# Patient Record
Sex: MALE | Race: OTHER | HISPANIC OR LATINO | URBAN - METROPOLITAN AREA
[De-identification: names, ages, dates, MRNs, and addresses within clinical notes are randomized per-mention and may not be internally consistent; named-entity substitution may affect disease eponyms.]

---

## 2017-07-11 ENCOUNTER — EMERGENCY (EMERGENCY)
Facility: HOSPITAL | Age: 37
LOS: 1 days | Discharge: HOME | End: 2017-07-11
Attending: EMERGENCY MEDICINE | Admitting: EMERGENCY MEDICINE
Payer: MEDICAID

## 2017-07-11 VITALS
HEART RATE: 84 BPM | DIASTOLIC BLOOD PRESSURE: 90 MMHG | SYSTOLIC BLOOD PRESSURE: 138 MMHG | TEMPERATURE: 98 F | OXYGEN SATURATION: 96 % | RESPIRATION RATE: 18 BRPM

## 2017-07-11 LAB
ALBUMIN SERPL ELPH-MCNC: 4.2 G/DL — SIGNIFICANT CHANGE UP (ref 3.3–5)
ALP SERPL-CCNC: 65 U/L — SIGNIFICANT CHANGE UP (ref 40–120)
ALT FLD-CCNC: 52 U/L RC — HIGH (ref 10–45)
ANION GAP SERPL CALC-SCNC: 11 MMOL/L — SIGNIFICANT CHANGE UP (ref 5–17)
APPEARANCE UR: CLEAR — SIGNIFICANT CHANGE UP
AST SERPL-CCNC: 28 U/L — SIGNIFICANT CHANGE UP (ref 10–40)
BACTERIA # UR AUTO: ABNORMAL /HPF
BASOPHILS # BLD AUTO: 0 K/UL — SIGNIFICANT CHANGE UP (ref 0–0.2)
BASOPHILS NFR BLD AUTO: 0.2 % — SIGNIFICANT CHANGE UP (ref 0–2)
BILIRUB SERPL-MCNC: 0.3 MG/DL — SIGNIFICANT CHANGE UP (ref 0.2–1.2)
BILIRUB UR-MCNC: NEGATIVE — SIGNIFICANT CHANGE UP
BUN SERPL-MCNC: 16 MG/DL — SIGNIFICANT CHANGE UP (ref 7–23)
CALCIUM SERPL-MCNC: 9.2 MG/DL — SIGNIFICANT CHANGE UP (ref 8.4–10.5)
CHLORIDE SERPL-SCNC: 104 MMOL/L — SIGNIFICANT CHANGE UP (ref 96–108)
CO2 SERPL-SCNC: 27 MMOL/L — SIGNIFICANT CHANGE UP (ref 22–31)
COLOR SPEC: YELLOW — SIGNIFICANT CHANGE UP
CREAT SERPL-MCNC: 0.89 MG/DL — SIGNIFICANT CHANGE UP (ref 0.5–1.3)
DIFF PNL FLD: NEGATIVE — SIGNIFICANT CHANGE UP
EOSINOPHIL # BLD AUTO: 0.1 K/UL — SIGNIFICANT CHANGE UP (ref 0–0.5)
EOSINOPHIL NFR BLD AUTO: 0.5 % — SIGNIFICANT CHANGE UP (ref 0–6)
EPI CELLS # UR: SIGNIFICANT CHANGE UP /HPF
GLUCOSE SERPL-MCNC: 97 MG/DL — SIGNIFICANT CHANGE UP (ref 70–99)
GLUCOSE UR QL: NEGATIVE — SIGNIFICANT CHANGE UP
HCT VFR BLD CALC: 46.6 % — SIGNIFICANT CHANGE UP (ref 39–50)
HGB BLD-MCNC: 16 G/DL — SIGNIFICANT CHANGE UP (ref 13–17)
KETONES UR-MCNC: NEGATIVE — SIGNIFICANT CHANGE UP
LEUKOCYTE ESTERASE UR-ACNC: ABNORMAL
LYMPHOCYTES # BLD AUTO: 21.9 % — SIGNIFICANT CHANGE UP (ref 13–44)
LYMPHOCYTES # BLD AUTO: 3.1 K/UL — SIGNIFICANT CHANGE UP (ref 1–3.3)
MCHC RBC-ENTMCNC: 33.3 PG — SIGNIFICANT CHANGE UP (ref 27–34)
MCHC RBC-ENTMCNC: 34.4 GM/DL — SIGNIFICANT CHANGE UP (ref 32–36)
MCV RBC AUTO: 96.9 FL — SIGNIFICANT CHANGE UP (ref 80–100)
MONOCYTES # BLD AUTO: 1.2 K/UL — HIGH (ref 0–0.9)
MONOCYTES NFR BLD AUTO: 8.1 % — SIGNIFICANT CHANGE UP (ref 2–14)
NEUTROPHILS # BLD AUTO: 9.8 K/UL — HIGH (ref 1.8–7.4)
NEUTROPHILS NFR BLD AUTO: 69.2 % — SIGNIFICANT CHANGE UP (ref 43–77)
NITRITE UR-MCNC: NEGATIVE — SIGNIFICANT CHANGE UP
PH UR: 6 — SIGNIFICANT CHANGE UP (ref 5–8)
PLATELET # BLD AUTO: 220 K/UL — SIGNIFICANT CHANGE UP (ref 150–400)
POTASSIUM SERPL-MCNC: 4.2 MMOL/L — SIGNIFICANT CHANGE UP (ref 3.5–5.3)
POTASSIUM SERPL-SCNC: 4.2 MMOL/L — SIGNIFICANT CHANGE UP (ref 3.5–5.3)
PROT SERPL-MCNC: 7.5 G/DL — SIGNIFICANT CHANGE UP (ref 6–8.3)
PROT UR-MCNC: 30 MG/DL
RBC # BLD: 4.81 M/UL — SIGNIFICANT CHANGE UP (ref 4.2–5.8)
RBC # FLD: 11.4 % — SIGNIFICANT CHANGE UP (ref 10.3–14.5)
RBC CASTS # UR COMP ASSIST: SIGNIFICANT CHANGE UP /HPF (ref 0–2)
SODIUM SERPL-SCNC: 142 MMOL/L — SIGNIFICANT CHANGE UP (ref 135–145)
SP GR SPEC: >1.03 — HIGH (ref 1.01–1.02)
UROBILINOGEN FLD QL: NEGATIVE — SIGNIFICANT CHANGE UP
WBC # BLD: 14.2 K/UL — HIGH (ref 3.8–10.5)
WBC # FLD AUTO: 14.2 K/UL — HIGH (ref 3.8–10.5)
WBC UR QL: ABNORMAL /HPF (ref 0–5)

## 2017-07-11 PROCEDURE — 76870 US EXAM SCROTUM: CPT | Mod: 26

## 2017-07-11 PROCEDURE — 99285 EMERGENCY DEPT VISIT HI MDM: CPT

## 2017-07-11 PROCEDURE — 93975 VASCULAR STUDY: CPT | Mod: 26

## 2017-07-11 RX ORDER — CEFTRIAXONE 500 MG/1
1 INJECTION, POWDER, FOR SOLUTION INTRAMUSCULAR; INTRAVENOUS EVERY 24 HOURS
Qty: 0 | Refills: 0 | Status: DISCONTINUED | OUTPATIENT
Start: 2017-07-11 | End: 2017-07-15

## 2017-07-11 RX ADMIN — CEFTRIAXONE 100 GRAM(S): 500 INJECTION, POWDER, FOR SOLUTION INTRAMUSCULAR; INTRAVENOUS at 23:57

## 2017-07-11 NOTE — ED PROVIDER NOTE - CARE PLAN
Principal Discharge DX:	Malignant neoplasm of descended left testis  Instructions for follow-up, activity and diet:	- Please follow up with urology.

## 2017-07-11 NOTE — ED PROVIDER NOTE - OBJECTIVE STATEMENT
36 y old male with a history of chronic back pain for I h after mild accident ,follow up by pain control doctor ,has been on oxycodon ,muscle relaxant ,came in complains of  pain and swelling in his l testicle on and off for several years ,for the last month pain increase and fees  what testicle is double size .Not sexually active ,denies penis discharge urinary frequency and urgency ,no fever or chills

## 2017-07-11 NOTE — ED ADULT NURSE NOTE - OBJECTIVE STATEMENT
36yr male presented to ED c/o L testicular pain.  Pt has no significant medical hx.  pt reports L testicular pain on and off for the past 1 week with the pain increasing the past 2 days, Pt reports the testicle is swollen as well, pt reports additional back pain with pain radiating to the L testicle.  Pt presents a&ox4, no n/v/d, no sob, no chest pain, no dizziness, no blood in urine or stool, no burning with urination, no problems with bowel movement, abdomin soft non distended, non tender to the touch, skin warm dry & intact.

## 2017-07-11 NOTE — ED PROVIDER NOTE - MEDICAL DECISION MAKING DETAILS
Pt is a 36 year old M with no PMH that presents with testicular pain and swelling. He is not sexually active. US of the testicle was ordered and results were consistent with testicular cancer. Urology was consulted and they suggested a LDH, AFP, and HCG. They also suggested a CXR. Pt is ready for d/c with outpt follow up. Pt is a 36 year old M with no PMH that presents with testicular pain and swelling for several y which is worst recently . He is not sexually active. ro hydrocelle ro malignancy  will obtain  US of the testicle   also blood test and ua   cons and on reevaluation: KRYSTAL

## 2017-07-11 NOTE — ED PROVIDER NOTE - GENITOURINARY, MLM
enlarged left testicle. enlarged left testicle.very hard and tender no inguinals lymph node enlargement

## 2017-07-11 NOTE — ED ADULT NURSE NOTE - CHPI ED SYMPTOMS NEG
no hematuria/no nausea/no burning urination/no chills/no fever/no dysuria/no diarrhea/no abdominal distension/no vomiting/no blood in stool

## 2017-07-11 NOTE — ED PROVIDER NOTE - PROGRESS NOTE DETAILS
ATTENDING MD Honorio: ultrasound results c/w malignancy, resident is consulting urology Urology c/s appreciated. Pt to follow up as outpt with Dr. Restrepo. Labs and CXR performed.

## 2017-07-12 VITALS
OXYGEN SATURATION: 98 % | DIASTOLIC BLOOD PRESSURE: 63 MMHG | SYSTOLIC BLOOD PRESSURE: 106 MMHG | HEART RATE: 73 BPM | RESPIRATION RATE: 17 BRPM

## 2017-07-12 PROBLEM — Z00.00 ENCOUNTER FOR PREVENTIVE HEALTH EXAMINATION: Noted: 2017-07-12

## 2017-07-12 LAB
AFP-TM SERPL-MCNC: 25.5 NG/ML — HIGH
HCG SERPL-ACNC: <2 MIU/ML — SIGNIFICANT CHANGE UP
LDH SERPL L TO P-CCNC: 146 U/L — SIGNIFICANT CHANGE UP (ref 50–242)

## 2017-07-12 PROCEDURE — 71010: CPT | Mod: 26

## 2017-07-12 PROCEDURE — 83615 LACTATE (LD) (LDH) ENZYME: CPT

## 2017-07-12 PROCEDURE — 71045 X-RAY EXAM CHEST 1 VIEW: CPT

## 2017-07-12 PROCEDURE — 85027 COMPLETE CBC AUTOMATED: CPT

## 2017-07-12 PROCEDURE — 96374 THER/PROPH/DIAG INJ IV PUSH: CPT

## 2017-07-12 PROCEDURE — 87086 URINE CULTURE/COLONY COUNT: CPT

## 2017-07-12 PROCEDURE — 76870 US EXAM SCROTUM: CPT

## 2017-07-12 PROCEDURE — 81001 URINALYSIS AUTO W/SCOPE: CPT

## 2017-07-12 PROCEDURE — 93975 VASCULAR STUDY: CPT

## 2017-07-12 PROCEDURE — 84702 CHORIONIC GONADOTROPIN TEST: CPT

## 2017-07-12 PROCEDURE — 99284 EMERGENCY DEPT VISIT MOD MDM: CPT | Mod: 25

## 2017-07-12 PROCEDURE — 82105 ALPHA-FETOPROTEIN SERUM: CPT

## 2017-07-12 PROCEDURE — 80053 COMPREHEN METABOLIC PANEL: CPT

## 2017-07-12 NOTE — CONSULT NOTE ADULT - ASSESSMENT
36 y.o. M with testicular mass   - tumor markers: LDH, AFP, Bhcg  - CXR  - f/u outpatient with urologist Dr. Phong Restrepo at the [facility] [phone]  - d/w Jono Arana, urology resident

## 2017-07-12 NOTE — CONSULT NOTE ADULT - SUBJECTIVE AND OBJECTIVE BOX
Urology PA Consult Note    HPI:  Patient is a 36y Male with no significant PMHx presents today with 3 week history of L testicular pain and swelling. Pt states L testicular pain radiating to lower back and swelling has been on and off for the past 3 weeks, but has constant for the past week. States in the past few years has had pain and swelling on and off which resolved. Pt reports he is able to void without any issue, denies urinary urgency or frequency. 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 Pt denies family history of testicular Ca. Denies penile discharge, denies current sexual activity, dysuria, hematuria, fevers/chills, n/v, chest pain or SOB.      PAST MEDICAL & SURGICAL HISTORY:  No pertinent past medical history  No significant past surgical history    MEDICATIONS  (STANDING):  cefTRIAXone   IVPB 1 Gram(s) IV Intermittent every 24 hours    MEDICATIONS  (PRN):    FAMILY HISTORY:    Allergies    No Known Allergies    Intolerances      REVIEW OF SYSTEMS: Pertinent positives and negatives as stated in HPI, otherwise negative    Vital signs  T(C): 36.9 (07-11-17 @ 18:34), Max: 36.9 (07-11-17 @ 18:34)  HR: 74 (07-12-17 @ 00:20)  BP: 110/67 (07-12-17 @ 00:20)  SpO2: 96% (07-12-17 @ 00:20)  Wt(kg): --    Output  I&O's Summary    UOP    Physical Exam  Gen: NAD, A&O x 3  Pulm: Nonlabored breathing  CV: RRR  Abd: Soft, NT/ND  : Uncircumcised, no lesions.  No discharge or blood at urethral meatus.  Testes descended bilaterally.  L testicular swelling with hard, tender, nonmobile mass palpated.     Back: no cva tenderness b/l  Ext: No edema present b/l    LABS:      07-11 @ 20:05    WBC 14.2  / Hct 46.6  / SCr 0.89     07-11    142  |  104  |  16  ----------------------------<  97  4.2   |  27  |  0.89    Ca    9.2      11 Jul 2017 20:05    TPro  7.5  /  Alb  4.2  /  TBili  0.3  /  DBili  x   /  AST  28  /  ALT  52<H>  /  AlkPhos  65  07-11      Urinalysis Basic - ( 11 Jul 2017 20:47 )    Color: Yellow / Appearance: Clear / SG: >1.030 / pH: x  Gluc: x / Ketone: Negative  / Bili: Negative / Urobili: Negative   Blood: x / Protein: 30 mg/dL / Nitrite: Negative   Leuk Esterase: Moderate / RBC: 0-2 /HPF / WBC 5-10 /HPF   Sq Epi: x / Non Sq Epi: x / Bacteria: Few /HPF        Cultures: pending    RADIOLOGY:    Testicular U/S FINDINGS:      RIGHT:  Right testis: 4.6 x 2.6 x 3.3 cm. Normal echogenicity and echotexture   with no masses or areas of architectural distortion. Normal blood flow   pattern.  Right epididymis: Within normal limits.  Right hydrocele: Small hydrocele.  Right varicocele: None.      LEFT:   Left testis: 5.7 x 4.3 x 4.3 cm. Within the left testicle there is a   heterogeneous mass measuring 3.8 x 3.8 x 3.7 cm with mixed cystic and   solid areas. The mass demonstrates internal vascular flow.  Left epididymis: Within normal limits.  Left hydrocele: Moderate hydrocele.  Left varicocele: None.    IMPRESSION:   Mixed cystic and solid left testicular mass compatible with malignancy.   Follow-up CT may be obtained to evaluate for adenopathy.  Moderate right and small left hydroceles. Urology PA Consult Note    HPI:  Patient is a 36y Male with no significant PMHx presents today with 3 week history of L testicular pain and swelling. Pt states L testicular pain radiating to lower back and swelling has been on and off for the past 3 weeks, but has remained constant for the past week. States in the past few years has had pain and swelling on and off which resolved. Pt reports he is able to void without any issue, denies urinary urgency or frequency. 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 Pt denies family history of testicular Ca. Denies penile discharge, denies current sexual activity, dysuria, hematuria, fevers/chills, n/v, chest pain or SOB.      PAST MEDICAL & SURGICAL HISTORY:  No pertinent past medical history  No significant past surgical history    MEDICATIONS  (STANDING):  cefTRIAXone   IVPB 1 Gram(s) IV Intermittent every 24 hours    MEDICATIONS  (PRN):    FAMILY HISTORY:    Allergies    No Known Allergies    Intolerances      REVIEW OF SYSTEMS: Pertinent positives and negatives as stated in HPI, otherwise negative    Vital signs  T(C): 36.9 (07-11-17 @ 18:34), Max: 36.9 (07-11-17 @ 18:34)  HR: 74 (07-12-17 @ 00:20)  BP: 110/67 (07-12-17 @ 00:20)  SpO2: 96% (07-12-17 @ 00:20)  Wt(kg): --    Output  I&O's Summary    UOP    Physical Exam  Gen: NAD, A&O x 3  Pulm: Nonlabored breathing  CV: RRR  Abd: Soft, NT/ND  : Uncircumcised, no lesions.  No discharge or blood at urethral meatus.  Testes descended bilaterally.  L testicular swelling with hard, tender, nonmobile mass palpated.     Back: no cva tenderness b/l  Ext: No edema present b/l    LABS:      07-11 @ 20:05    WBC 14.2  / Hct 46.6  / SCr 0.89     07-11    142  |  104  |  16  ----------------------------<  97  4.2   |  27  |  0.89    Ca    9.2      11 Jul 2017 20:05    TPro  7.5  /  Alb  4.2  /  TBili  0.3  /  DBili  x   /  AST  28  /  ALT  52<H>  /  AlkPhos  65  07-11      Urinalysis Basic - ( 11 Jul 2017 20:47 )    Color: Yellow / Appearance: Clear / SG: >1.030 / pH: x  Gluc: x / Ketone: Negative  / Bili: Negative / Urobili: Negative   Blood: x / Protein: 30 mg/dL / Nitrite: Negative   Leuk Esterase: Moderate / RBC: 0-2 /HPF / WBC 5-10 /HPF   Sq Epi: x / Non Sq Epi: x / Bacteria: Few /HPF        Cultures: pending    RADIOLOGY:    Testicular U/S FINDINGS:      RIGHT:  Right testis: 4.6 x 2.6 x 3.3 cm. Normal echogenicity and echotexture   with no masses or areas of architectural distortion. Normal blood flow   pattern.  Right epididymis: Within normal limits.  Right hydrocele: Small hydrocele.  Right varicocele: None.      LEFT:   Left testis: 5.7 x 4.3 x 4.3 cm. Within the left testicle there is a   heterogeneous mass measuring 3.8 x 3.8 x 3.7 cm with mixed cystic and   solid areas. The mass demonstrates internal vascular flow.  Left epididymis: Within normal limits.  Left hydrocele: Moderate hydrocele.  Left varicocele: None.    IMPRESSION:   Mixed cystic and solid left testicular mass compatible with malignancy.   Follow-up CT may be obtained to evaluate for adenopathy.  Moderate right and small left hydroceles.

## 2017-07-12 NOTE — ED ADULT NURSE REASSESSMENT NOTE - NS ED NURSE REASSESS COMMENT FT1
Pt resting in room with family at bedside, no complaints at this time, VSS, IV site clear fluids running, a&ox4, waiting for plan of care.

## 2017-07-12 NOTE — ED ADULT NURSE REASSESSMENT NOTE - NS ED NURSE REASSESS COMMENT FT1
Pt discharged received written & verbal instructions, Pt verbalized understanding, IV d/c site clear no redness or swelling, VSS, a&ox4.

## 2017-07-13 ENCOUNTER — APPOINTMENT (OUTPATIENT)
Dept: UROLOGY | Facility: CLINIC | Age: 37
End: 2017-07-13

## 2017-07-13 DIAGNOSIS — Z84.1 FAMILY HISTORY OF DISORDERS OF KIDNEY AND URETER: ICD-10-CM

## 2017-07-13 LAB
CULTURE RESULTS: SIGNIFICANT CHANGE UP
SPECIMEN SOURCE: SIGNIFICANT CHANGE UP

## 2017-07-15 ENCOUNTER — OUTPATIENT (OUTPATIENT)
Dept: OUTPATIENT SERVICES | Facility: HOSPITAL | Age: 37
LOS: 1 days | End: 2017-07-15
Payer: MEDICAID

## 2017-07-15 ENCOUNTER — APPOINTMENT (OUTPATIENT)
Dept: CT IMAGING | Facility: CLINIC | Age: 37
End: 2017-07-15

## 2017-07-15 DIAGNOSIS — Z80.42 FAMILY HISTORY OF MALIGNANT NEOPLASM OF PROSTATE: ICD-10-CM

## 2017-07-15 PROCEDURE — 74177 CT ABD & PELVIS W/CONTRAST: CPT

## 2017-07-17 ENCOUNTER — OUTPATIENT (OUTPATIENT)
Dept: OUTPATIENT SERVICES | Facility: HOSPITAL | Age: 37
LOS: 1 days | End: 2017-07-17

## 2017-07-17 VITALS
HEIGHT: 72 IN | DIASTOLIC BLOOD PRESSURE: 74 MMHG | SYSTOLIC BLOOD PRESSURE: 102 MMHG | HEART RATE: 80 BPM | TEMPERATURE: 97 F | WEIGHT: 223.11 LBS | RESPIRATION RATE: 16 BRPM

## 2017-07-17 DIAGNOSIS — N50.9 DISORDER OF MALE GENITAL ORGANS, UNSPECIFIED: ICD-10-CM

## 2017-07-17 LAB
ALBUMIN SERPL ELPH-MCNC: 4.3 G/DL — SIGNIFICANT CHANGE UP (ref 3.3–5)
ALP SERPL-CCNC: 62 U/L — SIGNIFICANT CHANGE UP (ref 40–120)
ALT FLD-CCNC: 55 U/L — HIGH (ref 4–41)
APPEARANCE UR: CLEAR — SIGNIFICANT CHANGE UP
AST SERPL-CCNC: 31 U/L — SIGNIFICANT CHANGE UP (ref 4–40)
BACTERIA # UR AUTO: SIGNIFICANT CHANGE UP
BILIRUB SERPL-MCNC: 0.3 MG/DL — SIGNIFICANT CHANGE UP (ref 0.2–1.2)
BILIRUB UR-MCNC: NEGATIVE — SIGNIFICANT CHANGE UP
BLOOD UR QL VISUAL: NEGATIVE — SIGNIFICANT CHANGE UP
BUN SERPL-MCNC: 16 MG/DL — SIGNIFICANT CHANGE UP (ref 7–23)
CALCIUM SERPL-MCNC: 9.2 MG/DL — SIGNIFICANT CHANGE UP (ref 8.4–10.5)
CHLORIDE SERPL-SCNC: 101 MMOL/L — SIGNIFICANT CHANGE UP (ref 98–107)
CO2 SERPL-SCNC: 29 MMOL/L — SIGNIFICANT CHANGE UP (ref 22–31)
COLOR SPEC: YELLOW — SIGNIFICANT CHANGE UP
CREAT SERPL-MCNC: 0.84 MG/DL — SIGNIFICANT CHANGE UP (ref 0.5–1.3)
GLUCOSE SERPL-MCNC: 97 MG/DL — SIGNIFICANT CHANGE UP (ref 70–99)
GLUCOSE UR-MCNC: NEGATIVE — SIGNIFICANT CHANGE UP
HCT VFR BLD CALC: 46.5 % — SIGNIFICANT CHANGE UP (ref 39–50)
HGB BLD-MCNC: 15.6 G/DL — SIGNIFICANT CHANGE UP (ref 13–17)
KETONES UR-MCNC: NEGATIVE — SIGNIFICANT CHANGE UP
LEUKOCYTE ESTERASE UR-ACNC: NEGATIVE — SIGNIFICANT CHANGE UP
MCHC RBC-ENTMCNC: 31.3 PG — SIGNIFICANT CHANGE UP (ref 27–34)
MCHC RBC-ENTMCNC: 33.5 % — SIGNIFICANT CHANGE UP (ref 32–36)
MCV RBC AUTO: 93.2 FL — SIGNIFICANT CHANGE UP (ref 80–100)
MUCOUS THREADS # UR AUTO: SIGNIFICANT CHANGE UP
NITRITE UR-MCNC: NEGATIVE — SIGNIFICANT CHANGE UP
NON-SQ EPI CELLS # UR AUTO: <1 — SIGNIFICANT CHANGE UP
NRBC # FLD: 0 — SIGNIFICANT CHANGE UP
PH UR: 6 — SIGNIFICANT CHANGE UP (ref 4.6–8)
PLATELET # BLD AUTO: 227 K/UL — SIGNIFICANT CHANGE UP (ref 150–400)
PMV BLD: 10.1 FL — SIGNIFICANT CHANGE UP (ref 7–13)
POTASSIUM SERPL-MCNC: 4.5 MMOL/L — SIGNIFICANT CHANGE UP (ref 3.5–5.3)
POTASSIUM SERPL-SCNC: 4.5 MMOL/L — SIGNIFICANT CHANGE UP (ref 3.5–5.3)
PROT SERPL-MCNC: 7.7 G/DL — SIGNIFICANT CHANGE UP (ref 6–8.3)
PROT UR-MCNC: 20 — SIGNIFICANT CHANGE UP
RBC # BLD: 4.99 M/UL — SIGNIFICANT CHANGE UP (ref 4.2–5.8)
RBC # FLD: 12.3 % — SIGNIFICANT CHANGE UP (ref 10.3–14.5)
RBC CASTS # UR COMP ASSIST: SIGNIFICANT CHANGE UP (ref 0–?)
SODIUM SERPL-SCNC: 141 MMOL/L — SIGNIFICANT CHANGE UP (ref 135–145)
SP GR SPEC: 1.03 — SIGNIFICANT CHANGE UP (ref 1–1.03)
SQUAMOUS # UR AUTO: SIGNIFICANT CHANGE UP
UROBILINOGEN FLD QL: NORMAL E.U. — SIGNIFICANT CHANGE UP (ref 0.1–0.2)
WBC # BLD: 7.36 K/UL — SIGNIFICANT CHANGE UP (ref 3.8–10.5)
WBC # FLD AUTO: 7.36 K/UL — SIGNIFICANT CHANGE UP (ref 3.8–10.5)
WBC UR QL: SIGNIFICANT CHANGE UP (ref 0–?)

## 2017-07-17 NOTE — H&P PST ADULT - NEGATIVE NEUROLOGICAL SYMPTOMS
no headache/no generalized seizures/no difficulty walking/no weakness/no focal seizures/no transient paralysis/no paresthesias/no loss of sensation

## 2017-07-17 NOTE — H&P PST ADULT - NSANTHOSAYNRD_GEN_A_CORE
No. MALOU screening performed.  STOP BANG Legend: 0-2 = LOW Risk; 3-4 = INTERMEDIATE Risk; 5-8 = HIGH Risk

## 2017-07-17 NOTE — H&P PST ADULT - MUSCULOSKELETAL
details… detailed exam no calf tenderness/normal strength/no joint swelling/no joint erythema/ROM intact/no joint warmth

## 2017-07-17 NOTE — H&P PST ADULT - HISTORY OF PRESENT ILLNESS
35 yo male with PMH herniated lumbar disc presents to pre surgical testing.  Pt reports he woke up and felt extreme left testicular pain 7/11/17, pt went to "nearby ED", tumor marker was elevated. Pt reports sonogram and CT done, left testicular mass seen, suspicion for testicular cancer.  Pt is scheduled for left radical orchiectomy on 7/20/17. 35 yo male with PMH herniated lumbar disc presents to pre surgical testing.  Pt reports he woke up and felt extreme left testicular pain 7/11/17, pt went to ED, tumor marker was elevated. Pt reports sonogram and CT done, left testicular mass seen, suspicion for testicular cancer.  Pt is scheduled for left radical orchiectomy on 7/20/17.

## 2017-07-17 NOTE — H&P PST ADULT - RS GEN PE MLT RESP DETAILS PC
clear to auscultation bilaterally/no rhonchi/no wheezes/no chest wall tenderness/no subcutaneous emphysema/good air movement/breath sounds equal/no intercostal retractions/respirations non-labored/no rales

## 2017-07-17 NOTE — H&P PST ADULT - GENITOURINARY COMMENTS
was told he had UTI 7/11/17, was given IV antibiotics, did not take oral antibiotics post discharge was told he had UTI 7/11/17, was given IV antibiotics, did not take oral antibiotics post discharge from ED

## 2017-07-17 NOTE — H&P PST ADULT - ATTENDING COMMENTS
Discussed risk and complications with patient and parents.  Patient aware of need for additional treatment after radical orchiectomy  Patient does not want a prosthesis.  We discussed cryopreservation of sperm which he will embark on immediately after surgery

## 2017-07-17 NOTE — H&P PST ADULT - PROBLEM SELECTOR PLAN 1
Pt is scheduled for left radical orchiectomy on 7/20/17.  Pre op instructions given and pt able to verbalize understanding.

## 2017-07-17 NOTE — H&P PST ADULT - NEGATIVE MUSCULOSKELETAL SYMPTOMS
no leg pain L/no myalgia/no arthralgia/no arm pain L/no arm pain R/no joint swelling/no muscle weakness/no stiffness/no leg pain R/no muscle cramps/no neck pain

## 2017-07-17 NOTE — H&P PST ADULT - FAMILY HISTORY
Aunt  Still living? No  Family history of diabetes mellitus, Age at diagnosis: Age Unknown     Grandparent  Still living? No  Family history of ischemic heart disease, Age at diagnosis: Age Unknown  Family history of cancer, Age at diagnosis: Age Unknown

## 2017-07-17 NOTE — H&P PST ADULT - NEGATIVE CARDIOVASCULAR SYMPTOMS
no palpitations/no chest pain/no peripheral edema/no dyspnea on exertion/no orthopnea/no claudication/no paroxysmal nocturnal dyspnea

## 2017-07-17 NOTE — H&P PST ADULT - LYMPHATIC
supraclavicular L/posterior cervical L/anterior cervical R/supraclavicular R/anterior cervical L/posterior cervical R

## 2017-07-18 ENCOUNTER — MOBILE ON CALL (OUTPATIENT)
Age: 37
End: 2017-07-18

## 2017-07-19 LAB
BACTERIA UR CULT: SIGNIFICANT CHANGE UP
SPECIMEN SOURCE: SIGNIFICANT CHANGE UP

## 2017-07-20 ENCOUNTER — TRANSCRIPTION ENCOUNTER (OUTPATIENT)
Age: 37
End: 2017-07-20

## 2017-07-20 ENCOUNTER — OUTPATIENT (OUTPATIENT)
Dept: OUTPATIENT SERVICES | Facility: HOSPITAL | Age: 37
LOS: 1 days | Discharge: HOME | End: 2017-07-20
Payer: MEDICAID

## 2017-07-20 ENCOUNTER — MOBILE ON CALL (OUTPATIENT)
Age: 37
End: 2017-07-20

## 2017-07-20 ENCOUNTER — RESULT REVIEW (OUTPATIENT)
Age: 37
End: 2017-07-20

## 2017-07-20 ENCOUNTER — APPOINTMENT (OUTPATIENT)
Dept: UROLOGY | Facility: AMBULATORY SURGERY CENTER | Age: 37
End: 2017-07-20

## 2017-07-20 VITALS
SYSTOLIC BLOOD PRESSURE: 109 MMHG | TEMPERATURE: 98 F | WEIGHT: 223.11 LBS | HEART RATE: 72 BPM | DIASTOLIC BLOOD PRESSURE: 70 MMHG | HEIGHT: 72 IN | OXYGEN SATURATION: 98 % | RESPIRATION RATE: 16 BRPM

## 2017-07-20 VITALS
RESPIRATION RATE: 13 BRPM | HEART RATE: 83 BPM | OXYGEN SATURATION: 98 % | SYSTOLIC BLOOD PRESSURE: 107 MMHG | TEMPERATURE: 98 F | DIASTOLIC BLOOD PRESSURE: 69 MMHG

## 2017-07-20 DIAGNOSIS — N50.9 DISORDER OF MALE GENITAL ORGANS, UNSPECIFIED: ICD-10-CM

## 2017-07-20 PROCEDURE — 54530 REMOVAL OF TESTIS: CPT | Mod: LT

## 2017-07-20 PROCEDURE — 88309 TISSUE EXAM BY PATHOLOGIST: CPT | Mod: 26

## 2017-07-20 PROCEDURE — 88342 IMHCHEM/IMCYTCHM 1ST ANTB: CPT | Mod: 26

## 2017-07-20 PROCEDURE — 88341 IMHCHEM/IMCYTCHM EA ADD ANTB: CPT | Mod: 26

## 2017-07-20 NOTE — ASU DISCHARGE PLAN (ADULT/PEDIATRIC). - SPECIAL INSTRUCTIONS
ice if needed for 20 minutes at a time   increase fiber in diet to help with possible constipation from narcotics that are home

## 2017-07-20 NOTE — ASU DISCHARGE PLAN (ADULT/PEDIATRIC). - NOTIFY
Fever greater than 101/Inability to Tolerate Liquids or Foods/Bleeding that does not stop/Pain not relieved by Medications/Unable to Urinate/Persistent Nausea and Vomiting Swelling that continues/Inability to Tolerate Liquids or Foods/Bleeding that does not stop/Persistent Nausea and Vomiting/Pain not relieved by Medications/Fever greater than 101/Unable to Urinate

## 2017-07-27 ENCOUNTER — APPOINTMENT (OUTPATIENT)
Dept: HUMAN REPRODUCTION | Facility: CLINIC | Age: 37
End: 2017-07-27

## 2017-08-02 ENCOUNTER — APPOINTMENT (OUTPATIENT)
Dept: UROLOGY | Facility: CLINIC | Age: 37
End: 2017-08-02
Payer: MEDICAID

## 2017-08-02 LAB
BASOPHILS # BLD AUTO: 0.02 K/UL
BASOPHILS NFR BLD AUTO: 0.2 %
EOSINOPHIL # BLD AUTO: 0.14 K/UL
EOSINOPHIL NFR BLD AUTO: 1.6 %
HCT VFR BLD CALC: 48.8 %
HGB BLD-MCNC: 15.7 G/DL
IMM GRANULOCYTES NFR BLD AUTO: 0.8 %
LYMPHOCYTES # BLD AUTO: 2.72 K/UL
LYMPHOCYTES NFR BLD AUTO: 31.7 %
MAN DIFF?: NORMAL
MCHC RBC-ENTMCNC: 31.1 PG
MCHC RBC-ENTMCNC: 32.2 GM/DL
MCV RBC AUTO: 96.6 FL
MONOCYTES # BLD AUTO: 0.82 K/UL
MONOCYTES NFR BLD AUTO: 9.6 %
NEUTROPHILS # BLD AUTO: 4.81 K/UL
NEUTROPHILS NFR BLD AUTO: 56.1 %
PLATELET # BLD AUTO: 264 K/UL
RBC # BLD: 5.05 M/UL
RBC # FLD: 13 %
WBC # FLD AUTO: 8.58 K/UL

## 2017-08-02 PROCEDURE — 99213 OFFICE O/P EST LOW 20 MIN: CPT

## 2017-08-07 ENCOUNTER — OUTPATIENT (OUTPATIENT)
Dept: OUTPATIENT SERVICES | Facility: HOSPITAL | Age: 37
LOS: 1 days | Discharge: HOME | End: 2017-08-07

## 2017-08-07 DIAGNOSIS — C62.90 MALIGNANT NEOPLASM OF UNSPECIFIED TESTIS, UNSPECIFIED WHETHER DESCENDED OR UNDESCENDED: ICD-10-CM

## 2017-08-09 ENCOUNTER — APPOINTMENT (OUTPATIENT)
Dept: HEMATOLOGY ONCOLOGY | Facility: CLINIC | Age: 37
End: 2017-08-09
Payer: MEDICAID

## 2017-08-09 ENCOUNTER — RESULT REVIEW (OUTPATIENT)
Age: 37
End: 2017-08-09

## 2017-08-09 VITALS
OXYGEN SATURATION: 95 % | HEIGHT: 73.23 IN | WEIGHT: 228.17 LBS | SYSTOLIC BLOOD PRESSURE: 113 MMHG | DIASTOLIC BLOOD PRESSURE: 78 MMHG | HEART RATE: 76 BPM | BODY MASS INDEX: 29.92 KG/M2 | RESPIRATION RATE: 16 BRPM | TEMPERATURE: 98.6 F

## 2017-08-09 DIAGNOSIS — Z87.898 PERSONAL HISTORY OF OTHER SPECIFIED CONDITIONS: ICD-10-CM

## 2017-08-09 DIAGNOSIS — F17.200 NICOTINE DEPENDENCE, UNSPECIFIED, UNCOMPLICATED: ICD-10-CM

## 2017-08-09 DIAGNOSIS — Z80.42 FAMILY HISTORY OF MALIGNANT NEOPLASM OF PROSTATE: ICD-10-CM

## 2017-08-09 DIAGNOSIS — Z80.49 FAMILY HISTORY OF MALIGNANT NEOPLASM OF OTHER GENITAL ORGANS: ICD-10-CM

## 2017-08-09 DIAGNOSIS — Z87.39 PERSONAL HISTORY OF OTHER DISEASES OF THE MUSCULOSKELETAL SYSTEM AND CONNECTIVE TISSUE: ICD-10-CM

## 2017-08-09 LAB
HCT VFR BLD CALC: 47.3 % — SIGNIFICANT CHANGE UP (ref 39–50)
HGB BLD-MCNC: 16.5 G/DL — SIGNIFICANT CHANGE UP (ref 13–17)
MCHC RBC-ENTMCNC: 33 PG — SIGNIFICANT CHANGE UP (ref 27–34)
MCHC RBC-ENTMCNC: 35 G/DL — SIGNIFICANT CHANGE UP (ref 32–36)
MCV RBC AUTO: 94.5 FL — SIGNIFICANT CHANGE UP (ref 80–100)
PLATELET # BLD AUTO: 219 K/UL — SIGNIFICANT CHANGE UP (ref 150–400)
RBC # BLD: 5.01 M/UL — SIGNIFICANT CHANGE UP (ref 4.2–5.8)
RBC # FLD: 11.6 % — SIGNIFICANT CHANGE UP (ref 10.3–14.5)
WBC # BLD: 8.2 K/UL — SIGNIFICANT CHANGE UP (ref 3.8–10.5)
WBC # FLD AUTO: 8.2 K/UL — SIGNIFICANT CHANGE UP (ref 3.8–10.5)

## 2017-08-09 PROCEDURE — 99205 OFFICE O/P NEW HI 60 MIN: CPT

## 2017-08-09 RX ORDER — TIZANIDINE 2 MG/1
2 TABLET ORAL
Qty: 30 | Refills: 0 | Status: DISCONTINUED | COMMUNITY
Start: 2017-03-30 | End: 2017-08-09

## 2017-08-10 DIAGNOSIS — C62.92 MALIGNANT NEOPLASM OF LEFT TESTIS, UNSPECIFIED WHETHER DESCENDED OR UNDESCENDED: ICD-10-CM

## 2017-08-11 LAB
AFP-TM SERPL-MCNC: 20.7 NG/ML
AFPL3 RESULTS RECEIVED: NORMAL
ALBUMIN SERPL ELPH-MCNC: 4.4 G/DL
ALP BLD-CCNC: 60 U/L
ALPHA-1-FETOPROTEIN-L3: NORMAL %
ALPHA-1-FETOPROTEIN: 25.6 NG/ML
ALT SERPL-CCNC: 60 U/L
ANION GAP SERPL CALC-SCNC: 13 MMOL/L
AST SERPL-CCNC: 35 U/L
BILIRUB SERPL-MCNC: 0.4 MG/DL
BUN SERPL-MCNC: 14 MG/DL
CALCIUM SERPL-MCNC: 9.5 MG/DL
CHLORIDE SERPL-SCNC: 103 MMOL/L
CO2 SERPL-SCNC: 27 MMOL/L
CREAT SERPL-MCNC: 0.89 MG/DL
GLUCOSE SERPL-MCNC: 86 MG/DL
HBV CORE IGG+IGM SER QL: NONREACTIVE
HBV SURFACE AG SER QL: NONREACTIVE
HCG SERPL-MCNC: <1 MIU/ML
HCG SERPL-MCNC: <1 MIU/ML
HCV AB SER QL: NONREACTIVE
HCV S/CO RATIO: 0.16 S/CO
LDH SERPL-CCNC: 164 U/L
LDH SERPL-CCNC: 192 U/L
MAGNESIUM SERPL-MCNC: 2.1 MG/DL
POTASSIUM SERPL-SCNC: 4.4 MMOL/L
PROT SERPL-MCNC: 7.7 G/DL
SODIUM SERPL-SCNC: 143 MMOL/L

## 2017-08-14 ENCOUNTER — RESULT REVIEW (OUTPATIENT)
Age: 37
End: 2017-08-14

## 2017-08-14 ENCOUNTER — OTHER (OUTPATIENT)
Age: 37
End: 2017-08-14

## 2017-08-14 ENCOUNTER — APPOINTMENT (OUTPATIENT)
Dept: INFUSION THERAPY | Facility: HOSPITAL | Age: 37
End: 2017-08-14

## 2017-08-14 LAB
HCT VFR BLD CALC: 44 % — SIGNIFICANT CHANGE UP (ref 39–50)
HGB BLD-MCNC: 15.6 G/DL — SIGNIFICANT CHANGE UP (ref 13–17)
MCHC RBC-ENTMCNC: 33.5 PG — SIGNIFICANT CHANGE UP (ref 27–34)
MCHC RBC-ENTMCNC: 35.6 G/DL — SIGNIFICANT CHANGE UP (ref 32–36)
MCV RBC AUTO: 94.3 FL — SIGNIFICANT CHANGE UP (ref 80–100)
PLATELET # BLD AUTO: 201 K/UL — SIGNIFICANT CHANGE UP (ref 150–400)
RBC # BLD: 4.66 M/UL — SIGNIFICANT CHANGE UP (ref 4.2–5.8)
RBC # FLD: 11.7 % — SIGNIFICANT CHANGE UP (ref 10.3–14.5)
WBC # BLD: 8.3 K/UL — SIGNIFICANT CHANGE UP (ref 3.8–10.5)
WBC # FLD AUTO: 8.3 K/UL — SIGNIFICANT CHANGE UP (ref 3.8–10.5)

## 2017-08-15 ENCOUNTER — APPOINTMENT (OUTPATIENT)
Dept: INFUSION THERAPY | Facility: HOSPITAL | Age: 37
End: 2017-08-15

## 2017-08-15 ENCOUNTER — APPOINTMENT (OUTPATIENT)
Dept: UROLOGY | Facility: CLINIC | Age: 37
End: 2017-08-15

## 2017-08-15 DIAGNOSIS — Z51.11 ENCOUNTER FOR ANTINEOPLASTIC CHEMOTHERAPY: ICD-10-CM

## 2017-08-15 DIAGNOSIS — E86.0 DEHYDRATION: ICD-10-CM

## 2017-08-15 DIAGNOSIS — R11.2 NAUSEA WITH VOMITING, UNSPECIFIED: ICD-10-CM

## 2017-08-16 ENCOUNTER — APPOINTMENT (OUTPATIENT)
Dept: INFUSION THERAPY | Facility: HOSPITAL | Age: 37
End: 2017-08-16

## 2017-08-17 ENCOUNTER — APPOINTMENT (OUTPATIENT)
Dept: INFUSION THERAPY | Facility: HOSPITAL | Age: 37
End: 2017-08-17

## 2017-08-17 ENCOUNTER — RESULT REVIEW (OUTPATIENT)
Age: 37
End: 2017-08-17

## 2017-08-17 LAB
HCT VFR BLD CALC: 39.2 % — SIGNIFICANT CHANGE UP (ref 39–50)
HGB BLD-MCNC: 13.1 G/DL — SIGNIFICANT CHANGE UP (ref 13–17)
MCHC RBC-ENTMCNC: 31.8 PG — SIGNIFICANT CHANGE UP (ref 27–34)
MCHC RBC-ENTMCNC: 33.3 G/DL — SIGNIFICANT CHANGE UP (ref 32–36)
MCV RBC AUTO: 95.5 FL — SIGNIFICANT CHANGE UP (ref 80–100)
PLATELET # BLD AUTO: 193 K/UL — SIGNIFICANT CHANGE UP (ref 150–400)
RBC # BLD: 4.11 M/UL — LOW (ref 4.2–5.8)
RBC # FLD: 13.2 % — SIGNIFICANT CHANGE UP (ref 10.3–14.5)
WBC # BLD: 7.2 K/UL — SIGNIFICANT CHANGE UP (ref 3.8–10.5)
WBC # FLD AUTO: 7.2 K/UL — SIGNIFICANT CHANGE UP (ref 3.8–10.5)

## 2017-08-18 ENCOUNTER — APPOINTMENT (OUTPATIENT)
Dept: INFUSION THERAPY | Facility: HOSPITAL | Age: 37
End: 2017-08-18

## 2017-08-25 ENCOUNTER — INPATIENT (INPATIENT)
Facility: HOSPITAL | Age: 37
LOS: 3 days | Discharge: HOME | DRG: 603 | End: 2017-08-29
Attending: INTERNAL MEDICINE | Admitting: INTERNAL MEDICINE
Payer: MEDICAID

## 2017-08-25 VITALS
TEMPERATURE: 99 F | OXYGEN SATURATION: 98 % | DIASTOLIC BLOOD PRESSURE: 69 MMHG | SYSTOLIC BLOOD PRESSURE: 121 MMHG | RESPIRATION RATE: 18 BRPM | HEART RATE: 95 BPM

## 2017-08-25 DIAGNOSIS — Z90.79 ACQUIRED ABSENCE OF OTHER GENITAL ORGAN(S): Chronic | ICD-10-CM

## 2017-08-25 LAB
ALBUMIN SERPL ELPH-MCNC: 4.1 G/DL — SIGNIFICANT CHANGE UP (ref 3.3–5)
ALP SERPL-CCNC: 46 U/L — SIGNIFICANT CHANGE UP (ref 40–120)
ALT FLD-CCNC: 74 U/L RC — HIGH (ref 10–45)
ANION GAP SERPL CALC-SCNC: 12 MMOL/L — SIGNIFICANT CHANGE UP (ref 5–17)
AST SERPL-CCNC: 30 U/L — SIGNIFICANT CHANGE UP (ref 10–40)
BILIRUB SERPL-MCNC: 0.2 MG/DL — SIGNIFICANT CHANGE UP (ref 0.2–1.2)
BUN SERPL-MCNC: 18 MG/DL — SIGNIFICANT CHANGE UP (ref 7–23)
CALCIUM SERPL-MCNC: 9.3 MG/DL — SIGNIFICANT CHANGE UP (ref 8.4–10.5)
CHLORIDE SERPL-SCNC: 99 MMOL/L — SIGNIFICANT CHANGE UP (ref 96–108)
CO2 SERPL-SCNC: 30 MMOL/L — SIGNIFICANT CHANGE UP (ref 22–31)
CREAT SERPL-MCNC: 1.01 MG/DL — SIGNIFICANT CHANGE UP (ref 0.5–1.3)
GLUCOSE SERPL-MCNC: 104 MG/DL — HIGH (ref 70–99)
HCT VFR BLD CALC: 41.1 % — SIGNIFICANT CHANGE UP (ref 39–50)
HGB BLD-MCNC: 14.3 G/DL — SIGNIFICANT CHANGE UP (ref 13–17)
MCHC RBC-ENTMCNC: 33.6 PG — SIGNIFICANT CHANGE UP (ref 27–34)
MCHC RBC-ENTMCNC: 34.8 GM/DL — SIGNIFICANT CHANGE UP (ref 32–36)
MCV RBC AUTO: 96.3 FL — SIGNIFICANT CHANGE UP (ref 80–100)
PLATELET # BLD AUTO: 127 K/UL — LOW (ref 150–400)
POTASSIUM SERPL-MCNC: 4.4 MMOL/L — SIGNIFICANT CHANGE UP (ref 3.5–5.3)
POTASSIUM SERPL-SCNC: 4.4 MMOL/L — SIGNIFICANT CHANGE UP (ref 3.5–5.3)
PROT SERPL-MCNC: 7.3 G/DL — SIGNIFICANT CHANGE UP (ref 6–8.3)
RBC # BLD: 4.26 M/UL — SIGNIFICANT CHANGE UP (ref 4.2–5.8)
RBC # FLD: 11.2 % — SIGNIFICANT CHANGE UP (ref 10.3–14.5)
SODIUM SERPL-SCNC: 141 MMOL/L — SIGNIFICANT CHANGE UP (ref 135–145)
WBC # BLD: 2.8 K/UL — LOW (ref 3.8–10.5)
WBC # FLD AUTO: 2.8 K/UL — LOW (ref 3.8–10.5)

## 2017-08-25 PROCEDURE — 99285 EMERGENCY DEPT VISIT HI MDM: CPT

## 2017-08-25 RX ORDER — VANCOMYCIN HCL 1 G
1000 VIAL (EA) INTRAVENOUS ONCE
Qty: 0 | Refills: 0 | Status: COMPLETED | OUTPATIENT
Start: 2017-08-25 | End: 2017-08-26

## 2017-08-25 RX ORDER — PIPERACILLIN AND TAZOBACTAM 4; .5 G/20ML; G/20ML
3.38 INJECTION, POWDER, LYOPHILIZED, FOR SOLUTION INTRAVENOUS ONCE
Qty: 0 | Refills: 0 | Status: COMPLETED | OUTPATIENT
Start: 2017-08-25 | End: 2017-08-26

## 2017-08-25 NOTE — ED PROVIDER NOTE - OBJECTIVE STATEMENT
37 male history of testicular CA s/p surgical removal (early aug) and chemo x5 days here for right arm swelling. Onset  of swelling mid week, located at site of IV accses for chemo,   Onciologist: Ronnie Zambrano  Primary: Andres Matthews

## 2017-08-25 NOTE — ED PROVIDER NOTE - PROGRESS NOTE DETAILS
Ravin GARCIA: patient reassessed and reports not worsening of the pain. Patient awaiting for US results. - MD Maureen,PhD - Resident: Oncology was consulted at time of consultation note. I emphasized to oncologist to request hospitalist to call me regarding the patient and let me know that he received sign out from oncologist. Was not called. I repaged hospitalist at this time and was told patient was already assessed by hospitalist team. Will admit at this time.

## 2017-08-25 NOTE — ED PROVIDER NOTE - ATTENDING CONTRIBUTION TO CARE
I performed a history and physical exam of the patient and discussed their management with the resident. I reviewed the resident's note and agree with the documented findings and plan of care. My medical decision making and observations are found above.  Abd soft. rt arm warm tender and swollen over vein.

## 2017-08-25 NOTE — ED ADULT NURSE NOTE - OBJECTIVE STATEMENT
36 y/o male hx testicular cancer & kidney tumor came in c/o swelling & pain 7/10 on pain scale to right lower arm where he received chemo infusion last friday. As per pt numbness to the arm at times, cap refill <3sec, pulses present. Denies any chest pain or SOB, no nausea or vomiting, no diarrhea or constipation. Safety maintained & continue monitor.

## 2017-08-25 NOTE — ED PROVIDER NOTE - MEDICAL DECISION MAKING DETAILS
Danielle: Patient on chemo with redness and arm swelling after chemo infusion in that arm.  Looks like catheter infection. labs and US

## 2017-08-25 NOTE — ED PROVIDER NOTE - NS ED ROS FT
GENERAL: No fever or chills, EYES: no change in vision, HEENT: no trouble swallowing or speaking, CARDIAC: no chest pain, PULMONARY: no cough or SOB, GI: no abdominal pain, no nausea or no vomiting, no diarrhea or constipation, : No changes in urination, SKIN: no rashes, NEURO: no headache,  MSK: No joint pain otherwise as HPI or negative. ~Marvin Reddy M.D., Ph.D. -Resident

## 2017-08-26 DIAGNOSIS — D70.9 NEUTROPENIA, UNSPECIFIED: ICD-10-CM

## 2017-08-26 DIAGNOSIS — C62.90 MALIGNANT NEOPLASM OF UNSPECIFIED TESTIS, UNSPECIFIED WHETHER DESCENDED OR UNDESCENDED: ICD-10-CM

## 2017-08-26 DIAGNOSIS — I80.9 PHLEBITIS AND THROMBOPHLEBITIS OF UNSPECIFIED SITE: ICD-10-CM

## 2017-08-26 DIAGNOSIS — L03.113 CELLULITIS OF RIGHT UPPER LIMB: ICD-10-CM

## 2017-08-26 DIAGNOSIS — I80.8 PHLEBITIS AND THROMBOPHLEBITIS OF OTHER SITES: ICD-10-CM

## 2017-08-26 LAB
ANION GAP SERPL CALC-SCNC: 14 MMOL/L — SIGNIFICANT CHANGE UP (ref 5–17)
APPEARANCE UR: CLEAR — SIGNIFICANT CHANGE UP
APTT BLD: 32.2 SEC — SIGNIFICANT CHANGE UP (ref 27.5–37.4)
BASOPHILS # BLD AUTO: 0 K/UL — SIGNIFICANT CHANGE UP (ref 0–0.2)
BASOPHILS # BLD AUTO: 0 K/UL — SIGNIFICANT CHANGE UP (ref 0–0.2)
BASOPHILS NFR BLD AUTO: 0 % — SIGNIFICANT CHANGE UP (ref 0–2)
BASOPHILS NFR BLD AUTO: 0 % — SIGNIFICANT CHANGE UP (ref 0–2)
BILIRUB UR-MCNC: NEGATIVE — SIGNIFICANT CHANGE UP
BLASTS # FLD: 1 % — HIGH (ref 0–0)
BUN SERPL-MCNC: 13 MG/DL — SIGNIFICANT CHANGE UP (ref 7–23)
CALCIUM SERPL-MCNC: 8.4 MG/DL — SIGNIFICANT CHANGE UP (ref 8.4–10.5)
CHLORIDE SERPL-SCNC: 100 MMOL/L — SIGNIFICANT CHANGE UP (ref 96–108)
CO2 SERPL-SCNC: 24 MMOL/L — SIGNIFICANT CHANGE UP (ref 22–31)
COLOR SPEC: YELLOW — SIGNIFICANT CHANGE UP
CREAT SERPL-MCNC: 0.73 MG/DL — SIGNIFICANT CHANGE UP (ref 0.5–1.3)
DIFF PNL FLD: NEGATIVE — SIGNIFICANT CHANGE UP
EOSINOPHIL # BLD AUTO: 0.02 K/UL — SIGNIFICANT CHANGE UP (ref 0–0.5)
EOSINOPHIL # BLD AUTO: SIGNIFICANT CHANGE UP (ref 0–0.5)
EOSINOPHIL NFR BLD AUTO: 1.1 % — SIGNIFICANT CHANGE UP (ref 0–6)
EOSINOPHIL NFR BLD AUTO: 2 % — SIGNIFICANT CHANGE UP (ref 0–6)
GLUCOSE SERPL-MCNC: 108 MG/DL — HIGH (ref 70–99)
GLUCOSE UR QL: NEGATIVE — SIGNIFICANT CHANGE UP
HCT VFR BLD CALC: 36.7 % — LOW (ref 39–50)
HGB BLD-MCNC: 12.2 G/DL — LOW (ref 13–17)
IMM GRANULOCYTES NFR BLD AUTO: 0 % — SIGNIFICANT CHANGE UP (ref 0–1.5)
INR BLD: 0.95 RATIO — SIGNIFICANT CHANGE UP (ref 0.88–1.16)
KETONES UR-MCNC: NEGATIVE — SIGNIFICANT CHANGE UP
LEUKOCYTE ESTERASE UR-ACNC: NEGATIVE — SIGNIFICANT CHANGE UP
LYMPHOCYTES # BLD AUTO: 1.55 K/UL — SIGNIFICANT CHANGE UP (ref 1–3.3)
LYMPHOCYTES # BLD AUTO: 2.3 K/UL — SIGNIFICANT CHANGE UP (ref 1–3.3)
LYMPHOCYTES # BLD AUTO: 82.4 % — HIGH (ref 13–44)
LYMPHOCYTES # BLD AUTO: 87 % — HIGH (ref 13–44)
MCHC RBC-ENTMCNC: 31 PG — SIGNIFICANT CHANGE UP (ref 27–34)
MCHC RBC-ENTMCNC: 33.2 GM/DL — SIGNIFICANT CHANGE UP (ref 32–36)
MCV RBC AUTO: 93.4 FL — SIGNIFICANT CHANGE UP (ref 80–100)
MONOCYTES # BLD AUTO: 0.16 K/UL — SIGNIFICANT CHANGE UP (ref 0–0.9)
MONOCYTES # BLD AUTO: 0.2 K/UL — SIGNIFICANT CHANGE UP (ref 0–0.9)
MONOCYTES NFR BLD AUTO: 5 % — SIGNIFICANT CHANGE UP (ref 2–14)
MONOCYTES NFR BLD AUTO: 8.5 % — SIGNIFICANT CHANGE UP (ref 2–14)
NEUTROPHILS # BLD AUTO: 0.15 K/UL — LOW (ref 1.8–7.4)
NEUTROPHILS # BLD AUTO: SIGNIFICANT CHANGE UP (ref 1.8–7.4)
NEUTROPHILS NFR BLD AUTO: 4 % — LOW (ref 43–77)
NEUTROPHILS NFR BLD AUTO: 8 % — LOW (ref 43–77)
NEUTS BAND # BLD: 1 % — SIGNIFICANT CHANGE UP (ref 0–8)
NITRITE UR-MCNC: NEGATIVE — SIGNIFICANT CHANGE UP
PH UR: 6 — SIGNIFICANT CHANGE UP (ref 5–8)
PLAT MORPH BLD: NORMAL — SIGNIFICANT CHANGE UP
PLATELET # BLD AUTO: 105 K/UL — LOW (ref 150–400)
POTASSIUM SERPL-MCNC: 4.4 MMOL/L — SIGNIFICANT CHANGE UP (ref 3.5–5.3)
POTASSIUM SERPL-SCNC: 4.4 MMOL/L — SIGNIFICANT CHANGE UP (ref 3.5–5.3)
PROT UR-MCNC: SIGNIFICANT CHANGE UP
PROTHROM AB SERPL-ACNC: 10.3 SEC — SIGNIFICANT CHANGE UP (ref 9.8–12.7)
RBC # BLD: 3.93 M/UL — LOW (ref 4.2–5.8)
RBC # FLD: 12.3 % — SIGNIFICANT CHANGE UP (ref 10.3–14.5)
RBC BLD AUTO: NORMAL — SIGNIFICANT CHANGE UP
SODIUM SERPL-SCNC: 138 MMOL/L — SIGNIFICANT CHANGE UP (ref 135–145)
SP GR SPEC: 1.02 — SIGNIFICANT CHANGE UP (ref 1.01–1.02)
UROBILINOGEN FLD QL: NEGATIVE — SIGNIFICANT CHANGE UP
WBC # BLD: 1.88 K/UL — LOW (ref 3.8–10.5)
WBC # FLD AUTO: 1.88 K/UL — LOW (ref 3.8–10.5)

## 2017-08-26 PROCEDURE — 93971 EXTREMITY STUDY: CPT | Mod: 26

## 2017-08-26 PROCEDURE — 99222 1ST HOSP IP/OBS MODERATE 55: CPT | Mod: GC

## 2017-08-26 PROCEDURE — 71010: CPT | Mod: 26

## 2017-08-26 PROCEDURE — 12345: CPT | Mod: NC

## 2017-08-26 PROCEDURE — 99223 1ST HOSP IP/OBS HIGH 75: CPT

## 2017-08-26 RX ORDER — PIPERACILLIN AND TAZOBACTAM 4; .5 G/20ML; G/20ML
3.38 INJECTION, POWDER, LYOPHILIZED, FOR SOLUTION INTRAVENOUS EVERY 8 HOURS
Qty: 0 | Refills: 0 | Status: DISCONTINUED | OUTPATIENT
Start: 2017-08-26 | End: 2017-08-26

## 2017-08-26 RX ORDER — ACETAMINOPHEN 500 MG
650 TABLET ORAL EVERY 6 HOURS
Qty: 0 | Refills: 0 | Status: DISCONTINUED | OUTPATIENT
Start: 2017-08-26 | End: 2017-08-29

## 2017-08-26 RX ORDER — ENOXAPARIN SODIUM 100 MG/ML
40 INJECTION SUBCUTANEOUS EVERY 24 HOURS
Qty: 0 | Refills: 0 | Status: DISCONTINUED | OUTPATIENT
Start: 2017-08-26 | End: 2017-08-29

## 2017-08-26 RX ORDER — OXYCODONE HYDROCHLORIDE 5 MG/1
15 TABLET ORAL EVERY 6 HOURS
Qty: 0 | Refills: 0 | Status: DISCONTINUED | OUTPATIENT
Start: 2017-08-26 | End: 2017-08-29

## 2017-08-26 RX ORDER — VANCOMYCIN HCL 1 G
1000 VIAL (EA) INTRAVENOUS EVERY 12 HOURS
Qty: 0 | Refills: 0 | Status: DISCONTINUED | OUTPATIENT
Start: 2017-08-26 | End: 2017-08-29

## 2017-08-26 RX ADMIN — Medication 250 MILLIGRAM(S): at 13:09

## 2017-08-26 RX ADMIN — OXYCODONE HYDROCHLORIDE 15 MILLIGRAM(S): 5 TABLET ORAL at 05:28

## 2017-08-26 RX ADMIN — OXYCODONE HYDROCHLORIDE 15 MILLIGRAM(S): 5 TABLET ORAL at 05:43

## 2017-08-26 RX ADMIN — PIPERACILLIN AND TAZOBACTAM 25 GRAM(S): 4; .5 INJECTION, POWDER, LYOPHILIZED, FOR SOLUTION INTRAVENOUS at 07:01

## 2017-08-26 RX ADMIN — PIPERACILLIN AND TAZOBACTAM 200 GRAM(S): 4; .5 INJECTION, POWDER, LYOPHILIZED, FOR SOLUTION INTRAVENOUS at 00:14

## 2017-08-26 RX ADMIN — Medication 250 MILLIGRAM(S): at 01:29

## 2017-08-26 RX ADMIN — PIPERACILLIN AND TAZOBACTAM 25 GRAM(S): 4; .5 INJECTION, POWDER, LYOPHILIZED, FOR SOLUTION INTRAVENOUS at 14:16

## 2017-08-26 NOTE — CONSULT NOTE ADULT - PROBLEM SELECTOR RECOMMENDATION 9
Continue IV antibiotics until blood cultures proved negative.  Then, hope to send home on oral antibiotics if does not develop neutropenia.  Apply heat to thrombosis area.

## 2017-08-26 NOTE — H&P ADULT - PROBLEM SELECTOR PLAN 1
See above.  IV antibiotics as above.  Blood and urine cultures sent.  Oncology notified by the ER of patient's admission.

## 2017-08-26 NOTE — H&P ADULT - ATTENDING COMMENTS
NIGHT HOSPITALIST:  Patient/ parents in attendance aware of course and agree with plan/care as above.  Long term prognosis is guarded.  Emotional support provided to patient.  Care reviewed with covering NP.  Care assumed by the DAY HOSPITALIST.

## 2017-08-26 NOTE — CONSULT NOTE ADULT - ASSESSMENT
Testicular cancer s/p C#1 of etoposide and cisplatin. Has superficial vein thrombosis in right arm. Feels much improved with antibiotics.

## 2017-08-26 NOTE — H&P ADULT - ASSESSMENT
NIGHT HOSPITALIST:  Febrile nando counts in the setting of metastatic nonseminoma testicular tumor with suspected RIGHT forearm cellulitis>>will have to continue with anti-Pseudomonal and MRSA coverage.  [place] [employer]  in chart.  Will continue with Oxycodone IR 15 mg PO for adequate pain relief.  Reverse isolation.  Long term prognosis is guarded.  Emotional support provided to patient.

## 2017-08-26 NOTE — H&P ADULT - HISTORY OF PRESENT ILLNESS
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  No rigors.  No chest pain/pressure.  No dyspnea.  No HA, no focal weakness.  No abdominal pain, no red blood per rectum or melena.  No back pain, no tearing back pain.  No hematuria, no dysuria.  No rash.  Patient with anorexia and unspecified weight loss.  Remaining review of systems not contributory.

## 2017-08-26 NOTE — CHART NOTE - NSCHARTNOTEFT_GEN_A_CORE
Pt seen and examined.    Pt with testicular cancer on chemotherapy aw RUE cellulitis. Continue iv vancomycin. Monitor CBC. Likely D/c on Monday on po abx

## 2017-08-26 NOTE — ED ADULT NURSE REASSESSMENT NOTE - NS ED NURSE REASSESS COMMENT FT1
Pt sitting on assigned stretcher shows no signs of any distress, no pain noted and vs wnl. Awaiting US result at this time..Continue monitor.

## 2017-08-26 NOTE — H&P ADULT - NSHPPHYSICALEXAM_GEN_ALL_CORE
Physical exam with a young, chronically ill appearing M.  BP  Tm 99.4F.  /77  HR  81.  RR 14.  97% on RA.  HEENT, PERRL, EOMI, oropharynx clear.  neck supple, chest clear, cor s1 s2, abdomen soft, normal bowel sounds, nontender, LEFT high inguinal surgical scar c/d/i.  Ext with RIGHT lateral ventral forearm with mild edema and warmth.  No crepitus.  Neurologic exam AxOx3. speech fluent.  Cognition intact.

## 2017-08-26 NOTE — H&P ADULT - NSHPLABSRESULTS_GEN_ALL_CORE
WBC 2.8.  4% N with 1 % bands with 140 ANC.  87% L.  INR 0.9.  K+ 4.4  Random glucose of 104.  Cr 1.0.  Alb 4.1.  U/A neg.  Chest radiograph reviewed with no infiltrate or effusion.  Doppler with no DVT with superficial thrombophlebitis.

## 2017-08-26 NOTE — CONSULT NOTE ADULT - SUBJECTIVE AND OBJECTIVE BOX
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 He is S/P chemotherapy (cisplatin and etoposide 8/14 - 8/18). This weak with patient noted right forearm swelling, subjective fever and pain for the 3 days from a former IV chemotherapy site.  No rigors.  No chest pain/pressure.  No dyspnea.  No HA, no focal weakness.  No abdominal pain, no red blood per rectum or melena.  No back pain, no tearing back pain.  No hematuria, no dysuria.  No rash.      PAST MEDICAL & SURGICAL HISTORY:  Testicular cancer  Lumbar herniated disc  Testicular mass: left  History of orchiectomy      Allergies    sulfa drugs (Unknown)        MEDICATIONS  (STANDING):  vancomycin  IVPB 1000 milliGRAM(s) IV Intermittent every 12 hours  enoxaparin Injectable 40 milliGRAM(s) SubCutaneous every 24 hours    MEDICATIONS  (PRN):  oxyCODONE    IR 15 milliGRAM(s) Oral every 6 hours PRN Moderate Pain (4 - 6)  acetaminophen   Tablet. 650 milliGRAM(s) Oral every 6 hours PRN Mild Pain (1 - 3)      FAMILY HISTORY:  Family history of cancer (Grandparent)  Family history of ischemic heart disease (Grandparent)  Family history of diabetes mellitus (Aunt)      SOCIAL HISTORY: No EtOH, no tobacco    REVIEW OF SYSTEMS:    CONSTITUTIONAL: mild chills.  EYES/ENT: No visual changes;  No vertigo or throat pain   NECK: No pain or stiffness  RESPIRATORY: No cough, wheezing, hemoptysis; No shortness of breath  CARDIOVASCULAR: No chest pain or palpitations  GASTROINTESTINAL: No abdominal or epigastric pain. No nausea, vomiting, or hematemesis; No diarrhea or constipation. No melena or hematochezia.  GENITOURINARY: No dysuria, frequency or hematuria  NEUROLOGICAL: No numbness or weakness  SKIN: No itching, burning, rashes, or lesions       Height (cm): 187.96 (08-26 @ 04:48)  Weight (kg): 106.9 (08-26 @ 04:48)  BMI (kg/m2): 30.3 (08-26 @ 04:48)  BSA (m2): 2.33 (08-26 @ 04:48)    T(F): 98.9 (08-26-17 @ 14:01), Max: 99.4 (08-26-17 @ 04:01)  HR: 85 (08-26-17 @ 14:01)  BP: 113/76 (08-26-17 @ 14:01)  RR: 18 (08-26-17 @ 14:01)  SpO2: 97% (08-26-17 @ 14:01)  Wt(kg): --    GENERAL: NAD, well-developed  HEAD:  Atraumatic, Normocephalic  EYES: EOMI, PERRLA, conjunctiva and sclera clear  NECK: Supple, No JVD  CHEST/LUNG: Clear to auscultation bilaterally; No wheeze  HEART: Regular rate and rhythm; No murmurs, rubs, or gallops  ABDOMEN: Soft, Nontender, Nondistended; Bowel sounds present  EXTREMITIES:  Distal right forearm tender and minimal redness; small palpable vein cord.  NEUROLOGY: non-focal  SKIN: No rashes or lesions                          14.3   2.8   )-----------( 127      ( 25 Aug 2017 23:21 )             41.1       < from: [facility] Duplex Upper Ext Vein Scan, Right (08.26.17 @ 01:04) >  EXAM:  DUPLEX EXT VEINS UPPER RT                            PROCEDURE DATE:  08/26/2017      INTERPRETATION:  CLINICAL INFORMATION: Right upper extremity redness and   swelling following intravenous chemotherapy infusion.    COMPARISON: None available.    TECHNIQUE: Duplex sonography of the RIGHT UPPER extremity with color and   spectral Doppler, with and without compression.      FINDINGS:    The right internal jugular, axillary, brachial, basilic veins are patent   and compressible where applicable. The subclavian and innominate veins   demonstrates spontaneous and phasic flow. The cephalic vein is patent and   compressible above the elbow. However, below the elbow, the vessel   contains echogenic noncompressible debris within the lumen and does not   demonstrate flow.    Doppler examination shows normal spontaneous and phasic flow.    IMPRESSION:     No evidence of right upper extremity deep venous thrombosis.    Superficial thrombophlebitis involving the cephalic vein below the elbow.        141  |  99  |  18  ----------------------------<  104<H>  4.4   |  30  |  1.01    Ca    9.3      25 Aug 2017 23:21    TPro  7.3  /  Alb  4.1  /  TBili  0.2  /  DBili  x   /  AST  30  /  ALT  74<H>  /  AlkPhos  46  08-25    PT/INR - ( 26 Aug 2017 01:25 )   PT: 10.3 sec;   INR: 0.95 ratio       PTT - ( 26 Aug 2017 01:25 )  PTT:32.2 sec

## 2017-08-27 DIAGNOSIS — I80.9 PHLEBITIS AND THROMBOPHLEBITIS OF UNSPECIFIED SITE: ICD-10-CM

## 2017-08-27 LAB
ANION GAP SERPL CALC-SCNC: 15 MMOL/L — SIGNIFICANT CHANGE UP (ref 5–17)
BASOPHILS # BLD AUTO: 0 K/UL — SIGNIFICANT CHANGE UP (ref 0–0.2)
BASOPHILS NFR BLD AUTO: 0 % — SIGNIFICANT CHANGE UP (ref 0–2)
BUN SERPL-MCNC: 12 MG/DL — SIGNIFICANT CHANGE UP (ref 7–23)
CALCIUM SERPL-MCNC: 9.1 MG/DL — SIGNIFICANT CHANGE UP (ref 8.4–10.5)
CHLORIDE SERPL-SCNC: 100 MMOL/L — SIGNIFICANT CHANGE UP (ref 96–108)
CO2 SERPL-SCNC: 24 MMOL/L — SIGNIFICANT CHANGE UP (ref 22–31)
CREAT SERPL-MCNC: 0.84 MG/DL — SIGNIFICANT CHANGE UP (ref 0.5–1.3)
CULTURE RESULTS: NO GROWTH — SIGNIFICANT CHANGE UP
EOSINOPHIL # BLD AUTO: 0.02 K/UL — SIGNIFICANT CHANGE UP (ref 0–0.5)
EOSINOPHIL NFR BLD AUTO: 0.8 % — SIGNIFICANT CHANGE UP (ref 0–6)
GLUCOSE SERPL-MCNC: 102 MG/DL — HIGH (ref 70–99)
HCT VFR BLD CALC: 39.3 % — SIGNIFICANT CHANGE UP (ref 39–50)
HGB BLD-MCNC: 13 G/DL — SIGNIFICANT CHANGE UP (ref 13–17)
IMM GRANULOCYTES NFR BLD AUTO: 0 % — SIGNIFICANT CHANGE UP (ref 0–1.5)
LYMPHOCYTES # BLD AUTO: 2.16 K/UL — SIGNIFICANT CHANGE UP (ref 1–3.3)
LYMPHOCYTES # BLD AUTO: 84.7 % — HIGH (ref 13–44)
MCHC RBC-ENTMCNC: 31.1 PG — SIGNIFICANT CHANGE UP (ref 27–34)
MCHC RBC-ENTMCNC: 33.1 GM/DL — SIGNIFICANT CHANGE UP (ref 32–36)
MCV RBC AUTO: 94 FL — SIGNIFICANT CHANGE UP (ref 80–100)
MONOCYTES # BLD AUTO: 0.24 K/UL — SIGNIFICANT CHANGE UP (ref 0–0.9)
MONOCYTES NFR BLD AUTO: 9.4 % — SIGNIFICANT CHANGE UP (ref 2–14)
NEUTROPHILS # BLD AUTO: 0.13 K/UL — LOW (ref 1.8–7.4)
NEUTROPHILS NFR BLD AUTO: 5.1 % — LOW (ref 43–77)
PLATELET # BLD AUTO: 103 K/UL — LOW (ref 150–400)
POTASSIUM SERPL-MCNC: 4.3 MMOL/L — SIGNIFICANT CHANGE UP (ref 3.5–5.3)
POTASSIUM SERPL-SCNC: 4.3 MMOL/L — SIGNIFICANT CHANGE UP (ref 3.5–5.3)
RBC # BLD: 4.18 M/UL — LOW (ref 4.2–5.8)
RBC # FLD: 12.4 % — SIGNIFICANT CHANGE UP (ref 10.3–14.5)
SODIUM SERPL-SCNC: 139 MMOL/L — SIGNIFICANT CHANGE UP (ref 135–145)
SPECIMEN SOURCE: SIGNIFICANT CHANGE UP
VANCOMYCIN TROUGH SERPL-MCNC: 14 UG/ML — SIGNIFICANT CHANGE UP (ref 10–20)
WBC # BLD: 2.55 K/UL — LOW (ref 3.8–10.5)
WBC # FLD AUTO: 2.55 K/UL — LOW (ref 3.8–10.5)

## 2017-08-27 PROCEDURE — 99233 SBSQ HOSP IP/OBS HIGH 50: CPT

## 2017-08-27 PROCEDURE — 99232 SBSQ HOSP IP/OBS MODERATE 35: CPT | Mod: GC

## 2017-08-27 RX ORDER — FILGRASTIM 480MCG/1.6
480 VIAL (ML) INJECTION DAILY
Qty: 0 | Refills: 0 | Status: COMPLETED | OUTPATIENT
Start: 2017-08-27 | End: 2017-08-29

## 2017-08-27 RX ADMIN — ENOXAPARIN SODIUM 40 MILLIGRAM(S): 100 INJECTION SUBCUTANEOUS at 00:30

## 2017-08-27 RX ADMIN — Medication 250 MILLIGRAM(S): at 00:28

## 2017-08-27 RX ADMIN — OXYCODONE HYDROCHLORIDE 15 MILLIGRAM(S): 5 TABLET ORAL at 01:30

## 2017-08-27 RX ADMIN — Medication 480 MICROGRAM(S): at 16:38

## 2017-08-27 RX ADMIN — OXYCODONE HYDROCHLORIDE 15 MILLIGRAM(S): 5 TABLET ORAL at 00:34

## 2017-08-27 RX ADMIN — Medication 250 MILLIGRAM(S): at 12:00

## 2017-08-27 NOTE — PROGRESS NOTE ADULT - SUBJECTIVE AND OBJECTIVE BOX
KAYLEY GUERRIER  MRN-[id number]    Chief Complain:Patient is a 37y old  Male who presents with a chief complaint of cellulitis rt arm (26 Aug 2017 05:00)      SUBJECTIVE / OVERNIGHT EVENTS:    Review of Systems:  14 point ROS negative in detail except for the above: ***  Unable to evaluate ROS due to: cognitive deficits / altered mental status    MEDICATIONS  (STANDING):  vancomycin  IVPB 1000 milliGRAM(s) IV Intermittent every 12 hours  enoxaparin Injectable 40 milliGRAM(s) SubCutaneous every 24 hours    MEDICATIONS  (PRN):  oxyCODONE    IR 15 milliGRAM(s) Oral every 6 hours PRN Moderate Pain (4 - 6)  acetaminophen   Tablet. 650 milliGRAM(s) Oral every 6 hours PRN Mild Pain (1 - 3)      T(C): 36.8 (17 @ 07:50), Max: 37.3 (17 @ 18:05)  HR: 78 (17 @ 07:50) (78 - 92)  BP: 111/70 (17 @ 07:50) (103/68 - 113/76)  RR: 18 (17 @ 07:50) (16 - 18)  SpO2: 96% (17 @ 07:50) (96% - 97%)    CAPILLARY BLOOD GLUCOSE          I&O's Summary    26 Aug 2017 07:01  -  27 Aug 2017 07:00  --------------------------------------------------------  IN: 1190 mL / OUT: 0 mL / NET: 1190 mL        Allergies    sulfa drugs (Unknown)    Intolerances        PHYSICAL EXAM:  GENERAL: Not in distress, well-developed  HEAD:  Atraumatic, Normocephalic  EYES: EOMI, PERRLA, conjunctiva and sclera clear  NECK: Supple, No JVD  CHEST/LUNG: Clear to auscultation bilaterally; No wheeze  HEART: Regular rate and rhythm; No murmurs, rubs, or edema  ABDOMEN: Soft, Nontender, Nondistended; Bowel sounds present  EXTREMITIES: No joint effusions, good muscle tone and bulk  PSYCH: Normal mood and affect, alert and oriented  NEUROLOGY: Grossly intact   SKIN: No rashes or lesions    LABS:                        13.0   2.55  )-----------( 103      ( 27 Aug 2017 08:58 )             39.3         139  |  100  |  12  ----------------------------<  102<H>  4.3   |  24  |  0.84    Ca    9.1      27 Aug 2017 09:05    TPro  7.3  /  Alb  4.1  /  TBili  0.2  /  DBili  x   /  AST  30  /  ALT  74<H>  /  AlkPhos  46      LIVER FUNCTIONS - ( 25 Aug 2017 23:21 )  Alb: 4.1 g/dL / Pro: 7.3 g/dL / ALK PHOS: 46 U/L / ALT: 74 U/L RC / AST: 30 U/L / GGT: x           PT/INR - ( 26 Aug 2017 01:25 )   PT: 10.3 sec;   INR: 0.95 ratio         PTT - ( 26 Aug 2017 01:25 )  PTT:32.2 sec      Urinalysis Basic - ( 26 Aug 2017 01:55 )    Color: Yellow / Appearance: Clear / S.019 / pH: x  Gluc: x / Ketone: Negative  / Bili: Negative / Urobili: Negative   Blood: x / Protein: Trace / Nitrite: Negative   Leuk Esterase: Negative / RBC: x / WBC x   Sq Epi: x / Non Sq Epi: x / Bacteria: x      Blood Cultures        RADIOLOGY & ADDITIONAL TESTS:    Imaging:    EKG/Telemetry:    Consultant(s) Notes Reviewed:      Care Discussed with Consultants/Other Providers:    The above recommendations were discussed with the patient/family. The patient/family had all questions answered and expressed understanding of the plan. 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 Afebrile for 24 hours.    SUBJECTIVE / OVERNIGHT EVENTS: no reported overnight events    Review of Systems:  ROS negative in detail except for the above:     MEDICATIONS  (STANDING):  vancomycin  IVPB 1000 milliGRAM(s) IV Intermittent every 12 hours  enoxaparin Injectable 40 milliGRAM(s) SubCutaneous every 24 hours    MEDICATIONS  (PRN):  oxyCODONE    IR 15 milliGRAM(s) Oral every 6 hours PRN Moderate Pain (4 - 6)  acetaminophen   Tablet. 650 milliGRAM(s) Oral every 6 hours PRN Mild Pain (1 - 3)      T(C): 36.8 (17 @ 07:50), Max: 37.3 (17 @ 18:05)  HR: 78 (17 @ 07:50) (78 - 92)  BP: 111/70 (17 @ 07:50) (103/68 - 113/76)  RR: 18 (17 @ 07:50) (16 - 18)  SpO2: 96% (17 @ 07:50) (96% - 97%)    I&O's Summary    26 Aug 2017 07:01  -  27 Aug 2017 07:00  --------------------------------------------------------  IN: 1190 mL / OUT: 0 mL / NET: 1190 mL        Allergies    sulfa drugs (Unknown)    Intolerances        PHYSICAL EXAM:  GENERAL: NAD  EYES: conjunctiva and sclera clear  CHEST/LUNG: CTAB  HEART: S1S2 RRR  ABDOMEN: +BS NTND  EXTREMITIES: no edema  PSYCH: Normal mood and affect, alert and oriented  NEUROLOGY: Grossly intact   SKIN: warm, dry    LABS:                        13.0   2.55  )-----------( 103      ( 27 Aug 2017 08:58 )             39.3         139  |  100  |  12  ----------------------------<  102<H>  4.3   |  24  |  0.84    Ca    9.1      27 Aug 2017 09:05    TPro  7.3  /  Alb  4.1  /  TBili  0.2  /  DBili  x   /  AST  30  /  ALT  74<H>  /  AlkPhos  46  08-25    LIVER FUNCTIONS - ( 25 Aug 2017 23:21 )  Alb: 4.1 g/dL / Pro: 7.3 g/dL / ALK PHOS: 46 U/L / ALT: 74 U/L RC / AST: 30 U/L / GGT: x           PT/INR - ( 26 Aug 2017 01:25 )   PT: 10.3 sec;   INR: 0.95 ratio         PTT - ( 26 Aug 2017 01:25 )  PTT:32.2 sec      Urinalysis Basic - ( 26 Aug 2017 01:55 )    Color: Yellow / Appearance: Clear / S.019 / pH: x  Gluc: x / Ketone: Negative  / Bili: Negative / Urobili: Negative   Blood: x / Protein: Trace / Nitrite: Negative   Leuk Esterase: Negative / RBC: x / WBC x   Sq Epi: x / Non Sq Epi: x / Bacteria: x      Blood Cultures        RADIOLOGY & ADDITIONAL TESTS: no new imaging for review    Consultant(s) Notes Reviewed: hematology/oncology    The above recommendations were discussed with the patient/family. The patient/family had all questions answered and expressed understanding of the plan.

## 2017-08-27 NOTE — PROGRESS NOTE ADULT - SUBJECTIVE AND OBJECTIVE BOX
INTERVAL HPI/OVERNIGHT EVENTS:  Patient S&E at bedside. No o/n events, patient resting comfortably. Patient denies fever, chills, dizziness, weakness, CP, palpitations, SOB, cough, N/V/D/C, dysuria, changes in bowel movements, LE edema. Still has pain in the arm that si a bit worse overnight.    VITAL SIGNS:  T(F): 98.3 (08-27-17 @ 07:50)  HR: 78 (08-27-17 @ 07:50)  BP: 111/70 (08-27-17 @ 07:50)  RR: 18 (08-27-17 @ 07:50)  SpO2: 96% (08-27-17 @ 07:50)  Wt(kg): --    PHYSICAL EXAM:    Constitutional: NAD  Eyes: EOMI, sclera non-icteric  Neck: supple, no masses, no JVD  Respiratory: CTA b/l, good air entry b/l  Cardiovascular: RRR, no M/R/G  Gastrointestinal: soft, NTND, no masses palpable, + BS, no hepatosplenomegaly  Extremities: tenderness volar, right forearm, no erythema.  Neurological: AAOx3      MEDICATIONS  (STANDING):  vancomycin  IVPB 1000 milliGRAM(s) IV Intermittent every 12 hours  enoxaparin Injectable 40 milliGRAM(s) SubCutaneous every 24 hours  filgrastim-sndz Injectable 480 MICROGram(s) SubCutaneous daily  levoFLOXacin  Tablet 500 milliGRAM(s) Oral every 24 hours    MEDICATIONS  (PRN):  oxyCODONE    IR 15 milliGRAM(s) Oral every 6 hours PRN Moderate Pain (4 - 6)  acetaminophen   Tablet. 650 milliGRAM(s) Oral every 6 hours PRN Mild Pain (1 - 3)      Allergies    sulfa drugs (Unknown)      LABS:                        13.0   2.55  )-----------( 103      ( 27 Aug 2017 08:58 )             39.3     08-27    139  |  100  |  12  ----------------------------<  102<H>  4.3   |  24  |  0.84    Ca    9.1      27 Aug 2017 09:05    TPro  7.3  /  Alb  4.1  /  TBili  0.2  /  DBili  x   /  AST  30  /  ALT  74<H>  /  AlkPhos  46  08-25

## 2017-08-27 NOTE — PROGRESS NOTE ADULT - PROBLEM SELECTOR PLAN 2
c/w vancomycin, monitor VS, WBC with diff. Blood and urine culture NGTD. If afebrile and cultures remain negative, possible d/c with PO abx in 1-2 days. c/w vancomycin, monitor VS, WBC with diff. Blood and urine culture NGTD. If afebrile and cultures remain negative, possible d/c with PO abx in 1-2 days. Start levaqin in setting of neutropenia; d/w Dr. Sheehan, plan for neupogen today.

## 2017-08-28 DIAGNOSIS — A41.9 SEPSIS, UNSPECIFIED ORGANISM: ICD-10-CM

## 2017-08-28 DIAGNOSIS — L03.113 CELLULITIS OF RIGHT UPPER LIMB: ICD-10-CM

## 2017-08-28 LAB
ANION GAP SERPL CALC-SCNC: 14 MMOL/L — SIGNIFICANT CHANGE UP (ref 5–17)
BUN SERPL-MCNC: 12 MG/DL — SIGNIFICANT CHANGE UP (ref 7–23)
CALCIUM SERPL-MCNC: 8.8 MG/DL — SIGNIFICANT CHANGE UP (ref 8.4–10.5)
CHLORIDE SERPL-SCNC: 100 MMOL/L — SIGNIFICANT CHANGE UP (ref 96–108)
CO2 SERPL-SCNC: 25 MMOL/L — SIGNIFICANT CHANGE UP (ref 22–31)
CREAT SERPL-MCNC: 0.9 MG/DL — SIGNIFICANT CHANGE UP (ref 0.5–1.3)
GLUCOSE SERPL-MCNC: 95 MG/DL — SIGNIFICANT CHANGE UP (ref 70–99)
HCT VFR BLD CALC: 38.8 % — LOW (ref 39–50)
HGB BLD-MCNC: 13.3 G/DL — SIGNIFICANT CHANGE UP (ref 13–17)
MANUAL DIF COMMENT BLD-IMP: SIGNIFICANT CHANGE UP
MCHC RBC-ENTMCNC: 31.7 PG — SIGNIFICANT CHANGE UP (ref 27–34)
MCHC RBC-ENTMCNC: 34.3 GM/DL — SIGNIFICANT CHANGE UP (ref 32–36)
MCV RBC AUTO: 92.4 FL — SIGNIFICANT CHANGE UP (ref 80–100)
PLATELET # BLD AUTO: 105 K/UL — LOW (ref 150–400)
POTASSIUM SERPL-MCNC: 4.6 MMOL/L — SIGNIFICANT CHANGE UP (ref 3.5–5.3)
POTASSIUM SERPL-SCNC: 4.6 MMOL/L — SIGNIFICANT CHANGE UP (ref 3.5–5.3)
RBC # BLD: 4.2 M/UL — SIGNIFICANT CHANGE UP (ref 4.2–5.8)
RBC # FLD: 12.6 % — SIGNIFICANT CHANGE UP (ref 10.3–14.5)
SODIUM SERPL-SCNC: 139 MMOL/L — SIGNIFICANT CHANGE UP (ref 135–145)
WBC # BLD: 2.73 K/UL — LOW (ref 3.8–10.5)
WBC # FLD AUTO: 2.73 K/UL — LOW (ref 3.8–10.5)

## 2017-08-28 PROCEDURE — 99233 SBSQ HOSP IP/OBS HIGH 50: CPT

## 2017-08-28 RX ADMIN — Medication 480 MICROGRAM(S): at 12:22

## 2017-08-28 RX ADMIN — OXYCODONE HYDROCHLORIDE 15 MILLIGRAM(S): 5 TABLET ORAL at 19:30

## 2017-08-28 RX ADMIN — Medication 250 MILLIGRAM(S): at 12:22

## 2017-08-28 RX ADMIN — Medication 250 MILLIGRAM(S): at 00:13

## 2017-08-28 RX ADMIN — ENOXAPARIN SODIUM 40 MILLIGRAM(S): 100 INJECTION SUBCUTANEOUS at 00:13

## 2017-08-28 RX ADMIN — OXYCODONE HYDROCHLORIDE 15 MILLIGRAM(S): 5 TABLET ORAL at 20:30

## 2017-08-28 NOTE — PROGRESS NOTE ADULT - SUBJECTIVE AND OBJECTIVE BOX
Patient is a 37y old  Male who presents with a chief complaint of cellulitis rt arm (26 Aug 2017 05:00)    HPI: R arm erythema improving. Pt feels well    Vital Signs Last 24 Hrs  T(C): 37.1 (28 Aug 2017 07:59), Max: 37.2 (28 Aug 2017 00:24)  T(F): 98.8 (28 Aug 2017 07:59), Max: 98.9 (28 Aug 2017 00:24)  HR: 92 (28 Aug 2017 07:59) (77 - 92)  BP: 113/75 (28 Aug 2017 07:59) (94/60 - 115/75)  BP(mean): --  RR: 16 (28 Aug 2017 07:59) (16 - 18)  SpO2: 95% (28 Aug 2017 07:59) (95% - 99%)                          13.3   2.73  )-----------( 105      ( 28 Aug 2017 08:50 )             38.8     08-28    139  |  100  |  12  ----------------------------<  95  4.6   |  25  |  0.90    Ca    8.8      28 Aug 2017 08:56      MEDICATIONS  (STANDING):  vancomycin  IVPB 1000 milliGRAM(s) IV Intermittent every 12 hours  enoxaparin Injectable 40 milliGRAM(s) SubCutaneous every 24 hours  filgrastim-sndz Injectable 480 MICROGram(s) SubCutaneous daily  levoFLOXacin  Tablet 500 milliGRAM(s) Oral every 24 hours    MEDICATIONS  (PRN):  oxyCODONE    IR 15 milliGRAM(s) Oral every 6 hours PRN Moderate Pain (4 - 6)  acetaminophen   Tablet. 650 milliGRAM(s) Oral every 6 hours PRN Mild Pain (1 - 3)

## 2017-08-29 ENCOUNTER — TRANSCRIPTION ENCOUNTER (OUTPATIENT)
Age: 37
End: 2017-08-29

## 2017-08-29 VITALS
TEMPERATURE: 99 F | OXYGEN SATURATION: 98 % | RESPIRATION RATE: 18 BRPM | HEART RATE: 91 BPM | DIASTOLIC BLOOD PRESSURE: 66 MMHG | SYSTOLIC BLOOD PRESSURE: 100 MMHG

## 2017-08-29 LAB
BASOPHILS # BLD AUTO: 0 K/UL — SIGNIFICANT CHANGE UP (ref 0–0.2)
BASOPHILS NFR BLD AUTO: 0 % — SIGNIFICANT CHANGE UP (ref 0–2)
DOHLE BOD BLD QL SMEAR: PRESENT — SIGNIFICANT CHANGE UP
EOSINOPHIL # BLD AUTO: 0.1 K/UL — SIGNIFICANT CHANGE UP (ref 0–0.5)
EOSINOPHIL NFR BLD AUTO: 0 % — SIGNIFICANT CHANGE UP (ref 0–6)
HCT VFR BLD CALC: 41.1 % — SIGNIFICANT CHANGE UP (ref 39–50)
HGB BLD-MCNC: 13.9 G/DL — SIGNIFICANT CHANGE UP (ref 13–17)
HYPOSEGMENTATION: PRESENT — SIGNIFICANT CHANGE UP
LYMPHOCYTES # BLD AUTO: 2.6 K/UL — SIGNIFICANT CHANGE UP (ref 1–3.3)
LYMPHOCYTES # BLD AUTO: 56 % — HIGH (ref 13–44)
MCHC RBC-ENTMCNC: 32.8 PG — SIGNIFICANT CHANGE UP (ref 27–34)
MCHC RBC-ENTMCNC: 33.9 GM/DL — SIGNIFICANT CHANGE UP (ref 32–36)
MCV RBC AUTO: 96.5 FL — SIGNIFICANT CHANGE UP (ref 80–100)
METAMYELOCYTES # FLD: 2 % — HIGH (ref 0–0)
MONOCYTES # BLD AUTO: 0.8 K/UL — SIGNIFICANT CHANGE UP (ref 0–0.9)
MONOCYTES NFR BLD AUTO: 9 % — SIGNIFICANT CHANGE UP (ref 2–14)
MYELOCYTES NFR BLD: 5 % — HIGH (ref 0–0)
NEUTROPHILS # BLD AUTO: 1.3 K/UL — LOW (ref 1.8–7.4)
NEUTROPHILS NFR BLD AUTO: 11 % — LOW (ref 43–77)
NEUTS BAND # BLD: 10 % — HIGH (ref 0–8)
NRBC # BLD: 2 /100 — HIGH (ref 0–0)
PLAT MORPH BLD: NORMAL — SIGNIFICANT CHANGE UP
PLATELET # BLD AUTO: 120 K/UL — LOW (ref 150–400)
RBC # BLD: 4.26 M/UL — SIGNIFICANT CHANGE UP (ref 4.2–5.8)
RBC # FLD: 11.9 % — SIGNIFICANT CHANGE UP (ref 10.3–14.5)
RBC BLD AUTO: NORMAL — SIGNIFICANT CHANGE UP
TOXIC GRANULES BLD QL SMEAR: PRESENT — SIGNIFICANT CHANGE UP
VARIANT LYMPHS # BLD: 7 % — HIGH (ref 0–6)
WBC # BLD: 4.9 K/UL — SIGNIFICANT CHANGE UP (ref 3.8–10.5)
WBC # FLD AUTO: 4.9 K/UL — SIGNIFICANT CHANGE UP (ref 3.8–10.5)

## 2017-08-29 PROCEDURE — 85730 THROMBOPLASTIN TIME PARTIAL: CPT

## 2017-08-29 PROCEDURE — 80053 COMPREHEN METABOLIC PANEL: CPT

## 2017-08-29 PROCEDURE — 96374 THER/PROPH/DIAG INJ IV PUSH: CPT

## 2017-08-29 PROCEDURE — 87086 URINE CULTURE/COLONY COUNT: CPT

## 2017-08-29 PROCEDURE — 80048 BASIC METABOLIC PNL TOTAL CA: CPT

## 2017-08-29 PROCEDURE — 85610 PROTHROMBIN TIME: CPT

## 2017-08-29 PROCEDURE — 87040 BLOOD CULTURE FOR BACTERIA: CPT

## 2017-08-29 PROCEDURE — 85027 COMPLETE CBC AUTOMATED: CPT

## 2017-08-29 PROCEDURE — 99239 HOSP IP/OBS DSCHRG MGMT >30: CPT

## 2017-08-29 PROCEDURE — 99285 EMERGENCY DEPT VISIT HI MDM: CPT | Mod: 25

## 2017-08-29 PROCEDURE — 93971 EXTREMITY STUDY: CPT

## 2017-08-29 PROCEDURE — 80202 ASSAY OF VANCOMYCIN: CPT

## 2017-08-29 PROCEDURE — 71045 X-RAY EXAM CHEST 1 VIEW: CPT

## 2017-08-29 PROCEDURE — 93005 ELECTROCARDIOGRAM TRACING: CPT

## 2017-08-29 PROCEDURE — 81003 URINALYSIS AUTO W/O SCOPE: CPT

## 2017-08-29 PROCEDURE — 96375 TX/PRO/DX INJ NEW DRUG ADDON: CPT

## 2017-08-29 RX ADMIN — Medication 480 MICROGRAM(S): at 11:02

## 2017-08-29 RX ADMIN — Medication 650 MILLIGRAM(S): at 05:41

## 2017-08-29 RX ADMIN — Medication 650 MILLIGRAM(S): at 06:41

## 2017-08-29 RX ADMIN — ENOXAPARIN SODIUM 40 MILLIGRAM(S): 100 INJECTION SUBCUTANEOUS at 00:02

## 2017-08-29 RX ADMIN — Medication 250 MILLIGRAM(S): at 00:01

## 2017-08-29 NOTE — PROGRESS NOTE ADULT - PROBLEM SELECTOR PROBLEM 3
Malignant neoplasm of testis, unspecified laterality, unspecified whether descended or undescended

## 2017-08-29 NOTE — DISCHARGE NOTE ADULT - PLAN OF CARE
Resolution with superficial thrombophlebitis. Improved. Continue antibiotic as prescribed. Resolved. Received 3 doses of Neupogen. F/U with your oncologist. F/U with your oncologist tomorrow 8/30/17 at 10:40 am at [facility].

## 2017-08-29 NOTE — PROGRESS NOTE ADULT - MUSCULOSKELETAL
No joint pain, swelling or deformity; no limitation of movement
No joint pain, swelling or deformity; no limitation of movement

## 2017-08-29 NOTE — PROGRESS NOTE ADULT - PROBLEM SELECTOR PROBLEM 2
Neutropenic sepsis
Neutropenic fever
Superficial thrombophlebitis, involving unspecified site
Neutropenic sepsis

## 2017-08-29 NOTE — PROGRESS NOTE ADULT - SUBJECTIVE AND OBJECTIVE BOX
Patient is a 37y old  Male who presents with a chief complaint of cellulitis rt arm (26 Aug 2017 05:00)    HPI: Pt feels well. Remains afebrile    Vital Signs Last 24 Hrs  T(C): 37.4 (29 Aug 2017 04:52), Max: 37.4 (29 Aug 2017 04:52)  T(F): 99.3 (29 Aug 2017 04:52), Max: 99.3 (29 Aug 2017 04:52)  HR: 91 (29 Aug 2017 04:52) (91 - 112)  BP: 100/66 (29 Aug 2017 04:52) (99/69 - 109/70)  BP(mean): --  RR: 18 (29 Aug 2017 04:52) (18 - 18)  SpO2: 98% (29 Aug 2017 04:52) (94% - 98%)                          13.9   4.9   )-----------( 120      ( 29 Aug 2017 07:18 )             41.1     08-28    139  |  100  |  12  ----------------------------<  95  4.6   |  25  |  0.90    Ca    8.8      28 Aug 2017 08:56      MEDICATIONS  (STANDING):  vancomycin  IVPB 1000 milliGRAM(s) IV Intermittent every 12 hours  enoxaparin Injectable 40 milliGRAM(s) SubCutaneous every 24 hours  filgrastim-sndz Injectable 480 MICROGram(s) SubCutaneous daily  levoFLOXacin  Tablet 500 milliGRAM(s) Oral every 24 hours    MEDICATIONS  (PRN):  oxyCODONE    IR 15 milliGRAM(s) Oral every 6 hours PRN Moderate Pain (4 - 6)  acetaminophen   Tablet. 650 milliGRAM(s) Oral every 6 hours PRN Mild Pain (1 - 3)

## 2017-08-29 NOTE — DISCHARGE NOTE ADULT - CARE PROVIDER_API CALL
Neo Chirinos), Hematology; Internal Medicine; Medical Oncology  [address] [place] [address]  Phone: [phone]  Fax: [phone]

## 2017-08-29 NOTE — PROGRESS NOTE ADULT - PROBLEM SELECTOR PLAN 1
Improving on iv vancomycin. Transition to po abx today
Continue with supportive care, heat packs as tolerated. Improving.
Patient still neutropenic after his chemotherapy.  Given infection, will add GCSF.  Also, will add oral levaquin as prophylaxis against Gram negative organisms.
Improving on iv vancomycin

## 2017-08-29 NOTE — PROGRESS NOTE ADULT - ASSESSMENT
39M with PMH of testicular cancer s/p first cycle of etoposide and cisplatin, with neutropenic fever and superficial vein thrombosis in right arm. Feels much improved with antibiotics.
39M with testicular cancer s/p first cycle of etoposide and cisplatin aw RUE cellulitis and thrombophlebitis. Neutropenic- improving on filgrastim
Testicular cancer s/p C#1 of etoposide and cisplatin. Has superficial vein thrombosis in right arm. Feels somewhat improved with antibiotics.
39M with testicular cancer s/p first cycle of etoposide and cisplatin aw RUE cellulitis and thrombophlebitis. Neutropenic

## 2017-08-29 NOTE — DISCHARGE NOTE ADULT - PATIENT PORTAL LINK FT
“You can access the [employer] Patient Portal, offered by [facility], by registering with the following website: [email or web address]”

## 2017-08-29 NOTE — DISCHARGE NOTE ADULT - CARE PLAN
Principal Discharge DX:	Cellulitis of right upper extremity  Goal:	Resolution  Instructions for follow-up, activity and diet:	with superficial thrombophlebitis. Improved. Continue antibiotic as prescribed.  Secondary Diagnosis:	Neutropenia  Instructions for follow-up, activity and diet:	Resolved. Received 3 doses of Neupogen. F/U with your oncologist.  Secondary Diagnosis:	Testicular cancer  Instructions for follow-up, activity and diet:	F/U with your oncologist tomorrow 8/30/17 at 10:40 am at [facility]. Principal Discharge DX:	Cellulitis of right upper extremity  Goal:	Resolution  Instructions for follow-up, activity and diet:	with superficial thrombophlebitis. Improved. Continue antibiotic as prescribed.  Secondary Diagnosis:	Neutropenia  Instructions for follow-up, activity and diet:	Resolved. Received 3 doses of Neupogen. F/U with your oncologist.  Secondary Diagnosis:	Testicular cancer  Instructions for follow-up, activity and diet:	F/U with your oncologist tomorrow 8/30/17 at 10:40 am at [facility]. Principal Discharge DX:	Cellulitis of right upper extremity  Goal:	Resolution  Instructions for follow-up, activity and diet:	with superficial thrombophlebitis. Improved. Continue antibiotic as prescribed.  Secondary Diagnosis:	Neutropenia  Instructions for follow-up, activity and diet:	Resolved. Received 3 doses of Neupogen. F/U with your oncologist.  Secondary Diagnosis:	Testicular cancer  Instructions for follow-up, activity and diet:	F/U with your oncologist tomorrow 8/30/17 at 10:40 am at [facility].

## 2017-08-29 NOTE — PROGRESS NOTE ADULT - PROBLEM SELECTOR PROBLEM 1
Cellulitis of right upper extremity
Malignant neoplasm of testis, unspecified laterality, unspecified whether descended or undescended
Superficial thrombophlebitis of right upper extremity
Cellulitis of right upper extremity

## 2017-08-29 NOTE — DISCHARGE NOTE ADULT - HOSPITAL COURSE
By attending 38 yo with metastatic testicular cancer s/p recent chemotherapy aw RUE swelling, erythema and fever. Sepsis present on admission.     Started on iv abx for RUE cellulitis and phlebitis. ( IV vanco given for presumed MRSA infection). Neupogen started for neutropenia.     Erythema and swelling improved on treatment. ANC improved with neupogen.    D/ethan on 5 more days of po abx. F/u with oncology.    Diagnoses: Neutropenic sepsis; R arm cellulitis and acute phlebitis; Testicular cancer; Lymph node metastases; Chronic pain

## 2017-08-29 NOTE — DISCHARGE NOTE ADULT - MEDICATION SUMMARY - MEDICATIONS TO TAKE
I will START or STAY ON the medications listed below when I get home from the hospital:    oxyCODONE 15 mg oral tablet  -- 1 tab(s) by mouth every 6 hours, As Needed  -- Indication: For pain    levoFLOXacin 500 mg oral tablet  -- 1 tab(s) by mouth every 24 hours  -- Indication: For Cellulitis of right upper extremity

## 2017-08-30 ENCOUNTER — APPOINTMENT (OUTPATIENT)
Dept: HEMATOLOGY ONCOLOGY | Facility: CLINIC | Age: 37
End: 2017-08-30
Payer: MEDICAID

## 2017-08-30 ENCOUNTER — RESULT REVIEW (OUTPATIENT)
Age: 37
End: 2017-08-30

## 2017-08-30 VITALS
BODY MASS INDEX: 30.78 KG/M2 | OXYGEN SATURATION: 95 % | TEMPERATURE: 99.1 F | SYSTOLIC BLOOD PRESSURE: 111 MMHG | DIASTOLIC BLOOD PRESSURE: 74 MMHG | HEART RATE: 102 BPM | RESPIRATION RATE: 17 BRPM | WEIGHT: 234.79 LBS

## 2017-08-30 LAB
HCT VFR BLD CALC: 40.6 % — SIGNIFICANT CHANGE UP (ref 39–50)
HGB BLD-MCNC: 13.7 G/DL — SIGNIFICANT CHANGE UP (ref 13–17)
MCHC RBC-ENTMCNC: 31.8 PG — SIGNIFICANT CHANGE UP (ref 27–34)
MCHC RBC-ENTMCNC: 33.7 G/DL — SIGNIFICANT CHANGE UP (ref 32–36)
MCV RBC AUTO: 94.2 FL — SIGNIFICANT CHANGE UP (ref 80–100)
PLATELET # BLD AUTO: 141 K/UL — LOW (ref 150–400)
RBC # BLD: 4.3 M/UL — SIGNIFICANT CHANGE UP (ref 4.2–5.8)
RBC # FLD: 13.6 % — SIGNIFICANT CHANGE UP (ref 10.3–14.5)
WBC # BLD: 15.3 K/UL — HIGH (ref 3.8–10.5)
WBC # FLD AUTO: 15.3 K/UL — HIGH (ref 3.8–10.5)

## 2017-08-30 PROCEDURE — 99215 OFFICE O/P EST HI 40 MIN: CPT

## 2017-08-31 ENCOUNTER — OTHER (OUTPATIENT)
Age: 37
End: 2017-08-31

## 2017-08-31 LAB
AFP-TM SERPL-MCNC: 3.8 NG/ML
ALBUMIN SERPL ELPH-MCNC: 4.1 G/DL
ALP BLD-CCNC: 65 U/L
ALT SERPL-CCNC: 44 U/L
ANION GAP SERPL CALC-SCNC: 13 MMOL/L
AST SERPL-CCNC: 34 U/L
BILIRUB SERPL-MCNC: 0.2 MG/DL
BUN SERPL-MCNC: 15 MG/DL
CALCIUM SERPL-MCNC: 9.9 MG/DL
CHLORIDE SERPL-SCNC: 103 MMOL/L
CO2 SERPL-SCNC: 28 MMOL/L
CREAT SERPL-MCNC: 0.97 MG/DL
CULTURE RESULTS: SIGNIFICANT CHANGE UP
CULTURE RESULTS: SIGNIFICANT CHANGE UP
GLUCOSE SERPL-MCNC: 129 MG/DL
HCG SERPL-MCNC: 7 MIU/ML
LDH SERPL-CCNC: 711 U/L
MAGNESIUM SERPL-MCNC: 1.9 MG/DL
POTASSIUM SERPL-SCNC: 4.7 MMOL/L
PROT SERPL-MCNC: 6.5 G/DL
SODIUM SERPL-SCNC: 144 MMOL/L
SPECIMEN SOURCE: SIGNIFICANT CHANGE UP
SPECIMEN SOURCE: SIGNIFICANT CHANGE UP

## 2017-09-05 ENCOUNTER — APPOINTMENT (OUTPATIENT)
Dept: INFUSION THERAPY | Facility: HOSPITAL | Age: 37
End: 2017-09-05

## 2017-09-05 ENCOUNTER — LABORATORY RESULT (OUTPATIENT)
Age: 37
End: 2017-09-05

## 2017-09-05 ENCOUNTER — OUTPATIENT (OUTPATIENT)
Dept: OUTPATIENT SERVICES | Facility: HOSPITAL | Age: 37
LOS: 1 days | Discharge: HOME | End: 2017-09-05

## 2017-09-05 ENCOUNTER — RESULT REVIEW (OUTPATIENT)
Age: 37
End: 2017-09-05

## 2017-09-05 ENCOUNTER — OUTPATIENT (OUTPATIENT)
Dept: OUTPATIENT SERVICES | Facility: HOSPITAL | Age: 37
LOS: 1 days | End: 2017-09-05
Payer: MEDICAID

## 2017-09-05 DIAGNOSIS — C62.92 MALIGNANT NEOPLASM OF LEFT TESTIS, UNSPECIFIED WHETHER DESCENDED OR UNDESCENDED: ICD-10-CM

## 2017-09-05 DIAGNOSIS — Z90.79 ACQUIRED ABSENCE OF OTHER GENITAL ORGAN(S): Chronic | ICD-10-CM

## 2017-09-05 LAB
HCT VFR BLD CALC: 38.3 % — LOW (ref 39–50)
HGB BLD-MCNC: 13.6 G/DL — SIGNIFICANT CHANGE UP (ref 13–17)
MCHC RBC-ENTMCNC: 32.8 PG — SIGNIFICANT CHANGE UP (ref 27–34)
MCHC RBC-ENTMCNC: 35.6 G/DL — SIGNIFICANT CHANGE UP (ref 32–36)
MCV RBC AUTO: 92.3 FL — SIGNIFICANT CHANGE UP (ref 80–100)
PLATELET # BLD AUTO: 194 K/UL — SIGNIFICANT CHANGE UP (ref 150–400)
RBC # BLD: 4.15 M/UL — LOW (ref 4.2–5.8)
RBC # FLD: 11.9 % — SIGNIFICANT CHANGE UP (ref 10.3–14.5)
WBC # BLD: 10.3 K/UL — SIGNIFICANT CHANGE UP (ref 3.8–10.5)
WBC # FLD AUTO: 10.3 K/UL — SIGNIFICANT CHANGE UP (ref 3.8–10.5)

## 2017-09-05 PROCEDURE — 77001 FLUOROGUIDE FOR VEIN DEVICE: CPT | Mod: 26

## 2017-09-05 PROCEDURE — 36561 INSERT TUNNELED CV CATH: CPT

## 2017-09-05 PROCEDURE — 76937 US GUIDE VASCULAR ACCESS: CPT | Mod: 26

## 2017-09-05 PROCEDURE — C1894: CPT

## 2017-09-05 PROCEDURE — 77001 FLUOROGUIDE FOR VEIN DEVICE: CPT

## 2017-09-05 PROCEDURE — C1769: CPT

## 2017-09-05 PROCEDURE — C1788: CPT

## 2017-09-05 PROCEDURE — 76937 US GUIDE VASCULAR ACCESS: CPT

## 2017-09-06 ENCOUNTER — APPOINTMENT (OUTPATIENT)
Dept: INFUSION THERAPY | Facility: HOSPITAL | Age: 37
End: 2017-09-06

## 2017-09-06 DIAGNOSIS — R11.2 NAUSEA WITH VOMITING, UNSPECIFIED: ICD-10-CM

## 2017-09-06 DIAGNOSIS — E86.0 DEHYDRATION: ICD-10-CM

## 2017-09-06 DIAGNOSIS — Z51.11 ENCOUNTER FOR ANTINEOPLASTIC CHEMOTHERAPY: ICD-10-CM

## 2017-09-07 ENCOUNTER — APPOINTMENT (OUTPATIENT)
Dept: INFUSION THERAPY | Facility: HOSPITAL | Age: 37
End: 2017-09-07

## 2017-09-07 DIAGNOSIS — C62.90 MALIGNANT NEOPLASM OF UNSPECIFIED TESTIS, UNSPECIFIED WHETHER DESCENDED OR UNDESCENDED: ICD-10-CM

## 2017-09-07 DIAGNOSIS — Z45.2 ENCOUNTER FOR ADJUSTMENT AND MANAGEMENT OF VASCULAR ACCESS DEVICE: ICD-10-CM

## 2017-09-08 ENCOUNTER — APPOINTMENT (OUTPATIENT)
Dept: INFUSION THERAPY | Facility: HOSPITAL | Age: 37
End: 2017-09-08

## 2017-09-09 ENCOUNTER — RESULT REVIEW (OUTPATIENT)
Age: 37
End: 2017-09-09

## 2017-09-09 ENCOUNTER — APPOINTMENT (OUTPATIENT)
Dept: INFUSION THERAPY | Facility: HOSPITAL | Age: 37
End: 2017-09-09

## 2017-09-09 LAB
BASOPHILS # BLD AUTO: 0 K/UL — SIGNIFICANT CHANGE UP (ref 0–0.2)
EOSINOPHIL # BLD AUTO: 0 K/UL — SIGNIFICANT CHANGE UP (ref 0–0.5)
HCT VFR BLD CALC: 36.3 % — LOW (ref 39–50)
HGB BLD-MCNC: 13 G/DL — SIGNIFICANT CHANGE UP (ref 13–17)
LYMPHOCYTES # BLD AUTO: 2.4 K/UL — SIGNIFICANT CHANGE UP (ref 1–3.3)
LYMPHOCYTES # BLD AUTO: 48 % — HIGH (ref 13–44)
MCHC RBC-ENTMCNC: 33.5 PG — SIGNIFICANT CHANGE UP (ref 27–34)
MCHC RBC-ENTMCNC: 35.8 G/DL — SIGNIFICANT CHANGE UP (ref 32–36)
MCV RBC AUTO: 93.6 FL — SIGNIFICANT CHANGE UP (ref 80–100)
MONOCYTES # BLD AUTO: 0.2 K/UL — SIGNIFICANT CHANGE UP (ref 0–0.9)
MONOCYTES NFR BLD AUTO: 1 % — LOW (ref 2–14)
NEUTROPHILS # BLD AUTO: 2.3 K/UL — SIGNIFICANT CHANGE UP (ref 1.8–7.4)
NEUTROPHILS NFR BLD AUTO: 51 % — SIGNIFICANT CHANGE UP (ref 43–77)
PLAT MORPH BLD: NORMAL — SIGNIFICANT CHANGE UP
PLATELET # BLD AUTO: 220 K/UL — SIGNIFICANT CHANGE UP (ref 150–400)
RBC # BLD: 3.87 M/UL — LOW (ref 4.2–5.8)
RBC # FLD: 12 % — SIGNIFICANT CHANGE UP (ref 10.3–14.5)
RBC BLD AUTO: SIGNIFICANT CHANGE UP
WBC # BLD: 5 K/UL — SIGNIFICANT CHANGE UP (ref 3.8–10.5)
WBC # FLD AUTO: 5 K/UL — SIGNIFICANT CHANGE UP (ref 3.8–10.5)

## 2017-09-12 DIAGNOSIS — Z51.89 ENCOUNTER FOR OTHER SPECIFIED AFTERCARE: ICD-10-CM

## 2017-09-13 ENCOUNTER — TRANSCRIPTION ENCOUNTER (OUTPATIENT)
Age: 37
End: 2017-09-13

## 2017-09-19 ENCOUNTER — RESULT REVIEW (OUTPATIENT)
Age: 37
End: 2017-09-19

## 2017-09-19 ENCOUNTER — APPOINTMENT (OUTPATIENT)
Dept: HEMATOLOGY ONCOLOGY | Facility: CLINIC | Age: 37
End: 2017-09-19
Payer: MEDICAID

## 2017-09-19 VITALS
TEMPERATURE: 98.4 F | WEIGHT: 242.49 LBS | OXYGEN SATURATION: 100 % | BODY MASS INDEX: 31.8 KG/M2 | DIASTOLIC BLOOD PRESSURE: 78 MMHG | RESPIRATION RATE: 16 BRPM | SYSTOLIC BLOOD PRESSURE: 110 MMHG | HEART RATE: 84 BPM

## 2017-09-19 LAB
BASOPHILS # BLD AUTO: 0 K/UL — SIGNIFICANT CHANGE UP (ref 0–0.2)
BASOPHILS NFR BLD AUTO: 1 % — SIGNIFICANT CHANGE UP (ref 0–2)
EOSINOPHIL # BLD AUTO: 0.1 K/UL — SIGNIFICANT CHANGE UP (ref 0–0.5)
EOSINOPHIL NFR BLD AUTO: 1 % — SIGNIFICANT CHANGE UP (ref 0–6)
HCT VFR BLD CALC: 35.3 % — LOW (ref 39–50)
HGB BLD-MCNC: 12.1 G/DL — LOW (ref 13–17)
LYMPHOCYTES # BLD AUTO: 2 K/UL — SIGNIFICANT CHANGE UP (ref 1–3.3)
LYMPHOCYTES # BLD AUTO: 56 % — HIGH (ref 13–44)
MCHC RBC-ENTMCNC: 31.9 PG — SIGNIFICANT CHANGE UP (ref 27–34)
MCHC RBC-ENTMCNC: 34.3 G/DL — SIGNIFICANT CHANGE UP (ref 32–36)
MCV RBC AUTO: 92.9 FL — SIGNIFICANT CHANGE UP (ref 80–100)
MONOCYTES # BLD AUTO: 0.5 K/UL — SIGNIFICANT CHANGE UP (ref 0–0.9)
MONOCYTES NFR BLD AUTO: 15 % — HIGH (ref 2–14)
MYELOCYTES NFR BLD: 2 % — HIGH (ref 0–0)
NEUTROPHILS # BLD AUTO: 0.9 K/UL — LOW (ref 1.8–7.4)
NEUTROPHILS NFR BLD AUTO: 25 % — LOW (ref 43–77)
NRBC # BLD: 1 /100 — HIGH (ref 0–0)
PLAT MORPH BLD: NORMAL — SIGNIFICANT CHANGE UP
PLATELET # BLD AUTO: 72 K/UL — LOW (ref 150–400)
RBC # BLD: 3.8 M/UL — LOW (ref 4.2–5.8)
RBC # FLD: 12.6 % — SIGNIFICANT CHANGE UP (ref 10.3–14.5)
RBC BLD AUTO: SIGNIFICANT CHANGE UP
WBC # BLD: 3.4 K/UL — LOW (ref 3.8–10.5)
WBC # FLD AUTO: 3.4 K/UL — LOW (ref 3.8–10.5)

## 2017-09-19 PROCEDURE — 99214 OFFICE O/P EST MOD 30 MIN: CPT

## 2017-09-19 RX ORDER — IBUPROFEN 800 MG/1
TABLET, FILM COATED ORAL
Refills: 0 | Status: DISCONTINUED | COMMUNITY
End: 2017-09-19

## 2017-09-19 RX ORDER — FAMOTIDINE 20 MG/1
20 TABLET, FILM COATED ORAL
Qty: 60 | Refills: 5 | Status: DISCONTINUED | COMMUNITY
Start: 2017-08-15 | End: 2017-09-19

## 2017-09-19 RX ORDER — SUCRALFATE 1 G/10ML
1 SUSPENSION ORAL 4 TIMES DAILY
Qty: 1200 | Refills: 5 | Status: DISCONTINUED | COMMUNITY
Start: 2017-08-18 | End: 2017-09-19

## 2017-09-19 RX ORDER — GABAPENTIN 100 MG
100 TABLET ORAL
Refills: 0 | Status: DISCONTINUED | COMMUNITY
End: 2017-09-19

## 2017-09-20 LAB — HCG SERPL-MCNC: <1 MIU/ML

## 2017-09-25 ENCOUNTER — LABORATORY RESULT (OUTPATIENT)
Age: 37
End: 2017-09-25

## 2017-09-25 ENCOUNTER — APPOINTMENT (OUTPATIENT)
Dept: INFUSION THERAPY | Facility: HOSPITAL | Age: 37
End: 2017-09-25

## 2017-09-25 ENCOUNTER — RESULT REVIEW (OUTPATIENT)
Age: 37
End: 2017-09-25

## 2017-09-25 LAB
AFP-TM SERPL-MCNC: 3.3 NG/ML
ALBUMIN SERPL ELPH-MCNC: 3.9 G/DL
ALP BLD-CCNC: 60 U/L
ALT SERPL-CCNC: 38 U/L
ANION GAP SERPL CALC-SCNC: 10 MMOL/L
AST SERPL-CCNC: 16 U/L
BILIRUB SERPL-MCNC: 0.2 MG/DL
BUN SERPL-MCNC: 19 MG/DL
CALCIUM SERPL-MCNC: 9.1 MG/DL
CHLORIDE SERPL-SCNC: 105 MMOL/L
CO2 SERPL-SCNC: 28 MMOL/L
CREAT SERPL-MCNC: 0.95 MG/DL
GLUCOSE SERPL-MCNC: 109 MG/DL
HCT VFR BLD CALC: 36.5 % — LOW (ref 39–50)
HGB BLD-MCNC: 12.9 G/DL — LOW (ref 13–17)
LDH SERPL-CCNC: 212 U/L
MCHC RBC-ENTMCNC: 32.9 PG — SIGNIFICANT CHANGE UP (ref 27–34)
MCHC RBC-ENTMCNC: 35.5 G/DL — SIGNIFICANT CHANGE UP (ref 32–36)
MCV RBC AUTO: 92.6 FL — SIGNIFICANT CHANGE UP (ref 80–100)
PLATELET # BLD AUTO: 221 K/UL — SIGNIFICANT CHANGE UP (ref 150–400)
POTASSIUM SERPL-SCNC: 4.5 MMOL/L
PROT SERPL-MCNC: 6.4 G/DL
RBC # BLD: 3.94 M/UL — LOW (ref 4.2–5.8)
RBC # FLD: 14.3 % — SIGNIFICANT CHANGE UP (ref 10.3–14.5)
SODIUM SERPL-SCNC: 143 MMOL/L
WBC # BLD: 13.7 K/UL — HIGH (ref 3.8–10.5)
WBC # FLD AUTO: 13.7 K/UL — HIGH (ref 3.8–10.5)

## 2017-09-26 ENCOUNTER — APPOINTMENT (OUTPATIENT)
Dept: INFUSION THERAPY | Facility: HOSPITAL | Age: 37
End: 2017-09-26

## 2017-09-27 ENCOUNTER — APPOINTMENT (OUTPATIENT)
Dept: INFUSION THERAPY | Facility: HOSPITAL | Age: 37
End: 2017-09-27

## 2017-09-28 ENCOUNTER — APPOINTMENT (OUTPATIENT)
Dept: INFUSION THERAPY | Facility: HOSPITAL | Age: 37
End: 2017-09-28

## 2017-09-28 ENCOUNTER — RESULT REVIEW (OUTPATIENT)
Age: 37
End: 2017-09-28

## 2017-09-28 LAB
HCT VFR BLD CALC: 37.6 % — LOW (ref 39–50)
HGB BLD-MCNC: 12.8 G/DL — LOW (ref 13–17)
MCHC RBC-ENTMCNC: 31.9 PG — SIGNIFICANT CHANGE UP (ref 27–34)
MCHC RBC-ENTMCNC: 33.9 G/DL — SIGNIFICANT CHANGE UP (ref 32–36)
MCV RBC AUTO: 94.1 FL — SIGNIFICANT CHANGE UP (ref 80–100)
PLATELET # BLD AUTO: 311 K/UL — SIGNIFICANT CHANGE UP (ref 150–400)
RBC # BLD: 4 M/UL — LOW (ref 4.2–5.8)
RBC # FLD: 13 % — SIGNIFICANT CHANGE UP (ref 10.3–14.5)
WBC # BLD: 7.1 K/UL — SIGNIFICANT CHANGE UP (ref 3.8–10.5)
WBC # FLD AUTO: 7.1 K/UL — SIGNIFICANT CHANGE UP (ref 3.8–10.5)

## 2017-09-29 ENCOUNTER — APPOINTMENT (OUTPATIENT)
Dept: INFUSION THERAPY | Facility: HOSPITAL | Age: 37
End: 2017-09-29

## 2017-10-04 ENCOUNTER — OUTPATIENT (OUTPATIENT)
Dept: OUTPATIENT SERVICES | Facility: HOSPITAL | Age: 37
LOS: 1 days | Discharge: HOME | End: 2017-10-04
Payer: MEDICAID

## 2017-10-04 DIAGNOSIS — Z90.79 ACQUIRED ABSENCE OF OTHER GENITAL ORGAN(S): Chronic | ICD-10-CM

## 2017-10-04 DIAGNOSIS — C62.92 MALIGNANT NEOPLASM OF LEFT TESTIS, UNSPECIFIED WHETHER DESCENDED OR UNDESCENDED: ICD-10-CM

## 2017-10-09 ENCOUNTER — APPOINTMENT (OUTPATIENT)
Dept: HEMATOLOGY ONCOLOGY | Facility: CLINIC | Age: 37
End: 2017-10-09
Payer: MEDICAID

## 2017-10-09 ENCOUNTER — RESULT REVIEW (OUTPATIENT)
Age: 37
End: 2017-10-09

## 2017-10-09 VITALS
TEMPERATURE: 98.4 F | HEART RATE: 82 BPM | DIASTOLIC BLOOD PRESSURE: 60 MMHG | WEIGHT: 244.49 LBS | SYSTOLIC BLOOD PRESSURE: 110 MMHG | RESPIRATION RATE: 16 BRPM | OXYGEN SATURATION: 98 % | BODY MASS INDEX: 32.06 KG/M2

## 2017-10-09 LAB
HCT VFR BLD CALC: 32.4 % — LOW (ref 39–50)
HGB BLD-MCNC: 11.3 G/DL — LOW (ref 13–17)
MCHC RBC-ENTMCNC: 32.9 PG — SIGNIFICANT CHANGE UP (ref 27–34)
MCHC RBC-ENTMCNC: 34.9 G/DL — SIGNIFICANT CHANGE UP (ref 32–36)
MCV RBC AUTO: 94.1 FL — SIGNIFICANT CHANGE UP (ref 80–100)
PLATELET # BLD AUTO: 52 K/UL — LOW (ref 150–400)
RBC # BLD: 3.44 M/UL — LOW (ref 4.2–5.8)
RBC # FLD: 15.7 % — HIGH (ref 10.3–14.5)
WBC # BLD: 8.6 K/UL — SIGNIFICANT CHANGE UP (ref 3.8–10.5)
WBC # FLD AUTO: 8.6 K/UL — SIGNIFICANT CHANGE UP (ref 3.8–10.5)

## 2017-10-09 PROCEDURE — 99214 OFFICE O/P EST MOD 30 MIN: CPT

## 2017-10-10 LAB
AFP-TM SERPL-MCNC: 2.5 NG/ML
ALBUMIN SERPL ELPH-MCNC: 3.9 G/DL
ALP BLD-CCNC: 85 U/L
ALT SERPL-CCNC: 45 U/L
ANION GAP SERPL CALC-SCNC: 16 MMOL/L
AST SERPL-CCNC: 23 U/L
BILIRUB SERPL-MCNC: 0.2 MG/DL
BUN SERPL-MCNC: 18 MG/DL
CALCIUM SERPL-MCNC: 9 MG/DL
CHLORIDE SERPL-SCNC: 103 MMOL/L
CO2 SERPL-SCNC: 24 MMOL/L
CREAT SERPL-MCNC: 1.16 MG/DL
GLUCOSE SERPL-MCNC: 135 MG/DL
HCG SERPL-MCNC: <1 MIU/ML
LDH SERPL-CCNC: 261 U/L
MAGNESIUM SERPL-MCNC: 1.8 MG/DL
POTASSIUM SERPL-SCNC: 4.2 MMOL/L
PROT SERPL-MCNC: 6.3 G/DL
SODIUM SERPL-SCNC: 143 MMOL/L

## 2017-10-11 LAB — BACTERIA UR CULT: NORMAL

## 2017-10-13 ENCOUNTER — MOBILE ON CALL (OUTPATIENT)
Age: 37
End: 2017-10-13

## 2017-10-16 ENCOUNTER — LABORATORY RESULT (OUTPATIENT)
Age: 37
End: 2017-10-16

## 2017-10-16 ENCOUNTER — RESULT REVIEW (OUTPATIENT)
Age: 37
End: 2017-10-16

## 2017-10-16 ENCOUNTER — APPOINTMENT (OUTPATIENT)
Dept: INFUSION THERAPY | Facility: HOSPITAL | Age: 37
End: 2017-10-16

## 2017-10-16 ENCOUNTER — INPATIENT (INPATIENT)
Facility: HOSPITAL | Age: 37
LOS: 4 days | Discharge: HOME | DRG: 309 | End: 2017-10-21
Attending: INTERNAL MEDICINE | Admitting: HOSPITALIST
Payer: MEDICAID

## 2017-10-16 VITALS — HEART RATE: 150 BPM | RESPIRATION RATE: 18 BRPM | DIASTOLIC BLOOD PRESSURE: 86 MMHG | SYSTOLIC BLOOD PRESSURE: 114 MMHG

## 2017-10-16 DIAGNOSIS — C62.90 MALIGNANT NEOPLASM OF UNSPECIFIED TESTIS, UNSPECIFIED WHETHER DESCENDED OR UNDESCENDED: ICD-10-CM

## 2017-10-16 DIAGNOSIS — I48.92 UNSPECIFIED ATRIAL FLUTTER: ICD-10-CM

## 2017-10-16 DIAGNOSIS — Z29.9 ENCOUNTER FOR PROPHYLACTIC MEASURES, UNSPECIFIED: ICD-10-CM

## 2017-10-16 DIAGNOSIS — Z95.828 PRESENCE OF OTHER VASCULAR IMPLANTS AND GRAFTS: Chronic | ICD-10-CM

## 2017-10-16 DIAGNOSIS — Z90.79 ACQUIRED ABSENCE OF OTHER GENITAL ORGAN(S): Chronic | ICD-10-CM

## 2017-10-16 DIAGNOSIS — C62.92 MALIGNANT NEOPLASM OF LEFT TESTIS, UNSPECIFIED WHETHER DESCENDED OR UNDESCENDED: ICD-10-CM

## 2017-10-16 DIAGNOSIS — Z90.79 ACQUIRED ABSENCE OF OTHER GENITAL ORGAN(S): ICD-10-CM

## 2017-10-16 LAB
ALBUMIN SERPL ELPH-MCNC: 4 G/DL — SIGNIFICANT CHANGE UP (ref 3.3–5)
ALP SERPL-CCNC: 74 U/L — SIGNIFICANT CHANGE UP (ref 40–120)
ALT FLD-CCNC: 48 U/L RC — HIGH (ref 10–45)
ANION GAP SERPL CALC-SCNC: 11 MMOL/L — SIGNIFICANT CHANGE UP (ref 5–17)
APTT BLD: 29.8 SEC — SIGNIFICANT CHANGE UP (ref 27.5–37.4)
AST SERPL-CCNC: 30 U/L — SIGNIFICANT CHANGE UP (ref 10–40)
BASE EXCESS BLDV CALC-SCNC: 2.2 MMOL/L — HIGH (ref -2–2)
BASOPHILS # BLD AUTO: 0 K/UL — SIGNIFICANT CHANGE UP (ref 0–0.2)
BASOPHILS NFR BLD AUTO: 0.1 % — SIGNIFICANT CHANGE UP (ref 0–2)
BILIRUB SERPL-MCNC: 0.2 MG/DL — SIGNIFICANT CHANGE UP (ref 0.2–1.2)
BUN SERPL-MCNC: 19 MG/DL — SIGNIFICANT CHANGE UP (ref 7–23)
CA-I SERPL-SCNC: 1.22 MMOL/L — SIGNIFICANT CHANGE UP (ref 1.12–1.3)
CALCIUM SERPL-MCNC: 9.3 MG/DL — SIGNIFICANT CHANGE UP (ref 8.4–10.5)
CHLORIDE BLDV-SCNC: 107 MMOL/L — SIGNIFICANT CHANGE UP (ref 96–108)
CHLORIDE SERPL-SCNC: 104 MMOL/L — SIGNIFICANT CHANGE UP (ref 96–108)
CK SERPL-CCNC: 43 U/L — SIGNIFICANT CHANGE UP (ref 30–200)
CO2 BLDV-SCNC: 29 MMOL/L — SIGNIFICANT CHANGE UP (ref 22–30)
CO2 SERPL-SCNC: 25 MMOL/L — SIGNIFICANT CHANGE UP (ref 22–31)
CREAT SERPL-MCNC: 0.94 MG/DL — SIGNIFICANT CHANGE UP (ref 0.5–1.3)
EOSINOPHIL # BLD AUTO: 0.1 K/UL — SIGNIFICANT CHANGE UP (ref 0–0.5)
EOSINOPHIL NFR BLD AUTO: 0.5 % — SIGNIFICANT CHANGE UP (ref 0–6)
GAS PNL BLDV: 139 MMOL/L — SIGNIFICANT CHANGE UP (ref 136–145)
GAS PNL BLDV: SIGNIFICANT CHANGE UP
GAS PNL BLDV: SIGNIFICANT CHANGE UP
GLUCOSE BLDV-MCNC: 125 MG/DL — HIGH (ref 70–99)
GLUCOSE SERPL-MCNC: 133 MG/DL — HIGH (ref 70–99)
HCO3 BLDV-SCNC: 28 MMOL/L — SIGNIFICANT CHANGE UP (ref 21–29)
HCT VFR BLD CALC: 33 % — LOW (ref 39–50)
HCT VFR BLD CALC: 33.5 % — LOW (ref 39–50)
HCT VFR BLDA CALC: 42 % — SIGNIFICANT CHANGE UP (ref 39–50)
HGB BLD CALC-MCNC: 13.6 G/DL — SIGNIFICANT CHANGE UP (ref 13–17)
HGB BLD-MCNC: 11.7 G/DL — LOW (ref 13–17)
HGB BLD-MCNC: 11.9 G/DL — LOW (ref 13–17)
INR BLD: 1.05 RATIO — SIGNIFICANT CHANGE UP (ref 0.88–1.16)
LACTATE BLDV-MCNC: 1.7 MMOL/L — SIGNIFICANT CHANGE UP (ref 0.7–2)
LYMPHOCYTES # BLD AUTO: 2.8 K/UL — SIGNIFICANT CHANGE UP (ref 1–3.3)
LYMPHOCYTES # BLD AUTO: 24.6 % — SIGNIFICANT CHANGE UP (ref 13–44)
MAGNESIUM SERPL-MCNC: 2.1 MG/DL — SIGNIFICANT CHANGE UP (ref 1.6–2.6)
MCHC RBC-ENTMCNC: 33.7 PG — SIGNIFICANT CHANGE UP (ref 27–34)
MCHC RBC-ENTMCNC: 34.2 PG — HIGH (ref 27–34)
MCHC RBC-ENTMCNC: 35 GM/DL — SIGNIFICANT CHANGE UP (ref 32–36)
MCHC RBC-ENTMCNC: 35.9 G/DL — SIGNIFICANT CHANGE UP (ref 32–36)
MCV RBC AUTO: 93.7 FL — SIGNIFICANT CHANGE UP (ref 80–100)
MCV RBC AUTO: 97.9 FL — SIGNIFICANT CHANGE UP (ref 80–100)
MONOCYTES # BLD AUTO: 1.2 K/UL — HIGH (ref 0–0.9)
MONOCYTES NFR BLD AUTO: 10.3 % — SIGNIFICANT CHANGE UP (ref 2–14)
NEUTROPHILS # BLD AUTO: 7.3 K/UL — SIGNIFICANT CHANGE UP (ref 1.8–7.4)
NEUTROPHILS NFR BLD AUTO: 64.6 % — SIGNIFICANT CHANGE UP (ref 43–77)
NT-PROBNP SERPL-SCNC: 863 PG/ML — HIGH (ref 0–300)
OTHER CELLS CSF MANUAL: 11 ML/DL — LOW (ref 18–22)
PCO2 BLDV: 48 MMHG — SIGNIFICANT CHANGE UP (ref 35–50)
PH BLDV: 7.38 — SIGNIFICANT CHANGE UP (ref 7.35–7.45)
PHOSPHATE SERPL-MCNC: 3.3 MG/DL — SIGNIFICANT CHANGE UP (ref 2.5–4.5)
PLATELET # BLD AUTO: 205 K/UL — SIGNIFICANT CHANGE UP (ref 150–400)
PLATELET # BLD AUTO: 227 K/UL — SIGNIFICANT CHANGE UP (ref 150–400)
PO2 BLDV: 34 MMHG — SIGNIFICANT CHANGE UP (ref 25–45)
POTASSIUM BLDV-SCNC: 4.1 MMOL/L — SIGNIFICANT CHANGE UP (ref 3.5–5)
POTASSIUM SERPL-MCNC: 4.3 MMOL/L — SIGNIFICANT CHANGE UP (ref 3.5–5.3)
POTASSIUM SERPL-SCNC: 4.3 MMOL/L — SIGNIFICANT CHANGE UP (ref 3.5–5.3)
PROT SERPL-MCNC: 7.2 G/DL — SIGNIFICANT CHANGE UP (ref 6–8.3)
PROTHROM AB SERPL-ACNC: 11.5 SEC — SIGNIFICANT CHANGE UP (ref 9.8–12.7)
RBC # BLD: 3.42 M/UL — LOW (ref 4.2–5.8)
RBC # BLD: 3.52 M/UL — LOW (ref 4.2–5.8)
RBC # FLD: 14.8 % — HIGH (ref 10.3–14.5)
RBC # FLD: 15 % — HIGH (ref 10.3–14.5)
SAO2 % BLDV: 60 % — LOW (ref 67–88)
SODIUM SERPL-SCNC: 140 MMOL/L — SIGNIFICANT CHANGE UP (ref 135–145)
TROPONIN T SERPL-MCNC: <0.01 NG/ML — SIGNIFICANT CHANGE UP (ref 0–0.06)
TROPONIN T SERPL-MCNC: <0.01 NG/ML — SIGNIFICANT CHANGE UP (ref 0–0.06)
WBC # BLD: 10.4 K/UL — SIGNIFICANT CHANGE UP (ref 3.8–10.5)
WBC # BLD: 11.3 K/UL — HIGH (ref 3.8–10.5)
WBC # FLD AUTO: 10.4 K/UL — SIGNIFICANT CHANGE UP (ref 3.8–10.5)
WBC # FLD AUTO: 11.3 K/UL — HIGH (ref 3.8–10.5)

## 2017-10-16 PROCEDURE — 93010 ELECTROCARDIOGRAM REPORT: CPT

## 2017-10-16 PROCEDURE — 99222 1ST HOSP IP/OBS MODERATE 55: CPT

## 2017-10-16 PROCEDURE — 93308 TTE F-UP OR LMTD: CPT | Mod: 26

## 2017-10-16 PROCEDURE — 99223 1ST HOSP IP/OBS HIGH 75: CPT | Mod: GC

## 2017-10-16 PROCEDURE — 71020: CPT | Mod: 26

## 2017-10-16 PROCEDURE — 99285 EMERGENCY DEPT VISIT HI MDM: CPT

## 2017-10-16 RX ORDER — SODIUM CHLORIDE 9 MG/ML
1000 INJECTION INTRAMUSCULAR; INTRAVENOUS; SUBCUTANEOUS ONCE
Qty: 0 | Refills: 0 | Status: COMPLETED | OUTPATIENT
Start: 2017-10-16 | End: 2017-10-16

## 2017-10-16 RX ORDER — ENOXAPARIN SODIUM 100 MG/ML
40 INJECTION SUBCUTANEOUS DAILY
Qty: 0 | Refills: 0 | Status: DISCONTINUED | OUTPATIENT
Start: 2017-10-16 | End: 2017-10-17

## 2017-10-16 RX ORDER — METOCLOPRAMIDE HCL 10 MG
10 TABLET ORAL
Qty: 0 | Refills: 0 | Status: DISCONTINUED | OUTPATIENT
Start: 2017-10-16 | End: 2017-10-16

## 2017-10-16 RX ORDER — OXYCODONE HYDROCHLORIDE 5 MG/1
15 TABLET ORAL EVERY 6 HOURS
Qty: 0 | Refills: 0 | Status: DISCONTINUED | OUTPATIENT
Start: 2017-10-16 | End: 2017-10-16

## 2017-10-16 RX ORDER — METOPROLOL TARTRATE 50 MG
25 TABLET ORAL
Qty: 0 | Refills: 0 | Status: DISCONTINUED | OUTPATIENT
Start: 2017-10-16 | End: 2017-10-17

## 2017-10-16 RX ORDER — METOPROLOL TARTRATE 50 MG
5 TABLET ORAL ONCE
Qty: 0 | Refills: 0 | Status: DISCONTINUED | OUTPATIENT
Start: 2017-10-16 | End: 2017-10-16

## 2017-10-16 RX ORDER — ONDANSETRON 8 MG/1
4 TABLET, FILM COATED ORAL ONCE
Qty: 0 | Refills: 0 | Status: COMPLETED | OUTPATIENT
Start: 2017-10-16 | End: 2017-10-16

## 2017-10-16 RX ORDER — ENOXAPARIN SODIUM 100 MG/ML
40 INJECTION SUBCUTANEOUS ONCE
Qty: 0 | Refills: 0 | Status: COMPLETED | OUTPATIENT
Start: 2017-10-16 | End: 2017-10-16

## 2017-10-16 RX ORDER — FAMOTIDINE 10 MG/ML
40 INJECTION INTRAVENOUS
Qty: 0 | Refills: 0 | Status: DISCONTINUED | OUTPATIENT
Start: 2017-10-16 | End: 2017-10-21

## 2017-10-16 RX ORDER — SODIUM CHLORIDE 9 MG/ML
3 INJECTION INTRAMUSCULAR; INTRAVENOUS; SUBCUTANEOUS ONCE
Qty: 0 | Refills: 0 | Status: COMPLETED | OUTPATIENT
Start: 2017-10-16 | End: 2017-10-16

## 2017-10-16 RX ORDER — ENOXAPARIN SODIUM 100 MG/ML
30 INJECTION SUBCUTANEOUS EVERY 24 HOURS
Qty: 0 | Refills: 0 | Status: DISCONTINUED | OUTPATIENT
Start: 2017-10-16 | End: 2017-10-16

## 2017-10-16 RX ORDER — SUCRALFATE 1 G
1 TABLET ORAL
Qty: 0 | Refills: 0 | Status: DISCONTINUED | OUTPATIENT
Start: 2017-10-16 | End: 2017-10-16

## 2017-10-16 RX ADMIN — Medication 25 MILLIGRAM(S): at 22:19

## 2017-10-16 RX ADMIN — SODIUM CHLORIDE 3 MILLILITER(S): 9 INJECTION INTRAMUSCULAR; INTRAVENOUS; SUBCUTANEOUS at 12:12

## 2017-10-16 RX ADMIN — ENOXAPARIN SODIUM 40 MILLIGRAM(S): 100 INJECTION SUBCUTANEOUS at 22:19

## 2017-10-16 RX ADMIN — SODIUM CHLORIDE 2000 MILLILITER(S): 9 INJECTION INTRAMUSCULAR; INTRAVENOUS; SUBCUTANEOUS at 11:30

## 2017-10-16 RX ADMIN — FAMOTIDINE 40 MILLIGRAM(S): 10 INJECTION INTRAVENOUS at 22:19

## 2017-10-16 RX ADMIN — ONDANSETRON 4 MILLIGRAM(S): 8 TABLET, FILM COATED ORAL at 14:40

## 2017-10-16 RX ADMIN — SODIUM CHLORIDE 4000 MILLILITER(S): 9 INJECTION INTRAMUSCULAR; INTRAVENOUS; SUBCUTANEOUS at 12:35

## 2017-10-16 NOTE — H&P ADULT - NSHPREVIEWOFSYSTEMS_GEN_ALL_CORE
Review of Systems:  Constitutional: [ ] Fever [ ] Chills [ ] Fatigue [ ] Weight change   HEENT: [ ] Blurred vision [ ] Eye Pain [ ] Headache [ ] Runny nose [ ] Sore Throat   Respiratory: [ ] Cough [ ] Wheezing [ ] Shortness of breath  Cardiovascular: [ ] Chest Pain [ ] Palpitations [ ] ABARCA [ ] PND [ ] Orthopnea  Gastrointestinal: [X ] Abdominal Pain [ ] Diarrhea [ ] Constipation [ ] Hemorrhoids [X ] Nausea [ X] Vomiting  Genitourinary: [ ] Nocturia [ ] Dysuria [ ] Incontinence  Extremities: [ ] Swelling [ ] Joint Pain  Neurologic: [ ] Focal deficit [ ] Paresthesias [ ] Syncope  Skin: [ ] Rash [ ] Ecchymoses [ ] Wounds [ ] Lesions  Psychiatry: [ ] Depression [ ] Suicidal/Homicidal Ideation [ ] Anxiety [ ] Sleep Disturbances  [ ] 10 point review of systems is otherwise negative except as mentioned above            [ ]Unable to obtain Review of Systems:  Constitutional: [ ] Fever [ ] Chills [ ] Fatigue [ ] Weight change   HEENT: [ ] Blurred vision [ ] Eye Pain [ ] Headache [ ] Runny nose [ ] Sore Throat   Respiratory: [ ] Cough [ ] Wheezing [ ] Shortness of breath  Cardiovascular: [ ] Chest Pain [ ] Palpitations [ ] ABARCA [ ] PND [ ] Orthopnea  Gastrointestinal: [X ] Abdominal Pain [ ] Diarrhea [ ] Constipation [ ] Hemorrhoids [X ] Nausea [ X] Vomiting  Genitourinary: [ ] Nocturia [ ] Dysuria [ ] Incontinence  Extremities: [ ] Swelling [ ] Joint Pain  Neurologic: [ ] Focal deficit [ ] Paresthesias [ ] Syncope  Skin: [ ] Rash [ ] Ecchymoses [ ] Wounds [ ] Lesions  Psychiatry: [ ] Depression [ ] Suicidal/Homicidal Ideation [ ] Anxiety [ ] Sleep Disturbances  [ X] 10 point review of systems is otherwise negative except as mentioned above            [ ]Unable to obtain

## 2017-10-16 NOTE — ED PROVIDER NOTE - ATTENDING CONTRIBUTION TO CARE
I performed a history and physical exam of the patient and discussed their management with the resident. I reviewed the resident's note and agree with the documented findings and plan of care. My medical decison making and observations are found above.  lungs clear, mouth moist.

## 2017-10-16 NOTE — H&P ADULT - NSHPLABSRESULTS_GEN_ALL_CORE
11.7   11.3  )-----------( 227      ( 16 Oct 2017 11:50 )             33.5   10-16    140  |  104  |  19  ----------------------------<  133<H>  4.3   |  25  |  0.94    Ca    9.3      16 Oct 2017 11:42  Phos  3.3     10-16  Mg     2.1     10-16    TPro  7.2  /  Alb  4.0  /  TBili  0.2  /  DBili  x   /  AST  30  /  ALT  48<H>  /  AlkPhos  74  10-16    PT/INR - ( 16 Oct 2017 11:42 )   PT: 11.5 sec;   INR: 1.05 ratio         PTT - ( 16 Oct 2017 11:42 )  PTT:29.8 sec    pH, Venous: 7.38 (10.16.17 @ 11:42)  Serum Pro-Brain Natriuretic Peptide (10.16.17 @ 11:42)    Serum Pro-Brain Natriuretic Peptide: 863: NT-proBNP Interpretive comments:        < from: US TTE 2D F/U, Limited w/o Contrast (ED) (10.16.17 @ 13:46) >    IMPRESSION:   Trace pericardial effusion without evidence of tamponade.   The heart was dilated and hypodynamic with an estimated ejection fraction   of 10-15%.    < end of copied text >    < from: Xray Chest 2 Views PA/Lat (10.16.17 @ 11:49) >    FINDINGS:  The cardiac silhouette is normal in size. There is a   right-sided Mediport with its tip overlying the superior vena cava. There   are no focal consolidations or pleural effusions. The hilar and   mediastinal structures appear unremarkable. The osseous structures are   intact.    IMPRESSION: Clear lungs.    < end of copied text >

## 2017-10-16 NOTE — ED ADULT NURSE NOTE - OBJECTIVE STATEMENT
Pt YAN from [facility] for "tachycardia."  Pt was due to receive his 4th of 4 chemo treatments today, receiving etoposide and cisplatin Q2 weeks for testicular cancer, last treatment 2 weeks ago, when he was found to have a heart rate as high as 150 via pulse ox monitoring.  Pt reports that he has had nausea/vomiting and decreased PO intake throughout his treatment, not any worse over the past few days.  He endorses feeling weak, but this has also been normal for him.  Pt conversive without difficulty, no distress, able to stand and get himself into stretcher without need for assistance, skin wdi, abd soft/nd/nt, denies cp, palpitations, lightheadedness/dizziness, sob, abd pain, current n/v, diarrhea, urinary symptoms, sick contacts.  R chest wall port accessed at [facility] and pt arrives with IVF infusing, 200mL given by EMS. [facility]."  Pt was due to receive his 4th of 4 chemo treatments today, receiving etoposide and cisplatin Q2 weeks for testicular cancer, last treatment 2 weeks ago, when he was found to have a heart rate as high as 150 via pulse ox monitoring.  Pt reports that he has had nausea/vomiting and decreased PO intake throughout his treatment, not any worse over the past few days.  He endorses feeling weak, but this has also been normal for him.  Pt conversive without difficulty, no distress, able to stand and get himself into stretcher without need for assistance, skin wdi, abd soft/nd/nt, denies cp, palpitations, lightheadedness/dizziness, sob, abd pain, current n/v, diarrhea, urinary symptoms, sick contacts.  R chest wall port accessed at [facility] and pt arrives with IVF infusing, 200mL given by EMS.  Denies any PMH aside from cancer.  Diagnosed July 2017 here, had L radical orchiectomy, no radiation. Pt YAN from [facility] for "tachycardia."  Pt was due to receive his 4th of 4 chemo treatments today, receiving etoposide and cisplatin Q2 weeks for testicular cancer, began Aug 14, last treatment 2 weeks ago, when he was found to have a heart rate as high as 150 via pulse ox monitoring.  Pt reports that he has had nausea/vomiting and decreased PO intake throughout his treatment, not any worse over the past few days.  He endorses feeling weak, but this has also been normal for him.  Pt conversive without difficulty, no distress, able to stand and get himself into stretcher without need for assistance, skin wdi, abd soft/nd/nt, denies cp, palpitations, lightheadedness/dizziness, sob, abd pain, current n/v, diarrhea, urinary symptoms, sick contacts.  R chest wall port accessed at [facility] and pt arrives with IVF infusing, 200mL given by EMS.  Denies any PMH aside from cancer.  Diagnosed July 2017 here, had L radical orchiectomy, no radiation.

## 2017-10-16 NOTE — ED PROVIDER NOTE - MEDICAL DECISION MAKING DETAILS
Danielle: pt with high heart rate, ?flutter. Patient on chemo for testicular cancer. Some vomiting and diarrhea but not clinically dehydrated. No chest pain or sob.

## 2017-10-16 NOTE — CONSULT NOTE ADULT - PROBLEM SELECTOR RECOMMENDATION 9
We would consult IR: Flutter may be related to the portocath that may be irritating the R atrium. We would also consult and follow up cardiology rec

## 2017-10-16 NOTE — H&P ADULT - PROBLEM SELECTOR PLAN 1
-Responded to diltiazem 10mg x2 in ED, continues to be tachycardic to 120s with irregularly irreg rhythm will need to control rhythm before continuation of chemotherapy  -TTE to check EF with better controlled rhythm  -continue patient on telemetry  -will follow up with cardiology recommendations -Responded to diltiazem 10mg x2 in ED, continues to be tachycardic to 120s with irregularly irreg rhythm will need to control rhythm before continuation of chemotherapy  -Started on 30mg diltiazem PO q8h after 10mg IV diltiazem  -TTE to check EF with better controlled rhythm  -continue patient on telemetry  -will follow up with cardiology recommendations -Responded to diltiazem 10mg x2 in ED, continues to be tachycardic to 120s with irregularly irreg rhythm will need to control rhythm before continuation of chemotherapy  -Started on 30mg diltiazem PO q8h after 10mg IV diltiazem  -TTE to check EF with better controlled rhythm  -continue patient on telemetry  -TSH, T4  -troponin q6  -will follow up with cardiology recommendations

## 2017-10-16 NOTE — ED PROVIDER NOTE - PHYSICAL EXAMINATION
Abraham: A & O x 3, NAD, HEENT with no hair on head, otherwise WNL and no facial asymmetry; lungs CTAB, heart with irreg tachycaric rhythm; abdomen soft NTND; extremities with trace edema; skin with no rashes, neuro exam non focal with no motor or sensory deficits

## 2017-10-16 NOTE — H&P ADULT - NSHPPHYSICALEXAM_GEN_ALL_CORE
PHYSICAL EXAM:  GENERAL: In no acute distress. NAD, well-developed  HEAD:  Atraumatic, Normocephalic  EYES: EOMI, PERRLA, conjunctiva and sclera clear  NECK: Supple, No JVD  CHEST/LUNG: Clear to auscultation bilaterally; No wheeze  HEART: Regular rate and rhythm; No murmurs, rubs, or gallops  ABDOMEN: Soft, Nontender, Nondistended; Bowel sounds present  EXTREMITIES:  2+ Peripheral Pulses, No clubbing, cyanosis, or edema  PSYCH: AAOx3  NEUROLOGY: non-focal  SKIN: No rashes or lesions, portacath well healed PHYSICAL EXAM:  GENERAL: In no acute distress. NAD, well-developed  HEAD:  Bald, Atraumatic, Normocephalic  EYES: EOMI, PERRLA, conjunctiva and sclera clear  NECK: Supple, No JVD  CHEST/LUNG: Port-a-cath placed with no erythema/edema surrounding. No TTP. Clear to auscultation bilaterally; No wheeze  HEART: Irregularly irregular at about 120bpm, palpable heave; No murmurs, rubs, or gallops  ABDOMEN: Soft, Nontender, Nondistended; Bowel sounds present  EXTREMITIES:  2+ Peripheral Pulses, No clubbing, cyanosis, or edema, 5/5 strength BL U/L extremities  PSYCH: AAOx3  NEUROLOGY: non-focal CN II-XII grossly intact  SKIN: Dry skin on forearms, No rashes or lesions

## 2017-10-16 NOTE — ED PROVIDER NOTE - OBJECTIVE STATEMENT
37 year old, on chemo for left testicular CA since july 11th, s/p orchectomy, last chemo 2 weeks ago, daily nausea/vomit. Presenting from treatment center today after he was found to be tachycardic to 150s. He did not receive the treatment today. patient denies severe change in his PO intake. not in pain, no shortness of breath, no dyspnea on exertion, no chest pain.    PMD: none  Onc: Laith ( Neo Chirinos) 37 year old, on chemo for left testicular CA since july 11th, s/p orchectomy, last chemo 2 weeks ago, daily nausea/vomit. Presenting from treatment center today after he was found to be tachycardic to 150s. He did not receive the treatment today. patient denies severe change in his PO intake. not in pain, no shortness of breath, no dyspnea on exertion, no chest pain.    Patient has received 3 cycles of etoposide and cisplatin.     PMD: none  Onc: Laith ( Neo Chirinos)

## 2017-10-16 NOTE — CONSULT NOTE ADULT - ASSESSMENT
36 y/o M with pmhx of testicular cancer presenting after being seen in chemo infusion center and told his heart rate was very fast and found to be tachycardic    #Tachycardia - Long RP suggestive of atrial arrythmia 36 y/o M with pmhx of testicular cancer presenting after being seen in chemo infusion center and told his heart rate was very fast and found to be tachycardic    #Tachycardia - likely afib/flutter/ w/ some organized atrial activity. Pt [id number] is 0. Unclear etiology at this point  - check TSH, ECHO  - started PO Metoprolol 25mg BID for rate control  - admit to Tele  - appreciate Heme/Onc reccs. Reach out to IR to look closely as chemoport position. This arrythmia occurs about 4 weeks post placement.  - may need cardioversion if doesn't spontaneously convert and has 36 y/o M with pmhx of testicular cancer presenting after being seen in chemo infusion center and told his heart rate was very fast and found to be tachycardic    #Tachycardia - Atypical Atria flutter. Pt [id number] is 0. Unclear etiology at this point,  but it is possible that ht e catheter trip is causing mechanical irritation  - check TSH, ECHO  - started PO Metoprolol 25mg BID for rate control  - admit to Tele  Consult IR re: catheter position  - appreciate Heme/Onc recs. Reach out to IR to look closely as chemoport position. This arrythmia occurs about 4 weeks post placement.  - may need cardioversion if doesn't spontaneously convert and has

## 2017-10-16 NOTE — ED PROVIDER NOTE - NS ED ROS FT
CONST: no fevers, no chills  EYES: no pain  ENT: no sore throat   CV: no chest pain  RESP: no shortness of breath  ABD: currently no nausea, no abdominal pain   : no dysuria  MSK: no back pain  NEURO: no headache or additional neurologic complaints  HEME: no easy bleeding  SKIN:  no rash

## 2017-10-16 NOTE — CONSULT NOTE ADULT - SUBJECTIVE AND OBJECTIVE BOX
Chief Complaint: "They told me to come in for elevated heart rate"    HPI: 36 y/o M with pmhx of testicular cancer presenting after being seen in chemo infusion center and told his heart rate was very fast. Patient was last seen by his oncologist, Dr. Chirinos, last Monday at which time everything was normal. He endorses getting a usual check up a week before infusions. He denies any palpitations, chest pain or dyspnea and was surprised when alerted to this finding. Patient is currently receiving a regimen of etoposide and cisplatin.     In the ED patient received metoprolol and cardizem.     PMH:   Testicular cancer  Lumbar herniated disc  Testicular mass    PSH:   History of orchiectomy    Medications:   metoclopramide 10 milliGRAM(s) Oral Before meals and at bedtime PRN  oxyCODONE    IR 15 milliGRAM(s) Oral every 6 hours PRN    Allergies:  sulfa drugs (Unknown)    FAMILY HISTORY:  Family history of cancer (Grandparent)  Family history of ischemic heart disease (Grandparent)  Family history of diabetes mellitus (Aunt)    Social History:  Smoking: No  Alcohol: No  Drugs: No    Review of Systems:  REVIEW OF SYSTEMS:    CONSTITUTIONAL: No weakness, fevers or chills  EYES/ENT: No visual changes;  No dysphagia  NECK: No pain or stiffness  RESPIRATORY: No cough, wheezing, hemoptysis; No shortness of breath  CARDIOVASCULAR: No chest pain or palpitations; No lower extremity edema  GASTROINTESTINAL: No abdominal or epigastric pain. No nausea, vomiting, or hematemesis; No diarrhea or constipation. No melena or hematochezia.  BACK: No back pain  GENITOURINARY: No dysuria, frequency or hematuria  NEUROLOGICAL: No numbness or weakness  SKIN: No itching, burning, rashes, or lesions   All other review of systems is negative unless indicated above.  [x ] 10 point review of systems is otherwise negative except as mentioned above            [ ]Unable to obtain    Physical Exam:  T(C): 37 (10-16-17 @ 15:44), Max: 37 (10-16-17 @ 15:44)  HR: 124 (10-16-17 @ 15:44) (114 - 150)  BP: 107/86 (10-16-17 @ 15:44) (107/86 - 126/95)  RR: 16 (10-16-17 @ 15:44) (16 - 20)  SpO2: 100% (10-16-17 @ 15:44) (98% - 100%)  Wt(kg): --  GENERAL: No acute distress, well-developed  HEAD:  Atraumatic, Normocephalic  ENT: EOMI, PERRLA, conjunctiva and sclera clear, Neck supple, No JVD, moist mucosa  CHEST/LUNG: Clear to auscultation bilaterally; No wheeze, equal breath sounds bilaterally   BACK: No spinal tenderness  HEART: irregularly irregular, tachycardic; No murmurs, rubs, or gallops  ABDOMEN: Soft, Nontender, Nondistended; Bowel sounds present  EXTREMITIES:  No clubbing, cyanosis, or edema  PSYCH: Nl behavior, nl affect  NEUROLOGY: AAOx3, non-focal, cranial nerves intact  SKIN: Normal color, No rashes or lesions      Daily     Daily     Cardiovascular Diagnostic Testing:  ECG: Afib w/ RVR at 134    Echo:    Stress Testing:    Cath:    Interpretation of Telemetry:    Imaging:    Labs:                        11.7   11.3  )-----------( 227      ( 16 Oct 2017 11:50 )             33.5     10-16    140  |  104  |  19  ----------------------------<  133<H>  4.3   |  25  |  0.94    Ca    9.3      16 Oct 2017 11:42  Phos  3.3     10-16  Mg     2.1     10-16    TPro  7.2  /  Alb  4.0  /  TBili  0.2  /  DBili  x   /  AST  30  /  ALT  48<H>  /  AlkPhos  74  10-16    PT/INR - ( 16 Oct 2017 11:42 )   PT: 11.5 sec;   INR: 1.05 ratio         PTT - ( 16 Oct 2017 11:42 )  PTT:29.8 sec  CARDIAC MARKERS ( 16 Oct 2017 11:42 )  x     / <0.01 ng/mL / 43 U/L / x     / x          Serum Pro-Brain Natriuretic Peptide: 863 pg/mL (10-16 @ 11:42) Chief Complaint: "They told me to come in for elevated heart rate"    HPI: 38 y/o M with pmhx of testicular cancer presenting after being seen in chemo infusion center and told his heart rate was very fast. Patient was last seen by his oncologist, Dr. Chirinos, last Monday at which time everything was normal. He endorses getting a usual check up a week before infusions. He denies any palpitations, chest pain or dyspnea and was surprised when alerted to this finding. Patient is currently receiving a regimen of etoposide and cisplatin.     In the ED patient received metoprolol and cardizem.     PMH:   Testicular cancer  Lumbar herniated disc  Testicular mass    PSH:   History of orchiectomy    Medications:   metoclopramide 10 milliGRAM(s) Oral Before meals and at bedtime PRN  oxyCODONE    IR 15 milliGRAM(s) Oral every 6 hours PRN    Allergies:  sulfa drugs (Unknown)    FAMILY HISTORY:  Family history of cancer (Grandparent)  Family history of ischemic heart disease (Grandparent)  Family history of diabetes mellitus (Aunt)    Social History:  Smoking: No  Alcohol: No  Drugs: No    Review of Systems:  REVIEW OF SYSTEMS:    CONSTITUTIONAL: No weakness, fevers or chills  EYES/ENT: No visual changes;  No dysphagia  NECK: No pain or stiffness  RESPIRATORY: No cough, wheezing, hemoptysis; No shortness of breath  CARDIOVASCULAR: No chest pain or palpitations; No lower extremity edema  GASTROINTESTINAL: No abdominal or epigastric pain. No nausea, vomiting, or hematemesis; No diarrhea or constipation. No melena or hematochezia.  BACK: No back pain  GENITOURINARY: No dysuria, frequency or hematuria  NEUROLOGICAL: No numbness or weakness  SKIN: No itching, burning, rashes, or lesions   All other review of systems is negative unless indicated above.  [x ] 10 point review of systems is otherwise negative except as mentioned above            [ ]Unable to obtain    Physical Exam:  T(C): 37 (10-16-17 @ 15:44), Max: 37 (10-16-17 @ 15:44)  HR: 124 (10-16-17 @ 15:44) (114 - 150)  BP: 107/86 (10-16-17 @ 15:44) (107/86 - 126/95)  RR: 16 (10-16-17 @ 15:44) (16 - 20)  SpO2: 100% (10-16-17 @ 15:44) (98% - 100%)  Wt(kg): --  GENERAL: No acute distress, well-developed  HEAD:  Atraumatic, Normocephalic  ENT: EOMI, PERRLA, conjunctiva and sclera clear, Neck supple, No JVD, moist mucosa  CHEST/LUNG: Clear to auscultation bilaterally; No wheeze, equal breath sounds bilaterally   BACK: No spinal tenderness  HEART: irregularly irregular, tachycardic; No murmurs, rubs, or gallops  ABDOMEN: Soft, Nontender, Nondistended; Bowel sounds present  EXTREMITIES:  No clubbing, cyanosis, or edema  PSYCH: Nl behavior, nl affect  NEUROLOGY: AAOx3, non-focal, cranial nerves intact  SKIN: Normal color, No rashes or les    Cardiovascular Diagnostic Testing:  ECG: Atypical AFL w/ 2:1 => variable block    Echo:    Stress Testing:    Cath:    Interpretation of Telemetry:    Imaging:    Labs:                        11.7   11.3  )-----------( 227      ( 16 Oct 2017 11:50 )             33.5     10-16    140  |  104  |  19  ----------------------------<  133<H>  4.3   |  25  |  0.94    Ca    9.3      16 Oct 2017 11:42  Phos  3.3     10-16  Mg     2.1     10-16    TPro  7.2  /  Alb  4.0  /  TBili  0.2  /  DBili  x   /  AST  30  /  ALT  48<H>  /  AlkPhos  74  10-16    PT/INR - ( 16 Oct 2017 11:42 )   PT: 11.5 sec;   INR: 1.05 ratio         PTT - ( 16 Oct 2017 11:42 )  PTT:29.8 sec  CARDIAC MARKERS ( 16 Oct 2017 11:42 )  x     / <0.01 ng/mL / 43 U/L / x     / x          Serum Pro-Brain Natriuretic Peptide: 863 pg/mL (10-16 @ 11:42)

## 2017-10-16 NOTE — CONSULT NOTE ADULT - SUBJECTIVE AND OBJECTIVE BOX
INTERVAL HPI/OVERNIGHT EVENTS:  Patient S&E at bedside. No o/n events,     VITAL SIGNS:  T(F): 98.6 (10-16-17 @ 15:44)  HR: 124 (10-16-17 @ 15:44)  BP: 107/86 (10-16-17 @ 15:44)  RR: 16 (10-16-17 @ 15:44)  SpO2: 100% (10-16-17 @ 15:44)  Wt(kg): --    PHYSICAL EXAM:    Constitutional: NAD  Eyes: EOMI, sclera non-icteric  Chest Portacath R side chest  Neck: supple, no masses, no JVD  Respiratory: CTA b/l, good air entry b/l  Cardiovascular: rapid heart beats, no M/R/G  Gastrointestinal: soft, NTND, no masses palpable, + BS, no hepatosplenomegaly  Extremities: no c/c/e  Neurological: AAOx3      MEDICATIONS  (STANDING):    MEDICATIONS  (PRN):  metoclopramide 10 milliGRAM(s) Oral Before meals and at bedtime PRN Nausea  oxyCODONE    IR 15 milliGRAM(s) Oral every 6 hours PRN Moderate Pain (4 - 6)      Allergies    sulfa drugs (Unknown)    Intolerances        LABS:                        11.7   11.3  )-----------( 227      ( 16 Oct 2017 11:50 )             33.5     10-16    140  |  104  |  19  ----------------------------<  133<H>  4.3   |  25  |  0.94    Ca    9.3      16 Oct 2017 11:42  Phos  3.3     10-16  Mg     2.1     10-16    TPro  7.2  /  Alb  4.0  /  TBili  0.2  /  DBili  x   /  AST  30  /  ALT  48<H>  /  AlkPhos  74  10-16    PT/INR - ( 16 Oct 2017 11:42 )   PT: 11.5 sec;   INR: 1.05 ratio         PTT - ( 16 Oct 2017 11:42 )  PTT:29.8 sec      RADIOLOGY & ADDITIONAL TESTS:  Studies reviewed.

## 2017-10-16 NOTE — H&P ADULT - HISTORY OF PRESENT ILLNESS
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 He only mentions that he fell into the door after a bout of emesis two weeks ago.   Currently he states that he feels "much better" than his numbers look.  In the ED he was found to have tachycardia found to be a.flutter after receiving 10mg IV diltiazem x2, a CXR 36 yo M with PMHx of testicular cancer s/p left orchiectomy on chemotherapy who present to the ED before receiving chemo for tachycardia.  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 He only mentions that he fell into the door after a bout of emesis two weeks ago.   Currently he states that he feels "much better" than his numbers look.  In the ED he was found to have tachycardia found to be a.flutter after receiving 10mg IV diltiazem x2, a CXR 38 yo M with PMHx of testicular cancer s/p left orchiectomy on chemotherapy who present to the ED before receiving chemo for tachycardia.  The patient states that he has been feeling well for the past 2 weeks since his 3rd dose of chemotherapy. He denies CP, ABARCA, peripheral edema, cough, palpitations. He does states that he had one near syncopal event during a bout of emesis 2 weeks ago which caused him to fall into a door. Has not had any other syncopal or presyncopal episodes since then.   The patient was diagnosed with testicular cancer on 7/11/2017 when lower back pain extended to scrotum prompting an ED visit to [facility]. He subsuquently underwent left radical orchiectomy on 7/20 found to be a mixed germ cell tumor 95% teratoma (T1 N2M0) and started on chemotherapy on 8/14/2017 with etoposide and cisplatin currently s/p 3 cycles (4th and final scheduled for today 10/16). His complications from the medication include periperal neuropathy the day of infusion, hair loss, dysgeusia, GERD, and fatigue. He follows with Dr. Redman for Heme/Onc.     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  In the ED he was found to have tachycardia found to be a.flutter after receiving 10mg IV diltiazem x2, a CXR 38 yo M with PMHx of testicular cancer s/p left orchiectomy on chemotherapy who present to the ED before receiving chemo for tachycardia.  The patient states that he has been feeling well for the past 2 weeks since his 3rd dose of chemotherapy. He denies CP, ABARCA, peripheral edema, cough, palpitations. He does states that he had one near syncopal event during a bout of emesis 2 weeks ago which caused him to fall into a door. Has not had any other syncopal or presyncopal episodes since then.   The patient was diagnosed with testicular cancer on 7/11/2017 when lower back pain extended to scrotum prompting an ED visit to [facility]. He subsuquently underwent left radical orchiectomy on 7/20 found to be a mixed germ cell tumor 95% teratoma (T1 N2M0) and started on chemotherapy on 8/14/2017 with etoposide and cisplatin currently s/p 3 cycles (4th and final scheduled for today 10/16). His complications from the medication include periperal neuropathy the day of infusion, hair loss, dysgeusia, GERD, and fatigue. He follows with Dr. Redman for Heme/Onc.   He also denies any fevers, chills, rash, erythema or edema or portacath.   Currently he states that he feels "much better" than his numbers look.    In the ED he was found to have tachycardia found to be a.flutter after receiving 10mg IV diltiazem x2, a CXR with well placed portacath and no infiltrations. On bedside US found to have EF of 10-15% while tachycardic.

## 2017-10-16 NOTE — CONSULT NOTE ADULT - ASSESSMENT
38 y/o man with hx of T1 N2 L testicular cancer mixed germ cell tumor 95% teratoma diagnosed in  July 11, 2017. He underwent a left radical orchiectomy on July 20, 2017.  Has had sperm cryopreservation. [id number] patient received 3 cycles of Cis Etoposide. Today seen in chemo room for his cycle #4 found to be tachycardic. Patient denies any SOB with exertion, describes episodes of nausea associated with chemotherapy. He has a port in place and he states sometimes at night the port has been bothering him.  [id number]

## 2017-10-16 NOTE — CONSULT NOTE ADULT - ATTENDING COMMENTS
Patient interviewed, examined and chart reviewed.  Case discussed with fellow.  Agree w/ Assessment and Plan as outlined.    Ramos Waggoner MD [facility]  P: [phone]  Spectra:  [phone]  Office: [phone]

## 2017-10-16 NOTE — H&P ADULT - ASSESSMENT
38 yo M with PMHx of testicular cancer s/p left orchiectomy on chemotherapy who present to the ED before receiving chemo for tachycardia. Patient found to be in atrial flutter with some response to IV diltiazem and bedside US with EF of 10-15%. However clinically the patient does not appear to have such severe HF with no JVD, minimal edema, no hepatosplenomegaly, mildly elevated proBNP with no symptoms of ABARCA, orthopnea or PND. However, etiology of acute onset of tachycardia is unclear, possible due to chemotherapy induced arrythmia, unlikely due to portacath placement as it is not in RA.

## 2017-10-17 ENCOUNTER — APPOINTMENT (OUTPATIENT)
Dept: INFUSION THERAPY | Facility: HOSPITAL | Age: 37
End: 2017-10-17

## 2017-10-17 LAB
ANION GAP SERPL CALC-SCNC: 12 MMOL/L — SIGNIFICANT CHANGE UP (ref 5–17)
APTT BLD: 98.8 SEC — HIGH (ref 27.5–37.4)
BUN SERPL-MCNC: 13 MG/DL — SIGNIFICANT CHANGE UP (ref 7–23)
CALCIUM SERPL-MCNC: 8.9 MG/DL — SIGNIFICANT CHANGE UP (ref 8.4–10.5)
CHLORIDE SERPL-SCNC: 103 MMOL/L — SIGNIFICANT CHANGE UP (ref 96–108)
CO2 SERPL-SCNC: 25 MMOL/L — SIGNIFICANT CHANGE UP (ref 22–31)
CREAT SERPL-MCNC: 0.98 MG/DL — SIGNIFICANT CHANGE UP (ref 0.5–1.3)
GLUCOSE SERPL-MCNC: 98 MG/DL — SIGNIFICANT CHANGE UP (ref 70–99)
HCT VFR BLD CALC: 30.6 % — LOW (ref 39–50)
HCT VFR BLD CALC: 31.7 % — LOW (ref 39–50)
HGB BLD-MCNC: 10.6 G/DL — LOW (ref 13–17)
HGB BLD-MCNC: 10.8 G/DL — LOW (ref 13–17)
MCHC RBC-ENTMCNC: 32.2 PG — SIGNIFICANT CHANGE UP (ref 27–34)
MCHC RBC-ENTMCNC: 33.7 PG — SIGNIFICANT CHANGE UP (ref 27–34)
MCHC RBC-ENTMCNC: 34.1 GM/DL — SIGNIFICANT CHANGE UP (ref 32–36)
MCHC RBC-ENTMCNC: 34.5 GM/DL — SIGNIFICANT CHANGE UP (ref 32–36)
MCV RBC AUTO: 94.6 FL — SIGNIFICANT CHANGE UP (ref 80–100)
MCV RBC AUTO: 97.9 FL — SIGNIFICANT CHANGE UP (ref 80–100)
PLATELET # BLD AUTO: 224 K/UL — SIGNIFICANT CHANGE UP (ref 150–400)
PLATELET # BLD AUTO: 228 K/UL — SIGNIFICANT CHANGE UP (ref 150–400)
POTASSIUM SERPL-MCNC: 4.6 MMOL/L — SIGNIFICANT CHANGE UP (ref 3.5–5.3)
POTASSIUM SERPL-SCNC: 4.6 MMOL/L — SIGNIFICANT CHANGE UP (ref 3.5–5.3)
RBC # BLD: 3.13 M/UL — LOW (ref 4.2–5.8)
RBC # BLD: 3.35 M/UL — LOW (ref 4.2–5.8)
RBC # FLD: 14.9 % — HIGH (ref 10.3–14.5)
RBC # FLD: 16.3 % — HIGH (ref 10.3–14.5)
SODIUM SERPL-SCNC: 140 MMOL/L — SIGNIFICANT CHANGE UP (ref 135–145)
T4 AB SER-ACNC: 8 UG/DL — SIGNIFICANT CHANGE UP (ref 4.6–12)
TROPONIN T SERPL-MCNC: <0.01 NG/ML — SIGNIFICANT CHANGE UP (ref 0–0.06)
TSH SERPL-MCNC: 1.01 UIU/ML — SIGNIFICANT CHANGE UP (ref 0.27–4.2)
WBC # BLD: 10.3 K/UL — SIGNIFICANT CHANGE UP (ref 3.8–10.5)
WBC # BLD: 9.46 K/UL — SIGNIFICANT CHANGE UP (ref 3.8–10.5)
WBC # FLD AUTO: 10.3 K/UL — SIGNIFICANT CHANGE UP (ref 3.8–10.5)
WBC # FLD AUTO: 9.46 K/UL — SIGNIFICANT CHANGE UP (ref 3.8–10.5)

## 2017-10-17 PROCEDURE — 99233 SBSQ HOSP IP/OBS HIGH 50: CPT | Mod: GC

## 2017-10-17 RX ORDER — HEPARIN SODIUM 5000 [USP'U]/ML
9000 INJECTION INTRAVENOUS; SUBCUTANEOUS ONCE
Qty: 0 | Refills: 0 | Status: COMPLETED | OUTPATIENT
Start: 2017-10-17 | End: 2017-10-17

## 2017-10-17 RX ORDER — METOPROLOL TARTRATE 50 MG
75 TABLET ORAL
Qty: 0 | Refills: 0 | Status: DISCONTINUED | OUTPATIENT
Start: 2017-10-17 | End: 2017-10-17

## 2017-10-17 RX ORDER — HEPARIN SODIUM 5000 [USP'U]/ML
INJECTION INTRAVENOUS; SUBCUTANEOUS
Qty: 25000 | Refills: 0 | Status: DISCONTINUED | OUTPATIENT
Start: 2017-10-17 | End: 2017-10-18

## 2017-10-17 RX ORDER — METOPROLOL TARTRATE 50 MG
50 TABLET ORAL
Qty: 0 | Refills: 0 | Status: DISCONTINUED | OUTPATIENT
Start: 2017-10-17 | End: 2017-10-17

## 2017-10-17 RX ORDER — DILTIAZEM HCL 120 MG
10 CAPSULE, EXT RELEASE 24 HR ORAL
Qty: 125 | Refills: 0 | Status: DISCONTINUED | OUTPATIENT
Start: 2017-10-17 | End: 2017-10-17

## 2017-10-17 RX ORDER — METOPROLOL TARTRATE 50 MG
25 TABLET ORAL ONCE
Qty: 0 | Refills: 0 | Status: COMPLETED | OUTPATIENT
Start: 2017-10-17 | End: 2017-10-17

## 2017-10-17 RX ORDER — HEPARIN SODIUM 5000 [USP'U]/ML
9000 INJECTION INTRAVENOUS; SUBCUTANEOUS EVERY 6 HOURS
Qty: 0 | Refills: 0 | Status: DISCONTINUED | OUTPATIENT
Start: 2017-10-17 | End: 2017-10-18

## 2017-10-17 RX ORDER — METOPROLOL TARTRATE 50 MG
75 TABLET ORAL
Qty: 0 | Refills: 0 | Status: DISCONTINUED | OUTPATIENT
Start: 2017-10-17 | End: 2017-10-18

## 2017-10-17 RX ORDER — HEPARIN SODIUM 5000 [USP'U]/ML
4000 INJECTION INTRAVENOUS; SUBCUTANEOUS EVERY 6 HOURS
Qty: 0 | Refills: 0 | Status: DISCONTINUED | OUTPATIENT
Start: 2017-10-17 | End: 2017-10-18

## 2017-10-17 RX ADMIN — HEPARIN SODIUM 9000 UNIT(S): 5000 INJECTION INTRAVENOUS; SUBCUTANEOUS at 15:51

## 2017-10-17 RX ADMIN — Medication 75 MILLIGRAM(S): at 17:50

## 2017-10-17 RX ADMIN — FAMOTIDINE 40 MILLIGRAM(S): 10 INJECTION INTRAVENOUS at 17:50

## 2017-10-17 RX ADMIN — HEPARIN SODIUM 1900 UNIT(S)/HR: 5000 INJECTION INTRAVENOUS; SUBCUTANEOUS at 23:08

## 2017-10-17 RX ADMIN — HEPARIN SODIUM 1900 UNIT(S)/HR: 5000 INJECTION INTRAVENOUS; SUBCUTANEOUS at 15:50

## 2017-10-17 RX ADMIN — Medication 25 MILLIGRAM(S): at 10:36

## 2017-10-17 RX ADMIN — FAMOTIDINE 40 MILLIGRAM(S): 10 INJECTION INTRAVENOUS at 06:36

## 2017-10-17 RX ADMIN — Medication 25 MILLIGRAM(S): at 06:36

## 2017-10-17 RX ADMIN — ENOXAPARIN SODIUM 40 MILLIGRAM(S): 100 INJECTION SUBCUTANEOUS at 11:59

## 2017-10-17 NOTE — PROGRESS NOTE ADULT - PROBLEM SELECTOR PLAN 1
-Responded to diltiazem 10mg x2 in ED, continues to be tachycardic to 120s with irregularly irreg rhythm will need to control rhythm before continuation of chemotherapy  -Started on 30mg diltiazem PO q8h after 10mg IV diltiazem  -TTE to check EF with better controlled rhythm  -continue patient on telemetry  -TSH, T4  -troponinx3 negative  -will follow up with cardiology recommendations  -will consult IR about Port a cath placement possibly causing symptom. -Responded to diltiazem 10mg x2 in ED, continues to be tachycardic to 120s with irregularly irreg rhythm will need to control rhythm before continuation of chemotherapy  -Started on 30mg diltiazem PO q8h after 10mg IV diltiazem  -TTE to check EF with better controlled rhythm  -continue patient on telemetry  -TSH, T4  -troponinx3 negative  -will follow up with cardiology recommendations  -will consult IR about metaport placement possibly causing symptom. -Responded to diltiazem 10mg x2 in ED, continues to be tachycardic to 120s with atrial flutter will need to control rhythm before continuation of chemotherapy  -will start on diltiazem drip.  -TTE to check EF with better controlled rhythm  -continue patient on telemetry  -TSH, T4 negative  -troponinx3 negative  -will follow up with cardiology recommendations  -will consult IR about metaport placement possibly causing symptom. Patient interested in removal of metaport. -Responded to diltiazem 10mg x2 in ED, continues to be tachycardic to 120s with atrial flutter will need to control rhythm before continuation of chemotherapy  -will start on diltiazem drip.  -TTE to check EF with better controlled rhythm  -continue patient on telemetry  -TSH, T4 negative  -troponinx3 negative  -will follow up with cardiology recommendations/ Possible cardioversion tomorrow if patient does not spontaneously convert to sinus / so far patient is still in A flutter.  Patient was started on Heparin drip  -will consult IR about metaport placement possibly causing symptom. Patient interested in removal of metaport. -Responded to diltiazem 10mg x2 in ED, continues to be tachycardic to 120s with atrial flutter will need to control rhythm before continuation of chemotherapy  -increased dose of metoprolol  -TTE to check EF with better controlled rhythm  -continue patient on telemetry  -TSH, T4 negative  -troponinx3 negative  -will follow up with cardiology recommendations/ Possible cardioversion tomorrow if patient does not spontaneously convert to sinus / so far patient is still in A flutter.  Patient was started on Heparin drip  -will consult IR about metiport placement possibly causing symptom. Patient interested in removal of metiport.

## 2017-10-17 NOTE — PROGRESS NOTE ADULT - ASSESSMENT
36 yo M with PMHx of testicular cancer s/p left orchiectomy on chemotherapy who present to the ED before receiving chemo for tachycardia. Patient found to be in atrial flutter with some response to IV diltiazem and bedside US with EF of 10-15%. However clinically the patient does not appear to have such severe HF with no JVD, minimal edema, no hepatosplenomegaly, mildly elevated proBNP with no symptoms of ABARCA, orthopnea or PND. However, etiology of acute onset of tachycardia is unclear, possible due to chemotherapy induced arrythmia, unlikely due to portacath placement as it is not in RA. 36 yo M with PMHx of testicular cancer s/p left orchiectomy on chemotherapy who present to the ED before receiving chemo for tachycardia. Patient found to be in atrial flutter with some response to IV diltiazem and bedside US with EF of 10-15%. However clinically the patient does not appear to have such severe HF with no JVD, minimal edema, no hepatosplenomegaly, mildly elevated proBNP with no symptoms of ABARCA, orthopnea or PND. However, etiology of acute onset of tachycardia is unclear, possible due to chemotherapy induced arrythmia, unlikely due to metaport placement as it is not in RA.

## 2017-10-17 NOTE — PROGRESS NOTE ADULT - SUBJECTIVE AND OBJECTIVE BOX
Patient is a 37y old  Male who presents with a chief complaint of cellulitis rt arm (16 Oct 2017 21:25)      SUBJECTIVE / OVERNIGHT EVENTS:  Patient continues to have episodes of tachycardia.    MEDICATIONS  (STANDING):  enoxaparin Injectable 40 milliGRAM(s) SubCutaneous daily  famotidine    Tablet 40 milliGRAM(s) Oral two times a day  metoprolol 25 milliGRAM(s) Oral two times a day    MEDICATIONS  (PRN):      Vital Signs Last 24 Hrs  T(C): 36.9 (17 Oct 2017 04:00), Max: 37.6 (16 Oct 2017 19:51)  T(F): 98.4 (17 Oct 2017 04:00), Max: 99.7 (16 Oct 2017 19:51)  HR: 130 (17 Oct 2017 06:22) (102 - 150)  BP: 106/74 (17 Oct 2017 06:22) (104/72 - 126/95)  BP(mean): --  RR: 18 (17 Oct 2017 04:00) (16 - 20)  SpO2: 95% (17 Oct 2017 04:00) (95% - 100%)  CAPILLARY BLOOD GLUCOSE        I&O's Summary    16 Oct 2017 07:01  -  17 Oct 2017 06:48  --------------------------------------------------------  IN: 50 mL / OUT: 0 mL / NET: 50 mL        PHYSICAL EXAM:  GENERAL: NAD, well-developed  HEAD:  Atraumatic, Normocephalic  EYES: EOMI, PERRLA, conjunctiva and sclera clear  NECK: Supple, No JVD  CHEST/LUNG: Clear to auscultation bilaterally; No wheeze  HEART: Regular rate and rhythm; No murmurs, rubs, or gallops  ABDOMEN: Soft, Nontender, Nondistended; Bowel sounds present  EXTREMITIES:  2+ Peripheral Pulses, No clubbing, cyanosis, or edema  PSYCH: AAOx3  NEUROLOGY: non-focal  SKIN: No rashes or lesions    LABS:                        11.7   11.3  )-----------( 227      ( 16 Oct 2017 11:50 )             33.5     10-16    140  |  104  |  19  ----------------------------<  133<H>  4.3   |  25  |  0.94    Ca    9.3      16 Oct 2017 11:42  Phos  3.3     10-16  Mg     2.1     10-16    TPro  7.2  /  Alb  4.0  /  TBili  0.2  /  DBili  x   /  AST  30  /  ALT  48<H>  /  AlkPhos  74  10-16    PT/INR - ( 16 Oct 2017 11:42 )   PT: 11.5 sec;   INR: 1.05 ratio         PTT - ( 16 Oct 2017 11:42 )  PTT:29.8 sec  CARDIAC MARKERS ( 17 Oct 2017 02:12 )  x     / <0.01 ng/mL / x     / x     / x      CARDIAC MARKERS ( 16 Oct 2017 19:51 )  x     / <0.01 ng/mL / x     / x     / x      CARDIAC MARKERS ( 16 Oct 2017 11:42 )  x     / <0.01 ng/mL / 43 U/L / x     / x              RADIOLOGY & ADDITIONAL TESTS:    Imaging Personally Reviewed:    Consultant(s) Notes Reviewed:      Care Discussed with Consultants/Other Providers: Patient is a 37y old  Male who presents with a chief complaint of cellulitis rt arm (16 Oct 2017 21:25)      SUBJECTIVE / OVERNIGHT EVENTS:    MEDICATIONS  (STANDING):  famotidine    Tablet 40 milliGRAM(s) Oral two times a day  heparin  Infusion.  Unit(s)/Hr (19 mL/Hr) IV Continuous <Continuous>  metoprolol 75 milliGRAM(s) Oral two times a day    MEDICATIONS  (PRN):  heparin  Injectable 9000 Unit(s) IV Push every 6 hours PRN For aPTT less than 40  heparin  Injectable 4000 Unit(s) IV Push every 6 hours PRN For aPTT between 40 - 57      Vital Signs Last 24 Hrs  T(C): 36.6 (17 Oct 2017 10:34), Max: 37.6 (16 Oct 2017 19:51)  T(F): 97.8 (17 Oct 2017 10:34), Max: 99.7 (16 Oct 2017 19:51)  HR: 130 (17 Oct 2017 13:55) (85 - 135)  BP: 106/78 (17 Oct 2017 13:55) (103/73 - 122/71)  BP(mean): --  RR: 18 (17 Oct 2017 10:34) (17 - 18)  SpO2: 96% (17 Oct 2017 10:34) (95% - 98%)  CAPILLARY BLOOD GLUCOSE        I&O's Summary    16 Oct 2017 07:01  -  17 Oct 2017 07:00  --------------------------------------------------------  IN: 50 mL / OUT: 0 mL / NET: 50 mL    17 Oct 2017 07:01  -  17 Oct 2017 15:55  --------------------------------------------------------  IN: 360 mL / OUT: 0 mL / NET: 360 mL        GENERAL: In no acute distress. NAD, well-developed  HEAD:  Bald, Atraumatic, Normocephalic  EYES: EOMI, PERRLA, conjunctiva and sclera clear  NECK: Supple, No JVD  CHEST/LUNG: metaport placed with no erythema/edema surrounding. No TTP. Clear to auscultation bilaterally; No wheeze  HEART: Irregularly irregular at about 100; No murmurs, rubs, or gallops  ABDOMEN: Soft, Nontender, Nondistended; Bowel sounds present  EXTREMITIES:  2+ Peripheral Pulses, No clubbing, cyanosis, or edema, 5/5 strength BL U/L extremities  PSYCH: AAOx3  NEUROLOGY: non-focal CN II-XII grossly intact  SKIN: Dry skin on forearms, No rashes or lesions    LABS:                        10.8   9.46  )-----------( 228      ( 17 Oct 2017 09:09 )             31.7     10-17    140  |  103  |  13  ----------------------------<  98  4.6   |  25  |  0.98    Ca    8.9      17 Oct 2017 09:09  Phos  3.3     10-16  Mg     2.1     10-16    TPro  7.2  /  Alb  4.0  /  TBili  0.2  /  DBili  x   /  AST  30  /  ALT  48<H>  /  AlkPhos  74  10-16    PT/INR - ( 16 Oct 2017 11:42 )   PT: 11.5 sec;   INR: 1.05 ratio         PTT - ( 16 Oct 2017 11:42 )  PTT:29.8 sec  CARDIAC MARKERS ( 17 Oct 2017 02:12 )  x     / <0.01 ng/mL / x     / x     / x      CARDIAC MARKERS ( 16 Oct 2017 19:51 )  x     / <0.01 ng/mL / x     / x     / x      CARDIAC MARKERS ( 16 Oct 2017 11:42 )  x     / <0.01 ng/mL / 43 U/L / x     / x              RADIOLOGY & ADDITIONAL TESTS:    Imaging Personally Reviewed:    Consultant(s) Notes Reviewed:  Cardiology and Heme/Onc    Care Discussed with Consultants/Other Providers: Patient is a 37y old  Male who presents with a chief complaint of tachycardia / Atrial flutter with rapid ventricular response (16 Oct 2017 21:25)      SUBJECTIVE / OVERNIGHT EVENTS:   NO chest pain or SOB or dizziness or palpitation.        MEDICATIONS  (STANDING):  famotidine    Tablet 40 milliGRAM(s) Oral two times a day  heparin  Infusion.  Unit(s)/Hr (19 mL/Hr) IV Continuous <Continuous>  metoprolol 75 milliGRAM(s) Oral two times a day    MEDICATIONS  (PRN):  heparin  Injectable 9000 Unit(s) IV Push every 6 hours PRN For aPTT less than 40  heparin  Injectable 4000 Unit(s) IV Push every 6 hours PRN For aPTT between 40 - 57      Vital Signs Last 24 Hrs  T(C): 36.6 (17 Oct 2017 10:34), Max: 37.6 (16 Oct 2017 19:51)  T(F): 97.8 (17 Oct 2017 10:34), Max: 99.7 (16 Oct 2017 19:51)  HR: 130 (17 Oct 2017 13:55) (85 - 135)  BP: 106/78 (17 Oct 2017 13:55) (103/73 - 122/71)  BP(mean): --  RR: 18 (17 Oct 2017 10:34) (17 - 18)  SpO2: 96% (17 Oct 2017 10:34) (95% - 98%)  CAPILLARY BLOOD GLUCOSE        I&O's Summary    16 Oct 2017 07:01  -  17 Oct 2017 07:00  --------------------------------------------------------  IN: 50 mL / OUT: 0 mL / NET: 50 mL    17 Oct 2017 07:01  -  17 Oct 2017 15:55  --------------------------------------------------------  IN: 360 mL / OUT: 0 mL / NET: 360 mL        GENERAL: In no acute distress. NAD, well-developed  HEAD:  Bald, Atraumatic, Normocephalic  EYES: EOMI, PERRLA, conjunctiva and sclera clear  NECK: Supple, No JVD  CHEST/LUNG: metaport placed with no erythema/edema surrounding. No TTP. Clear to auscultation bilaterally; No wheeze  HEART: Irregularly irregular at about 100; No murmurs, rubs, or gallops  ABDOMEN: Soft, Nontender, Nondistended; Bowel sounds present  EXTREMITIES:  2+ Peripheral Pulses, No clubbing, cyanosis, or edema, 5/5 strength BL U/L extremities  PSYCH: AAOx3  NEUROLOGY: non-focal CN II-XII grossly intact  SKIN: Dry skin on forearms, No rashes or lesions    LABS:                        10.8   9.46  )-----------( 228      ( 17 Oct 2017 09:09 )             31.7     10-17    140  |  103  |  13  ----------------------------<  98  4.6   |  25  |  0.98    Ca    8.9      17 Oct 2017 09:09  Phos  3.3     10-16  Mg     2.1     10-16    TPro  7.2  /  Alb  4.0  /  TBili  0.2  /  DBili  x   /  AST  30  /  ALT  48<H>  /  AlkPhos  74  10-16    PT/INR - ( 16 Oct 2017 11:42 )   PT: 11.5 sec;   INR: 1.05 ratio         PTT - ( 16 Oct 2017 11:42 )  PTT:29.8 sec  CARDIAC MARKERS ( 17 Oct 2017 02:12 )  x     / <0.01 ng/mL / x     / x     / x      CARDIAC MARKERS ( 16 Oct 2017 19:51 )  x     / <0.01 ng/mL / x     / x     / x      CARDIAC MARKERS ( 16 Oct 2017 11:42 )  x     / <0.01 ng/mL / 43 U/L / x     / x              RADIOLOGY & ADDITIONAL TESTS:    Imaging Personally Reviewed:    Consultant(s) Notes Reviewed:  Cardiology and Heme/Onc    Care Discussed with Consultants/Other Providers:

## 2017-10-17 NOTE — PROGRESS NOTE ADULT - SUBJECTIVE AND OBJECTIVE BOX
Date of Admission: 10/16/17     24H hour events: No acute events overnight. Tele AF .     MEDICATIONS:  enoxaparin Injectable 40 milliGRAM(s) SubCutaneous daily  metoprolol 50 milliGRAM(s) Oral two times a day      famotidine    Tablet 40 milliGRAM(s) Oral two times a day      REVIEW OF SYSTEMS:    CONSTITUTIONAL: No weakness, fevers or chills  EYES/ENT: No visual changes;  No dysphagia  NECK: No pain or stiffness  RESPIRATORY: No cough, wheezing, hemoptysis; No shortness of breath  CARDIOVASCULAR: No chest pain or palpitations; No lower extremity edema  GASTROINTESTINAL: No abdominal or epigastric pain. No nausea, vomiting, or hematemesis; No diarrhea or constipation. No melena or hematochezia.  BACK: No back pain  GENITOURINARY: No dysuria, frequency or hematuria  NEUROLOGICAL: No numbness or weakness  SKIN: No itching, burning, rashes, or lesions   All other review of systems is negative unless indicated above.  PHYSICAL EXAM:  T(C): 36.6 (10-17-17 @ 10:34), Max: 37.6 (10-16-17 @ 19:51)  HR: 85 (10-17-17 @ 10:34) (85 - 143)  BP: 103/73 (10-17-17 @ 10:34) (103/73 - 122/71)  RR: 18 (10-17-17 @ 10:34) (16 - 18)  SpO2: 96% (10-17-17 @ 10:34) (95% - 100%)  Wt(kg): --  I&O's Summary    16 Oct 2017 07:01  -  17 Oct 2017 07:00  --------------------------------------------------------  IN: 50 mL / OUT: 0 mL / NET: 50 mL    17 Oct 2017 07:01  -  17 Oct 2017 13:06  --------------------------------------------------------  IN: 120 mL / OUT: 0 mL / NET: 120 mL        Appearance: Normal	  HEENT:   Normal oral mucosa, PERRL, EOMI	  Lymphatic: No lymphadenopathy  Cardiovascular: Normal S1 S2, No JVD, No murmurs, No edema  Respiratory: Lungs clear to auscultation	  Psychiatry: A & O x 3, Mood & affect appropriate  Gastrointestinal:  Soft, Non-tender, + BS	  Skin: No rashes, No ecchymoses, No cyanosis	  Neurologic: Non-focal  Extremities: Normal range of motion, No clubbing, cyanosis or edema  Vascular: Peripheral pulses palpable 2+ bilaterally        LABS:	 	    CBC Full  -  ( 17 Oct 2017 09:09 )  WBC Count : 9.46 K/uL  Hemoglobin : 10.8 g/dL  Hematocrit : 31.7 %  Platelet Count - Automated : 228 K/uL  Mean Cell Volume : 94.6 fl  Mean Cell Hemoglobin : 32.2 pg  Mean Cell Hemoglobin Concentration : 34.1 gm/dL    10-17    140  |  103  |  13  ----------------------------<  98  4.6   |  25  |  0.98  10-16    140  |  104  |  19  ----------------------------<  133<H>  4.3   |  25  |  0.94    Ca    8.9      17 Oct 2017 09:09  Ca    9.3      16 Oct 2017 11:42  Phos  3.3     10-16  Mg     2.1     10-16    TPro  7.2  /  Alb  4.0  /  TBili  0.2  /  DBili  x   /  AST  30  /  ALT  48<H>  /  AlkPhos  74  10-16      proBNP: Serum Pro-Brain Natriuretic Peptide: 863 pg/mL (10-16 @ 11:42)    TELEMETRY: 	  Aflutter     ASSESSMENT/PLAN: 	    38 y/o M with pmhx of testicular cancer referred from the chemo infusion center with tachycardia found to be in new onset Atrial flutter;     #Atypical Atrial flutter CHADSVASC 0--concern that there may be mechanical irritation by the recently chemoport  - Follow up TTE; TSH wnl   - c/w Metoprolol 50mg BID for rate control; tele rates AF ; transiently 140 however overall controlled  - Telemetry   - Recommend discussing with IR re: catheter position  - appreciate Heme/Onc recs  - If persistent Aflutter may need cardioversion if doesn't spontaneously convert.     will continue to follow Date of Admission: 10/16/17     24H hour events: No acute events overnight. Tele AF .     MEDICATIONS:  enoxaparin Injectable 40 milliGRAM(s) SubCutaneous daily  metoprolol 50 milliGRAM(s) Oral two times a day      famotidine    Tablet 40 milliGRAM(s) Oral two times a day      REVIEW OF SYSTEMS:    CONSTITUTIONAL: No weakness, fevers or chills  EYES/ENT: No visual changes;  No dysphagia  NECK: No pain or stiffness  RESPIRATORY: No cough, wheezing, hemoptysis; No shortness of breath  CARDIOVASCULAR: No chest pain or palpitations; No lower extremity edema  GASTROINTESTINAL: No abdominal or epigastric pain. No nausea, vomiting, or hematemesis; No diarrhea or constipation. No melena or hematochezia.  BACK: No back pain  GENITOURINARY: No dysuria, frequency or hematuria  NEUROLOGICAL: No numbness or weakness  SKIN: No itching, burning, rashes, or lesions   All other review of systems is negative unless indicated above.  PHYSICAL EXAM:  T(C): 36.6 (10-17-17 @ 10:34), Max: 37.6 (10-16-17 @ 19:51)  HR: 85 (10-17-17 @ 10:34) (85 - 143)  BP: 103/73 (10-17-17 @ 10:34) (103/73 - 122/71)  RR: 18 (10-17-17 @ 10:34) (16 - 18)  SpO2: 96% (10-17-17 @ 10:34) (95% - 100%)  Wt(kg): --  I&O's Summary    16 Oct 2017 07:01  -  17 Oct 2017 07:00  --------------------------------------------------------  IN: 50 mL / OUT: 0 mL / NET: 50 mL    17 Oct 2017 07:01  -  17 Oct 2017 13:06  --------------------------------------------------------  IN: 120 mL / OUT: 0 mL / NET: 120 mL        Appearance: Normal	  HEENT:   Normal oral mucosa, PERRL, EOMI	  Lymphatic: No lymphadenopathy  Cardiovascular: Normal S1 S2, No JVD, No murmurs, No edema  Respiratory: Lungs clear to auscultation	  Psychiatry: A & O x 3, Mood & affect appropriate  Gastrointestinal:  Soft, Non-tender, + BS	  Skin: No rashes, No ecchymoses, No cyanosis	  Neurologic: Non-focal  Extremities: Normal range of motion, No clubbing, cyanosis or edema  Vascular: Peripheral pulses palpable 2+ bilaterally        LABS:	 	    CBC Full  -  ( 17 Oct 2017 09:09 )  WBC Count : 9.46 K/uL  Hemoglobin : 10.8 g/dL  Hematocrit : 31.7 %  Platelet Count - Automated : 228 K/uL  Mean Cell Volume : 94.6 fl  Mean Cell Hemoglobin : 32.2 pg  Mean Cell Hemoglobin Concentration : 34.1 gm/dL    10-17    140  |  103  |  13  ----------------------------<  98  4.6   |  25  |  0.98  10-16    140  |  104  |  19  ----------------------------<  133<H>  4.3   |  25  |  0.94    Ca    8.9      17 Oct 2017 09:09  Ca    9.3      16 Oct 2017 11:42  Phos  3.3     10-16  Mg     2.1     10-16    TPro  7.2  /  Alb  4.0  /  TBili  0.2  /  DBili  x   /  AST  30  /  ALT  48<H>  /  AlkPhos  74  10-16      proBNP: Serum Pro-Brain Natriuretic Peptide: 863 pg/mL (10-16 @ 11:42)    TELEMETRY: 	  Aflutter , transiently to 140    ASSESSMENT/PLAN: 	    38 y/o M with pmhx of testicular cancer referred from the chemo infusion center with tachycardia found to be in new onset Atrial flutter.     #Atypical Atrial flutter CHADSVASC 0--concern that there may be mechanical irritation by the recently chemoport  - Heparin AC   - Follow up TTE; TSH wnl   - Discontinue BB and start dilt gtt.  - Telemetry   - Recommend discussing with IR re: catheter position  - appreciate Heme/Onc recs  - If persistent Aflutter may need cardioversion if doesn't spontaneously convert.     will continue to follow Date of Admission: 10/16/17     24H hour events: No acute events overnight. Tele AF .     MEDICATIONS:  enoxaparin Injectable 40 milliGRAM(s) SubCutaneous daily  metoprolol 50 milliGRAM(s) Oral two times a day      famotidine    Tablet 40 milliGRAM(s) Oral two times a day      REVIEW OF SYSTEMS:    CONSTITUTIONAL: No weakness, fevers or chills  EYES/ENT: No visual changes;  No dysphagia  NECK: No pain or stiffness  RESPIRATORY: No cough, wheezing, hemoptysis; No shortness of breath  CARDIOVASCULAR: No chest pain or palpitations; No lower extremity edema  GASTROINTESTINAL: No abdominal or epigastric pain. No nausea, vomiting, or hematemesis; No diarrhea or constipation. No melena or hematochezia.  BACK: No back pain  GENITOURINARY: No dysuria, frequency or hematuria  NEUROLOGICAL: No numbness or weakness  SKIN: No itching, burning, rashes, or lesions   All other review of systems is negative unless indicated above.  PHYSICAL EXAM:  T(C): 36.6 (10-17-17 @ 10:34), Max: 37.6 (10-16-17 @ 19:51)  HR: 85 (10-17-17 @ 10:34) (85 - 143)  BP: 103/73 (10-17-17 @ 10:34) (103/73 - 122/71)  RR: 18 (10-17-17 @ 10:34) (16 - 18)  SpO2: 96% (10-17-17 @ 10:34) (95% - 100%)  Wt(kg): --  I&O's Summary    16 Oct 2017 07:01  -  17 Oct 2017 07:00  --------------------------------------------------------  IN: 50 mL / OUT: 0 mL / NET: 50 mL    17 Oct 2017 07:01  -  17 Oct 2017 13:06  --------------------------------------------------------  IN: 120 mL / OUT: 0 mL / NET: 120 mL        Appearance: Normal	  HEENT:   Normal oral mucosa, PERRL, EOMI	  Lymphatic: No lymphadenopathy  Cardiovascular: Normal S1 S2, No JVD, No murmurs, No edema  Respiratory: Lungs clear to auscultation	  Psychiatry: A & O x 3, Mood & affect appropriate  Gastrointestinal:  Soft, Non-tender, + BS	  Skin: No rashes, No ecchymoses, No cyanosis	  Neurologic: Non-focal  Extremities: Normal range of motion, No clubbing, cyanosis or edema  Vascular: Peripheral pulses palpable 2+ bilaterally        LABS:	 	    CBC Full  -  ( 17 Oct 2017 09:09 )  WBC Count : 9.46 K/uL  Hemoglobin : 10.8 g/dL  Hematocrit : 31.7 %  Platelet Count - Automated : 228 K/uL  Mean Cell Volume : 94.6 fl  Mean Cell Hemoglobin : 32.2 pg  Mean Cell Hemoglobin Concentration : 34.1 gm/dL    10-17    140  |  103  |  13  ----------------------------<  98  4.6   |  25  |  0.98  10-16    140  |  104  |  19  ----------------------------<  133<H>  4.3   |  25  |  0.94    Ca    8.9      17 Oct 2017 09:09  Ca    9.3      16 Oct 2017 11:42  Phos  3.3     10-16  Mg     2.1     10-16    TPro  7.2  /  Alb  4.0  /  TBili  0.2  /  DBili  x   /  AST  30  /  ALT  48<H>  /  AlkPhos  74  10-16      proBNP: Serum Pro-Brain Natriuretic Peptide: 863 pg/mL (10-16 @ 11:42)    TELEMETRY: 	  Aflutter , transiently to 140    ASSESSMENT/PLAN: 	    36 y/o M with pmhx of testicular cancer referred from the chemo infusion center with tachycardia found to be in new onset Atrial flutter.     #Atypical Atrial flutter CHADSVASC 0--concern that there may be mechanical irritation by the recently chemoport  - Heparin AC   - Follow up TTE- ; TSH wnl   - Discontinue BB and start dilt gtt.  - Telemetry f LV dysfxn is confirmed. likely myopathy of tachcardia vs stress CM  - Recommend discussing with IR re: catheter position  - appreciate Heme/Onc recs  - If persistent Aflutter may need cardioversion(TYLOR guided) if doesn't spontaneously convert.   -Heparinize(unless port is about to be d/c)  -  review possibility of removing pot as he has only 1 more round of ChemoRx planned  will continue to follow

## 2017-10-18 ENCOUNTER — APPOINTMENT (OUTPATIENT)
Dept: INFUSION THERAPY | Facility: HOSPITAL | Age: 37
End: 2017-10-18

## 2017-10-18 LAB
ALBUMIN SERPL ELPH-MCNC: 3.3 G/DL — SIGNIFICANT CHANGE UP (ref 3.3–5)
ALP SERPL-CCNC: 62 U/L — SIGNIFICANT CHANGE UP (ref 40–120)
ALT FLD-CCNC: 44 U/L — SIGNIFICANT CHANGE UP (ref 10–45)
ANION GAP SERPL CALC-SCNC: 11 MMOL/L — SIGNIFICANT CHANGE UP (ref 5–17)
APTT BLD: 146.3 SEC — CRITICAL HIGH (ref 27.5–37.4)
APTT BLD: 150 SEC — CRITICAL HIGH (ref 27.5–37.4)
APTT BLD: 54.1 SEC — HIGH (ref 27.5–37.4)
AST SERPL-CCNC: 37 U/L — SIGNIFICANT CHANGE UP (ref 10–40)
BILIRUB SERPL-MCNC: 0.2 MG/DL — SIGNIFICANT CHANGE UP (ref 0.2–1.2)
BUN SERPL-MCNC: 16 MG/DL — SIGNIFICANT CHANGE UP (ref 7–23)
CALCIUM SERPL-MCNC: 8.7 MG/DL — SIGNIFICANT CHANGE UP (ref 8.4–10.5)
CHLORIDE SERPL-SCNC: 103 MMOL/L — SIGNIFICANT CHANGE UP (ref 96–108)
CO2 SERPL-SCNC: 26 MMOL/L — SIGNIFICANT CHANGE UP (ref 22–31)
CREAT SERPL-MCNC: 1.15 MG/DL — SIGNIFICANT CHANGE UP (ref 0.5–1.3)
GLUCOSE SERPL-MCNC: 105 MG/DL — HIGH (ref 70–99)
HCT VFR BLD CALC: 30.9 % — LOW (ref 39–50)
HCT VFR BLD CALC: 31.4 % — LOW (ref 39–50)
HGB BLD-MCNC: 10.6 G/DL — LOW (ref 13–17)
HGB BLD-MCNC: 11.1 G/DL — LOW (ref 13–17)
MAGNESIUM SERPL-MCNC: 2 MG/DL — SIGNIFICANT CHANGE UP (ref 1.6–2.6)
MCHC RBC-ENTMCNC: 31.9 PG — SIGNIFICANT CHANGE UP (ref 27–34)
MCHC RBC-ENTMCNC: 33.8 GM/DL — SIGNIFICANT CHANGE UP (ref 32–36)
MCHC RBC-ENTMCNC: 35.1 PG — HIGH (ref 27–34)
MCHC RBC-ENTMCNC: 36 GM/DL — SIGNIFICANT CHANGE UP (ref 32–36)
MCV RBC AUTO: 94.6 FL — SIGNIFICANT CHANGE UP (ref 80–100)
MCV RBC AUTO: 97.5 FL — SIGNIFICANT CHANGE UP (ref 80–100)
PHOSPHATE SERPL-MCNC: 4.5 MG/DL — SIGNIFICANT CHANGE UP (ref 2.5–4.5)
PLATELET # BLD AUTO: 240 K/UL — SIGNIFICANT CHANGE UP (ref 150–400)
PLATELET # BLD AUTO: 257 K/UL — SIGNIFICANT CHANGE UP (ref 150–400)
POTASSIUM SERPL-MCNC: 4.3 MMOL/L — SIGNIFICANT CHANGE UP (ref 3.5–5.3)
POTASSIUM SERPL-SCNC: 4.3 MMOL/L — SIGNIFICANT CHANGE UP (ref 3.5–5.3)
PROT SERPL-MCNC: 6.5 G/DL — SIGNIFICANT CHANGE UP (ref 6–8.3)
RBC # BLD: 3.17 M/UL — LOW (ref 4.2–5.8)
RBC # BLD: 3.32 M/UL — LOW (ref 4.2–5.8)
RBC # FLD: 15 % — HIGH (ref 10.3–14.5)
RBC # FLD: 16.1 % — HIGH (ref 10.3–14.5)
SODIUM SERPL-SCNC: 140 MMOL/L — SIGNIFICANT CHANGE UP (ref 135–145)
WBC # BLD: 9.1 K/UL — SIGNIFICANT CHANGE UP (ref 3.8–10.5)
WBC # BLD: 9.51 K/UL — SIGNIFICANT CHANGE UP (ref 3.8–10.5)
WBC # FLD AUTO: 9.1 K/UL — SIGNIFICANT CHANGE UP (ref 3.8–10.5)
WBC # FLD AUTO: 9.51 K/UL — SIGNIFICANT CHANGE UP (ref 3.8–10.5)

## 2017-10-18 PROCEDURE — 93306 TTE W/DOPPLER COMPLETE: CPT | Mod: 26

## 2017-10-18 PROCEDURE — 99233 SBSQ HOSP IP/OBS HIGH 50: CPT | Mod: GC

## 2017-10-18 RX ORDER — HEPARIN SODIUM 5000 [USP'U]/ML
9000 INJECTION INTRAVENOUS; SUBCUTANEOUS EVERY 6 HOURS
Qty: 0 | Refills: 0 | Status: DISCONTINUED | OUTPATIENT
Start: 2017-10-18 | End: 2017-10-18

## 2017-10-18 RX ORDER — HEPARIN SODIUM 5000 [USP'U]/ML
9000 INJECTION INTRAVENOUS; SUBCUTANEOUS ONCE
Qty: 0 | Refills: 0 | Status: DISCONTINUED | OUTPATIENT
Start: 2017-10-18 | End: 2017-10-18

## 2017-10-18 RX ORDER — HEPARIN SODIUM 5000 [USP'U]/ML
4000 INJECTION INTRAVENOUS; SUBCUTANEOUS EVERY 6 HOURS
Qty: 0 | Refills: 0 | Status: DISCONTINUED | OUTPATIENT
Start: 2017-10-18 | End: 2017-10-19

## 2017-10-18 RX ORDER — DILTIAZEM HCL 120 MG
10 CAPSULE, EXT RELEASE 24 HR ORAL
Qty: 125 | Refills: 0 | Status: DISCONTINUED | OUTPATIENT
Start: 2017-10-18 | End: 2017-10-18

## 2017-10-18 RX ORDER — HEPARIN SODIUM 5000 [USP'U]/ML
1600 INJECTION INTRAVENOUS; SUBCUTANEOUS
Qty: 25000 | Refills: 0 | Status: DISCONTINUED | OUTPATIENT
Start: 2017-10-18 | End: 2017-10-19

## 2017-10-18 RX ORDER — HEPARIN SODIUM 5000 [USP'U]/ML
4000 INJECTION INTRAVENOUS; SUBCUTANEOUS EVERY 6 HOURS
Qty: 0 | Refills: 0 | Status: DISCONTINUED | OUTPATIENT
Start: 2017-10-18 | End: 2017-10-18

## 2017-10-18 RX ORDER — HEPARIN SODIUM 5000 [USP'U]/ML
9000 INJECTION INTRAVENOUS; SUBCUTANEOUS EVERY 6 HOURS
Qty: 0 | Refills: 0 | Status: DISCONTINUED | OUTPATIENT
Start: 2017-10-18 | End: 2017-10-19

## 2017-10-18 RX ORDER — DILTIAZEM HCL 120 MG
10 CAPSULE, EXT RELEASE 24 HR ORAL
Qty: 125 | Refills: 0 | Status: DISCONTINUED | OUTPATIENT
Start: 2017-10-18 | End: 2017-10-19

## 2017-10-18 RX ORDER — HEPARIN SODIUM 5000 [USP'U]/ML
INJECTION INTRAVENOUS; SUBCUTANEOUS
Qty: 25000 | Refills: 0 | Status: DISCONTINUED | OUTPATIENT
Start: 2017-10-18 | End: 2017-10-18

## 2017-10-18 RX ADMIN — HEPARIN SODIUM 1800 UNIT(S)/HR: 5000 INJECTION INTRAVENOUS; SUBCUTANEOUS at 17:08

## 2017-10-18 RX ADMIN — FAMOTIDINE 40 MILLIGRAM(S): 10 INJECTION INTRAVENOUS at 17:06

## 2017-10-18 RX ADMIN — Medication 10 MG/HR: at 14:28

## 2017-10-18 RX ADMIN — HEPARIN SODIUM 4000 UNIT(S): 5000 INJECTION INTRAVENOUS; SUBCUTANEOUS at 17:09

## 2017-10-18 RX ADMIN — Medication 75 MILLIGRAM(S): at 05:54

## 2017-10-18 RX ADMIN — FAMOTIDINE 40 MILLIGRAM(S): 10 INJECTION INTRAVENOUS at 05:54

## 2017-10-18 RX ADMIN — HEPARIN SODIUM 0 UNIT(S)/HR: 5000 INJECTION INTRAVENOUS; SUBCUTANEOUS at 06:57

## 2017-10-18 RX ADMIN — HEPARIN SODIUM 1600 UNIT(S)/HR: 5000 INJECTION INTRAVENOUS; SUBCUTANEOUS at 10:12

## 2017-10-18 RX ADMIN — HEPARIN SODIUM 0 UNIT(S)/HR: 5000 INJECTION INTRAVENOUS; SUBCUTANEOUS at 23:50

## 2017-10-18 RX ADMIN — Medication 10 MG/HR: at 20:11

## 2017-10-18 NOTE — PROGRESS NOTE ADULT - SUBJECTIVE AND OBJECTIVE BOX
Patient is a 37y old  Male who presents with a chief complaint of cellulitis rt arm (16 Oct 2017 21:25)      SUBJECTIVE / OVERNIGHT EVENTS:    MEDICATIONS  (STANDING):  famotidine    Tablet 40 milliGRAM(s) Oral two times a day  heparin  Infusion.  Unit(s)/Hr (19 mL/Hr) IV Continuous <Continuous>  metoprolol 75 milliGRAM(s) Oral two times a day    MEDICATIONS  (PRN):  heparin  Injectable 9000 Unit(s) IV Push every 6 hours PRN For aPTT less than 40  heparin  Injectable 4000 Unit(s) IV Push every 6 hours PRN For aPTT between 40 - 57      Vital Signs Last 24 Hrs  T(C): 37 (18 Oct 2017 04:27), Max: 37 (18 Oct 2017 04:27)  T(F): 98.6 (18 Oct 2017 04:27), Max: 98.6 (18 Oct 2017 04:27)  HR: 104 (18 Oct 2017 04:27) (85 - 130)  BP: 105/70 (18 Oct 2017 04:27) (103/73 - 112/76)  BP(mean): --  RR: 18 (18 Oct 2017 04:27) (18 - 18)  SpO2: 98% (18 Oct 2017 04:27) (96% - 98%)  CAPILLARY BLOOD GLUCOSE        I&O's Summary    17 Oct 2017 07:01  -  18 Oct 2017 07:00  --------------------------------------------------------  IN: 1078 mL / OUT: 0 mL / NET: 1078 mL        PHYSICAL EXAM:  GENERAL: NAD, well-developed  HEAD:  Atraumatic, Normocephalic  EYES: EOMI, PERRLA, conjunctiva and sclera clear  NECK: Supple, No JVD  CHEST/LUNG: Clear to auscultation bilaterally; No wheeze  HEART: Regular rate and rhythm; No murmurs, rubs, or gallops  ABDOMEN: Soft, Nontender, Nondistended; Bowel sounds present  EXTREMITIES:  2+ Peripheral Pulses, No clubbing, cyanosis, or edema  PSYCH: AAOx3  NEUROLOGY: non-focal  SKIN: No rashes or lesions    LABS:                        10.6   10.3  )-----------( 224      ( 17 Oct 2017 22:27 )             30.6     10-17    140  |  103  |  13  ----------------------------<  98  4.6   |  25  |  0.98    Ca    8.9      17 Oct 2017 09:09  Phos  3.3     10-16  Mg     2.1     10-16    TPro  7.2  /  Alb  4.0  /  TBili  0.2  /  DBili  x   /  AST  30  /  ALT  48<H>  /  AlkPhos  74  10-16    PT/INR - ( 16 Oct 2017 11:42 )   PT: 11.5 sec;   INR: 1.05 ratio         PTT - ( 18 Oct 2017 06:05 )  PTT:146.3 sec  CARDIAC MARKERS ( 17 Oct 2017 02:12 )  x     / <0.01 ng/mL / x     / x     / x      CARDIAC MARKERS ( 16 Oct 2017 19:51 )  x     / <0.01 ng/mL / x     / x     / x      CARDIAC MARKERS ( 16 Oct 2017 11:42 )  x     / <0.01 ng/mL / 43 U/L / x     / x              RADIOLOGY & ADDITIONAL TESTS:    Imaging Personally Reviewed:    Consultant(s) Notes Reviewed:      Care Discussed with Consultants/Other Providers: Patient is a 37y old  Male who presents with a chief complaint of cellulitis rt arm (16 Oct 2017 21:25)      SUBJECTIVE / OVERNIGHT EVENTS:  Continues to have elevated rates despite diltiazem drip ranging from . He continues to deny any symptoms stating that he feels well with no headache, fever, chill, chest pain, palpitations, SOB, abdominal pain dysuria, edema.    MEDICATIONS  (STANDING):  famotidine    Tablet 40 milliGRAM(s) Oral two times a day  heparin  Infusion.  Unit(s)/Hr (19 mL/Hr) IV Continuous <Continuous>  metoprolol 75 milliGRAM(s) Oral two times a day    MEDICATIONS  (PRN):  heparin  Injectable 9000 Unit(s) IV Push every 6 hours PRN For aPTT less than 40  heparin  Injectable 4000 Unit(s) IV Push every 6 hours PRN For aPTT between 40 - 57      Vital Signs Last 24 Hrs  T(C): 37 (18 Oct 2017 04:27), Max: 37 (18 Oct 2017 04:27)  T(F): 98.6 (18 Oct 2017 04:27), Max: 98.6 (18 Oct 2017 04:27)  HR: 104 (18 Oct 2017 04:27) (85 - 130)  BP: 105/70 (18 Oct 2017 04:27) (103/73 - 112/76)  BP(mean): --  RR: 18 (18 Oct 2017 04:27) (18 - 18)  SpO2: 98% (18 Oct 2017 04:27) (96% - 98%)  CAPILLARY BLOOD GLUCOSE        I&O's Summary    17 Oct 2017 07:01  -  18 Oct 2017 07:00  --------------------------------------------------------  IN: 1078 mL / OUT: 0 mL / NET: 1078 mL      GENERAL: In no acute distress. NAD, well-developed  HEAD:  [place], Atraumatic, Normocephalic  EYES: EOMI, PERRLA, conjunctiva and sclera clear,   NECK: Supple, No JVD, no cervical lymphadenopathy  CHEST/LUNG: metaport placed with no erythema/edema surrounding. No TTP. Clear to auscultation bilaterally; No wheeze  HEART: Irregularly irregular at about 100; No murmurs, rubs, or gallops  ABDOMEN: Soft, Nontender, Nondistended; Bowel sounds present  EXTREMITIES:  2+ Peripheral Pulses, No clubbing, cyanosis, or edema  PSYCH: AAOx3  NEUROLOGY: non-focal CN II-XII grossly intact  SKIN: Dry skin on forearms, No rashes or lesions    LABS:                        10.6   10.3  )-----------( 224      ( 17 Oct 2017 22:27 )             30.6     10-17    140  |  103  |  13  ----------------------------<  98  4.6   |  25  |  0.98    Ca    8.9      17 Oct 2017 09:09  Phos  3.3     10-16  Mg     2.1     10-16    TPro  7.2  /  Alb  4.0  /  TBili  0.2  /  DBili  x   /  AST  30  /  ALT  48<H>  /  AlkPhos  74  10-16    PT/INR - ( 16 Oct 2017 11:42 )   PT: 11.5 sec;   INR: 1.05 ratio         PTT - ( 18 Oct 2017 06:05 )  PTT:146.3 sec  CARDIAC MARKERS ( 17 Oct 2017 02:12 )  x     / <0.01 ng/mL / x     / x     / x      CARDIAC MARKERS ( 16 Oct 2017 19:51 )  x     / <0.01 ng/mL / x     / x     / x      CARDIAC MARKERS ( 16 Oct 2017 11:42 )  x     / <0.01 ng/mL / 43 U/L / x     / x              RADIOLOGY & ADDITIONAL TESTS:    Imaging Personally Reviewed:    Consultant(s) Notes Reviewed:      Care Discussed with Consultants/Other Providers:

## 2017-10-18 NOTE — PROGRESS NOTE ADULT - PROBLEM SELECTOR PLAN 1
-Responded to diltiazem 10mg x2 in ED, continues to be tachycardic to 120s with atrial flutter will need to control rhythm before continuation of chemotherapy  -will start on diltiazem drip.  -TTE to check EF with better controlled rhythm  -continue patient on telemetry  -TSH, T4 negative  -troponinx3 negative  -will follow up with cardiology recommendations/ Possible cardioversion tomorrow if patient does not spontaneously convert to sinus / so far patient is still in A flutter.  Patient was started on Heparin drip  -will consult IR about metaport placement possibly causing symptom. Patient interested in removal of metaport. -Responded to diltiazem 10mg x2 in ED, continues to be tachycardic to 120s with atrial flutter will need to control rhythm before continuation of chemotherapy  -will start on diltiazem drip.  -TTE to check EF with better controlled rhythm  -continue patient on telemetry  -TSH, T4 negative  -troponinx3 negative  -will follow up with cardiology recommendations will remove metaport prior to cardioversion. Holding heparin for now.  -will consult IR about metaport placement possibly causing symptom. Patient interested in removal of metaport. -Responded to diltiazem 10mg x2 in ED, continues to be tachycardic to 120s with atrial flutter will need to control rhythm before continuation of chemotherapy  -increased metoprolol to 75mg BID PO  -TTE to check EF with better controlled rhythm  -continue patient on telemetry  -will follow up with cardiology recommendations will remove metaport prior to cardioversion. Will hold heparin overnight.  -will consult IR about metiport placement possibly causing symptom. Patient interested in removal of metiport.  -NPO at midnight

## 2017-10-18 NOTE — PROGRESS NOTE ADULT - ASSESSMENT
38 yo M with PMHx of testicular cancer s/p left orchiectomy on chemotherapy who present to the ED before receiving chemo for tachycardia. Patient found to be in atrial flutter with some response to IV diltiazem and bedside US with EF of 10-15%. However clinically the patient does not appear to have such severe HF with no JVD, minimal edema, no hepatosplenomegaly, mildly elevated proBNP with no symptoms of ABARCA, orthopnea or PND. However, etiology of acute onset of tachycardia is unclear, possible due to chemotherapy induced arrythmia, unlikely due to metaport placement as it is not in RA.

## 2017-10-18 NOTE — PROGRESS NOTE ADULT - SUBJECTIVE AND OBJECTIVE BOX
Interval History: No acute issues overnight. Denies any chest pain, palpitations, dyspnea overnight.     Review Of Systems:  Constitutional: [ ] Fever [ ] Chills [ ] Fatigue [ ] Weight change   HEENT: [ ] Blurred vision [ ] Eye Pain [ ] Headache [ ] Runny nose [ ] Sore Throat   Respiratory: [ ] Cough [ ] Wheezing [ ] Shortness of breath  Cardiovascular: [ ] Chest Pain [ ] Palpitations [ ] ABARCA [ ] PND [ ] Orthopnea  Gastrointestinal: [ ] Abdominal Pain [ ] Diarrhea [ ] Constipation [ ] Hemorrhoids [ ] Nausea [ ] Vomiting  Genitourinary: [ ] Nocturia [ ] Dysuria [ ] Incontinence  Extremities: [ ] Swelling [ ] Joint Pain  Neurologic: [ ] Focal deficit [ ] Paresthesias [ ] Syncope  Lymphatic: [ ] Swelling [ ] Lymphadenopathy   Skin: [ ] Rash [ ] Ecchymoses [ ] Wounds [ ] Lesions  Psychiatry: [ ] Depression [ ] Suicidal/Homicidal Ideation [ ] Anxiety [ ] Sleep Disturbances  [x] 10 point review of systems is otherwise negative except as mentioned above            [ ]Unable to obtain    Medications:  famotidine    Tablet 40 milliGRAM(s) Oral two times a day  heparin  Infusion.  Unit(s)/Hr IV Continuous <Continuous>  heparin  Injectable 9000 Unit(s) IV Push every 6 hours PRN  heparin  Injectable 4000 Unit(s) IV Push every 6 hours PRN  metoprolol 75 milliGRAM(s) Oral two times a day      Vitals:  T(C): 37 (10-18-17 @ 04:27), Max: 37 (10-18-17 @ 04:27)  HR: 104 (10-18-17 @ 04:27) (85 - 130)  BP: 105/70 (10-18-17 @ 04:27) (103/73 - 112/76)  BP(mean): --  RR: 18 (10-18-17 @ 04:27) (18 - 18)  SpO2: 98% (10-18-17 @ 04:27) (96% - 98%)  Wt(kg): --  Daily     Daily Weight in k.2 (17 Oct 2017 07:27)  I&O's Summary    16 Oct 2017 07:01  -  17 Oct 2017 07:00  --------------------------------------------------------  IN: 50 mL / OUT: 0 mL / NET: 50 mL    17 Oct 2017 07:01  -  18 Oct 2017 06:30  --------------------------------------------------------  IN: 600 mL / OUT: 0 mL / NET: 600 mL        Physical Exam:  Appearance:  Normal, NAD  Eyes: PERRL, EOMI  HENT: Normal oral [place] NC/AT  Cardiovascular: S1, S2, irregular, No m/r/g appreciated, Trace edema, no elevation in JVP  Respiratory: Clear to auscultation bilaterally  Gastrointestinal: Soft, Non-tender, Non-distended, BS+  Musculoskeletal:  No clubbing, No joint deformity   Neurologic: Non-focal  Lymphatic: No lymphadenopathy  Psychiatry: AAOx3, Mood & affect appropriate  Skin: No rashes, No ecchymoses, No cyanosis; chemo port CDI    Labs:                        10.6   10.3  )-----------( 224      ( 17 Oct 2017 22:27 )             30.6     10-17    140  |  103  |  13  ----------------------------<  98  4.6   |  25  |  0.98    Ca    8.9      17 Oct 2017 09:09  Phos  3.3     10-16  Mg     2.1     10-16    TPro  7.2  /  Alb  4.0  /  TBili  0.2  /  DBili  x   /  AST  30  /  ALT  48<H>  /  AlkPhos  74  10-16    PT/INR - ( 16 Oct 2017 11:42 )   PT: 11.5 sec;   INR: 1.05 ratio         PTT - ( 17 Oct 2017 22:27 )  PTT:98.8 sec  CARDIAC MARKERS ( 17 Oct 2017 02:12 )  x     / <0.01 ng/mL / x     / x     / x      CARDIAC MARKERS ( 16 Oct 2017 19:51 )  x     / <0.01 ng/mL / x     / x     / x      CARDIAC MARKERS ( 16 Oct 2017 11:42 )  x     / <0.01 ng/mL / 43 U/L / x     / x          Serum Pro-Brain Natriuretic Peptide: 863 pg/mL (10-16 @ 11:42)        Echo: Official Pending        Interpretation of Telemetry: Aflutter ~100 bpm

## 2017-10-18 NOTE — PROGRESS NOTE ADULT - ASSESSMENT
38 y/o M with pmhx of testicular cancer referred from the chemo infusion center with tachycardia found to be in new onset Atrial flutter.     #Atypical Atrial flutter CHADSVASC 0--concern that there may be mechanical irritation by the recently chemoport  - Heparin AC for potential cardioversion once discussion with IR regarding port placement is had   - Follow up TTE (findings on POCUS in ER - likely myopathy of tachcardia vs stress CM)  - c/w metoprolol   - appreciate Heme/Onc recs

## 2017-10-19 ENCOUNTER — APPOINTMENT (OUTPATIENT)
Dept: INFUSION THERAPY | Facility: HOSPITAL | Age: 37
End: 2017-10-19

## 2017-10-19 DIAGNOSIS — I50.20 UNSPECIFIED SYSTOLIC (CONGESTIVE) HEART FAILURE: ICD-10-CM

## 2017-10-19 LAB
APTT BLD: 38.6 SEC — HIGH (ref 27.5–37.4)
APTT BLD: 59.8 SEC — HIGH (ref 27.5–37.4)
CHOLEST SERPL-MCNC: 117 MG/DL — SIGNIFICANT CHANGE UP (ref 10–199)
HCT VFR BLD CALC: 30.8 % — LOW (ref 39–50)
HDLC SERPL-MCNC: 32 MG/DL — LOW (ref 40–125)
HGB BLD-MCNC: 10.5 G/DL — LOW (ref 13–17)
LIPID PNL WITH DIRECT LDL SERPL: 65 MG/DL — SIGNIFICANT CHANGE UP
MCHC RBC-ENTMCNC: 32.3 PG — SIGNIFICANT CHANGE UP (ref 27–34)
MCHC RBC-ENTMCNC: 34.1 GM/DL — SIGNIFICANT CHANGE UP (ref 32–36)
MCV RBC AUTO: 94.8 FL — SIGNIFICANT CHANGE UP (ref 80–100)
PLATELET # BLD AUTO: 243 K/UL — SIGNIFICANT CHANGE UP (ref 150–400)
RBC # BLD: 3.25 M/UL — LOW (ref 4.2–5.8)
RBC # FLD: 16.6 % — HIGH (ref 10.3–14.5)
TOTAL CHOLESTEROL/HDL RATIO MEASUREMENT: 3.7 RATIO — SIGNIFICANT CHANGE UP (ref 3.4–9.6)
TRIGL SERPL-MCNC: 101 MG/DL — SIGNIFICANT CHANGE UP (ref 10–149)
WBC # BLD: 8.2 K/UL — SIGNIFICANT CHANGE UP (ref 3.8–10.5)
WBC # FLD AUTO: 8.2 K/UL — SIGNIFICANT CHANGE UP (ref 3.8–10.5)

## 2017-10-19 PROCEDURE — 99222 1ST HOSP IP/OBS MODERATE 55: CPT

## 2017-10-19 PROCEDURE — 36590 REMOVAL TUNNELED CV CATH: CPT

## 2017-10-19 PROCEDURE — 99233 SBSQ HOSP IP/OBS HIGH 50: CPT | Mod: GC

## 2017-10-19 RX ORDER — METOPROLOL TARTRATE 50 MG
100 TABLET ORAL
Qty: 0 | Refills: 0 | Status: DISCONTINUED | OUTPATIENT
Start: 2017-10-19 | End: 2017-10-20

## 2017-10-19 RX ORDER — HEPARIN SODIUM 5000 [USP'U]/ML
9000 INJECTION INTRAVENOUS; SUBCUTANEOUS EVERY 6 HOURS
Qty: 0 | Refills: 0 | Status: DISCONTINUED | OUTPATIENT
Start: 2017-10-19 | End: 2017-10-21

## 2017-10-19 RX ORDER — HEPARIN SODIUM 5000 [USP'U]/ML
4000 INJECTION INTRAVENOUS; SUBCUTANEOUS EVERY 6 HOURS
Qty: 0 | Refills: 0 | Status: DISCONTINUED | OUTPATIENT
Start: 2017-10-19 | End: 2017-10-21

## 2017-10-19 RX ORDER — HEPARIN SODIUM 5000 [USP'U]/ML
1500 INJECTION INTRAVENOUS; SUBCUTANEOUS
Qty: 25000 | Refills: 0 | Status: DISCONTINUED | OUTPATIENT
Start: 2017-10-19 | End: 2017-10-21

## 2017-10-19 RX ADMIN — Medication 100 MILLIGRAM(S): at 18:08

## 2017-10-19 RX ADMIN — FAMOTIDINE 40 MILLIGRAM(S): 10 INJECTION INTRAVENOUS at 17:25

## 2017-10-19 RX ADMIN — HEPARIN SODIUM 1500 UNIT(S)/HR: 5000 INJECTION INTRAVENOUS; SUBCUTANEOUS at 00:55

## 2017-10-19 RX ADMIN — HEPARIN SODIUM 1500 UNIT(S)/HR: 5000 INJECTION INTRAVENOUS; SUBCUTANEOUS at 17:25

## 2017-10-19 RX ADMIN — FAMOTIDINE 40 MILLIGRAM(S): 10 INJECTION INTRAVENOUS at 05:46

## 2017-10-19 NOTE — CONSULT NOTE ADULT - SUBJECTIVE AND OBJECTIVE BOX
HISTORY OF PRESENT ILLNESS:   38 y/o M with pmhx of testicular cancer referred from the chemo infusion center with tachycardia found to be in new onset Atrial flutter.   Admitted 10/16, has remained in AFL.     EP consulted for TYLOR/DCCV.   At bedside, patient sitting in chair. Has no complaints. Denies chest pain, dyspnea, dizziness, syncope, presyncope, orthopnea, edema, PND.     Allergies    sulfa drugs (Unknown)    Intolerances    	    MEDICATIONS:  diltiazem Infusion 10 mG/Hr IV Continuous <Continuous>  heparin  Infusion. 1500 Unit(s)/Hr IV Continuous <Continuous>  heparin  Injectable 9000 Unit(s) IV Push every 6 hours PRN  heparin  Injectable 4000 Unit(s) IV Push every 6 hours PRN  metoprolol 100 milliGRAM(s) Oral two times a day          famotidine    Tablet 40 milliGRAM(s) Oral two times a day          PAST MEDICAL & SURGICAL HISTORY:  Testicular cancer  Lumbar herniated disc  Testicular mass: left  Port-a-cath in place  History of orchiectomy      FAMILY HISTORY:  Family history of cancer (Grandparent)  Family history of ischemic heart disease (Grandparent)  Family history of diabetes mellitus (Aunt)      SOCIAL HISTORY:    [ ] Non-smoker  [ ] Smoker  [ ] Alcohol      REVIEW OF SYSTEMS:  General: no fatigue/malaise, weight loss/gain.  Skin: no rashes.  Ophthalmologic: no blurred vision, no loss of vision. 	  ENT: no sore throat, rhinorrhea, sinus congestion.  Respiratory: no SOB, cough or wheeze.  Gastrointestinal:  no N/V/D, no melena/hematemesis/hematochezia.  Genitourinary: no dysuria/hesitancy or hematuria.  Musculoskeletal: no myalgias or arthralgias.  Neurological: no changes in vision or hearing, no lightheadedness/dizziness, no syncope/near syncope	  Psychiatric: no unusual stress/anxiety.   Hematology/Lymphatics: no unusual bleeding, bruising and no lymphadenopathy.  Endocrine: no unusual thirst.   All others negative except as stated above and in HPI.    PHYSICAL EXAM:  T(C): 36.5 (10-19-17 @ 14:44), Max: 37 (10-18-17 @ 17:39)  HR: 82 (10-19-17 @ 15:30) (74 - 86)  BP: 122/81 (10-19-17 @ 15:30) (99/66 - 123/81)  RR: 18 (10-19-17 @ 14:44) (18 - 18)  SpO2: 98% (10-19-17 @ 14:44) (95% - 98%)  Wt(kg): --  I&O's Summary    18 Oct 2017 07:01  -  19 Oct 2017 07:00  --------------------------------------------------------  IN: 928 mL / OUT: 0 mL / NET: 928 mL        Appearance: Normal	  HEENT:   Normal oral mucosa, PERRL, EOMI	  Lymphatic: No lymphadenopathy  Cardiovascular: Normal S1 S2, No JVD, No murmurs, No edema  Respiratory: Lungs clear to auscultation	  Psychiatry: A & O x 3, Mood & affect appropriate  Gastrointestinal:  Soft, Non-tender, + BS	  Skin: No rashes, No ecchymoses, No cyanosis	  Neurologic: Non-focal  Extremities: Normal range of motion, No clubbing, cyanosis or edema  Vascular: Peripheral pulses palpable 2+ bilaterally        LABS:	 	    CBC Full  -  ( 19 Oct 2017 07:25 )  WBC Count : 8.20 K/uL  Hemoglobin : 10.5 g/dL  Hematocrit : 30.8 %  Platelet Count - Automated : 243 K/uL  Mean Cell Volume : 94.8 fl  Mean Cell Hemoglobin : 32.3 pg  Mean Cell Hemoglobin Concentration : 34.1 gm/dL  Auto Neutrophil # : x  Auto Lymphocyte # : x  Auto Monocyte # : x  Auto Eosinophil # : x  Auto Basophil # : x  Auto Neutrophil % : x  Auto Lymphocyte % : x  Auto Monocyte % : x  Auto Eosinophil % : x  Auto Basophil % : x    10-18    140  |  103  |  16  ----------------------------<  105<H>  4.3   |  26  |  1.15    Ca    8.7      18 Oct 2017 07:30  Phos  4.5     10-18  Mg     2.0     10-18    TPro  6.5  /  Alb  3.3  /  TBili  0.2  /  DBili  x   /  AST  37  /  ALT  44  /  AlkPhos  62  10-18      proBNP:   Lipid Profile:   HgA1c:   TSH:       CARDIAC MARKERS:            TELEMETRY: 	    ECG:  	  RADIOLOGY:  OTHER: 	    PREVIOUS DIAGNOSTIC TESTING:    [ ] Echocardiogram: < from: Transthoracic Echocardiogram (10.18.17 @ 18:10) >    Patient name: KAYLEY GUERRIER  YOB: 1980   Age: 37 (M)   MR#: [id number]  Study Date: 10/18/2017  Location: [address] [place], [place] [id number]: Manuela Traore RDCS  Study quality: Technically fair  Referring Physician: Fili sandoval MD  Blood Pressure: 150/80 mmHg  Height: 188 cm  Weight: 106 kg  BSA: 2.3 m2  Heart Rate: 140 mmHg  ------------------------------------------------------------------------  PROCEDURE: Transthoracic echocardiogram with 2-D, M-Mode  and complete spectral and color flow Doppler.  INDICATION: Unspecified atrial fibrillation (I48.91)  ------------------------------------------------------------------------  Dimensions:    Normal Values:  LA:     4.4    2.0 - 4.0 cm  Ao:     3.3    2.0 - 3.8 cm  SEPTUM: 0.9 0.6 - 1.2 cm  PWT:    1.0    0.6 - 1.1 cm  LVIDd:  6.0    3.0 - 5.6 cm  LVIDs:  4.8    1.8 - 4.0 cm  Derived variables:  LVMI: 100 g/m2  RWT: 0.33  Fractional short: 20 %  EF (Visual Estimate): 35-40 %  EF (Teicholtz): 40 %  ------------------------------------------------------------------------  Observations:  Mitral Valve: Normal mitral valve. Mild mitral  regurgitation.  Aortic Valve/Aorta: Normal trileaflet aortic valve.  Aortic Root: 3.3 cm.  Left Atrium: Mildly dilated left atrium.  LA volume index =  39 cc/m2.  Left Ventricle: Moderate to severe global left ventricular  systolic dysfunction. Increased relative wall thickness  with normal left ventricular mass index, consistent with  concentric left ventricular remodeling.  Right Heart: Normal right atrium. Normal right ventricular  size and function. Normal tricuspid valve. Mild tricuspid  regurgitation. Normal pulmonic valve. Mild pulmonic  regurgitation.  Pericardium/Pleura: Normal pericardium with no pericardial  effusion.  Hemodynamic: Estimated right atrial pressure is 8 mm Hg.  Estimated right ventricular systolic pressure equals 33 mm  Hg, assuming right atrial pressure equals 8 mm Hg,  consistent with normal pulmonary pressures.  ------------------------------------------------------------------------  Conclusions:  1. Mildly dilated left atrium.  LA volume index = 39 cc/m2.  2. Increased relative wall thickness with normal left  ventricular mass index, consistent with concentric left  ventricular remodeling.  3. Moderate to severe global left ventricular systolic  dysfunction.  4. Strain average -10.3% which is abnormally below normal  values of at least >-17% consistent with LV dysfunction.  ------------------------------------------------------------------------  Confirmed on  10/18/2017 - 20:34:32 by Zoey Greer M.D.  ------------------------------------------------------------------------    < end of copied text >    [ ]  Catheterization:  [ ] Stress Test:

## 2017-10-19 NOTE — PROGRESS NOTE ADULT - PROBLEM SELECTOR PLAN 1
-Responded to diltiazem 10mg x2 in ED, continues to be tachycardic to 120s with atrial flutter will need to control rhythm before continuation of chemotherapy  -on diltiazem drip with better control  -TTE to check EF with better controlled rhythm  -continue patient on telemetry  -will follow up with cardiology recommendations will remove metaport prior to cardioversion. Will hold heparin overnight.  -will consult IR about metiport placement possibly causing symptom. Patient interested in removal of metiport.  -NPO at midnight -on diltiazem drip with better control and will switch to oral metoprolol 100mg BID  -TTE to check EF with better controlled rhythm  -continue patient on telemetry  -now s/p metiport removal  -NPO at midnight for TYLOR and cardioversion in AM

## 2017-10-19 NOTE — PROGRESS NOTE ADULT - PROBLEM SELECTOR PLAN 3
-currently on heparin for DVT ppx -Seen by heme/onc, will not receive chemo until after heart rate controlled.

## 2017-10-19 NOTE — PROGRESS NOTE ADULT - SUBJECTIVE AND OBJECTIVE BOX
Interval History: Denies any chest pain, palpitations, dyspnea overnight. Currently NPO for port removal. Pt transitioned back to dilt gtt yesterday afternoon.    Review Of Systems:  Constitutional: [ ] Fever [ ] Chills [ ] Fatigue [ ] Weight change   HEENT: [ ] Blurred vision [ ] Eye Pain [ ] Headache [ ] Runny nose [ ] Sore Throat   Respiratory: [ ] Cough [ ] Wheezing [ ] Shortness of breath  Cardiovascular: [ ] Chest Pain [ ] Palpitations [ ] ABARCA [ ] PND [ ] Orthopnea  Gastrointestinal: [ ] Abdominal Pain [ ] Diarrhea [ ] Constipation [ ] Hemorrhoids [ ] Nausea [ ] Vomiting  Genitourinary: [ ] Nocturia [ ] Dysuria [ ] Incontinence  Extremities: [ ] Swelling [ ] Joint Pain  Neurologic: [ ] Focal deficit [ ] Paresthesias [ ] Syncope  Lymphatic: [ ] Swelling [ ] Lymphadenopathy   Skin: [ ] Rash [ ] Ecchymoses [ ] Wounds [ ] Lesions  Psychiatry: [ ] Depression [ ] Suicidal/Homicidal Ideation [ ] Anxiety [ ] Sleep Disturbances  [x ] 10 point review of systems is otherwise negative except as mentioned above            [ ]Unable to obtain    Medications:  diltiazem Infusion 10 mG/Hr IV Continuous <Continuous>  famotidine    Tablet 40 milliGRAM(s) Oral two times a day      Vitals:  T(C): 37 (10-19-17 @ 04:22), Max: 37 (10-18-17 @ 17:39)  HR: 80 (10-19-17 @ 04:22) (78 - 108)  BP: 103/68 (10-19-17 @ 04:22) (99/66 - 121/87)  BP(mean): --  RR: 18 (10-19-17 @ 04:22) (18 - 20)  SpO2: 95% (10-19-17 @ 04:22) (95% - 98%)  Wt(kg): --  Daily     Daily Weight in k.4 (18 Oct 2017 08:01)  I&O's Summary    17 Oct 2017 07:01  -  18 Oct 2017 07:00  --------------------------------------------------------  IN: 1078 mL / OUT: 0 mL / NET: 1078 mL    18 Oct 2017 07:01  -  19 Oct 2017 06:53  --------------------------------------------------------  IN: 928 mL / OUT: 0 mL / NET: 928 mL        Physical Exam:  Appearance:  Normal, NAD  Eyes: PERRL, EOMI  HENT: Normal oral [place] NC/AT  Cardiovascular: S1, S2, irreg, No m/r/g appreciated,trace edema   Respiratory: Clear to auscultation bilaterally  Gastrointestinal: Soft, Non-tender, Non-distended, BS+  Musculoskeletal:  No clubbing, No joint deformity   Neurologic: Non-focal  Lymphatic: No lymphadenopathy  Psychiatry: AAOx3, Mood & affect appropriate  Skin: No rashes, No ecchymoses, No cyanosis    Labs:                        11.1   9.1   )-----------( 257      ( 18 Oct 2017 16:44 )             30.9     10-18    140  |  103  |  16  ----------------------------<  105<H>  4.3   |  26  |  1.15    Ca    8.7      18 Oct 2017 07:30  Phos  4.5     10-  Mg     2.0     10-18    TPro  6.5  /  Alb  3.3  /  TBili  0.2  /  DBili  x   /  AST  37  /  ALT  44  /  AlkPhos  62  10-    PTT - ( 18 Oct 2017 23:16 )  PTT:150.0 sec      Serum Pro-Brain Natriuretic Peptide: 863 pg/mL (10-16 @ 11:42)          ECG:    Echo:  Confirmed on  10/18/2017 - 20:34:32 by Zoey Greer M.D.  Dimensions:    Normal Values:  LA:     4.4    2.0 - 4.0 cm  Ao:     3.3    2.0 - 3.8 cm  SEPTUM: 0.9    0.6 - 1.2 cm  PWT:    1.0    0.6 - 1.1 cm  LVIDd:  6.0    3.0 - 5.6 cm  LVIDs:  4.8    1.8 - 4.0 cm  Derived variables:  LVMI: 100 g/m2  RWT: 0.33  Fractional short: 20 %  EF (Visual Estimate): 35-40 %  EF (Teicholtz): 40 %  ------------------------------------------------------------------------  Observations:  Mitral Valve: Normal mitral valve. Mild mitral  regurgitation.  Aortic Valve/Aorta: Normal trileaflet aortic valve.  Aortic Root: 3.3 cm.  Left Atrium: Mildly dilated left atrium.  LA volume index =  39 cc/m2.  Left Ventricle: Moderate to severe global left ventricular  systolic dysfunction. Increased relative wall thickness  with normal left ventricular mass index, consistent with  concentric left ventricular remodeling.  Right Heart: Normal right atrium. Normal right ventricular  size and function. Normal tricuspid valve. Mild tricuspid  regurgitation. Normal pulmonic valve. Mild pulmonic  regurgitation.  Pericardium/Pleura: Normal pericardium with no pericardial  effusion.  Hemodynamic: Estimated right atrial pressure is 8 mm Hg.  Estimated right ventricular systolic pressure equals 33 mm  Hg, assuming right atrial pressure equals 8 mm Hg,  consistent with normal pulmonary pressures.  ------------------------------------------------------------------------  Conclusions:  1. Mildly dilated left atrium.  LA volume index = 39 cc/m2.  2. Increased relative wall thickness with normal left  ventricular mass index, consistent with concentric left  ventricular remodeling.  3. Moderate to severe global left ventricular systolic  dysfunction.  4. Strain average -10.3% which is abnormally below normal  values of at least >-17% consistent with LV dysfunction.  ------------------------------------------------------------------------      Stress Testing:     Cath:    Imaging:    Interpretation of Telemetry: flutter 60-70s

## 2017-10-19 NOTE — PROGRESS NOTE ADULT - SUBJECTIVE AND OBJECTIVE BOX
Patient is a 37y old  Male who presents with a chief complaint of cellulitis rt arm (16 Oct 2017 21:25)      SUBJECTIVE / OVERNIGHT EVENTS:    MEDICATIONS  (STANDING):  diltiazem Infusion 10 mG/Hr (10 mL/Hr) IV Continuous <Continuous>  famotidine    Tablet 40 milliGRAM(s) Oral two times a day    MEDICATIONS  (PRN):      Vital Signs Last 24 Hrs  T(C): 37 (19 Oct 2017 04:22), Max: 37 (18 Oct 2017 17:39)  T(F): 98.6 (19 Oct 2017 04:22), Max: 98.6 (18 Oct 2017 17:39)  HR: 80 (19 Oct 2017 04:22) (78 - 108)  BP: 103/68 (19 Oct 2017 04:22) (99/66 - 121/87)  BP(mean): --  RR: 18 (19 Oct 2017 04:22) (18 - 20)  SpO2: 95% (19 Oct 2017 04:22) (95% - 98%)  CAPILLARY BLOOD GLUCOSE        I&O's Summary    18 Oct 2017 07:01  -  19 Oct 2017 07:00  --------------------------------------------------------  IN: 928 mL / OUT: 0 mL / NET: 928 mL        PHYSICAL EXAM:  GENERAL: NAD, well-developed  HEAD:  Atraumatic, Normocephalic  EYES: EOMI, PERRLA, conjunctiva and sclera clear  NECK: Supple, No JVD  CHEST/LUNG: Clear to auscultation bilaterally; No wheeze  HEART: Regular rate and rhythm; No murmurs, rubs, or gallops  ABDOMEN: Soft, Nontender, Nondistended; Bowel sounds present  EXTREMITIES:  2+ Peripheral Pulses, No clubbing, cyanosis, or edema  PSYCH: AAOx3  NEUROLOGY: non-focal  SKIN: No rashes or lesions    LABS:                        11.1   9.1   )-----------( 257      ( 18 Oct 2017 16:44 )             30.9     10-18    140  |  103  |  16  ----------------------------<  105<H>  4.3   |  26  |  1.15    Ca    8.7      18 Oct 2017 07:30  Phos  4.5     10-18  Mg     2.0     10-18    TPro  6.5  /  Alb  3.3  /  TBili  0.2  /  DBili  x   /  AST  37  /  ALT  44  /  AlkPhos  62  10-18    PTT - ( 18 Oct 2017 23:16 )  PTT:150.0 sec          RADIOLOGY & ADDITIONAL TESTS:    Imaging Personally Reviewed:    < from: Transthoracic Echocardiogram (10.18.17 @ 18:10) >  Conclusions:  1. Mildly dilated left atrium.  LA volume index = 39 cc/m2.  2. Increased relative wall thickness with normal left  ventricular mass index, consistent with concentric left  ventricular remodeling.  3. Moderate to severe global left ventricular systolic  dysfunction.  4. Strain average -10.3% which is abnormally below normal  values of at least >-17% consistent with LV dysfunction.    < end of copied text >      Consultant(s) Notes Reviewed:      Care Discussed with Consultants/Other Providers: Patient is a 37y old  Male who presents with a chief complaint of cellulitis rt arm (16 Oct 2017 21:25)      SUBJECTIVE / OVERNIGHT EVENTS:  Patient is doing well just anxious on the next steps in care. He has been better rate controlled on the diltiazem drip. He went for TTE yesterday. NPO overnight and heparin stopped overnight for port removal.  He denies any chest pain, SOB, ABARCA, palpitations, abdominal pain, dysuria, peripheral edema.    MEDICATIONS  (STANDING):  diltiazem Infusion 10 mG/Hr (10 mL/Hr) IV Continuous <Continuous>  famotidine    Tablet 40 milliGRAM(s) Oral two times a day    MEDICATIONS  (PRN):      Vital Signs Last 24 Hrs  T(C): 37 (19 Oct 2017 04:22), Max: 37 (18 Oct 2017 17:39)  T(F): 98.6 (19 Oct 2017 04:22), Max: 98.6 (18 Oct 2017 17:39)  HR: 80 (19 Oct 2017 04:22) (78 - 108)  BP: 103/68 (19 Oct 2017 04:22) (99/66 - 121/87)  BP(mean): --  RR: 18 (19 Oct 2017 04:22) (18 - 20)  SpO2: 95% (19 Oct 2017 04:22) (95% - 98%)  CAPILLARY BLOOD GLUCOSE        I&O's Summary    18 Oct 2017 07:01  -  19 Oct 2017 07:00  --------------------------------------------------------  IN: 928 mL / OUT: 0 mL / NET: 928 mL        GENERAL: In no acute distress. NAD, well-developed  HEAD:  Balding, Atraumatic, Normocephalic  EYES: EOMI, PERRLA, conjunctiva and sclera clear,   NECK: Supple, No JVD, no cervical lymphadenopathy  CHEST/LUNG: metiport placed with no erythema/edema surrounding. No TTP. Clear to auscultation bilaterally; No wheeze  HEART: Irregularly irregular at about 80; No murmurs, rubs, or gallops  ABDOMEN: Soft, Nontender, Nondistended; Bowel sounds present  EXTREMITIES:  2+ Peripheral Pulses, No clubbing, cyanosis, or edema  PSYCH: AAOx3  NEUROLOGY: non-focal CN II-XII grossly intact  SKIN: Dry skin on forearms, No rashes or lesions    LABS:                        11.1   9.1   )-----------( 257      ( 18 Oct 2017 16:44 )             30.9     10-18    140  |  103  |  16  ----------------------------<  105<H>  4.3   |  26  |  1.15    Ca    8.7      18 Oct 2017 07:30  Phos  4.5     10-18  Mg     2.0     10-18    TPro  6.5  /  Alb  3.3  /  TBili  0.2  /  DBili  x   /  AST  37  /  ALT  44  /  AlkPhos  62  10-18    PTT - ( 18 Oct 2017 23:16 )  PTT:150.0 sec          RADIOLOGY & ADDITIONAL TESTS:    Imaging Personally Reviewed:    < from: Transthoracic Echocardiogram (10.18.17 @ 18:10) >  Conclusions:  1. Mildly dilated left atrium.  LA volume index = 39 cc/m2.  2. Increased relative wall thickness with normal left  ventricular mass index, consistent with concentric left  ventricular remodeling.  3. Moderate to severe global left ventricular systolic  dysfunction.  4. Strain average -10.3% which is abnormally below normal  values of at least >-17% consistent with LV dysfunction.    < end of copied text >      Consultant(s) Notes Reviewed:      Care Discussed with Consultants/Other Providers:

## 2017-10-19 NOTE — PROGRESS NOTE ADULT - SUBJECTIVE AND OBJECTIVE BOX
38 yo M with PMHx of testicular cancer s/p left orchiectomy on chemotherapy s/p chest port placement on 9/5/17 with aflutter presented to IR for port removal . NPO past midnight.      Allergies: sulfa drugs (Unknown)      PAST MEDICAL & SURGICAL HISTORY:  Testicular cancer  Lumbar herniated disc  Testicular mass: left  Port-a-cath in place  History of orchiectomy        Pertinent labs:                      10.5   8.20  )-----------( 243      ( 19 Oct 2017 07:25 )             30.8   10-18    140  |  103  |  16  ----------------------------<  105<H>  4.3   |  26  |  1.15    Ca    8.7      18 Oct 2017 07:30  Phos  4.5     10-18  Mg     2.0     10-18    TPro  6.5  /  Alb  3.3  /  TBili  0.2  /  DBili  x   /  AST  37  /  ALT  44  /  AlkPhos  62  10-18  PTT - ( 19 Oct 2017 06:59 )  PTT:38.6 sec    Consent: Procedure/risks/ Benefits explained. Informed consent obtained. Pt verbalizes understanding. 38 yo M with PMHx of testicular cancer s/p left orchiectomy on chemotherapy s/p chest port placement on 9/5/17 with aflutter presented to IR for port removal . Cardiology note reviewed- concern for port causing irritation and atypical atrial flutter. NPO past midnight.      Allergies: sulfa drugs (Unknown)      PAST MEDICAL & SURGICAL HISTORY:  Testicular cancer  Lumbar herniated disc  Testicular mass: left  Port-a-cath in place  History of orchiectomy        Pertinent labs:                      10.5   8.20  )-----------( 243      ( 19 Oct 2017 07:25 )             30.8   10-18    140  |  103  |  16  ----------------------------<  105<H>  4.3   |  26  |  1.15    Ca    8.7      18 Oct 2017 07:30  Phos  4.5     10-18  Mg     2.0     10-18    TPro  6.5  /  Alb  3.3  /  TBili  0.2  /  DBili  x   /  AST  37  /  ALT  44  /  AlkPhos  62  10-18  PTT - ( 19 Oct 2017 06:59 )  PTT:38.6 sec    Consent: Procedure/risks/ Benefits explained. Informed consent obtained. Pt verbalizes understanding.

## 2017-10-19 NOTE — PROGRESS NOTE ADULT - ASSESSMENT
36 y/o M with pmhx of testicular cancer referred from the chemo infusion center with tachycardia found to be in new onset Atrial flutter.     #Atypical Atrial flutter CHADSVASC 0--concern that there may be mechanical irritation by the recently chemoport, however, patient does have an mildly dilated LA which may of been contributory. Will need to re-address the etiology of this arrythmia in setting of echo findings but okay to proceed with port removal (patient very adamant about this)  - okay to hold heparin for IR procedure today  - can leave on dilt gtt till after the procedure as it is controlling his rates well. After procedure would start Metoprolol 100 succinate w/ an hour overlap with diltiazem gtt which can then be turned off. Beta-blocker with more of an indication phani in the setting of his cardiomyopathy    #Cardiomyopathy - unclear etiology. Possibly related to arrythmia and doubtful there is an ischemic component. Chemo agents are not known to be overtly cardiotoxic  - proceed with above plan for now  - if related to rate there should be reversibility  - will continue to follow 38 y/o M with pmhx of testicular cancer referred from the chemo infusion center with tachycardia found to be in new onset Atrial flutter.     #Atrial flutter- CHADSVASC 0--concern that there may be mechanical irritation by the recently chemoport, however, patient does have an mildly dilated LA which may of been contributory. Will need to re-address the etiology of this arrythmia in setting of echo findings but okay to proceed with port removal (patient very adamant about this)  - okay to hold heparin for IR procedure today  - can leave on dilt gtt till after the procedure as it is controlling his rates well. After procedure would start Metoprolol 100 succinate w/ an hour overlap with diltiazem gtt which can then be turned off. Beta-blocker with more of an indication phani in the setting of his cardiomyopathy    #Cardiomyopathy - unclear etiology. Possibly related to arrythmia and doubtful there is an ischemic component. Chemo agents are not known to be overtly cardiotoxic  - proceed with above plan for now  - if related to rate there should be reversibility  - will continue to follow

## 2017-10-19 NOTE — PROGRESS NOTE ADULT - PROBLEM SELECTOR PLAN 2
-Seen by heme/onc, will not receive chemo until after heart rate controlled. -will add metoprolol succinate 100mg BID

## 2017-10-19 NOTE — PROGRESS NOTE ADULT - ASSESSMENT
36 yo M with PMHx of testicular cancer s/p left orchiectomy on chemotherapy who present to the ED before receiving chemo for tachycardia. Patient found to be in atrial flutter with some response to IV diltiazem and bedside US with EF of 10-15%. However clinically the patient does not appear to have such severe HF with no JVD, minimal edema, no hepatosplenomegaly, mildly elevated proBNP with no symptoms of ABARCA, orthopnea or PND. However, etiology of acute onset of tachycardia is unclear, possible due to chemotherapy induced arrythmia, unlikely due to metaport placement as it is not in RA. 36 yo M with PMHx of testicular cancer s/p left orchiectomy on chemotherapy who present to the ED before receiving chemo for tachycardia. Patient found to be in atrial flutter with some response to IV diltiazem and bedside US with EF of 10-15% on TTE showed concentric left ventricular remodeling with LVEF of 35-40% possibly due to paroxysmal aflutter, unlikely due to chemotherapy. Currently better rate controlled on diltiazem drip.

## 2017-10-20 ENCOUNTER — APPOINTMENT (OUTPATIENT)
Dept: INFUSION THERAPY | Facility: HOSPITAL | Age: 37
End: 2017-10-20

## 2017-10-20 LAB
ALBUMIN SERPL ELPH-MCNC: 3.6 G/DL — SIGNIFICANT CHANGE UP (ref 3.3–5)
ALP SERPL-CCNC: 64 U/L — SIGNIFICANT CHANGE UP (ref 40–120)
ALT FLD-CCNC: 61 U/L — HIGH (ref 10–45)
ANION GAP SERPL CALC-SCNC: 15 MMOL/L — SIGNIFICANT CHANGE UP (ref 5–17)
APTT BLD: 72.2 SEC — HIGH (ref 27.5–37.4)
AST SERPL-CCNC: 39 U/L — SIGNIFICANT CHANGE UP (ref 10–40)
BILIRUB SERPL-MCNC: 0.2 MG/DL — SIGNIFICANT CHANGE UP (ref 0.2–1.2)
BUN SERPL-MCNC: 15 MG/DL — SIGNIFICANT CHANGE UP (ref 7–23)
CALCIUM SERPL-MCNC: 9.7 MG/DL — SIGNIFICANT CHANGE UP (ref 8.4–10.5)
CHLORIDE SERPL-SCNC: 104 MMOL/L — SIGNIFICANT CHANGE UP (ref 96–108)
CO2 SERPL-SCNC: 21 MMOL/L — LOW (ref 22–31)
CREAT SERPL-MCNC: 1.01 MG/DL — SIGNIFICANT CHANGE UP (ref 0.5–1.3)
GLUCOSE SERPL-MCNC: 107 MG/DL — HIGH (ref 70–99)
HCT VFR BLD CALC: 33.3 % — LOW (ref 39–50)
HCT VFR BLD CALC: 34.2 % — LOW (ref 39–50)
HGB BLD-MCNC: 11.3 G/DL — LOW (ref 13–17)
HGB BLD-MCNC: 11.9 G/DL — LOW (ref 13–17)
MAGNESIUM SERPL-MCNC: 2 MG/DL — SIGNIFICANT CHANGE UP (ref 1.6–2.6)
MCHC RBC-ENTMCNC: 31.9 PG — SIGNIFICANT CHANGE UP (ref 27–34)
MCHC RBC-ENTMCNC: 33.9 GM/DL — SIGNIFICANT CHANGE UP (ref 32–36)
MCHC RBC-ENTMCNC: 34 PG — SIGNIFICANT CHANGE UP (ref 27–34)
MCHC RBC-ENTMCNC: 34.9 GM/DL — SIGNIFICANT CHANGE UP (ref 32–36)
MCV RBC AUTO: 94.1 FL — SIGNIFICANT CHANGE UP (ref 80–100)
MCV RBC AUTO: 97.7 FL — SIGNIFICANT CHANGE UP (ref 80–100)
PHOSPHATE SERPL-MCNC: 4.5 MG/DL — SIGNIFICANT CHANGE UP (ref 2.5–4.5)
PLATELET # BLD AUTO: 271 K/UL — SIGNIFICANT CHANGE UP (ref 150–400)
PLATELET # BLD AUTO: 275 K/UL — SIGNIFICANT CHANGE UP (ref 150–400)
POTASSIUM SERPL-MCNC: 4.7 MMOL/L — SIGNIFICANT CHANGE UP (ref 3.5–5.3)
POTASSIUM SERPL-SCNC: 4.7 MMOL/L — SIGNIFICANT CHANGE UP (ref 3.5–5.3)
PROT SERPL-MCNC: 6.7 G/DL — SIGNIFICANT CHANGE UP (ref 6–8.3)
RBC # BLD: 3.5 M/UL — LOW (ref 4.2–5.8)
RBC # BLD: 3.54 M/UL — LOW (ref 4.2–5.8)
RBC # FLD: 14.9 % — HIGH (ref 10.3–14.5)
RBC # FLD: 16.3 % — HIGH (ref 10.3–14.5)
SODIUM SERPL-SCNC: 140 MMOL/L — SIGNIFICANT CHANGE UP (ref 135–145)
WBC # BLD: 8.77 K/UL — SIGNIFICANT CHANGE UP (ref 3.8–10.5)
WBC # BLD: 9.4 K/UL — SIGNIFICANT CHANGE UP (ref 3.8–10.5)
WBC # FLD AUTO: 8.77 K/UL — SIGNIFICANT CHANGE UP (ref 3.8–10.5)
WBC # FLD AUTO: 9.4 K/UL — SIGNIFICANT CHANGE UP (ref 3.8–10.5)

## 2017-10-20 PROCEDURE — 99233 SBSQ HOSP IP/OBS HIGH 50: CPT

## 2017-10-20 PROCEDURE — 93325 DOPPLER ECHO COLOR FLOW MAPG: CPT | Mod: 26

## 2017-10-20 PROCEDURE — 93320 DOPPLER ECHO COMPLETE: CPT | Mod: 26

## 2017-10-20 PROCEDURE — 93010 ELECTROCARDIOGRAM REPORT: CPT

## 2017-10-20 PROCEDURE — 93312 ECHO TRANSESOPHAGEAL: CPT | Mod: 26

## 2017-10-20 PROCEDURE — 99232 SBSQ HOSP IP/OBS MODERATE 35: CPT | Mod: GC

## 2017-10-20 RX ORDER — METOPROLOL TARTRATE 50 MG
200 TABLET ORAL DAILY
Qty: 0 | Refills: 0 | Status: DISCONTINUED | OUTPATIENT
Start: 2017-10-20 | End: 2017-10-21

## 2017-10-20 RX ORDER — RIVAROXABAN 15 MG-20MG
15 KIT ORAL
Qty: 0 | Refills: 0 | Status: DISCONTINUED | OUTPATIENT
Start: 2017-10-21 | End: 2017-10-21

## 2017-10-20 RX ADMIN — Medication 100 MILLIGRAM(S): at 06:07

## 2017-10-20 RX ADMIN — FAMOTIDINE 40 MILLIGRAM(S): 10 INJECTION INTRAVENOUS at 06:07

## 2017-10-20 RX ADMIN — HEPARIN SODIUM 1500 UNIT(S)/HR: 5000 INJECTION INTRAVENOUS; SUBCUTANEOUS at 09:06

## 2017-10-20 RX ADMIN — Medication 100 MILLIGRAM(S): at 16:50

## 2017-10-20 RX ADMIN — FAMOTIDINE 40 MILLIGRAM(S): 10 INJECTION INTRAVENOUS at 16:50

## 2017-10-20 RX ADMIN — HEPARIN SODIUM 1500 UNIT(S)/HR: 5000 INJECTION INTRAVENOUS; SUBCUTANEOUS at 00:30

## 2017-10-20 NOTE — PROGRESS NOTE ADULT - SUBJECTIVE AND OBJECTIVE BOX
Interval History: Denies any chest pain, palpitations, dyspnea overnight.     Review Of Systems:  Constitutional: [ ] Fever [ ] Chills [ ] Fatigue [ ] Weight change   HEENT: [ ] Blurred vision [ ] Eye Pain [ ] Headache [ ] Runny nose [ ] Sore Throat   Respiratory: [ ] Cough [ ] Wheezing [ ] Shortness of breath  Cardiovascular: [ ] Chest Pain [ ] Palpitations [ ] ABARCA [ ] PND [ ] Orthopnea  Gastrointestinal: [ ] Abdominal Pain [ ] Diarrhea [ ] Constipation [ ] Hemorrhoids [ ] Nausea [ ] Vomiting  Genitourinary: [ ] Nocturia [ ] Dysuria [ ] Incontinence  Extremities: [ ] Swelling [ ] Joint Pain  Neurologic: [ ] Focal deficit [ ] Paresthesias [ ] Syncope  Lymphatic: [ ] Swelling [ ] Lymphadenopathy   Skin: [ ] Rash [ ] Ecchymoses [ ] Wounds [ ] Lesions  Psychiatry: [ ] Depression [ ] Suicidal/Homicidal Ideation [ ] Anxiety [ ] Sleep Disturbances  [x ] 10 point review of systems is otherwise negative except as mentioned above            [ ]Unable to obtain    Medications:  famotidine    Tablet 40 milliGRAM(s) Oral two times a day  heparin  Infusion. 1500 Unit(s)/Hr IV Continuous <Continuous>  heparin  Injectable 9000 Unit(s) IV Push every 6 hours PRN  heparin  Injectable 4000 Unit(s) IV Push every 6 hours PRN  metoprolol 100 milliGRAM(s) Oral two times a day      Vitals:  T(C): 36.8 (10-20-17 @ 04:25), Max: 36.8 (10-20-17 @ 04:25)  HR: 105 (10-20-17 @ 06:04) (69 - 105)  BP: 103/72 (10-20-17 @ 06:04) (97/68 - 123/81)  BP(mean): --  RR: 18 (10-20-17 @ 04:25) (18 - 18)  SpO2: 97% (10-20-17 @ 04:25) (96% - 98%)  Wt(kg): --  Daily     Daily   I&O's Summary    19 Oct 2017 07:01  -  20 Oct 2017 07:00  --------------------------------------------------------  IN: 240 mL / OUT: 2 mL / NET: 238 mL        Physical Exam:  Appearance:  Normal, NAD  Eyes: PERRL, EOMI  HENT: Normal oral [place] NC/AT  Cardiovascular: S1, S2, irreg (stable on exam), No m/r/g appreciated,trace edema   Respiratory: Clear to auscultation bilaterally  Gastrointestinal: Soft, Non-tender, Non-distended, BS+  Musculoskeletal:  No clubbing, No joint deformity   Neurologic: Non-focal  Lymphatic: No lymphadenopathy  Psychiatry: AAOx3, Mood & affect appropriate  Skin: No rashes, No ecchymoses, No cyanosis    Labs:                        11.9   9.4   )-----------( 275      ( 19 Oct 2017 23:37 )             34.2           PTT - ( 20 Oct 2017 08:24 )  PTT:72.2 sec      Serum Pro-Brain Natriuretic Peptide: 863 pg/mL (10-16 @ 11:42)      Interpretation of Telemetry: flutter 70-80s      Echo:  Confirmed on  10/18/2017 - 20:34:32 by Zoey Greer M.D.  Dimensions:    Normal Values:  LA:     4.4    2.0 - 4.0 cm  Ao:     3.3    2.0 - 3.8 cm  SEPTUM: 0.9    0.6 - 1.2 cm  PWT:    1.0    0.6 - 1.1 cm  LVIDd:  6.0    3.0 - 5.6 cm  LVIDs:  4.8    1.8 - 4.0 cm  Derived variables:  LVMI: 100 g/m2  RWT: 0.33  Fractional short: 20 %  EF (Visual Estimate): 35-40 %  EF (Teicholtz): 40 %  ------------------------------------------------------------------------  Observations:  Mitral Valve: Normal mitral valve. Mild mitral  regurgitation.  Aortic Valve/Aorta: Normal trileaflet aortic valve.  Aortic Root: 3.3 cm.  Left Atrium: Mildly dilated left atrium.  LA volume index =  39 cc/m2.  Left Ventricle: Moderate to severe global left ventricular  systolic dysfunction. Increased relative wall thickness  with normal left ventricular mass index, consistent with  concentric left ventricular remodeling.  Right Heart: Normal right atrium. Normal right ventricular  size and function. Normal tricuspid valve. Mild tricuspid  regurgitation. Normal pulmonic valve. Mild pulmonic  regurgitation.  Pericardium/Pleura: Normal pericardium with no pericardial  effusion.  Hemodynamic: Estimated right atrial pressure is 8 mm Hg.  Estimated right ventricular systolic pressure equals 33 mm  Hg, assuming right atrial pressure equals 8 mm Hg,  consistent with normal pulmonary pressures.  ------------------------------------------------------------------------  Conclusions:  1. Mildly dilated left atrium.  LA volume index = 39 cc/m2.  2. Increased relative wall thickness with normal left  ventricular mass index, consistent with concentric left  ventricular remodeling.  3. Moderate to severe global left ventricular systolic  dysfunction.  4. Strain average -10.3% which is abnormally below normal  values of at least >-17% consistent with LV dysfunction.  ------------------------------------------------------------------------

## 2017-10-20 NOTE — PROGRESS NOTE ADULT - SUBJECTIVE AND OBJECTIVE BOX
Interventional Radiology Brief Note    Procedure: Port removal (10/19/2017)    Operators: Nikki    Anesthesia (type): sedation administered by anesth Attg. 1% lidocaine local    Contrast: none    EBL: none    Findings/Follow up Plan of Care: Intact port removal    Specimens Removed: port sent to microbiology    Implants: none    Complications: none    Condition/Disposition: stable    Please call Interventional Radiology [phone] with any questions, concerns, or issues.

## 2017-10-20 NOTE — PROGRESS NOTE ADULT - ASSESSMENT
38 y/o M with pmhx of testicular cancer referred from the chemo infusion center with tachycardia found to be in new onset Atrial flutter.     #Atrial flutter- CHADSVASC, s/p port removal and remain in flutter. pt NPO for TYLOR cardioversion this morning.  - c/w metoprolol  - f/u after cardioversion and place back on tele to continue monitor for stability of rhythm    #Cardiomyopathy - unclear etiology. Possibly related to arrythmia and doubtful there is an ischemic component. Chemo agents are not known to be overtly cardiotoxic  - proceed with above plan for now  - if related to rate there should be reversibility  - will continue to follow   38 y/o M with pmhx of testicular cancer referred from the chemo infusion center with tachycardia found to be in new onset Atrial flutter.     #Atypical Atrial flutter CHADSVASC 0--concern that there may be mechanical irritation by the recently chemoport, however, patient does have an mildly dilated LA which may of been contributory. Will need to re-address the etiology of this arrythmia in setting of echo findings but okay to proceed with port removal (patient very adamant about this)  - okay to hold heparin for IR procedure today  - can leave on dilt gtt till after the procedure as it is controlling his rates well. After procedure would start Metoprolol 100 succinate w/ an hour overlap with diltiazem gtt which can then be turned off. Beta-blocker with more of an indication phani in the setting of his cardiomyopathy    #Cardiomyopathy - unclear etiology. Possibly related to arrythmia and doubtful there is an ischemic component. Chemo agents are not known to be overtly cardiotoxic  - proceed with above plan for now  - if related to rate there should be reversibility  - will continue to follow   - c/w beta blocker for now  - will likely request introduction of additional meds after intervention to optimize

## 2017-10-20 NOTE — PROGRESS NOTE ADULT - SUBJECTIVE AND OBJECTIVE BOX
Patient is a 37y old  Male who presents with a chief complaint of cellulitis rt arm (16 Oct 2017 21:25)      SUBJECTIVE / OVERNIGHT EVENTS:  Scheduled for TYLOR today. NPO overnight.    MEDICATIONS  (STANDING):  famotidine    Tablet 40 milliGRAM(s) Oral two times a day  heparin  Infusion. 1500 Unit(s)/Hr (15 mL/Hr) IV Continuous <Continuous>  metoprolol 100 milliGRAM(s) Oral two times a day    MEDICATIONS  (PRN):  heparin  Injectable 9000 Unit(s) IV Push every 6 hours PRN For aPTT less than 40  heparin  Injectable 4000 Unit(s) IV Push every 6 hours PRN For aPTT between 40 - 57      Vital Signs Last 24 Hrs  T(C): 36.8 (20 Oct 2017 04:25), Max: 36.8 (20 Oct 2017 04:25)  T(F): 98.3 (20 Oct 2017 04:25), Max: 98.3 (20 Oct 2017 04:25)  HR: 105 (20 Oct 2017 06:04) (69 - 105)  BP: 103/72 (20 Oct 2017 06:04) (97/68 - 123/81)  BP(mean): --  RR: 18 (20 Oct 2017 04:25) (18 - 18)  SpO2: 97% (20 Oct 2017 04:25) (96% - 98%)  CAPILLARY BLOOD GLUCOSE        I&O's Summary    19 Oct 2017 07:01  -  20 Oct 2017 07:00  --------------------------------------------------------  IN: 240 mL / OUT: 2 mL / NET: 238 mL        PHYSICAL EXAM:  GENERAL: NAD, well-developed  HEAD:  Atraumatic, Normocephalic  EYES: EOMI, PERRLA, conjunctiva and sclera clear  NECK: Supple, No JVD  CHEST/LUNG: Clear to auscultation bilaterally; No wheeze  HEART: Regular rate and rhythm; No murmurs, rubs, or gallops  ABDOMEN: Soft, Nontender, Nondistended; Bowel sounds present  EXTREMITIES:  2+ Peripheral Pulses, No clubbing, cyanosis, or edema  PSYCH: AAOx3  NEUROLOGY: non-focal  SKIN: No rashes or lesions    LABS:                        11.9   9.4   )-----------( 275      ( 19 Oct 2017 23:37 )             34.2     10-18    140  |  103  |  16  ----------------------------<  105<H>  4.3   |  26  |  1.15    Ca    8.7      18 Oct 2017 07:30  Phos  4.5     10-18  Mg     2.0     10-18    TPro  6.5  /  Alb  3.3  /  TBili  0.2  /  DBili  x   /  AST  37  /  ALT  44  /  AlkPhos  62  10-18    PTT - ( 19 Oct 2017 23:37 )  PTT:59.8 sec          RADIOLOGY & ADDITIONAL TESTS:    Imaging Personally Reviewed:    Consultant(s) Notes Reviewed:      Care Discussed with Consultants/Other Providers: Patient is a 37y old  Male who presents with a chief complaint of cellulitis rt arm (16 Oct 2017 21:25)      SUBJECTIVE / OVERNIGHT EVENTS:  Patient with no complaints overnight, just stating he is anxious and wonders if there is anything to help with the anxiety. Scheduled for TYLOR today. NPO overnight. He denies fever, chills, headache, chest pain, palpitations, SOB, abdominal pain, dysuria.    MEDICATIONS  (STANDING):  famotidine    Tablet 40 milliGRAM(s) Oral two times a day  heparin  Infusion. 1500 Unit(s)/Hr (15 mL/Hr) IV Continuous <Continuous>  metoprolol 100 milliGRAM(s) Oral two times a day    MEDICATIONS  (PRN):  heparin  Injectable 9000 Unit(s) IV Push every 6 hours PRN For aPTT less than 40  heparin  Injectable 4000 Unit(s) IV Push every 6 hours PRN For aPTT between 40 - 57      Vital Signs Last 24 Hrs  T(C): 36.8 (20 Oct 2017 04:25), Max: 36.8 (20 Oct 2017 04:25)  T(F): 98.3 (20 Oct 2017 04:25), Max: 98.3 (20 Oct 2017 04:25)  HR: 105 (20 Oct 2017 06:04) (69 - 105)  BP: 103/72 (20 Oct 2017 06:04) (97/68 - 123/81)  BP(mean): --  RR: 18 (20 Oct 2017 04:25) (18 - 18)  SpO2: 97% (20 Oct 2017 04:25) (96% - 98%)  CAPILLARY BLOOD GLUCOSE        I&O's Summary    19 Oct 2017 07:01  -  20 Oct 2017 07:00  --------------------------------------------------------  IN: 240 mL / OUT: 2 mL / NET: 238 mL        GENERAL: In no acute distress. NAD, well-developed  HEAD:  Balding, Atraumatic, Normocephalic  EYES: EOMI, PERRLA, conjunctiva and sclera clear,   NECK: Supple, No JVD, no cervical lymphadenopathy  CHEST/LUNG: Metiport removed with small amount of serosanginous drainage at the bandaged site. Clear to auscultation bilaterally; No wheeze  HEART: Irregularly irregular at about 80; No murmurs, rubs, or gallops  ABDOMEN: Soft, Nontender, Nondistended; Bowel sounds present  EXTREMITIES:  2+ Peripheral Pulses, No clubbing, cyanosis, or edema  PSYCH: AAOx3  NEUROLOGY: non-focal CN II-XII grossly intact  SKIN: Dry skin on forearms, No rashes or lesions    LABS:                        11.9   9.4   )-----------( 275      ( 19 Oct 2017 23:37 )             34.2     10-18    140  |  103  |  16  ----------------------------<  105<H>  4.3   |  26  |  1.15    Ca    8.7      18 Oct 2017 07:30  Phos  4.5     10-18  Mg     2.0     10-18    TPro  6.5  /  Alb  3.3  /  TBili  0.2  /  DBili  x   /  AST  37  /  ALT  44  /  AlkPhos  62  10-18    PTT - ( 19 Oct 2017 23:37 )  PTT:59.8 sec          RADIOLOGY & ADDITIONAL TESTS:    Imaging Personally Reviewed:    Consultant(s) Notes Reviewed:      Care Discussed with Consultants/Other Providers:

## 2017-10-20 NOTE — PROGRESS NOTE ADULT - PROBLEM SELECTOR PLAN 1
-Switched to oral metoprolol 100mg BID 10/19  -TTE showing left ventricular remodeling.  -continue patient on telemetry  -now s/p metiport removal  -going to DC cardioversion with TYLOR today.

## 2017-10-20 NOTE — CHART NOTE - NSCHARTNOTEFT_GEN_A_CORE
With new findings on TYLOR the cardioversion was aborted. Patient likely has RA thrombus.    Discussed case with EP colleagues and attending. Please start patient on Xarelto - heparin gtt can be stopped after.    Continue with rate control.    Pt should return to EP clinic in 4 week on AC where a repeat TYLOR will be done will subsequent cardioversion if thrombus resolved.    Cleve Lion MD  [id number]

## 2017-10-21 ENCOUNTER — TRANSCRIPTION ENCOUNTER (OUTPATIENT)
Age: 37
End: 2017-10-21

## 2017-10-21 VITALS
HEART RATE: 93 BPM | OXYGEN SATURATION: 98 % | SYSTOLIC BLOOD PRESSURE: 105 MMHG | TEMPERATURE: 98 F | DIASTOLIC BLOOD PRESSURE: 77 MMHG | RESPIRATION RATE: 19 BRPM

## 2017-10-21 DIAGNOSIS — I51.3 INTRACARDIAC THROMBOSIS, NOT ELSEWHERE CLASSIFIED: ICD-10-CM

## 2017-10-21 LAB — APTT BLD: 38.8 SEC — HIGH (ref 27.5–37.4)

## 2017-10-21 PROCEDURE — 71046 X-RAY EXAM CHEST 2 VIEWS: CPT

## 2017-10-21 PROCEDURE — 80061 LIPID PANEL: CPT

## 2017-10-21 PROCEDURE — 83735 ASSAY OF MAGNESIUM: CPT

## 2017-10-21 PROCEDURE — 80048 BASIC METABOLIC PNL TOTAL CA: CPT

## 2017-10-21 PROCEDURE — 99232 SBSQ HOSP IP/OBS MODERATE 35: CPT

## 2017-10-21 PROCEDURE — 99239 HOSP IP/OBS DSCHRG MGMT >30: CPT

## 2017-10-21 PROCEDURE — 84295 ASSAY OF SERUM SODIUM: CPT

## 2017-10-21 PROCEDURE — 83880 ASSAY OF NATRIURETIC PEPTIDE: CPT

## 2017-10-21 PROCEDURE — 93325 DOPPLER ECHO COLOR FLOW MAPG: CPT

## 2017-10-21 PROCEDURE — 93320 DOPPLER ECHO COMPLETE: CPT

## 2017-10-21 PROCEDURE — 84484 ASSAY OF TROPONIN QUANT: CPT

## 2017-10-21 PROCEDURE — 85730 THROMBOPLASTIN TIME PARTIAL: CPT

## 2017-10-21 PROCEDURE — 82550 ASSAY OF CK (CPK): CPT

## 2017-10-21 PROCEDURE — 93308 TTE F-UP OR LMTD: CPT

## 2017-10-21 PROCEDURE — 80053 COMPREHEN METABOLIC PANEL: CPT

## 2017-10-21 PROCEDURE — 82435 ASSAY OF BLOOD CHLORIDE: CPT

## 2017-10-21 PROCEDURE — 36590 REMOVAL TUNNELED CV CATH: CPT

## 2017-10-21 PROCEDURE — 82947 ASSAY GLUCOSE BLOOD QUANT: CPT

## 2017-10-21 PROCEDURE — 85610 PROTHROMBIN TIME: CPT

## 2017-10-21 PROCEDURE — 99285 EMERGENCY DEPT VISIT HI MDM: CPT | Mod: 25

## 2017-10-21 PROCEDURE — 85014 HEMATOCRIT: CPT

## 2017-10-21 PROCEDURE — 84100 ASSAY OF PHOSPHORUS: CPT

## 2017-10-21 PROCEDURE — 83605 ASSAY OF LACTIC ACID: CPT

## 2017-10-21 PROCEDURE — 84443 ASSAY THYROID STIM HORMONE: CPT

## 2017-10-21 PROCEDURE — 84132 ASSAY OF SERUM POTASSIUM: CPT

## 2017-10-21 PROCEDURE — 84436 ASSAY OF TOTAL THYROXINE: CPT

## 2017-10-21 PROCEDURE — 96374 THER/PROPH/DIAG INJ IV PUSH: CPT

## 2017-10-21 PROCEDURE — 93005 ELECTROCARDIOGRAM TRACING: CPT

## 2017-10-21 PROCEDURE — 85027 COMPLETE CBC AUTOMATED: CPT

## 2017-10-21 PROCEDURE — 82330 ASSAY OF CALCIUM: CPT

## 2017-10-21 PROCEDURE — 87070 CULTURE OTHR SPECIMN AEROBIC: CPT

## 2017-10-21 PROCEDURE — 93306 TTE W/DOPPLER COMPLETE: CPT

## 2017-10-21 PROCEDURE — 93312 ECHO TRANSESOPHAGEAL: CPT

## 2017-10-21 PROCEDURE — 82803 BLOOD GASES ANY COMBINATION: CPT

## 2017-10-21 RX ORDER — ETOPOSIDE 20 MG/ML
0 VIAL (ML) INTRAVENOUS
Qty: 0 | Refills: 0 | COMMUNITY

## 2017-10-21 RX ORDER — CISPLATIN 1 MG/ML
0 INJECTION, SOLUTION INTRAVENOUS
Qty: 0 | Refills: 0 | COMMUNITY

## 2017-10-21 RX ORDER — FAMOTIDINE 10 MG/ML
1 INJECTION INTRAVENOUS
Qty: 30 | Refills: 0 | OUTPATIENT
Start: 2017-10-21 | End: 2017-11-20

## 2017-10-21 RX ORDER — LISINOPRIL 2.5 MG/1
1 TABLET ORAL
Qty: 30 | Refills: 0 | OUTPATIENT
Start: 2017-10-21 | End: 2017-11-20

## 2017-10-21 RX ORDER — LISINOPRIL 2.5 MG/1
2.5 TABLET ORAL DAILY
Qty: 0 | Refills: 0 | Status: DISCONTINUED | OUTPATIENT
Start: 2017-10-21 | End: 2017-10-21

## 2017-10-21 RX ORDER — RIVAROXABAN 15 MG-20MG
1 KIT ORAL
Qty: 42 | Refills: 0 | OUTPATIENT
Start: 2017-10-21 | End: 2017-11-11

## 2017-10-21 RX ORDER — METOPROLOL TARTRATE 50 MG
1 TABLET ORAL
Qty: 30 | Refills: 0 | OUTPATIENT
Start: 2017-10-21 | End: 2017-11-20

## 2017-10-21 RX ADMIN — RIVAROXABAN 15 MILLIGRAM(S): KIT at 06:05

## 2017-10-21 RX ADMIN — FAMOTIDINE 40 MILLIGRAM(S): 10 INJECTION INTRAVENOUS at 06:05

## 2017-10-21 RX ADMIN — Medication 200 MILLIGRAM(S): at 06:07

## 2017-10-21 RX ADMIN — LISINOPRIL 2.5 MILLIGRAM(S): 2.5 TABLET ORAL at 08:21

## 2017-10-21 NOTE — DISCHARGE NOTE ADULT - HOSPITAL COURSE
36 yo M with PMHx of testicular cancer s/p left orchiectomy on chemotherapy who present to the ED before receiving chemo for tachycardia.  The patient states that he has been feeling well for the past 2 weeks since his 3rd dose of chemotherapy. He denies CP, ABARCA, peripheral edema, cough, palpitations. He does states that he had one near syncopal event during a bout of emesis 2 weeks ago which caused him to fall into a door. Has not had any other syncopal or presyncopal episodes since then.   In the ED he was found to have tachycardia found to be a.flutter after receiving 10mg IV diltiazem x2, a CXR with well placed portacath and no infiltrations. On bedside US found to have EF of 10-15% while tachycardic.  The patient was initially started on metoprolol tartrate with increasing dosages unable to control the arrhythmia he was subsequently switch to a diltiazem drip. The metiport was removed after discussion with his Oncologist and cardiology for possible arrhythmia induction which was removed 10/19. He also received a TTE on 10/19 which showed EF of 35-40% with LV remodeling. Afterwards he was switched to metoprolol succinate 200mg qday.  He was started on heparin for preparation for TYLOR and cardioversion which was performed on 10/20 and found a RA appendage thrombus with PFO and reduced LV function. No cardioversion was performed as a result and he was switched to rivaroxaban on 10/21, lisinopril 2.5mg qday and metoprolol succinate 200mg qday. He will follow with cardiology in 2 weeks and EP in 4 weeks. 36 yo M with PMHx of testicular cancer s/p left orchiectomy on chemotherapy who present to the ED before receiving chemo for tachycardia.  The patient states that he has been feeling well for the past 2 weeks since his 3rd dose of chemotherapy. He denies CP, ABARCA, peripheral edema, cough, palpitations. He does states that he had one near syncopal event during a bout of emesis 2 weeks ago which caused him to fall into a door. Has not had any other syncopal or presyncopal episodes since then.   In the ED he was found to have tachycardia found to be a.flutter after receiving 10mg IV diltiazem x2, a CXR with well placed portacath and no infiltrations. On bedside US found to have EF of 10-15% while tachycardic.    The patient was initially started on metoprolol tartrate with increasing dosages unable to control the arrhythmia he was subsequently switch to a diltiazem drip. The metiport was removed after discussion with his Oncologist and cardiology for possible arrhythmia induction which was removed 10/19. He also received a TTE on 10/19 which showed EF of 35-40% with LV remodeling. Afterwards he was switched to metoprolol succinate 200mg qday.    He was started on heparin for preparation for TYLOR and cardioversion which was performed on 10/20 and found a RA appendage thrombus with PFO and reduced LV function. No cardioversion was performed as a result and he was switched to rivaroxaban on 10/21, lisinopril 2.5mg qday and metoprolol succinate 200mg qday. He will follow with cardiology in 2 weeks and EP in 4 weeks.

## 2017-10-21 NOTE — PROGRESS NOTE ADULT - PROVIDER SPECIALTY LIST ADULT
Cardiology
Internal Medicine
Internal Medicine
Intervent Radiology
Intervent Radiology
Cardiology
Cardiology
Internal Medicine

## 2017-10-21 NOTE — DISCHARGE NOTE ADULT - OTHER SIGNIFICANT FINDINGS
< from: Transesophageal Echocardiogram w/o TTE (10.20.17 @ 12:08) >  Conclusions:  1. Left atrial enlargement.  No left atrial or left atrial  appendage thrombus.  2. Moderate to severe global left ventricular systolic  dysfunction.  3. There is an echodensity (likely thrombus vs mass) in the  right atrial wall that measures (approximately 2.7 cm x 2.0  cm). This is near the distal point of the SVC and may  represent thrombus from prior indwelling catheter.  4. Right ventricular enlargement with decreased right  ventricular systolic function.  5. Color Doppler demonstrates evidence of a patent foramen  ovale.  6. Trivial pericardial effusion.    < end of copied text >      < from: Transthoracic Echocardiogram (10.18.17 @ 18:10) >  Conclusions:  1. Mildly dilated left atrium.  LA volume index = 39 cc/m2.  2. Increased relative wall thickness with normal left  ventricular mass index, consistent with concentric left  ventricular remodeling.  3. Moderate to severe global left ventricular systolic  dysfunction.  4. Strain average -10.3% which is abnormally below normal  values of at least >-17% consistent with LV dysfunction.    < end of copied text >

## 2017-10-21 NOTE — PROGRESS NOTE ADULT - PROBLEM SELECTOR PLAN 1
-TYLOR showed RA thrombus, discuss with cards and patient started on rivaroxaban and will f/u with  EP in 4wks  -Switched to metoprolol succinate 200mg qday  -TTE showing left ventricular remodeling.  -continue patient on telemetry  -now s/p metiport removal -TYLOR showed RA thrombus, discuss with cards and patient started on rivaroxaban and will f/u with  EP in 4wks for cardioversion if thrombus resolves  -Switched to metoprolol succinate 200mg qday  -TTE showing left ventricular remodeling.  -continue patient on telemetry  -now s/p metiport removal

## 2017-10-21 NOTE — DISCHARGE NOTE ADULT - PLAN OF CARE
Management You were found to have an irregular rhythm that was difficult to control. Initially metoprolol tartrate was used but was unable to control the rhythm and rate. The metiport was removed as a possible cause of the atrial flutter. Diltiazem intervenously was able to control the rate in preparation for cardioversion and heparin was used for anticoagulation. During transesophageal echocardiogram a thrombus was found so no cardioversion was performed. Please take metoprolol succinate 200mg every day and follow up with your electrophysiologist in 4 weeks. During initial evaluation with echocardiogram you were found to have reduced function of you heart to pump blood. It is thought that the irregular rhythm could have cause the reduced function. Please follow up with your cardiologist in two week and take lisinopril 2.5mg every day and metoprolol succinate 200mg everyday for protection of the heart. Treatment During evaluation for possible cardioversion a thrombus was found in the right atrium despite being on heparin. No cardioversion was performed and you were started on an oral medication called rivaroxaban 15mg two times a day for 21 days. After 21 days you will take 20mg rivaroxaban every day. Please follow up with your primary care physician, electrophysiologist and cardiology for treatment. You did not receive chemotherapy while in the hospital, however your metiport was removed. Your oncologist was informed about your condition. Please follow up with Dr. Chirinos for further management.

## 2017-10-21 NOTE — DISCHARGE NOTE ADULT - CARE PROVIDER_API CALL
Neo Chirinos), Hematology; Internal Medicine; Medical Oncology  [address] [place] [address]  Phone: [phone]  Fax: [phone]    Austyn Talbot), Cardiac Electrophysiology; Cardiology; Internal Medicine  [address] [place] [address]  Phone: [phone]    Ramos Waggoner), Cardiovascular Disease; Internal Medicine  [address] [place] [address]  Phone: [phone]  Fax: [phone]

## 2017-10-21 NOTE — DISCHARGE NOTE ADULT - CARE PROVIDERS DIRECT ADDRESSES
[email or web address]

## 2017-10-21 NOTE — DISCHARGE NOTE ADULT - PATIENT PORTAL LINK FT
“You can access the Follow[facility] Patient Portal, offered by [facility], by registering with the following website: [email or web address]”

## 2017-10-21 NOTE — PROGRESS NOTE ADULT - PROBLEM SELECTOR PLAN 4
-Seen by heme/onc, will not receive chemo until after heart rate controlled. -Seen by heme/onc, will not receive chemo until after heart rate controlled

## 2017-10-21 NOTE — DISCHARGE NOTE ADULT - CARE PLAN
Principal Discharge DX:	Atrial flutter, unspecified type  Goal:	Management  Instructions for follow-up, activity and diet:	You were found to have an irregular rhythm that was difficult to control. Initially metoprolol tartrate was used but was unable to control the rhythm and rate. The metiport was removed as a possible cause of the atrial flutter. Diltiazem intervenously was able to control the rate in preparation for cardioversion and heparin was used for anticoagulation. During transesophageal echocardiogram a thrombus was found so no cardioversion was performed. Please take metoprolol succinate 200mg every day and follow up with your electrophysiologist in 4 weeks.  Secondary Diagnosis:	Systolic heart failure, unspecified heart failure chronicity  Goal:	Management  Instructions for follow-up, activity and diet:	During initial evaluation with echocardiogram you were found to have reduced function of you heart to pump blood. It is thought that the irregular rhythm could have cause the reduced function. Please follow up with your cardiologist in two week and take lisinopril 2.5mg every day and metoprolol succinate 200mg everyday for protection of the heart.  Secondary Diagnosis:	Thrombus of right atrial appendage without antecedent myocardial infarction  Goal:	Treatment  Instructions for follow-up, activity and diet:	During evaluation for possible cardioversion a thrombus was found in the right atrium despite being on heparin. No cardioversion was performed and you were started on an oral medication called rivaroxaban 15mg two times a day for 21 days. After 21 days you will take 20mg rivaroxaban every day. Please follow up with your primary care physician, electrophysiologist and cardiology for treatment.  Secondary Diagnosis:	Malignant neoplasm of left testis, unspecified whether descended or undescended  Goal:	Treatment  Instructions for follow-up, activity and diet:	You did not receive chemotherapy while in the hospital, however your metiport was removed. Your oncologist was informed about your condition. Please follow up with Dr. Chirinos for further management.

## 2017-10-21 NOTE — DISCHARGE NOTE ADULT - MEDICATION SUMMARY - MEDICATIONS TO STOP TAKING
I will STOP taking the medications listed below when I get home from the hospital:    oxyCODONE 15 mg oral tablet  -- 1 tab(s) by mouth every 6 hours, As Needed    levoFLOXacin 500 mg oral tablet  -- 1 tab(s) by mouth every 24 hours    metoclopramide 10 mg oral tablet  -- 1 tab(s) by mouth 4 times a day (before meals and at bedtime), As Needed    dexamethasone 4 mg oral tablet  -- take 2 tablets for 2 days, then 1 tablet for 1 day after day 5 of treatment.

## 2017-10-21 NOTE — PROGRESS NOTE ADULT - ATTENDING COMMENTS
Patient interviewed, examined and chart reviewed.  Case discussed with fellow.  Agree w/ Assessment and Plan as outlined.    Ramos Waggoner MD [facility]  P: [phone]  Spectra:  [phone]  Office: [phone]
Patient interviewed, examined and chart reviewed.  Case discussed with fellow.  Agree w/ Assessment and Plan as outlined.  would Inc Metoprolol abd change to Succinate 100 mg bid  Proceed w/ port removal, the , pending review of TTE => TYLOR guided DCCV.  Ramos Waggoner MD [facility]  P: [phone]  Spectra:  [phone]  Office: [phone]
Patient interviewed, examined and chart reviewed.  Case discussed with fellow.  Agree w/ Assessment and Plan as outlined.    Ramos Waggoner MD [facility]  P: [phone]  Spectra:  [phone]  Office: [phone]
Patient seen agree with above assessment and plan. No objection to discharge. Continue present medications. Plan for DCCV in 4 weeks if suspected RA thrombus resolved. Eventual PFO closure. Follow up in 1-2 weeks.
Possible d/c of the port at the request of patient to be done by IR , and if patient remains in A Flutter , might undergo TYLOR Cardioversion.  continue Heparin Drip.       April Magaña   Hospitalist   [phone]
April Magaña M.D.  Hospitalist   
April Magaña M.D.   Hospitalist   
Extensive discussion with patient regarding hospitalization and diagnoses and plan of action going forward. Patient in agreement with discharge plan, and will return home today with plan for outpatient follow-up with his oncologist at the [facility], as well as with cardiology and electrophysiology.     Total discharge planning time spent = 40minutes.

## 2017-10-21 NOTE — PROGRESS NOTE ADULT - SUBJECTIVE AND OBJECTIVE BOX
24H hour events:   Pt denies palpatations, chest pain, shortness of breath, orthopnea, PND  MEDICATIONS:  lisinopril 2.5 milliGRAM(s) Oral daily  metoprolol succinate  milliGRAM(s) Oral daily  rivaroxaban 15 milliGRAM(s) Oral two times a day          famotidine    Tablet 40 milliGRAM(s) Oral two times a day          REVIEW OF SYSTEMS:  Complete 10point ROS negative.    PHYSICAL EXAM:  T(C): 36.5 (10-21-17 @ 03:55), Max: 36.8 (10-20-17 @ 21:06)  HR: 95 (10-21-17 @ 06:01) (78 - 116)  BP: 112/71 (10-21-17 @ 06:01) (96/65 - 126/86)  RR: 18 (10-21-17 @ 03:55) (18 - 18)  SpO2: 98% (10-21-17 @ 03:55) (98% - 98%)  Wt(kg): --  I&O's Summary    20 Oct 2017 07:01  -  21 Oct 2017 07:00  --------------------------------------------------------  IN: 367 mL / OUT: 0 mL / NET: 367 mL        Appearance:  Normal, NAD  Eyes: PERRL, EOMI  HENT: Normal oral [place] NC/AT  Cardiovascular: S1, S2, irreg (stable on exam), No m/r/g appreciated,trace edema   Respiratory: Clear to auscultation bilaterally  Gastrointestinal: Soft, Non-tender, Non-distended, BS+  Musculoskeletal:  No clubbing, No joint deformity   Neurologic: Non-focal  Lymphatic: No lymphadenopathy  Psychiatry: AAOx3, Mood & affect appropriate  Skin: No rashes, No ecchymoses, No cyanosis        LABS:	 	    CBC Full  -  ( 20 Oct 2017 10:08 )  WBC Count : 8.77 K/uL  Hemoglobin : 11.3 g/dL  Hematocrit : 33.3 %  Platelet Count - Automated : 271 K/uL  Mean Cell Volume : 94.1 fl  Mean Cell Hemoglobin : 31.9 pg  Mean Cell Hemoglobin Concentration : 33.9 gm/dL  Auto Neutrophil # : x  Auto Lymphocyte # : x  Auto Monocyte # : x  Auto Eosinophil # : x  Auto Basophil # : x  Auto Neutrophil % : x  Auto Lymphocyte % : x  Auto Monocyte % : x  Auto Eosinophil % : x  Auto Basophil % : x    10-20    140  |  104  |  15  ----------------------------<  107<H>  4.7   |  21<L>  |  1.01    Ca    9.7      20 Oct 2017 10:08  Phos  4.5     10-20  Mg     2.0     10-20    TPro  6.7  /  Alb  3.6  /  TBili  0.2  /  DBili  x   /  AST  39  /  ALT  61<H>  /  AlkPhos  64  10-20      proBNP: Serum Pro-Brain Natriuretic Peptide: 863 pg/mL (10-16 @ 11:42)    TELEMETRY: 	  NSR  	    PREVIOUS DIAGNOSTIC TESTING:    [ ] Echocardiogram:  Conclusions:  1. Left atrial enlargement.  No left atrial or left atrial  appendage thrombus.  2. Moderate to severe global left ventricular systolic  dysfunction.  3. There is an echodensity (likely thrombus vs mass) in the  right atrial wall that measures (approximately 2.7 cm x 2.0  cm). This is near the distal point of the SVC and may  represent thrombus from prior indwelling catheter.  4. Right ventricular enlargement with decreased right  ventricular systolic function.  5. Color Doppler demonstrates evidence of a patent foramen  ovale.  6. Trivial pericardial effusion.  *** Compared with echocardiogram of 10/18/2017, findings of  RA echodensity is new. Cardioversion was not performed due  to concern for embolization of thrombus.  [ ]  Catheterization:  [ ] Stress Test:  	  	  ASSESSMENT/PLAN: 	  38 y/o M with pmhx of testicular cancer referred from the chemo infusion center with tachycardia found to be in new onset Atrial flutter.     #Atrial flutter  -s/p cardioversion. Now in NSR. Cont metoprolol, xarelto    #Cardiomyopathy - unclear etiology. Possibly related to arrythmia and doubtful there is an ischemic component. Chemo agents are not known to be overtly cardiotoxic  - proceed with above plan for now  - if related to rate there should be reversibility  - will continue to follow   38 y/o M with pmhx of testicular cancer referred from the chemo infusion center with tachycardia found to be in new onset Atrial flutter.     #Atypical Atrial flutter CHADSVASC 0--concern that there may be mechanical irritation by the recently chemoport, however, patient does have an mildly dilated LA which may of been contributory. Will need to re-address the etiology of this arrythmia in setting of echo findings but okay to proceed with port removal (patient very adamant about this)  - okay to hold heparin for IR procedure today  - can leave on dilt gtt till after the procedure as it is controlling his rates well. After procedure would start Metoprolol 100 succinate w/ an hour overlap with diltiazem gtt which can then be turned off. Beta-blocker with more of an indication phani in the setting of his cardiomyopathy    #Cardiomyopathy - unclear etiology. Possibly related to arrythmia and doubtful there is an ischemic component. Chemo agents are not known to be overtly cardiotoxic  - proceed with above plan for now  - if related to rate there should be reversibility  - will continue to follow   - c/w beta blocker for now  - will likely request introduction of additional meds after intervention to optimize     # PFO  -No history of CVA. No significant shunt visualized. May need outpatient workup for closure given possible RA thrombus    # RA thrombus  -continue yvette Patton [id number] 24H hour events:   Pt denies palpatations, chest pain, shortness of breath, orthopnea, PND  MEDICATIONS:  lisinopril 2.5 milliGRAM(s) Oral daily  metoprolol succinate  milliGRAM(s) Oral daily  rivaroxaban 15 milliGRAM(s) Oral two times a day          famotidine    Tablet 40 milliGRAM(s) Oral two times a day          REVIEW OF SYSTEMS:  Complete 10point ROS negative.    PHYSICAL EXAM:  T(C): 36.5 (10-21-17 @ 03:55), Max: 36.8 (10-20-17 @ 21:06)  HR: 95 (10-21-17 @ 06:01) (78 - 116)  BP: 112/71 (10-21-17 @ 06:01) (96/65 - 126/86)  RR: 18 (10-21-17 @ 03:55) (18 - 18)  SpO2: 98% (10-21-17 @ 03:55) (98% - 98%)  Wt(kg): --  I&O's Summary    20 Oct 2017 07:01  -  21 Oct 2017 07:00  --------------------------------------------------------  IN: 367 mL / OUT: 0 mL / NET: 367 mL        Appearance:  Normal, NAD  Eyes: PERRL, EOMI  HENT: Normal oral [place] NC/AT  Cardiovascular: S1, S2, irreg (stable on exam), No m/r/g appreciated,trace edema   Respiratory: Clear to auscultation bilaterally  Gastrointestinal: Soft, Non-tender, Non-distended, BS+  Musculoskeletal:  No clubbing, No joint deformity   Neurologic: Non-focal  Lymphatic: No lymphadenopathy  Psychiatry: AAOx3, Mood & affect appropriate  Skin: No rashes, No ecchymoses, No cyanosis        LABS:	 	    CBC Full  -  ( 20 Oct 2017 10:08 )  WBC Count : 8.77 K/uL  Hemoglobin : 11.3 g/dL  Hematocrit : 33.3 %  Platelet Count - Automated : 271 K/uL  Mean Cell Volume : 94.1 fl  Mean Cell Hemoglobin : 31.9 pg  Mean Cell Hemoglobin Concentration : 33.9 gm/dL  Auto Neutrophil # : x  Auto Lymphocyte # : x  Auto Monocyte # : x  Auto Eosinophil # : x  Auto Basophil # : x  Auto Neutrophil % : x  Auto Lymphocyte % : x  Auto Monocyte % : x  Auto Eosinophil % : x  Auto Basophil % : x    10-20    140  |  104  |  15  ----------------------------<  107<H>  4.7   |  21<L>  |  1.01    Ca    9.7      20 Oct 2017 10:08  Phos  4.5     10-20  Mg     2.0     10-20    TPro  6.7  /  Alb  3.6  /  TBili  0.2  /  DBili  x   /  AST  39  /  ALT  61<H>  /  AlkPhos  64  10-20      proBNP: Serum Pro-Brain Natriuretic Peptide: 863 pg/mL (10-16 @ 11:42)    TELEMETRY: 	  atrial flutter with rate < 100 (90 now)  	    PREVIOUS DIAGNOSTIC TESTING:    [ ] Echocardiogram:  Conclusions:  1. Left atrial enlargement.  No left atrial or left atrial  appendage thrombus.  2. Moderate to severe global left ventricular systolic  dysfunction.  3. There is an echodensity (likely thrombus vs mass) in the  right atrial wall that measures (approximately 2.7 cm x 2.0  cm). This is near the distal point of the SVC and may  represent thrombus from prior indwelling catheter.  4. Right ventricular enlargement with decreased right  ventricular systolic function.  5. Color Doppler demonstrates evidence of a patent foramen  ovale.  6. Trivial pericardial effusion.  *** Compared with echocardiogram of 10/18/2017, findings of  RA echodensity is new. Cardioversion was not performed due  to concern for embolization of thrombus.  [ ]  Catheterization:  [ ] Stress Test:  	  	  ASSESSMENT/PLAN: 	  36 y/o M with pmhx of testicular cancer referred from the chemo infusion center with tachycardia found to be in new onset Atrial flutter.     #Atrial flutter  - plan for cardioversion in 4 weeks once thrombus resolves    #Cardiomyopathy - unclear etiology. Possibly related to arrythmia and doubtful there is an ischemic component. Chemo agents are not known to be overtly cardiotoxic  - proceed with above plan for now  - if related to rate there should be reversibility  - will continue to follow   36 y/o M with pmhx of testicular cancer referred from the chemo infusion center with tachycardia found to be in new onset Atrial flutter.     #Atypical Atrial flutter CHADSVASC 0--concern that there may be mechanical irritation by the recently chemoport, however, patient does have an mildly dilated LA which may of been contributory. Will need to re-address the etiology of this arrythmia in setting of echo findings but okay to proceed with port removal (patient very adamant about this)  - okay to hold heparin for IR procedure today  - can leave on dilt gtt till after the procedure as it is controlling his rates well. After procedure would start Metoprolol 100 succinate w/ an hour overlap with diltiazem gtt which can then be turned off. Beta-blocker with more of an indication phani in the setting of his cardiomyopathy    #Cardiomyopathy - unclear etiology. Possibly related to arrythmia and doubtful there is an ischemic component. Chemo agents are not known to be overtly cardiotoxic  - proceed with above plan for now  - if related to rate there should be reversibility  - will continue to follow   - c/w beta blocker for now  - will likely request introduction of additional meds after intervention to optimize     # PFO  -No history of CVA. No significant shunt visualized. May need outpatient workup for closure given possible RA thrombus    # RA thrombus  -continue xarelto    No cardiovascular contraindication to discharge today. Please make an appointment with the cardiology team for cardioversion in 4 weeks    Austyn Patton [id number] 24H hour events:   Pt denies palpatations, chest pain, shortness of breath, orthopnea, PND  MEDICATIONS:  lisinopril 2.5 milliGRAM(s) Oral daily  metoprolol succinate  milliGRAM(s) Oral daily  rivaroxaban 15 milliGRAM(s) Oral two times a day  famotidine    Tablet 40 milliGRAM(s) Oral two times a day      REVIEW OF SYSTEMS:  Complete 10point ROS negative.    PHYSICAL EXAM:  T(C): 36.5 (10-21-17 @ 03:55), Max: 36.8 (10-20-17 @ 21:06)  HR: 95 (10-21-17 @ 06:01) (78 - 116)  BP: 112/71 (10-21-17 @ 06:01) (96/65 - 126/86)  RR: 18 (10-21-17 @ 03:55) (18 - 18)  SpO2: 98% (10-21-17 @ 03:55) (98% - 98%)  Wt(kg): --  I&O's Summary    20 Oct 2017 07:01  -  21 Oct 2017 07:00  --------------------------------------------------------  IN: 367 mL / OUT: 0 mL / NET: 367 mL        Appearance:  Normal, NAD  Eyes: PERRL, EOMI  HENT: Normal oral [place] NC/AT  Cardiovascular: S1, S2, irreg (stable on exam), No m/r/g appreciated,trace edema   Respiratory: Clear to auscultation bilaterally  Gastrointestinal: Soft, Non-tender, Non-distended, BS+  Musculoskeletal:  No clubbing, No joint deformity   Neurologic: Non-focal  Lymphatic: No lymphadenopathy  Psychiatry: AAOx3, Mood & affect appropriate  Skin: No rashes, No ecchymoses, No cyanosis        LABS:	 	    CBC Full  -  ( 20 Oct 2017 10:08 )  WBC Count : 8.77 K/uL  Hemoglobin : 11.3 g/dL  Hematocrit : 33.3 %  Platelet Count - Automated : 271 K/uL  Mean Cell Volume : 94.1 fl  Mean Cell Hemoglobin : 31.9 pg  Mean Cell Hemoglobin Concentration : 33.9 gm/dL  Auto Neutrophil # : x  Auto Lymphocyte # : x  Auto Monocyte # : x  Auto Eosinophil # : x  Auto Basophil # : x  Auto Neutrophil % : x  Auto Lymphocyte % : x  Auto Monocyte % : x  Auto Eosinophil % : x  Auto Basophil % : x    10-20    140  |  104  |  15  ----------------------------<  107<H>  4.7   |  21<L>  |  1.01    Ca    9.7      20 Oct 2017 10:08  Phos  4.5     10-20  Mg     2.0     10-20    TPro  6.7  /  Alb  3.6  /  TBili  0.2  /  DBili  x   /  AST  39  /  ALT  61<H>  /  AlkPhos  64  10-20      proBNP: Serum Pro-Brain Natriuretic Peptide: 863 pg/mL (10-16 @ 11:42)    TELEMETRY: 	  atrial flutter with rate < 100 (90 now)  	    PREVIOUS DIAGNOSTIC TESTING:    [ ] Echocardiogram:  Conclusions:  1. Left atrial enlargement.  No left atrial or left atrial  appendage thrombus.  2. Moderate to severe global left ventricular systolic  dysfunction.  3. There is an echodensity (likely thrombus vs mass) in the  right atrial wall that measures (approximately 2.7 cm x 2.0  cm). This is near the distal point of the SVC and may  represent thrombus from prior indwelling catheter.  4. Right ventricular enlargement with decreased right  ventricular systolic function.  5. Color Doppler demonstrates evidence of a patent foramen  ovale.  6. Trivial pericardial effusion.  *** Compared with echocardiogram of 10/18/2017, findings of  RA echodensity is new. Cardioversion was not performed due  to concern for embolization of thrombus.  [ ]  Catheterization:  [ ] Stress Test:  	  	  ASSESSMENT/PLAN: 	  38 y/o M with pmhx of testicular cancer referred from the chemo infusion center with tachycardia found to be in new onset Atrial flutter.     #Atrial flutter  - plan for cardioversion in 4 weeks once thrombus resolves    #Cardiomyopathy - unclear etiology. Possibly related to arrythmia and doubtful there is an ischemic component. Chemo agents are not known to be overtly cardiotoxic  - proceed with above plan for now  - if related to rate there should be reversibility  - will continue to follow   38 y/o M with pmhx of testicular cancer referred from the chemo infusion center with tachycardia found to be in new onset Atrial flutter.     #Atypical Atrial flutter CHADSVASC 0--concern that there may be mechanical irritation by the recently chemoport, however, patient does have an mildly dilated LA which may of been contributory. Will need to re-address the etiology of this arrythmia in setting of echo findings but okay to proceed with port removal (patient very adamant about this)  - okay to hold heparin for IR procedure today  - can leave on dilt gtt till after the procedure as it is controlling his rates well. After procedure would start Metoprolol 100 succinate w/ an hour overlap with diltiazem gtt which can then be turned off. Beta-blocker with more of an indication phani in the setting of his cardiomyopathy    #Cardiomyopathy - unclear etiology. Possibly related to arrythmia and doubtful there is an ischemic component. Chemo agents are not known to be overtly cardiotoxic  - proceed with above plan for now  - if related to rate there should be reversibility  - will continue to follow   - c/w beta blocker for now  - will likely request introduction of additional meds after intervention to optimize     # PFO  -No history of CVA. No significant shunt visualized. May need outpatient workup for closure given possible RA thrombus    # RA thrombus  -continue xarelto    No cardiovascular contraindication to discharge today. Please make an appointment with the cardiology team for cardioversion in 4 weeks    Austyn Patton [id number]

## 2017-10-21 NOTE — PROGRESS NOTE ADULT - ASSESSMENT
36 yo M with PMHx of testicular cancer s/p left orchiectomy on chemotherapy who present to the ED before receiving chemo for tachycardia. Patient found to be in atrial flutter with some response to IV diltiazem and bedside US with EF of 10-15% on TTE showed concentric left ventricular remodeling with LVEF of 35-40% possibly due to paroxysmal aflutter, unlikely due to chemotherapy. Better rate controlled on 100mg metoprolol BID PO. Found to have right atrial thrombus and so did not have cardioversion.

## 2017-10-21 NOTE — PROGRESS NOTE ADULT - PROBLEM SELECTOR PLAN 2
-Found on TYLOR prior to cardioversion. Started on rivaroxaban 15mg PO BID for 21 days then 20mg qday.  -Will f/u EP 4 wks -Found on TYLOR prior to cardioversion. Started on rivaroxaban 15mg PO BID for 21 days then 20mg qday.  -Will f/u EP 4 wks and cardiology 2weeks

## 2017-10-21 NOTE — PROGRESS NOTE ADULT - SUBJECTIVE AND OBJECTIVE BOX
Patient is a 37y old  Male who presents with a chief complaint of cellulitis rt arm (16 Oct 2017 21:25)      SUBJECTIVE / OVERNIGHT EVENTS:    MEDICATIONS  (STANDING):  famotidine    Tablet 40 milliGRAM(s) Oral two times a day  lisinopril 2.5 milliGRAM(s) Oral daily  metoprolol succinate  milliGRAM(s) Oral daily  rivaroxaban 15 milliGRAM(s) Oral two times a day    MEDICATIONS  (PRN):      Vital Signs Last 24 Hrs  T(C): 36.5 (21 Oct 2017 03:55), Max: 36.8 (20 Oct 2017 21:06)  T(F): 97.7 (21 Oct 2017 03:55), Max: 98.3 (20 Oct 2017 21:06)  HR: 95 (21 Oct 2017 06:01) (78 - 116)  BP: 112/71 (21 Oct 2017 06:01) (96/65 - 126/86)  BP(mean): --  RR: 18 (21 Oct 2017 03:55) (18 - 18)  SpO2: 98% (21 Oct 2017 03:55) (98% - 98%)  CAPILLARY BLOOD GLUCOSE        I&O's Summary    20 Oct 2017 07:01  -  21 Oct 2017 07:00  --------------------------------------------------------  IN: 367 mL / OUT: 0 mL / NET: 367 mL        PHYSICAL EXAM:  GENERAL: NAD, well-developed  HEAD:  Atraumatic, Normocephalic  EYES: EOMI, PERRLA, conjunctiva and sclera clear  NECK: Supple, No JVD  CHEST/LUNG: Clear to auscultation bilaterally; No wheeze  HEART: Regular rate and rhythm; No murmurs, rubs, or gallops  ABDOMEN: Soft, Nontender, Nondistended; Bowel sounds present  EXTREMITIES:  2+ Peripheral Pulses, No clubbing, cyanosis, or edema  PSYCH: AAOx3  NEUROLOGY: non-focal  SKIN: No rashes or lesions    LABS:                        11.3   8.77  )-----------( 271      ( 20 Oct 2017 10:08 )             33.3     10-20    140  |  104  |  15  ----------------------------<  107<H>  4.7   |  21<L>  |  1.01    Ca    9.7      20 Oct 2017 10:08  Phos  4.5     10-20  Mg     2.0     10-20    TPro  6.7  /  Alb  3.6  /  TBili  0.2  /  DBili  x   /  AST  39  /  ALT  61<H>  /  AlkPhos  64  10-20    PTT - ( 20 Oct 2017 08:24 )  PTT:72.2 sec          RADIOLOGY & ADDITIONAL TESTS:    Imaging Personally Reviewed:    Consultant(s) Notes Reviewed:      Care Discussed with Consultants/Other Providers: Patient is a 37y old  Male who presents with a chief complaint of cellulitis rt arm (16 Oct 2017 21:25)      SUBJECTIVE / OVERNIGHT EVENTS:  Patient found to have RA appendage thrombus on TYLOR so did not go for cardioversion. Switched to rivaroxaban and metoprolol succinate this morning. Patient with no complaints overnight with rates between 70s and 90 and spikes to 130s in flutter.     MEDICATIONS  (STANDING):  famotidine    Tablet 40 milliGRAM(s) Oral two times a day  lisinopril 2.5 milliGRAM(s) Oral daily  metoprolol succinate  milliGRAM(s) Oral daily  rivaroxaban 15 milliGRAM(s) Oral two times a day    MEDICATIONS  (PRN):      Vital Signs Last 24 Hrs  T(C): 36.5 (21 Oct 2017 03:55), Max: 36.8 (20 Oct 2017 21:06)  T(F): 97.7 (21 Oct 2017 03:55), Max: 98.3 (20 Oct 2017 21:06)  HR: 95 (21 Oct 2017 06:01) (78 - 116)  BP: 112/71 (21 Oct 2017 06:01) (96/65 - 126/86)  BP(mean): --  RR: 18 (21 Oct 2017 03:55) (18 - 18)  SpO2: 98% (21 Oct 2017 03:55) (98% - 98%)  CAPILLARY BLOOD GLUCOSE        I&O's Summary    20 Oct 2017 07:01  -  21 Oct 2017 07:00  --------------------------------------------------------  IN: 367 mL / OUT: 0 mL / NET: 367 mL        GENERAL: In no acute distress. NAD, well-developed  HEAD:  Balding, Atraumatic, Normocephalic  EYES: EOMI, PERRLA, conjunctiva and sclera clear,   NECK: Supple, No JVD, no cervical lymphadenopathy  CHEST/LUNG: Metiport removed with small amount of serosanginous drainage at the bandaged site. Clear to auscultation bilaterally; No wheeze  HEART: Irregularly irregular at about 90; No murmurs, rubs, or gallops  ABDOMEN: Soft, Nontender, Nondistended; Bowel sounds present  EXTREMITIES:  2+ Peripheral Pulses, No clubbing, cyanosis, or edema  PSYCH: AAOx3  NEUROLOGY: non-focal CN II-XII grossly intact  SKIN: No rashes or lesions    LABS:                        11.3   8.77  )-----------( 271      ( 20 Oct 2017 10:08 )             33.3     10-20    140  |  104  |  15  ----------------------------<  107<H>  4.7   |  21<L>  |  1.01    Ca    9.7      20 Oct 2017 10:08  Phos  4.5     10-20  Mg     2.0     10-20    TPro  6.7  /  Alb  3.6  /  TBili  0.2  /  DBili  x   /  AST  39  /  ALT  61<H>  /  AlkPhos  64  10-20    PTT - ( 20 Oct 2017 08:24 )  PTT:72.2 sec          RADIOLOGY & ADDITIONAL TESTS:    Imaging Personally Reviewed:  < from: Transesophageal Echocardiogram w/o TTE (10.20.17 @ 12:08) >  Conclusions:  1. Left atrial enlargement.  No left atrial or left atrial  appendage thrombus.  2. Moderate to severe global left ventricular systolic  dysfunction.  3. There is an echodensity (likely thrombus vs mass) in the  right atrial wall that measures (approximately 2.7 cm x 2.0  cm). This is near the distal point of the SVC and may  represent thrombus from prior indwelling catheter.  4. Right ventricular enlargement with decreased right  ventricular systolic function.  5. Color Doppler demonstrates evidence of a patent foramen  ovale.  6. Trivial pericardial effusion.    < end of copied text >      Consultant(s) Notes Reviewed:  Cardiology, EP    Care Discussed with Consultants/Other Providers: Patient is a 37y old  Male who presents with a chief complaint of cellulitis rt arm (16 Oct 2017 21:25)    SUBJECTIVE / OVERNIGHT EVENTS:  Patient found to have RA appendage thrombus on TYLOR so did not go for cardioversion. Switched to rivaroxaban and metoprolol succinate this morning. Patient with no complaints overnight with rates between 70s and 90 and spikes to 130s in flutter.     MEDICATIONS  (STANDING):  famotidine    Tablet 40 milliGRAM(s) Oral two times a day  lisinopril 2.5 milliGRAM(s) Oral daily  metoprolol succinate  milliGRAM(s) Oral daily  rivaroxaban 15 milliGRAM(s) Oral two times a day    Vital Signs Last 24 Hrs  T(F): 97.7 (21 Oct 2017 03:55), Max: 98.3 (20 Oct 2017 21:06)  HR: 95 (21 Oct 2017 06:01) (78 - 116)  BP: 112/71 (21 Oct 2017 06:01) (96/65 - 126/86)  RR: 18 (21 Oct 2017 03:55) (18 - 18)  SpO2: 98% (21 Oct 2017 03:55) (98% - 98%)    I&O's Summary  20 Oct 2017 07:01  -  21 Oct 2017 07:00  --------------------------------------------------------  IN: 367 mL / OUT: 0 mL / NET: 367 mL    Physical Examination:    GENERAL: In no acute distress. NAD, well-developed  HEAD:  Balding, Atraumatic, Normocephalic  EYES: EOMI, PERRLA, conjunctiva and sclera clear,   NECK: Supple, No JVD, no cervical lymphadenopathy  CHEST/LUNG: Metiport removed with small amount of serosanginous drainage at the bandaged site. Clear to auscultation bilaterally; No wheeze  HEART: Irregularly irregular at about 90; No murmurs, rubs, or gallops  ABDOMEN: Soft, Nontender, Nondistended; Bowel sounds present  EXTREMITIES:  2+ Peripheral Pulses, No clubbing, cyanosis, or edema  PSYCH: AAOx3  NEUROLOGY: non-focal CN II-XII grossly intact  SKIN: No rashes or lesions    LABS:                      11.3   8.77  )-----------( 271      ( 20 Oct 2017 10:08 )             33.3     10-20    140  |  104  |  15  ----------------------------<  107<H>  4.7   |  21<L>  |  1.01    Ca    9.7      20 Oct 2017 10:08  Phos  4.5     10-20  Mg     2.0     10-20    TPro  6.7  /  Alb  3.6  /  TBili  0.2  /  DBili  x   /  AST  39  /  ALT  61<H>  /  AlkPhos  64  10-20    PTT - ( 20 Oct 2017 08:24 )  PTT:72.2 sec    RADIOLOGY & ADDITIONAL TESTS:  Imaging Personally Reviewed:  < from: Transesophageal Echocardiogram w/o TTE (10.20.17 @ 12:08) >  Conclusions:  1. Left atrial enlargement.  No left atrial or left atrial  appendage thrombus.  2. Moderate to severe global left ventricular systolic  dysfunction.  3. There is an echodensity (likely thrombus vs mass) in the  right atrial wall that measures (approximately 2.7 cm x 2.0  cm). This is near the distal point of the SVC and may  represent thrombus from prior indwelling catheter.  4. Right ventricular enlargement with decreased right  ventricular systolic function.  5. Color Doppler demonstrates evidence of a patent foramen  ovale.  6. Trivial pericardial effusion.    < end of copied text >    Consultant(s) Notes Reviewed:  Cardiology, EP

## 2017-10-21 NOTE — DISCHARGE NOTE ADULT - SECONDARY DIAGNOSIS.
Systolic heart failure, unspecified heart failure chronicity Thrombus of right atrial appendage without antecedent myocardial infarction Malignant neoplasm of left testis, unspecified whether descended or undescended

## 2017-10-28 LAB
CULTURE RESULTS: SIGNIFICANT CHANGE UP
SPECIMEN SOURCE: SIGNIFICANT CHANGE UP

## 2017-10-30 ENCOUNTER — APPOINTMENT (OUTPATIENT)
Dept: HEMATOLOGY ONCOLOGY | Facility: CLINIC | Age: 37
End: 2017-10-30
Payer: MEDICAID

## 2017-10-30 ENCOUNTER — RESULT REVIEW (OUTPATIENT)
Age: 37
End: 2017-10-30

## 2017-10-30 VITALS
OXYGEN SATURATION: 96 % | WEIGHT: 241.84 LBS | SYSTOLIC BLOOD PRESSURE: 101 MMHG | HEART RATE: 130 BPM | BODY MASS INDEX: 31.71 KG/M2 | TEMPERATURE: 98.6 F | RESPIRATION RATE: 18 BRPM | DIASTOLIC BLOOD PRESSURE: 71 MMHG

## 2017-10-30 DIAGNOSIS — I51.89 OTHER ILL-DEFINED HEART DISEASES: ICD-10-CM

## 2017-10-30 LAB
HCT VFR BLD CALC: 39.4 % — SIGNIFICANT CHANGE UP (ref 39–50)
HGB BLD-MCNC: 13.7 G/DL — SIGNIFICANT CHANGE UP (ref 13–17)
MCHC RBC-ENTMCNC: 33.8 PG — SIGNIFICANT CHANGE UP (ref 27–34)
MCHC RBC-ENTMCNC: 34.8 G/DL — SIGNIFICANT CHANGE UP (ref 32–36)
MCV RBC AUTO: 97.1 FL — SIGNIFICANT CHANGE UP (ref 80–100)
PLATELET # BLD AUTO: 195 K/UL — SIGNIFICANT CHANGE UP (ref 150–400)
RBC # BLD: 4.06 M/UL — LOW (ref 4.2–5.8)
RBC # FLD: 14.5 % — SIGNIFICANT CHANGE UP (ref 10.3–14.5)
WBC # BLD: 7.9 K/UL — SIGNIFICANT CHANGE UP (ref 3.8–10.5)
WBC # FLD AUTO: 7.9 K/UL — SIGNIFICANT CHANGE UP (ref 3.8–10.5)

## 2017-10-30 PROCEDURE — 99215 OFFICE O/P EST HI 40 MIN: CPT

## 2017-10-30 RX ORDER — DRONABINOL 5 MG/1
5 CAPSULE ORAL EVERY 8 HOURS
Qty: 90 | Refills: 0 | Status: DISCONTINUED | COMMUNITY
Start: 2017-10-09 | End: 2017-10-30

## 2017-10-30 RX ORDER — CIPROFLOXACIN HYDROCHLORIDE 500 MG/1
500 TABLET, FILM COATED ORAL
Qty: 14 | Refills: 0 | Status: DISCONTINUED | COMMUNITY
Start: 2017-07-13

## 2017-10-30 RX ORDER — BACLOFEN 10 MG/1
10 TABLET ORAL
Qty: 120 | Refills: 5 | Status: DISCONTINUED | COMMUNITY
Start: 2017-08-17 | End: 2017-10-30

## 2017-10-30 RX ORDER — GABAPENTIN 100 MG/1
100 CAPSULE ORAL
Qty: 90 | Refills: 0 | Status: DISCONTINUED | COMMUNITY
Start: 2017-07-27

## 2017-10-30 RX ORDER — PROCHLORPERAZINE MALEATE 5 MG/1
5 TABLET ORAL EVERY 6 HOURS
Qty: 30 | Refills: 5 | Status: DISCONTINUED | COMMUNITY
Start: 2017-09-29 | End: 2017-10-30

## 2017-10-30 RX ORDER — LEVOFLOXACIN 500 MG/1
500 TABLET, FILM COATED ORAL
Qty: 5 | Refills: 0 | Status: DISCONTINUED | COMMUNITY
Start: 2017-08-29

## 2017-10-31 ENCOUNTER — APPOINTMENT (OUTPATIENT)
Dept: CT IMAGING | Facility: IMAGING CENTER | Age: 37
End: 2017-10-31
Payer: MEDICAID

## 2017-10-31 ENCOUNTER — OUTPATIENT (OUTPATIENT)
Dept: OUTPATIENT SERVICES | Facility: HOSPITAL | Age: 37
LOS: 1 days | End: 2017-10-31
Payer: MEDICAID

## 2017-10-31 DIAGNOSIS — C62.92 MALIGNANT NEOPLASM OF LEFT TESTIS, UNSPECIFIED WHETHER DESCENDED OR UNDESCENDED: ICD-10-CM

## 2017-10-31 DIAGNOSIS — Z95.828 PRESENCE OF OTHER VASCULAR IMPLANTS AND GRAFTS: Chronic | ICD-10-CM

## 2017-10-31 DIAGNOSIS — Z90.79 ACQUIRED ABSENCE OF OTHER GENITAL ORGAN(S): Chronic | ICD-10-CM

## 2017-10-31 LAB
AFP-TM SERPL-MCNC: 1.9 NG/ML
ALBUMIN SERPL ELPH-MCNC: 4.4 G/DL
ALP BLD-CCNC: 59 U/L
ALT SERPL-CCNC: 49 U/L
ANION GAP SERPL CALC-SCNC: 12 MMOL/L
AST SERPL-CCNC: 23 U/L
BILIRUB SERPL-MCNC: 0.4 MG/DL
BUN SERPL-MCNC: 23 MG/DL
CALCIUM SERPL-MCNC: 9.4 MG/DL
CHLORIDE SERPL-SCNC: 104 MMOL/L
CO2 SERPL-SCNC: 25 MMOL/L
CREAT SERPL-MCNC: 1.15 MG/DL
GLUCOSE SERPL-MCNC: 107 MG/DL
HCG SERPL-MCNC: <1 MIU/ML
LDH SERPL-CCNC: 187 U/L
MAGNESIUM SERPL-MCNC: 1.9 MG/DL
POTASSIUM SERPL-SCNC: 4.8 MMOL/L
PROT SERPL-MCNC: 7.1 G/DL
SODIUM SERPL-SCNC: 141 MMOL/L

## 2017-10-31 PROCEDURE — 71260 CT THORAX DX C+: CPT | Mod: 26

## 2017-10-31 PROCEDURE — 74177 CT ABD & PELVIS W/CONTRAST: CPT | Mod: 26

## 2017-10-31 PROCEDURE — 71260 CT THORAX DX C+: CPT

## 2017-10-31 PROCEDURE — 74177 CT ABD & PELVIS W/CONTRAST: CPT

## 2017-11-03 ENCOUNTER — OUTPATIENT (OUTPATIENT)
Dept: OUTPATIENT SERVICES | Facility: HOSPITAL | Age: 37
LOS: 1 days | Discharge: HOME | End: 2017-11-03

## 2017-11-03 DIAGNOSIS — C62.92 MALIGNANT NEOPLASM OF LEFT TESTIS, UNSPECIFIED WHETHER DESCENDED OR UNDESCENDED: ICD-10-CM

## 2017-11-03 DIAGNOSIS — Z95.828 PRESENCE OF OTHER VASCULAR IMPLANTS AND GRAFTS: Chronic | ICD-10-CM

## 2017-11-03 DIAGNOSIS — Z90.79 ACQUIRED ABSENCE OF OTHER GENITAL ORGAN(S): Chronic | ICD-10-CM

## 2017-11-06 ENCOUNTER — APPOINTMENT (OUTPATIENT)
Dept: CARDIOLOGY | Facility: CLINIC | Age: 37
End: 2017-11-06
Payer: MEDICAID

## 2017-11-06 ENCOUNTER — NON-APPOINTMENT (OUTPATIENT)
Age: 37
End: 2017-11-06

## 2017-11-06 VITALS
WEIGHT: 241 LBS | HEIGHT: 73.23 IN | BODY MASS INDEX: 31.6 KG/M2 | HEART RATE: 69 BPM | OXYGEN SATURATION: 99 % | DIASTOLIC BLOOD PRESSURE: 72 MMHG | SYSTOLIC BLOOD PRESSURE: 101 MMHG

## 2017-11-06 PROCEDURE — 99215 OFFICE O/P EST HI 40 MIN: CPT

## 2017-11-06 PROCEDURE — 93000 ELECTROCARDIOGRAM COMPLETE: CPT

## 2017-11-07 ENCOUNTER — LABORATORY RESULT (OUTPATIENT)
Age: 37
End: 2017-11-07

## 2017-11-07 ENCOUNTER — APPOINTMENT (OUTPATIENT)
Dept: INFUSION THERAPY | Facility: HOSPITAL | Age: 37
End: 2017-11-07

## 2017-11-08 ENCOUNTER — APPOINTMENT (OUTPATIENT)
Dept: INFUSION THERAPY | Facility: HOSPITAL | Age: 37
End: 2017-11-08

## 2017-11-09 ENCOUNTER — APPOINTMENT (OUTPATIENT)
Dept: INFUSION THERAPY | Facility: HOSPITAL | Age: 37
End: 2017-11-09

## 2017-11-10 ENCOUNTER — APPOINTMENT (OUTPATIENT)
Dept: INFUSION THERAPY | Facility: HOSPITAL | Age: 37
End: 2017-11-10

## 2017-11-11 ENCOUNTER — APPOINTMENT (OUTPATIENT)
Dept: INFUSION THERAPY | Facility: HOSPITAL | Age: 37
End: 2017-11-11

## 2017-11-13 ENCOUNTER — APPOINTMENT (OUTPATIENT)
Dept: CARDIOLOGY | Facility: CLINIC | Age: 37
End: 2017-11-13
Payer: MEDICAID

## 2017-11-13 ENCOUNTER — NON-APPOINTMENT (OUTPATIENT)
Age: 37
End: 2017-11-13

## 2017-11-13 VITALS — HEART RATE: 78 BPM | WEIGHT: 242 LBS | OXYGEN SATURATION: 98 % | BODY MASS INDEX: 31.73 KG/M2

## 2017-11-13 VITALS — DIASTOLIC BLOOD PRESSURE: 70 MMHG | SYSTOLIC BLOOD PRESSURE: 100 MMHG

## 2017-11-13 PROCEDURE — 93000 ELECTROCARDIOGRAM COMPLETE: CPT

## 2017-11-13 PROCEDURE — 99215 OFFICE O/P EST HI 40 MIN: CPT

## 2017-11-15 ENCOUNTER — APPOINTMENT (OUTPATIENT)
Dept: ELECTROPHYSIOLOGY | Facility: CLINIC | Age: 37
End: 2017-11-15
Payer: MEDICAID

## 2017-11-15 VITALS — OXYGEN SATURATION: 98 % | HEART RATE: 85 BPM | SYSTOLIC BLOOD PRESSURE: 109 MMHG | DIASTOLIC BLOOD PRESSURE: 77 MMHG

## 2017-11-15 PROCEDURE — 93000 ELECTROCARDIOGRAM COMPLETE: CPT

## 2017-11-15 PROCEDURE — 99214 OFFICE O/P EST MOD 30 MIN: CPT

## 2017-11-15 RX ORDER — PANTOPRAZOLE 40 MG/1
40 TABLET, DELAYED RELEASE ORAL DAILY
Qty: 30 | Refills: 5 | Status: DISCONTINUED | COMMUNITY
Start: 2017-08-17 | End: 2017-11-15

## 2017-12-04 ENCOUNTER — MEDICATION RENEWAL (OUTPATIENT)
Age: 37
End: 2017-12-04

## 2017-12-04 ENCOUNTER — OUTPATIENT (OUTPATIENT)
Dept: OUTPATIENT SERVICES | Facility: HOSPITAL | Age: 37
LOS: 1 days | End: 2017-12-04

## 2017-12-04 ENCOUNTER — APPOINTMENT (OUTPATIENT)
Dept: CV DIAGNOSITCS | Facility: HOSPITAL | Age: 37
End: 2017-12-04

## 2017-12-04 ENCOUNTER — OUTPATIENT (OUTPATIENT)
Dept: OUTPATIENT SERVICES | Facility: HOSPITAL | Age: 37
LOS: 1 days | End: 2017-12-04
Payer: MEDICAID

## 2017-12-04 VITALS
TEMPERATURE: 98 F | WEIGHT: 242.95 LBS | HEIGHT: 74 IN | OXYGEN SATURATION: 97 % | DIASTOLIC BLOOD PRESSURE: 81 MMHG | RESPIRATION RATE: 16 BRPM | SYSTOLIC BLOOD PRESSURE: 119 MMHG | HEART RATE: 83 BPM

## 2017-12-04 DIAGNOSIS — I82.409 ACUTE EMBOLISM AND THROMBOSIS OF UNSPECIFIED DEEP VEINS OF UNSPECIFIED LOWER EXTREMITY: ICD-10-CM

## 2017-12-04 DIAGNOSIS — Z90.79 ACQUIRED ABSENCE OF OTHER GENITAL ORGAN(S): Chronic | ICD-10-CM

## 2017-12-04 DIAGNOSIS — Z95.828 PRESENCE OF OTHER VASCULAR IMPLANTS AND GRAFTS: Chronic | ICD-10-CM

## 2017-12-04 DIAGNOSIS — I49.9 CARDIAC ARRHYTHMIA, UNSPECIFIED: ICD-10-CM

## 2017-12-04 DIAGNOSIS — I48.92 UNSPECIFIED ATRIAL FLUTTER: ICD-10-CM

## 2017-12-04 DIAGNOSIS — Z01.818 ENCOUNTER FOR OTHER PREPROCEDURAL EXAMINATION: ICD-10-CM

## 2017-12-04 LAB
ALBUMIN SERPL ELPH-MCNC: 4.3 G/DL — SIGNIFICANT CHANGE UP (ref 3.3–5)
ALP SERPL-CCNC: 49 U/L — SIGNIFICANT CHANGE UP (ref 40–120)
ALT FLD-CCNC: 45 U/L RC — SIGNIFICANT CHANGE UP (ref 10–45)
ANION GAP SERPL CALC-SCNC: 11 MMOL/L — SIGNIFICANT CHANGE UP (ref 5–17)
APTT BLD: 40.2 SEC — HIGH (ref 27.5–37.4)
AST SERPL-CCNC: 45 U/L — HIGH (ref 10–40)
BILIRUB SERPL-MCNC: 0.7 MG/DL — SIGNIFICANT CHANGE UP (ref 0.2–1.2)
BLD GP AB SCN SERPL QL: NEGATIVE — SIGNIFICANT CHANGE UP
BUN SERPL-MCNC: 17 MG/DL — SIGNIFICANT CHANGE UP (ref 7–23)
CALCIUM SERPL-MCNC: 8.9 MG/DL — SIGNIFICANT CHANGE UP (ref 8.4–10.5)
CHLORIDE SERPL-SCNC: 101 MMOL/L — SIGNIFICANT CHANGE UP (ref 96–108)
CO2 SERPL-SCNC: 26 MMOL/L — SIGNIFICANT CHANGE UP (ref 22–31)
CREAT SERPL-MCNC: 1.06 MG/DL — SIGNIFICANT CHANGE UP (ref 0.5–1.3)
GLUCOSE SERPL-MCNC: 101 MG/DL — HIGH (ref 70–99)
HCT VFR BLD CALC: 43.3 % — SIGNIFICANT CHANGE UP (ref 39–50)
HGB BLD-MCNC: 15 G/DL — SIGNIFICANT CHANGE UP (ref 13–17)
INR BLD: 1.49 RATIO — HIGH (ref 0.88–1.16)
MCHC RBC-ENTMCNC: 34.5 PG — HIGH (ref 27–34)
MCHC RBC-ENTMCNC: 34.7 GM/DL — SIGNIFICANT CHANGE UP (ref 32–36)
MCV RBC AUTO: 99.4 FL — SIGNIFICANT CHANGE UP (ref 80–100)
PLATELET # BLD AUTO: 186 K/UL — SIGNIFICANT CHANGE UP (ref 150–400)
POTASSIUM SERPL-MCNC: 4.5 MMOL/L — SIGNIFICANT CHANGE UP (ref 3.5–5.3)
POTASSIUM SERPL-MCNC: 5.1 MMOL/L — SIGNIFICANT CHANGE UP (ref 3.5–5.3)
POTASSIUM SERPL-SCNC: 4.5 MMOL/L — SIGNIFICANT CHANGE UP (ref 3.5–5.3)
POTASSIUM SERPL-SCNC: 5.1 MMOL/L — SIGNIFICANT CHANGE UP (ref 3.5–5.3)
PROT SERPL-MCNC: 7.4 G/DL — SIGNIFICANT CHANGE UP (ref 6–8.3)
PROTHROM AB SERPL-ACNC: 16.4 SEC — HIGH (ref 9.8–12.7)
RBC # BLD: 4.36 M/UL — SIGNIFICANT CHANGE UP (ref 4.2–5.8)
RBC # FLD: 11.8 % — SIGNIFICANT CHANGE UP (ref 10.3–14.5)
RH IG SCN BLD-IMP: POSITIVE — SIGNIFICANT CHANGE UP
SODIUM SERPL-SCNC: 138 MMOL/L — SIGNIFICANT CHANGE UP (ref 135–145)
WBC # BLD: 8.1 K/UL — SIGNIFICANT CHANGE UP (ref 3.8–10.5)
WBC # FLD AUTO: 8.1 K/UL — SIGNIFICANT CHANGE UP (ref 3.8–10.5)

## 2017-12-04 PROCEDURE — 86850 RBC ANTIBODY SCREEN: CPT

## 2017-12-04 PROCEDURE — 80053 COMPREHEN METABOLIC PANEL: CPT

## 2017-12-04 PROCEDURE — 85610 PROTHROMBIN TIME: CPT

## 2017-12-04 PROCEDURE — 86901 BLOOD TYPING SEROLOGIC RH(D): CPT

## 2017-12-04 PROCEDURE — 86900 BLOOD TYPING SEROLOGIC ABO: CPT

## 2017-12-04 PROCEDURE — 85730 THROMBOPLASTIN TIME PARTIAL: CPT

## 2017-12-04 PROCEDURE — 85027 COMPLETE CBC AUTOMATED: CPT

## 2017-12-04 PROCEDURE — 84132 ASSAY OF SERUM POTASSIUM: CPT

## 2017-12-04 PROCEDURE — 93010 ELECTROCARDIOGRAM REPORT: CPT

## 2017-12-04 PROCEDURE — 93005 ELECTROCARDIOGRAM TRACING: CPT

## 2017-12-04 NOTE — H&P CARDIOLOGY - PMH
Atrial fibrillation    Lumbar herniated disc    Testicular cancer    Testicular mass  left  Thrombus  Right atrial wall ( suspected from last TYLOR) On Xarelto

## 2017-12-04 NOTE — H&P CARDIOLOGY - HISTORY OF PRESENT ILLNESS
38 y/o male with PMHx of A fib, suspected Left atrial thrombus, on Xarelto, Testicular cancer presents today for TYLOR and has scheduled SVT ablation on 12/5. Patient states he was admitted here in October 2017 for management for testcular cancer, developed Atrial flutter and TYLOR was done which showed atrial thrombus. DCCV cancelled secondary to thrombus and started on Xarelto. Patient has SOB, Palpitation, dizziness and weakness. Seen & evaluated by Dr. Arambula and recommends for Ablation  Conclusions:  1. Left atrial enlargement.  No left atrial or left atrial  appendage thrombus.  2. Moderate to severe global left ventricular systolic  dysfunction.  3. There is an echodensity (likely thrombus vs mass) in the  right atrial wall that measures (approximately 2.7 cm x 2.0  cm). This is near the distal point of the SVC and may  represent thrombus from prior indwelling catheter.  4. Right ventricular enlargement with decreased right  ventricular systolic function.  5. Color Doppler demonstrates evidence of a patent foramen  ovale 38 y/o male with PMHx of A fib/ flutter, suspected Left atrial thrombus, on Xarelto, Testicular cancer presents today for TYLOR and has scheduled SVT ablation on 12/5. Patient states he was admitted here in October 2017 for management for testcular cancer, developed Atrial flutter and TYLOR was done which showed atrial thrombus. DCCV cancelled secondary to thrombus and started on Xarelto. Patient has SOB, Palpitation, dizziness and weakness. Seen & evaluated by Dr. Arambula and recommends for Ablation. patient is here today for TYLOR and ablation on 12/5  Conclusions:  1. Left atrial enlargement.  No left atrial or left atrial  appendage thrombus.  2. Moderate to severe global left ventricular systolic  dysfunction.  3. There is an echodensity (likely thrombus vs mass) in the  right atrial wall that measures (approximately 2.7 cm x 2.0  cm). This is near the distal point of the SVC and may  represent thrombus from prior indwelling catheter.  4. Right ventricular enlargement with decreased right  ventricular systolic function.  5. Color Doppler demonstrates evidence of a patent foramen  ovale 36 y/o male with PMHx of A fib/ flutter, suspected Left atrial thrombus, on Xarelto, Testicular cancer presents today for TYLOR and has scheduled SVT ablation on 12/5. Patient states he was admitted here in October 2017 for management for testcular cancer, developed Atrial flutter and TYLOR was done which showed atrial thrombus. DCCV cancelled secondary to thrombus and started on Xarelto. Patient has SOB, Palpitation, dizziness and weakness. Seen & evaluated by Dr. Arambula and recommends for Ablation. Patient is here today for TYLOR and ablation on 12/5  Conclusions:  1. Left atrial enlargement.  No left atrial or left atrial  appendage thrombus.  2. Moderate to severe global left ventricular systolic  dysfunction.  3. There is an echodensity (likely thrombus vs mass) in the  right atrial wall that measures (approximately 2.7 cm x 2.0  cm). This is near the distal point of the SVC and may  represent thrombus from prior indwelling catheter.  4. Right ventricular enlargement with decreased right  ventricular systolic function.  5. Color Doppler demonstrates evidence of a patent foramen  ovale

## 2017-12-05 ENCOUNTER — RESULT REVIEW (OUTPATIENT)
Age: 37
End: 2017-12-05

## 2017-12-05 ENCOUNTER — MEDICATION RENEWAL (OUTPATIENT)
Age: 37
End: 2017-12-05

## 2017-12-05 LAB — PROTHROMBIN TIME COMMENT: NORMAL

## 2017-12-08 ENCOUNTER — APPOINTMENT (OUTPATIENT)
Dept: CARDIOLOGY | Facility: CLINIC | Age: 37
End: 2017-12-08
Payer: MEDICAID

## 2017-12-08 VITALS — DIASTOLIC BLOOD PRESSURE: 68 MMHG | SYSTOLIC BLOOD PRESSURE: 102 MMHG | HEART RATE: 84 BPM | OXYGEN SATURATION: 98 %

## 2017-12-08 LAB — INR PPP: 2.5 RATIO

## 2017-12-08 PROCEDURE — 85610 PROTHROMBIN TIME: CPT | Mod: QW

## 2017-12-08 PROCEDURE — 99211 OFF/OP EST MAY X REQ PHY/QHP: CPT

## 2017-12-08 RX ORDER — RIVAROXABAN 15 MG/1
15 TABLET, FILM COATED ORAL
Qty: 90 | Refills: 2 | Status: DISCONTINUED | COMMUNITY
Start: 2017-10-21 | End: 2017-12-08

## 2017-12-11 ENCOUNTER — APPOINTMENT (OUTPATIENT)
Dept: CARDIOLOGY | Facility: CLINIC | Age: 37
End: 2017-12-11
Payer: MEDICAID

## 2017-12-11 VITALS
DIASTOLIC BLOOD PRESSURE: 70 MMHG | HEART RATE: 55 BPM | RESPIRATION RATE: 18 BRPM | OXYGEN SATURATION: 98 % | SYSTOLIC BLOOD PRESSURE: 96 MMHG

## 2017-12-11 LAB — INR PPP: 1.8 RATIO

## 2017-12-11 PROCEDURE — 85610 PROTHROMBIN TIME: CPT | Mod: QW

## 2017-12-11 PROCEDURE — 99211 OFF/OP EST MAY X REQ PHY/QHP: CPT

## 2017-12-12 ENCOUNTER — APPOINTMENT (OUTPATIENT)
Dept: CARDIOLOGY | Facility: CLINIC | Age: 37
End: 2017-12-12

## 2017-12-18 ENCOUNTER — APPOINTMENT (OUTPATIENT)
Dept: CARDIOLOGY | Facility: CLINIC | Age: 37
End: 2017-12-18
Payer: MEDICAID

## 2017-12-18 VITALS — HEART RATE: 80 BPM | DIASTOLIC BLOOD PRESSURE: 60 MMHG | OXYGEN SATURATION: 99 % | SYSTOLIC BLOOD PRESSURE: 118 MMHG

## 2017-12-18 LAB — INR PPP: 2.5 RATIO

## 2017-12-18 PROCEDURE — 85610 PROTHROMBIN TIME: CPT | Mod: QW

## 2017-12-18 PROCEDURE — 99211 OFF/OP EST MAY X REQ PHY/QHP: CPT

## 2017-12-26 ENCOUNTER — RX RENEWAL (OUTPATIENT)
Age: 37
End: 2017-12-26

## 2017-12-27 ENCOUNTER — APPOINTMENT (OUTPATIENT)
Dept: CARDIOLOGY | Facility: CLINIC | Age: 37
End: 2017-12-27
Payer: MEDICAID

## 2017-12-27 VITALS — OXYGEN SATURATION: 97 % | SYSTOLIC BLOOD PRESSURE: 100 MMHG | DIASTOLIC BLOOD PRESSURE: 60 MMHG | HEART RATE: 60 BPM

## 2017-12-27 LAB — INR PPP: 2 RATIO

## 2017-12-27 PROCEDURE — 99211 OFF/OP EST MAY X REQ PHY/QHP: CPT

## 2017-12-27 PROCEDURE — 85610 PROTHROMBIN TIME: CPT | Mod: QW

## 2018-01-02 ENCOUNTER — NON-APPOINTMENT (OUTPATIENT)
Age: 38
End: 2018-01-02

## 2018-01-02 ENCOUNTER — APPOINTMENT (OUTPATIENT)
Dept: CARDIOLOGY | Facility: CLINIC | Age: 38
End: 2018-01-02
Payer: MEDICAID

## 2018-01-02 VITALS
SYSTOLIC BLOOD PRESSURE: 101 MMHG | HEART RATE: 87 BPM | DIASTOLIC BLOOD PRESSURE: 69 MMHG | WEIGHT: 255 LBS | OXYGEN SATURATION: 95 % | BODY MASS INDEX: 33.43 KG/M2

## 2018-01-02 LAB — INR PPP: 1.7 RATIO

## 2018-01-02 PROCEDURE — 93000 ELECTROCARDIOGRAM COMPLETE: CPT

## 2018-01-02 PROCEDURE — 99215 OFFICE O/P EST HI 40 MIN: CPT

## 2018-01-08 ENCOUNTER — APPOINTMENT (OUTPATIENT)
Dept: CARDIOLOGY | Facility: CLINIC | Age: 38
End: 2018-01-08

## 2018-01-17 ENCOUNTER — OUTPATIENT (OUTPATIENT)
Dept: OUTPATIENT SERVICES | Facility: HOSPITAL | Age: 38
LOS: 1 days | End: 2018-01-17
Payer: COMMERCIAL

## 2018-01-17 ENCOUNTER — APPOINTMENT (OUTPATIENT)
Dept: CV DIAGNOSITCS | Facility: HOSPITAL | Age: 38
End: 2018-01-17

## 2018-01-17 VITALS
DIASTOLIC BLOOD PRESSURE: 71 MMHG | SYSTOLIC BLOOD PRESSURE: 109 MMHG | WEIGHT: 259.93 LBS | HEART RATE: 86 BPM | HEIGHT: 74 IN | TEMPERATURE: 99 F | OXYGEN SATURATION: 96 % | RESPIRATION RATE: 20 BRPM

## 2018-01-17 DIAGNOSIS — I49.9 CARDIAC ARRHYTHMIA, UNSPECIFIED: ICD-10-CM

## 2018-01-17 DIAGNOSIS — Z90.79 ACQUIRED ABSENCE OF OTHER GENITAL ORGAN(S): Chronic | ICD-10-CM

## 2018-01-17 DIAGNOSIS — I48.92 UNSPECIFIED ATRIAL FLUTTER: ICD-10-CM

## 2018-01-17 DIAGNOSIS — Z95.828 PRESENCE OF OTHER VASCULAR IMPLANTS AND GRAFTS: Chronic | ICD-10-CM

## 2018-01-17 LAB
ALBUMIN SERPL ELPH-MCNC: 4 G/DL — SIGNIFICANT CHANGE UP (ref 3.3–5)
ALP SERPL-CCNC: 48 U/L — SIGNIFICANT CHANGE UP (ref 40–120)
ALT FLD-CCNC: 54 U/L RC — HIGH (ref 10–45)
ANION GAP SERPL CALC-SCNC: 15 MMOL/L — SIGNIFICANT CHANGE UP (ref 5–17)
APTT BLD: 40.7 SEC — HIGH (ref 27.5–37.4)
AST SERPL-CCNC: 39 U/L — SIGNIFICANT CHANGE UP (ref 10–40)
BILIRUB SERPL-MCNC: 0.4 MG/DL — SIGNIFICANT CHANGE UP (ref 0.2–1.2)
BUN SERPL-MCNC: 18 MG/DL — SIGNIFICANT CHANGE UP (ref 7–23)
CALCIUM SERPL-MCNC: 8.9 MG/DL — SIGNIFICANT CHANGE UP (ref 8.4–10.5)
CHLORIDE SERPL-SCNC: 101 MMOL/L — SIGNIFICANT CHANGE UP (ref 96–108)
CO2 SERPL-SCNC: 24 MMOL/L — SIGNIFICANT CHANGE UP (ref 22–31)
CREAT SERPL-MCNC: 0.95 MG/DL — SIGNIFICANT CHANGE UP (ref 0.5–1.3)
GLUCOSE SERPL-MCNC: 104 MG/DL — HIGH (ref 70–99)
HCT VFR BLD CALC: 43.7 % — SIGNIFICANT CHANGE UP (ref 39–50)
HGB BLD-MCNC: 16 G/DL — SIGNIFICANT CHANGE UP (ref 13–17)
INR BLD: 1.94 RATIO — HIGH (ref 0.88–1.16)
MCHC RBC-ENTMCNC: 34.4 PG — HIGH (ref 27–34)
MCHC RBC-ENTMCNC: 36.6 GM/DL — HIGH (ref 32–36)
MCV RBC AUTO: 93.9 FL — SIGNIFICANT CHANGE UP (ref 80–100)
PLATELET # BLD AUTO: 202 K/UL — SIGNIFICANT CHANGE UP (ref 150–400)
POTASSIUM SERPL-MCNC: 4.1 MMOL/L — SIGNIFICANT CHANGE UP (ref 3.5–5.3)
POTASSIUM SERPL-SCNC: 4.1 MMOL/L — SIGNIFICANT CHANGE UP (ref 3.5–5.3)
PROT SERPL-MCNC: 7.1 G/DL — SIGNIFICANT CHANGE UP (ref 6–8.3)
PROTHROM AB SERPL-ACNC: 21.4 SEC — HIGH (ref 9.8–12.7)
RBC # BLD: 4.65 M/UL — SIGNIFICANT CHANGE UP (ref 4.2–5.8)
RBC # FLD: 10.9 % — SIGNIFICANT CHANGE UP (ref 10.3–14.5)
SODIUM SERPL-SCNC: 140 MMOL/L — SIGNIFICANT CHANGE UP (ref 135–145)
WBC # BLD: 11.6 K/UL — HIGH (ref 3.8–10.5)
WBC # FLD AUTO: 11.6 K/UL — HIGH (ref 3.8–10.5)

## 2018-01-17 PROCEDURE — 93010 ELECTROCARDIOGRAM REPORT: CPT

## 2018-01-17 PROCEDURE — 93312 ECHO TRANSESOPHAGEAL: CPT

## 2018-01-17 PROCEDURE — 93306 TTE W/DOPPLER COMPLETE: CPT

## 2018-01-17 PROCEDURE — 80053 COMPREHEN METABOLIC PANEL: CPT

## 2018-01-17 PROCEDURE — 93306 TTE W/DOPPLER COMPLETE: CPT | Mod: 26

## 2018-01-17 PROCEDURE — 93005 ELECTROCARDIOGRAM TRACING: CPT

## 2018-01-17 PROCEDURE — 93312 ECHO TRANSESOPHAGEAL: CPT | Mod: 26

## 2018-01-17 PROCEDURE — 93010 ELECTROCARDIOGRAM REPORT: CPT | Mod: 77

## 2018-01-17 PROCEDURE — 92960 CARDIOVERSION ELECTRIC EXT: CPT

## 2018-01-17 PROCEDURE — 85610 PROTHROMBIN TIME: CPT

## 2018-01-17 PROCEDURE — 85730 THROMBOPLASTIN TIME PARTIAL: CPT

## 2018-01-17 PROCEDURE — 85027 COMPLETE CBC AUTOMATED: CPT

## 2018-01-17 RX ORDER — DIGOXIN 125 UG/1
125 TABLET ORAL DAILY
Qty: 90 | Refills: 1 | Status: DISCONTINUED | COMMUNITY
Start: 2017-11-06 | End: 2018-01-17

## 2018-01-17 RX ORDER — SODIUM CHLORIDE 9 MG/ML
3 INJECTION INTRAMUSCULAR; INTRAVENOUS; SUBCUTANEOUS EVERY 8 HOURS
Qty: 0 | Refills: 0 | Status: DISCONTINUED | OUTPATIENT
Start: 2018-01-17 | End: 2018-02-01

## 2018-01-17 NOTE — H&P CARDIOLOGY - PMH
Atrial fibrillation    Lumbar herniated disc    Testicular cancer    Testicular mass  left  Thrombus  Right atrial wall ( suspected from last TYLOR) On AC

## 2018-01-17 NOTE — H&P CARDIOLOGY - HISTORY OF PRESENT ILLNESS
36 y/o male with PMHx of A fib/ flutter, suspected Left atrial thrombus, on AC, Testicular cancer, Patient states he was admitted here in October 2017 for management for testcular cancer, developed Atrial flutter and TYLOR was done which showed atrial thrombus. DCCV cancelled secondary to thrombus and started on warfarin last dose 1/17 AM. Patient has SOB, Palpitation, dizziness and weakness. Seen & evaluated,. Patient is here today for TYLOR/ cardioversion.

## 2018-01-18 ENCOUNTER — APPOINTMENT (OUTPATIENT)
Dept: CARDIOLOGY | Facility: CLINIC | Age: 38
End: 2018-01-18
Payer: MEDICAID

## 2018-01-18 VITALS — DIASTOLIC BLOOD PRESSURE: 68 MMHG | SYSTOLIC BLOOD PRESSURE: 112 MMHG | OXYGEN SATURATION: 95 % | HEART RATE: 73 BPM

## 2018-01-18 LAB
INR PPP: 2.2 RATIO
PROTHROMBIN TIME COMMENT: NORMAL

## 2018-01-18 PROCEDURE — 85610 PROTHROMBIN TIME: CPT | Mod: QW

## 2018-01-18 PROCEDURE — 99211 OFF/OP EST MAY X REQ PHY/QHP: CPT

## 2018-01-22 ENCOUNTER — APPOINTMENT (OUTPATIENT)
Dept: CARDIOLOGY | Facility: CLINIC | Age: 38
End: 2018-01-22
Payer: MEDICAID

## 2018-01-22 ENCOUNTER — NON-APPOINTMENT (OUTPATIENT)
Age: 38
End: 2018-01-22

## 2018-01-22 VITALS — HEART RATE: 67 BPM | OXYGEN SATURATION: 98 % | SYSTOLIC BLOOD PRESSURE: 102 MMHG | DIASTOLIC BLOOD PRESSURE: 60 MMHG

## 2018-01-22 LAB — INR PPP: 1.9 RATIO

## 2018-01-22 PROCEDURE — 93000 ELECTROCARDIOGRAM COMPLETE: CPT

## 2018-01-22 PROCEDURE — 85610 PROTHROMBIN TIME: CPT | Mod: QW

## 2018-01-22 PROCEDURE — 99211 OFF/OP EST MAY X REQ PHY/QHP: CPT | Mod: 25

## 2018-01-29 ENCOUNTER — APPOINTMENT (OUTPATIENT)
Dept: CARDIOLOGY | Facility: CLINIC | Age: 38
End: 2018-01-29
Payer: MEDICAID

## 2018-01-29 VITALS — SYSTOLIC BLOOD PRESSURE: 118 MMHG | DIASTOLIC BLOOD PRESSURE: 72 MMHG | HEART RATE: 80 BPM | OXYGEN SATURATION: 97 %

## 2018-01-29 LAB — INR PPP: 2 RATIO

## 2018-01-29 PROCEDURE — 99211 OFF/OP EST MAY X REQ PHY/QHP: CPT

## 2018-01-29 PROCEDURE — 85610 PROTHROMBIN TIME: CPT | Mod: QW

## 2018-02-05 ENCOUNTER — APPOINTMENT (OUTPATIENT)
Dept: CARDIOLOGY | Facility: CLINIC | Age: 38
End: 2018-02-05
Payer: MEDICAID

## 2018-02-05 ENCOUNTER — NON-APPOINTMENT (OUTPATIENT)
Age: 38
End: 2018-02-05

## 2018-02-05 VITALS
DIASTOLIC BLOOD PRESSURE: 72 MMHG | WEIGHT: 255 LBS | HEART RATE: 72 BPM | SYSTOLIC BLOOD PRESSURE: 99 MMHG | BODY MASS INDEX: 33.43 KG/M2 | OXYGEN SATURATION: 96 %

## 2018-02-05 VITALS — DIASTOLIC BLOOD PRESSURE: 76 MMHG | SYSTOLIC BLOOD PRESSURE: 102 MMHG

## 2018-02-05 LAB — INR PPP: 2 RATIO

## 2018-02-05 PROCEDURE — 99215 OFFICE O/P EST HI 40 MIN: CPT

## 2018-02-05 PROCEDURE — 93000 ELECTROCARDIOGRAM COMPLETE: CPT

## 2018-02-05 PROCEDURE — 85610 PROTHROMBIN TIME: CPT | Mod: QW

## 2018-02-05 RX ORDER — AMIODARONE HYDROCHLORIDE 200 MG/1
200 TABLET ORAL
Qty: 60 | Refills: 5 | Status: DISCONTINUED | COMMUNITY
Start: 2018-01-17 | End: 2018-02-05

## 2018-02-07 ENCOUNTER — APPOINTMENT (OUTPATIENT)
Dept: ELECTROPHYSIOLOGY | Facility: CLINIC | Age: 38
End: 2018-02-07
Payer: MEDICAID

## 2018-02-14 ENCOUNTER — APPOINTMENT (OUTPATIENT)
Dept: ELECTROPHYSIOLOGY | Facility: CLINIC | Age: 38
End: 2018-02-14
Payer: MEDICAID

## 2018-02-14 ENCOUNTER — NON-APPOINTMENT (OUTPATIENT)
Age: 38
End: 2018-02-14

## 2018-02-14 VITALS — OXYGEN SATURATION: 95 % | DIASTOLIC BLOOD PRESSURE: 77 MMHG | SYSTOLIC BLOOD PRESSURE: 112 MMHG | HEART RATE: 82 BPM

## 2018-02-14 PROCEDURE — 93000 ELECTROCARDIOGRAM COMPLETE: CPT

## 2018-02-14 PROCEDURE — 99214 OFFICE O/P EST MOD 30 MIN: CPT

## 2018-02-14 RX ORDER — CISPLATIN 1 MG/ML
INJECTION, SOLUTION INTRAVENOUS
Refills: 0 | Status: DISCONTINUED | COMMUNITY
End: 2018-02-14

## 2018-02-14 RX ORDER — ETOPOSIDE 20 MG/ML
INJECTION INTRAVENOUS
Refills: 0 | Status: DISCONTINUED | COMMUNITY
End: 2018-02-14

## 2018-02-14 RX ORDER — SUCRALFATE 1 G/10ML
1 SUSPENSION ORAL 4 TIMES DAILY
Qty: 1200 | Refills: 5 | Status: DISCONTINUED | COMMUNITY
Start: 2017-10-09 | End: 2018-02-14

## 2018-02-14 RX ORDER — DEXAMETHASONE 4 MG/1
4 TABLET ORAL
Qty: 25 | Refills: 5 | Status: DISCONTINUED | COMMUNITY
Start: 2017-08-09 | End: 2018-02-14

## 2018-02-14 RX ORDER — METOCLOPRAMIDE 5 MG/1
5 TABLET ORAL
Qty: 30 | Refills: 5 | Status: DISCONTINUED | COMMUNITY
Start: 2017-08-09 | End: 2018-02-14

## 2018-02-14 RX ORDER — FAMOTIDINE 40 MG/1
40 TABLET, FILM COATED ORAL
Qty: 30 | Refills: 0 | Status: DISCONTINUED | COMMUNITY
Start: 2017-10-21 | End: 2018-02-14

## 2018-02-14 RX ORDER — LIDOCAINE AND PRILOCAINE 25; 25 MG/G; MG/G
2.5-2.5 CREAM TOPICAL
Qty: 1 | Refills: 0 | Status: DISCONTINUED | COMMUNITY
Start: 2017-09-08 | End: 2018-02-14

## 2018-02-14 RX ORDER — LORAZEPAM 0.5 MG/1
0.5 TABLET ORAL
Qty: 60 | Refills: 0 | Status: DISCONTINUED | COMMUNITY
Start: 2017-09-19 | End: 2018-02-14

## 2018-02-14 RX ORDER — OXYCODONE AND ACETAMINOPHEN TABLETS 10; 300 MG/1; MG/1
TABLET ORAL
Refills: 0 | Status: DISCONTINUED | COMMUNITY
End: 2018-02-14

## 2018-02-20 ENCOUNTER — NON-APPOINTMENT (OUTPATIENT)
Age: 38
End: 2018-02-20

## 2018-02-21 ENCOUNTER — APPOINTMENT (OUTPATIENT)
Dept: CARDIOLOGY | Facility: CLINIC | Age: 38
End: 2018-02-21
Payer: MEDICAID

## 2018-02-21 VITALS — SYSTOLIC BLOOD PRESSURE: 102 MMHG | HEART RATE: 77 BPM | DIASTOLIC BLOOD PRESSURE: 60 MMHG | OXYGEN SATURATION: 97 %

## 2018-02-21 LAB
INR PPP: 1.6 RATIO
PROTHROMBIN TIME COMMENT: NORMAL

## 2018-02-21 PROCEDURE — 99211 OFF/OP EST MAY X REQ PHY/QHP: CPT

## 2018-02-21 PROCEDURE — 85610 PROTHROMBIN TIME: CPT | Mod: QW

## 2018-02-21 RX ORDER — DIGOXIN 125 UG/1
125 TABLET ORAL DAILY
Qty: 30 | Refills: 0 | Status: DISCONTINUED | COMMUNITY
End: 2018-02-21

## 2018-02-22 ENCOUNTER — OUTPATIENT (OUTPATIENT)
Dept: OUTPATIENT SERVICES | Facility: HOSPITAL | Age: 38
LOS: 1 days | End: 2018-02-22
Payer: COMMERCIAL

## 2018-02-22 ENCOUNTER — APPOINTMENT (OUTPATIENT)
Dept: CV DIAGNOSITCS | Facility: HOSPITAL | Age: 38
End: 2018-02-22

## 2018-02-22 DIAGNOSIS — I49.9 CARDIAC ARRHYTHMIA, UNSPECIFIED: ICD-10-CM

## 2018-02-22 DIAGNOSIS — Z90.79 ACQUIRED ABSENCE OF OTHER GENITAL ORGAN(S): Chronic | ICD-10-CM

## 2018-02-22 DIAGNOSIS — Z95.828 PRESENCE OF OTHER VASCULAR IMPLANTS AND GRAFTS: Chronic | ICD-10-CM

## 2018-02-22 PROCEDURE — 86900 BLOOD TYPING SEROLOGIC ABO: CPT

## 2018-02-22 PROCEDURE — 86850 RBC ANTIBODY SCREEN: CPT

## 2018-02-22 PROCEDURE — 86901 BLOOD TYPING SEROLOGIC RH(D): CPT

## 2018-02-22 PROCEDURE — 93005 ELECTROCARDIOGRAM TRACING: CPT

## 2018-02-22 PROCEDURE — 93306 TTE W/DOPPLER COMPLETE: CPT

## 2018-02-22 PROCEDURE — 93010 ELECTROCARDIOGRAM REPORT: CPT

## 2018-02-26 ENCOUNTER — RESULT REVIEW (OUTPATIENT)
Age: 38
End: 2018-02-26

## 2018-02-26 ENCOUNTER — OUTPATIENT (OUTPATIENT)
Dept: OUTPATIENT SERVICES | Facility: HOSPITAL | Age: 38
LOS: 1 days | Discharge: HOME | End: 2018-02-26

## 2018-02-26 ENCOUNTER — APPOINTMENT (OUTPATIENT)
Dept: HEMATOLOGY ONCOLOGY | Facility: CLINIC | Age: 38
End: 2018-02-26
Payer: MEDICAID

## 2018-02-26 VITALS
SYSTOLIC BLOOD PRESSURE: 111 MMHG | BODY MASS INDEX: 33.96 KG/M2 | HEART RATE: 91 BPM | TEMPERATURE: 98.9 F | DIASTOLIC BLOOD PRESSURE: 73 MMHG | OXYGEN SATURATION: 94 % | RESPIRATION RATE: 16 BRPM | WEIGHT: 259 LBS

## 2018-02-26 DIAGNOSIS — Z95.828 PRESENCE OF OTHER VASCULAR IMPLANTS AND GRAFTS: Chronic | ICD-10-CM

## 2018-02-26 DIAGNOSIS — C62.92 MALIGNANT NEOPLASM OF LEFT TESTIS, UNSPECIFIED WHETHER DESCENDED OR UNDESCENDED: ICD-10-CM

## 2018-02-26 DIAGNOSIS — Z90.79 ACQUIRED ABSENCE OF OTHER GENITAL ORGAN(S): Chronic | ICD-10-CM

## 2018-02-26 PROBLEM — I42.9 CARDIOMYOPATHY, UNSPECIFIED: Chronic | Status: ACTIVE | Noted: 2018-02-22

## 2018-02-26 PROBLEM — I48.92 UNSPECIFIED ATRIAL FLUTTER: Chronic | Status: ACTIVE | Noted: 2018-02-22

## 2018-02-26 LAB
HCT VFR BLD CALC: 42.4 % — SIGNIFICANT CHANGE UP (ref 39–50)
HGB BLD-MCNC: 15 G/DL — SIGNIFICANT CHANGE UP (ref 13–17)
MCHC RBC-ENTMCNC: 33 PG — SIGNIFICANT CHANGE UP (ref 27–34)
MCHC RBC-ENTMCNC: 35.4 G/DL — SIGNIFICANT CHANGE UP (ref 32–36)
MCV RBC AUTO: 93.2 FL — SIGNIFICANT CHANGE UP (ref 80–100)
PLATELET # BLD AUTO: 194 K/UL — SIGNIFICANT CHANGE UP (ref 150–400)
RBC # BLD: 4.55 M/UL — SIGNIFICANT CHANGE UP (ref 4.2–5.8)
RBC # FLD: 11.4 % — SIGNIFICANT CHANGE UP (ref 10.3–14.5)
WBC # BLD: 7.6 K/UL — SIGNIFICANT CHANGE UP (ref 3.8–10.5)
WBC # FLD AUTO: 7.6 K/UL — SIGNIFICANT CHANGE UP (ref 3.8–10.5)

## 2018-02-26 PROCEDURE — 99215 OFFICE O/P EST HI 40 MIN: CPT

## 2018-02-27 ENCOUNTER — APPOINTMENT (OUTPATIENT)
Dept: CARDIOLOGY | Facility: CLINIC | Age: 38
End: 2018-02-27

## 2018-02-27 NOTE — BRIEF OPERATIVE NOTE - ELECTIVE PROCEDURE
ULTRASOUND GUIDED NEEDLE CORE BIOPSY RIGHT BREAST WITH CLIP PLACEMENT: 

02/27/18



CLINICAL: Right retroareolar breast mass.



COMPARISON :02/14/18



FINDINGS: The procedure was explained to the patient and informed 

consent was obtained.  



Ultrasound demonstrated the previously described retroareolar mass at 

12 o'clock..



I marked the breast with a felt tip marker and a time out was called. 

The skin was prepped with Betadine and anesthetized with 1% lidocaine. 

Needle core biopsy was performed through a tiny dermatotomy using 

ultrasound guidance, 2% lidocaine with epinephrine for deep anesthesia 

and a 14-gauge Achieve biopsy device.  4 cores were obtained and placed 

in formalin.  A clip was deployed within the mass. The patient 

tolerated the procedure well and there were no apparent complications.  

Hemostasis was achieved with minimal pressure and a sterile dressing 

was applied. A two view mammogram demonstrated concordant placement of 

the clip. She left the department in good condition and was given 

instructions for wound care and followup.



IMPRESSION: Uncomplicated ultrasound guided needle core biopsy with 

clip placement right breast.
Yes

## 2018-02-28 LAB
AFP-TM SERPL-MCNC: 2.1 NG/ML
ALBUMIN SERPL ELPH-MCNC: 4.4 G/DL
ALP BLD-CCNC: 45 U/L
ALT SERPL-CCNC: 48 U/L
ANION GAP SERPL CALC-SCNC: 13 MMOL/L
AST SERPL-CCNC: 39 U/L
BILIRUB SERPL-MCNC: 0.5 MG/DL
BUN SERPL-MCNC: 18 MG/DL
CALCIUM SERPL-MCNC: 9.6 MG/DL
CHLORIDE SERPL-SCNC: 102 MMOL/L
CO2 SERPL-SCNC: 26 MMOL/L
CREAT SERPL-MCNC: 0.92 MG/DL
GLUCOSE SERPL-MCNC: 97 MG/DL
HCG-TM SERPL-MCNC: <1 MIU/ML
LDH SERPL-CCNC: 273 U/L
POTASSIUM SERPL-SCNC: 4.4 MMOL/L
PROT SERPL-MCNC: 7.4 G/DL
SODIUM SERPL-SCNC: 141 MMOL/L

## 2018-03-08 ENCOUNTER — FORM ENCOUNTER (OUTPATIENT)
Age: 38
End: 2018-03-08

## 2018-03-09 ENCOUNTER — APPOINTMENT (OUTPATIENT)
Dept: CT IMAGING | Facility: IMAGING CENTER | Age: 38
End: 2018-03-09
Payer: MEDICAID

## 2018-03-09 ENCOUNTER — OUTPATIENT (OUTPATIENT)
Dept: OUTPATIENT SERVICES | Facility: HOSPITAL | Age: 38
LOS: 1 days | End: 2018-03-09
Payer: COMMERCIAL

## 2018-03-09 DIAGNOSIS — Z95.828 PRESENCE OF OTHER VASCULAR IMPLANTS AND GRAFTS: Chronic | ICD-10-CM

## 2018-03-09 DIAGNOSIS — Z90.79 ACQUIRED ABSENCE OF OTHER GENITAL ORGAN(S): Chronic | ICD-10-CM

## 2018-03-09 DIAGNOSIS — C62.92 MALIGNANT NEOPLASM OF LEFT TESTIS, UNSPECIFIED WHETHER DESCENDED OR UNDESCENDED: ICD-10-CM

## 2018-03-09 DIAGNOSIS — I51.3 INTRACARDIAC THROMBOSIS, NOT ELSEWHERE CLASSIFIED: ICD-10-CM

## 2018-03-09 PROCEDURE — 71260 CT THORAX DX C+: CPT

## 2018-03-09 PROCEDURE — 74177 CT ABD & PELVIS W/CONTRAST: CPT | Mod: 26

## 2018-03-09 PROCEDURE — 74177 CT ABD & PELVIS W/CONTRAST: CPT

## 2018-03-09 PROCEDURE — 71260 CT THORAX DX C+: CPT | Mod: 26

## 2018-03-13 ENCOUNTER — FORM ENCOUNTER (OUTPATIENT)
Age: 38
End: 2018-03-13

## 2018-03-14 ENCOUNTER — OUTPATIENT (OUTPATIENT)
Dept: OUTPATIENT SERVICES | Facility: HOSPITAL | Age: 38
LOS: 1 days | End: 2018-03-14
Payer: COMMERCIAL

## 2018-03-14 ENCOUNTER — APPOINTMENT (OUTPATIENT)
Dept: MRI IMAGING | Facility: CLINIC | Age: 38
End: 2018-03-14

## 2018-03-14 DIAGNOSIS — Z95.828 PRESENCE OF OTHER VASCULAR IMPLANTS AND GRAFTS: Chronic | ICD-10-CM

## 2018-03-14 DIAGNOSIS — Z00.8 ENCOUNTER FOR OTHER GENERAL EXAMINATION: ICD-10-CM

## 2018-03-14 DIAGNOSIS — Z90.79 ACQUIRED ABSENCE OF OTHER GENITAL ORGAN(S): Chronic | ICD-10-CM

## 2018-03-14 PROCEDURE — 75561 CARDIAC MRI FOR MORPH W/DYE: CPT

## 2018-03-14 PROCEDURE — A9585: CPT

## 2018-03-14 PROCEDURE — 75561 CARDIAC MRI FOR MORPH W/DYE: CPT | Mod: 26

## 2018-03-22 ENCOUNTER — APPOINTMENT (OUTPATIENT)
Dept: UROLOGY | Facility: CLINIC | Age: 38
End: 2018-03-22
Payer: MEDICAID

## 2018-03-22 DIAGNOSIS — N50.9 DISORDER OF MALE GENITAL ORGANS, UNSPECIFIED: ICD-10-CM

## 2018-03-22 PROCEDURE — 99215 OFFICE O/P EST HI 40 MIN: CPT

## 2018-04-10 NOTE — H&P CARDIOLOGY - TOBACCO USE
Pt daughter notified and states they need this sent to mail order- [id number] [facility] she will call [facility] to cancel Rx with them Former smoker

## 2018-04-11 ENCOUNTER — APPOINTMENT (OUTPATIENT)
Dept: ELECTROPHYSIOLOGY | Facility: CLINIC | Age: 38
End: 2018-04-11

## 2018-04-23 NOTE — ED PROVIDER NOTE - CONDUCTED A DETAILED DISCUSSION WITH PATIENT AND/OR GUARDIAN REGARDING, MDM
Writer notified DEJAH Patel of patient having 130cc out of drain in PACU and an additional 180cc out on day shift. New orders received for H/H tomorrow AM. Writer will continue to monitor.   lab results/radiology results

## 2018-04-27 ENCOUNTER — OUTPATIENT (OUTPATIENT)
Dept: OUTPATIENT SERVICES | Facility: HOSPITAL | Age: 38
LOS: 1 days | End: 2018-04-27
Payer: MEDICAID

## 2018-04-27 VITALS
DIASTOLIC BLOOD PRESSURE: 80 MMHG | HEIGHT: 73 IN | HEART RATE: 82 BPM | TEMPERATURE: 98 F | WEIGHT: 250 LBS | RESPIRATION RATE: 16 BRPM | SYSTOLIC BLOOD PRESSURE: 110 MMHG

## 2018-04-27 DIAGNOSIS — Z90.79 ACQUIRED ABSENCE OF OTHER GENITAL ORGAN(S): Chronic | ICD-10-CM

## 2018-04-27 DIAGNOSIS — Z98.890 OTHER SPECIFIED POSTPROCEDURAL STATES: Chronic | ICD-10-CM

## 2018-04-27 DIAGNOSIS — I48.92 UNSPECIFIED ATRIAL FLUTTER: ICD-10-CM

## 2018-04-27 DIAGNOSIS — Z95.828 PRESENCE OF OTHER VASCULAR IMPLANTS AND GRAFTS: Chronic | ICD-10-CM

## 2018-04-27 DIAGNOSIS — C79.9 SECONDARY MALIGNANT NEOPLASM OF UNSPECIFIED SITE: ICD-10-CM

## 2018-04-27 DIAGNOSIS — C62.92 MALIGNANT NEOPLASM OF LEFT TESTIS, UNSPECIFIED WHETHER DESCENDED OR UNDESCENDED: ICD-10-CM

## 2018-04-27 LAB
APPEARANCE UR: CLEAR — SIGNIFICANT CHANGE UP
APTT BLD: 41.4 SEC — HIGH (ref 27.5–37.4)
BILIRUB UR-MCNC: NEGATIVE — SIGNIFICANT CHANGE UP
BLD GP AB SCN SERPL QL: NEGATIVE — SIGNIFICANT CHANGE UP
BLOOD UR QL VISUAL: NEGATIVE — SIGNIFICANT CHANGE UP
BUN SERPL-MCNC: 17 MG/DL — SIGNIFICANT CHANGE UP (ref 7–23)
CALCIUM SERPL-MCNC: 9.2 MG/DL — SIGNIFICANT CHANGE UP (ref 8.4–10.5)
CHLORIDE SERPL-SCNC: 102 MMOL/L — SIGNIFICANT CHANGE UP (ref 98–107)
CO2 SERPL-SCNC: 29 MMOL/L — SIGNIFICANT CHANGE UP (ref 22–31)
COLOR SPEC: YELLOW — SIGNIFICANT CHANGE UP
CREAT SERPL-MCNC: 0.9 MG/DL — SIGNIFICANT CHANGE UP (ref 0.5–1.3)
GLUCOSE SERPL-MCNC: 90 MG/DL — SIGNIFICANT CHANGE UP (ref 70–99)
GLUCOSE UR-MCNC: NEGATIVE — SIGNIFICANT CHANGE UP
HCT VFR BLD CALC: 46.1 % — SIGNIFICANT CHANGE UP (ref 39–50)
HGB BLD-MCNC: 14.9 G/DL — SIGNIFICANT CHANGE UP (ref 13–17)
INR BLD: 1.35 — HIGH (ref 0.88–1.17)
KETONES UR-MCNC: NEGATIVE — SIGNIFICANT CHANGE UP
LEUKOCYTE ESTERASE UR-ACNC: NEGATIVE — SIGNIFICANT CHANGE UP
MCHC RBC-ENTMCNC: 31.2 PG — SIGNIFICANT CHANGE UP (ref 27–34)
MCHC RBC-ENTMCNC: 32.3 % — SIGNIFICANT CHANGE UP (ref 32–36)
MCV RBC AUTO: 96.6 FL — SIGNIFICANT CHANGE UP (ref 80–100)
MUCOUS THREADS # UR AUTO: SIGNIFICANT CHANGE UP
NITRITE UR-MCNC: NEGATIVE — SIGNIFICANT CHANGE UP
NRBC # FLD: 0 — SIGNIFICANT CHANGE UP
PH UR: 7 — SIGNIFICANT CHANGE UP (ref 4.6–8)
PLATELET # BLD AUTO: 203 K/UL — SIGNIFICANT CHANGE UP (ref 150–400)
PMV BLD: 10 FL — SIGNIFICANT CHANGE UP (ref 7–13)
POTASSIUM SERPL-MCNC: 4.3 MMOL/L — SIGNIFICANT CHANGE UP (ref 3.5–5.3)
POTASSIUM SERPL-SCNC: 4.3 MMOL/L — SIGNIFICANT CHANGE UP (ref 3.5–5.3)
PROT UR-MCNC: 10 MG/DL — SIGNIFICANT CHANGE UP
PROTHROM AB SERPL-ACNC: 15.1 SEC — HIGH (ref 9.8–13.1)
RBC # BLD: 4.77 M/UL — SIGNIFICANT CHANGE UP (ref 4.2–5.8)
RBC # FLD: 12.4 % — SIGNIFICANT CHANGE UP (ref 10.3–14.5)
RBC CASTS # UR COMP ASSIST: SIGNIFICANT CHANGE UP (ref 0–?)
RH IG SCN BLD-IMP: POSITIVE — SIGNIFICANT CHANGE UP
SODIUM SERPL-SCNC: 141 MMOL/L — SIGNIFICANT CHANGE UP (ref 135–145)
SP GR SPEC: 1.02 — SIGNIFICANT CHANGE UP (ref 1–1.04)
SQUAMOUS # UR AUTO: SIGNIFICANT CHANGE UP
UROBILINOGEN FLD QL: NORMAL MG/DL — SIGNIFICANT CHANGE UP
WBC # BLD: 7.71 K/UL — SIGNIFICANT CHANGE UP (ref 3.8–10.5)
WBC # FLD AUTO: 7.71 K/UL — SIGNIFICANT CHANGE UP (ref 3.8–10.5)
WBC UR QL: SIGNIFICANT CHANGE UP (ref 0–?)

## 2018-04-27 PROCEDURE — 93010 ELECTROCARDIOGRAM REPORT: CPT

## 2018-04-27 RX ORDER — DIGOXIN 250 MCG
1 TABLET ORAL
Qty: 0 | Refills: 0 | COMMUNITY

## 2018-04-27 RX ORDER — SUCRALFATE 1 G
10 TABLET ORAL
Qty: 0 | Refills: 0 | COMMUNITY

## 2018-04-27 RX ORDER — TETRAHYDROZOLINE/POLYETHYL GLY 0.05 %-1 %
1 DROPS OPHTHALMIC (EYE)
Qty: 0 | Refills: 0 | COMMUNITY

## 2018-04-27 NOTE — H&P PST ADULT - REASON FOR ADMISSION
"retroperitoneal dissection, removing all of the tumors that are inside, using nerve sparing technique"

## 2018-04-27 NOTE — H&P PST ADULT - PSH
History of orchiectomy  left-7/2017  Port-a-cath in place  placed 9/2017,, removed-10/18/2017 History of cardioversion  2/2018  History of orchiectomy  left-7/2017  Port-a-cath in place  placed 9/2017,, removed-10/18/2017

## 2018-04-27 NOTE — H&P PST ADULT - PROBLEM SELECTOR PLAN 2
Pt. states he's on Coumadin and has not seen his Cardiologist since cardioversion approximately 2 months ago.  Pt. states no insurance now, currently on Medicaid and insurance will be reinstated 5/1/18.  Consequently, he has not recently seen his Doctors.  Discussed with Dr. Lam.  Pt. instructed to contact his Cardiologist and discuss when Coumadin needs to be stopped prior to surgery.  Notified the Surgeon's Surgical Coordinator, Yary that pt. instructed to discuss and obtain stop date for Coumadin with his Cardiologist.

## 2018-04-27 NOTE — H&P PST ADULT - PMH
Atrial fibrillation  s/p DCCV 1/18  Atrial flutter    Cardiomyopathy    Lumbar herniated disc    Testicular cancer    Testicular mass  left  Thrombus  Right atrial wall ( suspected from last TYLOR) On AC

## 2018-04-27 NOTE — H&P PST ADULT - PROBLEM SELECTOR PLAN 1
Pt. is scheduled for an exploratory laparotomy, resection of retroperitoneal mass, retroperitoneal lymph node dissection, pelvic lymph node dissection 5/4/18.

## 2018-04-27 NOTE — H&P PST ADULT - HISTORY OF PRESENT ILLNESS
Pt. is a 38 yo male that was diagnosed with testicular cancer 7/2017 that metastasized.  Pt. had chemotherapy.

## 2018-04-27 NOTE — H&P PST ADULT - MALLAMPATI CLASS
Class III - visualization of the soft palate and the base of the uvula Class III - visualization of the soft palate and the base of the uvula/on phonation

## 2018-04-28 LAB
BACTERIA UR CULT: SIGNIFICANT CHANGE UP
SPECIMEN SOURCE: SIGNIFICANT CHANGE UP

## 2018-05-03 NOTE — ASU PATIENT PROFILE, ADULT - PSH
History of cardioversion  2/2018  History of orchiectomy  left-7/2017  Port-a-cath in place  placed 9/2017,, removed-10/18/2017

## 2018-05-03 NOTE — ASU PATIENT PROFILE, ADULT - PMH
Atrial fibrillation  s/p DCCV 1/18  Atrial flutter    Atrial mass  right artrial echo density per MRI 3/14/18.  Cardiomyopathy    Lumbar herniated disc    Obesity    Testicular cancer    Testicular mass  left  Thrombus  Right atrial wall ( suspected from last TYLOR) On AC

## 2018-05-04 ENCOUNTER — OTHER (OUTPATIENT)
Age: 38
End: 2018-05-04

## 2018-05-04 ENCOUNTER — APPOINTMENT (OUTPATIENT)
Dept: UROLOGY | Facility: HOSPITAL | Age: 38
End: 2018-05-04

## 2018-05-25 ENCOUNTER — FORM ENCOUNTER (OUTPATIENT)
Age: 38
End: 2018-05-25

## 2018-05-26 ENCOUNTER — APPOINTMENT (OUTPATIENT)
Dept: CT IMAGING | Facility: IMAGING CENTER | Age: 38
End: 2018-05-26
Payer: MEDICAID

## 2018-05-26 ENCOUNTER — OUTPATIENT (OUTPATIENT)
Dept: OUTPATIENT SERVICES | Facility: HOSPITAL | Age: 38
LOS: 1 days | End: 2018-05-26
Payer: MEDICAID

## 2018-05-26 DIAGNOSIS — C77.2 SECONDARY AND UNSPECIFIED MALIGNANT NEOPLASM OF INTRA-ABDOMINAL LYMPH NODES: ICD-10-CM

## 2018-05-26 DIAGNOSIS — Z98.890 OTHER SPECIFIED POSTPROCEDURAL STATES: Chronic | ICD-10-CM

## 2018-05-26 DIAGNOSIS — Z90.79 ACQUIRED ABSENCE OF OTHER GENITAL ORGAN(S): Chronic | ICD-10-CM

## 2018-05-26 DIAGNOSIS — Z95.828 PRESENCE OF OTHER VASCULAR IMPLANTS AND GRAFTS: Chronic | ICD-10-CM

## 2018-05-26 PROCEDURE — 71260 CT THORAX DX C+: CPT

## 2018-05-26 PROCEDURE — 74177 CT ABD & PELVIS W/CONTRAST: CPT | Mod: 26

## 2018-05-26 PROCEDURE — 71260 CT THORAX DX C+: CPT | Mod: 26

## 2018-05-26 PROCEDURE — 74177 CT ABD & PELVIS W/CONTRAST: CPT

## 2018-05-30 ENCOUNTER — OTHER (OUTPATIENT)
Age: 38
End: 2018-05-30

## 2018-06-01 ENCOUNTER — OUTPATIENT (OUTPATIENT)
Dept: OUTPATIENT SERVICES | Facility: HOSPITAL | Age: 38
LOS: 1 days | End: 2018-06-01
Payer: MEDICAID

## 2018-06-01 DIAGNOSIS — Z95.828 PRESENCE OF OTHER VASCULAR IMPLANTS AND GRAFTS: Chronic | ICD-10-CM

## 2018-06-01 DIAGNOSIS — Z90.79 ACQUIRED ABSENCE OF OTHER GENITAL ORGAN(S): Chronic | ICD-10-CM

## 2018-06-01 DIAGNOSIS — Z98.890 OTHER SPECIFIED POSTPROCEDURAL STATES: Chronic | ICD-10-CM

## 2018-06-01 PROCEDURE — G9001: CPT

## 2018-06-04 ENCOUNTER — OUTPATIENT (OUTPATIENT)
Dept: OUTPATIENT SERVICES | Facility: HOSPITAL | Age: 38
LOS: 1 days | End: 2018-06-04
Payer: MEDICAID

## 2018-06-04 VITALS
HEIGHT: 74 IN | SYSTOLIC BLOOD PRESSURE: 120 MMHG | OXYGEN SATURATION: 98 % | RESPIRATION RATE: 16 BRPM | TEMPERATURE: 98 F | DIASTOLIC BLOOD PRESSURE: 80 MMHG | WEIGHT: 253.09 LBS | HEART RATE: 75 BPM

## 2018-06-04 DIAGNOSIS — R19.00 INTRA-ABDOMINAL AND PELVIC SWELLING, MASS AND LUMP, UNSPECIFIED SITE: ICD-10-CM

## 2018-06-04 DIAGNOSIS — C62.92 MALIGNANT NEOPLASM OF LEFT TESTIS, UNSPECIFIED WHETHER DESCENDED OR UNDESCENDED: ICD-10-CM

## 2018-06-04 DIAGNOSIS — Z98.890 OTHER SPECIFIED POSTPROCEDURAL STATES: Chronic | ICD-10-CM

## 2018-06-04 DIAGNOSIS — Z95.828 PRESENCE OF OTHER VASCULAR IMPLANTS AND GRAFTS: Chronic | ICD-10-CM

## 2018-06-04 DIAGNOSIS — Z90.79 ACQUIRED ABSENCE OF OTHER GENITAL ORGAN(S): Chronic | ICD-10-CM

## 2018-06-04 DIAGNOSIS — I51.9 HEART DISEASE, UNSPECIFIED: ICD-10-CM

## 2018-06-04 LAB
ALBUMIN SERPL ELPH-MCNC: 4.5 G/DL — SIGNIFICANT CHANGE UP (ref 3.3–5)
ALP SERPL-CCNC: 66 U/L — SIGNIFICANT CHANGE UP (ref 40–120)
ALT FLD-CCNC: 60 U/L — HIGH (ref 4–41)
APTT BLD: 33.3 SEC — SIGNIFICANT CHANGE UP (ref 27.5–37.4)
AST SERPL-CCNC: 41 U/L — HIGH (ref 4–40)
BILIRUB SERPL-MCNC: 0.4 MG/DL — SIGNIFICANT CHANGE UP (ref 0.2–1.2)
BLD GP AB SCN SERPL QL: NEGATIVE — SIGNIFICANT CHANGE UP
BUN SERPL-MCNC: 15 MG/DL — SIGNIFICANT CHANGE UP (ref 7–23)
CALCIUM SERPL-MCNC: 9.4 MG/DL — SIGNIFICANT CHANGE UP (ref 8.4–10.5)
CHLORIDE SERPL-SCNC: 98 MMOL/L — SIGNIFICANT CHANGE UP (ref 98–107)
CO2 SERPL-SCNC: 27 MMOL/L — SIGNIFICANT CHANGE UP (ref 22–31)
CREAT SERPL-MCNC: 0.9 MG/DL — SIGNIFICANT CHANGE UP (ref 0.5–1.3)
GLUCOSE SERPL-MCNC: 80 MG/DL — SIGNIFICANT CHANGE UP (ref 70–99)
HCT VFR BLD CALC: 45.9 % — SIGNIFICANT CHANGE UP (ref 39–50)
HGB BLD-MCNC: 15.5 G/DL — SIGNIFICANT CHANGE UP (ref 13–17)
INR BLD: 0.99 — SIGNIFICANT CHANGE UP (ref 0.88–1.17)
MCHC RBC-ENTMCNC: 31.9 PG — SIGNIFICANT CHANGE UP (ref 27–34)
MCHC RBC-ENTMCNC: 33.8 % — SIGNIFICANT CHANGE UP (ref 32–36)
MCV RBC AUTO: 94.4 FL — SIGNIFICANT CHANGE UP (ref 80–100)
NRBC # FLD: 0 — SIGNIFICANT CHANGE UP
PLATELET # BLD AUTO: 227 K/UL — SIGNIFICANT CHANGE UP (ref 150–400)
PMV BLD: 10.1 FL — SIGNIFICANT CHANGE UP (ref 7–13)
POTASSIUM SERPL-MCNC: 4 MMOL/L — SIGNIFICANT CHANGE UP (ref 3.5–5.3)
POTASSIUM SERPL-SCNC: 4 MMOL/L — SIGNIFICANT CHANGE UP (ref 3.5–5.3)
PROT SERPL-MCNC: 8.1 G/DL — SIGNIFICANT CHANGE UP (ref 6–8.3)
PROTHROM AB SERPL-ACNC: 11.4 SEC — SIGNIFICANT CHANGE UP (ref 9.8–13.1)
RBC # BLD: 4.86 M/UL — SIGNIFICANT CHANGE UP (ref 4.2–5.8)
RBC # FLD: 12.1 % — SIGNIFICANT CHANGE UP (ref 10.3–14.5)
RH IG SCN BLD-IMP: POSITIVE — SIGNIFICANT CHANGE UP
SODIUM SERPL-SCNC: 139 MMOL/L — SIGNIFICANT CHANGE UP (ref 135–145)
WBC # BLD: 10.88 K/UL — HIGH (ref 3.8–10.5)
WBC # FLD AUTO: 10.88 K/UL — HIGH (ref 3.8–10.5)

## 2018-06-04 PROCEDURE — 93010 ELECTROCARDIOGRAM REPORT: CPT

## 2018-06-04 RX ORDER — IBUPROFEN 200 MG
2 TABLET ORAL
Qty: 0 | Refills: 0 | COMMUNITY

## 2018-06-04 RX ORDER — SODIUM CHLORIDE 9 MG/ML
1000 INJECTION, SOLUTION INTRAVENOUS
Qty: 0 | Refills: 0 | Status: DISCONTINUED | OUTPATIENT
Start: 2018-06-06 | End: 2018-06-06

## 2018-06-04 RX ORDER — WARFARIN SODIUM 2.5 MG/1
1 TABLET ORAL
Qty: 0 | Refills: 0 | COMMUNITY

## 2018-06-04 NOTE — H&P PST ADULT - PMH
Atrial fibrillation  s/p DCCV 1/18  Atrial flutter    Cardiomyopathy    Lumbar herniated disc    Testicular cancer    Testicular mass  left  Thrombus  Right atrial wall ( suspected from last TYLOR) On AC Atrial fibrillation  s/p DCCV 1/18  Atrial flutter    Atrial mass  right artrial echo density per MRI 3/14/18.  Cardiomyopathy    Lumbar herniated disc    Testicular cancer    Testicular mass  left  Thrombus  Right atrial wall ( suspected from last YTLOR) On AC Atrial fibrillation  s/p DCCV 1/18  Atrial flutter    Atrial mass  right artrial echo density per MRI 3/14/18.  Cardiomyopathy    Lumbar herniated disc    Obesity    Testicular cancer    Testicular mass  left  Thrombus  Right atrial wall ( suspected from last TYLOR) On AC

## 2018-06-04 NOTE — H&P PST ADULT - CARDIOVASCULAR COMMENTS
right echodensity per Cardiac  MRI.  Hx of Afib, Aflutter.  pt was on coumadin which he discontinued on his own 2 weeks ago because he "ran out" right echodensity per Cardiac  MRI.  Hx of Afib, Aflutter. S/P Successful cardio conversion per pt.   Thrombus right atrial wall. Pt was on coumadin which he discontinued on his own 2 weeks ago because he "ran out"

## 2018-06-04 NOTE — H&P PST ADULT - PROBLEM SELECTOR PLAN 1
Exploratory Laparotomy, Resection or Retroperitoneal mass, retroperitoneal lymph node dissection, pelvic lymph node  dissection    MALOU precautions, OR booking informed  CMP PT/PTT T&S EKG    Pre-op instructions, given and explained Exploratory Laparotomy, Resection or Retroperitoneal mass, retroperitoneal lymph node dissection, pelvic lymph node  dissection 6/6/18                  CMP PT/PTT T&S EKG    Pre-op instructions, antibacterial soap given and explained

## 2018-06-04 NOTE — H&P PST ADULT - PRO TOBACCO TYPE
1 pack per week on and off for 8 years, last smoked summer 2017/cigarettes , last smoked summer 2017.  Hx 1 PPweek x15 years/cigarettes

## 2018-06-04 NOTE — H&P PST ADULT - OTHER CARE PROVIDERS
Dr. Mccualey, Cardiologist [address] [place] [address]; Dr. Chirinos, Oncologist Dr. Marquez, [phone] Cardiologist [address] [place] [address]; Dr. Chirinos, Oncologist

## 2018-06-04 NOTE — H&P PST ADULT - RS GEN PE MLT RESP DETAILS PC
clear to auscultation bilaterally no rhonchi/no wheezes/breath sounds equal/respirations non-labored/no rales/clear to auscultation bilaterally

## 2018-06-04 NOTE — H&P PST ADULT - HISTORY OF PRESENT ILLNESS
Pt. is a 38 yo male that was diagnosed with testicular cancer 7/2017 that metastasized.  Pt. had chemotherapy. Pt. is a 36 yo male that was diagnosed with testicular cancer 7/2017 that metastasized.  Pt. had chemotherapy which completed 10/2018.  Pt reports retroperitoneal mass discovered on CT scan.  now scheduled for Exploratory Laparotomy, Resection of retroperitoneal mass, retroperitoneal lymph node dissection, pelvic lymph node dissection 6/6/18.

## 2018-06-04 NOTE — H&P PST ADULT - MUSCULOSKELETAL
details… No joint pain, swelling or deformity; no limitation of movement negative detailed exam no calf tenderness/normal strength

## 2018-06-04 NOTE — H&P PST ADULT - PROBLEM SELECTOR PLAN 2
Hx of right atrial thrombus, possible right atrial mass, on coumadin, Pt reports he stopped taking his Coumadin 2 weeks ago on his own because he "ran out".  Pt also reports that he gets CP at times with exertion.  Cardiology clearance requested on chart. Dr Restrepo informed of above Hx of right atrial thrombus, possible right atrial mass, on coumadin, Pt reports he stopped taking his Coumadin 2 weeks ago on his own because he "ran out".  Pt also reports that he gets CP at times with exertion.  Cardiology clearance requested on chart. Dr Restrepo informed of above.

## 2018-06-05 ENCOUNTER — TRANSCRIPTION ENCOUNTER (OUTPATIENT)
Age: 38
End: 2018-06-05

## 2018-06-06 ENCOUNTER — INPATIENT (INPATIENT)
Facility: HOSPITAL | Age: 38
LOS: 4 days | Discharge: HOME | End: 2018-06-11
Attending: UROLOGY | Admitting: UROLOGY
Payer: MEDICAID

## 2018-06-06 ENCOUNTER — RESULT REVIEW (OUTPATIENT)
Age: 38
End: 2018-06-06

## 2018-06-06 ENCOUNTER — APPOINTMENT (OUTPATIENT)
Dept: UROLOGY | Facility: HOSPITAL | Age: 38
End: 2018-06-06

## 2018-06-06 VITALS
TEMPERATURE: 98 F | RESPIRATION RATE: 17 BRPM | HEART RATE: 88 BPM | WEIGHT: 253.09 LBS | SYSTOLIC BLOOD PRESSURE: 126 MMHG | HEIGHT: 74 IN | DIASTOLIC BLOOD PRESSURE: 81 MMHG | OXYGEN SATURATION: 95 %

## 2018-06-06 DIAGNOSIS — C62.92 MALIGNANT NEOPLASM OF LEFT TESTIS, UNSPECIFIED WHETHER DESCENDED OR UNDESCENDED: ICD-10-CM

## 2018-06-06 DIAGNOSIS — Z90.79 ACQUIRED ABSENCE OF OTHER GENITAL ORGAN(S): Chronic | ICD-10-CM

## 2018-06-06 DIAGNOSIS — Z98.890 OTHER SPECIFIED POSTPROCEDURAL STATES: Chronic | ICD-10-CM

## 2018-06-06 DIAGNOSIS — Z95.828 PRESENCE OF OTHER VASCULAR IMPLANTS AND GRAFTS: Chronic | ICD-10-CM

## 2018-06-06 LAB
ALBUMIN SERPL ELPH-MCNC: 4.2 G/DL — SIGNIFICANT CHANGE UP (ref 3.3–5)
ALP SERPL-CCNC: 57 U/L — SIGNIFICANT CHANGE UP (ref 40–120)
ALT FLD-CCNC: 59 U/L — HIGH (ref 4–41)
AST SERPL-CCNC: 55 U/L — HIGH (ref 4–40)
BASE EXCESS BLDA CALC-SCNC: -0.2 MMOL/L — SIGNIFICANT CHANGE UP
BASE EXCESS BLDA CALC-SCNC: -0.6 MMOL/L — SIGNIFICANT CHANGE UP
BASE EXCESS BLDA CALC-SCNC: 0.1 MMOL/L — SIGNIFICANT CHANGE UP
BASE EXCESS BLDA CALC-SCNC: 0.9 MMOL/L — SIGNIFICANT CHANGE UP
BASE EXCESS BLDA CALC-SCNC: 3.3 MMOL/L — SIGNIFICANT CHANGE UP
BILIRUB SERPL-MCNC: 0.5 MG/DL — SIGNIFICANT CHANGE UP (ref 0.2–1.2)
BUN SERPL-MCNC: 18 MG/DL — SIGNIFICANT CHANGE UP (ref 7–23)
CA-I BLDA-SCNC: 1.07 MMOL/L — LOW (ref 1.15–1.29)
CA-I BLDA-SCNC: 1.13 MMOL/L — LOW (ref 1.15–1.29)
CA-I BLDA-SCNC: 1.13 MMOL/L — LOW (ref 1.15–1.29)
CA-I BLDA-SCNC: 1.14 MMOL/L — LOW (ref 1.15–1.29)
CA-I BLDA-SCNC: 1.15 MMOL/L — SIGNIFICANT CHANGE UP (ref 1.15–1.29)
CALCIUM SERPL-MCNC: 9.1 MG/DL — SIGNIFICANT CHANGE UP (ref 8.4–10.5)
CHLORIDE SERPL-SCNC: 101 MMOL/L — SIGNIFICANT CHANGE UP (ref 98–107)
CO2 SERPL-SCNC: 21 MMOL/L — LOW (ref 22–31)
CREAT SERPL-MCNC: 0.82 MG/DL — SIGNIFICANT CHANGE UP (ref 0.5–1.3)
GAS PNL BLDV: 137 MMOL/L — SIGNIFICANT CHANGE UP (ref 136–146)
GLUCOSE BLDA-MCNC: 118 MG/DL — HIGH (ref 70–99)
GLUCOSE BLDA-MCNC: 121 MG/DL — HIGH (ref 70–99)
GLUCOSE BLDA-MCNC: 128 MG/DL — HIGH (ref 70–99)
GLUCOSE BLDA-MCNC: 136 MG/DL — HIGH (ref 70–99)
GLUCOSE BLDA-MCNC: 94 MG/DL — SIGNIFICANT CHANGE UP (ref 70–99)
GLUCOSE BLDV-MCNC: 103 — HIGH (ref 70–99)
GLUCOSE SERPL-MCNC: 99 MG/DL — SIGNIFICANT CHANGE UP (ref 70–99)
HCO3 BLDA-SCNC: 24 MMOL/L — SIGNIFICANT CHANGE UP (ref 22–26)
HCO3 BLDA-SCNC: 25 MMOL/L — SIGNIFICANT CHANGE UP (ref 22–26)
HCO3 BLDA-SCNC: 25 MMOL/L — SIGNIFICANT CHANGE UP (ref 22–26)
HCO3 BLDA-SCNC: 26 MMOL/L — SIGNIFICANT CHANGE UP (ref 22–26)
HCO3 BLDA-SCNC: 27 MMOL/L — HIGH (ref 22–26)
HCT VFR BLDA CALC: 29.5 % — LOW (ref 39–51)
HCT VFR BLDA CALC: 30 % — LOW (ref 39–51)
HCT VFR BLDA CALC: 32.1 % — LOW (ref 39–51)
HCT VFR BLDA CALC: 38.1 % — LOW (ref 39–51)
HCT VFR BLDA CALC: 38.1 % — LOW (ref 39–51)
HCT VFR BLDV CALC: 55.9 % — HIGH (ref 39–51)
HGB BLDA-MCNC: 10.4 G/DL — LOW (ref 13–17)
HGB BLDA-MCNC: 12.4 G/DL — LOW (ref 13–17)
HGB BLDA-MCNC: 12.4 G/DL — LOW (ref 13–17)
HGB BLDA-MCNC: 9.5 G/DL — LOW (ref 13–17)
HGB BLDA-MCNC: 9.7 G/DL — LOW (ref 13–17)
PCO2 BLDA: 32 MMHG — LOW (ref 35–48)
PCO2 BLDA: 34 MMHG — LOW (ref 35–48)
PCO2 BLDA: 39 MMHG — SIGNIFICANT CHANGE UP (ref 35–48)
PCO2 BLDA: 39 MMHG — SIGNIFICANT CHANGE UP (ref 35–48)
PCO2 BLDA: 41 MMHG — SIGNIFICANT CHANGE UP (ref 35–48)
PH BLDA: 7.4 PH — SIGNIFICANT CHANGE UP (ref 7.35–7.45)
PH BLDA: 7.41 PH — SIGNIFICANT CHANGE UP (ref 7.35–7.45)
PH BLDA: 7.44 PH — SIGNIFICANT CHANGE UP (ref 7.35–7.45)
PH BLDA: 7.46 PH — HIGH (ref 7.35–7.45)
PH BLDA: 7.49 PH — HIGH (ref 7.35–7.45)
PO2 BLDA: 228 MMHG — HIGH (ref 83–108)
PO2 BLDA: 229 MMHG — HIGH (ref 83–108)
PO2 BLDA: 238 MMHG — HIGH (ref 83–108)
PO2 BLDA: 258 MMHG — HIGH (ref 83–108)
PO2 BLDA: 279 MMHG — HIGH (ref 83–108)
POTASSIUM BLDA-SCNC: 3.6 MMOL/L — SIGNIFICANT CHANGE UP (ref 3.4–4.5)
POTASSIUM BLDA-SCNC: 3.6 MMOL/L — SIGNIFICANT CHANGE UP (ref 3.4–4.5)
POTASSIUM BLDA-SCNC: 3.8 MMOL/L — SIGNIFICANT CHANGE UP (ref 3.4–4.5)
POTASSIUM BLDA-SCNC: 3.9 MMOL/L — SIGNIFICANT CHANGE UP (ref 3.4–4.5)
POTASSIUM BLDA-SCNC: 4.2 MMOL/L — SIGNIFICANT CHANGE UP (ref 3.4–4.5)
POTASSIUM BLDV-SCNC: 4.4 MMOL/L — SIGNIFICANT CHANGE UP (ref 3.4–4.5)
POTASSIUM SERPL-MCNC: 5.1 MMOL/L — SIGNIFICANT CHANGE UP (ref 3.5–5.3)
POTASSIUM SERPL-SCNC: 5.1 MMOL/L — SIGNIFICANT CHANGE UP (ref 3.5–5.3)
PROT SERPL-MCNC: 8 G/DL — SIGNIFICANT CHANGE UP (ref 6–8.3)
SAO2 % BLDA: 99.6 % — HIGH (ref 95–99)
SAO2 % BLDA: 99.6 % — HIGH (ref 95–99)
SAO2 % BLDA: 99.7 % — HIGH (ref 95–99)
SAO2 % BLDA: 99.8 % — HIGH (ref 95–99)
SAO2 % BLDA: 99.8 % — HIGH (ref 95–99)
SODIUM BLDA-SCNC: 135 MMOL/L — LOW (ref 136–146)
SODIUM BLDA-SCNC: 136 MMOL/L — SIGNIFICANT CHANGE UP (ref 136–146)
SODIUM BLDA-SCNC: 138 MMOL/L — SIGNIFICANT CHANGE UP (ref 136–146)
SODIUM SERPL-SCNC: 137 MMOL/L — SIGNIFICANT CHANGE UP (ref 135–145)

## 2018-06-06 PROCEDURE — 88342 IMHCHEM/IMCYTCHM 1ST ANTB: CPT | Mod: 26

## 2018-06-06 PROCEDURE — 88305 TISSUE EXAM BY PATHOLOGIST: CPT | Mod: 26

## 2018-06-06 PROCEDURE — 93010 ELECTROCARDIOGRAM REPORT: CPT

## 2018-06-06 PROCEDURE — 38780 REMOVE ABDOMEN LYMPH NODES: CPT | Mod: 59

## 2018-06-06 PROCEDURE — 88331 PATH CONSLTJ SURG 1 BLK 1SPC: CPT | Mod: 26

## 2018-06-06 PROCEDURE — 49204: CPT

## 2018-06-06 PROCEDURE — 88341 IMHCHEM/IMCYTCHM EA ADD ANTB: CPT | Mod: 26

## 2018-06-06 PROCEDURE — 88307 TISSUE EXAM BY PATHOLOGIST: CPT | Mod: 26

## 2018-06-06 RX ORDER — SODIUM CHLORIDE 9 MG/ML
1000 INJECTION, SOLUTION INTRAVENOUS
Qty: 0 | Refills: 0 | Status: DISCONTINUED | OUTPATIENT
Start: 2018-06-06 | End: 2018-06-07

## 2018-06-06 RX ORDER — HEPARIN SODIUM 5000 [USP'U]/ML
5000 INJECTION INTRAVENOUS; SUBCUTANEOUS THREE TIMES A DAY
Qty: 0 | Refills: 0 | Status: DISCONTINUED | OUTPATIENT
Start: 2018-06-06 | End: 2018-06-11

## 2018-06-06 RX ORDER — NALOXONE HYDROCHLORIDE 4 MG/.1ML
0.1 SPRAY NASAL
Qty: 0 | Refills: 0 | Status: DISCONTINUED | OUTPATIENT
Start: 2018-06-06 | End: 2018-06-08

## 2018-06-06 RX ORDER — HYDROMORPHONE HYDROCHLORIDE 2 MG/ML
0.5 INJECTION INTRAMUSCULAR; INTRAVENOUS; SUBCUTANEOUS
Qty: 0 | Refills: 0 | Status: DISCONTINUED | OUTPATIENT
Start: 2018-06-06 | End: 2018-06-08

## 2018-06-06 RX ORDER — FENTANYL CITRATE 50 UG/ML
25 INJECTION INTRAVENOUS
Qty: 0 | Refills: 0 | Status: DISCONTINUED | OUTPATIENT
Start: 2018-06-06 | End: 2018-06-07

## 2018-06-06 RX ORDER — OXYCODONE AND ACETAMINOPHEN 5; 325 MG/1; MG/1
1 TABLET ORAL EVERY 4 HOURS
Qty: 0 | Refills: 0 | Status: DISCONTINUED | OUTPATIENT
Start: 2018-06-06 | End: 2018-06-06

## 2018-06-06 RX ORDER — SENNA PLUS 8.6 MG/1
2 TABLET ORAL AT BEDTIME
Qty: 0 | Refills: 0 | Status: DISCONTINUED | OUTPATIENT
Start: 2018-06-06 | End: 2018-06-11

## 2018-06-06 RX ORDER — ONDANSETRON 8 MG/1
4 TABLET, FILM COATED ORAL EVERY 6 HOURS
Qty: 0 | Refills: 0 | Status: DISCONTINUED | OUTPATIENT
Start: 2018-06-06 | End: 2018-06-11

## 2018-06-06 RX ORDER — FENTANYL CITRATE 50 UG/ML
50 INJECTION INTRAVENOUS
Qty: 0 | Refills: 0 | Status: DISCONTINUED | OUTPATIENT
Start: 2018-06-06 | End: 2018-06-07

## 2018-06-06 RX ORDER — ONDANSETRON 8 MG/1
4 TABLET, FILM COATED ORAL EVERY 6 HOURS
Qty: 0 | Refills: 0 | Status: DISCONTINUED | OUTPATIENT
Start: 2018-06-06 | End: 2018-06-07

## 2018-06-06 RX ORDER — HYDROMORPHONE HYDROCHLORIDE 2 MG/ML
30 INJECTION INTRAMUSCULAR; INTRAVENOUS; SUBCUTANEOUS
Qty: 0 | Refills: 0 | Status: DISCONTINUED | OUTPATIENT
Start: 2018-06-06 | End: 2018-06-08

## 2018-06-06 RX ORDER — HYDROMORPHONE HYDROCHLORIDE 2 MG/ML
0.5 INJECTION INTRAMUSCULAR; INTRAVENOUS; SUBCUTANEOUS
Qty: 0 | Refills: 0 | Status: DISCONTINUED | OUTPATIENT
Start: 2018-06-06 | End: 2018-06-07

## 2018-06-06 RX ADMIN — HYDROMORPHONE HYDROCHLORIDE 30 MILLILITER(S): 2 INJECTION INTRAMUSCULAR; INTRAVENOUS; SUBCUTANEOUS at 23:31

## 2018-06-06 RX ADMIN — SODIUM CHLORIDE 30 MILLILITER(S): 9 INJECTION, SOLUTION INTRAVENOUS at 13:43

## 2018-06-06 RX ADMIN — SODIUM CHLORIDE 125 MILLILITER(S): 9 INJECTION, SOLUTION INTRAVENOUS at 22:26

## 2018-06-06 NOTE — BRIEF OPERATIVE NOTE - PROCEDURE
<<-----Click on this checkbox to enter Procedure Open retroperitoneal lymph node dissection  06/06/2018    Active  SGURRAM

## 2018-06-06 NOTE — H&P PST ADULT - LAST ECHOCARDIOGRAM
EXAM DATE:  6/6/2018 2:21 PM EDT

AGE/SEX:        88 years / Female



INDICATIONS:  Shortness of breath.



CLINICAL DATA:  This is the patient's subsequent encounter. Patient reports that signs and symptoms h
ave been present for 1 week and indicates a pain score of 3/10. 

                                                                          

MEDICAL/SURGICAL HISTORY:       Hypertension. Appendectomy.  Pacemaker.  Kyphoplasty.  Bowel surgery.




COMPARISON:      [facility] [place], CHEST PA & LAT, 5/8/2018 and chest x-ray 6/1/2018.  .



FINDINGS:  

A single AP view of the chest demonstrates diffuse interstitial prominence most pronounced within the
 left lung. An intra-alveolar opacity is now seen within the right upper lobe. No discrete effusions.
 Heart is mildly enlarged. Pulmonary vasculature is limited in its evaluation but appears within the 
normal range. Left-sided pacing device. Prior cement augmentation of an upper thoracic vertebral body
.



CONCLUSION: 

Radiographic pattern most consistent with pulmonary edema. Infiltrate within the right apex likely re
lating to focal intra-alveolar edema.



Electronically signed by: Wilmer Hamilton MD  6/6/2018 2:25 PM EDT denies

## 2018-06-07 DIAGNOSIS — R69 ILLNESS, UNSPECIFIED: ICD-10-CM

## 2018-06-07 DIAGNOSIS — C62.90 MALIGNANT NEOPLASM OF UNSPECIFIED TESTIS, UNSPECIFIED WHETHER DESCENDED OR UNDESCENDED: ICD-10-CM

## 2018-06-07 LAB
BLD GP AB SCN SERPL QL: NEGATIVE — SIGNIFICANT CHANGE UP
HCT VFR BLD CALC: 31.4 % — LOW (ref 39–50)
HGB BLD-MCNC: 10.7 G/DL — LOW (ref 13–17)
MCHC RBC-ENTMCNC: 32.2 PG — SIGNIFICANT CHANGE UP (ref 27–34)
MCHC RBC-ENTMCNC: 34.1 % — SIGNIFICANT CHANGE UP (ref 32–36)
MCV RBC AUTO: 94.6 FL — SIGNIFICANT CHANGE UP (ref 80–100)
NRBC # FLD: 0 — SIGNIFICANT CHANGE UP
PLATELET # BLD AUTO: 204 K/UL — SIGNIFICANT CHANGE UP (ref 150–400)
PMV BLD: 9.6 FL — SIGNIFICANT CHANGE UP (ref 7–13)
RBC # BLD: 3.32 M/UL — LOW (ref 4.2–5.8)
RBC # FLD: 12.2 % — SIGNIFICANT CHANGE UP (ref 10.3–14.5)
RH IG SCN BLD-IMP: POSITIVE — SIGNIFICANT CHANGE UP
WBC # BLD: 12.61 K/UL — HIGH (ref 3.8–10.5)
WBC # FLD AUTO: 12.61 K/UL — HIGH (ref 3.8–10.5)

## 2018-06-07 PROCEDURE — 93010 ELECTROCARDIOGRAM REPORT: CPT

## 2018-06-07 PROCEDURE — 99223 1ST HOSP IP/OBS HIGH 75: CPT

## 2018-06-07 RX ORDER — DOCUSATE SODIUM 100 MG
100 CAPSULE ORAL THREE TIMES A DAY
Qty: 0 | Refills: 0 | Status: DISCONTINUED | OUTPATIENT
Start: 2018-06-07 | End: 2018-06-11

## 2018-06-07 RX ORDER — ACETAMINOPHEN 500 MG
1000 TABLET ORAL ONCE
Qty: 0 | Refills: 0 | Status: COMPLETED | OUTPATIENT
Start: 2018-06-07 | End: 2018-06-07

## 2018-06-07 RX ORDER — SODIUM CHLORIDE 9 MG/ML
1000 INJECTION, SOLUTION INTRAVENOUS
Qty: 0 | Refills: 0 | Status: DISCONTINUED | OUTPATIENT
Start: 2018-06-07 | End: 2018-06-11

## 2018-06-07 RX ORDER — GABAPENTIN 400 MG/1
200 CAPSULE ORAL EVERY 8 HOURS
Qty: 0 | Refills: 0 | Status: DISCONTINUED | OUTPATIENT
Start: 2018-06-07 | End: 2018-06-11

## 2018-06-07 RX ORDER — SODIUM CHLORIDE 9 MG/ML
1000 INJECTION INTRAMUSCULAR; INTRAVENOUS; SUBCUTANEOUS ONCE
Qty: 0 | Refills: 0 | Status: COMPLETED | OUTPATIENT
Start: 2018-06-07 | End: 2018-06-07

## 2018-06-07 RX ADMIN — HYDROMORPHONE HYDROCHLORIDE 30 MILLILITER(S): 2 INJECTION INTRAMUSCULAR; INTRAVENOUS; SUBCUTANEOUS at 20:21

## 2018-06-07 RX ADMIN — HYDROMORPHONE HYDROCHLORIDE 30 MILLILITER(S): 2 INJECTION INTRAMUSCULAR; INTRAVENOUS; SUBCUTANEOUS at 02:28

## 2018-06-07 RX ADMIN — Medication 100 MILLIGRAM(S): at 13:23

## 2018-06-07 RX ADMIN — SODIUM CHLORIDE 75 MILLILITER(S): 9 INJECTION, SOLUTION INTRAVENOUS at 07:57

## 2018-06-07 RX ADMIN — HYDROMORPHONE HYDROCHLORIDE 30 MILLILITER(S): 2 INJECTION INTRAMUSCULAR; INTRAVENOUS; SUBCUTANEOUS at 08:27

## 2018-06-07 RX ADMIN — HEPARIN SODIUM 5000 UNIT(S): 5000 INJECTION INTRAVENOUS; SUBCUTANEOUS at 05:28

## 2018-06-07 RX ADMIN — Medication 400 MILLIGRAM(S): at 13:23

## 2018-06-07 RX ADMIN — HEPARIN SODIUM 5000 UNIT(S): 5000 INJECTION INTRAVENOUS; SUBCUTANEOUS at 13:23

## 2018-06-07 RX ADMIN — SODIUM CHLORIDE 125 MILLILITER(S): 9 INJECTION, SOLUTION INTRAVENOUS at 06:58

## 2018-06-07 RX ADMIN — GABAPENTIN 200 MILLIGRAM(S): 400 CAPSULE ORAL at 22:25

## 2018-06-07 RX ADMIN — GABAPENTIN 200 MILLIGRAM(S): 400 CAPSULE ORAL at 16:43

## 2018-06-07 RX ADMIN — Medication 400 MILLIGRAM(S): at 19:48

## 2018-06-07 RX ADMIN — Medication 400 MILLIGRAM(S): at 08:34

## 2018-06-07 RX ADMIN — SODIUM CHLORIDE 1000 MILLILITER(S): 9 INJECTION INTRAMUSCULAR; INTRAVENOUS; SUBCUTANEOUS at 17:15

## 2018-06-07 RX ADMIN — HEPARIN SODIUM 5000 UNIT(S): 5000 INJECTION INTRAVENOUS; SUBCUTANEOUS at 22:25

## 2018-06-07 NOTE — CONSULT NOTE ADULT - SUBJECTIVE AND OBJECTIVE BOX
HPI:  Mr. Calderón is a 36 yo male with hx of testicular Ca diagnosed in 7/2017 s/p chemo last on 10/18, c/b CM with EF or 40-45%, peripheral neuropathies, afib, right atrial mass (unclear if thrombus vs mass) previously on coumadin, fth RP mass subsequently presenting for RP mass resection and LN dissection.  Pt is now s/p Exploratory Laparotomy, Resection of retroperitoneal mass, retroperitoneal lymph node dissection, pelvic lymph node dissection on 6/6/18.  Currently pt's only complaint is abd pain with movement. He notes the pain is moderately well controlled on PCA. Otherwise denies SOB, CP, fatigue, f/c. Notes +flatus, tolerating liquid diet.    Review of symptoms:  Remainder ROS negative    PAST MEDICAL & SURGICAL HISTORY:  Obesity  Atrial mass: right artrial echo density per MRI 3/14/18.  Atrial flutter  Cardiomyopathy  Atrial fibrillation: s/p DCCV 1/18  Thrombus: Right atrial wall ( suspected from last TYLOR) On AC  Testicular cancer  Lumbar herniated disc  Testicular mass: left  History of cardioversion: 2/2018  Port-a-cath in place: placed 9/2017,, removed-10/18/2017  History of orchiectomy: left-7/2017      Allergies    No Known Allergies    Intolerances        Social History:     FAMILY HISTORY:  Family history of cancer (Grandparent)  Family history of ischemic heart disease (Grandparent)  Family history of diabetes mellitus (Aunt)      MEDICATIONS  (STANDING):  acetaminophen  IVPB. 1000 milliGRAM(s) IV Intermittent once  dextrose 5% + sodium chloride 0.45%. 1000 milliLiter(s) (75 mL/Hr) IV Continuous <Continuous>  docusate sodium 100 milliGRAM(s) Oral three times a day  gabapentin 200 milliGRAM(s) Oral every 8 hours  heparin  Injectable 5000 Unit(s) SubCutaneous three times a day  HYDROmorphone PCA (1 mG/mL) 30 milliLiter(s) PCA Continuous PCA Continuous  senna 2 Tablet(s) Oral at bedtime    MEDICATIONS  (PRN):  HYDROmorphone PCA (1 mG/mL) Rescue Clinician Bolus 0.5 milliGRAM(s) IV Push every 15 minutes PRN for Pain Scale GREATER THAN 6  naloxone Injectable 0.1 milliGRAM(s) IV Push every 3 minutes PRN For ANY of the following changes in patient status:  A. RR LESS THAN 10 breaths per minute, B. Oxygen saturation LESS THAN 90%, C. Sedation score of 6  ondansetron Injectable 4 milliGRAM(s) IV Push every 6 hours PRN Nausea and/or Vomiting      CAPILLARY BLOOD GLUCOSE        I&O's Summary    06 Jun 2018 07:01  -  07 Jun 2018 07:00  --------------------------------------------------------  IN: 350 mL / OUT: 1150 mL / NET: -800 mL    07 Jun 2018 07:01  -  07 Jun 2018 14:01  --------------------------------------------------------  IN: 0 mL / OUT: 275 mL / NET: -275 mL        PHYSICAL EXAM:  GEN: NAD, obese  EYES: conjunctiva and sclera clear  HEENT: mmm, no JVD  CHEST/LUNG: CTABL anteriorly, exam limited by shallow breaths  HEART: tachy, regular, no m/r/g appreciated  ABDOMEN: Soft, distended, surgical site c/d/i, diffusely tender  EXTREMITIES:  wwp, no edema  PSYCH: AAOx3  NEUROLOGY: non-focal  SKIN: No rashes or lesions    LABS:    06-06    137  |  101  |  18  ----------------------------<  99  5.1   |  21<L>  |  0.82    Ca    9.1      06 Jun 2018 13:37    TPro  8.0  /  Alb  4.2  /  TBili  0.5  /  DBili  x   /  AST  55<H>  /  ALT  59<H>  /  AlkPhos  57  06-06              RADIOLOGY & ADDITIONAL TESTS:    Imaging Personally Reviewed:    Consultant(s) Notes Reviewed:      Care Discussed with Consultants/Other Providers:

## 2018-06-07 NOTE — PROGRESS NOTE ADULT - SUBJECTIVE AND OBJECTIVE BOX
ANESTHESIA POSTOP CHECK    37y Male POSTOP DAY 1 S/P     Vital Signs Last 24 Hrs  T(C): 36.9 (07 Jun 2018 13:44), Max: 37.3 (07 Jun 2018 05:24)  T(F): 98.4 (07 Jun 2018 13:44), Max: 99.1 (07 Jun 2018 05:24)  HR: 105 (07 Jun 2018 13:44) (85 - 105)  BP: 95/64 (07 Jun 2018 13:44) (92/55 - 118/72)  BP(mean): 77 (06 Jun 2018 23:00) (74 - 81)  RR: 19 (07 Jun 2018 13:44) (10 - 19)  SpO2: 95% (07 Jun 2018 13:44) (95% - 100%)  I&O's Summary    06 Jun 2018 07:01  -  07 Jun 2018 07:00  --------------------------------------------------------  IN: 350 mL / OUT: 1150 mL / NET: -800 mL    07 Jun 2018 07:01  -  07 Jun 2018 13:51  --------------------------------------------------------  IN: 0 mL / OUT: 275 mL / NET: -275 mL        [X ] NO APPARENT ANESTHESIA COMPLICATIONS      Comments:

## 2018-06-07 NOTE — PROGRESS NOTE ADULT - SUBJECTIVE AND OBJECTIVE BOX
Day _2__ of Anesthesia Pain Management Service    Allergies    No Known Allergies    Intolerances        SUBJECTIVE: I'm doing ok    Pain Scale Score	At rest: _1__ 	With Activity: ___ 	[ ] Refer to charted pain scores    THERAPY:    [ ] IV PCA Morphine		[ ] 5 mg/mL	[ ] 1 mg/mL  [X] IV PCA Hydromorphone	[ ] 5 mg/mL	[X] 1 mg/mL  [ ] IV PCA Fentanyl		[ ] 50 micrograms/mL    Demand dose _0.2mg_ lockout _6_ (minutes) Continuous Rate _0_ Total: _3.6mg__  Daily      MEDICATIONS  (STANDING):  acetaminophen  IVPB. 1000 milliGRAM(s) IV Intermittent once  acetaminophen  IVPB. 1000 milliGRAM(s) IV Intermittent once  dextrose 5% + sodium chloride 0.45%. 1000 milliLiter(s) (75 mL/Hr) IV Continuous <Continuous>  docusate sodium 100 milliGRAM(s) Oral three times a day  heparin  Injectable 5000 Unit(s) SubCutaneous three times a day  HYDROmorphone PCA (1 mG/mL) 30 milliLiter(s) PCA Continuous PCA Continuous  senna 2 Tablet(s) Oral at bedtime    MEDICATIONS  (PRN):  HYDROmorphone PCA (1 mG/mL) Rescue Clinician Bolus 0.5 milliGRAM(s) IV Push every 15 minutes PRN for Pain Scale GREATER THAN 6  naloxone Injectable 0.1 milliGRAM(s) IV Push every 3 minutes PRN For ANY of the following changes in patient status:  A. RR LESS THAN 10 breaths per minute, B. Oxygen saturation LESS THAN 90%, C. Sedation score of 6  ondansetron Injectable 4 milliGRAM(s) IV Push every 6 hours PRN Nausea and/or Vomiting      OBJECTIVE: A&Ox3, NAD, supine in bed    Sedation Score:	[X] Alert	[ ] Drowsy	[ ] Arousable	[ ] Asleep	[ ] Unresponsive    Side Effects:	[X] None	[ ] Nausea	[ ] Vomiting	[ ] Pruritus  		  [ ] Weakness		[ ] Numbness	[ ] Other:            06-06    137  |  101  |  18  ----------------------------<  99  5.1   |  21<L>  |  0.82    Ca    9.1      06 Jun 2018 13:37    TPro  8.0  /  Alb  4.2  /  TBili  0.5  /  DBili  x   /  AST  55<H>  /  ALT  59<H>  /  AlkPhos  57  06-06      ASSESSMENT/ PLAN    Therapy to  be:	[X] Continue   [ ] Discontinued   [ ] Change to prn Analgesics    Documentation and Verification of current medications:  [X] Done	[ ] Not done, not eligible  [ ] Not done, reason not given    [ ]  [place]  Reviewed and Copied to Chart    Comments: continue current therapy

## 2018-06-07 NOTE — PROGRESS NOTE ADULT - SUBJECTIVE AND OBJECTIVE BOX
Anesthesia Pain Management Service    SUBJECTIVE: Pt doing well with IV PCA without problems reported but with some pain not fully controlled.  Therapy:	  [ X] IV PCA	   [ ] Epidural           [ ] s/p Spinal Opoid              [ ] Postpartum infusion	  [ ] Patient controlled regional anesthesia (PCRA)    [ ] prn Analgesics    Allergies    No Known Allergies    Intolerances      MEDICATIONS  (STANDING):  acetaminophen  IVPB. 1000 milliGRAM(s) IV Intermittent once  dextrose 5% + sodium chloride 0.45%. 1000 milliLiter(s) (75 mL/Hr) IV Continuous <Continuous>  docusate sodium 100 milliGRAM(s) Oral three times a day  gabapentin 200 milliGRAM(s) Oral every 8 hours  heparin  Injectable 5000 Unit(s) SubCutaneous three times a day  HYDROmorphone PCA (1 mG/mL) 30 milliLiter(s) PCA Continuous PCA Continuous  senna 2 Tablet(s) Oral at bedtime    MEDICATIONS  (PRN):  HYDROmorphone PCA (1 mG/mL) Rescue Clinician Bolus 0.5 milliGRAM(s) IV Push every 15 minutes PRN for Pain Scale GREATER THAN 6  naloxone Injectable 0.1 milliGRAM(s) IV Push every 3 minutes PRN For ANY of the following changes in patient status:  A. RR LESS THAN 10 breaths per minute, B. Oxygen saturation LESS THAN 90%, C. Sedation score of 6  ondansetron Injectable 4 milliGRAM(s) IV Push every 6 hours PRN Nausea and/or Vomiting      OBJECTIVE:   [X] No new signs     [ ] Other:    Side Effects:  [X ] None			[ ] Other:    Assessment of Catheter Site:		[ ] Intact		[ ] Other:    ASSESSMENT/PLAN  [ X] Continue current therapy    [ ] Therapy changed to:    [ ] IV PCA       [ ] Epidural     [ ] prn Analgesics     Comments: Pt agrees to have  Neurontin added as adjuvant analgesic & has taken this in past.    Progress Note written now but Patient was seen earlier.

## 2018-06-07 NOTE — CONSULT NOTE ADULT - ASSESSMENT
38 yo male with hx of testicular Ca diagnosed in 7/2017 s/p chemo last on 10/18, c/b CM with EF or 40-45%, peripheral neuropathies, afib, right atrial mass (unclear if thrombus vs mass) previously on coumadin, fth RP mass subsequently presenting for RP mass resection and LN dissection, s/p Ex lap on 6/6/18.    #Post-op  - management per   - pain control with PCA, IV tylenol  - richardson, monitor UO  - advance diet as tolerated  - OOB with assistance    #Intra-op blood loss  - Pt currently tachy with boderline BPs, asymp  - would obtain CBC to ensure hgb stable  - if dropping consider CTAP for bleed  - holding home lisinopril    #HFrEF  - euvolemic on exam  - monitor closely for overload  - if pt receiving blood will consider lasix bud  - holding lisinopril in setting of borderline BP    #Elevated LFTs  - unclear etiology, elevated intermittently in past  - check CMP  - check hep serologies  - if persistent and hep neg, would consider imaging    #Atrial mass  - unclear if thrombus vs mass  - restart coumadin as soon as deemed safe per primary team    #afib  - s/p cardioversion, currently regular rate on exam  - coumadin held in setting of OR, restart as soon as safe    #DVT ppx  - hsq 38 yo male with hx of testicular Ca diagnosed in 7/2017 s/p chemo last on 10/18, c/b CM with EF or 40-45%, peripheral neuropathies, afib, right atrial mass (unclear if thrombus vs mass) previously on coumadin, fth RP mass subsequently presenting for RP mass resection and LN dissection, s/p Ex lap on 6/6/18.    #Post-op  - management per   - pain control with PCA, IV tylenol  - richardson, monitor UO  - advance diet as tolerated  - OOB with assistance    #Intra-op blood loss  - Pt currently tachy with boderline BPs, asymp  - would obtain CBC to ensure hgb stable  - if dropping consider CTAP for bleed  - holding home lisinopril    #HFrEF  - euvolemic on exam  - given borderline BP and intraop blood loss, ok to c/w IVF for now, monitor closely for overload  - if pt receiving blood will consider lasix bud  - holding lisinopril in setting of borderline BP    #Elevated LFTs  - unclear etiology, elevated intermittently in past  - check CMP  - check hep serologies  - if persistent and hep neg, would consider imaging    #Atrial mass  - unclear if thrombus vs mass  - restart coumadin as soon as deemed safe per primary team    #afib  - s/p cardioversion, currently regular rate on exam  - coumadin held in setting of OR, restart as soon as safe    #DVT ppx  - hsq 36 yo male with hx of testicular Ca diagnosed in 7/2017 s/p chemo last on 10/18, c/b CM with EF or 40-45%, peripheral neuropathies, afib, right atrial mass (unclear if thrombus vs mass) previously on coumadin, fth RP mass subsequently presenting for RP mass resection and LN dissection, s/p Ex lap on 6/6/18.    #Post-op  - management per   - pain control with PCA, IV tylenol  - richardson, monitor UO  - advance diet as tolerated  - OOB with assistance    #Intra-op blood loss  - Pt currently tachy with boderline BPs, asymp  - would obtain CBC to ensure hgb stable  - if dropping consider CTAP for bleed  - holding home lisinopril    #Hyponatremia  - pt does not appear overloaded on exam, etiology SIADH in setting of pain and fevers vs hypovolemic in setting of decreased PO intake and increased insensible losses  - fu UA however urine studies will be confounded by ongoing IVF  - if Na continues to downtrend on IVF would be c/f SIADH  - monitor Na daily    #HFrEF  - euvolemic on exam  - given borderline BP and intraop blood loss, ok to c/w IVF for now, monitor closely for overload  - if pt receiving blood will consider lasix bud  - holding lisinopril in setting of borderline BP    #Elevated LFTs  - unclear etiology, elevated intermittently in past  - check CMP  - check hep serologies  - if persistent and hep neg, would consider imaging    #Atrial mass  - unclear if thrombus vs mass  - restart coumadin as soon as deemed safe per primary team    #afib  - s/p cardioversion, currently regular rate on exam  - coumadin held in setting of OR, restart as soon as safe    #DVT ppx  - hsq

## 2018-06-07 NOTE — PROGRESS NOTE ADULT - PROBLEM SELECTOR PLAN 1
Continue PCA, add IV tylenol, monitor pain  Continue richardson, monitor UO  IVM, continue CLD, monitor GI function  OOB with assistance  DVT prophy, IS

## 2018-06-07 NOTE — PROGRESS NOTE ADULT - SUBJECTIVE AND OBJECTIVE BOX
Overnight events:  None    Subjective:  Pt states pain fairly controlled with PCA, + flatus, tolerating sips of CLD, not OOB yet    Objective:    Vital signs  T(C): , Max: 37.3 (06-07-18 @ 05:24)  HR: 96 (06-07-18 @ 05:24)  BP: 106/55 (06-07-18 @ 05:24)  SpO2: 95% (06-07-18 @ 05:24)  Wt(kg): --    Output   Wong: 1150      Gen: NAD  Abd: staples c/d/i, mildly distended, appropriately tender  : dylan secured    Labs: none today

## 2018-06-08 LAB
ALBUMIN SERPL ELPH-MCNC: 3.3 G/DL — SIGNIFICANT CHANGE UP (ref 3.3–5)
ALBUMIN SERPL ELPH-MCNC: 3.3 G/DL — SIGNIFICANT CHANGE UP (ref 3.3–5)
ALP SERPL-CCNC: 41 U/L — SIGNIFICANT CHANGE UP (ref 40–120)
ALP SERPL-CCNC: 41 U/L — SIGNIFICANT CHANGE UP (ref 40–120)
ALT FLD-CCNC: 32 U/L — SIGNIFICANT CHANGE UP (ref 4–41)
ALT FLD-CCNC: 32 U/L — SIGNIFICANT CHANGE UP (ref 4–41)
APPEARANCE UR: CLEAR — SIGNIFICANT CHANGE UP
AST SERPL-CCNC: 48 U/L — HIGH (ref 4–40)
AST SERPL-CCNC: 48 U/L — HIGH (ref 4–40)
BACTERIA # UR AUTO: SIGNIFICANT CHANGE UP
BILIRUB DIRECT SERPL-MCNC: 0.1 MG/DL — SIGNIFICANT CHANGE UP (ref 0.1–0.2)
BILIRUB SERPL-MCNC: 0.5 MG/DL — SIGNIFICANT CHANGE UP (ref 0.2–1.2)
BILIRUB SERPL-MCNC: 0.5 MG/DL — SIGNIFICANT CHANGE UP (ref 0.2–1.2)
BILIRUB UR-MCNC: NEGATIVE — SIGNIFICANT CHANGE UP
BLOOD UR QL VISUAL: HIGH
BUN SERPL-MCNC: 11 MG/DL — SIGNIFICANT CHANGE UP (ref 7–23)
CALCIUM SERPL-MCNC: 8 MG/DL — LOW (ref 8.4–10.5)
CHLORIDE SERPL-SCNC: 95 MMOL/L — LOW (ref 98–107)
CO2 SERPL-SCNC: 21 MMOL/L — LOW (ref 22–31)
COLOR SPEC: YELLOW — SIGNIFICANT CHANGE UP
CREAT SERPL-MCNC: 0.85 MG/DL — SIGNIFICANT CHANGE UP (ref 0.5–1.3)
GLUCOSE SERPL-MCNC: 123 MG/DL — HIGH (ref 70–99)
GLUCOSE UR-MCNC: NEGATIVE — SIGNIFICANT CHANGE UP
HAV IGM SER-ACNC: NONREACTIVE — SIGNIFICANT CHANGE UP
HBV CORE IGM SER-ACNC: NONREACTIVE — SIGNIFICANT CHANGE UP
HBV SURFACE AG SER-ACNC: NONREACTIVE — SIGNIFICANT CHANGE UP
HCT VFR BLD CALC: 28.6 % — LOW (ref 39–50)
HCV AB S/CO SERPL IA: 0.1 S/CO — SIGNIFICANT CHANGE UP
HCV AB SERPL-IMP: SIGNIFICANT CHANGE UP
HGB BLD-MCNC: 9.8 G/DL — LOW (ref 13–17)
KETONES UR-MCNC: SIGNIFICANT CHANGE UP
LACTATE SERPL-SCNC: 2.8 MMOL/L — HIGH (ref 0.5–2)
LEUKOCYTE ESTERASE UR-ACNC: NEGATIVE — SIGNIFICANT CHANGE UP
MCHC RBC-ENTMCNC: 31.1 PG — SIGNIFICANT CHANGE UP (ref 27–34)
MCHC RBC-ENTMCNC: 34.3 % — SIGNIFICANT CHANGE UP (ref 32–36)
MCV RBC AUTO: 90.8 FL — SIGNIFICANT CHANGE UP (ref 80–100)
MUCOUS THREADS # UR AUTO: SIGNIFICANT CHANGE UP
NITRITE UR-MCNC: NEGATIVE — SIGNIFICANT CHANGE UP
NRBC # FLD: 0 — SIGNIFICANT CHANGE UP
PH UR: 6.5 — SIGNIFICANT CHANGE UP (ref 4.6–8)
PLATELET # BLD AUTO: 200 K/UL — SIGNIFICANT CHANGE UP (ref 150–400)
PMV BLD: 9.8 FL — SIGNIFICANT CHANGE UP (ref 7–13)
POTASSIUM SERPL-MCNC: 4.5 MMOL/L — SIGNIFICANT CHANGE UP (ref 3.5–5.3)
POTASSIUM SERPL-SCNC: 4.5 MMOL/L — SIGNIFICANT CHANGE UP (ref 3.5–5.3)
PROT SERPL-MCNC: 6 G/DL — SIGNIFICANT CHANGE UP (ref 6–8.3)
PROT SERPL-MCNC: 6 G/DL — SIGNIFICANT CHANGE UP (ref 6–8.3)
PROT UR-MCNC: 20 MG/DL — SIGNIFICANT CHANGE UP
RBC # BLD: 3.15 M/UL — LOW (ref 4.2–5.8)
RBC # FLD: 12.3 % — SIGNIFICANT CHANGE UP (ref 10.3–14.5)
RBC CASTS # UR COMP ASSIST: SIGNIFICANT CHANGE UP (ref 0–?)
SODIUM SERPL-SCNC: 131 MMOL/L — LOW (ref 135–145)
SP GR SPEC: 1.02 — SIGNIFICANT CHANGE UP (ref 1–1.04)
SQUAMOUS # UR AUTO: SIGNIFICANT CHANGE UP
UROBILINOGEN FLD QL: NORMAL MG/DL — SIGNIFICANT CHANGE UP
WBC # BLD: 14.8 K/UL — HIGH (ref 3.8–10.5)
WBC # FLD AUTO: 14.8 K/UL — HIGH (ref 3.8–10.5)
WBC UR QL: SIGNIFICANT CHANGE UP (ref 0–?)

## 2018-06-08 PROCEDURE — 99233 SBSQ HOSP IP/OBS HIGH 50: CPT

## 2018-06-08 PROCEDURE — 71045 X-RAY EXAM CHEST 1 VIEW: CPT | Mod: 26

## 2018-06-08 PROCEDURE — 93010 ELECTROCARDIOGRAM REPORT: CPT

## 2018-06-08 RX ORDER — MORPHINE SULFATE 50 MG/1
6 CAPSULE, EXTENDED RELEASE ORAL EVERY 4 HOURS
Qty: 0 | Refills: 0 | Status: DISCONTINUED | OUTPATIENT
Start: 2018-06-08 | End: 2018-06-11

## 2018-06-08 RX ORDER — OXYCODONE HYDROCHLORIDE 5 MG/1
5 TABLET ORAL
Qty: 0 | Refills: 0 | Status: DISCONTINUED | OUTPATIENT
Start: 2018-06-08 | End: 2018-06-08

## 2018-06-08 RX ORDER — SODIUM CHLORIDE 9 MG/ML
500 INJECTION, SOLUTION INTRAVENOUS ONCE
Qty: 0 | Refills: 0 | Status: COMPLETED | OUTPATIENT
Start: 2018-06-08 | End: 2018-06-08

## 2018-06-08 RX ORDER — OXYCODONE HYDROCHLORIDE 5 MG/1
10 TABLET ORAL EVERY 4 HOURS
Qty: 0 | Refills: 0 | Status: DISCONTINUED | OUTPATIENT
Start: 2018-06-08 | End: 2018-06-11

## 2018-06-08 RX ORDER — ONDANSETRON 8 MG/1
4 TABLET, FILM COATED ORAL ONCE
Qty: 0 | Refills: 0 | Status: COMPLETED | OUTPATIENT
Start: 2018-06-08 | End: 2018-06-08

## 2018-06-08 RX ORDER — ACETAMINOPHEN 500 MG
650 TABLET ORAL ONCE
Qty: 0 | Refills: 0 | Status: COMPLETED | OUTPATIENT
Start: 2018-06-08 | End: 2018-06-08

## 2018-06-08 RX ORDER — MORPHINE SULFATE 50 MG/1
4 CAPSULE, EXTENDED RELEASE ORAL EVERY 6 HOURS
Qty: 0 | Refills: 0 | Status: DISCONTINUED | OUTPATIENT
Start: 2018-06-08 | End: 2018-06-11

## 2018-06-08 RX ORDER — OXYCODONE HYDROCHLORIDE 5 MG/1
10 TABLET ORAL
Qty: 0 | Refills: 0 | Status: DISCONTINUED | OUTPATIENT
Start: 2018-06-08 | End: 2018-06-08

## 2018-06-08 RX ORDER — OXYCODONE HYDROCHLORIDE 5 MG/1
15 TABLET ORAL EVERY 4 HOURS
Qty: 0 | Refills: 0 | Status: DISCONTINUED | OUTPATIENT
Start: 2018-06-08 | End: 2018-06-11

## 2018-06-08 RX ORDER — ACETAMINOPHEN 500 MG
650 TABLET ORAL EVERY 6 HOURS
Qty: 0 | Refills: 0 | Status: DISCONTINUED | OUTPATIENT
Start: 2018-06-08 | End: 2018-06-11

## 2018-06-08 RX ADMIN — ONDANSETRON 4 MILLIGRAM(S): 8 TABLET, FILM COATED ORAL at 18:12

## 2018-06-08 RX ADMIN — GABAPENTIN 200 MILLIGRAM(S): 400 CAPSULE ORAL at 05:46

## 2018-06-08 RX ADMIN — HYDROMORPHONE HYDROCHLORIDE 30 MILLILITER(S): 2 INJECTION INTRAMUSCULAR; INTRAVENOUS; SUBCUTANEOUS at 08:22

## 2018-06-08 RX ADMIN — SODIUM CHLORIDE 75 MILLILITER(S): 9 INJECTION, SOLUTION INTRAVENOUS at 01:59

## 2018-06-08 RX ADMIN — ONDANSETRON 4 MILLIGRAM(S): 8 TABLET, FILM COATED ORAL at 23:34

## 2018-06-08 RX ADMIN — SODIUM CHLORIDE 125 MILLILITER(S): 9 INJECTION, SOLUTION INTRAVENOUS at 22:32

## 2018-06-08 RX ADMIN — Medication 100 MILLIGRAM(S): at 05:46

## 2018-06-08 RX ADMIN — OXYCODONE HYDROCHLORIDE 15 MILLIGRAM(S): 5 TABLET ORAL at 15:09

## 2018-06-08 RX ADMIN — HEPARIN SODIUM 5000 UNIT(S): 5000 INJECTION INTRAVENOUS; SUBCUTANEOUS at 22:32

## 2018-06-08 RX ADMIN — SODIUM CHLORIDE 1000 MILLILITER(S): 9 INJECTION, SOLUTION INTRAVENOUS at 01:57

## 2018-06-08 RX ADMIN — SODIUM CHLORIDE 125 MILLILITER(S): 9 INJECTION, SOLUTION INTRAVENOUS at 13:20

## 2018-06-08 RX ADMIN — Medication 100 MILLIGRAM(S): at 22:32

## 2018-06-08 RX ADMIN — Medication 650 MILLIGRAM(S): at 11:03

## 2018-06-08 RX ADMIN — HEPARIN SODIUM 5000 UNIT(S): 5000 INJECTION INTRAVENOUS; SUBCUTANEOUS at 13:34

## 2018-06-08 RX ADMIN — OXYCODONE HYDROCHLORIDE 15 MILLIGRAM(S): 5 TABLET ORAL at 16:30

## 2018-06-08 RX ADMIN — Medication 650 MILLIGRAM(S): at 19:05

## 2018-06-08 RX ADMIN — GABAPENTIN 200 MILLIGRAM(S): 400 CAPSULE ORAL at 13:34

## 2018-06-08 RX ADMIN — HEPARIN SODIUM 5000 UNIT(S): 5000 INJECTION INTRAVENOUS; SUBCUTANEOUS at 05:46

## 2018-06-08 RX ADMIN — Medication 100 MILLIGRAM(S): at 13:34

## 2018-06-08 NOTE — PROGRESS NOTE ADULT - SUBJECTIVE AND OBJECTIVE BOX
Patient is a 37y old  Male who presents with a chief complaint of "retroperitoneal dissection, removing all of the tumors that are inside" (04 Jun 2018 17:31)      SUBJECTIVE / OVERNIGHT EVENTS: Pt with fevers this am. He notes feeling febrile and chills, also notes LLQ abd pain, which he notes is not significantly different than yesterday. He denies diarrhea, cough, sob, cp, rashes, n/v.    MEDICATIONS  (STANDING):  dextrose 5% + sodium chloride 0.45%. 1000 milliLiter(s) (125 mL/Hr) IV Continuous <Continuous>  docusate sodium 100 milliGRAM(s) Oral three times a day  gabapentin 200 milliGRAM(s) Oral every 8 hours  heparin  Injectable 5000 Unit(s) SubCutaneous three times a day  senna 2 Tablet(s) Oral at bedtime    MEDICATIONS  (PRN):  morphine  - Injectable 6 milliGRAM(s) IV Push every 4 hours PRN Severe Pain (7 - 10)  ondansetron Injectable 4 milliGRAM(s) IV Push every 6 hours PRN Nausea and/or Vomiting  oxyCODONE    IR 5 milliGRAM(s) Oral every 3 hours PRN Mild Pain (1 - 3)  oxyCODONE    IR 10 milliGRAM(s) Oral every 3 hours PRN Moderate Pain (4 - 6)      Vital Signs Last 24 Hrs  T(C): 37.6 (06-08-18 @ 13:32), Max: 38.5 (06-08-18 @ 10:22)  T(F): 99.7 (06-08-18 @ 13:32), Max: 101.3 (06-08-18 @ 10:22)  HR: 114 (06-08-18 @ 13:32) (106 - 129)  BP: 107/58 (06-08-18 @ 13:32) (101/59 - 116/62)  BP(mean): --  RR: 18 (06-08-18 @ 13:32) (17 - 20)  SpO2: 94% (06-08-18 @ 13:32) (94% - 98%)  CAPILLARY BLOOD GLUCOSE        I&O's Summary    07 Jun 2018 07:01  -  08 Jun 2018 07:00  --------------------------------------------------------  IN: 1750 mL / OUT: 1075 mL / NET: 675 mL    08 Jun 2018 07:01  -  08 Jun 2018 13:59  --------------------------------------------------------  IN: 0 mL / OUT: 100 mL / NET: -100 mL    PHYSICAL EXAM:  GEN: NAD, obese  EYES: conjunctiva and sclera clear  HEENT: mmm, no JVD  CHEST/LUNG: CTABL anteriorly, exam limited by shallow breaths  HEART: tachy, regular, no m/r/g appreciated  ABDOMEN: Soft, distended, surgical site c/d/i, diffusely tender  EXTREMITIES:  wwp, no edema  : richardson in place draining yellow urine  PSYCH: AAOx3  NEUROLOGY: non-focal  SKIN: No rashes or lesions    LABS:                        9.8    14.80 )-----------( 200      ( 08 Jun 2018 12:53 )             28.6     06-08    131<L>  |  95<L>  |  11  ----------------------------<  123<H>  4.5   |  21<L>  |  0.85    Ca    8.0<L>      08 Jun 2018 11:19    TPro  6.0  /  Alb  3.3  /  TBili  0.5  /  DBili  0.1  /  AST  48<H>  /  ALT  32  /  AlkPhos  41  06-08              RADIOLOGY & ADDITIONAL TESTS:    Imaging Personally Reviewed:    Consultant(s) Notes Reviewed:      Care Discussed with Consultants/Other Providers:

## 2018-06-08 NOTE — PROGRESS NOTE ADULT - ASSESSMENT
36 yo male with hx of testicular Ca diagnosed in 7/2017 s/p chemo last on 10/18, c/b CM with EF or 40-45%, peripheral neuropathies, afib, right atrial mass (unclear if thrombus vs mass) previously on coumadin, fth RP mass subsequently presenting for RP mass resection and LN dissection, s/p Ex lap on 6/6/18.    #Fever  - SIRS however unclear source, only localizing symptom is abd confounded by recent ex lap  - check UA, Ucx, Bcx, cxr  - Tachy improving with IVF, monitor closely for e/o overload, currently euvolemic on exam  - BP soft, cont holding home lisinopril, if BP decreases further or pt with clinical changes, would start empiric abx cvrg    #Post-op  - management per   - pain control oxy, IV tylenol  - richardson, monitor UO  - advance diet as tolerated  - OOB with assistance    #Intra-op blood loss  - Pt currently tachy with boderline BPs however confounded by fevers  - hgb downtrending, monitor closely  - if continues to downtrend, BP drops or pt with clinical symptoms c/f worsening anemia, would obtain CTAP to eval for intra-ab bleeding and transfuse  - ensure active T&S, large bore IVs    #HFrEF  - euvolemic on exam  - given borderline BP and intraop blood loss, ok to c/w IVF for now, monitor closely for overload  - if pt receiving blood will consider lasix bud  - holding lisinopril in setting of borderline BP    #Elevated LFTs  - unclear etiology, elevated intermittently in past  - trend  - check hep serologies  - if persistent and hep neg, would consider imaging    #Atrial mass  - unclear if thrombus vs mass  - recommend restarting coumadin as soon as deemed safe per primary team    #afib  - s/p cardioversion, currently tachy in sinus  - coumadin held in setting of OR, restart as soon as safe    #DVT ppx  - hsq 38 yo male with hx of testicular Ca diagnosed in 7/2017 s/p chemo last on 10/18, c/b CM with EF or 40-45%, peripheral neuropathies, afib, right atrial mass (unclear if thrombus vs mass) previously on coumadin, fth RP mass subsequently presenting for RP mass resection and LN dissection, s/p Ex lap on 6/6/18.    #Fever  - SIRS however unclear source, only localizing symptom is abd confounded by recent ex lap  - check UA, Ucx, Bcx  - if UA grossly positive, would start empiric abx given pt would meet sepsis criteria given increased mortality associated with delay in abx  - Tachy improving with IVF, monitor closely for e/o overload, currently euvolemic on exam  - BP soft, cont holding home lisinopril, if BP decreases further or pt with clinical changes, would start empiric abx cvrg    #Post-op  - management per   - pain control oxy, IV tylenol  - richardson, monitor UO  - advance diet as tolerated  - OOB with assistance    #Intra-op blood loss  - Pt currently tachy with boderline BPs however confounded by fevers  - hgb downtrending, monitor closely  - if continues to downtrend, BP drops or pt with clinical symptoms c/f worsening anemia, would obtain CTAP to eval for intra-ab bleeding and transfuse  - ensure active T&S, large bore IVs    #HFrEF  - euvolemic on exam  - given borderline BP and intraop blood loss, ok to c/w IVF for now, monitor closely for overload  - if pt receiving blood will consider lasix bud  - holding lisinopril in setting of borderline BP    #Elevated LFTs  - unclear etiology, elevated intermittently in past  - trend  - check hep serologies  - if persistent and hep neg, would consider imaging    #Atrial mass  - unclear if thrombus vs mass  - recommend restarting coumadin as soon as deemed safe per primary team    #afib  - s/p cardioversion, currently tachy in sinus  - coumadin held in setting of OR, restart as soon as safe    #DVT ppx  - hsq 36 yo male with hx of testicular Ca diagnosed in 7/2017 s/p chemo last on 10/18, c/b CM with EF or 40-45%, afib, right atrial mass (unclear if thrombus vs mass) previously on coumadin, fth RP mass subsequently presenting for RP mass resection and LN dissection, s/p Ex lap on 6/6/18.    #Fever  - SIRS however unclear source, only localizing symptom is abd confounded by recent ex lap  - check UA, Ucx, Bcx  - if UA grossly positive, would start empiric abx given pt would meet sepsis criteria given increased mortality associated with delay in abx  - Tachy improving with IVF, monitor closely for e/o overload, currently euvolemic on exam  - BP soft, cont holding home lisinopril, if BP decreases further or pt with clinical changes, would start empiric abx cvrg    #Post-op  - management per   - pain control oxy, IV tylenol  - richardson, monitor UO  - advance diet as tolerated  - OOB with assistance    #Intra-op blood loss  - Pt currently tachy with boderline BPs however confounded by fevers  - hgb downtrending, monitor closely  - if continues to downtrend, BP drops or pt with clinical symptoms c/f worsening anemia, would obtain CTAP to eval for intra-ab bleeding and transfuse  - ensure active T&S, large bore IVs    #HFrEF  - euvolemic on exam  - given borderline BP and intraop blood loss, ok to c/w IVF for now, monitor closely for overload  - if pt receiving blood will consider lasix bud  - holding lisinopril in setting of borderline BP    #Elevated LFTs  - unclear etiology, elevated intermittently in past  - trend  - check hep serologies  - if persistent and hep neg, would consider imaging    #Atrial mass  - unclear if thrombus vs mass  - recommend restarting coumadin as soon as deemed safe per primary team    #afib  - s/p cardioversion, currently tachy in sinus  - coumadin held in setting of OR, restart as soon as safe    #DVT ppx  - hsq

## 2018-06-08 NOTE — PROGRESS NOTE ADULT - SUBJECTIVE AND OBJECTIVE BOX
Anesthesia Pain Management Service    SUBJECTIVE: Patient is doing well with IV PCA and no significant problems reported.    Pain Scale Score	At rest: _2__ 	With Activity: ___ 	[X ] Refer to charted pain scores    THERAPY:    [ ] IV PCA Morphine		[ ] 5 mg/mL	[ ] 1 mg/mL  [X ] IV PCA Hydromorphone	[ ] 5 mg/mL	[X ] 1 mg/mL  [ ] IV PCA Fentanyl		[ ] 50 micrograms/mL    Demand dose __0.2_ lockout __6_ (minutes) Continuous Rate _0__ Total: _6.8__   mg used (in past 24 hrs)      MEDICATIONS  (STANDING):  dextrose 5% + sodium chloride 0.45%. 1000 milliLiter(s) (75 mL/Hr) IV Continuous <Continuous>  docusate sodium 100 milliGRAM(s) Oral three times a day  gabapentin 200 milliGRAM(s) Oral every 8 hours  heparin  Injectable 5000 Unit(s) SubCutaneous three times a day  senna 2 Tablet(s) Oral at bedtime    MEDICATIONS  (PRN):  ondansetron Injectable 4 milliGRAM(s) IV Push every 6 hours PRN Nausea and/or Vomiting  oxyCODONE    IR 5 milliGRAM(s) Oral every 3 hours PRN Moderate Pain (4 - 6)  oxyCODONE    IR 10 milliGRAM(s) Oral every 3 hours PRN Severe Pain (7 - 10)      OBJECTIVE: sitting in chair     Sedation Score:	[ X] Alert	[ ] Drowsy 	[ ] Arousable	[ ] Asleep	[ ] Unresponsive    Side Effects:	[X ] None	[ ] Nausea	[ ] Vomiting	[ ] Pruritus  		[ ] Other:    Vital Signs Last 24 Hrs  T(C): 37.8 (08 Jun 2018 09:37), Max: 38 (08 Jun 2018 09:18)  T(F): 100 (08 Jun 2018 09:37), Max: 100.4 (08 Jun 2018 09:18)  HR: 126 (08 Jun 2018 09:37) (105 - 129)  BP: 112/63 (08 Jun 2018 09:18) (95/64 - 116/62)  BP(mean): --  RR: 20 (08 Jun 2018 09:37) (17 - 20)  SpO2: 98% (08 Jun 2018 09:37) (95% - 98%)    ASSESSMENT/ PLAN    Therapy to  be:	[ ] Continue   [ X] Discontinued   [X ] Change to prn Analgesics    Documentation and Verification of current medications:   [X] Done	[ ] Not done, not elligible    Comments: PRN Oral/IV opioids and/or Adjuvant medication to be ordered at this point.

## 2018-06-08 NOTE — PROGRESS NOTE ADULT - SUBJECTIVE AND OBJECTIVE BOX
Overnight events:  Pt received fluid bolus for low urine output with modest repsonse    Subjective:  Pt c/o incisional pain, tolerating CLD, no N/V, + flatus, was OOB to chair yesterday    Objective:    Vital signs  T(C): , Max: 37.7 (06-08-18 @ 05:40)  HR: 114 (06-08-18 @ 05:40)  BP: 101/59 (06-08-18 @ 05:40)  SpO2: 95% (06-08-18 @ 05:40)  Wt(kg): --    Output   Richardson: 500      Gen: NAD  Abd: staples c/d/i, mildly distended, appropriately tender  : richardson secured    Labs: none today

## 2018-06-08 NOTE — PROGRESS NOTE ADULT - PROBLEM SELECTOR PLAN 1
Continue PCA, add IV tylenol, monitor pain  Continue richardson, monitor UO  IVM, continue CLD, monitor GI function  OOB with assistance  DVT prophy, IS Continue PCA, add IV tylenol, monitor pain  Continue richardson, monitor UO  IVM, continue CLD, monitor GI function  Continue to hold AC for now  OOB with assistance  DVT prophy, IS

## 2018-06-08 NOTE — PROGRESS NOTE ADULT - SUBJECTIVE AND OBJECTIVE BOX
Anesthesia Pain Management Service- Attending Addendum    SUBJECTIVE: Patient's pain control adequate    Therapy:	  [ X] IV PCA	   [ ] Epidural           [ ] s/p Spinal Opoid              [ ] Postpartum infusion	  [ ] Patient controlled regional anesthesia (PCRA)    [ ] prn Analgesics    Allergies    No Known Allergies    Intolerances      MEDICATIONS  (STANDING):  dextrose 5% + sodium chloride 0.45%. 1000 milliLiter(s) (125 mL/Hr) IV Continuous <Continuous>  docusate sodium 100 milliGRAM(s) Oral three times a day  gabapentin 200 milliGRAM(s) Oral every 8 hours  heparin  Injectable 5000 Unit(s) SubCutaneous three times a day  senna 2 Tablet(s) Oral at bedtime    MEDICATIONS  (PRN):  acetaminophen   Tablet 650 milliGRAM(s) Oral every 6 hours PRN For Temp greater than 38 C (100.4 F)  morphine  - Injectable 4 milliGRAM(s) IV Push every 6 hours PRN Breakthrough severe pain  morphine  - Injectable 6 milliGRAM(s) IV Push every 4 hours PRN Severe Pain (7 - 10)  ondansetron Injectable 4 milliGRAM(s) IV Push every 6 hours PRN Nausea and/or Vomiting  oxyCODONE    IR 10 milliGRAM(s) Oral every 4 hours PRN Mild Pain (1 - 3)  oxyCODONE    IR 15 milliGRAM(s) Oral every 4 hours PRN Moderate Pain (4 - 6)      OBJECTIVE:   [X] No new signs     [ ] Other:    Side Effects:  [X ] None			[ ] Other:      ASSESSMENT/PLAN  -Discontinue current therapy    [ ] Therapy changed to:    [ ] IV PCA       [ ] Epidural     [ X] prn Analgesics     Comments: Pain management per primary team, APS to sign off

## 2018-06-08 NOTE — PROGRESS NOTE ADULT - ASSESSMENT
38 yo M POD #2 ex lap, resection of RP mass, RPLND, PLND 38 yo M afib/flutter, cardiomyopathy, atrial wall thrombus, POD #2 ex lap, resection of RP mass, RPLND, PLND

## 2018-06-09 LAB
BACTERIA UR CULT: SIGNIFICANT CHANGE UP
SPECIMEN SOURCE: SIGNIFICANT CHANGE UP

## 2018-06-09 PROCEDURE — 99233 SBSQ HOSP IP/OBS HIGH 50: CPT

## 2018-06-09 RX ORDER — DRONABINOL 2.5 MG
2.5 CAPSULE ORAL
Qty: 0 | Refills: 0 | Status: DISCONTINUED | OUTPATIENT
Start: 2018-06-09 | End: 2018-06-11

## 2018-06-09 RX ORDER — METOCLOPRAMIDE HCL 10 MG
10 TABLET ORAL EVERY 6 HOURS
Qty: 0 | Refills: 0 | Status: DISCONTINUED | OUTPATIENT
Start: 2018-06-09 | End: 2018-06-11

## 2018-06-09 RX ORDER — METOCLOPRAMIDE HCL 10 MG
10 TABLET ORAL ONCE
Qty: 0 | Refills: 0 | Status: COMPLETED | OUTPATIENT
Start: 2018-06-09 | End: 2018-06-09

## 2018-06-09 RX ORDER — SUCRALFATE 1 G
1 TABLET ORAL
Qty: 0 | Refills: 0 | Status: DISCONTINUED | OUTPATIENT
Start: 2018-06-09 | End: 2018-06-09

## 2018-06-09 RX ORDER — PANTOPRAZOLE SODIUM 20 MG/1
40 TABLET, DELAYED RELEASE ORAL DAILY
Qty: 0 | Refills: 0 | Status: DISCONTINUED | OUTPATIENT
Start: 2018-06-09 | End: 2018-06-11

## 2018-06-09 RX ORDER — SUCRALFATE 1 G
1 TABLET ORAL
Qty: 0 | Refills: 0 | Status: DISCONTINUED | OUTPATIENT
Start: 2018-06-09 | End: 2018-06-11

## 2018-06-09 RX ORDER — MAGNESIUM HYDROXIDE 400 MG/1
30 TABLET, CHEWABLE ORAL ONCE
Qty: 0 | Refills: 0 | Status: DISCONTINUED | OUTPATIENT
Start: 2018-06-09 | End: 2018-06-11

## 2018-06-09 RX ADMIN — HEPARIN SODIUM 5000 UNIT(S): 5000 INJECTION INTRAVENOUS; SUBCUTANEOUS at 14:16

## 2018-06-09 RX ADMIN — Medication 100 MILLIGRAM(S): at 21:41

## 2018-06-09 RX ADMIN — OXYCODONE HYDROCHLORIDE 15 MILLIGRAM(S): 5 TABLET ORAL at 21:41

## 2018-06-09 RX ADMIN — Medication 10 MILLIGRAM(S): at 14:15

## 2018-06-09 RX ADMIN — Medication 1 GRAM(S): at 21:35

## 2018-06-09 RX ADMIN — SODIUM CHLORIDE 75 MILLILITER(S): 9 INJECTION, SOLUTION INTRAVENOUS at 09:00

## 2018-06-09 RX ADMIN — HEPARIN SODIUM 5000 UNIT(S): 5000 INJECTION INTRAVENOUS; SUBCUTANEOUS at 21:41

## 2018-06-09 RX ADMIN — PANTOPRAZOLE SODIUM 40 MILLIGRAM(S): 20 TABLET, DELAYED RELEASE ORAL at 10:41

## 2018-06-09 RX ADMIN — ONDANSETRON 4 MILLIGRAM(S): 8 TABLET, FILM COATED ORAL at 01:18

## 2018-06-09 RX ADMIN — HEPARIN SODIUM 5000 UNIT(S): 5000 INJECTION INTRAVENOUS; SUBCUTANEOUS at 06:44

## 2018-06-09 RX ADMIN — OXYCODONE HYDROCHLORIDE 15 MILLIGRAM(S): 5 TABLET ORAL at 22:26

## 2018-06-09 NOTE — PROGRESS NOTE ADULT - ASSESSMENT
38 yo male with hx of testicular Ca diagnosed in 7/2017 s/p chemo last on 10/18, c/b CM with EF or 40-45%, afib, right atrial mass (unclear if thrombus vs mass) previously on coumadin, fth RP mass subsequently presenting for RP mass resection and LN dissection, s/p Ex lap on 6/6/18.    #Fever  - SIRS with acute organ dysfunction (elevated lactate); no suspected source at this time given lack of localization clinically. Likely due to sympathetic response post-procedure given involvement of sympathetic chain   - UA negative; BCx in lab   - Tachy improving with IVF, monitor closely for e/o overload, currently euvolemic on exam  - BP soft, cont holding home lisinopril, if BP decreases further or pt with clinical changes  - Recommend checking labs today including lactate level, CBC, BMP, and procalcitonin level   - If procalcitonin level very elevated (> 0.75) or he develops cough, SOB, chest pain, or worsened sputum, would also check CT chest non-con to rule out post-op PNA (CXR with small LLL infiltrate; unclear if atelectasis or consolidation)  - Hold off abx for now unless hemodynamic instability or Cx positive or clinically worsens   - Hold off on metoprolol given sinus tachycardia (EKG reviewed by me) is likely secondary to pain, volume loss, and/or sympathetic response. If develops rapid A-fib, would need to be transferred to telemetry and would call patient's cardiologist (Dr. Marquez)     #Post-op  - management per   - pain control oxy, IV tylenol  - richardson, monitor UO  - advance diet as tolerated  - OOB with assistance    #Postoperative anemia due to acute blood loss   - Pt currently tachy with borderline BPs however confounded by fevers  - hgb downtrending, monitor closely  - if continues to downtrend, BP drops or pt with clinical symptoms c/f worsening anemia, would obtain CTAP to eval for intra-ab bleeding and transfuse  - ensure active T&S, large bore IVs    #HFrEF  - euvolemic on exam  - given borderline BP and intraop blood loss, ok to c/w gentle IVF for now, monitor closely for overload  - if pt receiving blood will consider lasix bud  - holding lisinopril in setting of borderline BP    #Elevated LFTs  - unclear etiology, elevated intermittently in past  - trend  - check hep serologies  - if persistent and hep neg, would consider imaging    #Atrial mass  - unclear if thrombus vs mass  - recommend restarting coumadin as soon as deemed safe per primary team    #afib  - s/p cardioversion, currently tachy in sinus  - coumadin held in setting of OR, restart as soon as safe    #DVT ppx  - hsq

## 2018-06-09 NOTE — PROGRESS NOTE ADULT - ASSESSMENT
36 yo M afib/flutter, cardiomyopathy, atrial wall thrombus, POD #3 ex lap, resection of RP mass, RPLND, PLND

## 2018-06-09 NOTE — PROGRESS NOTE ADULT - SUBJECTIVE AND OBJECTIVE BOX
Overnight events:  Pt w/ emesis x 2 (green per RN) unknown quantity, remained afebrile    Subjective:  Pt states nausea has resolved, states this happens intermittently in the AM since chemo, still + flatus, ambulated yesterday    Objective:    Vital signs  T(C): , Max: 38.5 (06-08-18 @ 10:22)  HR: 119 (06-09-18 @ 05:48)  BP: 140/70 (06-09-18 @ 05:48)  SpO2: 96% (06-09-18 @ 05:48)  Wt(kg): --    Output   Richardson: 475        Gen: NAD  Abd: staples c/d/i, softly distended, appropriately tender  : richardson secured    Labs: none today      Urine Cx:   Culture - Urine (06.08.18 @ 11:21)    Culture - Urine:   NO GROWTH AT 24 HOURS    Specimen Source: URINE CATHETER

## 2018-06-09 NOTE — PROGRESS NOTE ADULT - SUBJECTIVE AND OBJECTIVE BOX
Patient is a 37y old  Male who presents with a chief complaint of "retroperitoneal dissection, removing all of the tumors that are inside" (04 Jun 2018 17:31)      SUBJECTIVE / OVERNIGHT EVENTS: Patient seen and examined. Overnight, febrile to 100.7F. In the AM, patient reports 2 episodes of NB vomiting since AM with inability to tolerate PO and feeling of "gas". He reports uncontrolled heartburn. Denies any chills, SOB, cough, sputum, dysuria, or worsening abdominal pain.     MEDICATIONS  (STANDING):  dextrose 5% + sodium chloride 0.45%. 1000 milliLiter(s) (75 mL/Hr) IV Continuous <Continuous>  docusate sodium 100 milliGRAM(s) Oral three times a day  gabapentin 200 milliGRAM(s) Oral every 8 hours  heparin  Injectable 5000 Unit(s) SubCutaneous three times a day  metoclopramide Injectable 10 milliGRAM(s) IV Push once  pantoprazole  Injectable 40 milliGRAM(s) IV Push daily  senna 2 Tablet(s) Oral at bedtime    MEDICATIONS  (PRN):  acetaminophen   Tablet 650 milliGRAM(s) Oral every 6 hours PRN For Temp greater than 38 C (100.4 F)  morphine  - Injectable 4 milliGRAM(s) IV Push every 6 hours PRN Breakthrough severe pain  morphine  - Injectable 6 milliGRAM(s) IV Push every 4 hours PRN Severe Pain (7 - 10)  ondansetron Injectable 4 milliGRAM(s) IV Push every 6 hours PRN Nausea and/or Vomiting  oxyCODONE    IR 10 milliGRAM(s) Oral every 4 hours PRN Mild Pain (1 - 3)  oxyCODONE    IR 15 milliGRAM(s) Oral every 4 hours PRN Moderate Pain (4 - 6)    Vital Signs Last 24 Hrs  T(C): 37.2 (09 Jun 2018 10:09), Max: 38.2 (08 Jun 2018 17:03)  T(F): 99 (09 Jun 2018 10:09), Max: 100.7 (08 Jun 2018 17:03)  HR: 113 (09 Jun 2018 10:09) (113 - 126)  BP: 123/67 (09 Jun 2018 10:09) (122/78 - 140/70)  BP(mean): --  RR: 20 (09 Jun 2018 10:09) (17 - 20)  SpO2: 97% (09 Jun 2018 10:09) (93% - 97%)    I&O's Detail    08 Jun 2018 07:01  -  09 Jun 2018 07:00  --------------------------------------------------------  IN:    dextrose 5% + sodium chloride 0.45%.: 1000 mL  Total IN: 1000 mL    OUT:    Indwelling Catheter - Urethral: 1325 mL  Total OUT: 1325 mL    Total NET: -325 mL      09 Jun 2018 07:01  -  09 Jun 2018 13:37  --------------------------------------------------------  IN:  Total IN: 0 mL    OUT:    Indwelling Catheter - Urethral: 350 mL  Total OUT: 350 mL    Total NET: -350 mL    PHYSICAL EXAM:  GEN: NAD, obese  EYES: conjunctiva and sclera clear  HEENT: mmm, no JVD  CHEST/LUNG: CTABL anteriorly, exam limited by shallow breaths  HEART: tachy, regular, no m/r/g appreciated  ABDOMEN: Soft, distended, surgical site c/d/i, diffusely tender  EXTREMITIES:  wwp, no edema  : richardson in place draining yellow urine  PSYCH: AAOx3  NEUROLOGY: non-focal  SKIN: No rashes or lesions    LABS:                        9.8    14.80 )-----------( 200      ( 08 Jun 2018 12:53 )             28.6     06-08    131<L>  |  95<L>  |  11  ----------------------------<  123<H>  4.5   |  21<L>  |  0.85    Ca    8.0<L>      08 Jun 2018 11:19    TPro  6.0  /  Alb  3.3  /  TBili  0.5  /  DBili  0.1  /  AST  48<H>  /  ALT  32  /  AlkPhos  41  06-08    RADIOLOGY & ADDITIONAL TESTS:    Imaging Personally Reviewed:    Consultant(s) Notes Reviewed:      Care Discussed with Consultants/Other Providers:

## 2018-06-09 NOTE — PROGRESS NOTE ADULT - PROBLEM SELECTOR PLAN 1
Sips of clears, monitor GI function, abdominal exam  If emesis recurs will make NPO  Continue IVF  Continue richardson, monitor UO  Continue to hold AC for now  F/U blood culture  F/U Hospitalist re: restarting metoprolol  OOB with assistance  SHAUN bass, IS

## 2018-06-10 LAB
BUN SERPL-MCNC: 9 MG/DL — SIGNIFICANT CHANGE UP (ref 7–23)
CALCIUM SERPL-MCNC: 7.9 MG/DL — LOW (ref 8.4–10.5)
CHLORIDE SERPL-SCNC: 96 MMOL/L — LOW (ref 98–107)
CO2 SERPL-SCNC: 31 MMOL/L — SIGNIFICANT CHANGE UP (ref 22–31)
CREAT SERPL-MCNC: 0.86 MG/DL — SIGNIFICANT CHANGE UP (ref 0.5–1.3)
GLUCOSE SERPL-MCNC: 109 MG/DL — HIGH (ref 70–99)
HCT VFR BLD CALC: 28.1 % — LOW (ref 39–50)
HGB BLD-MCNC: 9.4 G/DL — LOW (ref 13–17)
MCHC RBC-ENTMCNC: 32 PG — SIGNIFICANT CHANGE UP (ref 27–34)
MCHC RBC-ENTMCNC: 33.5 % — SIGNIFICANT CHANGE UP (ref 32–36)
MCV RBC AUTO: 95.6 FL — SIGNIFICANT CHANGE UP (ref 80–100)
NRBC # FLD: 0.17 — SIGNIFICANT CHANGE UP
NRBC FLD-RTO: 1.7 — SIGNIFICANT CHANGE UP
PLATELET # BLD AUTO: 149 K/UL — LOW (ref 150–400)
PMV BLD: 11.1 FL — SIGNIFICANT CHANGE UP (ref 7–13)
POTASSIUM SERPL-MCNC: 3.2 MMOL/L — LOW (ref 3.5–5.3)
POTASSIUM SERPL-SCNC: 3.2 MMOL/L — LOW (ref 3.5–5.3)
RBC # BLD: 2.94 M/UL — LOW (ref 4.2–5.8)
RBC # FLD: 12.6 % — SIGNIFICANT CHANGE UP (ref 10.3–14.5)
SODIUM SERPL-SCNC: 135 MMOL/L — SIGNIFICANT CHANGE UP (ref 135–145)
WBC # BLD: 9.95 K/UL — SIGNIFICANT CHANGE UP (ref 3.8–10.5)
WBC # FLD AUTO: 9.95 K/UL — SIGNIFICANT CHANGE UP (ref 3.8–10.5)

## 2018-06-10 PROCEDURE — 99233 SBSQ HOSP IP/OBS HIGH 50: CPT

## 2018-06-10 RX ORDER — BENZOCAINE AND MENTHOL 5; 1 G/100ML; G/100ML
1 LIQUID ORAL THREE TIMES A DAY
Qty: 0 | Refills: 0 | Status: DISCONTINUED | OUTPATIENT
Start: 2018-06-10 | End: 2018-06-11

## 2018-06-10 RX ORDER — POTASSIUM CHLORIDE 20 MEQ
40 PACKET (EA) ORAL ONCE
Qty: 0 | Refills: 0 | Status: COMPLETED | OUTPATIENT
Start: 2018-06-10 | End: 2018-06-10

## 2018-06-10 RX ADMIN — Medication 2.5 MILLIGRAM(S): at 19:22

## 2018-06-10 RX ADMIN — HEPARIN SODIUM 5000 UNIT(S): 5000 INJECTION INTRAVENOUS; SUBCUTANEOUS at 05:45

## 2018-06-10 RX ADMIN — Medication 100 MILLIGRAM(S): at 21:58

## 2018-06-10 RX ADMIN — PANTOPRAZOLE SODIUM 40 MILLIGRAM(S): 20 TABLET, DELAYED RELEASE ORAL at 11:18

## 2018-06-10 RX ADMIN — HEPARIN SODIUM 5000 UNIT(S): 5000 INJECTION INTRAVENOUS; SUBCUTANEOUS at 21:57

## 2018-06-10 RX ADMIN — Medication 40 MILLIEQUIVALENT(S): at 19:46

## 2018-06-10 RX ADMIN — HEPARIN SODIUM 5000 UNIT(S): 5000 INJECTION INTRAVENOUS; SUBCUTANEOUS at 13:33

## 2018-06-10 RX ADMIN — OXYCODONE HYDROCHLORIDE 15 MILLIGRAM(S): 5 TABLET ORAL at 16:45

## 2018-06-10 RX ADMIN — OXYCODONE HYDROCHLORIDE 15 MILLIGRAM(S): 5 TABLET ORAL at 21:58

## 2018-06-10 RX ADMIN — OXYCODONE HYDROCHLORIDE 15 MILLIGRAM(S): 5 TABLET ORAL at 22:55

## 2018-06-10 RX ADMIN — Medication 1 GRAM(S): at 19:22

## 2018-06-10 RX ADMIN — Medication 1 GRAM(S): at 05:45

## 2018-06-10 RX ADMIN — OXYCODONE HYDROCHLORIDE 15 MILLIGRAM(S): 5 TABLET ORAL at 15:46

## 2018-06-10 RX ADMIN — Medication 100 MILLIGRAM(S): at 13:33

## 2018-06-10 NOTE — PROGRESS NOTE ADULT - SUBJECTIVE AND OBJECTIVE BOX
Subjective  Continuing tachycardia overnight  No further episodes of emesis, started on Marinol in addition to Reglan    Objective  Vital signs  T(F): , Max: 99.5 (06-09-18 @ 15:11)  HR: 103 (06-10-18 @ 01:34)  BP: 113/66 (06-10-18 @ 01:34)  SpO2: 94% (06-10-18 @ 01:34)  Wt(kg): --    Output     06-08 @ 07:01  -  06-09 @ 07:00  --------------------------------------------------------  IN: 1000 mL / OUT: 1325 mL / NET: -325 mL    06-09 @ 07:01  -  06-10 @ 03:01  --------------------------------------------------------  IN: 0 mL / OUT: 740 mL / NET: -740 mL        Gen  Abd      Labs      06-08 @ 12:53    WBC 14.80 / Hct 28.6  / SCr --       06-08 @ 11:19    WBC --    / Hct --    / SCr 0.85       Urine Cx: ?  Blood Cx: ?    Imaging Subjective  Continuing tachycardia overnight  No further episodes of emesis, started on Marinol in addition to Reglan.  Feels very well this AM.      Objective  Vital signs  T(F): , Max: 99.5 (06-09-18 @ 15:11)  HR: 103 (06-10-18 @ 01:34)  BP: 113/66 (06-10-18 @ 01:34)  SpO2: 94% (06-10-18 @ 01:34)  Wt(kg): --    Output     06-08 @ 07:01  -  06-09 @ 07:00  --------------------------------------------------------  IN: 1000 mL / OUT: 1325 mL / NET: -325 mL    06-09 @ 07:01  -  06-10 @ 03:01  --------------------------------------------------------  IN: 0 mL / OUT: 740 mL / NET: -740 mL        Gen  Abd soft, NT, minimally distended    Clear yellow urine.     Labs      06-08 @ 12:53    WBC 14.80 / Hct 28.6  / SCr --       06-08 @ 11:19    WBC --    / Hct --    / SCr 0.85

## 2018-06-10 NOTE — PROGRESS NOTE ADULT - PROBLEM SELECTOR PLAN 1
Sips of clears, monitor GI function, abdominal exam  If emesis recurs will make NPO  Continue IVF  Continue richardson, monitor UO  Continue to hold AC for now  F/U blood culture  F/U Hospitalist: metoprolol - not indicated at this time, will follow up AM labs and consider further chest imaging if concern for pneumonia >> atelectasis seen on prior CXR  OOB with assistance  DVT shelia, IS CLD, monitor GI function, abdominal exam  advance diet to regular at lunch.  Continue IVF  Continue richardson, monitor UO  Continue to hold AC for now  F/U blood culture  F/U Hospitalist: metoprolol - not indicated at this time, will follow up AM labs and consider further chest imaging if concern for pneumonia >> atelectasis seen on prior CXR  OOB with assistance  DVT shelia, IS

## 2018-06-10 NOTE — PROGRESS NOTE ADULT - ASSESSMENT
36 yo M afib/flutter, cardiomyopathy, atrial wall thrombus, POD #4 ex lap, resection of RP mass, RPLND

## 2018-06-10 NOTE — PROGRESS NOTE ADULT - ASSESSMENT
38 yo male with hx of testicular Ca diagnosed in 7/2017 s/p chemo last on 10/18, c/b CM with EF or 40-45%, afib, right atrial mass (unclear if thrombus vs mass) previously on coumadin, fth RP mass subsequently presenting for RP mass resection and LN dissection, s/p Ex lap on 6/6/18.    #Fever  - resolved with clinical improvement and wbc normalized off abx suggesting autonomic etiology in setting of recent surgery  - cx data ngtd, fu for finalization  - Tachy improving with IVF, monitor closely for e/o overload, currently euvolemic on exam  - BP soft, cont holding home lisinopril, if BP decreases further or pt with clinical changes  - pt's only localizing symptom is mild cough, if e/o decompensation would start empiric abx with vanc/cefepime and image chest  - Hold off abx for now unless hemodynamic instability or Cx positive or clinically worsens     #Post-op  - management per   - pain control oxy, IV tylenol  - richardson d/cd, monitor UO  - advance diet as tolerated  - OOB with assistance    #Postoperative anemia due to acute blood loss   - hgb downtrending stable today, monitor closely  - if continues to downtrend, BP drops or pt with clinical symptoms c/f worsening anemia, would obtain CTAP to eval for intra-ab bleeding and transfuse  - ensure active T&S, large bore IVs    #HFrEF  - euvolemic on exam  - given borderline BP and intraop blood loss, ok to c/w gentle IVF for now, monitor closely for overload  - if pt receiving blood will consider lasix bud  - holding lisinopril in setting of borderline BP    #Elevated LFTs  - unclear etiology, elevated intermittently in past  - trend  - hep serologies negative  - if persistent and hep neg, would consider imaging    #Atrial mass  - unclear if thrombus vs mass  - recommend restarting coumadin as soon as deemed safe per primary team    #afib  - s/p cardioversion, currently tachy in sinus  - coumadin held in setting of OR, restart as soon as safe    #DVT ppx  - hsq

## 2018-06-10 NOTE — PROGRESS NOTE ADULT - SUBJECTIVE AND OBJECTIVE BOX
Patient is a 37y old  Male who presents with a chief complaint of "retroperitoneal dissection, removing all of the tumors that are inside" (2018 17:31)      SUBJECTIVE / OVERNIGHT EVENTS: Pt reports sig clinical improvement.     MEDICATIONS  (STANDING):  dextrose 5% + sodium chloride 0.45%. 1000 milliLiter(s) (75 mL/Hr) IV Continuous <Continuous>  docusate sodium 100 milliGRAM(s) Oral three times a day  dronabinol 2.5 milliGRAM(s) Oral two times a day  gabapentin 200 milliGRAM(s) Oral every 8 hours  heparin  Injectable 5000 Unit(s) SubCutaneous three times a day  pantoprazole  Injectable 40 milliGRAM(s) IV Push daily  senna 2 Tablet(s) Oral at bedtime  sucralfate suspension 1 Gram(s) Oral two times a day    MEDICATIONS  (PRN):  acetaminophen   Tablet 650 milliGRAM(s) Oral every 6 hours PRN For Temp greater than 38 C (100.4 F)  magnesium hydroxide Suspension 30 milliLiter(s) Oral once PRN Constipation  metoclopramide Injectable 10 milliGRAM(s) IV Push every 6 hours PRN nausea/vomiting  morphine  - Injectable 4 milliGRAM(s) IV Push every 6 hours PRN Breakthrough severe pain  morphine  - Injectable 6 milliGRAM(s) IV Push every 4 hours PRN Severe Pain (7 - 10)  ondansetron Injectable 4 milliGRAM(s) IV Push every 6 hours PRN Nausea and/or Vomiting  oxyCODONE    IR 10 milliGRAM(s) Oral every 4 hours PRN Mild Pain (1 - 3)  oxyCODONE    IR 15 milliGRAM(s) Oral every 4 hours PRN Moderate Pain (4 - 6)      Vital Signs Last 24 Hrs  T(C): 37.1 (06-10-18 @ 09:18), Max: 37.5 (18 @ 15:11)  T(F): 98.8 (06-10-18 @ 09:18), Max: 99.5 (18 @ 15:11)  HR: 106 (06-10-18 @ 09:18) (97 - 120)  BP: 103/69 (06-10-18 @ 09:18) (103/69 - 113/66)  BP(mean): --  RR: 20 (06-10-18 @ 09:18) (18 - 20)  SpO2: 98% (06-10-18 @ 09:18) (93% - 98%)  CAPILLARY BLOOD GLUCOSE        I&O's Summary    2018 07:01  -  10 Ernesto 2018 07:00  --------------------------------------------------------  IN: 0 mL / OUT: 1090 mL / NET: -1090 mL    10 Ernesto 2018 07:01  -  10 Ernesto 2018 13:29  --------------------------------------------------------  IN: 0 mL / OUT: 150 mL / NET: -150 mL    PHYSICAL EXAM:  GEN: NAD, obese  EYES: conjunctiva and sclera clear  HEENT: mmm, no JVD  CHEST/LUNG: CTABL  HEART: tachy, regular, no m/r/g appreciated  ABDOMEN: Soft, distended, surgical site c/d/i, diffusely tender  EXTREMITIES:  wwp, no edema  PSYCH: AAOx3  NEUROLOGY: non-focal  SKIN: No rashes or lesions    LABS:                        9.4    9.95  )-----------( 149      ( 10 Ernesto 2018 07:00 )             28.1     06-10    135  |  96<L>  |  9   ----------------------------<  109<H>  3.2<L>   |  31  |  0.86    Ca    7.9<L>      10 Ernesto 2018 07:00            Urinalysis Basic - ( 2018 14:30 )    Color: YELLOW / Appearance: CLEAR / S.018 / pH: 6.5  Gluc: NEGATIVE / Ketone: TRACE  / Bili: NEGATIVE / Urobili: NORMAL mg/dL   Blood: SMALL / Protein: 20 mg/dL / Nitrite: NEGATIVE   Leuk Esterase: NEGATIVE / RBC: 2-5 / WBC 2-5   Sq Epi: OCC / Non Sq Epi: x / Bacteria: FEW        RADIOLOGY & ADDITIONAL TESTS:    Imaging Personally Reviewed:    Consultant(s) Notes Reviewed:      Care Discussed with Consultants/Other Providers:

## 2018-06-11 ENCOUNTER — TRANSCRIPTION ENCOUNTER (OUTPATIENT)
Age: 38
End: 2018-06-11

## 2018-06-11 VITALS
HEART RATE: 91 BPM | OXYGEN SATURATION: 98 % | RESPIRATION RATE: 16 BRPM | SYSTOLIC BLOOD PRESSURE: 109 MMHG | TEMPERATURE: 99 F | DIASTOLIC BLOOD PRESSURE: 65 MMHG

## 2018-06-11 LAB — PROCALCITONIN SERPL-MCNC: 0.32 NG/ML — HIGH (ref 0.02–0.1)

## 2018-06-11 PROCEDURE — 99232 SBSQ HOSP IP/OBS MODERATE 35: CPT

## 2018-06-11 RX ORDER — ENOXAPARIN SODIUM 100 MG/ML
115 INJECTION SUBCUTANEOUS
Qty: 0 | Refills: 0 | Status: DISCONTINUED | OUTPATIENT
Start: 2018-06-11 | End: 2018-06-11

## 2018-06-11 RX ORDER — ENOXAPARIN SODIUM 100 MG/ML
115 INJECTION SUBCUTANEOUS
Qty: 20 | Refills: 0 | OUTPATIENT
Start: 2018-06-11 | End: 2018-06-20

## 2018-06-11 RX ORDER — IBUPROFEN 200 MG
2 TABLET ORAL
Qty: 0 | Refills: 0 | COMMUNITY

## 2018-06-11 RX ORDER — OXYCODONE HYDROCHLORIDE 5 MG/1
1 TABLET ORAL
Qty: 20 | Refills: 0 | OUTPATIENT
Start: 2018-06-11 | End: 2018-06-15

## 2018-06-11 RX ORDER — WARFARIN SODIUM 2.5 MG/1
1 TABLET ORAL
Qty: 0 | Refills: 0 | COMMUNITY

## 2018-06-11 RX ORDER — OXYCODONE HYDROCHLORIDE 5 MG/1
1 TABLET ORAL
Qty: 0 | Refills: 0 | COMMUNITY

## 2018-06-11 RX ORDER — WARFARIN SODIUM 2.5 MG/1
1 TABLET ORAL
Qty: 30 | Refills: 0 | OUTPATIENT
Start: 2018-06-11 | End: 2018-07-10

## 2018-06-11 RX ADMIN — Medication 2.5 MILLIGRAM(S): at 06:19

## 2018-06-11 RX ADMIN — Medication 1 GRAM(S): at 06:19

## 2018-06-11 RX ADMIN — HEPARIN SODIUM 5000 UNIT(S): 5000 INJECTION INTRAVENOUS; SUBCUTANEOUS at 06:19

## 2018-06-11 RX ADMIN — Medication 100 MILLIGRAM(S): at 06:19

## 2018-06-11 RX ADMIN — OXYCODONE HYDROCHLORIDE 15 MILLIGRAM(S): 5 TABLET ORAL at 11:31

## 2018-06-11 RX ADMIN — ENOXAPARIN SODIUM 115 MILLIGRAM(S): 100 INJECTION SUBCUTANEOUS at 09:38

## 2018-06-11 RX ADMIN — OXYCODONE HYDROCHLORIDE 15 MILLIGRAM(S): 5 TABLET ORAL at 12:06

## 2018-06-11 NOTE — DISCHARGE NOTE ADULT - CONDITIONS AT DISCHARGE
Abdominal incision with staples SANDY. Positive bowel sounds, pt passing flatus, erasmo po diet well. Voiding without difficulty. VS stable, Afebrile. pT SEEN by MD and cleared for Dc to home as per safe Dc plan. Lovenox instruction reviewed with AM administration, father to asst pt at home.

## 2018-06-11 NOTE — DISCHARGE NOTE ADULT - CARE PROVIDER_API CALL
Phong Restrepo), Urology  [address] [place] [address]  Phone: [phone]  Fax: [phone]

## 2018-06-11 NOTE — DISCHARGE NOTE ADULT - INSTRUCTIONS
as tolerated Maintain abdominal incision with staples clean and dry, call MD with any signs of infection such as fever, redness or drainage from site, or with persistent nausea or vomiting, or difficulty urinating. Continue to drink plenty of fluids, avoid strenuous activity and heavy lifting, as well as constipation which may be a side effect from taking narcotic pain medication. Follow up with surgeon in the office for post-op check as instructed, as well as PMD for continuity of care.

## 2018-06-11 NOTE — PROGRESS NOTE ADULT - SUBJECTIVE AND OBJECTIVE BOX
Patient is a 37y old  Male who presents with a chief complaint of "retroperitoneal dissection, removing all of the tumors that are inside" (11 Jun 2018 08:44)      SUBJECTIVE / OVERNIGHT EVENTS: Pt without complaints, anticipating discharge.    MEDICATIONS  (STANDING):  dextrose 5% + sodium chloride 0.45%. 1000 milliLiter(s) (75 mL/Hr) IV Continuous <Continuous>  docusate sodium 100 milliGRAM(s) Oral three times a day  dronabinol 2.5 milliGRAM(s) Oral two times a day  enoxaparin Injectable 115 milliGRAM(s) SubCutaneous two times a day  gabapentin 200 milliGRAM(s) Oral every 8 hours  pantoprazole  Injectable 40 milliGRAM(s) IV Push daily  senna 2 Tablet(s) Oral at bedtime  sucralfate suspension 1 Gram(s) Oral two times a day    MEDICATIONS  (PRN):  acetaminophen   Tablet 650 milliGRAM(s) Oral every 6 hours PRN For Temp greater than 38 C (100.4 F)  benzocaine 15 mG/menthol 3.6 mG Lozenge 1 Lozenge Oral three times a day PRN Sore Throat  magnesium hydroxide Suspension 30 milliLiter(s) Oral once PRN Constipation  metoclopramide Injectable 10 milliGRAM(s) IV Push every 6 hours PRN nausea/vomiting  morphine  - Injectable 4 milliGRAM(s) IV Push every 6 hours PRN Breakthrough severe pain  morphine  - Injectable 6 milliGRAM(s) IV Push every 4 hours PRN Severe Pain (7 - 10)  ondansetron Injectable 4 milliGRAM(s) IV Push every 6 hours PRN Nausea and/or Vomiting  oxyCODONE    IR 10 milliGRAM(s) Oral every 4 hours PRN Mild Pain (1 - 3)  oxyCODONE    IR 15 milliGRAM(s) Oral every 4 hours PRN Moderate Pain (4 - 6)      Vital Signs Last 24 Hrs  T(C): 37 (06-11-18 @ 09:03), Max: 37.1 (06-10-18 @ 17:35)  T(F): 98.6 (06-11-18 @ 09:03), Max: 98.8 (06-10-18 @ 17:35)  HR: 92 (06-11-18 @ 09:03) (92 - 104)  BP: 107/58 (06-11-18 @ 09:03) (105/62 - 118/71)  BP(mean): --  RR: 17 (06-11-18 @ 09:03) (16 - 18)  SpO2: 97% (06-11-18 @ 09:03) (94% - 100%)  CAPILLARY BLOOD GLUCOSE        I&O's Summary    10 Ernesto 2018 07:01  -  11 Jun 2018 07:00  --------------------------------------------------------  IN: 0 mL / OUT: 1290 mL / NET: -1290 mL    PHYSICAL EXAM:  GEN: NAD, obese  EYES: conjunctiva and sclera clear  HEENT: mmm, no JVD  CHEST/LUNG: CTABL  HEART: tachy, regular, no m/r/g appreciated  ABDOMEN: Soft, distended, surgical site c/d/i  EXTREMITIES:  wwp, no edema  PSYCH: AAOx3  NEUROLOGY: non-focal  SKIN: No rashes or lesions      LABS:                        9.4    9.95  )-----------( 149      ( 10 Ernseto 2018 07:00 )             28.1     06-10    135  |  96<L>  |  9   ----------------------------<  109<H>  3.2<L>   |  31  |  0.86    Ca    7.9<L>      10 Ernesto 2018 07:00                RADIOLOGY & ADDITIONAL TESTS:    Imaging Personally Reviewed:    Consultant(s) Notes Reviewed:      Care Discussed with Consultants/Other Providers:

## 2018-06-11 NOTE — PROGRESS NOTE ADULT - PROVIDER SPECIALTY LIST ADULT
Anesthesia
Hospitalist
Pain Medicine
Urology
Pain Medicine
Hospitalist

## 2018-06-11 NOTE — PROGRESS NOTE ADULT - ATTENDING COMMENTS
Doing well.  Tolerating diet.  Pain controlled, ambulating.  Re-start coumadin.  Will discuss need for Lovenox until therapeutic INR with cardiology.
Pt seen and examined  labs reviewed  agree with above
Pt doing well, improving.  Less nausea- now resolved.  Seen and examined

## 2018-06-11 NOTE — DISCHARGE NOTE ADULT - CARE PLAN
Principal Discharge DX:	Retroperitoneal mass  Goal:	excision  Assessment and plan of treatment:	as above; drink plenty of fluids; f/u with Dr. Marquez for coumadin levels; he will arrange home blood draw for Wednesday

## 2018-06-11 NOTE — DISCHARGE NOTE ADULT - HOSPITAL COURSE
Pt with hx of cardiomyopathy admitted for f/u of testicular Ca; had exc of RP mass and RPLND; he did well; he is ambulating; erasmo reg diet; had BM;  he will go home today on home dose of coumadin and lovenox bridge; he will be followed by his cardiologist Dr. Cleve Marquez, whom I spoke to.

## 2018-06-11 NOTE — PROGRESS NOTE ADULT - SUBJECTIVE AND OBJECTIVE BOX
POD #5    Afeb 105/62  102  94%RA    Pt has no c/o  Abd- soft NT ND; + flatus    staples C&D  Marlon reg diet    Void 540

## 2018-06-11 NOTE — DISCHARGE NOTE ADULT - PLAN OF CARE
excision as above; drink plenty of fluids; f/u with Dr. Marquez for coumadin levels; he will arrange home blood draw for Wednesday

## 2018-06-11 NOTE — PROGRESS NOTE ADULT - ASSESSMENT
36 yo male with hx of testicular Ca diagnosed in 7/2017 s/p chemo last on 10/18, c/b CM with EF or 40-45%, afib, right atrial mass (unclear if thrombus vs mass) previously on coumadin, fth RP mass subsequently presenting for RP mass resection and LN dissection, s/p Ex lap on 6/6/18.    #Fever  - resolved with clinical improvement and wbc normalized off abx suggesting autonomic etiology in setting of recent surgery  - cx data ngtd  - Tachy improving with IVF, currently euvolemic on exam    #Post-op  - management per   - pain control oxy, IV tylenol  - richardson d/cd    #Postoperative anemia due to acute blood loss   - hgb stable    #HFrEF  - euvolemic on exam  - can restart low dose lisinopril for neurohormonal blockade on discharge    #Elevated LFTs  - unclear etiology, elevated intermittently in past  - hep serologies negative  - fu as OP    #Atrial mass  - unclear if thrombus vs mass  - plan to d/c on [place] bridge to coumadin    #afib  - s/p cardioversion, currently tachy in sinus  - plan to d/c on [place] bridge to coumadin    #DVT ppx  - hsq

## 2018-06-11 NOTE — PROGRESS NOTE ADULT - ASSESSMENT
36 yo M afib/flutter, cardiomyopathy, atrial wall thrombus, POD #5 ex lap, resection of RP mass, RPLND

## 2018-06-11 NOTE — DISCHARGE NOTE ADULT - MEDICATION SUMMARY - MEDICATIONS TO TAKE
I will START or STAY ON the medications listed below when I get home from the hospital:    oxyCODONE 15 mg oral tablet  -- 1 tab(s) by mouth every 6 hours, As Needed MDD:4 tabs  -- Indication: For pain as needed    Coumadin 5 mg oral tablet  -- 1 tab(s) by mouth once a day.  pt stopped taking medication on his own 2 weeks ago  -- Indication: For blood thinner    Lovenox 120 mg/0.8 mL injectable solution  -- 115 milligram(s) subcutaneously 2 times a day   -- It is very important that you take or use this exactly as directed.  Do not skip doses or discontinue unless directed by your doctor.    -- Indication: For same I will START or STAY ON the medications listed below when I get home from the hospital:    Lovenox 120 mg/0.8 mL injectable solution  -- 115 milligram(s) subcutaneously 2 times a day   -- It is very important that you take or use this exactly as directed.  Do not skip doses or discontinue unless directed by your doctor.    -- Indication: For same    Coumadin 5 mg oral tablet  -- 1 tab(s) by mouth once a day.  pt stopped taking medication on his own 2 weeks ago  -- Indication: For blood thinner

## 2018-06-11 NOTE — DISCHARGE NOTE ADULT - HOME CARE AGENCY
[facility] - [phone]  Nurse to visit the day after hospital discharge

## 2018-06-13 ENCOUNTER — LABORATORY RESULT (OUTPATIENT)
Age: 38
End: 2018-06-13

## 2018-06-13 LAB
BACTERIA BLD CULT: SIGNIFICANT CHANGE UP
BACTERIA BLD CULT: SIGNIFICANT CHANGE UP

## 2018-06-14 ENCOUNTER — MEDICATION RENEWAL (OUTPATIENT)
Age: 38
End: 2018-06-14

## 2018-06-14 LAB — SURGICAL PATHOLOGY STUDY: SIGNIFICANT CHANGE UP

## 2018-06-19 ENCOUNTER — APPOINTMENT (OUTPATIENT)
Dept: UROLOGY | Facility: CLINIC | Age: 38
End: 2018-06-19
Payer: MEDICAID

## 2018-06-19 DIAGNOSIS — Z87.438 PERSONAL HISTORY OF OTHER DISEASES OF MALE GENITAL ORGANS: ICD-10-CM

## 2018-06-19 PROCEDURE — 99024 POSTOP FOLLOW-UP VISIT: CPT

## 2018-06-20 LAB
AFP-TM SERPL-MCNC: 3.8 NG/ML
ALBUMIN SERPL ELPH-MCNC: 4.2 G/DL
ALP BLD-CCNC: 61 U/L
ALT SERPL-CCNC: 97 U/L
ANION GAP SERPL CALC-SCNC: 17 MMOL/L
AST SERPL-CCNC: 62 U/L
BASOPHILS # BLD AUTO: 0.01 K/UL
BASOPHILS NFR BLD AUTO: 0.1 %
BILIRUB SERPL-MCNC: 0.2 MG/DL
BUN SERPL-MCNC: 17 MG/DL
CALCIUM SERPL-MCNC: 9.6 MG/DL
CHLORIDE SERPL-SCNC: 101 MMOL/L
CO2 SERPL-SCNC: 24 MMOL/L
CREAT SERPL-MCNC: 0.98 MG/DL
EOSINOPHIL # BLD AUTO: 0.06 K/UL
EOSINOPHIL NFR BLD AUTO: 0.6 %
GLUCOSE SERPL-MCNC: 83 MG/DL
HCG SERPL-MCNC: <1 MIU/ML
HCT VFR BLD CALC: 38.5 %
HGB BLD-MCNC: 12 G/DL
IMM GRANULOCYTES NFR BLD AUTO: 0.8 %
INR PPP: 1.66 RATIO
LDH SERPL-CCNC: 386 U/L
LYMPHOCYTES # BLD AUTO: 3.12 K/UL
LYMPHOCYTES NFR BLD AUTO: 29.1 %
MAN DIFF?: NORMAL
MCHC RBC-ENTMCNC: 31.2 GM/DL
MCHC RBC-ENTMCNC: 31.3 PG
MCV RBC AUTO: 100.5 FL
MONOCYTES # BLD AUTO: 0.74 K/UL
MONOCYTES NFR BLD AUTO: 6.9 %
NEUTROPHILS # BLD AUTO: 6.7 K/UL
NEUTROPHILS NFR BLD AUTO: 62.5 %
PLATELET # BLD AUTO: 471 K/UL
POTASSIUM SERPL-SCNC: 5.6 MMOL/L
PROT SERPL-MCNC: 7.3 G/DL
PT BLD: 19 SEC
RBC # BLD: 3.83 M/UL
RBC # FLD: 14.7 %
SODIUM SERPL-SCNC: 142 MMOL/L
WBC # FLD AUTO: 10.72 K/UL

## 2018-06-27 ENCOUNTER — NON-APPOINTMENT (OUTPATIENT)
Age: 38
End: 2018-06-27

## 2018-06-27 ENCOUNTER — APPOINTMENT (OUTPATIENT)
Dept: CARDIOLOGY | Facility: CLINIC | Age: 38
End: 2018-06-27
Payer: MEDICAID

## 2018-06-27 VITALS
HEIGHT: 73 IN | SYSTOLIC BLOOD PRESSURE: 106 MMHG | HEART RATE: 105 BPM | WEIGHT: 248 LBS | BODY MASS INDEX: 32.87 KG/M2 | DIASTOLIC BLOOD PRESSURE: 74 MMHG | OXYGEN SATURATION: 96 %

## 2018-06-27 LAB — INR PPP: 2.6 RATIO

## 2018-06-27 PROCEDURE — 93000 ELECTROCARDIOGRAM COMPLETE: CPT

## 2018-06-27 PROCEDURE — 99215 OFFICE O/P EST HI 40 MIN: CPT

## 2018-06-27 RX ORDER — METOPROLOL SUCCINATE 25 MG/1
25 TABLET, EXTENDED RELEASE ORAL DAILY
Qty: 30 | Refills: 3 | Status: DISCONTINUED | COMMUNITY
Start: 2017-10-21 | End: 2018-06-27

## 2018-07-01 ENCOUNTER — OUTPATIENT (OUTPATIENT)
Dept: OUTPATIENT SERVICES | Facility: HOSPITAL | Age: 38
LOS: 1 days | End: 2018-07-01

## 2018-07-01 DIAGNOSIS — Z98.890 OTHER SPECIFIED POSTPROCEDURAL STATES: Chronic | ICD-10-CM

## 2018-07-01 DIAGNOSIS — Z90.79 ACQUIRED ABSENCE OF OTHER GENITAL ORGAN(S): Chronic | ICD-10-CM

## 2018-07-01 DIAGNOSIS — Z95.828 PRESENCE OF OTHER VASCULAR IMPLANTS AND GRAFTS: Chronic | ICD-10-CM

## 2018-07-02 ENCOUNTER — APPOINTMENT (OUTPATIENT)
Dept: CARDIOLOGY | Facility: CLINIC | Age: 38
End: 2018-07-02
Payer: MEDICAID

## 2018-07-02 ENCOUNTER — OTHER (OUTPATIENT)
Age: 38
End: 2018-07-02

## 2018-07-02 VITALS — HEART RATE: 84 BPM | OXYGEN SATURATION: 96 % | SYSTOLIC BLOOD PRESSURE: 114 MMHG | DIASTOLIC BLOOD PRESSURE: 77 MMHG

## 2018-07-02 LAB — INR PPP: 2.5

## 2018-07-02 PROCEDURE — 85610 PROTHROMBIN TIME: CPT | Mod: QW

## 2018-07-02 PROCEDURE — 99211 OFF/OP EST MAY X REQ PHY/QHP: CPT

## 2018-07-03 ENCOUNTER — OUTPATIENT (OUTPATIENT)
Dept: OUTPATIENT SERVICES | Facility: HOSPITAL | Age: 38
LOS: 1 days | Discharge: HOME | End: 2018-07-03

## 2018-07-03 DIAGNOSIS — Z90.79 ACQUIRED ABSENCE OF OTHER GENITAL ORGAN(S): Chronic | ICD-10-CM

## 2018-07-03 DIAGNOSIS — Z95.828 PRESENCE OF OTHER VASCULAR IMPLANTS AND GRAFTS: Chronic | ICD-10-CM

## 2018-07-03 DIAGNOSIS — C62.92 MALIGNANT NEOPLASM OF LEFT TESTIS, UNSPECIFIED WHETHER DESCENDED OR UNDESCENDED: ICD-10-CM

## 2018-07-03 DIAGNOSIS — Z98.890 OTHER SPECIFIED POSTPROCEDURAL STATES: Chronic | ICD-10-CM

## 2018-07-06 ENCOUNTER — APPOINTMENT (OUTPATIENT)
Dept: UROLOGY | Facility: CLINIC | Age: 38
End: 2018-07-06
Payer: MEDICAID

## 2018-07-06 VITALS
HEART RATE: 83 BPM | BODY MASS INDEX: 32.98 KG/M2 | DIASTOLIC BLOOD PRESSURE: 80 MMHG | WEIGHT: 250 LBS | RESPIRATION RATE: 17 BRPM | TEMPERATURE: 98.2 F | SYSTOLIC BLOOD PRESSURE: 118 MMHG | OXYGEN SATURATION: 97 %

## 2018-07-06 PROCEDURE — 99024 POSTOP FOLLOW-UP VISIT: CPT

## 2018-07-09 ENCOUNTER — APPOINTMENT (OUTPATIENT)
Dept: HEMATOLOGY ONCOLOGY | Facility: CLINIC | Age: 38
End: 2018-07-09
Payer: MEDICAID

## 2018-07-09 ENCOUNTER — APPOINTMENT (OUTPATIENT)
Dept: MRI IMAGING | Facility: HOSPITAL | Age: 38
End: 2018-07-09

## 2018-07-09 ENCOUNTER — RESULT REVIEW (OUTPATIENT)
Age: 38
End: 2018-07-09

## 2018-07-09 VITALS
HEART RATE: 85 BPM | SYSTOLIC BLOOD PRESSURE: 106 MMHG | TEMPERATURE: 99.1 F | RESPIRATION RATE: 16 BRPM | OXYGEN SATURATION: 96 % | BODY MASS INDEX: 32.87 KG/M2 | WEIGHT: 249.12 LBS | DIASTOLIC BLOOD PRESSURE: 74 MMHG

## 2018-07-09 LAB
HCT VFR BLD CALC: 40.4 % — SIGNIFICANT CHANGE UP (ref 39–50)
HGB BLD-MCNC: 14 G/DL — SIGNIFICANT CHANGE UP (ref 13–17)
MCHC RBC-ENTMCNC: 32.3 PG — SIGNIFICANT CHANGE UP (ref 27–34)
MCHC RBC-ENTMCNC: 34.5 G/DL — SIGNIFICANT CHANGE UP (ref 32–36)
MCV RBC AUTO: 93.5 FL — SIGNIFICANT CHANGE UP (ref 80–100)
PLATELET # BLD AUTO: 257 K/UL — SIGNIFICANT CHANGE UP (ref 150–400)
RBC # BLD: 4.32 M/UL — SIGNIFICANT CHANGE UP (ref 4.2–5.8)
RBC # FLD: 12 % — SIGNIFICANT CHANGE UP (ref 10.3–14.5)
WBC # BLD: 6.5 K/UL — SIGNIFICANT CHANGE UP (ref 3.8–10.5)
WBC # FLD AUTO: 6.5 K/UL — SIGNIFICANT CHANGE UP (ref 3.8–10.5)

## 2018-07-09 PROCEDURE — 99215 OFFICE O/P EST HI 40 MIN: CPT

## 2018-07-09 RX ORDER — ENOXAPARIN SODIUM 150 MG/ML
120 INJECTION SUBCUTANEOUS
Qty: 16 | Refills: 0 | Status: DISCONTINUED | COMMUNITY
Start: 2018-06-11

## 2018-07-09 RX ORDER — LISINOPRIL 5 MG/1
5 TABLET ORAL
Qty: 30 | Refills: 0 | Status: DISCONTINUED | COMMUNITY
Start: 2018-02-07

## 2018-07-10 LAB
AFP-TM SERPL-MCNC: 2 NG/ML
ALBUMIN SERPL ELPH-MCNC: 4.5 G/DL
ALP BLD-CCNC: 79 U/L
ALT SERPL-CCNC: 76 U/L
ANION GAP SERPL CALC-SCNC: 3 MMOL/L
AST SERPL-CCNC: 42 U/L
BILIRUB SERPL-MCNC: 0.3 MG/DL
BUN SERPL-MCNC: 16 MG/DL
CALCIUM SERPL-MCNC: 9.4 MG/DL
CHLORIDE SERPL-SCNC: 108 MMOL/L
CO2 SERPL-SCNC: 26 MMOL/L
CREAT SERPL-MCNC: 0.93 MG/DL
GLUCOSE SERPL-MCNC: 99 MG/DL
HCG-TM SERPL-MCNC: <1 MIU/ML
LDH SERPL-CCNC: 187 U/L
POTASSIUM SERPL-SCNC: 4.3 MMOL/L
PROT SERPL-MCNC: 7.7 G/DL
SODIUM SERPL-SCNC: 137 MMOL/L

## 2018-07-16 ENCOUNTER — APPOINTMENT (OUTPATIENT)
Dept: CARDIOLOGY | Facility: CLINIC | Age: 38
End: 2018-07-16
Payer: MEDICAID

## 2018-07-16 VITALS — SYSTOLIC BLOOD PRESSURE: 102 MMHG | OXYGEN SATURATION: 97 % | DIASTOLIC BLOOD PRESSURE: 60 MMHG | HEART RATE: 72 BPM

## 2018-07-16 PROBLEM — I48.91 UNSPECIFIED ATRIAL FIBRILLATION: Chronic | Status: ACTIVE | Noted: 2017-12-04

## 2018-07-16 PROBLEM — I82.90 ACUTE EMBOLISM AND THROMBOSIS OF UNSPECIFIED VEIN: Chronic | Status: ACTIVE | Noted: 2017-12-04

## 2018-07-16 LAB — INR PPP: 1.8 RATIO

## 2018-07-16 PROCEDURE — 99211 OFF/OP EST MAY X REQ PHY/QHP: CPT

## 2018-07-16 PROCEDURE — 85610 PROTHROMBIN TIME: CPT | Mod: QW

## 2018-07-19 ENCOUNTER — APPOINTMENT (OUTPATIENT)
Dept: HEMATOLOGY ONCOLOGY | Facility: CLINIC | Age: 38
End: 2018-07-19
Payer: MEDICAID

## 2018-07-19 ENCOUNTER — RESULT REVIEW (OUTPATIENT)
Age: 38
End: 2018-07-19

## 2018-07-19 VITALS
BODY MASS INDEX: 33.16 KG/M2 | TEMPERATURE: 98.7 F | WEIGHT: 251.3 LBS | SYSTOLIC BLOOD PRESSURE: 117 MMHG | DIASTOLIC BLOOD PRESSURE: 70 MMHG | RESPIRATION RATE: 18 BRPM | OXYGEN SATURATION: 97 % | HEART RATE: 74 BPM

## 2018-07-19 PROBLEM — N50.9 DISORDER OF MALE GENITAL ORGANS, UNSPECIFIED: Chronic | Status: ACTIVE | Noted: 2017-07-17

## 2018-07-19 PROBLEM — I51.9 HEART DISEASE, UNSPECIFIED: Chronic | Status: ACTIVE | Noted: 2018-06-04

## 2018-07-19 PROBLEM — M51.26 OTHER INTERVERTEBRAL DISC DISPLACEMENT, LUMBAR REGION: Chronic | Status: ACTIVE | Noted: 2017-07-17

## 2018-07-19 PROBLEM — E66.9 OBESITY, UNSPECIFIED: Chronic | Status: ACTIVE | Noted: 2018-06-04

## 2018-07-19 LAB
AFP-TM SERPL-MCNC: 1.2 NG/ML
ALBUMIN SERPL ELPH-MCNC: 4.5 G/DL
ALP BLD-CCNC: 66 U/L
ALT SERPL-CCNC: 46 U/L
ANION GAP SERPL CALC-SCNC: 12 MMOL/L
AST SERPL-CCNC: 31 U/L
BILIRUB SERPL-MCNC: 0.2 MG/DL
BUN SERPL-MCNC: 14 MG/DL
CALCIUM SERPL-MCNC: 9.5 MG/DL
CHLORIDE SERPL-SCNC: 103 MMOL/L
CO2 SERPL-SCNC: 26 MMOL/L
CREAT SERPL-MCNC: 0.81 MG/DL
GLUCOSE SERPL-MCNC: 103 MG/DL
HCT VFR BLD CALC: 40.7 % — SIGNIFICANT CHANGE UP (ref 39–50)
HGB BLD-MCNC: 14.2 G/DL — SIGNIFICANT CHANGE UP (ref 13–17)
LDH SERPL-CCNC: 214 U/L
MAGNESIUM SERPL-MCNC: 2.1 MG/DL
MCHC RBC-ENTMCNC: 32.4 PG — SIGNIFICANT CHANGE UP (ref 27–34)
MCHC RBC-ENTMCNC: 35 G/DL — SIGNIFICANT CHANGE UP (ref 32–36)
MCV RBC AUTO: 92.7 FL — SIGNIFICANT CHANGE UP (ref 80–100)
PLATELET # BLD AUTO: 216 K/UL — SIGNIFICANT CHANGE UP (ref 150–400)
POTASSIUM SERPL-SCNC: 5.3 MMOL/L
PROT SERPL-MCNC: 7.5 G/DL
RBC # BLD: 4.39 M/UL — SIGNIFICANT CHANGE UP (ref 4.2–5.8)
RBC # FLD: 11.9 % — SIGNIFICANT CHANGE UP (ref 10.3–14.5)
SODIUM SERPL-SCNC: 141 MMOL/L
WBC # BLD: 6.2 K/UL — SIGNIFICANT CHANGE UP (ref 3.8–10.5)
WBC # FLD AUTO: 6.2 K/UL — SIGNIFICANT CHANGE UP (ref 3.8–10.5)

## 2018-07-19 PROCEDURE — 99214 OFFICE O/P EST MOD 30 MIN: CPT

## 2018-07-20 LAB — HCG-TM SERPL-MCNC: <1 MIU/ML

## 2018-07-23 ENCOUNTER — APPOINTMENT (OUTPATIENT)
Dept: CARDIOLOGY | Facility: CLINIC | Age: 38
End: 2018-07-23
Payer: MEDICAID

## 2018-07-23 ENCOUNTER — NON-APPOINTMENT (OUTPATIENT)
Age: 38
End: 2018-07-23

## 2018-07-23 VITALS
OXYGEN SATURATION: 99 % | HEART RATE: 72 BPM | SYSTOLIC BLOOD PRESSURE: 105 MMHG | WEIGHT: 249 LBS | DIASTOLIC BLOOD PRESSURE: 73 MMHG | BODY MASS INDEX: 32.85 KG/M2

## 2018-07-23 LAB — INR PPP: 1.8 RATIO

## 2018-07-23 PROCEDURE — 93000 ELECTROCARDIOGRAM COMPLETE: CPT

## 2018-07-23 PROCEDURE — 99215 OFFICE O/P EST HI 40 MIN: CPT

## 2018-07-26 ENCOUNTER — APPOINTMENT (OUTPATIENT)
Dept: CARDIOLOGY | Facility: CLINIC | Age: 38
End: 2018-07-26
Payer: MEDICAID

## 2018-07-26 ENCOUNTER — FORM ENCOUNTER (OUTPATIENT)
Age: 38
End: 2018-07-26

## 2018-07-26 PROCEDURE — 93306 TTE W/DOPPLER COMPLETE: CPT

## 2018-07-27 ENCOUNTER — APPOINTMENT (OUTPATIENT)
Dept: HEMATOLOGY ONCOLOGY | Facility: CLINIC | Age: 38
End: 2018-07-27

## 2018-07-27 ENCOUNTER — OUTPATIENT (OUTPATIENT)
Dept: OUTPATIENT SERVICES | Facility: HOSPITAL | Age: 38
LOS: 1 days | End: 2018-07-27
Payer: MEDICAID

## 2018-07-27 ENCOUNTER — RESULT REVIEW (OUTPATIENT)
Age: 38
End: 2018-07-27

## 2018-07-27 DIAGNOSIS — Z90.79 ACQUIRED ABSENCE OF OTHER GENITAL ORGAN(S): Chronic | ICD-10-CM

## 2018-07-27 DIAGNOSIS — C80.1 MALIGNANT (PRIMARY) NEOPLASM, UNSPECIFIED: ICD-10-CM

## 2018-07-27 DIAGNOSIS — Z95.828 PRESENCE OF OTHER VASCULAR IMPLANTS AND GRAFTS: Chronic | ICD-10-CM

## 2018-07-27 DIAGNOSIS — Z98.890 OTHER SPECIFIED POSTPROCEDURAL STATES: Chronic | ICD-10-CM

## 2018-07-27 LAB
HCT VFR BLD CALC: 42.7 % — SIGNIFICANT CHANGE UP (ref 39–50)
HGB BLD-MCNC: 14.3 G/DL — SIGNIFICANT CHANGE UP (ref 13–17)
MCHC RBC-ENTMCNC: 30.7 PG — SIGNIFICANT CHANGE UP (ref 27–34)
MCHC RBC-ENTMCNC: 33.3 G/DL — SIGNIFICANT CHANGE UP (ref 32–36)
MCV RBC AUTO: 92.1 FL — SIGNIFICANT CHANGE UP (ref 80–100)
PLATELET # BLD AUTO: 212 K/UL — SIGNIFICANT CHANGE UP (ref 150–400)
RBC # BLD: 4.64 M/UL — SIGNIFICANT CHANGE UP (ref 4.2–5.8)
RBC # FLD: 11.8 % — SIGNIFICANT CHANGE UP (ref 10.3–14.5)
WBC # BLD: 7.1 K/UL — SIGNIFICANT CHANGE UP (ref 3.8–10.5)
WBC # FLD AUTO: 7.1 K/UL — SIGNIFICANT CHANGE UP (ref 3.8–10.5)

## 2018-07-27 PROCEDURE — 77001 FLUOROGUIDE FOR VEIN DEVICE: CPT | Mod: 26,GC

## 2018-07-27 PROCEDURE — 36569 INSJ PICC 5 YR+ W/O IMAGING: CPT

## 2018-07-27 PROCEDURE — 76937 US GUIDE VASCULAR ACCESS: CPT | Mod: 26

## 2018-07-29 ENCOUNTER — INPATIENT (INPATIENT)
Facility: HOSPITAL | Age: 38
LOS: 0 days | Discharge: HOME HEALTH | End: 2018-07-30
Attending: HOSPITALIST | Admitting: HOSPITALIST
Payer: MEDICAID

## 2018-07-29 VITALS
HEIGHT: 72.99 IN | HEART RATE: 81 BPM | TEMPERATURE: 98 F | SYSTOLIC BLOOD PRESSURE: 110 MMHG | OXYGEN SATURATION: 98 % | RESPIRATION RATE: 19 BRPM | DIASTOLIC BLOOD PRESSURE: 69 MMHG | WEIGHT: 249.12 LBS

## 2018-07-29 DIAGNOSIS — Z29.9 ENCOUNTER FOR PROPHYLACTIC MEASURES, UNSPECIFIED: ICD-10-CM

## 2018-07-29 DIAGNOSIS — C62.90 MALIGNANT NEOPLASM OF UNSPECIFIED TESTIS, UNSPECIFIED WHETHER DESCENDED OR UNDESCENDED: ICD-10-CM

## 2018-07-29 DIAGNOSIS — C79.9 SECONDARY MALIGNANT NEOPLASM OF UNSPECIFIED SITE: ICD-10-CM

## 2018-07-29 DIAGNOSIS — I48.92 UNSPECIFIED ATRIAL FLUTTER: ICD-10-CM

## 2018-07-29 DIAGNOSIS — Z98.890 OTHER SPECIFIED POSTPROCEDURAL STATES: Chronic | ICD-10-CM

## 2018-07-29 DIAGNOSIS — Z90.79 ACQUIRED ABSENCE OF OTHER GENITAL ORGAN(S): Chronic | ICD-10-CM

## 2018-07-29 DIAGNOSIS — Z95.828 PRESENCE OF OTHER VASCULAR IMPLANTS AND GRAFTS: Chronic | ICD-10-CM

## 2018-07-29 DIAGNOSIS — D43.2 NEOPLASM OF UNCERTAIN BEHAVIOR OF BRAIN, UNSPECIFIED: ICD-10-CM

## 2018-07-29 LAB
APTT BLD: 39.5 SEC — HIGH (ref 27.5–37.4)
INR BLD: 2.02 — HIGH (ref 0.88–1.17)
PROTHROM AB SERPL-ACNC: 22.7 SEC — HIGH (ref 9.8–13.1)

## 2018-07-29 PROCEDURE — 99239 HOSP IP/OBS DSCHRG MGMT >30: CPT

## 2018-07-29 PROCEDURE — 99223 1ST HOSP IP/OBS HIGH 75: CPT | Mod: GC

## 2018-07-29 RX ORDER — PALONOSETRON HYDROCHLORIDE 0.25 MG/5ML
0.25 INJECTION, SOLUTION INTRAVENOUS ONCE
Qty: 0 | Refills: 0 | Status: COMPLETED | OUTPATIENT
Start: 2018-07-29 | End: 2018-07-29

## 2018-07-29 RX ORDER — WARFARIN SODIUM 2.5 MG/1
5 TABLET ORAL ONCE
Qty: 0 | Refills: 0 | Status: COMPLETED | OUTPATIENT
Start: 2018-07-29 | End: 2018-07-29

## 2018-07-29 RX ORDER — FAMOTIDINE 10 MG/ML
20 INJECTION INTRAVENOUS
Qty: 0 | Refills: 0 | Status: DISCONTINUED | OUTPATIENT
Start: 2018-07-29 | End: 2018-07-30

## 2018-07-29 RX ORDER — FAMOTIDINE 10 MG/ML
20 INJECTION INTRAVENOUS ONCE
Qty: 0 | Refills: 0 | Status: DISCONTINUED | OUTPATIENT
Start: 2018-07-29 | End: 2018-07-29

## 2018-07-29 RX ORDER — MESNA 100 MG/ML
1000 INJECTION, SOLUTION INTRAVENOUS ONCE
Qty: 0 | Refills: 0 | Status: DISCONTINUED | OUTPATIENT
Start: 2018-07-30 | End: 2018-07-30

## 2018-07-29 RX ORDER — DEXAMETHASONE 0.5 MG/5ML
12 ELIXIR ORAL ONCE
Qty: 0 | Refills: 0 | Status: DISCONTINUED | OUTPATIENT
Start: 2018-07-29 | End: 2018-07-29

## 2018-07-29 RX ORDER — DIPHENHYDRAMINE HCL 50 MG
50 CAPSULE ORAL ONCE
Qty: 0 | Refills: 0 | Status: DISCONTINUED | OUTPATIENT
Start: 2018-07-29 | End: 2018-07-29

## 2018-07-29 RX ORDER — FOSAPREPITANT DIMEGLUMINE 150 MG/5ML
150 INJECTION, POWDER, LYOPHILIZED, FOR SOLUTION INTRAVENOUS ONCE
Qty: 0 | Refills: 0 | Status: COMPLETED | OUTPATIENT
Start: 2018-07-29 | End: 2018-07-29

## 2018-07-29 RX ORDER — SODIUM CHLORIDE 9 MG/ML
1000 INJECTION, SOLUTION INTRAVENOUS ONCE
Qty: 0 | Refills: 0 | Status: DISCONTINUED | OUTPATIENT
Start: 2018-07-30 | End: 2018-07-30

## 2018-07-29 RX ORDER — LISINOPRIL 2.5 MG/1
2.5 TABLET ORAL DAILY
Qty: 0 | Refills: 0 | Status: DISCONTINUED | OUTPATIENT
Start: 2018-07-29 | End: 2018-07-30

## 2018-07-29 RX ORDER — PACLITAXEL 6 MG/ML
590 INJECTION, SOLUTION, CONCENTRATE INTRAVENOUS ONCE
Qty: 0 | Refills: 0 | Status: DISCONTINUED | OUTPATIENT
Start: 2018-07-29 | End: 2018-07-30

## 2018-07-29 RX ORDER — IFOSFAMIDE 1 G/1
2832 INJECTION, POWDER, LYOPHILIZED, FOR SOLUTION INTRAVENOUS ONCE
Qty: 0 | Refills: 0 | Status: DISCONTINUED | OUTPATIENT
Start: 2018-07-30 | End: 2018-07-30

## 2018-07-29 RX ORDER — CARVEDILOL PHOSPHATE 80 MG/1
6.25 CAPSULE, EXTENDED RELEASE ORAL EVERY 12 HOURS
Qty: 0 | Refills: 0 | Status: DISCONTINUED | OUTPATIENT
Start: 2018-07-29 | End: 2018-07-30

## 2018-07-29 RX ORDER — CISPLATIN 1 MG/ML
47 INJECTION, SOLUTION INTRAVENOUS ONCE
Qty: 0 | Refills: 0 | Status: DISCONTINUED | OUTPATIENT
Start: 2018-07-30 | End: 2018-07-30

## 2018-07-29 RX ADMIN — Medication 0.5 MILLIGRAM(S): at 12:07

## 2018-07-29 RX ADMIN — CARVEDILOL PHOSPHATE 6.25 MILLIGRAM(S): 80 CAPSULE, EXTENDED RELEASE ORAL at 18:07

## 2018-07-29 RX ADMIN — FAMOTIDINE 20 MILLIGRAM(S): 10 INJECTION INTRAVENOUS at 18:07

## 2018-07-29 RX ADMIN — WARFARIN SODIUM 5 MILLIGRAM(S): 2.5 TABLET ORAL at 18:22

## 2018-07-29 RX ADMIN — LISINOPRIL 2.5 MILLIGRAM(S): 2.5 TABLET ORAL at 18:07

## 2018-07-29 NOTE — H&P ADULT - PROBLEM SELECTOR PLAN 1
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  -pt admitted for cycle 1 of TIP to be initiated today   -oncology recs appreciated will obtain - pt to be Pre-medicated with benadryl, decadron, pepcid - patient took premedications at home last night and this morning  -will Check CBC, CMP, Mag, Phos, Uric acid on AM labs

## 2018-07-29 NOTE — H&P ADULT - NSHPPHYSICALEXAM_GEN_ALL_CORE
Vital Signs Last 24 Hrs  T(C): 36.8 (29 Jul 2018 09:43), Max: 36.8 (29 Jul 2018 09:43)  T(F): 98.2 (29 Jul 2018 09:43), Max: 98.2 (29 Jul 2018 09:43)  HR: 81 (29 Jul 2018 09:43) (81 - 81)  BP: 110/69 (29 Jul 2018 09:43) (110/69 - 110/69)  BP(mean): --  RR: 19 (29 Jul 2018 09:43) (19 - 19)  SpO2: 98% (29 Jul 2018 09:43) (98% - 98%)  PHYSICAL EXAM:  Constitutional: NAD  Eyes: PERRL, EOMI, no scleral icterus or injection  ENMT: supple, no lymphadenopathy, no palpable mass  Respiratory: CTA b/l , no wheezing, or ronchi  Cardiovascular: RRR, +S1/S2, no murmur appreciated   Gastrointestinal: Soft, NT/ND, +BS   Extremities: Warm and well perfused   Vascular: +DP/PT pulses   Neurological: AX0x3, CN II-XII intact, fine touch and vibratory sense intact. Stregnth 5+ in upper and lower e  Skin: unremarkable   Psychiatric: Normal mood and affect Vital Signs Last 24 Hrs  T(C): 36.8 (29 Jul 2018 09:43), Max: 36.8 (29 Jul 2018 09:43)  T(F): 98.2 (29 Jul 2018 09:43), Max: 98.2 (29 Jul 2018 09:43)  HR: 81 (29 Jul 2018 09:43) (81 - 81)  BP: 110/69 (29 Jul 2018 09:43) (110/69 - 110/69)  BP(mean): --  RR: 19 (29 Jul 2018 09:43) (19 - 19)  SpO2: 98% (29 Jul 2018 09:43) (98% - 98%)  PHYSICAL EXAM:  Constitutional: NAD  Eyes: PERRL, EOMI, no scleral icterus or injection  ENMT: supple, no lymphadenopathy, no palpable mass  Respiratory: CTA b/l , no wheezing, or ronchi  Cardiovascular: RRR, +S1/S2, no murmur appreciated   Gastrointestinal: Soft, NT/ND, +BS   Extremities: Warm and well perfused . + PICC in L arm   Vascular: +DP/PT pulses   Neurological: AX0x3, Non focal   Skin: unremarkable   Psychiatric: Normal mood and affect

## 2018-07-29 NOTE — H&P ADULT - ASSESSMENT
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

## 2018-07-29 NOTE — H&P ADULT - NSHPREVIEWOFSYSTEMS_GEN_ALL_CORE
REVIEW OF SYSTEMS:    CONSTITUTIONAL: No fever, weight loss, or fatigue  EYES: No eye pain, visual disturbances, or discharge  ENMT:  No difficulty hearing, tinnitus, vertigo; No sinus or throat pain  NECK: No pain or stiffness  BREASTS: No pain, masses, or nipple discharge  RESPIRATORY: No cough, wheezing, chills or hemoptysis; No shortness of breath  CARDIOVASCULAR: No chest pain, palpitations, dizziness, or leg swelling  GASTROINTESTINAL: No abdominal or epigastric pain. No nausea, vomiting, or hematemesis; No diarrhea or constipation. No melena or hematochezia.  GENITOURINARY: No dysuria, frequency, hematuria, or incontinence  NEUROLOGICAL: No headaches, memory loss, loss of strength, numbness, or tremors  SKIN: No itching, burning, rashes, or lesions   LYMPH NODES: No enlarged glands  ENDOCRINE: No heat or cold intolerance; No hair loss  MUSCULOSKELETAL: No joint pain or swelling; No muscle, back, or extremity pain  PSYCHIATRIC: No depression, anxiety, mood swings, or difficulty sleeping  HEME/LYMPH: No easy bruising, or bleeding gums  ALLERY AND IMMUNOLOGIC: No hives or eczema REVIEW OF SYSTEMS:    CONSTITUTIONAL: No fever. + fatigue  EYES: No eye pain, visual disturbances, or discharge  ENMT:  No difficulty hearing, tinnitus, vertigo; No sinus or throat pain  NECK: No pain or stiffness  RESPIRATORY: No cough, wheezing, chills or hemoptysis; No shortness of breath  CARDIOVASCULAR: No chest pain, palpitations, dizziness, or leg swelling  GASTROINTESTINAL: No abdominal or epigastric pain. No nausea, vomiting, or hematemesis; No diarrhea. +occasional constipation. No melena or hematochezia.  GENITOURINARY: No dysuria, frequency, hematuria,   NEUROLOGICAL: No headaches, memory loss, loss of strength. + numbness as stated in the HPI   SKIN: No itching, burning, rashes, or lesions   MUSCULOSKELETAL: No joint pain or swelling; No muscle, back, or extremity pain  PSYCHIATRIC: No depression or difficulty sleeping  HEME/LYMPH: No easy bruising, or bleeding gums  ALLERY AND IMMUNOLOGIC: No known allergies

## 2018-07-29 NOTE — H&P ADULT - PROBLEM SELECTOR PLAN 3
Last cardiac imaging was cardiac MRI in  3/2018 that still shows atrial mass/thrombus that appears to be reduced in size compared to 10/2017.     -c/w coumadin for a/c. 7/27 INR was 1.8 and has it checked q1-2 wks. Will check INR now and dose coumadin accordingly. Pt on 5mg coumadin at home.  -some concern for mass vs thrombus. Suspect that it is more c/w thrombus as size has decreased and pt was not on treatment for his malignancy. Either way pt will eventually need f/u imaging  -will f/u w/ pts cardiologist in regards to best time for re-imaging. MRI from 3/2018 recommended 3month f/u.

## 2018-07-29 NOTE — CONSULT NOTE ADULT - SUBJECTIVE AND OBJECTIVE BOX
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  Patient did not complete adequate therapy initially, therefore plan for curative chemotherapy with taxol/ifosfamide/cisplatin.     Patient feeling well, no fevers/chills/nausea/vomiting.  Anxious about chemotherapy given multiple cardiac issues after previous cycles/         PAST MEDICAL & SURGICAL HISTORY:  Obesity  Atrial mass: right artrial echo density per MRI 3/14/18.  Atrial flutter  Cardiomyopathy  Atrial fibrillation: s/p DCCV 1/18  Thrombus: Right atrial wall ( suspected from last TYLOR) On AC  Testicular cancer  Lumbar herniated disc  Testicular mass: left  History of cardioversion: 2/2018  Port-a-cath in place: placed 9/2017,, removed-10/18/2017  History of orchiectomy: left-7/2017      MEDICATIONS  (STANDING):  dexamethasone   IVPB (Chemo) 12 milliGRAM(s) IV Intermittent once  diphenhydrAMINE  Injectable (Chemo) 50 milliGRAM(s) IV Push once  famotidine  IVPB (Chemo) 20 milliGRAM(s) IV Intermittent once  fosaprepitant IVPB (Chemo) 150 milliGRAM(s) IV Intermittent once  PACLitaxel IVPB 590 milliGRAM(s) IV Intermittent once  palonosetron Injectable (Chemo) 0.25 milliGRAM(s) IV Push once    MEDICATIONS  (PRN):      Allergies  No Known Allergies      FAMILY HISTORY:  Family history of cancer (Grandparent)  Family history of ischemic heart disease (Grandparent)  Family history of diabetes mellitus (Aunt)      Vital Signs Last 24 Hrs  T(C): --  T(F): --  HR: --  BP: --  BP(mean): --  RR: --  SpO2: --        PHYSICAL EXAM:  GENERAL: NAD, AAOx3   HEAD:  NC/AT  EYES: EOMI, PERRLA, no scleral icterus  HEENT: Moist mucous membranes  LUNG: Clear to auscultation bilaterally; No rales, rhonchi, wheezing, or rubs  HEART: RRR; No murmurs, rubs, or gallops  ABDOMEN: +BS, ST/ND/NT  EXTREMITIES:  No clubbing, cyanosis, or edema        LABS:                        14.3   7.1   )-----------( 212 ( 27 Jul 2018 11:25 )             42.7 HPI:  37M w/ PMH of 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  Patient did not complete adequate therapy initially, therefore plan for curative chemotherapy with taxol/ifosfamide/cisplatin.     Patient feeling well, no fevers/chills/nausea/vomiting.  Anxious about chemotherapy given multiple cardiac issues after previous cycles.      Chemotherapy and side effects discussed at length with Dr. Chirinos in clinic.    Patient took pre-medications at home as directed (benadryl, pepcid, dexamethasone).   Has had severe nausea with previous cycles of chemotherapy.         PAST MEDICAL & SURGICAL HISTORY:  Obesity  Atrial mass: right artrial echo density per MRI 3/14/18.  Atrial flutter  Cardiomyopathy  Atrial fibrillation: s/p DCCV 1/18  Thrombus: Right atrial wall ( suspected from last TYLOR) On AC  Testicular cancer  Lumbar herniated disc  Testicular mass: left  History of cardioversion: 2/2018  Port-a-cath in place: placed 9/2017,, removed-10/18/2017  History of orchiectomy: left-7/2017      MEDICATIONS  (STANDING):  dexamethasone   IVPB (Chemo) 12 milliGRAM(s) IV Intermittent once  diphenhydrAMINE  Injectable (Chemo) 50 milliGRAM(s) IV Push once  famotidine  IVPB (Chemo) 20 milliGRAM(s) IV Intermittent once  fosaprepitant IVPB (Chemo) 150 milliGRAM(s) IV Intermittent once  PACLitaxel IVPB 590 milliGRAM(s) IV Intermittent once  palonosetron Injectable (Chemo) 0.25 milliGRAM(s) IV Push once    MEDICATIONS  (PRN):      Allergies  No Known Allergies      FAMILY HISTORY:  Family history of cancer (Grandparent)  Family history of ischemic heart disease (Grandparent)  Family history of diabetes mellitus (Aunt)    Vital Signs Last 24 Hrs  T(C): 36.6 (07-29-18 @ 13:05), Max: 36.8 (07-29-18 @ 09:43)  T(F): 97.8 (07-29-18 @ 13:05), Max: 98.2 (07-29-18 @ 09:43)  HR: 88 (07-29-18 @ 13:05) (81 - 88)  BP: 105/69 (07-29-18 @ 13:05) (105/69 - 110/69)  RR: 18 (07-29-18 @ 13:05) (18 - 19)  SpO2: 98% (07-29-18 @ 13:05) (98% - 98%)      PHYSICAL EXAM:  GENERAL: NAD, AAOx3   HEAD:  NC/AT  EYES: EOMI, PERRLA, no scleral icterus  HEENT: Moist mucous membranes  LUNG: Clear to auscultation bilaterally; No rales, rhonchi, wheezing, or rubs  HEART: RRR; No murmurs, rubs, or gallops  ABDOMEN: +BS, ST/ND/NT  EXTREMITIES:  No clubbing, cyanosis, or edema

## 2018-07-29 NOTE — H&P ADULT - NSHPLABSRESULTS_GEN_ALL_CORE
Labs from 7/27/18 in allscripts reviewed. CBC wnl. CMP noted for elation of ALT to 55 and overall improved compared to prior labs. INR 1.8

## 2018-07-29 NOTE — H&P ADULT - PROBLEM SELECTOR PLAN 4
Last TYLOR from 2/2018 showed  EF 40-45%. Mild-moderate global left ventricular systolic dysfunction.    -c/w coreg 6.25mg BID and lisinopril 2.5mg qd. Per pt his lisinopril was increased by cards on 7/27 to 5mg but he has not started it yet. Will reassess during hospitalization increasing dose.

## 2018-07-29 NOTE — H&P ADULT - HISTORY OF PRESENT ILLNESS
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      Today patient has no complaints. He denies having f/c , H/A, n/v/d. At baseline he has L leg numbness since his surgery in 2017 and neuropathies of the hands that started after chemo initiation . 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 S/p L PICC placement on friday    Today patient has no complaints. He denies having f/c , H/A, n/v/d. At baseline he has L leg numbness since his surgery in 2017 and neuropathies of the hands that started after chemo initiation .

## 2018-07-29 NOTE — CONSULT NOTE ADULT - ASSESSMENT
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    #Mixed germ cell tumor:   - Cycle 1 of 4 of TIP (taxol/ifosfamide/cisplatin)- taxol to be given today.  Ok to start chemotherapy based on outpatient labs last week  - Pre-medicate with benadryl, decadron, pepcid - patient took premedications at home last night and this morning  - Check CBC, CMP, Mag, Phos, Uric acid on AM labs    Will continue to follow        France Rubio MD  Hematology/Oncology Fellow, PGY-4  pager: [phone]

## 2018-07-29 NOTE — H&P ADULT - NSHPSOCIALHISTORY_GEN_ALL_CORE
Lives at home with parents. Quit smoking about 2 yrs ago. Prior smoked one pack a week on and off. Denies ETOH and illicit drugs

## 2018-07-30 ENCOUNTER — TRANSCRIPTION ENCOUNTER (OUTPATIENT)
Age: 38
End: 2018-07-30

## 2018-07-30 ENCOUNTER — MEDICATION RENEWAL (OUTPATIENT)
Age: 38
End: 2018-07-30

## 2018-07-30 VITALS
TEMPERATURE: 98 F | SYSTOLIC BLOOD PRESSURE: 111 MMHG | OXYGEN SATURATION: 98 % | RESPIRATION RATE: 17 BRPM | HEART RATE: 78 BPM | DIASTOLIC BLOOD PRESSURE: 73 MMHG

## 2018-07-30 LAB
ALBUMIN SERPL ELPH-MCNC: 3.9 G/DL — SIGNIFICANT CHANGE UP (ref 3.3–5)
ALBUMIN SERPL ELPH-MCNC: 4.3 G/DL
ALP BLD-CCNC: 64 U/L
ALP SERPL-CCNC: 57 U/L — SIGNIFICANT CHANGE UP (ref 40–120)
ALT FLD-CCNC: 48 U/L — HIGH (ref 4–41)
ALT SERPL-CCNC: 55 U/L
ANION GAP SERPL CALC-SCNC: 14 MMOL/L
APTT BLD: 33 SEC — SIGNIFICANT CHANGE UP (ref 27.5–37.4)
AST SERPL-CCNC: 33 U/L — SIGNIFICANT CHANGE UP (ref 4–40)
AST SERPL-CCNC: 36 U/L
BILIRUB SERPL-MCNC: 0.3 MG/DL
BILIRUB SERPL-MCNC: 0.4 MG/DL — SIGNIFICANT CHANGE UP (ref 0.2–1.2)
BUN SERPL-MCNC: 13 MG/DL — SIGNIFICANT CHANGE UP (ref 7–23)
BUN SERPL-MCNC: 16 MG/DL
CALCIUM SERPL-MCNC: 8.6 MG/DL — SIGNIFICANT CHANGE UP (ref 8.4–10.5)
CALCIUM SERPL-MCNC: 9.2 MG/DL
CHLORIDE SERPL-SCNC: 102 MMOL/L
CHLORIDE SERPL-SCNC: 102 MMOL/L — SIGNIFICANT CHANGE UP (ref 98–107)
CO2 SERPL-SCNC: 22 MMOL/L — SIGNIFICANT CHANGE UP (ref 22–31)
CO2 SERPL-SCNC: 27 MMOL/L
CREAT SERPL-MCNC: 0.75 MG/DL — SIGNIFICANT CHANGE UP (ref 0.5–1.3)
CREAT SERPL-MCNC: 0.84 MG/DL
GLUCOSE SERPL-MCNC: 122 MG/DL
GLUCOSE SERPL-MCNC: 132 MG/DL — HIGH (ref 70–99)
HCT VFR BLD CALC: 39.3 % — SIGNIFICANT CHANGE UP (ref 39–50)
HGB BLD-MCNC: 12.9 G/DL — LOW (ref 13–17)
INR BLD: 2.44 — HIGH (ref 0.88–1.17)
MAGNESIUM SERPL-MCNC: 2.1 MG/DL
MAGNESIUM SERPL-MCNC: 2.2 MG/DL — SIGNIFICANT CHANGE UP (ref 1.6–2.6)
MCHC RBC-ENTMCNC: 30.1 PG — SIGNIFICANT CHANGE UP (ref 27–34)
MCHC RBC-ENTMCNC: 32.8 % — SIGNIFICANT CHANGE UP (ref 32–36)
MCV RBC AUTO: 91.8 FL — SIGNIFICANT CHANGE UP (ref 80–100)
NRBC # FLD: 0 — SIGNIFICANT CHANGE UP
PHOSPHATE SERPL-MCNC: 2.4 MG/DL — LOW (ref 2.5–4.5)
PLATELET # BLD AUTO: 199 K/UL — SIGNIFICANT CHANGE UP (ref 150–400)
PMV BLD: 10.1 FL — SIGNIFICANT CHANGE UP (ref 7–13)
POTASSIUM SERPL-MCNC: 4.7 MMOL/L — SIGNIFICANT CHANGE UP (ref 3.5–5.3)
POTASSIUM SERPL-SCNC: 4.7 MMOL/L
POTASSIUM SERPL-SCNC: 4.7 MMOL/L — SIGNIFICANT CHANGE UP (ref 3.5–5.3)
PROT SERPL-MCNC: 7.2 G/DL — SIGNIFICANT CHANGE UP (ref 6–8.3)
PROT SERPL-MCNC: 7.6 G/DL
PROTHROM AB SERPL-ACNC: 27.6 SEC — HIGH (ref 9.8–13.1)
RBC # BLD: 4.28 M/UL — SIGNIFICANT CHANGE UP (ref 4.2–5.8)
RBC # FLD: 12.2 % — SIGNIFICANT CHANGE UP (ref 10.3–14.5)
SODIUM SERPL-SCNC: 137 MMOL/L — SIGNIFICANT CHANGE UP (ref 135–145)
SODIUM SERPL-SCNC: 143 MMOL/L
URATE SERPL-MCNC: 5.8 MG/DL — SIGNIFICANT CHANGE UP (ref 3.4–8.8)
WBC # BLD: 8.77 K/UL — SIGNIFICANT CHANGE UP (ref 3.8–10.5)
WBC # FLD AUTO: 8.77 K/UL — SIGNIFICANT CHANGE UP (ref 3.8–10.5)

## 2018-07-30 PROCEDURE — 99233 SBSQ HOSP IP/OBS HIGH 50: CPT | Mod: GC

## 2018-07-30 PROCEDURE — 99239 HOSP IP/OBS DSCHRG MGMT >30: CPT

## 2018-07-30 RX ORDER — CISPLATIN 1 MG/ML
47 INJECTION, SOLUTION INTRAVENOUS ONCE
Qty: 0 | Refills: 0 | Status: DISCONTINUED | OUTPATIENT
Start: 2018-07-30 | End: 2018-07-30

## 2018-07-30 RX ORDER — SODIUM CHLORIDE 9 MG/ML
500 INJECTION INTRAMUSCULAR; INTRAVENOUS; SUBCUTANEOUS ONCE
Qty: 0 | Refills: 0 | Status: DISCONTINUED | OUTPATIENT
Start: 2018-07-30 | End: 2018-07-30

## 2018-07-30 RX ORDER — CHLORHEXIDINE GLUCONATE 213 G/1000ML
1 SOLUTION TOPICAL
Qty: 0 | Refills: 0 | Status: DISCONTINUED | OUTPATIENT
Start: 2018-07-30 | End: 2018-07-30

## 2018-07-30 RX ORDER — SODIUM CHLORIDE 9 MG/ML
1000 INJECTION INTRAMUSCULAR; INTRAVENOUS; SUBCUTANEOUS
Qty: 0 | Refills: 0 | Status: DISCONTINUED | OUTPATIENT
Start: 2018-07-30 | End: 2018-07-30

## 2018-07-30 RX ORDER — MESNA 100 MG/ML
1000 INJECTION, SOLUTION INTRAVENOUS ONCE
Qty: 0 | Refills: 0 | Status: DISCONTINUED | OUTPATIENT
Start: 2018-07-30 | End: 2018-07-30

## 2018-07-30 RX ORDER — ONDANSETRON 8 MG/1
4 TABLET, FILM COATED ORAL ONCE
Qty: 0 | Refills: 0 | Status: DISCONTINUED | OUTPATIENT
Start: 2018-07-30 | End: 2018-07-30

## 2018-07-30 RX ORDER — IFOSFAMIDE 1 G/1
2832 INJECTION, POWDER, LYOPHILIZED, FOR SOLUTION INTRAVENOUS ONCE
Qty: 0 | Refills: 0 | Status: DISCONTINUED | OUTPATIENT
Start: 2018-07-30 | End: 2018-07-30

## 2018-07-30 RX ORDER — SODIUM CHLORIDE 9 MG/ML
1000 INJECTION, SOLUTION INTRAVENOUS ONCE
Qty: 0 | Refills: 0 | Status: DISCONTINUED | OUTPATIENT
Start: 2018-07-30 | End: 2018-07-30

## 2018-07-30 RX ORDER — WARFARIN SODIUM 2.5 MG/1
5 TABLET ORAL ONCE
Qty: 0 | Refills: 0 | Status: DISCONTINUED | OUTPATIENT
Start: 2018-07-30 | End: 2018-07-30

## 2018-07-30 RX ADMIN — CARVEDILOL PHOSPHATE 6.25 MILLIGRAM(S): 80 CAPSULE, EXTENDED RELEASE ORAL at 06:10

## 2018-07-30 RX ADMIN — FAMOTIDINE 20 MILLIGRAM(S): 10 INJECTION INTRAVENOUS at 06:10

## 2018-07-30 RX ADMIN — LISINOPRIL 2.5 MILLIGRAM(S): 2.5 TABLET ORAL at 06:10

## 2018-07-30 RX ADMIN — CHLORHEXIDINE GLUCONATE 1 APPLICATION(S): 213 SOLUTION TOPICAL at 06:10

## 2018-07-30 NOTE — DISCHARGE NOTE ADULT - MEDICATION SUMMARY - MEDICATIONS TO TAKE
I will START or STAY ON the medications listed below when I get home from the hospital:    lisinopril 2.5 mg oral tablet  -- 1 tab(s) by mouth once a day  -- Indication: For Cardiomyopathy    Coumadin 5 mg oral tablet  -- 1 tab(s) by mouth once a day.  pt stopped taking medication on his own 2 weeks ago  -- Indication: For Atrial thrombus without antecedent myocardial infarction    Coreg 6.25 mg oral tablet  -- 1 tab(s) by mouth 2 times a day  -- Indication: For Cardiomyopathy    famotidine 10 mg oral tablet  -- orally every 12 hours 2 tabs BID (20mg BID)  -- Indication: For Prophylactic measure

## 2018-07-30 NOTE — PROGRESS NOTE ADULT - ASSESSMENT
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    #Mixed germ cell tumor:   - Cycle 1 of 4 of TIP (taxol/ifosfamide/cisplatin)- today D2. Patient received taxol on Day 1. Ifosfamide and cisplatin to start today and be given daily for D2-D5.     - Ok for patient to go home later today after he completes chemotherapy. [place] [facility] [place]   - labs look good and patient tolerating chemo well  - outpatient follow-up with Dr. Chirinos

## 2018-07-30 NOTE — PROGRESS NOTE ADULT - PROBLEM SELECTOR PLAN 3
Last cardiac imaging was cardiac MRI in  3/2018 that still shows atrial mass/thrombus that appears to be reduced in size compared to 10/2017.     -c/w coumadin for a/c. 7/27 INR was 1.8 and has it checked q1-2 wks. Will check INR now and dose coumadin accordingly. Pt on 5mg coumadin at home.  -some concern for mass vs thrombus. Suspect that it is more c/w thrombus as size has decreased and pt was not on treatment for his malignancy. Either way pt will eventually need f/u imaging  -will f/u w/ pts cardiologist in regards to best time for re-imaging. MRI from 3/2018 recommended 3month f/u. Last cardiac imaging was cardiac MRI in  3/2018 that still shows atrial mass/thrombus that appears to be reduced in size compared to 10/2017.   INR therapeutic- known to Dr. Kathy Cunha-OP f/u  -c/w coumadin for a/c. 7/27 INR was 1.8 and has it checked q1-2 wks. Will check INR now and dose coumadin accordingly. Pt on 5mg coumadin at home.  -some concern for mass vs thrombus. Suspect that it is more c/w thrombus as size has decreased and pt was not on treatment for his malignancy. Either way pt will eventually need f/u imaging  -will f/u w/ pts cardiologist in regards to best time for re-imaging. MRI from 3/2018 recommended 3month f/u.

## 2018-07-30 NOTE — PROGRESS NOTE ADULT - PROBLEM SELECTOR PLAN 1
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  -pt admitted for cycle 1 of TIP to be initiated today   -oncology following  -will Check CBC, CMP, Mag, Phos, Uric acid on labs

## 2018-07-30 NOTE — PROGRESS NOTE ADULT - ATTENDING COMMENTS
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 He is getting C1D2 TIP. He feels fine.

## 2018-07-30 NOTE — DISCHARGE NOTE ADULT - PLAN OF CARE
complete chemo please follow up at [facility] for your scheduled appt on 7/31 at 8am INR 2-3 goal continue coumadin continue coumadin.  Please make an appointment re: the cardiac MRI - Dr. Marquez requested it.

## 2018-07-30 NOTE — DISCHARGE NOTE ADULT - CARE PROVIDER_API CALL
Neo Chirinos), Hematology; Internal Medicine; Medical Oncology  [address] [place] [address]  Phone: [phone]  Fax: [phone]

## 2018-07-30 NOTE — DISCHARGE NOTE ADULT - HOSPITAL COURSE
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      Problem/Plan - 1:  ·  Problem: Testicular cancer.  Plan: Germ cell tumor (95% teratoma) diagnosed 2017 s/p L radical orchiectomy and cis/etoposide (2017) found to have recurrence w/ path from 6/6/18 + for Metastatic embryonal carcinoma.  -pt admitted for cycle 1 of TIP to be initiated today   -oncology following  -will Check CBC, CMP, Mag, Phos, Uric acid on labs.    Problem/Plan - 2:  ·  Problem: Atrial flutter.  Plan: S/p DCCV in 2/2018 and pt has been in sinus since then  -c/w coreg 6.25mg BID.     Problem/Plan - 3:  ·  Problem: R/O Atrial thrombus without antecedent myocardial infarction.  Plan: Last cardiac imaging was cardiac MRI in  3/2018 that still shows atrial mass/thrombus that appears to be reduced in size compared to 10/2017.   INR therapeutic- known to Dr. Kathy Cunha-OP f/u  -c/w coumadin for a/c. 7/27 INR was 1.8 and has it checked q1-2 wks. Will check INR now and dose coumadin accordingly. Pt on 5mg coumadin at home.  -some concern for mass vs thrombus. Suspect that it is more c/w thrombus as size has decreased and pt was not on treatment for his malignancy. Either way pt will eventually need f/u imaging  -will f/u w/ pts cardiologist in regards to best time for re-imaging. MRI from 3/2018 recommended 3month f/u.    Problem/Plan - 4:  ·  Problem: R/O Cardiomyopathy.  Plan: Last TYLOR from 2/2018 showed  EF 40-45%. Mild-moderate global left ventricular systolic dysfunction.    -c/w coreg 6.25mg BID and lisinopril 2.5mg qd. Per pt his lisinopril was increased by cards on 7/27 to 5mg but he has not started it yet. Will reassess during hospitalization increasing dose.     Problem/Plan - 5:  ·  Problem: Prophylactic measure.  Plan: DVT ppx- on a/c with coumadin for atrial thrombus. 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      Problem/Plan - 1:  ·  Problem: Testicular cancer.  Plan: Germ cell tumor (95% teratoma) diagnosed 2017 s/p L radical orchiectomy and cis/etoposide (2017) found to have recurrence w/ path from 6/6/18 + for Metastatic embryonal carcinoma.  -pt admitted for cycle 1 of TIP to be initiated today   -oncology following  -monitored CBC, CMP, Mag, Phos, Uric acid on labs.    Problem/Plan - 2:  ·  Problem: Atrial flutter.  Plan: S/p DCCV in 2/2018 and pt has been in sinus since then  -c/w coreg 6.25mg BID.   -parameters in place    Problem/Plan - 3:  ·  Problem: R/O Atrial thrombus without antecedent myocardial infarction.  Plan: Last cardiac imaging was cardiac MRI in  3/2018 that still shows atrial mass/thrombus that appears to be reduced in size compared to 10/2017.   INR therapeutic- known to Dr. Kathy Cunha-OP f/u  -c/w coumadin for a/c. 7/27 INR was 1.8 and has it checked q1-2 wks. Will check INR now and dose coumadin accordingly. Pt on 5mg coumadin at home.  -some concern for mass vs thrombus. Suspect that it is more c/w thrombus as size has decreased and pt was not on treatment for his malignancy. Either way pt will eventually need f/u imaging  -will f/u w/ pts cardiologist in regards to best time for re-imaging. MRI from 3/2018 recommended 3month f/u.    Problem/Plan - 4:  ·  Problem: R/O Cardiomyopathy.  Plan: Last TYLOR from 2/2018 showed  EF 40-45%. Mild-moderate global left ventricular systolic dysfunction.  -c/w coreg 6.25mg BID and lisinopril 2.5mg qd. Per pt his lisinopril was increased by cards on 7/27 to 5mg but he has not started it yet.   -outpatient f/u with cardiologist for medication mgmt.     Problem/Plan - 5:  ·  Problem: Prophylactic measure.  Plan: DVT ppx- on a/c with coumadin for atrial thrombus.     Dispo: Per Dr. Vazquez pt medically stable for d/c home. 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      Problem/Plan - 1:  ·  Problem: Testicular cancer.  Plan: Germ cell tumor (95% teratoma) diagnosed 2017 s/p L radical orchiectomy and cis/etoposide (2017) found to have recurrence w/ path from 6/6/18 + for Metastatic embryonal carcinoma.  -pt admitted for cycle 1 of TIP to be initiated today   -oncology following  -monitored CBC, CMP, Mag, Phos, Uric acid on labs.    Problem/Plan - 2:  ·  Problem: Atrial flutter.  Plan: S/p DCCV in 2/2018 and pt has been in sinus since then  -c/w coreg 6.25mg BID.   -parameters in place    Problem/Plan - 3:  ·  Problem: R/O Atrial thrombus without antecedent myocardial infarction.  Plan: Last cardiac imaging was cardiac MRI in  3/2018 that still shows atrial mass/thrombus that appears to be reduced in size compared to 10/2017.   INR therapeutic- known to Dr. Kathy Cunha-OP f/u  -c/w coumadin for a/c. 7/27 INR was 1.8 and has it checked q1-2 wks. Will check INR now and dose coumadin accordingly. Pt on 5mg coumadin at home.  -some concern for mass vs thrombus. Suspect that it is more c/w thrombus as size has decreased and pt was not on treatment for his malignancy. Either way pt will eventually need f/u imaging  -will f/u w/ pts cardiologist in regards to best time for re-imaging. MRI from 3/2018 recommended 3month f/u.    Problem/Plan - 4:  ·  Problem: R/O Cardiomyopathy.  Plan: Last TYLOR from 2/2018 showed  EF 40-45%. Mild-moderate global left ventricular systolic dysfunction.  -c/w coreg 6.25mg BID and lisinopril 2.5mg qd. Per pt his lisinopril was increased by cards on 7/27 to 5mg but he has not started it yet.   -outpatient f/u with cardiologist for medication mgmt.     Problem/Plan - 5:  ·  Problem: Prophylactic measure.  Plan: DVT ppx- on a/c with coumadin for atrial thrombus.     Pt has Midline in place-- outpatient f/u at [facility]. Per CM pt to have PICC maintenance care set up with [facility].     Dispo: Per Dr. Vazquez pt medically stable for d/c home.

## 2018-07-30 NOTE — DISCHARGE NOTE ADULT - CARE PLAN
Principal Discharge DX:	Malignant neoplasm of right testis, unspecified whether descended or undescended  Goal:	complete chemo  Assessment and plan of treatment:	please follow up at [facility] for your scheduled appt on 7/31 at 8am  Secondary Diagnosis:	Atrial thrombus without antecedent myocardial infarction  Goal:	INR 2-3 goal  Assessment and plan of treatment:	continue coumadin Principal Discharge DX:	Malignant neoplasm of right testis, unspecified whether descended or undescended  Goal:	complete chemo  Assessment and plan of treatment:	please follow up at [facility] for your scheduled appt on 7/31 at 8am  Secondary Diagnosis:	Atrial thrombus without antecedent myocardial infarction  Goal:	INR 2-3 goal  Assessment and plan of treatment:	continue coumadin.  Please make an appointment re: the cardiac MRI - Dr. Marquez requested it.

## 2018-07-30 NOTE — PROGRESS NOTE ADULT - SUBJECTIVE AND OBJECTIVE BOX
Hematology Follow-up    INTERVAL HPI/OVERNIGHT EVENTS:  No acute changes. Patient tolerating chemotherapy well thus far. Today is D2. He said he felt mild muscle weakness and some neuropathy in hands after receiving taxol yesterday but that has now resolved. He denies fevers, chills, nausea, vomiting, abdominal pain, diarrhea.     VITAL SIGNS:  T(F): 97.6 (07-30-18 @ 06:07)  HR: 72 (07-30-18 @ 06:07)  BP: 120/84 (07-30-18 @ 06:07)  RR: 18 (07-30-18 @ 06:07)  SpO2: 98% (07-30-18 @ 06:07)  Wt(kg): --    PHYSICAL EXAM:    Constitutional: AAOx3, NAD,   Eyes: PERRL, EOMI, sclera non-icteric  Neck: supple, no masses, no JVD  Respiratory: CTA b/l, good air entry b/l, no wheezing, rhonchi, rales, with normal respiratory effort and no intercostal retractions  Cardiovascular: RRR, normal S1S2, no M/R/G  Gastrointestinal: soft, NTND, no masses palpable, BS normal in all four quadrants, no HSM  Extremities:  no c/c/e  Neurological: Grossly intact  Skin: Normal temperature    MEDICATIONS  (STANDING):  carvedilol 6.25 milliGRAM(s) Oral every 12 hours  chlorhexidine 4% Liquid 1 Application(s) Topical <User Schedule>  CISplatin IVPB 47 milliGRAM(s) IV Intermittent once  famotidine    Tablet 20 milliGRAM(s) Oral two times a day  ifosfamide IVPB 2832 milliGRAM(s) IV Intermittent once  lisinopril 2.5 milliGRAM(s) Oral daily  mesna IVPB (Chemo) 1000 milliGRAM(s) IV Intermittent once  mesna IVPB (Chemo) 1000 milliGRAM(s) IV Intermittent once  PACLitaxel IVPB 590 milliGRAM(s) IV Intermittent once  sodium chloride 0.9% (Chemo) 1000 milliLiter(s) IV Continuous once  sodium chloride 0.9% Bolus 500 milliLiter(s) IV Bolus once  sodium chloride 0.9%. 1000 milliLiter(s) (10 mL/Hr) IV Continuous <Continuous>  sodium chloride 0.9%. 1000 milliLiter(s) (75 mL/Hr) IV Continuous <Continuous>  warfarin 5 milliGRAM(s) Oral once    MEDICATIONS  (PRN):      No Known Allergies      LABS:                        12.9   8.77  )-----------( 199      ( 30 Jul 2018 06:04 )             39.3     07-30    137  |  102  |  13  ----------------------------<  132<H>  4.7   |  22  |  0.75    Ca    8.6      30 Jul 2018 06:04  Phos  2.4     07-30  Mg     2.2     07-30    TPro  7.2  /  Alb  3.9  /  TBili  0.4  /  DBili  x   /  AST  33  /  ALT  48<H>  /  AlkPhos  57  07-30    PT/INR - ( 30 Jul 2018 06:04 )   PT: 27.6 SEC;   INR: 2.44          PTT - ( 30 Jul 2018 06:04 )  PTT:33.0 SEC       RADIOLOGY & ADDITIONAL TESTS:  Studies reviewed.

## 2018-07-30 NOTE — DISCHARGE NOTE ADULT - PATIENT PORTAL LINK FT
You can access the [facility] Patient Portal, offered by [facility], by registering with the following website: [email or web address]

## 2018-07-30 NOTE — PROGRESS NOTE ADULT - SUBJECTIVE AND OBJECTIVE BOX
Patient is a 37y old  Male who presents with a chief complaint of Chemotherapy (29 Jul 2018 13:40)      SUBJECTIVE / OVERNIGHT EVENTS:    MEDICATIONS  (STANDING):  carvedilol 6.25 milliGRAM(s) Oral every 12 hours  chlorhexidine 4% Liquid 1 Application(s) Topical <User Schedule>  CISplatin IVPB 47 milliGRAM(s) IV Intermittent once  famotidine    Tablet 20 milliGRAM(s) Oral two times a day  ifosfamide IVPB 2832 milliGRAM(s) IV Intermittent once  lisinopril 2.5 milliGRAM(s) Oral daily  mesna IVPB (Chemo) 1000 milliGRAM(s) IV Intermittent once  mesna IVPB (Chemo) 1000 milliGRAM(s) IV Intermittent once  PACLitaxel IVPB 590 milliGRAM(s) IV Intermittent once  sodium chloride 0.9% (Chemo) 1000 milliLiter(s) IV Continuous once  sodium chloride 0.9% Bolus 500 milliLiter(s) IV Bolus once  sodium chloride 0.9%. 1000 milliLiter(s) (10 mL/Hr) IV Continuous <Continuous>  sodium chloride 0.9%. 1000 milliLiter(s) (75 mL/Hr) IV Continuous <Continuous>  warfarin 5 milliGRAM(s) Oral once    MEDICATIONS  (PRN):      Meds ordered within last 24hours  lisinopril: [Known as ZESTRIL]  2.5 milliGRAM(s), Oral, daily  Special Instructions: Hold for SBP <100  Provider's Contact #: [phone] (07-29 @ 14:57)  carvedilol: [Ordered as COREG]  6.25 milliGRAM(s), Oral, every 12 hours  Special Instructions: Hold for SBP <100 and HR < 60  Provider's Contact #: [phone] (07-29 @ 14:57)  famotidine    Tablet: [Ordered as PEPCID]  20 milliGRAM(s), Oral, two times a day  Provider's Contact #: [phone] (07-29 @ 14:57)  warfarin: [Known as COUMADIN]  5 milliGRAM(s), Oral, once, Stop After 1 Doses  Indication: Confirmed VTE  Administration Instructions: Return unused medication/wrapper to Pharmacy.  Provider's Contact #: [phone] (07-29 @ 18:17)  chlorhexidine 4% Liquid: [Known as HIBICLENS]  1 Application(s), Topical, <User Schedule> ( every 1 day: 06:00 ), To affected area  Indication: PICC line care  Administration Instructions: external use only  Dispose unused medication in BLACK bin.  Provider's Contact #: [phone] (07-30 @ 01:45)  warfarin: [Known as COUMADIN]  5 milliGRAM(s), Oral, once, Stop After 1 Doses  Indication: Confirmed VTE  Administration Instructions: Return unused medication/wrapper to Pharmacy.  Provider's Contact #: [phone] (07-30 @ 08:28)  sodium chloride 0.9% (Chemo): [Known as NS 0.9% (Chemo)] 1000 milliLiter(s)      with magnesium sulfate- additive 2 Gram(s), infuse at 500 mL/Hr; Stop After 1 Doses  Special Instructions: GIVE 1 HOUR POST CISPLATIN ( DAY 2 -- 7/30/18 ) --- administer over 2 hours  Provider's Contact #: [phone] (07-30 @ 09:17)  mesna IVPB (Chemo):   1000 milliGRAM(s) in sodium chloride 0.9% 90 milliLiter(s), IV Intermittent, once, infuse over 15 Minute(s), Stop After 1 Doses  Special Instructions: GIVE AT COMPLETION OF  IFOSFAMIDE INFUSION ON 7/30/18  Provider's Contact #: [phone] (07-30 @ 09:20)  mesna IVPB (Chemo):   1000 milliGRAM(s) in sodium chloride 0.9% 90 milliLiter(s), IV Intermittent, once, infuse over 15 Minute(s), Stop After 1 Doses  Special Instructions: GIVE BEFORE IFOSFAMIDE ON 7/30/18  Provider's Contact #: [phone] (07-30 @ 09:21)  ifosfamide IVPB: Known as IFEX  2832 milliGRAM(s) in sodium chloride 0.9% 443.36 milliLiter(s), IV Intermittent, once, infuse over 4 Hour(s), Stop After 1 Doses  Special Instructions: DAY 2 --- 7/30/18  Provider's Contact #: [phone] (07-30 @ 09:22)  CISplatin IVPB: Known as PLATINOL  47 milliGRAM(s) in sodium chloride 0.9% 53 milliLiter(s), IV Intermittent, once, infuse over 20 Minute(s), Stop After 1 Doses  Special Instructions: DAY 2 --- 7/30/18  Administration Instructions: store at room temp only  This is a Look-alike/Sound-alike Medication  Provider's Contact #: [phone] (07-30 @ 09:22)  sodium chloride 0.9%.: Solution, 1000 milliLiter(s) infuse at 75 mL/Hr  Special Instructions: Day 2 Hydration for Chemo  Provider's Contact #: [phone] (07-30 @ 09:27)  sodium chloride 0.9% Bolus:   500 milliLiter(s), IV Bolus, once, infuse over 1 Hour(s), Stop After 1 Doses  Special Instructions: 7/30/18; 1 Hour Prior to Cisplatin  Provider's Contact #: [phone] (07-30 @ 09:35)  sodium chloride 0.9%.: Solution, 1000 milliLiter(s) infuse at 10 mL/Hr  Special Instructions: to run maxwell ifsosamide x 4 hours, please d/c after chemo infusion completed  Provider's Contact #: [phone] (07-30 @ 11:18)  ondansetron Injectable: [Ordered as ZOFRAN Injectable]  4 milliGRAM(s), IV Push, once, Stop After 1 Doses  Administration Instructions: Max IV Push dose 8 milliGRAM(s)  IF IV PUSH, ADMINISTER OVER 2-5 MIN  Provider's Contact #: [phone] (07-30 @ 14:10)      T(C): 36.4 (07-30-18 @ 06:07), Max: 36.6 (07-29-18 @ 21:40)  HR: 72 (07-30-18 @ 06:07) (68 - 88)  BP: 120/84 (07-30-18 @ 06:07) (104/72 - 120/84)  RR: 18 (07-30-18 @ 06:07) (18 - 18)  SpO2: 98% (07-30-18 @ 06:07) (98% - 98%)    CAPILLARY BLOOD GLUCOSE        I&O's Summary      PHYSICAL EXAM:  GENERAL: NAD  CHEST/LUNG: Clear to auscultation bilaterally; No wheeze  HEART: Regular rate and rhythm; No murmurs, rubs, or gallops  ABDOMEN: Soft, Nontender, Nondistended; Bowel sounds present  EXTREMITIES:  No clubbing, cyanosis, or edema      LABS:                        12.9   8.77  )-----------( 199      ( 30 Jul 2018 06:04 )             39.3     07-30    137  |  102  |  13  ----------------------------<  132<H>  4.7   |  22  |  0.75    Ca    8.6      30 Jul 2018 06:04  Phos  2.4     07-30  Mg     2.2     07-30    TPro  7.2  /  Alb  3.9  /  TBili  0.4  /  DBili  x   /  AST  33  /  ALT  48<H>  /  AlkPhos  57  07-30    PT/INR - ( 30 Jul 2018 06:04 )   PT: 27.6 SEC;   INR: 2.44          PTT - ( 30 Jul 2018 06:04 )  PTT:33.0 SEC          RADIOLOGY & ADDITIONAL TESTS:    Imaging Personally Reviewed:    Consultant(s) Notes Reviewed:      Care Discussed with Consultants/Other Providers: Patient is a 37y old  Male who presents with a chief complaint of Chemotherapy (29 Jul 2018 13:40)      SUBJECTIVE / OVERNIGHT EVENTS: pt felt okay- knows he will be going home this evening.  Has an appt on 7/31 at [facility]    MEDICATIONS  (STANDING):  carvedilol 6.25 milliGRAM(s) Oral every 12 hours  chlorhexidine 4% Liquid 1 Application(s) Topical <User Schedule>  CISplatin IVPB 47 milliGRAM(s) IV Intermittent once  famotidine    Tablet 20 milliGRAM(s) Oral two times a day  ifosfamide IVPB 2832 milliGRAM(s) IV Intermittent once  lisinopril 2.5 milliGRAM(s) Oral daily  mesna IVPB (Chemo) 1000 milliGRAM(s) IV Intermittent once  mesna IVPB (Chemo) 1000 milliGRAM(s) IV Intermittent once  PACLitaxel IVPB 590 milliGRAM(s) IV Intermittent once  sodium chloride 0.9% (Chemo) 1000 milliLiter(s) IV Continuous once  sodium chloride 0.9% Bolus 500 milliLiter(s) IV Bolus once  sodium chloride 0.9%. 1000 milliLiter(s) (10 mL/Hr) IV Continuous <Continuous>  sodium chloride 0.9%. 1000 milliLiter(s) (75 mL/Hr) IV Continuous <Continuous>  warfarin 5 milliGRAM(s) Oral once    MEDICATIONS  (PRN):      Meds ordered within last 24hours  lisinopril: [Known as ZESTRIL]  2.5 milliGRAM(s), Oral, daily  Special Instructions: Hold for SBP <100  Provider's Contact #: [phone] (07-29 @ 14:57)  carvedilol: [Ordered as COREG]  6.25 milliGRAM(s), Oral, every 12 hours  Special Instructions: Hold for SBP <100 and HR < 60  Provider's Contact #: [phone] (07-29 @ 14:57)  famotidine    Tablet: [Ordered as PEPCID]  20 milliGRAM(s), Oral, two times a day  Provider's Contact #: [phone] (07-29 @ 14:57)  warfarin: [Known as COUMADIN]  5 milliGRAM(s), Oral, once, Stop After 1 Doses  Indication: Confirmed VTE  Administration Instructions: Return unused medication/wrapper to Pharmacy.  Provider's Contact #: [phone] (07-29 @ 18:17)  chlorhexidine 4% Liquid: [Known as HIBICLENS]  1 Application(s), Topical, <User Schedule> ( every 1 day: 06:00 ), To affected area  Indication: PICC line care  Administration Instructions: external use only  Dispose unused medication in BLACK bin.  Provider's Contact #: [phone] (07-30 @ 01:45)  warfarin: [Known as COUMADIN]  5 milliGRAM(s), Oral, once, Stop After 1 Doses  Indication: Confirmed VTE  Administration Instructions: Return unused medication/wrapper to Pharmacy.  Provider's Contact #: [phone] (07-30 @ 08:28)  sodium chloride 0.9% (Chemo): [Known as NS 0.9% (Chemo)] 1000 milliLiter(s)      with magnesium sulfate- additive 2 Gram(s), infuse at 500 mL/Hr; Stop After 1 Doses  Special Instructions: GIVE 1 HOUR POST CISPLATIN ( DAY 2 -- 7/30/18 ) --- administer over 2 hours  Provider's Contact #: [phone] (07-30 @ 09:17)  mesna IVPB (Chemo):   1000 milliGRAM(s) in sodium chloride 0.9% 90 milliLiter(s), IV Intermittent, once, infuse over 15 Minute(s), Stop After 1 Doses  Special Instructions: GIVE AT COMPLETION OF  IFOSFAMIDE INFUSION ON 7/30/18  Provider's Contact #: [phone] (07-30 @ 09:20)  mesna IVPB (Chemo):   1000 milliGRAM(s) in sodium chloride 0.9% 90 milliLiter(s), IV Intermittent, once, infuse over 15 Minute(s), Stop After 1 Doses  Special Instructions: GIVE BEFORE IFOSFAMIDE ON 7/30/18  Provider's Contact #: [phone] (07-30 @ 09:21)  ifosfamide IVPB: Known as IFEX  2832 milliGRAM(s) in sodium chloride 0.9% 443.36 milliLiter(s), IV Intermittent, once, infuse over 4 Hour(s), Stop After 1 Doses  Special Instructions: DAY 2 --- 7/30/18  Provider's Contact #: [phone] (07-30 @ 09:22)  CISplatin IVPB: Known as PLATINOL  47 milliGRAM(s) in sodium chloride 0.9% 53 milliLiter(s), IV Intermittent, once, infuse over 20 Minute(s), Stop After 1 Doses  Special Instructions: DAY 2 --- 7/30/18  Administration Instructions: store at room temp only  This is a Look-alike/Sound-alike Medication  Provider's Contact #: [phone] (07-30 @ 09:22)  sodium chloride 0.9%.: Solution, 1000 milliLiter(s) infuse at 75 mL/Hr  Special Instructions: Day 2 Hydration for Chemo  Provider's Contact #: [phone] (07-30 @ 09:27)  sodium chloride 0.9% Bolus:   500 milliLiter(s), IV Bolus, once, infuse over 1 Hour(s), Stop After 1 Doses  Special Instructions: 7/30/18; 1 Hour Prior to Cisplatin  Provider's Contact #: [phone] (07-30 @ 09:35)  sodium chloride 0.9%.: Solution, 1000 milliLiter(s) infuse at 10 mL/Hr  Special Instructions: to run maxwell ifsosamide x 4 hours, please d/c after chemo infusion completed  Provider's Contact #: [phone] (07-30 @ 11:18)  ondansetron Injectable: [Ordered as ZOFRAN Injectable]  4 milliGRAM(s), IV Push, once, Stop After 1 Doses  Administration Instructions: Max IV Push dose 8 milliGRAM(s)  IF IV PUSH, ADMINISTER OVER 2-5 MIN  Provider's Contact #: [phone] (07-30 @ 14:10)      T(C): 36.4 (07-30-18 @ 06:07), Max: 36.6 (07-29-18 @ 21:40)  HR: 72 (07-30-18 @ 06:07) (68 - 88)  BP: 120/84 (07-30-18 @ 06:07) (104/72 - 120/84)  RR: 18 (07-30-18 @ 06:07) (18 - 18)  SpO2: 98% (07-30-18 @ 06:07) (98% - 98%)    CAPILLARY BLOOD GLUCOSE        I&O's Summary      PHYSICAL EXAM:  GENERAL: NAD  CHEST/LUNG: Clear to auscultation bilaterally; No wheeze  HEART: Regular rate and rhythm; No murmurs, rubs, or gallops  ABDOMEN: Soft, Nontender, Nondistended; Bowel sounds present  EXTREMITIES:  No clubbing, cyanosis, or edema      LABS:                        12.9   8.77  )-----------( 199      ( 30 Jul 2018 06:04 )             39.3     07-30    137  |  102  |  13  ----------------------------<  132<H>  4.7   |  22  |  0.75    Ca    8.6      30 Jul 2018 06:04  Phos  2.4     07-30  Mg     2.2     07-30    TPro  7.2  /  Alb  3.9  /  TBili  0.4  /  DBili  x   /  AST  33  /  ALT  48<H>  /  AlkPhos  57  07-30    PT/INR - ( 30 Jul 2018 06:04 )   PT: 27.6 SEC;   INR: 2.44          PTT - ( 30 Jul 2018 06:04 )  PTT:33.0 SEC          RADIOLOGY & ADDITIONAL TESTS:    Imaging Personally Reviewed:    Consultant(s) Notes Reviewed:      Care Discussed with Consultants/Other Providers:

## 2018-07-31 ENCOUNTER — RESULT REVIEW (OUTPATIENT)
Age: 38
End: 2018-07-31

## 2018-07-31 ENCOUNTER — APPOINTMENT (OUTPATIENT)
Dept: INFUSION THERAPY | Facility: HOSPITAL | Age: 38
End: 2018-07-31

## 2018-07-31 ENCOUNTER — INBOUND DOCUMENT (OUTPATIENT)
Age: 38
End: 2018-07-31

## 2018-07-31 DIAGNOSIS — Z45.2 ENCOUNTER FOR ADJUSTMENT AND MANAGEMENT OF VASCULAR ACCESS DEVICE: ICD-10-CM

## 2018-07-31 DIAGNOSIS — C80.1 MALIGNANT (PRIMARY) NEOPLASM, UNSPECIFIED: ICD-10-CM

## 2018-07-31 DIAGNOSIS — Z71.89 OTHER SPECIFIED COUNSELING: ICD-10-CM

## 2018-07-31 LAB
APPEARANCE UR: CLEAR — SIGNIFICANT CHANGE UP
BILIRUB UR-MCNC: NEGATIVE — SIGNIFICANT CHANGE UP
COLOR SPEC: YELLOW — SIGNIFICANT CHANGE UP
DIFF PNL FLD: NEGATIVE — SIGNIFICANT CHANGE UP
GLUCOSE UR QL: NEGATIVE MG/DL — SIGNIFICANT CHANGE UP
KETONES UR-MCNC: NEGATIVE — SIGNIFICANT CHANGE UP
LEUKOCYTE ESTERASE UR-ACNC: NEGATIVE — SIGNIFICANT CHANGE UP
NITRITE UR-MCNC: NEGATIVE — SIGNIFICANT CHANGE UP
PH UR: 6 — SIGNIFICANT CHANGE UP (ref 5–8)
PROT UR-MCNC: NEGATIVE MG/DL — SIGNIFICANT CHANGE UP
SP GR SPEC: 1.02 — SIGNIFICANT CHANGE UP (ref 1.01–1.02)
UROBILINOGEN FLD QL: NEGATIVE MG/DL — SIGNIFICANT CHANGE UP

## 2018-08-01 ENCOUNTER — RESULT REVIEW (OUTPATIENT)
Age: 38
End: 2018-08-01

## 2018-08-01 ENCOUNTER — OUTPATIENT (OUTPATIENT)
Dept: OUTPATIENT SERVICES | Facility: HOSPITAL | Age: 38
LOS: 1 days | End: 2018-08-01

## 2018-08-01 ENCOUNTER — APPOINTMENT (OUTPATIENT)
Dept: INFUSION THERAPY | Facility: HOSPITAL | Age: 38
End: 2018-08-01

## 2018-08-01 DIAGNOSIS — Z51.11 ENCOUNTER FOR ANTINEOPLASTIC CHEMOTHERAPY: ICD-10-CM

## 2018-08-01 DIAGNOSIS — Z95.828 PRESENCE OF OTHER VASCULAR IMPLANTS AND GRAFTS: Chronic | ICD-10-CM

## 2018-08-01 DIAGNOSIS — E86.0 DEHYDRATION: ICD-10-CM

## 2018-08-01 DIAGNOSIS — R11.2 NAUSEA WITH VOMITING, UNSPECIFIED: ICD-10-CM

## 2018-08-01 DIAGNOSIS — Z90.79 ACQUIRED ABSENCE OF OTHER GENITAL ORGAN(S): Chronic | ICD-10-CM

## 2018-08-01 DIAGNOSIS — Z98.890 OTHER SPECIFIED POSTPROCEDURAL STATES: Chronic | ICD-10-CM

## 2018-08-01 LAB
APPEARANCE UR: CLEAR — SIGNIFICANT CHANGE UP
BACTERIA # UR AUTO: NEGATIVE — SIGNIFICANT CHANGE UP
BILIRUB UR-MCNC: NEGATIVE — SIGNIFICANT CHANGE UP
COLOR SPEC: YELLOW — SIGNIFICANT CHANGE UP
DIFF PNL FLD: ABNORMAL
EPI CELLS # UR: 2 /HPF — SIGNIFICANT CHANGE UP (ref 0–5)
GLUCOSE UR QL: NEGATIVE MG/DL — SIGNIFICANT CHANGE UP
HYALINE CASTS # UR AUTO: 2 /LPF — SIGNIFICANT CHANGE UP (ref 0–7)
KETONES UR-MCNC: ABNORMAL
LEUKOCYTE ESTERASE UR-ACNC: NEGATIVE — SIGNIFICANT CHANGE UP
NITRITE UR-MCNC: NEGATIVE — SIGNIFICANT CHANGE UP
PH UR: 7 — SIGNIFICANT CHANGE UP (ref 5–8)
PROT UR-MCNC: NEGATIVE MG/DL — SIGNIFICANT CHANGE UP
RBC CASTS # UR COMP ASSIST: 20 /HPF — HIGH (ref 0–4)
SP GR SPEC: 1.02 — SIGNIFICANT CHANGE UP (ref 1.01–1.02)
UROBILINOGEN FLD QL: 1 MG/DL — SIGNIFICANT CHANGE UP
WBC UR QL: 2 /HPF — SIGNIFICANT CHANGE UP (ref 0–5)

## 2018-08-02 ENCOUNTER — APPOINTMENT (OUTPATIENT)
Dept: INFUSION THERAPY | Facility: HOSPITAL | Age: 38
End: 2018-08-02

## 2018-08-02 ENCOUNTER — RESULT REVIEW (OUTPATIENT)
Age: 38
End: 2018-08-02

## 2018-08-02 LAB
APPEARANCE UR: CLEAR — SIGNIFICANT CHANGE UP
BACTERIA # UR AUTO: NEGATIVE — SIGNIFICANT CHANGE UP
BILIRUB UR-MCNC: NEGATIVE — SIGNIFICANT CHANGE UP
COLOR SPEC: YELLOW — SIGNIFICANT CHANGE UP
DIFF PNL FLD: NEGATIVE — SIGNIFICANT CHANGE UP
EPI CELLS # UR: 2 /HPF — SIGNIFICANT CHANGE UP (ref 0–5)
GLUCOSE UR QL: NEGATIVE MG/DL — SIGNIFICANT CHANGE UP
HYALINE CASTS # UR AUTO: 0 /LPF — SIGNIFICANT CHANGE UP (ref 0–7)
KETONES UR-MCNC: ABNORMAL
LEUKOCYTE ESTERASE UR-ACNC: NEGATIVE — SIGNIFICANT CHANGE UP
NITRITE UR-MCNC: NEGATIVE — SIGNIFICANT CHANGE UP
PH UR: 6 — SIGNIFICANT CHANGE UP (ref 5–8)
PROT UR-MCNC: NEGATIVE MG/DL — SIGNIFICANT CHANGE UP
RBC CASTS # UR COMP ASSIST: 2 /HPF — SIGNIFICANT CHANGE UP (ref 0–4)
SP GR SPEC: 1.02 — SIGNIFICANT CHANGE UP (ref 1.01–1.02)
UROBILINOGEN FLD QL: 1 MG/DL — SIGNIFICANT CHANGE UP
WBC UR QL: 3 /HPF — SIGNIFICANT CHANGE UP (ref 0–5)

## 2018-08-03 ENCOUNTER — OUTPATIENT (OUTPATIENT)
Dept: OUTPATIENT SERVICES | Facility: HOSPITAL | Age: 38
LOS: 1 days | Discharge: HOME | End: 2018-08-03

## 2018-08-03 ENCOUNTER — APPOINTMENT (OUTPATIENT)
Dept: INFUSION THERAPY | Facility: HOSPITAL | Age: 38
End: 2018-08-03

## 2018-08-03 ENCOUNTER — RESULT REVIEW (OUTPATIENT)
Age: 38
End: 2018-08-03

## 2018-08-03 DIAGNOSIS — C62.92 MALIGNANT NEOPLASM OF LEFT TESTIS, UNSPECIFIED WHETHER DESCENDED OR UNDESCENDED: ICD-10-CM

## 2018-08-03 DIAGNOSIS — Z98.890 OTHER SPECIFIED POSTPROCEDURAL STATES: Chronic | ICD-10-CM

## 2018-08-03 DIAGNOSIS — Z90.79 ACQUIRED ABSENCE OF OTHER GENITAL ORGAN(S): Chronic | ICD-10-CM

## 2018-08-03 DIAGNOSIS — Z95.828 PRESENCE OF OTHER VASCULAR IMPLANTS AND GRAFTS: Chronic | ICD-10-CM

## 2018-08-03 LAB
APPEARANCE UR: CLEAR — SIGNIFICANT CHANGE UP
BACTERIA # UR AUTO: NEGATIVE — SIGNIFICANT CHANGE UP
BILIRUB UR-MCNC: NEGATIVE — SIGNIFICANT CHANGE UP
COLOR SPEC: YELLOW — SIGNIFICANT CHANGE UP
DIFF PNL FLD: NEGATIVE — SIGNIFICANT CHANGE UP
EPI CELLS # UR: 4 /HPF — SIGNIFICANT CHANGE UP (ref 0–5)
GLUCOSE UR QL: NEGATIVE MG/DL — SIGNIFICANT CHANGE UP
HYALINE CASTS # UR AUTO: 2 /LPF — SIGNIFICANT CHANGE UP (ref 0–7)
KETONES UR-MCNC: ABNORMAL
LEUKOCYTE ESTERASE UR-ACNC: NEGATIVE — SIGNIFICANT CHANGE UP
NITRITE UR-MCNC: NEGATIVE — SIGNIFICANT CHANGE UP
PH UR: 7.5 — SIGNIFICANT CHANGE UP (ref 5–8)
PROT UR-MCNC: 30 MG/DL
RBC CASTS # UR COMP ASSIST: 2 /HPF — SIGNIFICANT CHANGE UP (ref 0–4)
SP GR SPEC: 1.02 — SIGNIFICANT CHANGE UP (ref 1.01–1.02)
UROBILINOGEN FLD QL: 1 MG/DL — SIGNIFICANT CHANGE UP
WBC UR QL: 4 /HPF — SIGNIFICANT CHANGE UP (ref 0–5)

## 2018-08-04 ENCOUNTER — OTHER (OUTPATIENT)
Age: 38
End: 2018-08-04

## 2018-08-06 ENCOUNTER — INPATIENT (INPATIENT)
Facility: HOSPITAL | Age: 38
LOS: 5 days | Discharge: HOME | End: 2018-08-12
Attending: HOSPITALIST | Admitting: HOSPITALIST
Payer: MEDICAID

## 2018-08-06 VITALS
RESPIRATION RATE: 20 BRPM | HEART RATE: 118 BPM | OXYGEN SATURATION: 98 % | SYSTOLIC BLOOD PRESSURE: 114 MMHG | TEMPERATURE: 99 F | DIASTOLIC BLOOD PRESSURE: 71 MMHG

## 2018-08-06 DIAGNOSIS — A41.9 SEPSIS, UNSPECIFIED ORGANISM: ICD-10-CM

## 2018-08-06 DIAGNOSIS — C62.90 MALIGNANT NEOPLASM OF UNSPECIFIED TESTIS, UNSPECIFIED WHETHER DESCENDED OR UNDESCENDED: ICD-10-CM

## 2018-08-06 DIAGNOSIS — E87.6 HYPOKALEMIA: ICD-10-CM

## 2018-08-06 DIAGNOSIS — Z51.89 ENCOUNTER FOR OTHER SPECIFIED AFTERCARE: ICD-10-CM

## 2018-08-06 DIAGNOSIS — Z51.11 ENCOUNTER FOR ANTINEOPLASTIC CHEMOTHERAPY: ICD-10-CM

## 2018-08-06 DIAGNOSIS — E87.1 HYPO-OSMOLALITY AND HYPONATREMIA: ICD-10-CM

## 2018-08-06 DIAGNOSIS — R11.2 NAUSEA WITH VOMITING, UNSPECIFIED: ICD-10-CM

## 2018-08-06 DIAGNOSIS — Z98.890 OTHER SPECIFIED POSTPROCEDURAL STATES: Chronic | ICD-10-CM

## 2018-08-06 DIAGNOSIS — E86.0 DEHYDRATION: ICD-10-CM

## 2018-08-06 DIAGNOSIS — Z29.9 ENCOUNTER FOR PROPHYLACTIC MEASURES, UNSPECIFIED: ICD-10-CM

## 2018-08-06 DIAGNOSIS — Z95.828 PRESENCE OF OTHER VASCULAR IMPLANTS AND GRAFTS: Chronic | ICD-10-CM

## 2018-08-06 DIAGNOSIS — R74.0 NONSPECIFIC ELEVATION OF LEVELS OF TRANSAMINASE AND LACTIC ACID DEHYDROGENASE [LDH]: ICD-10-CM

## 2018-08-06 DIAGNOSIS — Z90.79 ACQUIRED ABSENCE OF OTHER GENITAL ORGAN(S): Chronic | ICD-10-CM

## 2018-08-06 DIAGNOSIS — D68.59 OTHER PRIMARY THROMBOPHILIA: ICD-10-CM

## 2018-08-06 LAB
ALBUMIN SERPL ELPH-MCNC: 4.5 G/DL — SIGNIFICANT CHANGE UP (ref 3.3–5)
ALP SERPL-CCNC: 50 U/L — SIGNIFICANT CHANGE UP (ref 40–120)
ALT FLD-CCNC: 525 U/L — HIGH (ref 4–41)
AMORPH CRY # UR COMP ASSIST: SIGNIFICANT CHANGE UP (ref 0–0)
APPEARANCE UR: CLEAR — SIGNIFICANT CHANGE UP
APTT BLD: 27.2 SEC — LOW (ref 27.5–37.4)
AST SERPL-CCNC: 187 U/L — HIGH (ref 4–40)
BACTERIA # UR AUTO: SIGNIFICANT CHANGE UP
BASE EXCESS BLDV CALC-SCNC: 4.9 MMOL/L — SIGNIFICANT CHANGE UP
BASE EXCESS BLDV CALC-SCNC: 6.3 MMOL/L — SIGNIFICANT CHANGE UP
BASOPHILS # BLD AUTO: 0 K/UL — SIGNIFICANT CHANGE UP (ref 0–0.2)
BASOPHILS NFR BLD AUTO: 0 % — SIGNIFICANT CHANGE UP (ref 0–2)
BASOPHILS NFR SPEC: 0 % — SIGNIFICANT CHANGE UP (ref 0–2)
BILIRUB SERPL-MCNC: 0.7 MG/DL — SIGNIFICANT CHANGE UP (ref 0.2–1.2)
BILIRUB UR-MCNC: NEGATIVE — SIGNIFICANT CHANGE UP
BLASTS # FLD: 0 % — SIGNIFICANT CHANGE UP (ref 0–0)
BLOOD GAS VENOUS - CREATININE: 1.02 MG/DL — SIGNIFICANT CHANGE UP (ref 0.5–1.3)
BLOOD GAS VENOUS - CREATININE: 1.2 MG/DL — SIGNIFICANT CHANGE UP (ref 0.5–1.3)
BLOOD UR QL VISUAL: HIGH
BUN SERPL-MCNC: 19 MG/DL — SIGNIFICANT CHANGE UP (ref 7–23)
BUN SERPL-MCNC: 22 MG/DL — SIGNIFICANT CHANGE UP (ref 7–23)
CALCIUM SERPL-MCNC: 8.3 MG/DL — LOW (ref 8.4–10.5)
CALCIUM SERPL-MCNC: 9.4 MG/DL — SIGNIFICANT CHANGE UP (ref 8.4–10.5)
CHLORIDE BLDV-SCNC: 88 MMOL/L — LOW (ref 96–108)
CHLORIDE BLDV-SCNC: 93 MMOL/L — LOW (ref 96–108)
CHLORIDE SERPL-SCNC: 86 MMOL/L — LOW (ref 98–107)
CHLORIDE SERPL-SCNC: 90 MMOL/L — LOW (ref 98–107)
CHLORIDE UR-SCNC: 148 MMOL/L — SIGNIFICANT CHANGE UP
CO2 SERPL-SCNC: 21 MMOL/L — LOW (ref 22–31)
CO2 SERPL-SCNC: 29 MMOL/L — SIGNIFICANT CHANGE UP (ref 22–31)
COLOR SPEC: YELLOW — SIGNIFICANT CHANGE UP
CREAT SERPL-MCNC: 0.97 MG/DL — SIGNIFICANT CHANGE UP (ref 0.5–1.3)
CREAT SERPL-MCNC: 1.19 MG/DL — SIGNIFICANT CHANGE UP (ref 0.5–1.3)
EOSINOPHIL # BLD AUTO: 0 K/UL — SIGNIFICANT CHANGE UP (ref 0–0.5)
EOSINOPHIL NFR BLD AUTO: 0 % — SIGNIFICANT CHANGE UP (ref 0–6)
EOSINOPHIL NFR FLD: 0 % — SIGNIFICANT CHANGE UP (ref 0–6)
GAS PNL BLDV: 124 MMOL/L — LOW (ref 136–146)
GAS PNL BLDV: 126 MMOL/L — LOW (ref 136–146)
GIANT PLATELETS BLD QL SMEAR: PRESENT — SIGNIFICANT CHANGE UP
GLUCOSE BLDV-MCNC: 116 — HIGH (ref 70–99)
GLUCOSE BLDV-MCNC: 129 — HIGH (ref 70–99)
GLUCOSE SERPL-MCNC: 108 MG/DL — HIGH (ref 70–99)
GLUCOSE SERPL-MCNC: 121 MG/DL — HIGH (ref 70–99)
GLUCOSE UR-MCNC: 50 — SIGNIFICANT CHANGE UP
HCO3 BLDV-SCNC: 27 MMOL/L — SIGNIFICANT CHANGE UP (ref 20–27)
HCO3 BLDV-SCNC: 28 MMOL/L — HIGH (ref 20–27)
HCT VFR BLD CALC: 40.8 % — SIGNIFICANT CHANGE UP (ref 39–50)
HCT VFR BLDV CALC: 39.8 % — SIGNIFICANT CHANGE UP (ref 39–51)
HCT VFR BLDV CALC: 44.4 % — SIGNIFICANT CHANGE UP (ref 39–51)
HGB BLD-MCNC: 14.3 G/DL — SIGNIFICANT CHANGE UP (ref 13–17)
HGB BLDV-MCNC: 12.9 G/DL — LOW (ref 13–17)
HGB BLDV-MCNC: 14.5 G/DL — SIGNIFICANT CHANGE UP (ref 13–17)
IMM GRANULOCYTES # BLD AUTO: 0 # — SIGNIFICANT CHANGE UP
IMM GRANULOCYTES NFR BLD AUTO: 0 % — SIGNIFICANT CHANGE UP (ref 0–1.5)
INR BLD: 1.11 — SIGNIFICANT CHANGE UP (ref 0.88–1.17)
KETONES UR-MCNC: NEGATIVE — SIGNIFICANT CHANGE UP
LACTATE BLDV-MCNC: 2.1 MMOL/L — HIGH (ref 0.5–2)
LACTATE BLDV-MCNC: 2.9 MMOL/L — HIGH (ref 0.5–2)
LEUKOCYTE ESTERASE UR-ACNC: NEGATIVE — SIGNIFICANT CHANGE UP
LYMPHOCYTES # BLD AUTO: 0.29 K/UL — LOW (ref 1–3.3)
LYMPHOCYTES # BLD AUTO: 90.6 % — HIGH (ref 13–44)
LYMPHOCYTES NFR SPEC AUTO: 88.4 % — HIGH (ref 13–44)
MAGNESIUM SERPL-MCNC: 2 MG/DL — SIGNIFICANT CHANGE UP (ref 1.6–2.6)
MCHC RBC-ENTMCNC: 30.1 PG — SIGNIFICANT CHANGE UP (ref 27–34)
MCHC RBC-ENTMCNC: 35 % — SIGNIFICANT CHANGE UP (ref 32–36)
MCV RBC AUTO: 85.9 FL — SIGNIFICANT CHANGE UP (ref 80–100)
METAMYELOCYTES # FLD: 0 % — SIGNIFICANT CHANGE UP (ref 0–1)
MONOCYTES # BLD AUTO: 0.01 K/UL — SIGNIFICANT CHANGE UP (ref 0–0.9)
MONOCYTES NFR BLD AUTO: 3.1 % — SIGNIFICANT CHANGE UP (ref 2–14)
MONOCYTES NFR BLD: 2.3 % — SIGNIFICANT CHANGE UP (ref 2–9)
MORPHOLOGY BLD-IMP: NORMAL — SIGNIFICANT CHANGE UP
MUCOUS THREADS # UR AUTO: SIGNIFICANT CHANGE UP
MYELOCYTES NFR BLD: 0 % — SIGNIFICANT CHANGE UP (ref 0–0)
NEUTROPHIL AB SER-ACNC: 0 % — LOW (ref 43–77)
NEUTROPHILS # BLD AUTO: 0.02 K/UL — LOW (ref 1.8–7.4)
NEUTROPHILS NFR BLD AUTO: 6.3 % — LOW (ref 43–77)
NEUTS BAND # BLD: 0 % — SIGNIFICANT CHANGE UP (ref 0–6)
NITRITE UR-MCNC: NEGATIVE — SIGNIFICANT CHANGE UP
NRBC # FLD: 0 — SIGNIFICANT CHANGE UP
OSMOLALITY UR: 685 MOSMO/KG — SIGNIFICANT CHANGE UP (ref 50–1200)
OTHER - HEMATOLOGY %: 0 — SIGNIFICANT CHANGE UP
PCO2 BLDV: 47 MMHG — SIGNIFICANT CHANGE UP (ref 41–51)
PCO2 BLDV: 53 MMHG — HIGH (ref 41–51)
PH BLDV: 7.37 PH — SIGNIFICANT CHANGE UP (ref 7.32–7.43)
PH BLDV: 7.43 PH — SIGNIFICANT CHANGE UP (ref 7.32–7.43)
PH UR: 6.5 — SIGNIFICANT CHANGE UP (ref 4.6–8)
PHOSPHATE SERPL-MCNC: 1.7 MG/DL — LOW (ref 2.5–4.5)
PLATELET # BLD AUTO: 87 K/UL — LOW (ref 150–400)
PLATELET COUNT - ESTIMATE: SIGNIFICANT CHANGE UP
PMV BLD: 11.2 FL — SIGNIFICANT CHANGE UP (ref 7–13)
PO2 BLDV: 28 MMHG — LOW (ref 35–40)
PO2 BLDV: < 24 MMHG — LOW (ref 35–40)
POTASSIUM BLDV-SCNC: 2.7 MMOL/L — CRITICAL LOW (ref 3.4–4.5)
POTASSIUM BLDV-SCNC: 2.9 MMOL/L — CRITICAL LOW (ref 3.4–4.5)
POTASSIUM SERPL-MCNC: 2.8 MMOL/L — CRITICAL LOW (ref 3.5–5.3)
POTASSIUM SERPL-MCNC: 3.1 MMOL/L — LOW (ref 3.5–5.3)
POTASSIUM SERPL-SCNC: 2.8 MMOL/L — CRITICAL LOW (ref 3.5–5.3)
POTASSIUM SERPL-SCNC: 3.1 MMOL/L — LOW (ref 3.5–5.3)
POTASSIUM UR-SCNC: 37.5 MMOL/L — SIGNIFICANT CHANGE UP
PROMYELOCYTES # FLD: 0 % — SIGNIFICANT CHANGE UP (ref 0–0)
PROT SERPL-MCNC: 7.6 G/DL — SIGNIFICANT CHANGE UP (ref 6–8.3)
PROT UR-MCNC: 300 MG/DL — SIGNIFICANT CHANGE UP
PROTHROM AB SERPL-ACNC: 12.3 SEC — SIGNIFICANT CHANGE UP (ref 9.8–13.1)
RBC # BLD: 4.75 M/UL — SIGNIFICANT CHANGE UP (ref 4.2–5.8)
RBC # FLD: 11.4 % — SIGNIFICANT CHANGE UP (ref 10.3–14.5)
RBC CASTS # UR COMP ASSIST: HIGH (ref 0–?)
REVIEW TO FOLLOW: YES — SIGNIFICANT CHANGE UP
SAO2 % BLDV: 18.8 % — LOW (ref 60–85)
SAO2 % BLDV: 42.6 % — LOW (ref 60–85)
SODIUM SERPL-SCNC: 128 MMOL/L — LOW (ref 135–145)
SODIUM SERPL-SCNC: 129 MMOL/L — LOW (ref 135–145)
SODIUM UR-SCNC: 168 MMOL/L — SIGNIFICANT CHANGE UP
SP GR SPEC: 1.03 — SIGNIFICANT CHANGE UP (ref 1–1.04)
SQUAMOUS # UR AUTO: SIGNIFICANT CHANGE UP
UROBILINOGEN FLD QL: NORMAL MG/DL — SIGNIFICANT CHANGE UP
VARIANT LYMPHS # BLD: 9.3 % — SIGNIFICANT CHANGE UP
WBC # BLD: 0.32 K/UL — CRITICAL LOW (ref 3.8–10.5)
WBC # FLD AUTO: 0.32 K/UL — CRITICAL LOW (ref 3.8–10.5)
WBC UR QL: HIGH (ref 0–?)

## 2018-08-06 PROCEDURE — 99223 1ST HOSP IP/OBS HIGH 75: CPT | Mod: GC

## 2018-08-06 PROCEDURE — 71045 X-RAY EXAM CHEST 1 VIEW: CPT | Mod: 26

## 2018-08-06 RX ORDER — VANCOMYCIN HCL 1 G
1000 VIAL (EA) INTRAVENOUS ONCE
Qty: 0 | Refills: 0 | Status: COMPLETED | OUTPATIENT
Start: 2018-08-06 | End: 2018-08-06

## 2018-08-06 RX ORDER — CEFEPIME 1 G/1
2000 INJECTION, POWDER, FOR SOLUTION INTRAMUSCULAR; INTRAVENOUS ONCE
Qty: 0 | Refills: 0 | Status: COMPLETED | OUTPATIENT
Start: 2018-08-06 | End: 2018-08-06

## 2018-08-06 RX ORDER — ACETAMINOPHEN 500 MG
650 TABLET ORAL EVERY 6 HOURS
Qty: 0 | Refills: 0 | Status: DISCONTINUED | OUTPATIENT
Start: 2018-08-06 | End: 2018-08-12

## 2018-08-06 RX ORDER — NYSTATIN CREAM 100000 [USP'U]/G
1 CREAM TOPICAL
Qty: 0 | Refills: 0 | Status: DISCONTINUED | OUTPATIENT
Start: 2018-08-06 | End: 2018-08-12

## 2018-08-06 RX ORDER — HEPARIN SODIUM 5000 [USP'U]/ML
9000 INJECTION INTRAVENOUS; SUBCUTANEOUS EVERY 6 HOURS
Qty: 0 | Refills: 0 | Status: DISCONTINUED | OUTPATIENT
Start: 2018-08-06 | End: 2018-08-06

## 2018-08-06 RX ORDER — VANCOMYCIN HCL 1 G
1000 VIAL (EA) INTRAVENOUS EVERY 12 HOURS
Qty: 0 | Refills: 0 | Status: DISCONTINUED | OUTPATIENT
Start: 2018-08-06 | End: 2018-08-09

## 2018-08-06 RX ORDER — HEPARIN SODIUM 5000 [USP'U]/ML
4000 INJECTION INTRAVENOUS; SUBCUTANEOUS EVERY 6 HOURS
Qty: 0 | Refills: 0 | Status: DISCONTINUED | OUTPATIENT
Start: 2018-08-06 | End: 2018-08-07

## 2018-08-06 RX ORDER — HEPARIN SODIUM 5000 [USP'U]/ML
INJECTION INTRAVENOUS; SUBCUTANEOUS
Qty: 25000 | Refills: 0 | Status: DISCONTINUED | OUTPATIENT
Start: 2018-08-06 | End: 2018-08-07

## 2018-08-06 RX ORDER — FAMOTIDINE 10 MG/ML
0 INJECTION INTRAVENOUS
Qty: 0 | Refills: 0 | COMMUNITY

## 2018-08-06 RX ORDER — HEPARIN SODIUM 5000 [USP'U]/ML
4000 INJECTION INTRAVENOUS; SUBCUTANEOUS EVERY 6 HOURS
Qty: 0 | Refills: 0 | Status: DISCONTINUED | OUTPATIENT
Start: 2018-08-06 | End: 2018-08-06

## 2018-08-06 RX ORDER — HEPARIN SODIUM 5000 [USP'U]/ML
1900 INJECTION INTRAVENOUS; SUBCUTANEOUS
Qty: 25000 | Refills: 0 | Status: DISCONTINUED | OUTPATIENT
Start: 2018-08-06 | End: 2018-08-06

## 2018-08-06 RX ORDER — HEPARIN SODIUM 5000 [USP'U]/ML
9000 INJECTION INTRAVENOUS; SUBCUTANEOUS EVERY 6 HOURS
Qty: 0 | Refills: 0 | Status: DISCONTINUED | OUTPATIENT
Start: 2018-08-06 | End: 2018-08-07

## 2018-08-06 RX ORDER — HEPARIN SODIUM 5000 [USP'U]/ML
5000 INJECTION INTRAVENOUS; SUBCUTANEOUS EVERY 8 HOURS
Qty: 0 | Refills: 0 | Status: DISCONTINUED | OUTPATIENT
Start: 2018-08-06 | End: 2018-08-06

## 2018-08-06 RX ORDER — POTASSIUM CHLORIDE 20 MEQ
40 PACKET (EA) ORAL ONCE
Qty: 0 | Refills: 0 | Status: DISCONTINUED | OUTPATIENT
Start: 2018-08-06 | End: 2018-08-06

## 2018-08-06 RX ORDER — ONDANSETRON 8 MG/1
4 TABLET, FILM COATED ORAL ONCE
Qty: 0 | Refills: 0 | Status: COMPLETED | OUTPATIENT
Start: 2018-08-06 | End: 2018-08-06

## 2018-08-06 RX ORDER — ONDANSETRON 8 MG/1
4 TABLET, FILM COATED ORAL EVERY 6 HOURS
Qty: 0 | Refills: 0 | Status: DISCONTINUED | OUTPATIENT
Start: 2018-08-06 | End: 2018-08-12

## 2018-08-06 RX ORDER — OXYCODONE HYDROCHLORIDE 5 MG/1
15 TABLET ORAL EVERY 6 HOURS
Qty: 0 | Refills: 0 | Status: DISCONTINUED | OUTPATIENT
Start: 2018-08-06 | End: 2018-08-06

## 2018-08-06 RX ORDER — SODIUM CHLORIDE 9 MG/ML
1000 INJECTION INTRAMUSCULAR; INTRAVENOUS; SUBCUTANEOUS ONCE
Qty: 0 | Refills: 0 | Status: COMPLETED | OUTPATIENT
Start: 2018-08-06 | End: 2018-08-06

## 2018-08-06 RX ORDER — POTASSIUM CHLORIDE 20 MEQ
10 PACKET (EA) ORAL
Qty: 0 | Refills: 0 | Status: COMPLETED | OUTPATIENT
Start: 2018-08-06 | End: 2018-08-06

## 2018-08-06 RX ORDER — METOCLOPRAMIDE HCL 10 MG
5 TABLET ORAL ONCE
Qty: 0 | Refills: 0 | Status: COMPLETED | OUTPATIENT
Start: 2018-08-06 | End: 2018-08-06

## 2018-08-06 RX ORDER — POTASSIUM CHLORIDE 20 MEQ
40 PACKET (EA) ORAL ONCE
Qty: 0 | Refills: 0 | Status: COMPLETED | OUTPATIENT
Start: 2018-08-06 | End: 2018-08-06

## 2018-08-06 RX ORDER — SODIUM CHLORIDE 9 MG/ML
2000 INJECTION INTRAMUSCULAR; INTRAVENOUS; SUBCUTANEOUS ONCE
Qty: 0 | Refills: 0 | Status: COMPLETED | OUTPATIENT
Start: 2018-08-06 | End: 2018-08-06

## 2018-08-06 RX ORDER — CEFEPIME 1 G/1
2000 INJECTION, POWDER, FOR SOLUTION INTRAMUSCULAR; INTRAVENOUS EVERY 12 HOURS
Qty: 0 | Refills: 0 | Status: DISCONTINUED | OUTPATIENT
Start: 2018-08-06 | End: 2018-08-12

## 2018-08-06 RX ORDER — ACETAMINOPHEN 500 MG
650 TABLET ORAL ONCE
Qty: 0 | Refills: 0 | Status: COMPLETED | OUTPATIENT
Start: 2018-08-06 | End: 2018-08-06

## 2018-08-06 RX ADMIN — SODIUM CHLORIDE 1000 MILLILITER(S): 9 INJECTION INTRAMUSCULAR; INTRAVENOUS; SUBCUTANEOUS at 14:08

## 2018-08-06 RX ADMIN — CEFEPIME 100 MILLIGRAM(S): 1 INJECTION, POWDER, FOR SOLUTION INTRAMUSCULAR; INTRAVENOUS at 10:22

## 2018-08-06 RX ADMIN — HEPARIN SODIUM 1900 UNIT(S)/HR: 5000 INJECTION INTRAVENOUS; SUBCUTANEOUS at 20:11

## 2018-08-06 RX ADMIN — SODIUM CHLORIDE 3000 MILLILITER(S): 9 INJECTION INTRAMUSCULAR; INTRAVENOUS; SUBCUTANEOUS at 20:41

## 2018-08-06 RX ADMIN — Medication 30 MILLILITER(S): at 20:42

## 2018-08-06 RX ADMIN — SODIUM CHLORIDE 2000 MILLILITER(S): 9 INJECTION INTRAMUSCULAR; INTRAVENOUS; SUBCUTANEOUS at 10:23

## 2018-08-06 RX ADMIN — Medication 650 MILLIGRAM(S): at 20:28

## 2018-08-06 RX ADMIN — NYSTATIN CREAM 1 APPLICATION(S): 100000 CREAM TOPICAL at 20:27

## 2018-08-06 RX ADMIN — Medication 100 MILLIEQUIVALENT(S): at 14:08

## 2018-08-06 RX ADMIN — Medication 250 MILLIGRAM(S): at 12:00

## 2018-08-06 RX ADMIN — Medication 100 MILLIEQUIVALENT(S): at 12:00

## 2018-08-06 RX ADMIN — CEFEPIME 100 MILLIGRAM(S): 1 INJECTION, POWDER, FOR SOLUTION INTRAMUSCULAR; INTRAVENOUS at 23:08

## 2018-08-06 RX ADMIN — HEPARIN SODIUM 5000 UNIT(S): 5000 INJECTION INTRAVENOUS; SUBCUTANEOUS at 14:37

## 2018-08-06 RX ADMIN — Medication 5 MILLIGRAM(S): at 20:27

## 2018-08-06 RX ADMIN — ONDANSETRON 4 MILLIGRAM(S): 8 TABLET, FILM COATED ORAL at 14:08

## 2018-08-06 RX ADMIN — Medication 40 MILLIEQUIVALENT(S): at 12:30

## 2018-08-06 RX ADMIN — Medication 650 MILLIGRAM(S): at 10:43

## 2018-08-06 RX ADMIN — Medication 100 MILLIEQUIVALENT(S): at 15:45

## 2018-08-06 RX ADMIN — ONDANSETRON 4 MILLIGRAM(S): 8 TABLET, FILM COATED ORAL at 10:22

## 2018-08-06 NOTE — H&P ADULT - NSHPPHYSICALEXAM_GEN_ALL_CORE
Vital Signs Last 24 Hrs  T(C): 38.6 (06 Aug 2018 14:48), Max: 38.6 (06 Aug 2018 14:48)  T(F): 101.4 (06 Aug 2018 14:48), Max: 101.4 (06 Aug 2018 14:48)  HR: 115 (06 Aug 2018 14:48) (98 - 118)  BP: 121/68 (06 Aug 2018 14:48) (78/52 - 121/68)  BP(mean): --  RR: 14 (06 Aug 2018 14:48) (14 - 22)  SpO2: 100% (06 Aug 2018 14:48) (98% - 100%)    GENERAL: NAD, well-developed  HEAD:  Atraumatic, Normocephalic  EYES: EOMI, PERRLA, conjunctiva and sclera clear  Mouth: MMM, no lesions  NECK: Supple, no appreciable masses  Lung: normal work of breathing, cta b/l  Chest: S1&S2+, rrr, no m/r/g appreciated  ABDOMEN: bs+, soft, nt, nd, no appreciable masses, negative ramirez sign, midline abdominal surgical scar  : No richardson catheter, no CVA tenderness  EXTREMITIES:  radial pulse present b/l, PT present b/l, no pitting edema present  Neuro: A&Ox3, no focal deficits  SKIN: warm and dry, no visible rashes Vital Signs Last 24 Hrs  T(C): 38.6 (06 Aug 2018 14:48), Max: 38.6 (06 Aug 2018 14:48)  T(F): 101.4 (06 Aug 2018 14:48), Max: 101.4 (06 Aug 2018 14:48)  HR: 115 (06 Aug 2018 14:48) (98 - 118)  BP: 121/68 (06 Aug 2018 14:48) (78/52 - 121/68)  BP(mean): --  RR: 14 (06 Aug 2018 14:48) (14 - 22)  SpO2: 100% (06 Aug 2018 14:48) (98% - 100%)    GENERAL: NAD, well-developed  HEAD:  Atraumatic, Normocephalic  EYES: EOMI, PERRLA, conjunctiva and sclera clear  Mouth: MMM, no lesions  NECK: Supple, no appreciable masses  Lung: normal work of breathing, cta b/l  Chest: S1&S2+, rrr, no m/r/g appreciated  ABDOMEN: bs+, soft, nt, nd, no appreciable masses, negative ramirez sign, midline abdominal surgical scar  : No richardson catheter, no CVA tenderness  EXTREMITIES:  radial pulse present b/l, PT present b/l, no pitting edema present  Neuro: A&Ox3, no focal deficits  SKIN: warm and dry, no visible rashes. Suprapubic superficial lesion, no drainage, warmth, no induration. Vital Signs Last 24 Hrs  T(C): 38.6 (06 Aug 2018 14:48), Max: 38.6 (06 Aug 2018 14:48)  T(F): 101.4 (06 Aug 2018 14:48), Max: 101.4 (06 Aug 2018 14:48)  HR: 115 (06 Aug 2018 14:48) (98 - 118)  BP: 121/68 (06 Aug 2018 14:48) (78/52 - 121/68)  BP(mean): --  RR: 14 (06 Aug 2018 14:48) (14 - 22)  SpO2: 100% (06 Aug 2018 14:48) (98% - 100%)    GENERAL: NAD, well-developed  HEAD:  Atraumatic, Normocephalic  EYES: EOMI, PERRLA, conjunctiva and sclera clear  Mouth: MMM, no lesions  NECK: Supple, no appreciable masses  Lung: normal work of breathing, cta b/l  Chest: S1&S2+, rrr, no m/r/g appreciated  ABDOMEN: bs+, soft, nt, nd, no appreciable masses, negative ramirez sign, midline abdominal surgical scar  : No CVA tenderness. No scrotal swelling, left orchiectomy, right testicle mildly tender, no erythema or induration  EXTREMITIES:  radial pulse present b/l, PT present b/l, no pitting edema present  Neuro: A&Ox3, no focal deficits  SKIN: warm and dry, no visible rashes. Suprapubic superficial abrasion, no drainage, warmth, no induration.  Lines: Left upper arm PICC line, clean dry and intact, ecchymosis surrounding the site, no erythema, edema, induration, or drainage

## 2018-08-06 NOTE — H&P ADULT - NSHPLABSRESULTS_GEN_ALL_CORE
LABS:                         14.3   0.32  )-----------( 87       ( 06 Aug 2018 09:56 )             40.8     08-    129<L>  |  86<L>  |  22  ----------------------------<  121<H>  2.8<LL>   |  29  |  1.19    Ca    9.4      06 Aug 2018 09:56    TPro  7.6  /  Alb  4.5  /  TBili  0.7  /  DBili  x   /  AST  187<H>  /  ALT  525<H>  /  AlkPhos  50  08-06    PT/INR - ( 06 Aug 2018 09:56 )   PT: 12.3 SEC;   INR: 1.11          PTT - ( 06 Aug 2018 09:56 )  PTT:27.2 SEC  Urinalysis Basic - ( 06 Aug 2018 12:10 )    Color: YELLOW / Appearance: CLEAR / S.027 / pH: 6.5  Gluc: 50 / Ketone: NEGATIVE  / Bili: NEGATIVE / Urobili: NORMAL mg/dL   Blood: MODERATE / Protein: 300 mg/dL / Nitrite: NEGATIVE   Leuk Esterase: NEGATIVE / RBC: 5-10 / WBC 5-10   Sq Epi: OCC / Non Sq Epi: x / Bacteria: FEW            RADIOLOGY, EKG & ADDITIONAL TESTS: Reviewed.   EKG:

## 2018-08-06 NOTE — ED ADULT NURSE NOTE - OBJECTIVE STATEMENT
Pt presents to rm 22, A&Ox3, ambulatory at baseline w/ cane/ walker- needs wheelchair at this time, pmhx of atrial flutter Pt presents to rm 22, A&Ox3, ambulatory at baseline w/ cane/ walker- needs wheelchair at this time, pmhx of atrial flutter, testicular cancer, hx of cardioversion, double lumen PICC line observed on left arm and pshx of orchiectomy, Pt presents to rm 22, A&Ox3, ambulatory at baseline w/ cane/ walker- needs wheelchair at this time, pmhx of atrial flutter, testicular cancer, hx of cardioversion, double lumen PICC line observed on left arm and pshx of orchiectomy, here for evaluation of high pitched ringing in his ear, generalized weakness,  lethargy, nausea and dizziness, pt states having had last chemo session x3days ago, an episode of emesis last night, and weakness in b/l legs. Pt is hypotensive and febrile (100.9f) at this time, MD Jacobs aware. Denies chest pain, shortness of breath, palpitations, diaphoresis, headaches, fevers, vomiting, diarrhea, or urinary symptoms at this time. IV established in right forearm with a 22G, labs drawn and sent, call bell in reach, warm blanket provided, bed in lowest position, side rails up x2, MD evaluation in progress, pt on telemetry- sinus tach@106. Will continue to monitor.

## 2018-08-06 NOTE — H&P ADULT - PROBLEM SELECTOR PLAN 2
-     - possibly 2/2 chemo, sepsis, or GI etiology  - RUQ US ordered, will f/u results  - No RUQ tenderness, negative murphys sign.

## 2018-08-06 NOTE — H&P ADULT - PROBLEM SELECTOR PLAN 3
- Completed 1st cycle of 2nd round of chemo (7/29-8/3)  - metastatic disease  - left orchiectomy (July 2017) - Completed 1st cycle of 2nd round of chemo (7/29-8/3)  - metastatic disease (retroperitoneal lymph nodes)  - left orchiectomy (July 2017) - Completed 1st cycle of 2nd round of chemo (7/29-8/3)  - metastatic disease (retroperitoneal lymph nodes)  - left orchiectomy (July 2017)  - groin rash on exam, nystatin powder BID

## 2018-08-06 NOTE — H&P ADULT - PROBLEM SELECTOR PLAN 2
-     - RUQ US ordered, will f/u results  - No RUQ tenderness, negative murphys sign -     - possibly 2/2 chemo, sepsis, or GI etiology  - RUQ US ordered, will f/u results  - No RUQ tenderness, negative murphys sign

## 2018-08-06 NOTE — ED PROVIDER NOTE - PHYSICAL EXAMINATION
General: Patient awake alert, in no acute distress.   Cardiac: RRR, S1, S2, no murmur, rubs, gallop.   LUNGS: CTA B/L no wheeze, rhonchi, rales.   Abdomen: soft NT, ND no rebound no guarding, no CVA tenderness.   EXT: normal strength and ROM for patient's normal, no edema, + tenderness on palpation of bilateral lower extremities. ,   Neuro: A&Ox3, no focal neurological deficits.   Skin: warm, dry, no rash, no lesions

## 2018-08-06 NOTE — H&P ADULT - NSHPPHYSICALEXAM_GEN_ALL_CORE
Vital Signs Last 24 Hrs  	T(C): 38.6 (06 Aug 2018 14:48), Max: 38.6 (06 Aug 2018 14:48)  	T(F): 101.4 (06 Aug 2018 14:48), Max: 101.4 (06 Aug 2018 14:48)  	HR: 115 (06 Aug 2018 14:48) (98 - 118)  	BP: 121/68 (06 Aug 2018 14:48) (78/52 - 121/68)  	BP(mean): --  	RR: 14 (06 Aug 2018 14:48) (14 - 22)  	SpO2: 100% (06 Aug 2018 14:48) (98% - 100%)  Physical Exam:   GENERAL: NAD, well-developed  HEAD:  Atraumatic, Normocephalic  EYES: EOMI, PERRLA, conjunctiva and sclera clear  Mouth: MMM, no lesions  NECK: Supple, no appreciable masses  Lung: normal work of breathing, cta b/l  Chest: S1&S2+, rrr, no m/r/g appreciated  ABDOMEN: bs+, soft, nt, nd, no appreciable masses, negative ramirez sign, midline abdominal surgical scar  : No CVA tenderness. No scrotal swelling, left orchiectomy, right testicle mildly tender, no erythema or induration  EXTREMITIES:  radial pulse present b/l, PT present b/l, no pitting edema present  Neuro: A&Ox3, no focal deficits  SKIN: warm and dry, no visible rashes. Suprapubic superficial abrasion, no drainage, warmth, no induration.  Lines: Left upper arm PICC line, clean dry and intact, ecchymosis surrounding the site, no erythema, edema, induration, or drainage

## 2018-08-06 NOTE — H&P ADULT - ATTENDING COMMENTS
Patient was seen and examined personally by me on 8/6/18. I have discussed the plan and reviewed the resident's note and agree with the above physical exam findings including assessment and plan except as indicated below.    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 flutter requiring cardioversion (not ablated due to cardiac mass on coumadin), p/w worsening nausea, vomiting x 5 days, found to be febrile and tachycardic in ED, admitted for neutropenic sepsis with acute organ dysfunction with transaminitis     ANC: 19 on admission, last received chemo on 7/31 and Onpro on 8/3, f/u blood cultures (1 set to be drawn from PICC)  Unclear source at this time, c/w empiric Abx, UA unremarkable for infection, CXR negative, no S/S of URI, possible source from PICC? No diarrhea-soft stools per patient: 1-2 episodes daily that are nonbloody  Check US ABd given transaminitis  INR subtherapeutic due to N/V, start heparin drip  Thrombocytopenia likely due to consumption in setting of sepsis, check smear  Serum, urine osm and Brittany seem to be c/w possible SIADH? Check TSH, c/w IVF given hypotension and poor PO intake for now  Monitor volume status closely given EF: 40-45%  Supplement hypokalemia  Onc following

## 2018-08-06 NOTE — ED ADULT NURSE NOTE - INTERVENTIONS DEFINITIONS
Physically safe environment: no spills, clutter or unnecessary equipment/Call bell, personal items and telephone within reach/Instruct patient to call for assistance/Stretcher in lowest position, wheels locked, appropriate side rails in place

## 2018-08-06 NOTE — ED PROVIDER NOTE - ATTENDING CONTRIBUTION TO CARE
jadyn: hx testicular ca with mets. Initial  august 2018. had 6 day regimen of cisplat last week. on 2 other agents. in ED because of nausea, vomiting, weakness, ringing ears, joint pain. has 2 loose (not mucous) green BMs per day.   Exam: systolic BP 80. pulse 108. oral temp 99.3 (rectal pending).  exam otherwise unremarkable.   Impression: hypotension, sepsis/dehydration.   recc: rectal temp. labs incl lactate. IV fluids. ondansetron. antibiotics. contact oncology service.

## 2018-08-06 NOTE — ED ADULT TRIAGE NOTE - CHIEF COMPLAINT QUOTE
c/o "ringing in my ears" x4 days.  "this chemo is hurting me." Pt appears generally weak.   h/o testicular cancer on chemo,  PICC line left arm, cardiomyopathy  RED CARD

## 2018-08-06 NOTE — H&P ADULT - PMH
Atrial fibrillation  s/p DCCV 1/18  Atrial flutter    Atrial mass  right artrial echo density per MRI 3/14/18.  Cardiomyopathy    Lumbar herniated disc    Mixed germ cell tumor    Obesity    Testicular cancer    Testicular mass  left  Thrombus  Right atrial wall ( suspected from last TYLOR) On AC
Atrial fibrillation  s/p DCCV 1/18  Atrial flutter    Atrial mass  right artrial echo density per MRI 3/14/18.  Cardiomyopathy    Lumbar herniated disc    Mixed germ cell tumor    Obesity    Testicular cancer    Testicular mass  left  Thrombus  Right atrial wall ( suspected from last TYLOR) On AC

## 2018-08-06 NOTE — H&P ADULT - HISTORY OF PRESENT ILLNESS
37 year old male w/ history of left testicular cancer (s/p L orchiectomy 7/2017, 3 cycles etoposide/cisplatin 9/2017, RPLND 6/2018, 1 cycle TIP 7/2018) presenting with 5 days of nausea, vomiting and lethargy. He follows with Dr. Chirinos at [facility], and recently started 2nd line treatment for mixed germ cell tumor residual disease on 7/29/18, starting with inpatient infusion of taxol, followed by 5 days of ifosfamide and cisplatin (last dose 8/3/18). He received 1 dose of pegfilgrastim on 8/3/18. Starting on the Wednesday of chemo treatment, he started having nausea and vomiting, 3 to 4 times per day, worse at night. He has been having loose stools, and occasional abdominal pain, which he states feels similar to acid reflux. He complains of dizziness and high-pitched tinnitus, which makes hearing difficult. He has noticed that urination is painful and has developed red spots on his neck, chest, back and upper extremities since starting chemo. He states his symptoms are similar to the last time he underwent chemo, but are much stronger. He also has a history of chronic back and joint pain for which he normally takes oxycodone. He has not been able to take any of his oral medications since his symptoms started last Wednesday. Patient denies any fevers, chills, shortness of breath, cough, chest pain, or palpitations.     In the ED his vitals were Tmax 38.3, HR , BP ()/(52-72), RR 15-22, and SpO2 %. CXR showed no focal consolidations or findings. UA was grossly negative. He received 1 dose of vancomycin and cefepime. 37 year old male w/ history of left testicular cancer with metastasis to two lymph nodes (s/p L orchiectomy 7/2017, 3 cycles etoposide/cisplatin 9/2017, RPLND 6/2018, 1 cycle TIP 7/2018) presenting with 5 days of nausea, vomiting and lethargy. He follows with Dr. Chirinos at [facility], and recently started 2nd line treatment for mixed germ cell tumor residual disease on 7/29/18, starting with inpatient infusion of taxol, followed by 5 days of ifosfamide and cisplatin (last dose 8/3/18). He received 1 dose of pegfilgrastim on 8/3/18. Starting on the Wednesday of chemo treatment, he started having nausea and vomiting, 3 to 4 times per day, worse at night. He has been having soft stools, and occasional abdominal pain, which he states feels similar to acid reflux. He complains of dizziness and high-pitched tinnitus, which makes hearing difficult. He has noticed that urination is painful and has developed red spots on his neck, chest, back and upper extremities since starting chemo. He states his symptoms are similar to the last time he underwent chemo, but are much stronger. He also has a history of chronic back and joint pain for which he normally takes oxycodone. He has not been able to take any of his oral medications since his symptoms started last Wednesday. Patient denies any fevers, chills, shortness of breath, cough, chest pain, or palpitations.     In the ED his vitals were Tmax 38.3, HR , BP ()/(52-72), RR 15-22, and SpO2 %. CXR showed no focal consolidations or findings. UA was grossly negative. He received 1 dose of vancomycin and cefepime.

## 2018-08-06 NOTE — H&P ADULT - ASSESSMENT
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 flutter requiring cardioversion (not ablated due to cardiac mass?), presenting with worsening nausea, vomiting x 5 days, found to be febrile and tachycardic in ED, admitted for neutropenic sepsis.

## 2018-08-06 NOTE — ED ADULT NURSE REASSESSMENT NOTE - NS ED NURSE REASSESS COMMENT FT1
Report received from break RN, A&Ox3, continues to be mildly lethargic and nauseous, MD Carter aware. Awaiting an order for Reglan. VSS stable. Will continue to monitor. Report received from sea RN, A&Ox3, continues to be mildly lethargic and nauseous, MD Carter aware. Awaiting an order for Reglan.  Febrile at this time MD Carter made aware. Will continue to monitor.

## 2018-08-06 NOTE — CHART NOTE - NSCHARTNOTEFT_GEN_A_CORE
#: [id number]    Others' Prescriptions  Patient Name:	Cleve Calderón	YOB: 1980  Address:	[address] [place] [address]	Sex:	Male  Rx Written	Rx Dispensed	Drug	Quantity	Days Supply	Prescriber Name  07/19/2018	07/19/2018	oxycodone hcl 15 mg tablet	120	30	Mauri Phillips P MD  06/21/2018	06/21/2018	oxycodone hcl 15 mg tablet	120	30	Guille Phillipszegorz P MD  05/24/2018	05/24/2018	oxycodone hcl 15 mg tablet	120	30	Guille Phillipszegorz P MD  04/26/2018	04/26/2018	oxycodone hcl 15 mg tablet	120	30	Guille Phillipszegorz P MD  03/27/2018	03/27/2018	oxycodone hcl 15 mg tablet	120	30	Jacqueline Mauri P MD  02/27/2018	02/27/2018	oxycodone hcl 15 mg tablet	120	30	Guille Phillipszegorz P MD  01/23/2018	01/23/2018	oxycodone hcl 15 mg tablet	120	30	Guille Phillipszegorz P MD  12/26/2017	12/26/2017	oxycodone hcl 15 mg tablet	120	30	Guille Phillipszegorz P MD  11/27/2017	11/28/2017	oxycodone hcl 15 mg tablet	120	30	Guille Phillipszegorz P MD  10/26/2017	10/30/2017	oxycodone hcl 15 mg tablet	120	30	Guille Phillipszegorz P MD  09/28/2017	10/02/2017	oxycodone hcl 15 mg tablet	120	30	Guille Phillipszegorz P MD  08/31/2017	08/31/2017	oxycodone hcl 15 mg tablet	120	30	      Patient Name:	Cleve Calderón	YOB: 1980  Address:	[address] [place] [address]	Sex:	Male  Rx Written	Rx Dispensed	Drug	Quantity	Days Supply	Prescriber Name  09/19/2017	09/19/2017	lorazepam 0.5 mg tablet	60	30	Herb Jones M

## 2018-08-06 NOTE — CONSULT NOTE ADULT - ASSESSMENT
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     -Agree with Cefepime and vancomycin (PICC line)  -Please obtain RUQ US to eval elevated liver tests  -Follow blood cultures  -ID consult  -hydration, replete lytes  -monitor vs closely  -plt 87, would expect further downtrend given recent chemo, please hold heparin when plt<60  -outpatient oncology follow up upon discharge    Will follow  Charito Mccall MD  Oncology Attending

## 2018-08-06 NOTE — H&P ADULT - PROBLEM SELECTOR PLAN 6
- metastatic testicular cancer  - started on heparin gtt.
- metastatic testicular cancer  - started on heparin gtt

## 2018-08-06 NOTE — H&P ADULT - PSH
History of abdominal surgery  Retroperitoneal lymph node dissection (June 2018)  History of abdominal surgery  Retroperitoneal lymph node dissection (June 2018)  History of cardioversion  2/2018  History of orchiectomy  left-7/2017  Port-a-cath in place  placed 9/2017,, removed-10/18/2017
History of cardioversion  2/2018  History of orchiectomy  left-7/2017  Port-a-cath in place  placed 9/2017,, removed-10/18/2017

## 2018-08-06 NOTE — ED PROVIDER NOTE - OBJECTIVE STATEMENT
36 YO male with PMHx of prostate CA with recently diagnosed residual disease and receives his first dose of Ifosamide, mesna and Cisplatin D3 6 days ago,  atrial flutter, CHF, atrial thrombus on coumadin, presents for 4 days of tinnitus and vertigo, nausea, vomiting and diarrhea, fatigue, and bilateral lower extremity muscle and joint pain. Pt is not PO tolerant at home, unable to take his meds, and cannot ambulate on his own due to intermittent vertigo symptoms. + chills, retrosternal burning pain, dizziness; no subjective fever, chest pain/pressure, palpitation, dyspnea, urinary symptoms, melena, gross blood in vomit or stool. 36 YO male with PMHx of prostate CA with recently diagnosed residual disease and receives his first dose of Ifosamide, mesna and Cisplatin D3 6 days ago,  atrial flutter, CHF, atrial thrombus on coumadin, presents for 4 days of tinnitus and vertigo, nausea, vomiting and diarrhea, fatigue, and bilateral lower extremity muscle and joint pain. Pt is not PO tolerant at home, unable to take his meds, and cannot ambulate on his own due to intermittent vertigo symptoms. + chills, retrosternal burning pain, dizziness; no subjective fever, chest pain/pressure, palpitation, dyspnea, urinary symptoms, melena, gross blood in vomit or stool..

## 2018-08-06 NOTE — ED ADULT NURSE REASSESSMENT NOTE - NS ED NURSE REASSESS COMMENT FT1
Unable to draw blood as requested by MD from PICC line for blood cultures, patient febrile and hypotensive, HS2 MD Carter aware/ notified pt change in status. Awaiting orders. Report given to night RN

## 2018-08-06 NOTE — H&P ADULT - NSHPREVIEWOFSYSTEMS_GEN_ALL_CORE
CONSTITUTIONAL: No fever, no chills  	EYES: No eye pain, no visual disturbance  	Mouth: no pain in mouth, no cuts  	RESPIRATORY: No cough, No sob  	CARDIOVASCULAR: No CP, no palpitations  	GASTROINTESTINAL: no abdominal pain, (+) N/V, no diarrhea. No hematochezia or melena.   	GENITOURINARY: (+) mild dysuria, no hematuria  	NEUROLOGICAL: No headaches, no weakness  	SKIN: No itching, no rashes  MUSCULOSKELETAL: No joint pain, no joint swelling

## 2018-08-06 NOTE — H&P ADULT - PROBLEM SELECTOR PLAN 4
- likely 2/2 to prerenal due to sepsis and poor PO intake  - f/u urine lytes - Hypovolemic hyponatremia (hypotensive, vomitting)  - likely 2/2 to prerenal due to sepsis and poor PO intake  - f/u urine lytes  - f/u urine osm

## 2018-08-06 NOTE — H&P ADULT - PROBLEM SELECTOR PLAN 1
- WBC 0.32, neutrophil # 0.02  - Recent chemo (7/29-8/3)  - Unknown source of infection  - Left cephalic PICC line changed 8/4/2018  - Febrile, tachycardic, hypotensive  - Started on Cefepime 2g and Vanc 1g  - Blood cx pending - WBC 0.32, neutrophil # 0.02  - Recent chemo (7/29-8/3)  - Unknown source of infection, possibly due to PICC line  - Left cephalic PICC line changed 8/4/2018  - Febrile, tachycardic, hypotensive  - Started on Cefepime 2g and Vanc 1g  - Blood cx pending - WBC 0.32, neutrophil # 0.02  - Lactate 2.1   - Recent chemo (7/29-8/3)  - Unknown source of infection, possibly due to PICC line  - Left cephalic PICC line changed 8/4/2018  - Febrile, tachycardic, hypotensive  - Started on Cefepime 2g and Vanc 1g  - Blood cx pending - WBC 0.32, neutrophil # 0.02, ANC 19  - Lactate 2.1   - Recent chemo (7/29-8/3)  - Unknown source of infection, possibly due to PICC line  - Left upper ext PICC line changed 8/4/2018  - Febrile, tachycardic, hypotensive  - Started on Cefepime 2g and Vanc 1g  - Blood cx pending  - Recently received Pegfilgrastim (8/3), as per heme/onc unable to give again (14 days)

## 2018-08-06 NOTE — ED PROVIDER NOTE - NS ED ROS FT
GENERAL: + chills, no night sweats.   HEENT:     ears: + tinnitus, dizziness     eyes: no vision changes  CARDIAC: no chest pain/pressure, SOB, lower ex edema  PULMONARY: no cough, SOB  GI: + nausea, vomiting, diarrhea, no blood in vomit or stool.   : No changes in urination, no dysuria, frequency, hematuria  SKIN: no rashes, lesions, vesicles  NEURO: no headache, lightheadedness, paraesthesias.   MSK: + joint pain, + myalgia, +weakness.

## 2018-08-06 NOTE — H&P ADULT - PROBLEM SELECTOR PLAN 4
- Hypovolemic hyponatremia (hypotensive, vomitting)  - likely 2/2 to prerenal due to sepsis and poor PO intake

## 2018-08-06 NOTE — H&P ADULT - NSHPREVIEWOFSYSTEMS_GEN_ALL_CORE
CONSTITUTIONAL: No fever, no chills  EYES: No eye pain, no visual disturbance  Mouth: no pain in mouth, no cuts  RESPIRATORY: No cough, No sob  CARDIOVASCULAR: No CP, no palpitations  GASTROINTESTINAL: no abdominal pain, (+) N/V/D. No hematochezia or melena.   GENITOURINARY: (+) mild dysuria, no hematuria  NEUROLOGICAL: No headaches, no weakness  SKIN: No itching, no rashes  MUSCULOSKELETAL: No joint pain, no joint swelling CONSTITUTIONAL: No fever, no chills  EYES: No eye pain, no visual disturbance  Mouth: no pain in mouth, no cuts  RESPIRATORY: No cough, No sob  CARDIOVASCULAR: No CP, no palpitations  GASTROINTESTINAL: no abdominal pain, (+) N/V, no diarrhea. No hematochezia or melena.   GENITOURINARY: (+) mild dysuria, no hematuria  NEUROLOGICAL: No headaches, no weakness  SKIN: No itching, no rashes  MUSCULOSKELETAL: No joint pain, no joint swelling

## 2018-08-06 NOTE — H&P ADULT - NSHPLABSRESULTS_GEN_ALL_CORE
.  LABS:                         14.3   0.32  )-----------( 87       ( 06 Aug 2018 09:56 )             40.8     08-    128<L>  |  90<L>  |  19  ----------------------------<  108<H>  3.1<L>   |  21<L>  |  0.97    Ca    8.3<L>      06 Aug 2018 15:40  Phos  1.7     08-  Mg     2.0     08-    TPro  7.6  /  Alb  4.5  /  TBili  0.7  /  DBili  x   /  AST  187<H>  /  ALT  525<H>  /  AlkPhos  50  08-06    PT/INR - ( 06 Aug 2018 09:56 )   PT: 12.3 SEC;   INR: 1.11          PTT - ( 06 Aug 2018 09:56 )  PTT:27.2 SEC  Urinalysis Basic - ( 06 Aug 2018 12:10 )    Color: YELLOW / Appearance: CLEAR / S.027 / pH: 6.5  Gluc: 50 / Ketone: NEGATIVE  / Bili: NEGATIVE / Urobili: NORMAL mg/dL   Blood: MODERATE / Protein: 300 mg/dL / Nitrite: NEGATIVE   Leuk Esterase: NEGATIVE / RBC: 5-10 / WBC 5-10   Sq Epi: OCC / Non Sq Epi: x / Bacteria: FEW            RADIOLOGY, EKG & ADDITIONAL TESTS: Reviewed.   EKG: SInus tachycardia, normal axis, no ST changes, no qt prolongation .  LABS:                         14.3   0.32  )-----------( 87       ( 06 Aug 2018 09:56 )             40.8     08-    128<L>  |  90<L>  |  19  ----------------------------<  108<H>  3.1<L>   |  21<L>  |  0.97    Ca    8.3<L>      06 Aug 2018 15:40  Phos  1.7     08-  Mg     2.0     08-    TPro  7.6  /  Alb  4.5  /  TBili  0.7  /  DBili  x   /  AST  187<H>  /  ALT  525<H>  /  AlkPhos  50  08-06    PT/INR - ( 06 Aug 2018 09:56 )   PT: 12.3 SEC;   INR: 1.11          PTT - ( 06 Aug 2018 09:56 )  PTT:27.2 SEC  Urinalysis Basic - ( 06 Aug 2018 12:10 )    Color: YELLOW / Appearance: CLEAR / S.027 / pH: 6.5  Gluc: 50 / Ketone: NEGATIVE  / Bili: NEGATIVE / Urobili: NORMAL mg/dL   Blood: MODERATE / Protein: 300 mg/dL / Nitrite: NEGATIVE   Leuk Esterase: NEGATIVE / RBC: 5-10 / WBC 5-10   Sq Epi: OCC / Non Sq Epi: x / Bacteria: FEW      RADIOLOGY, EKG & ADDITIONAL TESTS personally Reviewed.   EKG: SInus tachycardia, normal axis, no ST changes, no qt prolongation

## 2018-08-06 NOTE — H&P ADULT - FAMILY HISTORY
Aunt  Still living? No  Family history of diabetes mellitus, Age at diagnosis: Age Unknown     Grandparent  Still living? No  Family history of ischemic heart disease, Age at diagnosis: Age Unknown  Family history of cancer, Age at diagnosis: Age Unknown     Mother  Still living? Unknown  Family history of kidney stones, Age at diagnosis: Age Unknown     Father  Still living? Unknown  Family history of hypertension in father, Age at diagnosis: Age Unknown
Aunt  Still living? No  Family history of diabetes mellitus, Age at diagnosis: Age Unknown     Grandparent  Still living? No  Family history of ischemic heart disease, Age at diagnosis: Age Unknown  Family history of cancer, Age at diagnosis: Age Unknown     Mother  Still living? Unknown  Family history of kidney stones, Age at diagnosis: Age Unknown     Father  Still living? Unknown  Family history of hypertension in father, Age at diagnosis: Age Unknown

## 2018-08-06 NOTE — ED PROVIDER NOTE - MEDICAL DECISION MAKING DETAILS
36 yo male with prostate CA received chemo 6 days ago presents for tinnitus, nausea, vomiting, diarrhea, myalgias. Pt was hypotensive and tachycardic and fit sepsis criteria. Sepsis protocol initiated in ED. Pt responding to fluids, likely will not require pressors. Admit to Medicine.

## 2018-08-06 NOTE — H&P ADULT - ASSESSMENT
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 flutter requiring cardioversion (not ablated due to cardiac mass?), presenting with worsening nausea, vomiting x 5 days, found to be febrile and tachycardic in ED with neutropenic sepsis. 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 flutter requiring cardioversion (not ablated due to cardiac mass?), presenting with worsening nausea, vomiting x 5 days, found to be febrile and tachycardic in ED, admitted for neutropenic sepsis.

## 2018-08-06 NOTE — CONSULT NOTE ADULT - SUBJECTIVE AND OBJECTIVE BOX
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 He also reports abdominal pain, occasional diarrhea. Denies cough or sob.  In the ED, he was found to be severely neutropenic, and febrile. CXR negative.    ROS: as above     PAST MEDICAL & SURGICAL HISTORY:  Mixed germ cell tumor  Obesity  Atrial mass: right artrial echo density per MRI 3/14/18.  Atrial flutter  Cardiomyopathy  Atrial fibrillation: s/p DCCV 1/18  Thrombus: Right atrial wall ( suspected from last TYLOR) On AC  Testicular cancer  Lumbar herniated disc  Testicular mass: left  History of abdominal surgery: Retroperitoneal lymph node dissection (June 2018)  History of cardioversion: 2/2018  Port-a-cath in place: placed 9/2017,, removed-10/18/2017  History of orchiectomy: left-7/2017      SOCIAL HISTORY:    FAMILY HISTORY:  Family history of hypertension in father (Father)  Family history of kidney stones (Mother): mother  Family history of cancer (Grandparent)  Family history of ischemic heart disease (Grandparent)  Family history of diabetes mellitus (Aunt)      MEDICATIONS  (STANDING):  cefepime   IVPB 2000 milliGRAM(s) IV Intermittent every 12 hours  heparin  Infusion.  Unit(s)/Hr (19 mL/Hr) IV Continuous <Continuous>  nystatin Powder 1 Application(s) Topical two times a day  vancomycin  IVPB 1000 milliGRAM(s) IV Intermittent every 12 hours    MEDICATIONS  (PRN):  aluminum hydroxide/magnesium hydroxide/simethicone Suspension 30 milliLiter(s) Oral every 4 hours PRN Dyspepsia  heparin  Injectable 9000 Unit(s) IV Push every 6 hours PRN For aPTT less than 40  heparin  Injectable 4000 Unit(s) IV Push every 6 hours PRN For aPTT between 40 - 57  ondansetron Injectable 4 milliGRAM(s) IV Push every 6 hours PRN Nausea and/or Vomiting  oxyCODONE    IR 15 milliGRAM(s) Oral every 6 hours PRN Severe Pain (7 - 10)      Allergies    No Known Allergies    Intolerances        Vital Signs Last 24 Hrs  T(C): 38.2 (06 Aug 2018 16:45), Max: 38.6 (06 Aug 2018 14:48)  T(F): 100.8 (06 Aug 2018 16:45), Max: 101.4 (06 Aug 2018 14:48)  HR: 111 (06 Aug 2018 16:45) (98 - 118)  BP: 108/72 (06 Aug 2018 16:45) (78/52 - 121/87)  BP(mean): --  RR: 17 (06 Aug 2018 16:45) (14 - 22)  SpO2: 100% (06 Aug 2018 16:45) (98% - 100%)    PHYSICAL EXAM  General: adult in NAD  HEENT: clear oropharynx, anicteric sclera, pink conjunctiva  Neck: supple  CV: normal S1/S2 with no murmur rubs or gallops  Lungs: positive air movement b/l ant lungs,clear to auscultation, no wheezes, no rales  Abdomen: soft non-tender non-distended, no hepatosplenomegaly  Ext: no clubbing cyanosis or edema  Skin: no rashes and no petechiae  Neuro: alert and oriented X 4, no focal deficits      LABS:                          14.3   0.32  )-----------( 87       ( 06 Aug 2018 09:56 )             40.8         Mean Cell Volume : 85.9 fL  Mean Cell Hemoglobin : 30.1 pg  Mean Cell Hemoglobin Concentration : 35.0 %  Auto Neutrophil # : 0.02 K/uL  Auto Lymphocyte # : 0.29 K/uL  Auto Monocyte # : 0.01 K/uL  Auto Eosinophil # : 0.00 K/uL  Auto Basophil # : 0.00 K/uL  Auto Neutrophil % : 6.3 %  Auto Lymphocyte % : 90.6 %  Auto Monocyte % : 3.1 %  Auto Eosinophil % : 0.0 %  Auto Basophil % : 0.0 %      Serial CBC's  08-06 @ 09:56  Hct-40.8 / Hgb-14.3 / Plat-87 / RBC-4.75 / WBC-0.32      08-06    128<L>  |  90<L>  |  19  ----------------------------<  108<H>  3.1<L>   |  21<L>  |  0.97    Ca    8.3<L>      06 Aug 2018 15:40  Phos  1.7     08-06  Mg     2.0     08-06    TPro  7.6  /  Alb  4.5  /  TBili  0.7  /  DBili  x   /  AST  187<H>  /  ALT  525<H>  /  AlkPhos  50  08-06      PT/INR - ( 06 Aug 2018 09:56 )   PT: 12.3 SEC;   INR: 1.11          PTT - ( 06 Aug 2018 09:56 )  PTT:27.2 SEC

## 2018-08-06 NOTE — H&P ADULT - HISTORY OF PRESENT ILLNESS
37 year old male w/ history of left testicular cancer with metastasis to two lymph nodes (s/p L orchiectomy 7/2017, 3 cycles etoposide/cisplatin 9/2017, RPLND 6/2018, 1 cycle TIP 7/2018) presenting with 5 days of nausea, vomiting and lethargy. He follows with Dr. Chirinos at [facility], and recently started 2nd line treatment for mixed germ cell tumor residual disease on 7/29/18, starting with inpatient infusion of taxol, followed by 5 days of ifosfamide and cisplatin (last dose 8/3/18). He received 1 dose of pegfilgrastim on 8/3/18. Starting on the Wednesday of chemo treatment, he started having nausea and vomiting, 3 to 4 times per day, worse at night. He has been having soft stools, and occasional abdominal pain, which he states feels similar to acid reflux. He complains of dizziness and high-pitched tinnitus, which makes hearing difficult. He has noticed that urination is painful and has developed red spots on his neck, chest, back and upper extremities since starting chemo. He states his symptoms are similar to the last time he underwent chemo, but are much stronger. He also has a history of chronic back and joint pain for which he normally takes oxycodone. He has not been able to take any of his oral medications since his symptoms started last Wednesday. Patient denies any fevers, chills, shortness of breath, cough, chest pain, or palpitations.     In the ED his vitals were Tmax 38.3, HR , BP ()/(52-72), RR 15-22, and SpO2 %. CXR showed no focal consolidations or findings. UA was grossly negative. He received 1 dose of vancomycin and cefepime.

## 2018-08-06 NOTE — H&P ADULT - PROBLEM SELECTOR PLAN 1
- patient presented with worsening nausea and vomiting  - tachycardic, hypotensive, febrile  - WBC 0.32, neutrophil # 0.02, ANC 19  -Lactate 2.1   - Recent chemo (7/29-8/3)  - Unknown source of infection, possibly due to PICC line  - Left upper ext PICC line changed 8/4/2018  - Febrile, tachycardic, hypotensive  - Started on Cefepime 2g and Vanc 1g  - Blood cx pending  - Recently received Pegfilgrastim (8/3), as per heme/onc unable to give again (14 days).

## 2018-08-07 LAB
ALBUMIN SERPL ELPH-MCNC: 3.3 G/DL — SIGNIFICANT CHANGE UP (ref 3.3–5)
ALP SERPL-CCNC: 45 U/L — SIGNIFICANT CHANGE UP (ref 40–120)
ALT FLD-CCNC: 397 U/L — HIGH (ref 4–41)
AST SERPL-CCNC: 119 U/L — HIGH (ref 4–40)
BASOPHILS # BLD AUTO: 0 K/UL — SIGNIFICANT CHANGE UP (ref 0–0.2)
BASOPHILS # BLD AUTO: 0 K/UL — SIGNIFICANT CHANGE UP (ref 0–0.2)
BASOPHILS NFR BLD AUTO: 0 % — SIGNIFICANT CHANGE UP (ref 0–2)
BASOPHILS NFR BLD AUTO: 0 % — SIGNIFICANT CHANGE UP (ref 0–2)
BILIRUB SERPL-MCNC: 0.5 MG/DL — SIGNIFICANT CHANGE UP (ref 0.2–1.2)
BUN SERPL-MCNC: 10 MG/DL — SIGNIFICANT CHANGE UP (ref 7–23)
BUN SERPL-MCNC: 11 MG/DL — SIGNIFICANT CHANGE UP (ref 7–23)
C DIFF TOX GENS STL QL NAA+PROBE: SIGNIFICANT CHANGE UP
CALCIUM SERPL-MCNC: 8.1 MG/DL — LOW (ref 8.4–10.5)
CALCIUM SERPL-MCNC: 8.4 MG/DL — SIGNIFICANT CHANGE UP (ref 8.4–10.5)
CHLORIDE SERPL-SCNC: 88 MMOL/L — LOW (ref 98–107)
CHLORIDE SERPL-SCNC: 91 MMOL/L — LOW (ref 98–107)
CO2 SERPL-SCNC: 24 MMOL/L — SIGNIFICANT CHANGE UP (ref 22–31)
CO2 SERPL-SCNC: 27 MMOL/L — SIGNIFICANT CHANGE UP (ref 22–31)
CREAT SERPL-MCNC: 0.92 MG/DL — SIGNIFICANT CHANGE UP (ref 0.5–1.3)
EOSINOPHIL # BLD AUTO: 0 K/UL — SIGNIFICANT CHANGE UP (ref 0–0.5)
EOSINOPHIL # BLD AUTO: 0 K/UL — SIGNIFICANT CHANGE UP (ref 0–0.5)
EOSINOPHIL NFR BLD AUTO: 0 % — SIGNIFICANT CHANGE UP (ref 0–6)
EOSINOPHIL NFR BLD AUTO: 0 % — SIGNIFICANT CHANGE UP (ref 0–6)
GLUCOSE SERPL-MCNC: 234 MG/DL — HIGH (ref 70–99)
HCT VFR BLD CALC: 31.8 % — LOW (ref 39–50)
HCT VFR BLD CALC: 32.5 % — LOW (ref 39–50)
HCT VFR BLD CALC: 33 % — LOW (ref 39–50)
HGB BLD-MCNC: 11.1 G/DL — LOW (ref 13–17)
HGB BLD-MCNC: 11.3 G/DL — LOW (ref 13–17)
HGB BLD-MCNC: 11.5 G/DL — LOW (ref 13–17)
IMM GRANULOCYTES # BLD AUTO: 0 # — SIGNIFICANT CHANGE UP
IMM GRANULOCYTES # BLD AUTO: 0 # — SIGNIFICANT CHANGE UP
IMM GRANULOCYTES NFR BLD AUTO: 0 % — SIGNIFICANT CHANGE UP (ref 0–1.5)
IMM GRANULOCYTES NFR BLD AUTO: 0 % — SIGNIFICANT CHANGE UP (ref 0–1.5)
LYMPHOCYTES # BLD AUTO: 0.3 K/UL — LOW (ref 1–3.3)
LYMPHOCYTES # BLD AUTO: 0.41 K/UL — LOW (ref 1–3.3)
LYMPHOCYTES # BLD AUTO: 90.9 % — HIGH (ref 13–44)
LYMPHOCYTES # BLD AUTO: 91.1 % — HIGH (ref 13–44)
MAGNESIUM SERPL-MCNC: 1.8 MG/DL — SIGNIFICANT CHANGE UP (ref 1.6–2.6)
MAGNESIUM SERPL-MCNC: 2.1 MG/DL — SIGNIFICANT CHANGE UP (ref 1.6–2.6)
MANUAL SMEAR VERIFICATION: SIGNIFICANT CHANGE UP
MCHC RBC-ENTMCNC: 29.3 PG — SIGNIFICANT CHANGE UP (ref 27–34)
MCHC RBC-ENTMCNC: 29.7 PG — SIGNIFICANT CHANGE UP (ref 27–34)
MCHC RBC-ENTMCNC: 30.2 PG — SIGNIFICANT CHANGE UP (ref 27–34)
MCHC RBC-ENTMCNC: 34.2 % — SIGNIFICANT CHANGE UP (ref 32–36)
MCHC RBC-ENTMCNC: 34.8 % — SIGNIFICANT CHANGE UP (ref 32–36)
MCHC RBC-ENTMCNC: 35.5 % — SIGNIFICANT CHANGE UP (ref 32–36)
MCV RBC AUTO: 83.7 FL — SIGNIFICANT CHANGE UP (ref 80–100)
MCV RBC AUTO: 85.8 FL — SIGNIFICANT CHANGE UP (ref 80–100)
MCV RBC AUTO: 86.6 FL — SIGNIFICANT CHANGE UP (ref 80–100)
MONOCYTES # BLD AUTO: 0.02 K/UL — SIGNIFICANT CHANGE UP (ref 0–0.9)
MONOCYTES # BLD AUTO: 0.03 K/UL — SIGNIFICANT CHANGE UP (ref 0–0.9)
MONOCYTES NFR BLD AUTO: 6.1 % — SIGNIFICANT CHANGE UP (ref 2–14)
MONOCYTES NFR BLD AUTO: 6.7 % — SIGNIFICANT CHANGE UP (ref 2–14)
NEUTROPHILS # BLD AUTO: 0.01 K/UL — LOW (ref 1.8–7.4)
NEUTROPHILS # BLD AUTO: 0.01 K/UL — LOW (ref 1.8–7.4)
NEUTROPHILS NFR BLD AUTO: 2.2 % — LOW (ref 43–77)
NEUTROPHILS NFR BLD AUTO: 3 % — LOW (ref 43–77)
NRBC # FLD: 0 — SIGNIFICANT CHANGE UP
PHOSPHATE SERPL-MCNC: 3 MG/DL — SIGNIFICANT CHANGE UP (ref 2.5–4.5)
PLATELET # BLD AUTO: 39 K/UL — LOW (ref 150–400)
PLATELET # BLD AUTO: 50 K/UL — LOW (ref 150–400)
PLATELET # BLD AUTO: 53 K/UL — LOW (ref 150–400)
PMV BLD: 10.7 FL — SIGNIFICANT CHANGE UP (ref 7–13)
PMV BLD: 11.1 FL — SIGNIFICANT CHANGE UP (ref 7–13)
PMV BLD: 11.1 FL — SIGNIFICANT CHANGE UP (ref 7–13)
POTASSIUM SERPL-MCNC: 2.8 MMOL/L — CRITICAL LOW (ref 3.5–5.3)
POTASSIUM SERPL-MCNC: 2.8 MMOL/L — CRITICAL LOW (ref 3.5–5.3)
POTASSIUM SERPL-SCNC: 2.8 MMOL/L — CRITICAL LOW (ref 3.5–5.3)
POTASSIUM SERPL-SCNC: 2.8 MMOL/L — CRITICAL LOW (ref 3.5–5.3)
PROT SERPL-MCNC: 6.4 G/DL — SIGNIFICANT CHANGE UP (ref 6–8.3)
RBC # BLD: 3.79 M/UL — LOW (ref 4.2–5.8)
RBC # BLD: 3.8 M/UL — LOW (ref 4.2–5.8)
RBC # BLD: 3.81 M/UL — LOW (ref 4.2–5.8)
RBC # FLD: 11.5 % — SIGNIFICANT CHANGE UP (ref 10.3–14.5)
RBC # FLD: 11.6 % — SIGNIFICANT CHANGE UP (ref 10.3–14.5)
RBC # FLD: 11.7 % — SIGNIFICANT CHANGE UP (ref 10.3–14.5)
SODIUM SERPL-SCNC: 127 MMOL/L — LOW (ref 135–145)
SODIUM SERPL-SCNC: 129 MMOL/L — LOW (ref 135–145)
SPECIMEN SOURCE: SIGNIFICANT CHANGE UP
WBC # BLD: 0.33 K/UL — CRITICAL LOW (ref 3.8–10.5)
WBC # BLD: 0.41 K/UL — CRITICAL LOW (ref 3.8–10.5)
WBC # BLD: 0.45 K/UL — CRITICAL LOW (ref 3.8–10.5)
WBC # FLD AUTO: 0.33 K/UL — CRITICAL LOW (ref 3.8–10.5)
WBC # FLD AUTO: 0.41 K/UL — CRITICAL LOW (ref 3.8–10.5)
WBC # FLD AUTO: 0.45 K/UL — CRITICAL LOW (ref 3.8–10.5)

## 2018-08-07 PROCEDURE — 99233 SBSQ HOSP IP/OBS HIGH 50: CPT | Mod: GC

## 2018-08-07 PROCEDURE — 93975 VASCULAR STUDY: CPT | Mod: 26

## 2018-08-07 RX ORDER — POTASSIUM CHLORIDE 20 MEQ
40 PACKET (EA) ORAL ONCE
Qty: 0 | Refills: 0 | Status: COMPLETED | OUTPATIENT
Start: 2018-08-07 | End: 2018-08-07

## 2018-08-07 RX ORDER — ONDANSETRON 8 MG/1
8 TABLET, FILM COATED ORAL ONCE
Qty: 0 | Refills: 0 | Status: COMPLETED | OUTPATIENT
Start: 2018-08-07 | End: 2018-08-07

## 2018-08-07 RX ORDER — PANTOPRAZOLE SODIUM 20 MG/1
40 TABLET, DELAYED RELEASE ORAL DAILY
Qty: 0 | Refills: 0 | Status: DISCONTINUED | OUTPATIENT
Start: 2018-08-07 | End: 2018-08-08

## 2018-08-07 RX ORDER — ENOXAPARIN SODIUM 100 MG/ML
100 INJECTION SUBCUTANEOUS
Qty: 0 | Refills: 0 | Status: DISCONTINUED | OUTPATIENT
Start: 2018-08-07 | End: 2018-08-07

## 2018-08-07 RX ORDER — POTASSIUM CHLORIDE 20 MEQ
10 PACKET (EA) ORAL ONCE
Qty: 0 | Refills: 0 | Status: COMPLETED | OUTPATIENT
Start: 2018-08-07 | End: 2018-08-07

## 2018-08-07 RX ADMIN — CEFEPIME 100 MILLIGRAM(S): 1 INJECTION, POWDER, FOR SOLUTION INTRAMUSCULAR; INTRAVENOUS at 22:39

## 2018-08-07 RX ADMIN — ONDANSETRON 4 MILLIGRAM(S): 8 TABLET, FILM COATED ORAL at 17:57

## 2018-08-07 RX ADMIN — Medication 650 MILLIGRAM(S): at 20:13

## 2018-08-07 RX ADMIN — Medication 250 MILLIGRAM(S): at 00:54

## 2018-08-07 RX ADMIN — CEFEPIME 100 MILLIGRAM(S): 1 INJECTION, POWDER, FOR SOLUTION INTRAMUSCULAR; INTRAVENOUS at 11:45

## 2018-08-07 RX ADMIN — Medication 250 MILLIGRAM(S): at 13:23

## 2018-08-07 RX ADMIN — Medication 250 MILLIGRAM(S): at 23:23

## 2018-08-07 RX ADMIN — Medication 100 MILLIEQUIVALENT(S): at 13:22

## 2018-08-07 RX ADMIN — Medication 40 MILLIEQUIVALENT(S): at 13:22

## 2018-08-07 RX ADMIN — Medication 30 MILLILITER(S): at 17:12

## 2018-08-07 NOTE — PROGRESS NOTE ADULT - SUBJECTIVE AND OBJECTIVE BOX
Patient is a 37y old  Male who presents with a chief complaint of Neutropenic sepsis (06 Aug 2018 21:02)      SUBJECTIVE / OVERNIGHT EVENTS:  Patient seen and examined at bedside.    Other Review of Systems Negative.    MEDICATIONS  (STANDING):  cefepime   IVPB 2000 milliGRAM(s) IV Intermittent every 12 hours  heparin  Infusion.  Unit(s)/Hr (19 mL/Hr) IV Continuous <Continuous>  nystatin Powder 1 Application(s) Topical two times a day  vancomycin  IVPB 1000 milliGRAM(s) IV Intermittent every 12 hours    MEDICATIONS  (PRN):  acetaminophen   Tablet 650 milliGRAM(s) Oral every 6 hours PRN For Temp greater than 38 C (100.4 F)  aluminum hydroxide/magnesium hydroxide/simethicone Suspension 30 milliLiter(s) Oral every 4 hours PRN Dyspepsia  heparin  Injectable 9000 Unit(s) IV Push every 6 hours PRN For aPTT less than 40  heparin  Injectable 4000 Unit(s) IV Push every 6 hours PRN For aPTT between 40 - 57  ondansetron Injectable 4 milliGRAM(s) IV Push every 6 hours PRN Nausea and/or Vomiting      OBJECTIVE:    Vital Signs Last 24 Hrs  T(C): 37.3 (07 Aug 2018 00:55), Max: 38.7 (06 Aug 2018 19:50)  T(F): 99.1 (07 Aug 2018 00:55), Max: 101.7 (06 Aug 2018 19:50)  HR: 104 (07 Aug 2018 05:52) (98 - 123)  BP: 92/62 (07 Aug 2018 05:52) (78/52 - 121/87)  BP(mean): --  RR: 17 (07 Aug 2018 05:52) (14 - 22)  SpO2: 99% (07 Aug 2018 05:52) (98% - 100%)    CAPILLARY BLOOD GLUCOSE    I&O's Summary      Physical Exam:   	GENERAL: NAD, well-developed  	HEAD:  Atraumatic, Normocephalic  	EYES: EOMI, PERRLA, conjunctiva and sclera clear  	Mouth: MMM, no lesions  	NECK: Supple, no appreciable masses  	Lung: normal work of breathing, cta b/l  	Chest: S1&S2+, rrr, no m/r/g appreciated  	ABDOMEN: bs+, soft, nt, nd, no appreciable masses, negative ramirez sign, midline abdominal surgical scar  	: No CVA tenderness. No scrotal swelling, left orchiectomy, right testicle mildly tender, no erythema or induration  	EXTREMITIES:  radial pulse present b/l, PT present b/l, no pitting edema present  	Neuro: A&Ox3, no focal deficits  	SKIN: warm and dry, no visible rashes. Suprapubic superficial abrasion, no drainage, warmth, no induration.  Lines: Left upper arm PICC line, clean dry and intact, ecchymosis surrounding the site, no erythema, edema, induration, or drainage    LABS:                        11.1   0.33  )-----------( 53       ( 07 Aug 2018 05:35 )             32.5     Auto Eosinophil # 0.00  / Auto Eosinophil % 0.0   / Auto Neutrophil # 0.01  / Auto Neutrophil % 3.0   / BANDS % x                            11.5   0.41  )-----------( 50       ( 07 Aug 2018 01:50 )             33.0     Auto Eosinophil # x     / Auto Eosinophil % x     / Auto Neutrophil # x     / Auto Neutrophil % x     / BANDS % x                            14.3   0.32  )-----------( 87       ( 06 Aug 2018 09:56 )             40.8     Auto Eosinophil # 0.00  / Auto Eosinophil % 0.0   / Auto Neutrophil # 0.02  / Auto Neutrophil % 6.3   / BANDS % 0        08-07    127<L>  |  88<L>  |  11  ----------------------------<  234<H>  2.8<LL>   |  24  |  0.92  08-06    128<L>  |  90<L>  |  19  ----------------------------<  108<H>  3.1<L>   |  21<L>  |  0.97  08-06    129<L>  |  86<L>  |  22  ----------------------------<  121<H>  2.8<LL>   |  29  |  1.19    Ca    8.1<L>      07 Aug 2018 05:35  Mg     1.8       Phos  3.0     08-07  TPro  6.4  /  Alb  3.3  /  TBili  0.5  /  DBili  x   /  AST  119<H>  /  ALT  397<H>  /  AlkPhos  45  08-07  TPro  7.6  /  Alb  4.5  /  TBili  0.7  /  DBili  x   /  AST  187<H>  /  ALT  525<H>  /  AlkPhos  50  08-06    PT/INR - ( 06 Aug 2018 09:56 )   PT: 12.3 SEC;   INR: 1.11          PTT - ( 06 Aug 2018 09:56 )  PTT:27.2 SEC      Urinalysis Basic - ( 06 Aug 2018 12:10 )    Color: YELLOW / Appearance: CLEAR / S.027 / pH: 6.5  Gluc: 50 / Ketone: NEGATIVE  / Bili: NEGATIVE / Urobili: NORMAL mg/dL   Blood: MODERATE / Protein: 300 mg/dL / Nitrite: NEGATIVE   Leuk Esterase: NEGATIVE / RBC: 5-10 / WBC 5-10   Sq Epi: OCC / Non Sq Epi: x / Bacteria: FEW      ABG:     VBG: ( 15:07 ) - VBG - pH: 7.37  | pCO2: 53    | pO2: 28    | Lactate: 2.9    ( 09:56 ) - VBG - pH: 7.43  | pCO2: 47    | pO2: < 24  | Lactate: 2.1          Care Discussed with Consultants/Other Providers:    RADIOLOGY & ADDITIONAL TESTS:  (Imaging Personally Reviewed) Patient is a 37y old  Male who presents with a chief complaint of Neutropenic sepsis (06 Aug 2018 21:02)      SUBJECTIVE / OVERNIGHT EVENTS:  Patient seen and examined at bedside.    Other Review of Systems Negative.    MEDICATIONS  (STANDING):  cefepime   IVPB 2000 milliGRAM(s) IV Intermittent every 12 hours  heparin  Infusion.  Unit(s)/Hr (19 mL/Hr) IV Continuous <Continuous>  nystatin Powder 1 Application(s) Topical two times a day  vancomycin  IVPB 1000 milliGRAM(s) IV Intermittent every 12 hours    MEDICATIONS  (PRN):  acetaminophen   Tablet 650 milliGRAM(s) Oral every 6 hours PRN For Temp greater than 38 C (100.4 F)  aluminum hydroxide/magnesium hydroxide/simethicone Suspension 30 milliLiter(s) Oral every 4 hours PRN Dyspepsia  heparin  Injectable 9000 Unit(s) IV Push every 6 hours PRN For aPTT less than 40  heparin  Injectable 4000 Unit(s) IV Push every 6 hours PRN For aPTT between 40 - 57  ondansetron Injectable 4 milliGRAM(s) IV Push every 6 hours PRN Nausea and/or Vomiting      OBJECTIVE:    Vital Signs Last 24 Hrs  T(C): 37.3 (07 Aug 2018 00:55), Max: 38.7 (06 Aug 2018 19:50)  T(F): 99.1 (07 Aug 2018 00:55), Max: 101.7 (06 Aug 2018 19:50)  HR: 104 (07 Aug 2018 05:52) (98 - 123)  BP: 92/62 (07 Aug 2018 05:52) (78/52 - 121/87)  BP(mean): --  RR: 17 (07 Aug 2018 05:52) (14 - 22)  SpO2: 99% (07 Aug 2018 05:52) (98% - 100%)    CAPILLARY BLOOD GLUCOSE    I&O's Summary      Physical Exam:   	GENERAL: NAD, well-developed  	HEAD:  Atraumatic, Normocephalic  	EYES:sclera clear  	Mouth: MMM, no lesions  	NECK: Supple, no appreciable masses  	Lung: normal work of breathing, cta b/l  	Chest: S1&S2+, rrr, no m/r/g appreciated  	ABDOMEN: bs+, soft, nt, nd, no appreciable masses, negative ramirez sign, midline abdominal surgical scar  	EXTREMITIES:  radial pulse present b/l, PT present b/l, no pitting edema present  	Neuro: A&Ox3, no focal deficits  	SKIN: warm and dry, no visible rashes. Suprapubic superficial abrasion, no drainage, warmth, no induration.  Lines: Left upper arm PICC line, clean dry and intact, ecchymosis surrounding the site, no erythema, edema, induration, or drainage    LABS:                        11.1   0.33  )-----------( 53       ( 07 Aug 2018 05:35 )             32.5     Auto Eosinophil # 0.00  / Auto Eosinophil % 0.0   / Auto Neutrophil # 0.01  / Auto Neutrophil % 3.0   / BANDS % x                            11.5   0.41  )-----------( 50       ( 07 Aug 2018 01:50 )             33.0     Auto Eosinophil # x     / Auto Eosinophil % x     / Auto Neutrophil # x     / Auto Neutrophil % x     / BANDS % x                            14.3   0.32  )-----------( 87       ( 06 Aug 2018 09:56 )             40.8     Auto Eosinophil # 0.00  / Auto Eosinophil % 0.0   / Auto Neutrophil # 0.02  / Auto Neutrophil % 6.3   / BANDS % 0        0807    127<L>  |  88<L>  |  11  ----------------------------<  234<H>  2.8<LL>   |  24  |  0.92      128<L>  |  90<L>  |  19  ----------------------------<  108<H>  3.1<L>   |  21<L>  |  0.97      129<L>  |  86<L>  |  22  ----------------------------<  121<H>  2.8<LL>   |  29  |  1.19    Ca    8.1<L>      07 Aug 2018 05:35  Mg     1.8       Phos  3.0       TPro  6.4  /  Alb  3.3  /  TBili  0.5  /  DBili  x   /  AST  119<H>  /  ALT  397<H>  /  AlkPhos  45  08-07  TPro  7.6  /  Alb  4.5  /  TBili  0.7  /  DBili  x   /  AST  187<H>  /  ALT  525<H>  /  AlkPhos  50  08-06    PT/INR - ( 06 Aug 2018 09:56 )   PT: 12.3 SEC;   INR: 1.11          PTT - ( 06 Aug 2018 09:56 )  PTT:27.2 SEC      Urinalysis Basic - ( 06 Aug 2018 12:10 )    Color: YELLOW / Appearance: CLEAR / S.027 / pH: 6.5  Gluc: 50 / Ketone: NEGATIVE  / Bili: NEGATIVE / Urobili: NORMAL mg/dL   Blood: MODERATE / Protein: 300 mg/dL / Nitrite: NEGATIVE   Leuk Esterase: NEGATIVE / RBC: 5-10 / WBC 5-10   Sq Epi: OCC / Non Sq Epi: x / Bacteria: FEW      ABG:     VBG: ( 15:07 ) - VBG - pH: 7.37  | pCO2: 53    | pO2: 28    | Lactate: 2.9    ( 09:56 ) - VBG - pH: 7.43  | pCO2: 47    | pO2: < 24  | Lactate: 2.1          Care Discussed with Consultants/Other Providers:    RADIOLOGY & ADDITIONAL TESTS:  (Imaging Personally Reviewed)

## 2018-08-07 NOTE — PROGRESS NOTE ADULT - ATTENDING COMMENTS
Patient was seen and examined personally by me. I have discussed the plan and reviewed the resident's note and agree with the above physical exam findings including assessment and plan except as indicated below.    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 flutter requiring cardioversion (not ablated due to cardiac mass on coumadin), p/w worsening nausea, vomiting x 5 days, found to be febrile and tachycardic in ED, admitted for neutropenic sepsis with acute organ dysfunction with transaminitis     ANC: 19 on admission downtrended to 9.9 8/7, last received chemo on 7/31 and Onpro on 8/3, f/u blood cultures: NGTD, unable to draw blood back from PICC and was unable to send Bcx from PICC  Unclear source at this time, c/w empiric Abx, UA unremarkable for infection, CXR negative, no S/S of URI, possible source from PICC? Cdiff negative, f/u GI PCR, stool O/P  US ABd with fatty liver, otherwise unremarkable, LFTs downtrending  INR subtherapeutic due to N/V, A/C held given downtrending platelets, if uptrending by 8/8, will resume on lovenox  Thrombocytopenia likely due to consumption in setting of sepsis, check smear  Serum, urine osm and Brittany seem to be c/w possible SIADH? Check TSH, fluid restrict 1 L, bolus PRN  Monitor volume status closely given EF: 40-45%  Supplement hypokalemia  Onc following

## 2018-08-07 NOTE — PHYSICAL THERAPY INITIAL EVALUATION ADULT - ADDITIONAL COMMENTS
as per pt. Pt. lives in a pvt home with their family. Pt. has steps with hand rail x1 to negotiate at home. Pt. was previously ambulating without an assistive device independently (pt. reports he owns a straight cane). Pt. was previously independent with ADLs. Pt. returned to bed at end of therapy session with all lines and tubes intact, call bell in reach and in NAD. Resident Doctor bedside at end of therapy session.

## 2018-08-07 NOTE — PHYSICAL THERAPY INITIAL EVALUATION ADULT - MANUAL MUSCLE TESTING RESULTS, REHAB EVAL
Bilateral UE muscle strength grossly 3/5 Throughout; right LE muscle strength grossly 3/5 Throughout, Left hip and ankle muscle strength grossly 3/5 throughout, Left knee muscle strength grossly 2/5 throughout.

## 2018-08-07 NOTE — PHYSICAL THERAPY INITIAL EVALUATION ADULT - LEVEL OF INDEPENDENCE: SIT/STAND, REHAB EVAL
pt. deferred further functional mobility this session secondary from being tired and wanting to rest at this time, will assess when feasible/unable to perform

## 2018-08-07 NOTE — PATIENT PROFILE ADULT. - ABILITY TO HEAR (WITH HEARING AID OR HEARING APPLIANCE IF NORMALLY USED):
Mildly to Moderately Impaired: difficulty hearing in some environments or speaker may need to increase volume or speak distinctly/tinitis from chemo

## 2018-08-07 NOTE — CHART NOTE - NSCHARTNOTEFT_GEN_A_CORE
Medicine Chart Note PGY-3    Called by RN indicating that RN team unable to draw blood for PTT check. When speaking with patient at bedside, he indicates that he had difficult sticks overnight which were very painful. He declines at this time blood draw due to concern for pain. I instructed that there  is risk of continuing heparin gtt without check. He would want to keep heparin gtt for now and defer until team can US guided blood draw for labs. Will d/w attending.    Haroon aCrter MD PGY-3

## 2018-08-07 NOTE — PROGRESS NOTE ADULT - ASSESSMENT
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 flutter requiring cardioversion (not ablated due to cardiac mass?), presenting with worsening nausea, vomiting x 5 days, found to be febrile and tachycardic in ED, admitted for neutropenic sepsis.      Problem/Plan - 1:  ·  Problem: Neutropenic sepsis.  Plan: - patient presented with worsening nausea and vomiting  - tachycardic, hypotensive, febrile  - WBC 0.33, neutrophil # 0.01, ANC 10  - Lactate 2.9  - Recent chemo (7/29-8/3)  - Unknown source of infection, possibly due to PICC line  - Left upper ext PICC line changed 8/4/2018  - c/w cefepime and Vanc 1g (day 2)  - Blood cx pending  - Recently received Pegfilgrastim (8/3), as per heme/onc unable to give again (14 days).      Problem/Plan - 2:  ·  Problem: Transaminitis.  Plan: -     - possibly 2/2 chemo, sepsis, or GI etiology  - RUQ US ordered, will f/u results  - No RUQ tenderness, negative murphys sign.      Problem/Plan - 3:  ·  Problem: Testicular cancer.  Plan: - Completed 1st cycle of 2nd round of chemo (7/29-8/3)  - metastatic disease (retroperitoneal lymph nodes)  - left orchiectomy (July 2017)  - groin rash on exam, nystatin powder BID.      Problem/Plan - 4:  ·  Problem: Hyponatremia.  Plan: - Hypovolemic hyponatremia (hypotensive, vomitting)  - sodium 127 today  - likely 2/2 to prerenal due to sepsis and poor PO intake.      Problem/Plan - 5:  ·  Problem: Hypokalemia.  Plan: - given KCl 40 x1  - Potassium 2.8 today, will replete  - monitor BMP.      Problem/Plan - 6:  Problem: Hypercoagulable state. Plan: - metastatic testicular cancer  - on heparin gtt.  - patient has poor access, will consider SC heparin or alternative   Problem/Plan - 7:  ·  Problem: Prophylactic measure.  Plan: - DVT ppx: currently on heparin drip.

## 2018-08-07 NOTE — PHYSICAL THERAPY INITIAL EVALUATION ADULT - DISCHARGE DISPOSITION, PT EVAL
d/c recommendation to be determined upon gait assessment, please follow therapy notes for continued assessment of functional mobility

## 2018-08-07 NOTE — PHYSICAL THERAPY INITIAL EVALUATION ADULT - PERTINENT HX OF CURRENT PROBLEM, REHAB EVAL
pt. is a 37 year old male admitted to [facility] secondary to sepsis. PMH: testicular cancer, lumbar herniated disc, atrial flutter,

## 2018-08-08 LAB
ALBUMIN SERPL ELPH-MCNC: 3.1 G/DL — LOW (ref 3.3–5)
ALP SERPL-CCNC: 45 U/L — SIGNIFICANT CHANGE UP (ref 40–120)
ALT FLD-CCNC: 242 U/L — HIGH (ref 4–41)
ANISOCYTOSIS BLD QL: SIGNIFICANT CHANGE UP
ANISOCYTOSIS BLD QL: SLIGHT — SIGNIFICANT CHANGE UP
AST SERPL-CCNC: 33 U/L — SIGNIFICANT CHANGE UP (ref 4–40)
BACTERIA UR CULT: SIGNIFICANT CHANGE UP
BASE EXCESS BLDV CALC-SCNC: 6.7 MMOL/L — SIGNIFICANT CHANGE UP
BASOPHILS # BLD AUTO: 0 K/UL — SIGNIFICANT CHANGE UP (ref 0–0.2)
BASOPHILS # BLD AUTO: 0 K/UL — SIGNIFICANT CHANGE UP (ref 0–0.2)
BASOPHILS NFR BLD AUTO: 0 % — SIGNIFICANT CHANGE UP (ref 0–2)
BASOPHILS NFR BLD AUTO: 0 % — SIGNIFICANT CHANGE UP (ref 0–2)
BASOPHILS NFR SPEC: 0 % — SIGNIFICANT CHANGE UP (ref 0–2)
BASOPHILS NFR SPEC: 0 % — SIGNIFICANT CHANGE UP (ref 0–2)
BILIRUB SERPL-MCNC: 0.4 MG/DL — SIGNIFICANT CHANGE UP (ref 0.2–1.2)
BLASTS # FLD: 0 % — SIGNIFICANT CHANGE UP (ref 0–0)
BLASTS # FLD: 0 % — SIGNIFICANT CHANGE UP (ref 0–0)
BLD GP AB SCN SERPL QL: NEGATIVE — SIGNIFICANT CHANGE UP
BLOOD GAS VENOUS - CREATININE: 0.8 MG/DL — SIGNIFICANT CHANGE UP (ref 0.5–1.3)
BUN SERPL-MCNC: 10 MG/DL — SIGNIFICANT CHANGE UP (ref 7–23)
BUN SERPL-MCNC: 9 MG/DL — SIGNIFICANT CHANGE UP (ref 7–23)
CALCIUM SERPL-MCNC: 8.4 MG/DL — SIGNIFICANT CHANGE UP (ref 8.4–10.5)
CALCIUM SERPL-MCNC: 8.6 MG/DL — SIGNIFICANT CHANGE UP (ref 8.4–10.5)
CHLORIDE BLDV-SCNC: 96 MMOL/L — SIGNIFICANT CHANGE UP (ref 96–108)
CHLORIDE SERPL-SCNC: 89 MMOL/L — LOW (ref 98–107)
CHLORIDE SERPL-SCNC: 90 MMOL/L — LOW (ref 98–107)
CO2 SERPL-SCNC: 27 MMOL/L — SIGNIFICANT CHANGE UP (ref 22–31)
CO2 SERPL-SCNC: 30 MMOL/L — SIGNIFICANT CHANGE UP (ref 22–31)
CREAT SERPL-MCNC: 0.93 MG/DL — SIGNIFICANT CHANGE UP (ref 0.5–1.3)
CREAT SERPL-MCNC: 0.94 MG/DL — SIGNIFICANT CHANGE UP (ref 0.5–1.3)
CREAT SERPL-MCNC: 0.97 MG/DL — SIGNIFICANT CHANGE UP (ref 0.5–1.3)
EOSINOPHIL # BLD AUTO: 0 K/UL — SIGNIFICANT CHANGE UP (ref 0–0.5)
EOSINOPHIL # BLD AUTO: 0 K/UL — SIGNIFICANT CHANGE UP (ref 0–0.5)
EOSINOPHIL NFR BLD AUTO: 0 % — SIGNIFICANT CHANGE UP (ref 0–6)
EOSINOPHIL NFR BLD AUTO: 0 % — SIGNIFICANT CHANGE UP (ref 0–6)
EOSINOPHIL NFR FLD: 0 % — SIGNIFICANT CHANGE UP (ref 0–6)
EOSINOPHIL NFR FLD: 0 % — SIGNIFICANT CHANGE UP (ref 0–6)
GAS PNL BLDV: 127 MMOL/L — LOW (ref 136–146)
GIANT PLATELETS BLD QL SMEAR: PRESENT — SIGNIFICANT CHANGE UP
GIANT PLATELETS BLD QL SMEAR: PRESENT — SIGNIFICANT CHANGE UP
GLUCOSE BLDV-MCNC: 101 — HIGH (ref 70–99)
GLUCOSE SERPL-MCNC: 108 MG/DL — HIGH (ref 70–99)
GLUCOSE SERPL-MCNC: 113 MG/DL — HIGH (ref 70–99)
GLUCOSE SERPL-MCNC: 126 MG/DL — HIGH (ref 70–99)
HCO3 BLDV-SCNC: 30 MMOL/L — HIGH (ref 20–27)
HCT VFR BLD CALC: 32.2 % — LOW (ref 39–50)
HCT VFR BLD CALC: 32.3 % — LOW (ref 39–50)
HCT VFR BLDV CALC: 35.1 % — LOW (ref 39–51)
HGB BLD-MCNC: 11.4 G/DL — LOW (ref 13–17)
HGB BLD-MCNC: 11.6 G/DL — LOW (ref 13–17)
HGB BLDV-MCNC: 11.4 G/DL — LOW (ref 13–17)
IMM GRANULOCYTES # BLD AUTO: 0 # — SIGNIFICANT CHANGE UP
IMM GRANULOCYTES # BLD AUTO: 0 # — SIGNIFICANT CHANGE UP
IMM GRANULOCYTES NFR BLD AUTO: 0 % — SIGNIFICANT CHANGE UP (ref 0–1.5)
IMM GRANULOCYTES NFR BLD AUTO: 0 % — SIGNIFICANT CHANGE UP (ref 0–1.5)
LACTATE BLDV-MCNC: 0.8 MMOL/L — SIGNIFICANT CHANGE UP (ref 0.5–2)
LYMPHOCYTES # BLD AUTO: 0.35 K/UL — LOW (ref 1–3.3)
LYMPHOCYTES # BLD AUTO: 0.41 K/UL — LOW (ref 1–3.3)
LYMPHOCYTES # BLD AUTO: 85.4 % — HIGH (ref 13–44)
LYMPHOCYTES # BLD AUTO: 85.4 % — HIGH (ref 13–44)
LYMPHOCYTES NFR SPEC AUTO: 85.1 % — HIGH (ref 13–44)
LYMPHOCYTES NFR SPEC AUTO: 88.1 % — HIGH (ref 13–44)
MAGNESIUM SERPL-MCNC: 2.1 MG/DL — SIGNIFICANT CHANGE UP (ref 1.6–2.6)
MAGNESIUM SERPL-MCNC: 2.1 MG/DL — SIGNIFICANT CHANGE UP (ref 1.6–2.6)
MANUAL SMEAR VERIFICATION: SIGNIFICANT CHANGE UP
MCHC RBC-ENTMCNC: 29.8 PG — SIGNIFICANT CHANGE UP (ref 27–34)
MCHC RBC-ENTMCNC: 30.2 PG — SIGNIFICANT CHANGE UP (ref 27–34)
MCHC RBC-ENTMCNC: 35.4 % — SIGNIFICANT CHANGE UP (ref 32–36)
MCHC RBC-ENTMCNC: 35.9 % — SIGNIFICANT CHANGE UP (ref 32–36)
MCV RBC AUTO: 83 FL — SIGNIFICANT CHANGE UP (ref 80–100)
MCV RBC AUTO: 85.2 FL — SIGNIFICANT CHANGE UP (ref 80–100)
METAMYELOCYTES # FLD: 0 % — SIGNIFICANT CHANGE UP (ref 0–1)
METAMYELOCYTES # FLD: 0 % — SIGNIFICANT CHANGE UP (ref 0–1)
MICROCYTES BLD QL: SLIGHT — SIGNIFICANT CHANGE UP
MONOCYTES # BLD AUTO: 0.05 K/UL — SIGNIFICANT CHANGE UP (ref 0–0.9)
MONOCYTES # BLD AUTO: 0.05 K/UL — SIGNIFICANT CHANGE UP (ref 0–0.9)
MONOCYTES NFR BLD AUTO: 10.4 % — SIGNIFICANT CHANGE UP (ref 2–14)
MONOCYTES NFR BLD AUTO: 12.2 % — SIGNIFICANT CHANGE UP (ref 2–14)
MONOCYTES NFR BLD: 0 % — LOW (ref 2–9)
MONOCYTES NFR BLD: 8.5 % — SIGNIFICANT CHANGE UP (ref 2–9)
MYELOCYTES NFR BLD: 0 % — SIGNIFICANT CHANGE UP (ref 0–0)
MYELOCYTES NFR BLD: 0 % — SIGNIFICANT CHANGE UP (ref 0–0)
NEUTROPHIL AB SER-ACNC: 0 % — LOW (ref 43–77)
NEUTROPHIL AB SER-ACNC: 0 % — LOW (ref 43–77)
NEUTROPHILS # BLD AUTO: 0.01 K/UL — LOW (ref 1.8–7.4)
NEUTROPHILS # BLD AUTO: 0.02 K/UL — LOW (ref 1.8–7.4)
NEUTROPHILS NFR BLD AUTO: 2.4 % — LOW (ref 43–77)
NEUTROPHILS NFR BLD AUTO: 4.2 % — LOW (ref 43–77)
NEUTS BAND # BLD: 0 % — SIGNIFICANT CHANGE UP (ref 0–6)
NEUTS BAND # BLD: 0 % — SIGNIFICANT CHANGE UP (ref 0–6)
NRBC # FLD: 0 — SIGNIFICANT CHANGE UP
NRBC # FLD: 0 — SIGNIFICANT CHANGE UP
OTHER - HEMATOLOGY %: 0 — SIGNIFICANT CHANGE UP
OTHER - HEMATOLOGY %: 0 — SIGNIFICANT CHANGE UP
PCO2 BLDV: 43 MMHG — SIGNIFICANT CHANGE UP (ref 41–51)
PH BLDV: 7.47 PH — HIGH (ref 7.32–7.43)
PHOSPHATE SERPL-MCNC: 1.1 MG/DL — LOW (ref 2.5–4.5)
PHOSPHATE SERPL-MCNC: 1.4 MG/DL — LOW (ref 2.5–4.5)
PHOSPHATE SERPL-MCNC: 1.5 MG/DL — LOW (ref 2.5–4.5)
PLATELET # BLD AUTO: 26 K/UL — LOW (ref 150–400)
PLATELET # BLD AUTO: 27 K/UL — LOW (ref 150–400)
PLATELET COUNT - ESTIMATE: SIGNIFICANT CHANGE UP
PLATELET COUNT - ESTIMATE: SIGNIFICANT CHANGE UP
PMV BLD: 10.2 FL — SIGNIFICANT CHANGE UP (ref 7–13)
PMV BLD: 11.3 FL — SIGNIFICANT CHANGE UP (ref 7–13)
PO2 BLDV: 28 MMHG — LOW (ref 35–40)
POLYCHROMASIA BLD QL SMEAR: SIGNIFICANT CHANGE UP
POTASSIUM BLDV-SCNC: 2.9 MMOL/L — CRITICAL LOW (ref 3.4–4.5)
POTASSIUM SERPL-MCNC: 3 MMOL/L — LOW (ref 3.5–5.3)
POTASSIUM SERPL-MCNC: 3.2 MMOL/L — LOW (ref 3.5–5.3)
POTASSIUM SERPL-SCNC: 3 MMOL/L — LOW (ref 3.5–5.3)
POTASSIUM SERPL-SCNC: 3.2 MMOL/L — LOW (ref 3.5–5.3)
PROMYELOCYTES # FLD: 0 % — SIGNIFICANT CHANGE UP (ref 0–0)
PROMYELOCYTES # FLD: 0 % — SIGNIFICANT CHANGE UP (ref 0–0)
PROT SERPL-MCNC: 6.3 G/DL — SIGNIFICANT CHANGE UP (ref 6–8.3)
RBC # BLD: 3.78 M/UL — LOW (ref 4.2–5.8)
RBC # BLD: 3.89 M/UL — LOW (ref 4.2–5.8)
RBC # FLD: 11.5 % — SIGNIFICANT CHANGE UP (ref 10.3–14.5)
RBC # FLD: 11.6 % — SIGNIFICANT CHANGE UP (ref 10.3–14.5)
RH IG SCN BLD-IMP: POSITIVE — SIGNIFICANT CHANGE UP
SAO2 % BLDV: 54.5 % — LOW (ref 60–85)
SMUDGE CELLS # BLD: PRESENT — SIGNIFICANT CHANGE UP
SMUDGE CELLS # BLD: PRESENT — SIGNIFICANT CHANGE UP
SODIUM SERPL-SCNC: 128 MMOL/L — LOW (ref 135–145)
SODIUM SERPL-SCNC: 130 MMOL/L — LOW (ref 135–145)
VARIANT LYMPHS # BLD: 11.9 % — SIGNIFICANT CHANGE UP
VARIANT LYMPHS # BLD: 6.4 % — SIGNIFICANT CHANGE UP
WBC # BLD: 0.41 K/UL — CRITICAL LOW (ref 3.8–10.5)
WBC # BLD: 0.48 K/UL — CRITICAL LOW (ref 3.8–10.5)
WBC # FLD AUTO: 0.41 K/UL — CRITICAL LOW (ref 3.8–10.5)
WBC # FLD AUTO: 0.48 K/UL — CRITICAL LOW (ref 3.8–10.5)

## 2018-08-08 PROCEDURE — 99233 SBSQ HOSP IP/OBS HIGH 50: CPT | Mod: GC

## 2018-08-08 PROCEDURE — 99233 SBSQ HOSP IP/OBS HIGH 50: CPT

## 2018-08-08 RX ORDER — POTASSIUM CHLORIDE 20 MEQ
10 PACKET (EA) ORAL
Qty: 0 | Refills: 0 | Status: DISCONTINUED | OUTPATIENT
Start: 2018-08-08 | End: 2018-08-08

## 2018-08-08 RX ORDER — POTASSIUM CHLORIDE 20 MEQ
10 PACKET (EA) ORAL ONCE
Qty: 0 | Refills: 0 | Status: DISCONTINUED | OUTPATIENT
Start: 2018-08-08 | End: 2018-08-08

## 2018-08-08 RX ORDER — POTASSIUM CHLORIDE 20 MEQ
40 PACKET (EA) ORAL ONCE
Qty: 0 | Refills: 0 | Status: COMPLETED | OUTPATIENT
Start: 2018-08-08 | End: 2018-08-08

## 2018-08-08 RX ORDER — POTASSIUM PHOSPHATE, MONOBASIC POTASSIUM PHOSPHATE, DIBASIC 236; 224 MG/ML; MG/ML
15 INJECTION, SOLUTION INTRAVENOUS ONCE
Qty: 0 | Refills: 0 | Status: COMPLETED | OUTPATIENT
Start: 2018-08-08 | End: 2018-08-08

## 2018-08-08 RX ORDER — POTASSIUM PHOSPHATE, MONOBASIC POTASSIUM PHOSPHATE, DIBASIC 236; 224 MG/ML; MG/ML
30 INJECTION, SOLUTION INTRAVENOUS ONCE
Qty: 0 | Refills: 0 | Status: DISCONTINUED | OUTPATIENT
Start: 2018-08-08 | End: 2018-08-08

## 2018-08-08 RX ORDER — CHLORHEXIDINE GLUCONATE 213 G/1000ML
1 SOLUTION TOPICAL ONCE
Qty: 0 | Refills: 0 | Status: COMPLETED | OUTPATIENT
Start: 2018-08-08 | End: 2018-08-09

## 2018-08-08 RX ORDER — POTASSIUM CHLORIDE 20 MEQ
10 PACKET (EA) ORAL ONCE
Qty: 0 | Refills: 0 | Status: COMPLETED | OUTPATIENT
Start: 2018-08-08 | End: 2018-08-08

## 2018-08-08 RX ORDER — POTASSIUM CHLORIDE 20 MEQ
40 PACKET (EA) ORAL ONCE
Qty: 0 | Refills: 0 | Status: DISCONTINUED | OUTPATIENT
Start: 2018-08-08 | End: 2018-08-08

## 2018-08-08 RX ADMIN — Medication 40 MILLIEQUIVALENT(S): at 07:45

## 2018-08-08 RX ADMIN — NYSTATIN CREAM 1 APPLICATION(S): 100000 CREAM TOPICAL at 18:45

## 2018-08-08 RX ADMIN — Medication 250 MILLIGRAM(S): at 12:56

## 2018-08-08 RX ADMIN — Medication 100 MILLIEQUIVALENT(S): at 08:41

## 2018-08-08 RX ADMIN — Medication 63.75 MILLIMOLE(S): at 10:57

## 2018-08-08 RX ADMIN — CEFEPIME 100 MILLIGRAM(S): 1 INJECTION, POWDER, FOR SOLUTION INTRAMUSCULAR; INTRAVENOUS at 06:59

## 2018-08-08 RX ADMIN — CEFEPIME 100 MILLIGRAM(S): 1 INJECTION, POWDER, FOR SOLUTION INTRAMUSCULAR; INTRAVENOUS at 18:45

## 2018-08-08 RX ADMIN — POTASSIUM PHOSPHATE, MONOBASIC POTASSIUM PHOSPHATE, DIBASIC 83.33 MILLIMOLE(S): 236; 224 INJECTION, SOLUTION INTRAVENOUS at 18:45

## 2018-08-08 RX ADMIN — Medication 40 MILLIEQUIVALENT(S): at 11:42

## 2018-08-08 RX ADMIN — ONDANSETRON 4 MILLIGRAM(S): 8 TABLET, FILM COATED ORAL at 00:12

## 2018-08-08 RX ADMIN — Medication 100 MILLIEQUIVALENT(S): at 16:57

## 2018-08-08 RX ADMIN — POTASSIUM PHOSPHATE, MONOBASIC POTASSIUM PHOSPHATE, DIBASIC 83.33 MILLIMOLE(S): 236; 224 INJECTION, SOLUTION INTRAVENOUS at 22:51

## 2018-08-08 NOTE — PROGRESS NOTE ADULT - SUBJECTIVE AND OBJECTIVE BOX
INTERVAL HPI/OVERNIGHT EVENTS:  Patient seen at bedside. He is feeling marginally better. Reports profound fatigue. No pain. No other localizing signs of infection.     VITAL SIGNS:  T(F): 99.9 (08-08-18 @ 12:31)  HR: 95 (08-08-18 @ 12:31)  BP: 108/73 (08-08-18 @ 12:31)  RR: 18 (08-08-18 @ 12:31)  SpO2: 97% (08-08-18 @ 12:31)  Wt(kg): --    PHYSICAL EXAM:    Constitutional: NAD,   Eyes: PERRL, EOMI, sclera non-icteric  Neck: supple, no masses, no JVD  Respiratory: CTA b/l, good air entry b/l  Cardiovascular: RRR, normal S1S2  Gastrointestinal: soft, NTND  Extremities: WWP, no c/c/e  Neurological: AAOx3  Skin: Normal temperature    MEDICATIONS  (STANDING):  cefepime   IVPB 2000 milliGRAM(s) IV Intermittent every 12 hours  nystatin Powder 1 Application(s) Topical two times a day  potassium chloride    Tablet ER 40 milliEquivalent(s) Oral once  potassium phosphate IVPB 15 milliMole(s) IV Intermittent once  potassium phosphate IVPB 15 milliMole(s) IV Intermittent once  vancomycin  IVPB 1000 milliGRAM(s) IV Intermittent every 12 hours    MEDICATIONS  (PRN):  acetaminophen   Tablet 650 milliGRAM(s) Oral every 6 hours PRN For Temp greater than 38 C (100.4 F)  aluminum hydroxide/magnesium hydroxide/simethicone Suspension 30 milliLiter(s) Oral every 4 hours PRN Dyspepsia  ondansetron Injectable 4 milliGRAM(s) IV Push every 6 hours PRN Nausea and/or Vomiting      Allergies    No Known Allergies    Intolerances        LABS:                        11.4   0.48  )-----------( 27       ( 08 Aug 2018 16:51 )             32.2     08-08    130<L>  |  90<L>  |  10  ----------------------------<  113<H>  3.2<L>   |  30  |  0.97    Ca    8.4      08 Aug 2018 16:51  Phos  1.1     08-08  Mg     2.1     08-08    TPro  6.3  /  Alb  3.1<L>  /  TBili  0.4  /  DBili  x   /  AST  33  /  ALT  242<H>  /  AlkPhos  45  08-08    RADIOLOGY & ADDITIONAL TESTS:  Studies reviewed.

## 2018-08-08 NOTE — PROGRESS NOTE ADULT - ASSESSMENT
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 Last febrile 8pm 8/7/2018    -Agree with Cefepime and vancomycin (has PICC line) for emperic treatment of febrile neutropenia  -liver tests downtrending. monitor  -hydration, replete lytes prn  -monitor vs closely  -monitor ANC  -monitor hgb, maintain active type and screen  -plt continue to downtrend, No AC for plt<50k  -transfuse plt if plt count less than 10k, or if plt count less than 20 and febrile  -outpatient oncology follow up upon discharge    Plan discussed with primary team and outpatient oncologist Dr. Farrar  Will follow  Charito Mccall MD  Oncology Attending

## 2018-08-08 NOTE — PROGRESS NOTE ADULT - ASSESSMENT
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 flutter requiring cardioversion (not ablated due to cardiac mass on coumadin), p/w worsening nausea, vomiting x 5 days (now resolved), diarrhea (neg c. diff) found to be febrile and tachycardic in ED, admitted for neutropenic sepsis with acute organ dysfunction with transaminitis     Neutropenic sepsis  - continues to be tachycardic, febrile 100.7 at 8PM yesterday, now afebrile, normotensive  - unknown source, CXR negative, Ucx negative, blood cx negative, no URI symptoms, negative C.diff, possible from PICC (changed 8/4).   - F/u GI PCR and ova/parasite   - WBC 0.45, ANC 9.9 (19 on admission)  - c/w cefepime and Vanc 1g (day 3)  - Lactate 2.9 (8/6)  - Recent chemo (7/29-8/3)  - Recently received Pegfilgrastim (8/3), as per heme/onc unable to give again (14 days).     Transaminitis:  - LFTs downtrending  - RUQ showed fatty liver, cholelithiasis, otherwise unremarkable    Thrombocytopenia  - likely due to consumption 2/2 sepsis  - 26 today, down from 39 (87 on admission)  - check smear    Hypercoaguable state:  - 2/2 to malignancy  - INR subtherapeutic  - A/C held due to downtrending platelets  - Consider resuming lovenox if uptrending tomorrow    Hyponatremia  - Serum, urine osm and Brittany seem to be c/w possible SIADH?   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 flutter requiring cardioversion (not ablated due to cardiac mass on coumadin), p/w worsening nausea, vomiting x 5 days (now resolved), diarrhea (neg c. diff) found to be febrile and tachycardic in ED, admitted for neutropenic sepsis with acute organ dysfunction with transaminitis     Neutropenic sepsis  - continues to be tachycardic, febrile 100.7 at 8PM yesterday, now afebrile, normotensive  - unknown source, CXR negative, Ucx negative, blood cx negative, no URI symptoms, negative C.diff, possible from PICC (changed 8/4).   - CT chest w/o contrast, CT ab/pelvis w/IV and PO contrast  - F/u GI PCR and ova/parasite   - WBC 0.45, ANC 10 (19 on admission)  - c/w cefepime and Vanc 1g (day 3), f/u vanc level  - Lactate 2.9 (8/6), will trend  - Recent chemo (7/29-8/3)  - Recently received Pegfilgrastim (8/3), as per heme/onc unable to give again (14 days).     Transaminitis:  - LFTs downtrending  - RUQ showed fatty liver, cholelithiasis, otherwise unremarkable    Thrombocytopenia  - likely due to consumption 2/2 sepsis  - 26 today, down from 39 (87 on admission)  - check smear  - type and screen ordered    Hypercoaguable state:  - 2/2 to malignancy  - INR subtherapeutic  - A/C held due to downtrending platelets  - Consider resuming lovenox if uptrending tomorrow    Hyponatremia  - Serum, urine osm and Brittany seem to be c/w possible SIADH?   - fluid restrict 1 L, bolus PRN  - Monitor volume status closely given EF: 40-45%    Hypokalemia  - Potassium 3.0 today  - Given KCl 40 PO and KCL 10 IV x3, will repeat BMP  - Supplement as needed

## 2018-08-08 NOTE — PROGRESS NOTE ADULT - SUBJECTIVE AND OBJECTIVE BOX
=========================================  CONTACT MCKENZIE Hendrix M.D., PGY-1  Pager: NS- [phone], [facility]- [phone]    Mon-Fri: pager covered by day team 7am-7pm;   ***Academic conferences 12pm-1pm- page ONLY if URGENT or if Consultant  /Yulia: see chart, primary physician assigned available 7am-12pm  Sat/[place] Coverage 12pm-7pm: NS- page [phone] for Team1-4, [facility]- pager forwarded to covering Resident  For Night coverage 7pm-7am: NS- page [phone] Team1-3, page [phone] Team4 & Care Model; [facility]- page [phone] for Red, [phone] for Blue  =========================================    Patient is a 37y old  Male who presents with a chief complaint of Neutropenic sepsis (06 Aug 2018 21:02)      SUBJECTIVE / OVERNIGHT EVENTS:  Patient seen and examined at bedside.    Other Review of Systems Negative.    MEDICATIONS  (STANDING):  cefepime   IVPB 2000 milliGRAM(s) IV Intermittent every 12 hours  nystatin Powder 1 Application(s) Topical two times a day  pantoprazole    Tablet 40 milliGRAM(s) Oral daily  potassium chloride  10 mEq/100 mL IVPB 10 milliEquivalent(s) IV Intermittent once  sodium phosphate IVPB 15 milliMole(s) IV Intermittent once  vancomycin  IVPB 1000 milliGRAM(s) IV Intermittent every 12 hours    MEDICATIONS  (PRN):  acetaminophen   Tablet 650 milliGRAM(s) Oral every 6 hours PRN For Temp greater than 38 C (100.4 F)  aluminum hydroxide/magnesium hydroxide/simethicone Suspension 30 milliLiter(s) Oral every 4 hours PRN Dyspepsia  ondansetron Injectable 4 milliGRAM(s) IV Push every 6 hours PRN Nausea and/or Vomiting      OBJECTIVE:    Vital Signs Last 24 Hrs  T(C): 37.1 (08 Aug 2018 06:57), Max: 38.2 (07 Aug 2018 20:00)  T(F): 98.7 (08 Aug 2018 06:57), Max: 100.7 (07 Aug 2018 20:00)  HR: 101 (08 Aug 2018 06:57) (94 - 112)  BP: 103/61 (08 Aug 2018 06:57) (100/62 - 115/68)  BP(mean): --  RR: 18 (08 Aug 2018 06:57) (16 - 20)  SpO2: 98% (08 Aug 2018 06:57) (98% - 100%)    CAPILLARY BLOOD GLUCOSE        I&O's Summary    07 Aug 2018 07:01  -  08 Aug 2018 07:00  --------------------------------------------------------  IN: 200 mL / OUT: 900 mL / NET: -700 mL        PHYSICAL EXAM:  	GENERAL: NAD, well-developed  	HEAD:  Atraumatic, Normocephalic  	EYES:sclera clear  	Mouth: MMM, no lesions  	NECK: Supple, no appreciable masses  	Lung: normal work of breathing, cta b/l  	Chest: S1&S2+, rrr, no m/r/g appreciated  	ABDOMEN: bs+, soft, nt, nd, no appreciable masses, negative ramirez sign, midline abdominal surgical scar  	EXTREMITIES:  radial pulse present b/l, PT present b/l, no pitting edema present  	Neuro: A&Ox3, no focal deficits  	SKIN: warm and dry, no visible rashes. Suprapubic superficial abrasion, no drainage, warmth, no induration.  Lines: Left upper arm PICC line, clean dry and intact, ecchymosis surrounding the site, no erythema, edema, induration, or drainage    LABS:                        11.6   0.41  )-----------( 26       ( 08 Aug 2018 06:53 )             32.3     Auto Eosinophil # 0.00  / Auto Eosinophil % 0.0   / Auto Neutrophil # 0.01  / Auto Neutrophil % 2.4   / BANDS % x                            11.3   0.45  )-----------( 39       ( 07 Aug 2018 23:15 )             31.8     Auto Eosinophil # 0.00  / Auto Eosinophil % 0.0   / Auto Neutrophil # 0.01  / Auto Neutrophil % 2.2   / BANDS % 0                            11.1   0.33  )-----------( 53       ( 07 Aug 2018 05:35 )             32.5     Auto Eosinophil # 0.00  / Auto Eosinophil % 0.0   / Auto Neutrophil # 0.01  / Auto Neutrophil % 3.0   / BANDS % x        08-08    128<L>  |  89<L>  |  9   ----------------------------<  108<H>  3.0<L>   |  27  |  0.93  08-07    129<L>  |  91<L>  |  10  ----------------------------<  126<H>  2.8<LL>   |  27  |  0.94  08-07    127<L>  |  88<L>  |  11  ----------------------------<  234<H>  2.8<LL>   |  24  |  0.92    Ca    8.6      08 Aug 2018 06:53  Mg     2.1     08-08  Phos  1.5     08-08  TPro  6.3  /  Alb  3.1<L>  /  TBili  0.4  /  DBili  x   /  AST  33  /  ALT  242<H>  /  AlkPhos  45  08-08  TPro  6.4  /  Alb  3.3  /  TBili  0.5  /  DBili  x   /  AST  119<H>  /  ALT  397<H>  /  AlkPhos  45  08-07  TPro  7.6  /  Alb  4.5  /  TBili  0.7  /  DBili  x   /  AST  187<H>  /  ALT  525<H>  /  AlkPhos  50  08-06    PT/INR - ( 06 Aug 2018 09:56 )   PT: 12.3 SEC;   INR: 1.11          PTT - ( 06 Aug 2018 09:56 )  PTT:27.2 SEC      Urinalysis Basic - ( 06 Aug 2018 12:10 )    Color: YELLOW / Appearance: CLEAR / S.027 / pH: 6.5  Gluc: 50 / Ketone: NEGATIVE  / Bili: NEGATIVE / Urobili: NORMAL mg/dL   Blood: MODERATE / Protein: 300 mg/dL / Nitrite: NEGATIVE   Leuk Esterase: NEGATIVE / RBC: 5-10 / WBC 5-10   Sq Epi: OCC / Non Sq Epi: x / Bacteria: FEW            ABG:     VBG:       Care Discussed with Consultants/Other Providers:    RADIOLOGY & ADDITIONAL TESTS:  RUQ US (): Cholelithiasis. Fatty liver =========================================  CONTACT MCKENZIE Hendrix M.D., PGY-1  Pager: NS- [phone], [facility]- [phone]    Mon-Fri: pager covered by day team 7am-7pm;   ***Academic conferences 12pm-1pm- page ONLY if URGENT or if Consultant  /Yulia: see chart, primary physician assigned available 7am-12pm  Sat/[place] Coverage 12pm-7pm: NS- page [phone] for Team1-4, [facility]- pager forwarded to covering Resident  For Night coverage 7pm-7am: NS- page [phone] Team1-3, page [phone] Team4 & Care Model; [facility]- page [phone] for Red, [phone] for Blue  =========================================    Patient is a 37y old  Male who presents with a chief complaint of Neutropenic sepsis (06 Aug 2018 21:02)      SUBJECTIVE / OVERNIGHT EVENTS:  Patient seen and examined at bedside.    Other Review of Systems Negative.    MEDICATIONS  (STANDING):  cefepime   IVPB 2000 milliGRAM(s) IV Intermittent every 12 hours  nystatin Powder 1 Application(s) Topical two times a day  pantoprazole    Tablet 40 milliGRAM(s) Oral daily  potassium chloride  10 mEq/100 mL IVPB 10 milliEquivalent(s) IV Intermittent once  sodium phosphate IVPB 15 milliMole(s) IV Intermittent once  vancomycin  IVPB 1000 milliGRAM(s) IV Intermittent every 12 hours    MEDICATIONS  (PRN):  acetaminophen   Tablet 650 milliGRAM(s) Oral every 6 hours PRN For Temp greater than 38 C (100.4 F)  aluminum hydroxide/magnesium hydroxide/simethicone Suspension 30 milliLiter(s) Oral every 4 hours PRN Dyspepsia  ondansetron Injectable 4 milliGRAM(s) IV Push every 6 hours PRN Nausea and/or Vomiting      OBJECTIVE:    Vital Signs Last 24 Hrs  T(C): 37.1 (08 Aug 2018 06:57), Max: 38.2 (07 Aug 2018 20:00)  T(F): 98.7 (08 Aug 2018 06:57), Max: 100.7 (07 Aug 2018 20:00)  HR: 101 (08 Aug 2018 06:57) (94 - 112)  BP: 103/61 (08 Aug 2018 06:57) (100/62 - 115/68)  BP(mean): --  RR: 18 (08 Aug 2018 06:57) (16 - 20)  SpO2: 98% (08 Aug 2018 06:57) (98% - 100%)    CAPILLARY BLOOD GLUCOSE        I&O's Summary    07 Aug 2018 07:01  -  08 Aug 2018 07:00  --------------------------------------------------------  IN: 200 mL / OUT: 900 mL / NET: -700 mL        PHYSICAL EXAM:  	GENERAL: NAD, well-developed  	HEAD:  Atraumatic, Normocephalic  	EYES:sclera clear  	Mouth: MMM, no lesions  	NECK: Supple, no appreciable masses  	Lung: normal work of breathing, cta b/l  	Chest: S1&S2+, rrr, no m/r/g appreciated  	ABDOMEN: bs+, soft, nt, nd, no appreciable masses, midline abdominal surgical scar  	EXTREMITIES:  radial pulse present b/l, PT present b/l, no pitting edema present  	Neuro: Awake and alert, answers questions appropriately  	SKIN: warm and dry, no visible rashes. Suprapubic superficial abrasion, no drainage, warmth, no induration.  Lines: Left upper arm PICC line, clean dry and intact, ecchymosis surrounding the site, no erythema, edema, induration, or drainage    LABS:                        11.6   0.41  )-----------( 26       ( 08 Aug 2018 06:53 )             32.3     Auto Eosinophil # 0.00  / Auto Eosinophil % 0.0   / Auto Neutrophil # 0.01  / Auto Neutrophil % 2.4   / BANDS % x                            11.3   0.45  )-----------( 39       ( 07 Aug 2018 23:15 )             31.8     Auto Eosinophil # 0.00  / Auto Eosinophil % 0.0   / Auto Neutrophil # 0.01  / Auto Neutrophil % 2.2   / BANDS % 0                            11.1   0.33  )-----------( 53       ( 07 Aug 2018 05:35 )             32.5     Auto Eosinophil # 0.00  / Auto Eosinophil % 0.0   / Auto Neutrophil # 0.01  / Auto Neutrophil % 3.0   / BANDS % x        08-08    128<L>  |  89<L>  |  9   ----------------------------<  108<H>  3.0<L>   |  27  |  0.93  08-07    129<L>  |  91<L>  |  10  ----------------------------<  126<H>  2.8<LL>   |  27  |  0.94  08-07    127<L>  |  88<L>  |  11  ----------------------------<  234<H>  2.8<LL>   |  24  |  0.92    Ca    8.6      08 Aug 2018 06:53  Mg     2.1     08-08  Phos  1.5     08-08  TPro  6.3  /  Alb  3.1<L>  /  TBili  0.4  /  DBili  x   /  AST  33  /  ALT  242<H>  /  AlkPhos  45  08-08  TPro  6.4  /  Alb  3.3  /  TBili  0.5  /  DBili  x   /  AST  119<H>  /  ALT  397<H>  /  AlkPhos  45  08-07  TPro  7.6  /  Alb  4.5  /  TBili  0.7  /  DBili  x   /  AST  187<H>  /  ALT  525<H>  /  AlkPhos  50  08-06    PT/INR - ( 06 Aug 2018 09:56 )   PT: 12.3 SEC;   INR: 1.11          PTT - ( 06 Aug 2018 09:56 )  PTT:27.2 SEC      Urinalysis Basic - ( 06 Aug 2018 12:10 )    Color: YELLOW / Appearance: CLEAR / S.027 / pH: 6.5  Gluc: 50 / Ketone: NEGATIVE  / Bili: NEGATIVE / Urobili: NORMAL mg/dL   Blood: MODERATE / Protein: 300 mg/dL / Nitrite: NEGATIVE   Leuk Esterase: NEGATIVE / RBC: 5-10 / WBC 5-10   Sq Epi: OCC / Non Sq Epi: x / Bacteria: FEW            ABG:     VBG:       Care Discussed with Consultants/Other Providers:    RADIOLOGY & ADDITIONAL TESTS:  RUQ US (): Cholelithiasis. Fatty liver

## 2018-08-08 NOTE — PROGRESS NOTE ADULT - ATTENDING COMMENTS
Patient was seen and examined personally by me. I have discussed the plan and reviewed the resident's note and agree with the above physical exam findings including assessment and plan except as indicated below.    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 flutter requiring cardioversion (not ablated due to cardiac mass on coumadin), p/w worsening nausea, vomiting x 5 days, found to be febrile and tachycardic in ED, admitted for neutropenic sepsis with acute organ dysfunction with transaminitis     ANC still low at 10, last received chemo on 7/31 and Onpro on 8/3, blood/urine cultures: NGTD, PICC now functioning but sending Bcx from PICC now may not be helpful given received Antibiotics for 3 days now  c/w empiric Abx, UA unremarkable for infection, CXR negative, no S/S of URI, possible source from PICC? Cdiff negative, f/u GI PCR, stool O/P  Check CT C/A/P given continued fevers although fever curve downtrending  US ABd with fatty liver, otherwise unremarkable, LFTs downtrending  INR subtherapeutic due to N/V, A/C held given downtrending platelets, once platelets>50k, will resume on lovenox  Thrombocytopenia likely due to consumption in setting of sepsis, smear without schistocytes, DC protonix-can affect platelets as well  Serum, urine osm and Brittany seem to be c/w SIADH, c/w fluid restrict 1 L, bolus PRN for hypotension, Monitor volume status closely: EF: 40-45%  Supplement hypokalemia  Onc following  Dispo: pending ANC>1000, improvement in platelets, CT C/A/P Patient was seen and examined personally by me. I have discussed the plan and reviewed the resident's note and agree with the above physical exam findings including assessment and plan except as indicated below.    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 flutter requiring cardioversion (not ablated due to cardiac mass on coumadin), p/w worsening nausea, vomiting x 5 days, found to be febrile and tachycardic in ED, admitted for neutropenic sepsis with acute organ dysfunction with transaminitis     ANC still low at 10, last received chemo on 7/31 and Onpro on 8/3, blood/urine cultures: NGTD, PICC now functioning but sending Bcx from PICC now may not be helpful given received Antibiotics for 3 days now  c/w empiric Abx, UA unremarkable for infection, CXR negative, no S/S of URI, possible source from PICC? Cdiff negative, f/u GI PCR, stool O/P  Check CT C/A/P given continued fevers although fever curve downtrending  US ABd with fatty liver, otherwise unremarkable, LFTs downtrending  INR subtherapeutic due to N/V, A/C held given downtrending platelets, once platelets>50k, will resume on lovenox, transfuse for plts<10 or <20 and febrile  Thrombocytopenia likely due to consumption in setting of sepsis, smear without schistocytes, DC protonix-can affect platelets as well  Serum, urine osm and Brittany seem to be c/w SIADH, c/w fluid restrict 1 L, bolus PRN for hypotension, Monitor volume status closely: EF: 40-45%  Supplement hypokalemia  Onc following  Dispo: pending ANC>1000, improvement in platelets, CT C/A/P

## 2018-08-08 NOTE — CHART NOTE - NSCHARTNOTEFT_GEN_A_CORE
Spoke with Stuart Johnson ([phone]) about family meeting at 1pm tomorrow (8/8) regarding at home hospice. He will be available via phone at 1pm. He will reach out to Carole and  to inform them of the meeting. I spoke with Misti and she will be there tomorrow 1pm.

## 2018-08-09 ENCOUNTER — TRANSCRIPTION ENCOUNTER (OUTPATIENT)
Age: 38
End: 2018-08-09

## 2018-08-09 LAB
BASOPHILS # BLD AUTO: 0 K/UL — SIGNIFICANT CHANGE UP (ref 0–0.2)
BASOPHILS NFR BLD AUTO: 0 % — SIGNIFICANT CHANGE UP (ref 0–2)
BUN SERPL-MCNC: 8 MG/DL — SIGNIFICANT CHANGE UP (ref 7–23)
CALCIUM SERPL-MCNC: 8.6 MG/DL — SIGNIFICANT CHANGE UP (ref 8.4–10.5)
CHLORIDE SERPL-SCNC: 91 MMOL/L — LOW (ref 98–107)
CO2 SERPL-SCNC: 27 MMOL/L — SIGNIFICANT CHANGE UP (ref 22–31)
CREAT SERPL-MCNC: 0.91 MG/DL — SIGNIFICANT CHANGE UP (ref 0.5–1.3)
EOSINOPHIL # BLD AUTO: 0.02 K/UL — SIGNIFICANT CHANGE UP (ref 0–0.5)
EOSINOPHIL NFR BLD AUTO: 2.7 % — SIGNIFICANT CHANGE UP (ref 0–6)
GI PCR PANEL, STOOL: SIGNIFICANT CHANGE UP
GLUCOSE SERPL-MCNC: 109 MG/DL — HIGH (ref 70–99)
HCT VFR BLD CALC: 33.5 % — LOW (ref 39–50)
HGB BLD-MCNC: 11.7 G/DL — LOW (ref 13–17)
IMM GRANULOCYTES # BLD AUTO: 0 # — SIGNIFICANT CHANGE UP
IMM GRANULOCYTES NFR BLD AUTO: 0 % — SIGNIFICANT CHANGE UP (ref 0–1.5)
LYMPHOCYTES # BLD AUTO: 0.55 K/UL — LOW (ref 1–3.3)
LYMPHOCYTES # BLD AUTO: 74.3 % — HIGH (ref 13–44)
MAGNESIUM SERPL-MCNC: 2.2 MG/DL — SIGNIFICANT CHANGE UP (ref 1.6–2.6)
MCHC RBC-ENTMCNC: 30.3 PG — SIGNIFICANT CHANGE UP (ref 27–34)
MCHC RBC-ENTMCNC: 34.9 % — SIGNIFICANT CHANGE UP (ref 32–36)
MCV RBC AUTO: 86.8 FL — SIGNIFICANT CHANGE UP (ref 80–100)
MONOCYTES # BLD AUTO: 0.09 K/UL — SIGNIFICANT CHANGE UP (ref 0–0.9)
MONOCYTES NFR BLD AUTO: 12.2 % — SIGNIFICANT CHANGE UP (ref 2–14)
NEUTROPHILS # BLD AUTO: 0.08 K/UL — LOW (ref 1.8–7.4)
NEUTROPHILS NFR BLD AUTO: 10.8 % — LOW (ref 43–77)
NRBC # FLD: 0 — SIGNIFICANT CHANGE UP
PHOSPHATE SERPL-MCNC: 1.8 MG/DL — LOW (ref 2.5–4.5)
PLATELET # BLD AUTO: 27 K/UL — LOW (ref 150–400)
PMV BLD: 11.9 FL — SIGNIFICANT CHANGE UP (ref 7–13)
POTASSIUM SERPL-MCNC: 3.4 MMOL/L — LOW (ref 3.5–5.3)
POTASSIUM SERPL-SCNC: 3.4 MMOL/L — LOW (ref 3.5–5.3)
RBC # BLD: 3.86 M/UL — LOW (ref 4.2–5.8)
RBC # FLD: 11.5 % — SIGNIFICANT CHANGE UP (ref 10.3–14.5)
SODIUM SERPL-SCNC: 129 MMOL/L — LOW (ref 135–145)
SPECIMEN SOURCE: SIGNIFICANT CHANGE UP
VANCOMYCIN FLD-MCNC: 11.9 UG/ML — SIGNIFICANT CHANGE UP
WBC # BLD: 0.74 K/UL — CRITICAL LOW (ref 3.8–10.5)
WBC # FLD AUTO: 0.74 K/UL — CRITICAL LOW (ref 3.8–10.5)

## 2018-08-09 PROCEDURE — 99233 SBSQ HOSP IP/OBS HIGH 50: CPT | Mod: GC

## 2018-08-09 RX ORDER — POTASSIUM CHLORIDE 20 MEQ
40 PACKET (EA) ORAL ONCE
Qty: 0 | Refills: 0 | Status: DISCONTINUED | OUTPATIENT
Start: 2018-08-09 | End: 2018-08-09

## 2018-08-09 RX ORDER — POTASSIUM CHLORIDE 20 MEQ
40 PACKET (EA) ORAL ONCE
Qty: 0 | Refills: 0 | Status: COMPLETED | OUTPATIENT
Start: 2018-08-09 | End: 2018-08-09

## 2018-08-09 RX ORDER — POTASSIUM PHOSPHATE, MONOBASIC POTASSIUM PHOSPHATE, DIBASIC 236; 224 MG/ML; MG/ML
15 INJECTION, SOLUTION INTRAVENOUS ONCE
Qty: 0 | Refills: 0 | Status: COMPLETED | OUTPATIENT
Start: 2018-08-09 | End: 2018-08-09

## 2018-08-09 RX ORDER — MIRTAZAPINE 45 MG/1
15 TABLET, ORALLY DISINTEGRATING ORAL AT BEDTIME
Qty: 0 | Refills: 0 | Status: DISCONTINUED | OUTPATIENT
Start: 2018-08-09 | End: 2018-08-09

## 2018-08-09 RX ORDER — BENZOCAINE AND MENTHOL 5; 1 G/100ML; G/100ML
1 LIQUID ORAL THREE TIMES A DAY
Qty: 0 | Refills: 0 | Status: DISCONTINUED | OUTPATIENT
Start: 2018-08-09 | End: 2018-08-12

## 2018-08-09 RX ORDER — LACTOBACILLUS ACIDOPHILUS 100MM CELL
1 CAPSULE ORAL DAILY
Qty: 0 | Refills: 0 | Status: DISCONTINUED | OUTPATIENT
Start: 2018-08-09 | End: 2018-08-12

## 2018-08-09 RX ADMIN — Medication 250 MILLIGRAM(S): at 00:38

## 2018-08-09 RX ADMIN — NYSTATIN CREAM 1 APPLICATION(S): 100000 CREAM TOPICAL at 17:17

## 2018-08-09 RX ADMIN — Medication 40 MILLIEQUIVALENT(S): at 13:29

## 2018-08-09 RX ADMIN — Medication 1 TABLET(S): at 17:17

## 2018-08-09 RX ADMIN — CEFEPIME 100 MILLIGRAM(S): 1 INJECTION, POWDER, FOR SOLUTION INTRAMUSCULAR; INTRAVENOUS at 17:19

## 2018-08-09 RX ADMIN — POTASSIUM PHOSPHATE, MONOBASIC POTASSIUM PHOSPHATE, DIBASIC 62.5 MILLIMOLE(S): 236; 224 INJECTION, SOLUTION INTRAVENOUS at 11:57

## 2018-08-09 RX ADMIN — CEFEPIME 100 MILLIGRAM(S): 1 INJECTION, POWDER, FOR SOLUTION INTRAMUSCULAR; INTRAVENOUS at 06:14

## 2018-08-09 RX ADMIN — CHLORHEXIDINE GLUCONATE 1 APPLICATION(S): 213 SOLUTION TOPICAL at 06:14

## 2018-08-09 NOTE — DISCHARGE NOTE ADULT - CARE PROVIDER_API CALL
Neo Chirinos), Hematology; Internal Medicine; Medical Oncology  [address] [place] [address]  Phone: [phone]  Fax: [phone]    Phong Restrepo), Urology  [address] [place] [address]  Phone: [phone]  Fax: [phone] Neo Chirinos), Hematology; Internal Medicine; Medical Oncology  [address] [place] [address]  Phone: [phone]  Fax: [phone]    Phong Restrepo), Urology  [address] [place] [address]  Phone: [phone]  Fax: [phone]    Cleve Marquez), Cardiology; Internal Medicine  [address] [place] [address]  Phone: [phone]  Fax: [phone]

## 2018-08-09 NOTE — PROGRESS NOTE ADULT - ATTENDING COMMENTS
Patient was seen and examined personally by me. I have discussed the plan and reviewed the resident's note and agree with the above physical exam findings including assessment and plan except as indicated below.    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 flutter requiring cardioversion (not ablated due to cardiac mass on coumadin), p/w worsening nausea, vomiting x 5 days, found to be febrile and tachycardic in ED, admitted for neutropenic sepsis with acute organ dysfunction with transaminitis     ANC improving, BCX NGTD, low suspicion for MRSA at this time, can DC vanco, c/w cefepime. Cdiff negative, negative GI PCR  Can hold off CT C/A/P given improvement, US ABd with fatty liver, otherwise unremarkable, LFTs downtrending  Platelets stable, once platelets>50k, will resume on lovenox, transfuse for plts<10 or <20 and febrile  Thrombocytopenia likely due to consumption in setting of sepsis, smear without schistocytes, DCed protonix-can affect platelets as well  Serum, urine osm and Brittany seem to be c/w SIADH, c/w fluid restrict 1 L, bolus PRN for hypotension, Monitor volume status closely: EF: 40-45%  Supplement hypokalemia  Onc following  Dispo: pending ANC>1000, improvement in platelets Patient was seen and examined personally by me. I have discussed the plan and reviewed the resident's note and agree with the above physical exam findings including assessment and plan except as indicated below.    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 flutter requiring cardioversion (not ablated due to cardiac mass on coumadin), p/w worsening nausea, vomiting x 5 days, found to be febrile and tachycardic in ED, admitted for neutropenic sepsis with acute organ dysfunction with transaminitis     ANC improving, BCX NGTD, low suspicion for MRSA at this time, can DC vanco, c/w cefepime, add probiotic. Cdiff negative, negative GI PCR  Can hold off CT C/A/P given improvement, US ABd with fatty liver, otherwise unremarkable, LFTs downtrending  Platelets stable, once platelets>50k, will resume on lovenox, transfuse for plts<10 or <20 and febrile  Thrombocytopenia likely due to consumption in setting of sepsis, smear without schistocytes, DCed protonix-can affect platelets as well  Serum, urine osm and Brittany seem to be c/w SIADH, c/w fluid restrict 1 L, bolus PRN for hypotension, Monitor volume status closely: EF: 40-45%  Supplement hypokalemia  Onc following  Dispo: pending ANC>1000, improvement in platelets

## 2018-08-09 NOTE — DISCHARGE NOTE ADULT - CARE PROVIDERS DIRECT ADDRESSES
[email or web address]

## 2018-08-09 NOTE — PROVIDER CONTACT NOTE (CRITICAL VALUE NOTIFICATION) - BACKGROUND
Patient admitted for neutropenic sepsis
Patient admitted for neutropenic sepsis.
Patient admitted for neutropenic sepsis
Patient admitted for neutropenic sepsis with hypokalemia.

## 2018-08-09 NOTE — DISCHARGE NOTE ADULT - HOSPITAL COURSE
37 year old male w/ history of left testicular cancer with metastasis to two lymph nodes (s/p L orchiectomy 7/2017, 3 cycles etoposide/cisplatin 9/2017, RPLND 6/2018, 1 cycle TIP 7/2018) presenting with 5 days of nausea, vomiting and lethargy. He follows with Dr. Chirinos at [facility], and recently started 2nd line treatment for mixed germ cell tumor residual disease on 7/29/18, starting with inpatient infusion of taxol, followed by 5 days of ifosfamide and cisplatin (last dose 8/3/18). He received 1 dose of pegfilgrastim on 8/3/18. Starting on the Wednesday of chemo treatment, he started having nausea and vomiting, 3 to 4 times per day, worse at night. He has been having soft stools, and occasional abdominal pain, which he states feels similar to acid reflux. He complains of dizziness and high-pitched tinnitus, which makes hearing difficult. He has noticed that urination is painful and has developed red spots on his neck, chest, back and upper extremities since starting chemo. He states his symptoms are similar to the last time he underwent chemo, but are much stronger. He also has a history of chronic back and joint pain for which he normally takes oxycodone. He has not been able to take any of his oral medications since his symptoms started last Wednesday. Patient denies any fevers, chills, shortness of breath, cough, chest pain, or palpitations.     In the ED his vitals were Tmax 38.3, HR , BP ()/(52-72), RR 15-22, and SpO2 %. CXR showed no focal consolidations or findings. UA was grossly negative. He received 1 dose of vancomycin and cefepime.     Pt admitted for neutropenic sepsis, unknown source, possibly from PICC (although changed 8/4). CXR was negative, UA negative, blood cultures negative, no URI symptoms.  Pts course complicated by thrombocytopenia, not requiring transfusion. Patient continued on vancomycin and cefepime, blood cultures were negative. Pt had diarrhea x1 day sent C. diff which was negative, diarrhea resolved. Pt became hyponatremic likely 2/2 SIADH with elevated urine sodium and urine osm. Patient also hypokalemic requiring potassium supplementation.     Prior to discharge, patient was medically optimized and advised to followup with PMD and oncologist. 37 year old male w/ history of left testicular cancer with metastasis to two lymph nodes (s/p L orchiectomy 7/2017, 3 cycles etoposide/cisplatin 9/2017, RPLND 6/2018, 1 cycle TIP 7/2018) presenting with 5 days of nausea, vomiting and lethargy. He follows with Dr. Chirinos at [facility], and recently started 2nd line treatment for mixed germ cell tumor residual disease on 7/29/18, starting with inpatient infusion of taxol, followed by 5 days of ifosfamide and cisplatin (last dose 8/3/18). He received 1 dose of pegfilgrastim on 8/3/18. Starting on the Wednesday of chemo treatment, he started having nausea and vomiting, 3 to 4 times per day, worse at night. He has been having soft stools, and occasional abdominal pain, which he states feels similar to acid reflux. He complains of dizziness and high-pitched tinnitus, which makes hearing difficult. He has noticed that urination is painful and has developed red spots on his neck, chest, back and upper extremities since starting chemo. He states his symptoms are similar to the last time he underwent chemo, but are much stronger. He also has a history of chronic back and joint pain for which he normally takes oxycodone. He has not been able to take any of his oral medications since his symptoms started last Wednesday. Patient denies any fevers, chills, shortness of breath, cough, chest pain, or palpitations.   In the ED his vitals were Tmax 38.3, HR , BP ()/(52-72), RR 15-22, and SpO2 %. CXR showed no focal consolidations or findings. UA was grossly negative. He received 1 dose of vancomycin and cefepime.     Pt admitted for neutropenic sepsis, unknown source, possibly from PICC (although changed 8/4). CXR was negative, UA negative, blood cultures negative, no URI symptoms.  Pts course complicated by thrombocytopenia, not requiring transfusion. Patient continued on vancomycin and cefepime, blood cultures were negative. Pt had diarrhea x1 day sent C. diff which was negative, diarrhea resolved. Pt became hyponatremic likely 2/2 SIADH with elevated urine sodium and urine osm. Patient also hypokalemic requiring potassium supplementation. Potassium stabilized, ANC uptrended to >1000. Platelets dropped to 9, required one unit platelets. increased t0 45 s/p transfusion.     Prior to discharge, patient was medically optimized and advised to followup with PMD and oncologist. 37 year old male w/ history of left testicular cancer with metastasis to two lymph nodes (s/p L orchiectomy 7/2017, 3 cycles etoposide/cisplatin 9/2017, RPLND 6/2018, 1 cycle TIP 7/2018) presenting with 5 days of nausea, vomiting and lethargy. He follows with Dr. Chirinos at [facility], and recently started 2nd line treatment for mixed germ cell tumor residual disease on 7/29/18, starting with inpatient infusion of taxol, followed by 5 days of ifosfamide and cisplatin (last dose 8/3/18). He received 1 dose of pegfilgrastim on 8/3/18. Starting on the Wednesday of chemo treatment, he started having nausea and vomiting, 3 to 4 times per day, worse at night. He has been having soft stools, and occasional abdominal pain, which he states feels similar to acid reflux. He complains of dizziness and high-pitched tinnitus, which makes hearing difficult. He has noticed that urination is painful and has developed red spots on his neck, chest, back and upper extremities since starting chemo. He states his symptoms are similar to the last time he underwent chemo, but are much stronger. He also has a history of chronic back and joint pain for which he normally takes oxycodone. He has not been able to take any of his oral medications since his symptoms started last Wednesday. Patient denies any fevers, chills, shortness of breath, cough, chest pain, or palpitations.   In the ED his vitals were Tmax 38.3, HR , BP ()/(52-72), RR 15-22, and SpO2 %. CXR showed no focal consolidations or findings. UA was grossly negative. He received 1 dose of vancomycin and cefepime.     Pt admitted for neutropenic sepsis, unknown source, possibly from PICC (although changed 8/4). CXR was negative, UA negative, blood cultures negative, no URI symptoms.  Pts course complicated by thrombocytopenia, not requiring transfusion. Patient continued on vancomycin and cefepime, blood cultures were negative. Pt had diarrhea x1 day sent C. diff which was negative, diarrhea resolved. Pt became hyponatremic likely 2/2 SIADH with elevated urine sodium and urine osm. Patient also hypokalemic requiring potassium supplementation. Potassium stabilized, ANC uptrended to >1000. Platelets dropped to 9, required one unit platelets. increased to 45 s/p transfusion. Patient received 5 days cefepime and remained afebrile and HD stable prior to dc. Coreg was held for low bp and A/C was held for thrombocytopenia.     Prior to discharge, patient was medically optimized and wished for discharge despite persistent thrombocytopenia. Patient was seen by hematology and agreed with dc today. Patient advised to followup with PMD and oncologist. 37 year old male w/ history of left testicular cancer with metastasis to two lymph nodes (s/p L orchiectomy 7/2017, 3 cycles etoposide/cisplatin 9/2017, RPLND 6/2018, 1 cycle TIP 7/2018) presenting with 5 days of nausea, vomiting and lethargy. He follows with Dr. Chirinos at [facility], and recently started 2nd line treatment for mixed germ cell tumor residual disease on 7/29/18, starting with inpatient infusion of taxol, followed by 5 days of ifosfamide and cisplatin (last dose 8/3/18). He received 1 dose of pegfilgrastim on 8/3/18. Starting on the Wednesday of chemo treatment, he started having nausea and vomiting, 3 to 4 times per day, worse at night. He has been having soft stools, and occasional abdominal pain, which he states feels similar to acid reflux. He complains of dizziness and high-pitched tinnitus, which makes hearing difficult. He has noticed that urination is painful and has developed red spots on his neck, chest, back and upper extremities since starting chemo. He states his symptoms are similar to the last time he underwent chemo, but are much stronger. He also has a history of chronic back and joint pain for which he normally takes oxycodone. He has not been able to take any of his oral medications since his symptoms started last Wednesday. Patient denies any fevers, chills, shortness of breath, cough, chest pain, or palpitations.   In the ED his vitals were Tmax 38.3, HR , BP ()/(52-72), RR 15-22, and SpO2 %. CXR showed no focal consolidations or findings. UA was grossly negative. He received 1 dose of vancomycin and cefepime.     Pt admitted for neutropenic sepsis, unknown source, possibly from PICC (although changed 8/4). CXR was negative, UA negative, blood cultures negative, no URI symptoms.  Pts course complicated by thrombocytopenia, not requiring transfusion. Patient continued on vancomycin and cefepime, blood cultures were negative. Pt had diarrhea x1 day sent C. diff which was negative, diarrhea resolved. Pt became hyponatremic likely 2/2 SIADH with elevated urine sodium and urine osm. Patient also hypokalemic requiring potassium supplementation. Potassium stabilized, ANC uptrended to >1000. Platelets dropped to 9, required one unit platelets. increased to 45 s/p transfusion. Patient received 5 days cefepime and remained afebrile and HD stable prior to dc. Coreg was held for low bp and A/C was held for thrombocytopenia.     Prior to discharge, patient was medically optimized and wished for discharge despite persistent thrombocytopenia. Patient was seen by hematology and agreed with dc today. Patient advised to followup with PMD and oncologist.  Patient instructed to f/u with Dr. Marquez regarding restarting coreg and lisinopril.

## 2018-08-09 NOTE — DISCHARGE NOTE ADULT - MEDICATION SUMMARY - MEDICATIONS TO TAKE
I will START or STAY ON the medications listed below when I get home from the hospital:    oxyCODONE 15 mg oral tablet  -- 1 tab(s) by mouth every 6 hours, As Needed  -- Indication: For Pain    Mesnex 400 mg oral tablet  -- Follow up with your oncologist on continuing this medication  -- Indication: For Testicular cancer

## 2018-08-09 NOTE — PROVIDER CONTACT NOTE (OTHER) - SITUATION
Patient has not had a pre-4th vancomycin trough drawn. Patient's next dose at midnight is the 6th dose of vancomycin 1 g IVPB he will be receiving.

## 2018-08-09 NOTE — DISCHARGE NOTE ADULT - CARE PLAN
Principal Discharge DX:	Neutropenic sepsis  Assessment and plan of treatment:	- Blood cultures negative  -  Secondary Diagnosis:	Mixed germ cell tumor  Assessment and plan of treatment:	- Patient received chemo 7/29-8/3  - Will follow up with oncologist regarding 2nd cycle of chemo  Secondary Diagnosis:	Atrial mass  Assessment and plan of treatment:	- Atrial mass vs. thrombus  - Anticoagulation was discontinued due to Platelets < 50,000  - Continue anticoagulation once platelets > 50,000  Secondary Diagnosis:	Hypokalemia  Assessment and plan of treatment:	- Possibly due diarrhea, vomiting  - repleted with PO and IV potassium chloride  Secondary Diagnosis:	Thrombocytopenia  Assessment and plan of treatment:	likely due to consumption 2/2 sepsis  - smear showed large platelets, no  - did not require transfusion Principal Discharge DX:	Neutropenic sepsis  Goal:	Follow up with PMD within one week of discharge  Assessment and plan of treatment:	- ANC now >1000  - Followup with oncologist as scheduled  - Blood cultures negative during hospital course  -  Secondary Diagnosis:	Mixed germ cell tumor  Goal:	Follow up with PMD within one week of discharge  Assessment and plan of treatment:	- Patient received chemo 7/29-8/3  - Will follow up with oncologist regarding 2nd cycle of chemo  Secondary Diagnosis:	Atrial mass  Goal:	Follow up with PMD within one week of discharge  Assessment and plan of treatment:	- Atrial mass vs. thrombus  - Anticoagulation was discontinued due to Platelets < 50,000  - WIill restart coumadin once platelets > 50,000  Secondary Diagnosis:	Hypokalemia  Goal:	Follow up with PMD within one week of discharge  Assessment and plan of treatment:	- Possibly due to diarrhea, vomiting  - repleted with PO and IV potassium chloride  - Potassium stablized by discharge  Secondary Diagnosis:	Thrombocytopenia  Goal:	Follow up with PMD within one week of discharge  Assessment and plan of treatment:	likely due to consumption 2/2 sepsis  - smear showed large platelets, no schistocytes  - Required one unit platelet transfusion for platelets of 9, increased to 45 s/p transfusion  - did not require transfusion Principal Discharge DX:	Neutropenic sepsis  Goal:	Follow up with oncologist within one week of discharge  Assessment and plan of treatment:	Your neutrophil count improved and you received 5 days of cefepime. Please follow up with your oncologist.   - Blood cultures negative during hospital course  -  Secondary Diagnosis:	Mixed germ cell tumor  Goal:	Follow up with oncologist within one week of discharge  Assessment and plan of treatment:	Please follow up with your oncologist upon discharge.  Secondary Diagnosis:	Atrial mass  Goal:	Follow up with PMD within one week of discharge  Assessment and plan of treatment:	Your coreg was held for low blood pressures and anticoagulation held for low platelets. These have not been resumed. Please follow up with your PMD/cardiologist as well.  Secondary Diagnosis:	Hypokalemia  Goal:	Follow up with PMD within one week of discharge  Assessment and plan of treatment:	Your potassium was low likely secondary to GI losses and improved with supplementation.  Secondary Diagnosis:	Thrombocytopenia  Goal:	Follow up with PMD within one week of discharge  Assessment and plan of treatment:	Anticoagulation has been held due to low platelets. Please monitor yourself for signs of bleeding, bruising. Return to hospital for any concerning symptoms. Principal Discharge DX:	Neutropenic sepsis  Goal:	Follow up with oncologist within one week of discharge  Assessment and plan of treatment:	Your neutrophil count improved and you received 5 days of cefepime. Please follow up with your oncologist.   - Blood cultures negative during hospital course  -  Secondary Diagnosis:	Mixed germ cell tumor  Goal:	Follow up with oncologist within one week of discharge  Assessment and plan of treatment:	Please follow up with your oncologist upon discharge.  Secondary Diagnosis:	Atrial mass  Goal:	Follow up with PMD within one week of discharge  Assessment and plan of treatment:	Your coreg was held for low blood pressures and anticoagulation held for low platelets. These have not been resumed. Please follow up with your PMD/cardiologist as well.  Secondary Diagnosis:	Hypokalemia  Goal:	Follow up with PMD within one week of discharge  Assessment and plan of treatment:	Your potassium was low likely secondary to GI losses and improved with supplementation.  Secondary Diagnosis:	Thrombocytopenia  Goal:	Follow up with PMD within one week of discharge  Assessment and plan of treatment:	Anticoagulation has been held due to low platelets. Please monitor yourself for signs of bleeding, bruising. Return to hospital for any concerning symptoms.  Secondary Diagnosis:	Atrial fibrillation  Assessment and plan of treatment:	Your coreg and lisinopril were held for hypotension. Please f/u with Dr. Marquez regarding restarting this medication.

## 2018-08-09 NOTE — DISCHARGE NOTE ADULT - ABILITY TO HEAR (WITH HEARING AID OR HEARING APPLIANCE IF NORMALLY USED):
tinitis from chemo/Mildly to Moderately Impaired: difficulty hearing in some environments or speaker may need to increase volume or speak distinctly Adequate: hears normal conversation without difficulty/tinitis from chemo

## 2018-08-09 NOTE — DISCHARGE NOTE ADULT - PLAN OF CARE
- Blood cultures negative  - - Patient received chemo 7/29-8/3  - Will follow up with oncologist regarding 2nd cycle of chemo - Atrial mass vs. thrombus  - Anticoagulation was discontinued due to Platelets < 50,000  - Continue anticoagulation once platelets > 50,000 - Possibly due diarrhea, vomiting  - repleted with PO and IV potassium chloride likely due to consumption 2/2 sepsis  - smear showed large platelets, no  - did not require transfusion Follow up with PMD within one week of discharge - ANC now >1000  - Followup with oncologist as scheduled  - Blood cultures negative during hospital course  - - Atrial mass vs. thrombus  - Anticoagulation was discontinued due to Platelets < 50,000  - WIill restart coumadin once platelets > 50,000 - Possibly due to diarrhea, vomiting  - repleted with PO and IV potassium chloride  - Potassium stablized by discharge likely due to consumption 2/2 sepsis  - smear showed large platelets, no schistocytes  - Required one unit platelet transfusion for platelets of 9, increased to 45 s/p transfusion  - did not require transfusion Follow up with oncologist within one week of discharge Your neutrophil count improved and you received 5 days of cefepime. Please follow up with your oncologist.   - Blood cultures negative during hospital course  - Please follow up with your oncologist upon discharge. Your coreg was held for low blood pressures and anticoagulation held for low platelets. These have not been resumed. Please follow up with your PMD/cardiologist as well. Your potassium was low likely secondary to GI losses and improved with supplementation. Anticoagulation has been held due to low platelets. Please monitor yourself for signs of bleeding, bruising. Return to hospital for any concerning symptoms. Your coreg and lisinopril were held for hypotension. Please f/u with Dr. Marquez regarding restarting this medication.

## 2018-08-09 NOTE — DISCHARGE NOTE ADULT - MEDICATION SUMMARY - MEDICATIONS TO STOP TAKING
I will STOP taking the medications listed below when I get home from the hospital:    Coumadin 5 mg oral tablet  -- 1 tab(s) by mouth once a day.  pt stopped taking medication on his own 2 weeks ago    lisinopril 2.5 mg oral tablet  -- 1 tab(s) by mouth once a day    Coreg 6.25 mg oral tablet  -- 1 tab(s) by mouth 2 times a day

## 2018-08-09 NOTE — DISCHARGE NOTE ADULT - PATIENT PORTAL LINK FT
You can access the [facility] Portal, offered by [facility], by registering with the following website: [email or web address]

## 2018-08-09 NOTE — PROGRESS NOTE ADULT - SUBJECTIVE AND OBJECTIVE BOX
=========================================  CONTACT INFO  James Hendrix M.D., PGY-1  Pager: [facility]- [phone], [facility]- [phone]    Mon-Fri: pager covered by day team 7am-7pm;   ***Academic conferences 12pm-1pm- page ONLY if URGENT or if Consultant  /Yulia: see chart, primary physician assigned available 7am-12pm  Sat/[place] Coverage 12pm-7pm: [facility]- page [phone] for Team1-4, [facility]- pager forwarded to covering Resident  For Night coverage 7pm-7am: [facility]- page [phone] Team1-3, page [phone] Team4 & Care Model; [facility]- page [phone] for Red, [phone] for Blue  =========================================    Patient is a 37y old  Male who presents with a chief complaint of Neutropenic sepsis (06 Aug 2018 21:02)      SUBJECTIVE / OVERNIGHT EVENTS:  Patient seen and examined at bedside.    Other Review of Systems Negative.    MEDICATIONS  (STANDING):  cefepime   IVPB 2000 milliGRAM(s) IV Intermittent every 12 hours  nystatin Powder 1 Application(s) Topical two times a day  vancomycin  IVPB 1000 milliGRAM(s) IV Intermittent every 12 hours    MEDICATIONS  (PRN):  acetaminophen   Tablet 650 milliGRAM(s) Oral every 6 hours PRN For Temp greater than 38 C (100.4 F)  aluminum hydroxide/magnesium hydroxide/simethicone Suspension 30 milliLiter(s) Oral every 4 hours PRN Dyspepsia  ondansetron Injectable 4 milliGRAM(s) IV Push every 6 hours PRN Nausea and/or Vomiting      OBJECTIVE:    Vital Signs Last 24 Hrs  T(C): 37.6 (09 Aug 2018 06:14), Max: 37.7 (08 Aug 2018 12:31)  T(F): 99.7 (09 Aug 2018 06:14), Max: 99.9 (08 Aug 2018 12:31)  HR: 93 (09 Aug 2018 06:14) (93 - 110)  BP: 113/68 (09 Aug 2018 06:14) (108/73 - 115/68)  BP(mean): --  RR: 18 (09 Aug 2018 06:14) (18 - 18)  SpO2: 98% (09 Aug 2018 06:14) (97% - 100%)    CAPILLARY BLOOD GLUCOSE        I&O's Summary    08 Aug 2018 07:01  -  09 Aug 2018 07:00  --------------------------------------------------------  IN: 0 mL / OUT: 300 mL / NET: -300 mL        PHYSICAL EXAM:  	GENERAL: NAD, well-developed  	HEAD:  Atraumatic, Normocephalic  	EYES:sclera clear  	Mouth: MMM, no lesions  	NECK: Supple, no appreciable masses  	Lung: normal work of breathing, cta b/l  	Chest: S1&S2+, rrr, no m/r/g appreciated  	ABDOMEN: bs+, soft, nt, nd, no appreciable masses, midline abdominal surgical scar  	EXTREMITIES:  radial pulse present b/l, PT present b/l, no pitting edema present  	Neuro: Awake and alert, answers questions appropriately  	SKIN: warm and dry, no visible rashes. Suprapubic superficial abrasion, no drainage, warmth, no induration.  Lines: Left upper arm PICC line, clean dry and intact, ecchymosis surrounding the site, no erythema, edema, induration, or drainage      LABS:                        11.4   0.48  )-----------( 27       ( 08 Aug 2018 16:51 )             32.2     Auto Eosinophil # 0.00  / Auto Eosinophil % 0.0   / Auto Neutrophil # 0.02  / Auto Neutrophil % 4.2   / BANDS % x                            11.6   0.41  )-----------( 26       ( 08 Aug 2018 06:53 )             32.3     Auto Eosinophil # 0.00  / Auto Eosinophil % 0.0   / Auto Neutrophil # 0.01  / Auto Neutrophil % 2.4   / BANDS % 0                            11.3   0.45  )-----------( 39       ( 07 Aug 2018 23:15 )             31.8     Auto Eosinophil # 0.00  / Auto Eosinophil % 0.0   / Auto Neutrophil # 0.01  / Auto Neutrophil % 2.2   / BANDS % 0        08-08    130<L>  |  90<L>  |  10  ----------------------------<  113<H>  3.2<L>   |  30  |  0.97  08-08    128<L>  |  89<L>  |  9   ----------------------------<  108<H>  3.0<L>   |  27  |  0.93  08-07    129<L>  |  91<L>  |  10  ----------------------------<  126<H>  2.8<LL>   |  27  |  0.94    Ca    8.4      08 Aug 2018 16:51  Mg     2.1     08-08  Phos  1.1     08-08  TPro  6.3  /  Alb  3.1<L>  /  TBili  0.4  /  DBili  x   /  AST  33  /  ALT  242<H>  /  AlkPhos  45  08-08        ABG:     VBG: ( 11:45 ) - VBG - pH: 7.47  | pCO2: 43    | pO2: 28    | Lactate: 0.8          Care Discussed with Consultants/Other Providers:    RADIOLOGY & ADDITIONAL TESTS:  (Imaging Personally Reviewed) =========================================  CONTACT MCKENZIE Hendrix M.D., PGY-1  Pager: [facility]- [phone], [facility]- [phone]    Mon-Fri: pager covered by day team 7am-7pm;   ***Academic conferences 12pm-1pm- page ONLY if URGENT or if Consultant  /Yulia: see chart, primary physician assigned available 7am-12pm  Sat/[place] Coverage 12pm-7pm: [facility]- page [phone] for Team1-4, [facility]- pager forwarded to covering Resident  For Night coverage 7pm-7am: NS- page [phone] Team1-3, page [phone] Team4 & Care Model; [facility]- page [phone] for Red, [phone] for Blue  =========================================    Patient is a 37y old  Male who presents with a chief complaint of Neutropenic sepsis (06 Aug 2018 21:02)      SUBJECTIVE / OVERNIGHT EVENTS:  Patient seen and examined at bedside. No acute events overnight. Patient feels better today, diarrhea resolved, now just soft stool x1 episode last night.     Other Review of Systems Negative.    MEDICATIONS  (STANDING):  cefepime   IVPB 2000 milliGRAM(s) IV Intermittent every 12 hours  nystatin Powder 1 Application(s) Topical two times a day  vancomycin  IVPB 1000 milliGRAM(s) IV Intermittent every 12 hours    MEDICATIONS  (PRN):  acetaminophen   Tablet 650 milliGRAM(s) Oral every 6 hours PRN For Temp greater than 38 C (100.4 F)  aluminum hydroxide/magnesium hydroxide/simethicone Suspension 30 milliLiter(s) Oral every 4 hours PRN Dyspepsia  ondansetron Injectable 4 milliGRAM(s) IV Push every 6 hours PRN Nausea and/or Vomiting      OBJECTIVE:    Vital Signs Last 24 Hrs  T(C): 37.6 (09 Aug 2018 06:14), Max: 37.7 (08 Aug 2018 12:31)  T(F): 99.7 (09 Aug 2018 06:14), Max: 99.9 (08 Aug 2018 12:31)  HR: 93 (09 Aug 2018 06:14) (93 - 110)  BP: 113/68 (09 Aug 2018 06:14) (108/73 - 115/68)  BP(mean): --  RR: 18 (09 Aug 2018 06:14) (18 - 18)  SpO2: 98% (09 Aug 2018 06:14) (97% - 100%)    CAPILLARY BLOOD GLUCOSE        I&O's Summary    08 Aug 2018 07:01  -  09 Aug 2018 07:00  --------------------------------------------------------  IN: 0 mL / OUT: 300 mL / NET: -300 mL        PHYSICAL EXAM:  	GENERAL: NAD, well-developed  	HEAD:  Atraumatic, Normocephalic  	EYES:sclera clear  	Mouth: MMM, no lesions  	NECK: Supple, no appreciable masses  	Lung: normal work of breathing, cta b/l  	Chest: S1&S2+, rrr, no m/r/g appreciated  	ABDOMEN: bs+, soft, nt, nd, no appreciable masses, midline abdominal surgical scar  	EXTREMITIES:  radial pulse present b/l, PT present b/l, no pitting edema present  	Neuro: Awake and alert, answers questions appropriately  	SKIN: warm and dry, no visible rashes. Suprapubic superficial abrasion, no drainage, warmth, no induration.  Lines: Left upper arm PICC line, clean dry and intact, ecchymosis surrounding the site, no erythema, edema, induration, or drainage      LABS:                        11.4   0.48  )-----------( 27       ( 08 Aug 2018 16:51 )             32.2     Auto Eosinophil # 0.00  / Auto Eosinophil % 0.0   / Auto Neutrophil # 0.02  / Auto Neutrophil % 4.2   / BANDS % x                            11.6   0.41  )-----------( 26       ( 08 Aug 2018 06:53 )             32.3     Auto Eosinophil # 0.00  / Auto Eosinophil % 0.0   / Auto Neutrophil # 0.01  / Auto Neutrophil % 2.4   / BANDS % 0                            11.3   0.45  )-----------( 39       ( 07 Aug 2018 23:15 )             31.8     Auto Eosinophil # 0.00  / Auto Eosinophil % 0.0   / Auto Neutrophil # 0.01  / Auto Neutrophil % 2.2   / BANDS % 0        08-08    130<L>  |  90<L>  |  10  ----------------------------<  113<H>  3.2<L>   |  30  |  0.97  08-08    128<L>  |  89<L>  |  9   ----------------------------<  108<H>  3.0<L>   |  27  |  0.93  08-07    129<L>  |  91<L>  |  10  ----------------------------<  126<H>  2.8<LL>   |  27  |  0.94    Ca    8.4      08 Aug 2018 16:51  Mg     2.1     08-08  Phos  1.1     08-08  TPro  6.3  /  Alb  3.1<L>  /  TBili  0.4  /  DBili  x   /  AST  33  /  ALT  242<H>  /  AlkPhos  45  08-08        ABG:     VBG: ( 11:45 ) - VBG - pH: 7.47  | pCO2: 43    | pO2: 28    | Lactate: 0.8          Care Discussed with Consultants/Other Providers:    RADIOLOGY & ADDITIONAL TESTS:  (Imaging Personally Reviewed)

## 2018-08-09 NOTE — DISCHARGE NOTE ADULT - PROVIDER TOKENS
TOKEN:'[id number]' TOKEN:'[id number]:'[id number]'

## 2018-08-09 NOTE — PROGRESS NOTE ADULT - ASSESSMENT
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 flutter requiring cardioversion (not ablated due to cardiac mass vs thrombus on coumadin at home). Continues to have diarrhea (neg c. diff), afebrile since 8/7.     Neutropenic sepsis  - afebrile since 8/7, normotensive  - unknown source, CXR negative, Ucx negative, blood cx negative, no URI symptoms, negative C.diff, possible from PICC (changed 8/4).   - PICC line now functioning 100%, will draw blood cultures from PICC  - CT chest w/o contrast, CT ab/pelvis w/IV and PO contrast - pending  - F/u GI PCR and ova/parasite   - WBC 0.48, ANC 10 (19 on admission)  - c/w cefepime and Vanc 1g (day 4), f/u vanc level   - Lactate 0.8 (8/8), 2.9 (8/6), will continue to trend  - Recent chemo (7/29-8/3)  - Recently received Pegfilgrastim (8/3), as per heme/onc unable to give again (14 days).     Thrombocytopenia  - likely due to consumption 2/2 sepsis  - 27 yesterday at 16:51, 26 yesterday at 06:53  (87 on admission)  - smear showed large platelets, no schistocytes  - type and screen ordered    Hypercoaguable state:  - 2/2 to malignancy  - INR subtherapeutic  - A/C held given downtrending platelets, once platelets>50k, will resume on lovenox  - transfuse for plts<10 or <20 and febrile    Hyponatremia  - Serum, urine osm and Brittany seem to be c/w SIADH  - fluid restrict 1 L, bolus PRN  - Monitor volume status closely given EF: 40-45%    Hypokalemia  - Potassium 3.2 yesterday at 16:51  - possibly 2/2 diarrhea, vomiting (resolved)  - Given KCl 40 PO x2 and KCL 10 IV x2 yesterday, will repeat BMP today  - Supplement as needed, potassium goal of 4    Hypophosphatemia  - 1.1 yesterday at 16:51  - repleted with KPhos IV 15 x2 overnight, Sodium Phos 10 IV x1 during the day yesterday (Phos 2.2), will repeat BMP today    Transaminitis:  - Resolved, LFTs WNL (8/8)  - RUQ showed fatty liver, cholelithiasis, otherwise unremarkable    Dispo: pending ANC>1000, improvement in platelets, CT C/A/P .

## 2018-08-10 ENCOUNTER — APPOINTMENT (OUTPATIENT)
Dept: GERIATRICS | Facility: CLINIC | Age: 38
End: 2018-08-10

## 2018-08-10 ENCOUNTER — OTHER (OUTPATIENT)
Age: 38
End: 2018-08-10

## 2018-08-10 LAB
BASOPHILS # BLD AUTO: 0.02 K/UL — SIGNIFICANT CHANGE UP (ref 0–0.2)
BASOPHILS NFR BLD AUTO: 1.5 % — SIGNIFICANT CHANGE UP (ref 0–2)
BASOPHILS NFR SPEC: 0 % — SIGNIFICANT CHANGE UP (ref 0–2)
BLASTS # FLD: 0 % — SIGNIFICANT CHANGE UP (ref 0–0)
BUN SERPL-MCNC: 11 MG/DL — SIGNIFICANT CHANGE UP (ref 7–23)
CALCIUM SERPL-MCNC: 8.4 MG/DL — SIGNIFICANT CHANGE UP (ref 8.4–10.5)
CHLORIDE SERPL-SCNC: 91 MMOL/L — LOW (ref 98–107)
CO2 SERPL-SCNC: 29 MMOL/L — SIGNIFICANT CHANGE UP (ref 22–31)
CREAT SERPL-MCNC: 0.93 MG/DL — SIGNIFICANT CHANGE UP (ref 0.5–1.3)
EOSINOPHIL # BLD AUTO: 0.01 K/UL — SIGNIFICANT CHANGE UP (ref 0–0.5)
EOSINOPHIL NFR BLD AUTO: 0.8 % — SIGNIFICANT CHANGE UP (ref 0–6)
EOSINOPHIL NFR FLD: 0 % — SIGNIFICANT CHANGE UP (ref 0–6)
GIANT PLATELETS BLD QL SMEAR: PRESENT — SIGNIFICANT CHANGE UP
GLUCOSE SERPL-MCNC: 102 MG/DL — HIGH (ref 70–99)
HCT VFR BLD CALC: 34.4 % — LOW (ref 39–50)
HCT VFR BLD CALC: 36.5 % — LOW (ref 39–50)
HGB BLD-MCNC: 12.1 G/DL — LOW (ref 13–17)
HGB BLD-MCNC: 12.6 G/DL — LOW (ref 13–17)
HYPOCHROMIA BLD QL: SIGNIFICANT CHANGE UP
IMM GRANULOCYTES # BLD AUTO: 0.01 # — SIGNIFICANT CHANGE UP
IMM GRANULOCYTES NFR BLD AUTO: 0.8 % — SIGNIFICANT CHANGE UP (ref 0–1.5)
LYMPHOCYTES # BLD AUTO: 0.6 K/UL — LOW (ref 1–3.3)
LYMPHOCYTES # BLD AUTO: 46.2 % — HIGH (ref 13–44)
LYMPHOCYTES NFR SPEC AUTO: 63.2 % — HIGH (ref 13–44)
MAGNESIUM SERPL-MCNC: 2.2 MG/DL — SIGNIFICANT CHANGE UP (ref 1.6–2.6)
MCHC RBC-ENTMCNC: 29.3 PG — SIGNIFICANT CHANGE UP (ref 27–34)
MCHC RBC-ENTMCNC: 30.6 PG — SIGNIFICANT CHANGE UP (ref 27–34)
MCHC RBC-ENTMCNC: 34.5 % — SIGNIFICANT CHANGE UP (ref 32–36)
MCHC RBC-ENTMCNC: 35.2 % — SIGNIFICANT CHANGE UP (ref 32–36)
MCV RBC AUTO: 84.9 FL — SIGNIFICANT CHANGE UP (ref 80–100)
MCV RBC AUTO: 86.9 FL — SIGNIFICANT CHANGE UP (ref 80–100)
METAMYELOCYTES # FLD: 0.9 % — SIGNIFICANT CHANGE UP (ref 0–1)
MICROCYTES BLD QL: SIGNIFICANT CHANGE UP
MONOCYTES # BLD AUTO: 0.2 K/UL — SIGNIFICANT CHANGE UP (ref 0–0.9)
MONOCYTES NFR BLD AUTO: 15.4 % — HIGH (ref 2–14)
MONOCYTES NFR BLD: 4.4 % — SIGNIFICANT CHANGE UP (ref 2–9)
MYELOCYTES NFR BLD: 0 % — SIGNIFICANT CHANGE UP (ref 0–0)
NEUTROPHIL AB SER-ACNC: 13.1 % — LOW (ref 43–77)
NEUTROPHILS # BLD AUTO: 0.46 K/UL — LOW (ref 1.8–7.4)
NEUTROPHILS NFR BLD AUTO: 35.3 % — LOW (ref 43–77)
NEUTS BAND # BLD: 7 % — HIGH (ref 0–6)
NRBC # FLD: 0 — SIGNIFICANT CHANGE UP
NRBC # FLD: 0 — SIGNIFICANT CHANGE UP
OTHER - HEMATOLOGY %: 0 — SIGNIFICANT CHANGE UP
PHOSPHATE SERPL-MCNC: 2.1 MG/DL — LOW (ref 2.5–4.5)
PLATELET # BLD AUTO: 45 K/UL — LOW (ref 150–400)
PLATELET # BLD AUTO: 9 K/UL — CRITICAL LOW (ref 150–400)
PLATELET COUNT - ESTIMATE: SIGNIFICANT CHANGE UP
PMV BLD: 10.5 FL — SIGNIFICANT CHANGE UP (ref 7–13)
PMV BLD: 13.4 FL — HIGH (ref 7–13)
POLYCHROMASIA BLD QL SMEAR: SLIGHT — SIGNIFICANT CHANGE UP
POTASSIUM SERPL-MCNC: 3.6 MMOL/L — SIGNIFICANT CHANGE UP (ref 3.5–5.3)
POTASSIUM SERPL-SCNC: 3.6 MMOL/L — SIGNIFICANT CHANGE UP (ref 3.5–5.3)
PROMYELOCYTES # FLD: 0 % — SIGNIFICANT CHANGE UP (ref 0–0)
RBC # BLD: 3.96 M/UL — LOW (ref 4.2–5.8)
RBC # BLD: 4.3 M/UL — SIGNIFICANT CHANGE UP (ref 4.2–5.8)
RBC # FLD: 11.5 % — SIGNIFICANT CHANGE UP (ref 10.3–14.5)
RBC # FLD: 11.6 % — SIGNIFICANT CHANGE UP (ref 10.3–14.5)
REVIEW TO FOLLOW: YES — SIGNIFICANT CHANGE UP
SODIUM SERPL-SCNC: 133 MMOL/L — LOW (ref 135–145)
VARIANT LYMPHS # BLD: 11.4 % — SIGNIFICANT CHANGE UP
WBC # BLD: 1.3 K/UL — LOW (ref 3.8–10.5)
WBC # BLD: 1.43 K/UL — LOW (ref 3.8–10.5)
WBC # FLD AUTO: 1.3 K/UL — LOW (ref 3.8–10.5)
WBC # FLD AUTO: 1.43 K/UL — LOW (ref 3.8–10.5)

## 2018-08-10 PROCEDURE — 99233 SBSQ HOSP IP/OBS HIGH 50: CPT | Mod: GC

## 2018-08-10 RX ORDER — CHLORHEXIDINE GLUCONATE 213 G/1000ML
1 SOLUTION TOPICAL
Qty: 0 | Refills: 0 | Status: DISCONTINUED | OUTPATIENT
Start: 2018-08-10 | End: 2018-08-12

## 2018-08-10 RX ORDER — DIPHENHYDRAMINE HCL 50 MG
25 CAPSULE ORAL ONCE
Qty: 0 | Refills: 0 | Status: COMPLETED | OUTPATIENT
Start: 2018-08-10 | End: 2018-08-10

## 2018-08-10 RX ORDER — LANOLIN ALCOHOL/MO/W.PET/CERES
3 CREAM (GRAM) TOPICAL AT BEDTIME
Qty: 0 | Refills: 0 | Status: DISCONTINUED | OUTPATIENT
Start: 2018-08-10 | End: 2018-08-12

## 2018-08-10 RX ORDER — ACETAMINOPHEN 500 MG
650 TABLET ORAL ONCE
Qty: 0 | Refills: 0 | Status: COMPLETED | OUTPATIENT
Start: 2018-08-10 | End: 2018-08-10

## 2018-08-10 RX ORDER — ACETAMINOPHEN 500 MG
500 TABLET ORAL ONCE
Qty: 0 | Refills: 0 | Status: DISCONTINUED | OUTPATIENT
Start: 2018-08-10 | End: 2018-08-10

## 2018-08-10 RX ADMIN — Medication 3 MILLIGRAM(S): at 21:15

## 2018-08-10 RX ADMIN — Medication 650 MILLIGRAM(S): at 14:25

## 2018-08-10 RX ADMIN — Medication 1 TABLET(S): at 12:05

## 2018-08-10 RX ADMIN — CEFEPIME 100 MILLIGRAM(S): 1 INJECTION, POWDER, FOR SOLUTION INTRAMUSCULAR; INTRAVENOUS at 06:57

## 2018-08-10 RX ADMIN — CHLORHEXIDINE GLUCONATE 1 APPLICATION(S): 213 SOLUTION TOPICAL at 12:06

## 2018-08-10 RX ADMIN — CEFEPIME 100 MILLIGRAM(S): 1 INJECTION, POWDER, FOR SOLUTION INTRAMUSCULAR; INTRAVENOUS at 18:09

## 2018-08-10 RX ADMIN — Medication 25 MILLIGRAM(S): at 14:17

## 2018-08-10 RX ADMIN — Medication 650 MILLIGRAM(S): at 14:26

## 2018-08-10 NOTE — PROVIDER CONTACT NOTE (CRITICAL VALUE NOTIFICATION) - SITUATION
Patient's WBC 0.74. Previous WBC 0.48.
WBC 0.41, previous WBC last night 0.45
Neutropenic Sepsis
Potassium 2.8. Previous potassium 2.8. Patient received potassium chloride powder and potassium chloride 10mEq x1 during the day.
WBC 0.45. Previous WBC 0.33

## 2018-08-10 NOTE — ADVANCED PRACTICE NURSE CONSULT - RECOMMEDATIONS
Serum pre-albumin with next blood draws.  Nutrition consult to optimize wound healing; pt with slow-healing surgical wound. Of note pt currently on chemotherapy.    Follow up with Dr. Nj and/or Dr. Quesada outpatient for slow healing abdominal surgical wound.     Topical recommendations:  Abdominal surgical wound- Gently cleanse wound and periwound skin with NS. Apply Liquid barrier film to periwound skin. Apply Medihoney gel to wound base, cover with silicone foam with border. Change daily.    Risk for moisture associated dermatitis beneath abdominal pannus- Cleanse with lukewarm soap and water. Dry well. Apply Interdry textile sheeting, under intertriginous folds leaving 2 inches exposed at ends to wick, remove to wash & dry affected area, then replace. Individual sheeting may be used for up to 5 days unless soiled.     Continue low air loss bed therapy, continue heel elevation with Z-flex fluidized positioning boots while in bed, continue to encourage/assist turning & repositioning q2h with Z-esvin fluidized positioning device, continue moisture management as recommended above & single breathable pad, continue measures to decrease friction/shear/pressure. Continue with nutritional support as per dietary/orders.  Findings and plan discussed with patient and primary team. Patient educated on topical wound therapy, all questions and concerns addressed.    Please contact Wound Care Service Line if we can be of further assistance (ext [phone]). Serum pre-albumin with next blood draws.  Nutrition consult to optimize wound healing; pt with slow-healing surgical wound. Of note pt currently on chemotherapy.  Please obtain height.     Follow up with Dr. Nj and/or Dr. Quesada outpatient for slow healing abdominal surgical wound.     Topical recommendations:  Abdominal surgical wound- Gently cleanse wound and periwound skin with NS. Apply Liquid barrier film to periwound skin. Apply Medihoney gel to wound base, cover with silicone foam with border. Change daily.    Risk for moisture associated dermatitis beneath abdominal pannus- Cleanse with lukewarm soap and water. Dry well. Apply Interdry textile sheeting, under intertriginous folds leaving 2 inches exposed at ends to wick, remove to wash & dry affected area, then replace. Individual sheeting may be used for up to 5 days unless soiled.     Continue low air loss bed therapy, continue heel elevation with Z-flex fluidized positioning boots while in bed, continue to encourage/assist turning & repositioning q2h with Z-esvin fluidized positioning device, continue moisture management as recommended above & single breathable pad, continue measures to decrease friction/shear/pressure. Continue with nutritional support as per dietary/orders.  Findings and plan discussed with patient and primary team. Patient educated on topical wound therapy, all questions and concerns addressed.    Please contact Wound Care Service Line if we can be of further assistance (ext [phone]).

## 2018-08-10 NOTE — PROGRESS NOTE ADULT - ATTENDING COMMENTS
Patient was seen and examined personally by me. I have discussed the plan and reviewed the resident's note and agree with the above physical exam findings including assessment and plan except as indicated below.    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 flutter requiring cardioversion (not ablated due to cardiac mass on coumadin), p/w worsening nausea, vomiting x 5 days, found to be febrile and tachycardic in ED, admitted for neutropenic sepsis with acute organ dysfunction with transaminitis     ANC improving, BCX NGTD, c/w cefepime  1 U platelets ordered for platelets: 9, once platelets>50k, will resume on lovenox, transfuse for plts<10 or <20 and febrile  Thrombocytopenia likely due to consumption in setting of sepsis, smear without schistocytes, DCed protonix-can affect platelets as well  Serum, urine osm and Brittany seem to be c/w SIADH, c/w fluid restrict 1 L, bolus PRN for hypotension, Monitor volume status closely: EF: 40-45%  Added Ensure  Dispo: pending ANC>1000, improvement in platelets

## 2018-08-10 NOTE — PROGRESS NOTE ADULT - ASSESSMENT
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 flutter requiring cardioversion (not ablated due to cardiac mass vs thrombus on coumadin at home). Continues to have diarrhea (neg c. diff), afebrile since 8/7.     Neutropenic sepsis  - afebrile since 8/7, normotensive  - unknown source, CXR negative, Ucx negative, blood cx negative, no URI symptoms, negative C.diff, possible from PICC (changed 8/4).   - PICC line now functioning 100%, will draw blood cultures from PICC  - CT chest w/o contrast, CT ab/pelvis w/IV and PO contrast - pending  - F/u GI PCR and ova/parasite   - WBC 0.48, ANC 10 (19 on admission)  - c/w cefepime and Vanc 1g (day 4), f/u vanc level   - Lactate 0.8 (8/8), 2.9 (8/6), will continue to trend  - Recent chemo (7/29-8/3)  - Recently received Pegfilgrastim (8/3), as per heme/onc unable to give again (14 days).     Thrombocytopenia  - likely due to consumption 2/2 sepsis  - 27 yesterday at 16:51, 26 yesterday at 06:53  (87 on admission)  - smear showed large platelets, no   Hypercoaguable state:  - 2/2 to malignancy  - INR subtherapeutic  - A/C held given downtrending platelets, once platelets>50k, will resume on lovenox  - transfuse for plts<10 or <20 and febrile    Hyponatremia  - Serum, urine osm and Brittany seem to be c/w SIADH  - fluid restrict 1 L, bolus PRN  - Monitor volume status closely given EF: 40-45%    Hypokalemia  - Potassium 3.2 yesterday at 16:51  - possibly 2/2 diarrhea, vomiting (resolved)  - Given KCl 40 PO x2 and KCL 10 IV x2 yesterday, will repeat BMP today  - Supplement as needed, potassium goal of 4    Hypophosphatemia  - 1.1 yesterday at 16:51  - repleted with KPhos IV 15 x2 overnight, Sodium Phos 10 IV x1 during the day yesterday (Phos 2.2), will repeat BMP today    Transaminitis:  - Resolved, LFTs WNL (8/8)  - RUQ showed fatty liver, cholelithiasis, otherwise unremarkable    Dispo: pending ANC>1000, improvement in platelets, CT C/A/P . 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 flutter requiring cardioversion (not ablated due to cardiac mass vs thrombus on coumadin at home).      Neutropenic sepsis  - afebrile since 8/7, normotensive  - unknown source, CXR negative, Ucx negative, blood cx negative, no URI symptoms, negative C.diff, possible from PICC (changed 8/4).   - PICC line now functioning 100%, will draw blood cultures from PICC  - CT chest w/o contrast, CT ab/pelvis w/IV and PO contrast - pending  - GI PCR negative    - F/u CBC today to check ANC (goal >1000)  - c/w cefepime and Vanc 1g (day 5), f/u vanc level   - Recent chemo (7/29-8/3)  - Recently received Pegfilgrastim (8/3), as per heme/onc unable to give again (14 days).     Thrombocytopenia  - likely due to consumption 2/2 sepsis  - f/u CBC to monitor platelets, 27 yesterday at 16:51  - smear showed large platelets, no     Hypercoaguable state:  - 2/2 to malignancy  - INR subtherapeutic  - A/C held given downtrending platelets, once platelets>50k, will resume on lovenox  - transfuse for plts<10 or <20 and febrile    Hyponatremia  - Serum, urine osm and Brittany seem to be c/w SIADH  - fluid restrict 1 L, bolus PRN  - Monitor volume status closely given EF: 40-45%    Hypokalemia  - Now WNL  - Supplement as needed, potassium goal of 4    Transaminitis:  - Resolved, LFTs WNL (8/8)  - RUQ showed fatty liver, cholelithiasis, otherwise unremarkable    Dispo: pending ANC>1000, improvement in platelets, CT C/A/P .

## 2018-08-10 NOTE — PROGRESS NOTE ADULT - NSHPATTENDINGPLANDISCUSS_GEN_ALL_CORE
patient, Team 2, Dr. Mccall
patient, Team 2, RN
patient, Team 2,  [facility]
patient, Team 2, RN,  [facility]

## 2018-08-10 NOTE — PROVIDER CONTACT NOTE (CRITICAL VALUE NOTIFICATION) - ACTION/TREATMENT ORDERED:
No new order
Will follow up orders
1 Unit Plt, recheck platelets after transfusion.
None at this time
Awaiting orders
None at this time

## 2018-08-10 NOTE — PROGRESS NOTE ADULT - SUBJECTIVE AND OBJECTIVE BOX
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 flutter requiring cardioversion (not ablated due to cardiac mass vs thrombus on coumadin at home). Continues to have diarrhea (neg c. diff), afebrile since 8/7.     Neutropenic sepsis  - afebrile since 8/7, normotensive  - unknown source, CXR negative, Ucx negative, blood cx negative, no URI symptoms, negative C.diff, possible from PICC (changed 8/4).   - PICC line now functioning 100%, will draw blood cultures from PICC  - CT chest w/o contrast, CT ab/pelvis w/IV and PO contrast - pending  - F/u GI PCR and ova/parasite   - WBC 0.48, ANC 10 (19 on admission)  - c/w cefepime and Vanc 1g (day 4), f/u vanc level   - Lactate 0.8 (8/8), 2.9 (8/6), will continue to trend  - Recent chemo (7/29-8/3)  - Recently received Pegfilgrastim (8/3), as per heme/onc unable to give again (14 days).     Thrombocytopenia  - likely due to consumption 2/2 sepsis  - 27 yesterday at 16:51, 26 yesterday at 06:53  (87 on admission)  - smear showed large platelets, no   Hypercoaguable state:  - 2/2 to malignancy  - INR subtherapeutic  - A/C held given downtrending platelets, once platelets>50k, will resume on lovenox  - transfuse for plts<10 or <20 and febrile    Hyponatremia  - Serum, urine osm and Brittany seem to be c/w SIADH  - fluid restrict 1 L, bolus PRN  - Monitor volume status closely given EF: 40-45%    Hypokalemia  - Potassium 3.2 yesterday at 16:51  - possibly 2/2 diarrhea, vomiting (resolved)  - Given KCl 40 PO x2 and KCL 10 IV x2 yesterday, will repeat BMP today  - Supplement as needed, potassium goal of 4    Hypophosphatemia  - 1.1 yesterday at 16:51  - repleted with KPhos IV 15 x2 overnight, Sodium Phos 10 IV x1 during the day yesterday (Phos 2.2), will repeat BMP today    Transaminitis:  - Resolved, LFTs WNL (8/8)  - RUQ showed fatty liver, cholelithiasis, otherwise unremarkable    Dispo: pending ANC>1000, improvement in platelets, CT C/A/P . =========================================  CONTACT INFO  James Hendrix M.D., PGY-1  Pager: [facility]- [phone], [facility]- [phone]    Mon-Fri: pager covered by day team 7am-7pm;   ***Academic conferences 12pm-1pm- page ONLY if URGENT or if Consultant  /Yulia: see chart, primary physician assigned available 7am-12pm  Sat/[place] Coverage 12pm-7pm: [facility]- page [phone] for Team1-4, [facility]- pager forwarded to covering Resident  For Night coverage 7pm-7am: [facility]- page [phone] Team1-3, page [phone] Team4 & Care Model; [facility]- page [phone] for Red, [phone] for Blue  =========================================    Patient is a 38y old  Male who presents with a chief complaint of Neutropenic sepsis (09 Aug 2018 17:50)      SUBJECTIVE / OVERNIGHT EVENTS:  Patient seen and examined at bedside.    Other Review of Systems Negative.    MEDICATIONS  (STANDING):  cefepime   IVPB 2000 milliGRAM(s) IV Intermittent every 12 hours  chlorhexidine 4% Liquid 1 Application(s) Topical <User Schedule>  lactobacillus acidophilus 1 Tablet(s) Oral daily  nystatin Powder 1 Application(s) Topical two times a day    MEDICATIONS  (PRN):  acetaminophen   Tablet 650 milliGRAM(s) Oral every 6 hours PRN For Temp greater than 38 C (100.4 F)  aluminum hydroxide/magnesium hydroxide/simethicone Suspension 30 milliLiter(s) Oral every 4 hours PRN Dyspepsia  benzocaine 15 mG/menthol 3.6 mG Lozenge 1 Lozenge Oral three times a day PRN Sore Throat  ondansetron Injectable 4 milliGRAM(s) IV Push every 6 hours PRN Nausea and/or Vomiting      OBJECTIVE:    Vital Signs Last 24 Hrs  T(C): 37 (10 Aug 2018 06:59), Max: 37.3 (09 Aug 2018 14:16)  T(F): 98.6 (10 Aug 2018 06:59), Max: 99.1 (09 Aug 2018 14:16)  HR: 94 (10 Aug 2018 06:59) (90 - 103)  BP: 101/62 (10 Aug 2018 06:59) (92/62 - 104/69)  BP(mean): --  RR: 18 (10 Aug 2018 06:59) (18 - 18)  SpO2: 98% (10 Aug 2018 06:59) (98% - 100%)    CAPILLARY BLOOD GLUCOSE        I&O's Summary    09 Aug 2018 07:01  -  10 Aug 2018 07:00  --------------------------------------------------------  IN: 0 mL / OUT: 525 mL / NET: -525 mL        PHYSICAL EXAM:  GENERAL: NAD, well-developed  HEAD:  Atraumatic, Normocephalic  EYES: EOMI, PERRLA, conjunctiva and sclera clear  NECK: Supple, No JVD  CHEST/LUNG: Clear to auscultation bilaterally; No wheeze  HEART: Regular rate and rhythm; No murmurs, rubs, or gallops  ABDOMEN: Soft, Nontender, Nondistended; Bowel sounds present  EXTREMITIES:  2+ Peripheral Pulses, No clubbing, cyanosis, or edema  PSYCH: AAOx3  NEUROLOGY: non-focal  SKIN: No rashes or lesions    LABS:                        11.7   0.74  )-----------( 27       ( 09 Aug 2018 06:00 )             33.5     Auto Eosinophil # 0.02  / Auto Eosinophil % 2.7   / Auto Neutrophil # 0.08  / Auto Neutrophil % 10.8  / BANDS % x                            11.4   0.48  )-----------( 27       ( 08 Aug 2018 16:51 )             32.2     Auto Eosinophil # 0.00  / Auto Eosinophil % 0.0   / Auto Neutrophil # 0.02  / Auto Neutrophil % 4.2   / BANDS % x        08-10    133<L>  |  91<L>  |  11  ----------------------------<  102<H>  3.6   |  29  |  0.93  08-09    129<L>  |  91<L>  |  8   ----------------------------<  109<H>  3.4<L>   |  27  |  0.91  08-08    130<L>  |  90<L>  |  10  ----------------------------<  113<H>  3.2<L>   |  30  |  0.97    Ca    8.4      10 Aug 2018 06:54  Mg     2.2     08-10  Phos  2.1     08-10        ABG:     VBG:       Care Discussed with Consultants/Other Providers:    RADIOLOGY & ADDITIONAL TESTS:  (Imaging Personally Reviewed) =========================================  CONTACT MCKENZIE Hendrix M.D., PGY-1  Pager: [facility]- [phone], [facility]- [phone]    Mon-Fri: pager covered by day team 7am-7pm;   ***Academic conferences 12pm-1pm- page ONLY if URGENT or if Consultant  /Yulia: see chart, primary physician assigned available 7am-12pm  Sat/[place] Coverage 12pm-7pm: [facility]- page [phone] for Team1-4, [facility]- pager forwarded to covering Resident  For Night coverage 7pm-7am: [facility]- page [phone] Team1-3, page [phone] Team4 & Care Model; [facility]- page [phone] for Red, [phone] for Blue  =========================================    Patient is a 38y old  Male who presents with a chief complaint of Neutropenic sepsis (09 Aug 2018 17:50)      SUBJECTIVE / OVERNIGHT EVENTS:  Patient seen and examined at bedside. Pt would like to leave due to upcoming appointment with doctor for medical marijuana.     Other Review of Systems Negative.    MEDICATIONS  (STANDING):  cefepime   IVPB 2000 milliGRAM(s) IV Intermittent every 12 hours  chlorhexidine 4% Liquid 1 Application(s) Topical <User Schedule>  lactobacillus acidophilus 1 Tablet(s) Oral daily  nystatin Powder 1 Application(s) Topical two times a day    MEDICATIONS  (PRN):  acetaminophen   Tablet 650 milliGRAM(s) Oral every 6 hours PRN For Temp greater than 38 C (100.4 F)  aluminum hydroxide/magnesium hydroxide/simethicone Suspension 30 milliLiter(s) Oral every 4 hours PRN Dyspepsia  benzocaine 15 mG/menthol 3.6 mG Lozenge 1 Lozenge Oral three times a day PRN Sore Throat  ondansetron Injectable 4 milliGRAM(s) IV Push every 6 hours PRN Nausea and/or Vomiting      OBJECTIVE:    Vital Signs Last 24 Hrs  T(C): 37 (10 Aug 2018 06:59), Max: 37.3 (09 Aug 2018 14:16)  T(F): 98.6 (10 Aug 2018 06:59), Max: 99.1 (09 Aug 2018 14:16)  HR: 94 (10 Aug 2018 06:59) (90 - 103)  BP: 101/62 (10 Aug 2018 06:59) (92/62 - 104/69)  BP(mean): --  RR: 18 (10 Aug 2018 06:59) (18 - 18)  SpO2: 98% (10 Aug 2018 06:59) (98% - 100%)    CAPILLARY BLOOD GLUCOSE        I&O's Summary    09 Aug 2018 07:01  -  10 Aug 2018 07:00  --------------------------------------------------------  IN: 0 mL / OUT: 525 mL / NET: -525 mL        PHYSICAL EXAM:  	GENERAL: NAD, well-developed  	HEAD:  Atraumatic, Normocephalic  	EYES:sclera clear  	Mouth: MMM, no lesions  	NECK: Supple, no appreciable masses  	Lung: normal work of breathing, cta b/l  	Chest: S1&S2+, rrr, no m/r/g appreciated  	ABDOMEN: bs+, soft, nt, nd, no appreciable masses, midline abdominal surgical scar  	EXTREMITIES:  radial pulse present b/l, PT present b/l, no pitting edema present  	Neuro: Awake and alert, answers questions appropriately  	SKIN: warm and dry, no visible rashes. Suprapubic superficial abrasion, no drainage, warmth, no induration.  Lines: Left upper arm PICC line, clean dry and intact, ecchymosis surrounding the site, no erythema, edema, induration, or drainage    LABS:                        11.7   0.74  )-----------( 27       ( 09 Aug 2018 06:00 )             33.5     Auto Eosinophil # 0.02  / Auto Eosinophil % 2.7   / Auto Neutrophil # 0.08  / Auto Neutrophil % 10.8  / BANDS % x                            11.4   0.48  )-----------( 27       ( 08 Aug 2018 16:51 )             32.2     Auto Eosinophil # 0.00  / Auto Eosinophil % 0.0   / Auto Neutrophil # 0.02  / Auto Neutrophil % 4.2   / BANDS % x        08-10    133<L>  |  91<L>  |  11  ----------------------------<  102<H>  3.6   |  29  |  0.93  08-09    129<L>  |  91<L>  |  8   ----------------------------<  109<H>  3.4<L>   |  27  |  0.91  08-08    130<L>  |  90<L>  |  10  ----------------------------<  113<H>  3.2<L>   |  30  |  0.97    Ca    8.4      10 Aug 2018 06:54  Mg     2.2     08-10  Phos  2.1     08-10        ABG:     VBG:       Care Discussed with Consultants/Other Providers:    RADIOLOGY & ADDITIONAL TESTS:  (Imaging Personally Reviewed)

## 2018-08-10 NOTE — PROVIDER CONTACT NOTE (CRITICAL VALUE NOTIFICATION) - ASSESSMENT
No s/s bleeding noted.
No s/s of acute distress, on neutropenic precautions
No s/s of acute distress
No s/s of acute distress

## 2018-08-10 NOTE — ADVANCED PRACTICE NURSE CONSULT - ASSESSMENT
General: A&O x 4, Left upper arm PICC line in place, dressing clean dry and intact. As per A&I pt requires one person assist for ambulation/activity, at this time due to lethargy and weakness pt spends majority of time in bed (as per patient). Continent of urine and stool. Skin warm, dry with increased moisture in intertriginous folds, adequate skin turgor, scattered areas of hyperpigmentation and hypopigmentation. Noted midline abdominal incisional scar.    Abdominal slow healing surgical wound- linear surgical scar measures 25cm in length. At distal end there is a slow healing open ulceration measuring [id number], base 100% pink-flat agranular with thin fibrin film. Scant serous drainage, no odor. Periwound skin intact. No increased warmth, no edema, no erythema, no induration noted. Area likely complicated by increased moisture as surgical wound is within intertriginous fold beneath abdominal pannus. Goals of care: maintain moist wound base to optimize wound healing, decrease bioburden, protect periwound skin, protect from increased moisture from intertriginous fold. General: A&O x 4, Left upper arm PICC line in place, dressing clean dry and intact. As per A&I pt requires one person assist for ambulation/activity, at this time due to lethargy and weakness pt spends majority of time in bed (as per patient). Continent of urine and stool. Skin warm, dry with increased moisture in intertriginous folds, adequate skin turgor, scattered areas of hyperpigmentation and hypopigmentation. Noted midline abdominal incisional scar.    Abdominal slow healing surgical wound- linear surgical scar measures 25cm in length. At distal end there is a slow healing open ulceration measuring [id number], base 100% pink-flat agranular with thin fibrin film. Scant serous drainage, no odor. Periwound skin intact. No increased warmth, no edema, no erythema, no induration noted. Area likely complicated by increased moisture as surgical wound is within intertriginous fold beneath abdominal pannus. Goals of care: maintain moist wound base to optimize wound healing, decrease bioburden, protect periwound skin, protect from increased moisture from intertriginous fold.    Findings and plan discussed with Dr. Carter.

## 2018-08-10 NOTE — PROVIDER CONTACT NOTE (CRITICAL VALUE NOTIFICATION) - PERSON GIVING RESULT:
Arjun Borrego
Fili Bruno (Chemistry)
Glenny Schafer)
TYREE Steinberg (Monae)
Tova Larson (Lab)
Toxicology/Latasha Maddox
Hematology/Michelle Wisdom

## 2018-08-11 LAB
BACTERIA BLD CULT: SIGNIFICANT CHANGE UP
BACTERIA BLD CULT: SIGNIFICANT CHANGE UP
BASOPHILS # BLD AUTO: 0.02 K/UL — SIGNIFICANT CHANGE UP (ref 0–0.2)
BASOPHILS NFR BLD AUTO: 0.9 % — SIGNIFICANT CHANGE UP (ref 0–2)
BUN SERPL-MCNC: 11 MG/DL — SIGNIFICANT CHANGE UP (ref 7–23)
CALCIUM SERPL-MCNC: 8.9 MG/DL — SIGNIFICANT CHANGE UP (ref 8.4–10.5)
CHLORIDE SERPL-SCNC: 93 MMOL/L — LOW (ref 98–107)
CO2 SERPL-SCNC: 31 MMOL/L — SIGNIFICANT CHANGE UP (ref 22–31)
CREAT SERPL-MCNC: 0.87 MG/DL — SIGNIFICANT CHANGE UP (ref 0.5–1.3)
EOSINOPHIL # BLD AUTO: 0.05 K/UL — SIGNIFICANT CHANGE UP (ref 0–0.5)
EOSINOPHIL NFR BLD AUTO: 2.3 % — SIGNIFICANT CHANGE UP (ref 0–6)
GLUCOSE SERPL-MCNC: 95 MG/DL — SIGNIFICANT CHANGE UP (ref 70–99)
HCT VFR BLD CALC: 33.2 % — LOW (ref 39–50)
HGB BLD-MCNC: 11.3 G/DL — LOW (ref 13–17)
IMM GRANULOCYTES # BLD AUTO: 0.17 # — SIGNIFICANT CHANGE UP
IMM GRANULOCYTES NFR BLD AUTO: 7.9 % — HIGH (ref 0–1.5)
LYMPHOCYTES # BLD AUTO: 0.74 K/UL — LOW (ref 1–3.3)
LYMPHOCYTES # BLD AUTO: 34.6 % — SIGNIFICANT CHANGE UP (ref 13–44)
MAGNESIUM SERPL-MCNC: 2 MG/DL — SIGNIFICANT CHANGE UP (ref 1.6–2.6)
MANUAL SMEAR VERIFICATION: SIGNIFICANT CHANGE UP
MCHC RBC-ENTMCNC: 29.5 PG — SIGNIFICANT CHANGE UP (ref 27–34)
MCHC RBC-ENTMCNC: 34 % — SIGNIFICANT CHANGE UP (ref 32–36)
MCV RBC AUTO: 86.7 FL — SIGNIFICANT CHANGE UP (ref 80–100)
MONOCYTES # BLD AUTO: 0.38 K/UL — SIGNIFICANT CHANGE UP (ref 0–0.9)
MONOCYTES NFR BLD AUTO: 17.8 % — HIGH (ref 2–14)
NEUTROPHILS # BLD AUTO: 0.78 K/UL — LOW (ref 1.8–7.4)
NEUTROPHILS NFR BLD AUTO: 36.5 % — LOW (ref 43–77)
NRBC # FLD: 0.02 — SIGNIFICANT CHANGE UP
PHOSPHATE SERPL-MCNC: 2.5 MG/DL — SIGNIFICANT CHANGE UP (ref 2.5–4.5)
PLATELET # BLD AUTO: 37 K/UL — LOW (ref 150–400)
PMV BLD: 12 FL — SIGNIFICANT CHANGE UP (ref 7–13)
POTASSIUM SERPL-MCNC: 3.8 MMOL/L — SIGNIFICANT CHANGE UP (ref 3.5–5.3)
POTASSIUM SERPL-SCNC: 3.8 MMOL/L — SIGNIFICANT CHANGE UP (ref 3.5–5.3)
RBC # BLD: 3.83 M/UL — LOW (ref 4.2–5.8)
RBC # FLD: 11.7 % — SIGNIFICANT CHANGE UP (ref 10.3–14.5)
SODIUM SERPL-SCNC: 135 MMOL/L — SIGNIFICANT CHANGE UP (ref 135–145)
WBC # BLD: 2.14 K/UL — LOW (ref 3.8–10.5)
WBC # FLD AUTO: 2.14 K/UL — LOW (ref 3.8–10.5)

## 2018-08-11 PROCEDURE — 99233 SBSQ HOSP IP/OBS HIGH 50: CPT | Mod: GC

## 2018-08-11 RX ADMIN — Medication 1 TABLET(S): at 13:32

## 2018-08-11 RX ADMIN — CHLORHEXIDINE GLUCONATE 1 APPLICATION(S): 213 SOLUTION TOPICAL at 13:32

## 2018-08-11 RX ADMIN — CEFEPIME 100 MILLIGRAM(S): 1 INJECTION, POWDER, FOR SOLUTION INTRAMUSCULAR; INTRAVENOUS at 06:40

## 2018-08-11 RX ADMIN — CEFEPIME 100 MILLIGRAM(S): 1 INJECTION, POWDER, FOR SOLUTION INTRAMUSCULAR; INTRAVENOUS at 17:57

## 2018-08-11 NOTE — PROVIDER CONTACT NOTE (OTHER) - ACTION/TREATMENT ORDERED:
Provider said ok to give Tylenol.
Provider aware, no action ordered
Random vancomycin lab to be ordered with AM labs.

## 2018-08-11 NOTE — PROGRESS NOTE ADULT - ATTENDING COMMENTS
Pt seen and examined by me Agree with resident  Overall, pt with clinical improvement- no fever, ANC slowly improving  continue Cefepime  Monitor WBC, platelets  Restart AC once Platelets > 50

## 2018-08-11 NOTE — PROGRESS NOTE ADULT - ASSESSMENT
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 flutter requiring cardioversion (not ablated due to cardiac mass vs thrombus on coumadin at home).      Neutropenic sepsis  - afebrile since 8/7, normotensive  - unknown source, CXR negative, Ucx negative, blood cx negative, no URI symptoms, negative C.diff, possible from PICC (changed 8/4).   - PICC line now functioning 100%, will draw blood cultures from PICC  - CT chest w/o contrast, CT ab/pelvis w/IV and PO contrast - pending  - GI PCR negative    - F/u CBC today to check ANC (goal >1000)  - c/w cefepime and Vanc 1g (day 5), f/u vanc level   - Recent chemo (7/29-8/3)  - Recently received Pegfilgrastim (8/3), as per heme/onc unable to give again (14 days).     Thrombocytopenia  - likely due to consumption 2/2 sepsis  - f/u CBC to monitor platelets, 27 yesterday at 16:51  - smear showed large platelets, no     Hypercoaguable state:  - 2/2 to malignancy  - INR subtherapeutic  - A/C held given downtrending platelets, once platelets>50k, will resume on lovenox  - transfuse for plts<10 or <20 and febrile    Hyponatremia  - Serum, urine osm and Brittany seem to be c/w SIADH  - fluid restrict 1 L, bolus PRN  - Monitor volume status closely given EF: 40-45%    Hypokalemia  - Now WNL  - Supplement as needed, potassium goal of 4    Transaminitis:  - Resolved, LFTs WNL (8/8)  - RUQ showed fatty liver, cholelithiasis, otherwise unremarkable    Dispo: pending ANC>1000, improvement in platelets, CT C/A/P . 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 flutter requiring cardioversion (not ablated due to cardiac mass vs thrombus on coumadin at home).      Neutropenic sepsis  - afebrile since 8/7, normotensive  - unknown source, CXR negative, Ucx negative, blood cx negative, no URI symptoms, negative C.diff, possible from PICC (changed 8/4).   - AND improving today 788, goal of >1000  - PICC line now functioning 100%, will draw blood cultures from PICC  - CT chest w/o contrast, CT ab/pelvis w/IV and PO contrast - pending  - GI PCR negative    - F/u CBC today to check ANC (goal >1000)  - c/w cefepime and Vanc 1g (day 6), f/u vanc level   - Recent chemo (7/29-8/3)  - Recently received Pegfilgrastim (8/3), as per heme/onc unable to give again (14 days).     Thrombocytopenia  - likely due to consumption 2/2 sepsis  - f/u CBC to monitor platelets, 37 today down from 45 s/p 1 unit platelet  - smear showed large platelets, no schistocytes    Hypercoaguable state:  - 2/2 to malignancy  - INR subtherapeutic  - A/C held given low platelets, once platelets>50k, will resume on lovenox  - transfuse for plts<10 or <20 and febrile    Hyponatremia  - Serum, urine osm and Brittany seem to be c/w SIADH  - fluid restrict 1 L, bolus PRN  - Monitor volume status closely given EF: 40-45%    Hypokalemia  - Now WNL  - Supplement as needed, potassium goal of 4    Transaminitis:  - Resolved, LFTs WNL (8/8)  - RUQ showed fatty liver, cholelithiasis, otherwise unremarkable    Dispo: pending ANC>1000, improvement in platelets 38 yo male with metastatic testicular cancer admitted for neutropenic sepsis with acute organ disfunction s/p 1 cycle of 2nd round of chemo (7/29 to 8/3), left orchiectomy (July 2017), 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 flutter requiring cardioversion (not ablated due to cardiac mass vs thrombus on coumadin at home).      Neutropenic sepsis  - afebrile since 8/7, normotensive  - unknown source, CXR negative, Ucx negative, blood cx negative, no URI symptoms, negative C.diff, possible from PICC (changed 8/4).   - AND improving today 781, goal of >1000  - PICC line now functioning 100%, will draw blood cultures from PICC  - CT chest w/o contrast, CT ab/pelvis w/IV and PO contrast - pending  - GI PCR negative    - F/u CBC today to check ANC (goal >1000)  - c/w cefepime and Vanc 1g (day 6), f/u vanc level   - Recent chemo (7/29-8/3)  - Recently received Pegfilgrastim (8/3), as per heme/onc unable to give again (14 days).     Thrombocytopenia  - likely due to consumption 2/2 sepsis  - f/u CBC to monitor platelets, 37 today down from 45 s/p 1 unit platelet  - smear showed large platelets, no schistocytes    Hypercoaguable state:  - 2/2 to malignancy  - INR subtherapeutic  - A/C held given low platelets, once platelets>50k, will resume on lovenox  - transfuse for plts<10 or <20 and febrile    Hyponatremia  - Serum, urine osm and Brittany seem to be c/w SIADH  - fluid restrict 1 L, bolus PRN  - Monitor volume status closely given EF: 40-45%    Hypokalemia  - Now WNL  - Supplement as needed, potassium goal of 4    Transaminitis:  - Resolved, LFTs WNL (8/8)  - RUQ showed fatty liver, cholelithiasis, otherwise unremarkable    Dispo: pending ANC>1000, improvement in platelets 38 yo male with metastatic testicular cancer admitted for neutropenic sepsis with acute organ disfunction s/p 1 cycle of 2nd round of chemo (7/29 to 8/3), left orchiectomy (July 2017), 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 flutter requiring cardioversion (not ablated due to cardiac mass vs thrombus on coumadin at home).      Neutropenic sepsis  - afebrile since 8/7, normotensive  - unknown source, CXR negative, Ucx negative, blood cx negative, no URI symptoms, negative C.diff, possible from PICC (changed 8/4).   - AND improving today 781, goal of >1000  - PICC line now functioning 100%, will draw blood cultures from PICC  - CT chest w/o contrast, CT ab/pelvis w/IV and PO contrast - pending  - GI PCR negative    - F/u CBC today to check ANC (goal >1000)  - c/w cefepime and Vanc 1g (day 6), f/u vanc level   - Recent chemo (7/29-8/3)  - Recently received Pegfilgrastim (8/3), as per heme/onc unable to give again (14 days).     Thrombocytopenia  - likely due to consumption 2/2 sepsis  - f/u CBC to monitor platelets, 37 today down from 45 s/p 1 unit platelet  - smear showed large platelets, no schistocytes    Hypercoaguable state:  - 2/2 to malignancy  - INR subtherapeutic  - A/C held given low platelets, once platelets>50k, will resume on lovenox  - transfuse for plts<10 or <20 and febrile    Atrial mass vs thrombus:  - A/C held given low platelets, once platelets>50k, will resume on lovenox  - hx of a flutter, not ablated due to mass     Hyponatremia  - Serum, urine osm and Brittany seem to be c/w SIADH  - fluid restrict 1 L, bolus PRN  - Monitor volume status closely given EF: 40-45%    Hypokalemia  - Now WNL  - Supplement as needed, potassium goal of 4    Transaminitis:  - Resolved, LFTs WNL (8/8)  - RUQ showed fatty liver, cholelithiasis, otherwise unremarkable    Dispo: pending ANC>1000, improvement in platelets 36 yo male with metastatic testicular cancer admitted for neutropenic sepsis with acute organ disfunction s/p 1 cycle of 2nd round of chemo (7/29 to 8/3), left orchiectomy (July 2017), 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 flutter requiring cardioversion (not ablated due to cardiac mass vs thrombus on coumadin at home).      Neutropenic sepsis- pt overall feels improving, afebrile since 8/7   - - unknown source, CXR negative, Ucx negative, blood cx negative, no URI symptoms, negative C.diff, possible from PICC (changed 8/4).   - ANC improving today 780, goal of >1000  -- c/w cefepime   - Recent chemo (7/29-8/3)  - Recently received Pegfilgrastim (8/3), as per heme/onc unable to give again (14 days).     Thrombocytopenia  - likely due to chemo   - consumption 2/2 sepsis  - f/u CBC to monitor platelets, 37 today down from 45 s/p 1 unit platelet  - smear showed large platelets, no schistocytes    Hypercoagulable state:  - 2/2 to malignancy  - A/C held given low platelets, once platelets>50k, will resume on lovenox  - transfuse for plts<10 or <20 and febrile    Atrial mass vs thrombus:  - A/C held given low platelets, once platelets>50k, will resume on lovenox  - hx of a flutter, not ablated due to mass     Hyponatremia  - Serum, urine osm and Brittany seem to be c/w SIADH  - fluid restrict 1 L, bolus PRN  - Monitor volume status closely given EF: 40-45%    Hypokalemia  - Now WNL  - Supplement as needed, potassium goal of 4    Transaminitis:  - Resolved, LFTs WNL (8/8)  - RUQ showed fatty liver, cholelithiasis, otherwise unremarkable    Dispo: pending ANC>1000, improvement in platelets

## 2018-08-11 NOTE — PROGRESS NOTE ADULT - SUBJECTIVE AND OBJECTIVE BOX
=========================================  CONTACT MCKENZIE Hendrix M.D., PGY-1  Pager: [phone], [facility]- [phone]    Mon-Fri: pager covered by day team 7am-7pm;   ***Academic conferences 12pm-1pm- page ONLY if URGENT or if Consultant  /Yulia: see chart, primary physician assigned available 7am-12pm  Sat/[place] Coverage 12pm-7pm: NS- page [phone] for Team1-4, [facility]- pager forwarded to covering Resident  For Night coverage 7pm-7am: NS- page [phone] Team1-3, page [phone] Team4 & Care Model; [facility]- page [phone] for Red, [phone] for Blue  =========================================    Patient is a 38y old  Male who presents with a chief complaint of Neutropenic sepsis (09 Aug 2018 17:50)      SUBJECTIVE / OVERNIGHT EVENTS:  Patient seen and examined at bedside. No acute events overnight.     Other Review of Systems Negative.    MEDICATIONS  (STANDING):  cefepime   IVPB 2000 milliGRAM(s) IV Intermittent every 12 hours  chlorhexidine 4% Liquid 1 Application(s) Topical <User Schedule>  chlorhexidine 4% Liquid 1 Application(s) Topical <User Schedule>  lactobacillus acidophilus 1 Tablet(s) Oral daily  melatonin 3 milliGRAM(s) Oral at bedtime  nystatin Powder 1 Application(s) Topical two times a day    MEDICATIONS  (PRN):  acetaminophen   Tablet 650 milliGRAM(s) Oral every 6 hours PRN For Temp greater than 38 C (100.4 F)  aluminum hydroxide/magnesium hydroxide/simethicone Suspension 30 milliLiter(s) Oral every 4 hours PRN Dyspepsia  benzocaine 15 mG/menthol 3.6 mG Lozenge 1 Lozenge Oral three times a day PRN Sore Throat  ondansetron Injectable 4 milliGRAM(s) IV Push every 6 hours PRN Nausea and/or Vomiting      OBJECTIVE:    Vital Signs Last 24 Hrs  T(C): 36.9 (11 Aug 2018 06:48), Max: 37.2 (10 Aug 2018 20:31)  T(F): 98.5 (11 Aug 2018 06:48), Max: 98.9 (10 Aug 2018 20:31)  HR: 92 (11 Aug 2018 06:48) (92 - 98)  BP: 96/63 (11 Aug 2018 06:48) (92/60 - 97/67)  BP(mean): --  RR: 18 (11 Aug 2018 06:48) (18 - 18)  SpO2: 99% (11 Aug 2018 06:48) (98% - 100%)    CAPILLARY BLOOD GLUCOSE        I&O's Summary    10 Aug 2018 07:01  -  11 Aug 2018 07:00  --------------------------------------------------------  IN: 50 mL / OUT: 0 mL / NET: 50 mL        PHYSICAL EXAM:  GENERAL: NAD, well-developed  HEAD:  Atraumatic, Normocephalic  EYES: EOMI, PERRLA, conjunctiva and sclera clear  NECK: Supple, No JVD  CHEST/LUNG: Clear to auscultation bilaterally; No wheeze  HEART: Regular rate and rhythm; No murmurs, rubs, or gallops  ABDOMEN: Soft, Nontender, Nondistended; Bowel sounds present  EXTREMITIES:  2+ Peripheral Pulses, No clubbing, cyanosis, or edema  PSYCH: AAOx3  NEUROLOGY: non-focal  SKIN: No rashes or lesions    LABS:                        12.6   1.43  )-----------( 45       ( 10 Aug 2018 18:05 )             36.5     Auto Eosinophil # x     / Auto Eosinophil % x     / Auto Neutrophil # x     / Auto Neutrophil % x     / BANDS % x                            12.1   1.30  )-----------( 9        ( 10 Aug 2018 12:00 )             34.4     Auto Eosinophil # 0.01  / Auto Eosinophil % 0.8   / Auto Neutrophil # 0.46  / Auto Neutrophil % 35.3  / BANDS % 7.0      08-10    133<L>  |  91<L>  |  11  ----------------------------<  102<H>  3.6   |  29  |  0.93    Ca    8.4      10 Aug 2018 06:54  Mg     2.2     08-10  Phos  2.1     08-10                  ABG:     VBG:       Care Discussed with Consultants/Other Providers:    RADIOLOGY & ADDITIONAL TESTS:  (Imaging Personally Reviewed) =========================================  CONTACT MCKENZIE Hendrix M.D., PGY-1  Pager: NS- [phone], [facility]- [phone]    Mon-Fri: pager covered by day team 7am-7pm;   ***Academic conferences 12pm-1pm- page ONLY if URGENT or if Consultant  /Yulia: see chart, primary physician assigned available 7am-12pm  Sat/[place] Coverage 12pm-7pm: NS- page [phone] for Team1-4, [facility]- pager forwarded to covering Resident  For Night coverage 7pm-7am: NS- page [phone] Team1-3, page [phone] Team4 & Care Model; [facility]- page [phone] for Red, [phone] for Blue  =========================================    Patient is a 38y old  Male who presents with a chief complaint of Neutropenic sepsis (09 Aug 2018 17:50)      SUBJECTIVE / OVERNIGHT EVENTS:  Patient seen and examined at bedside. No acute events overnight.     Other Review of Systems Negative. Soft stool hs resolved. No CP, SOB, or complaints at present. Would like to go home.     MEDICATIONS  (STANDING):  cefepime   IVPB 2000 milliGRAM(s) IV Intermittent every 12 hours  chlorhexidine 4% Liquid 1 Application(s) Topical <User Schedule>  chlorhexidine 4% Liquid 1 Application(s) Topical <User Schedule>  lactobacillus acidophilus 1 Tablet(s) Oral daily  melatonin 3 milliGRAM(s) Oral at bedtime  nystatin Powder 1 Application(s) Topical two times a day    MEDICATIONS  (PRN):  acetaminophen   Tablet 650 milliGRAM(s) Oral every 6 hours PRN For Temp greater than 38 C (100.4 F)  aluminum hydroxide/magnesium hydroxide/simethicone Suspension 30 milliLiter(s) Oral every 4 hours PRN Dyspepsia  benzocaine 15 mG/menthol 3.6 mG Lozenge 1 Lozenge Oral three times a day PRN Sore Throat  ondansetron Injectable 4 milliGRAM(s) IV Push every 6 hours PRN Nausea and/or Vomiting      OBJECTIVE:    Vital Signs Last 24 Hrs  T(C): 36.9 (11 Aug 2018 06:48), Max: 37.2 (10 Aug 2018 20:31)  T(F): 98.5 (11 Aug 2018 06:48), Max: 98.9 (10 Aug 2018 20:31)  HR: 92 (11 Aug 2018 06:48) (92 - 98)  BP: 96/63 (11 Aug 2018 06:48) (92/60 - 97/67)  BP(mean): --  RR: 18 (11 Aug 2018 06:48) (18 - 18)  SpO2: 99% (11 Aug 2018 06:48) (98% - 100%)    CAPILLARY BLOOD GLUCOSE        I&O's Summary    10 Aug 2018 07:01  -  11 Aug 2018 07:00  --------------------------------------------------------  IN: 50 mL / OUT: 0 mL / NET: 50 mL        PHYSICAL EXAM:  	GENERAL: NAD, well-developed  	HEAD:  Atraumatic, Normocephalic  	EYES:sclera clear  	Mouth: MMM, no lesions  	NECK: Supple, no appreciable masses  	Lung: normal work of breathing, cta b/l  	Chest: S1&S2+, rrr, no m/r/g appreciated  	ABDOMEN: bs+, soft, nt, nd, no appreciable masses, midline abdominal surgical scar  	EXTREMITIES:  radial pulse present b/l, PT present b/l, no pitting edema present  	Neuro: Awake and alert, answers questions appropriately  	SKIN: warm and dry, no visible rashes. Suprapubic superficial abrasion, no drainage, warmth, no induration.  Lines: Left upper arm PICC line, clean dry and intact, ecchymosis surrounding the site, no erythema, edema, induration, or drainage  LABS:                        12.6   1.43  )-----------( 45       ( 10 Aug 2018 18:05 )             36.5     Auto Eosinophil # x     / Auto Eosinophil % x     / Auto Neutrophil # x     / Auto Neutrophil % x     / BANDS % x                            12.1   1.30  )-----------( 9        ( 10 Aug 2018 12:00 )             34.4     Auto Eosinophil # 0.01  / Auto Eosinophil % 0.8   / Auto Neutrophil # 0.46  / Auto Neutrophil % 35.3  / BANDS % 7.0      08-10    133<L>  |  91<L>  |  11  ----------------------------<  102<H>  3.6   |  29  |  0.93    Ca    8.4      10 Aug 2018 06:54  Mg     2.2     08-10  Phos  2.1     08-10                  ABG:     VBG:       Care Discussed with Consultants/Other Providers:    RADIOLOGY & ADDITIONAL TESTS:  (Imaging Personally Reviewed)

## 2018-08-12 VITALS — WEIGHT: 252.87 LBS

## 2018-08-12 DIAGNOSIS — D61.818 OTHER PANCYTOPENIA: ICD-10-CM

## 2018-08-12 DIAGNOSIS — I48.91 UNSPECIFIED ATRIAL FIBRILLATION: ICD-10-CM

## 2018-08-12 DIAGNOSIS — I82.90 ACUTE EMBOLISM AND THROMBOSIS OF UNSPECIFIED VEIN: ICD-10-CM

## 2018-08-12 LAB
BASOPHILS # BLD AUTO: 0.03 K/UL — SIGNIFICANT CHANGE UP (ref 0–0.2)
BASOPHILS NFR BLD AUTO: 0.8 % — SIGNIFICANT CHANGE UP (ref 0–2)
BUN SERPL-MCNC: 14 MG/DL — SIGNIFICANT CHANGE UP (ref 7–23)
CALCIUM SERPL-MCNC: 9 MG/DL — SIGNIFICANT CHANGE UP (ref 8.4–10.5)
CHLORIDE SERPL-SCNC: 95 MMOL/L — LOW (ref 98–107)
CO2 SERPL-SCNC: 29 MMOL/L — SIGNIFICANT CHANGE UP (ref 22–31)
CREAT SERPL-MCNC: 0.85 MG/DL — SIGNIFICANT CHANGE UP (ref 0.5–1.3)
EOSINOPHIL # BLD AUTO: 0.02 K/UL — SIGNIFICANT CHANGE UP (ref 0–0.5)
EOSINOPHIL NFR BLD AUTO: 0.6 % — SIGNIFICANT CHANGE UP (ref 0–6)
GLUCOSE SERPL-MCNC: 114 MG/DL — HIGH (ref 70–99)
HCT VFR BLD CALC: 35.4 % — LOW (ref 39–50)
HGB BLD-MCNC: 11.9 G/DL — LOW (ref 13–17)
IMM GRANULOCYTES # BLD AUTO: 0.05 # — SIGNIFICANT CHANGE UP
IMM GRANULOCYTES NFR BLD AUTO: 1.4 % — SIGNIFICANT CHANGE UP (ref 0–1.5)
LYMPHOCYTES # BLD AUTO: 1.06 K/UL — SIGNIFICANT CHANGE UP (ref 1–3.3)
LYMPHOCYTES # BLD AUTO: 29.3 % — SIGNIFICANT CHANGE UP (ref 13–44)
MAGNESIUM SERPL-MCNC: 1.9 MG/DL — SIGNIFICANT CHANGE UP (ref 1.6–2.6)
MANUAL SMEAR VERIFICATION: SIGNIFICANT CHANGE UP
MCHC RBC-ENTMCNC: 29.7 PG — SIGNIFICANT CHANGE UP (ref 27–34)
MCHC RBC-ENTMCNC: 33.6 % — SIGNIFICANT CHANGE UP (ref 32–36)
MCV RBC AUTO: 88.3 FL — SIGNIFICANT CHANGE UP (ref 80–100)
MONOCYTES # BLD AUTO: 0.6 K/UL — SIGNIFICANT CHANGE UP (ref 0–0.9)
MONOCYTES NFR BLD AUTO: 16.6 % — HIGH (ref 2–14)
NEUTROPHILS # BLD AUTO: 1.86 K/UL — SIGNIFICANT CHANGE UP (ref 1.8–7.4)
NEUTROPHILS NFR BLD AUTO: 51.3 % — SIGNIFICANT CHANGE UP (ref 43–77)
NRBC # FLD: 0 — SIGNIFICANT CHANGE UP
PHOSPHATE SERPL-MCNC: 2.4 MG/DL — LOW (ref 2.5–4.5)
PLATELET # BLD AUTO: 28 K/UL — LOW (ref 150–400)
PMV BLD: 12 FL — SIGNIFICANT CHANGE UP (ref 7–13)
POTASSIUM SERPL-MCNC: 3.5 MMOL/L — SIGNIFICANT CHANGE UP (ref 3.5–5.3)
POTASSIUM SERPL-SCNC: 3.5 MMOL/L — SIGNIFICANT CHANGE UP (ref 3.5–5.3)
RBC # BLD: 4.01 M/UL — LOW (ref 4.2–5.8)
RBC # FLD: 11.8 % — SIGNIFICANT CHANGE UP (ref 10.3–14.5)
SODIUM SERPL-SCNC: 137 MMOL/L — SIGNIFICANT CHANGE UP (ref 135–145)
WBC # BLD: 3.62 K/UL — LOW (ref 3.8–10.5)
WBC # FLD AUTO: 3.62 K/UL — LOW (ref 3.8–10.5)

## 2018-08-12 PROCEDURE — 99239 HOSP IP/OBS DSCHRG MGMT >30: CPT

## 2018-08-12 PROCEDURE — 99232 SBSQ HOSP IP/OBS MODERATE 35: CPT | Mod: GC

## 2018-08-12 RX ORDER — SODIUM,POTASSIUM PHOSPHATES 278-250MG
1 POWDER IN PACKET (EA) ORAL
Qty: 0 | Refills: 0 | Status: DISCONTINUED | OUTPATIENT
Start: 2018-08-12 | End: 2018-08-12

## 2018-08-12 RX ORDER — LISINOPRIL 2.5 MG/1
1 TABLET ORAL
Qty: 0 | Refills: 0 | COMMUNITY

## 2018-08-12 RX ORDER — CARVEDILOL PHOSPHATE 80 MG/1
1 CAPSULE, EXTENDED RELEASE ORAL
Qty: 0 | Refills: 0 | COMMUNITY

## 2018-08-12 RX ADMIN — CEFEPIME 100 MILLIGRAM(S): 1 INJECTION, POWDER, FOR SOLUTION INTRAMUSCULAR; INTRAVENOUS at 05:39

## 2018-08-12 RX ADMIN — CHLORHEXIDINE GLUCONATE 1 APPLICATION(S): 213 SOLUTION TOPICAL at 12:55

## 2018-08-12 RX ADMIN — Medication 1 TABLET(S): at 12:55

## 2018-08-12 NOTE — CHART NOTE - NSCHARTNOTEFT_GEN_A_CORE
NUTRITION SERVICES                                                                                  MALNUTRITION ALERT     Attention Health Care Provider: Upon nutritional assessment by the Registered Dietitian your patient was determined to meet criteria / has evidence of the following diagnosis/diagnoses:    [ ] Mild Protein Calorie Malnutrition   [ ] Moderate Protein Calorie Malnutrition   [X] Severe Protein Calorie Malnutrition   [ ] Unspecified Protein Calorie Malnutrition   [ ] Underweight / BMI <19  [ ] Morbid Obesity / BMI >40      By signing this assessment you are acknowledging the diagnosis/diagnoses.       PLAN OF CARE: Refer to Initial Dietitian Evaluation or Nutrition Follow-Up Documentation for Nutritional Recommendations. NUTRITION SERVICES                                                                                  MALNUTRITION ALERT     Attention Health Care Provider: Upon nutritional assessment by the Registered Dietitian your patient was determined to meet criteria / has evidence of the following diagnosis/diagnoses:    [ ] Mild Protein Calorie Malnutrition   [ ] Moderate Protein Calorie Malnutrition   [X] Severe Protein Calorie Malnutrition   [ ] Unspecified Protein Calorie Malnutrition   [ ] Underweight / BMI <19  [ ] Morbid Obesity / BMI >40      By signing this assessment you are acknowledging the diagnosis/diagnoses.     PLAN OF CARE: Refer to Initial Dietitian Evaluation or Nutrition Follow-Up Documentation for Nutritional Recommendations.

## 2018-08-12 NOTE — DIETITIAN INITIAL EVALUATION ADULT. - OTHER INFO
Patient seen for nutrition consult (assessment). Per chart review patient with medical history of  left testicular cancer with metastasis to two lymph nodes presenting with 5 days of nausea, vomiting and lethargy. Patient with nausea/vomiting 3-4x daily that started when he began recent chemo treatment PTA. States his appetite is always poor during chemo. NKFA. Patient states at baseline he eats well and has a good appetite. Reports he mainly consumes Ensure supplements and V8 juices as well as protein/calorie dense foods (chicken, avocado). In-house patient mainly consuming 3 Ensure Enlives daily and food brought in from outside hospital. Endorses weight loss PTA. Recent weight change equated to be 4% loss in 2 weeks. Encouraged PO intake as tolerated and reviewed protein/calorie dense foods.

## 2018-08-12 NOTE — DIETITIAN INITIAL EVALUATION ADULT. - ADHERENCE
Patient states that he consumes healthy foods (avoids fried foods and foods high in sugar). States that during chemo treatments he tolerates liquids and "shakes" better.

## 2018-08-12 NOTE — PROGRESS NOTE ADULT - ASSESSMENT
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 flutter requiring cardioversion (not ablated due to cardiac mass vs thrombus on coumadin at home), anticoagulation held for thrombocytopenia and ANC > 1500.

## 2018-08-12 NOTE — DIETITIAN INITIAL EVALUATION ADULT. - PROBLEM SELECTOR PLAN 3
- Completed 1st cycle of 2nd round of chemo (7/29-8/3)  - metastatic disease (retroperitoneal lymph nodes)  - left orchiectomy (July 2017)  - groin rash on exam, nystatin powder BID.

## 2018-08-12 NOTE — DIETITIAN INITIAL EVALUATION ADULT. - ENERGY NEEDS
Weight: 237.6# (107.8kg) (8/06)  Height: 74 inches (obtained from past admission records)  BMI: 30kg/m^2  IBW: 190# (86.3kg)+/-10%  Edema: 1+ right/left ankles. Skin: abdomen surgical incision

## 2018-08-12 NOTE — PROGRESS NOTE ADULT - ATTENDING COMMENTS
Pt seen and examined by me Agree with resident  clinically improving, feeling stronger    no fever since 8/7 2017  ANC  improving to 1800  Platelets still low ( 24)   Infectious work-up including blood cultures, urine culture, c. diff, stool cultures all negative  On  Cefepime x 7 days , will dc Abx  on dc   Monitor WBC, platelets  Restart AC once Platelets > 50     Pt anxious to go home. Team dw Hematology team -Patient can be discharged from Heme/Onc standpoint, will follow-up with Dr. Chirinos.  Pt to FU with Dr Cleve Marquez ( Cardiology ) on  8/15 re-  restarting  AC for atrial thrombus if platelet count > 50  Pt also advised home med Coreg held during hospitalization for low SBP in 90s, advised to FU with Cardiology as outpt and restart as SBP tolerates   DC plannign 40 mins

## 2018-08-12 NOTE — CHART NOTE - NSCHARTNOTEFT_GEN_A_CORE
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 flutter requiring cardioversion (not ablated due to cardiac mass vs thrombus on coumadin at home), presenting with neutropenic sepsis.     Infectious work-up including blood cultures, urine culture, c. diff, stool cultures all negative.   Patient's counts improving, received Onpro with chemotherapy.     Patient can be discharged from Heme/Onc standpoint, will follow-up with Dr. Chirinos.     Discussed with attending Dr. Marquez and primary medicine resident.       France Rubio MD  Hematology/Oncology Fellow, PGY-4  pager: [phone]

## 2018-08-12 NOTE — DIETITIAN INITIAL EVALUATION ADULT. - ORAL INTAKE PTA
poor/Patient with poor appetite and PO intake x 1 week PTA due to nausea/vomiting (s/p 1st cycle of second round of chemotherapy 7/29-8/03)

## 2018-08-12 NOTE — DIETITIAN INITIAL EVALUATION ADULT. - NS AS NUTRI INTERV MEALS SNACK
General/healthful diet/1. Continue Regular diet. 1000mL Fluid Restriction per medical team. 2. Continue Ensure Enlive 240mls 3x daily (1050kcal, 60g protein). 3. Continue probiotics and Zofran PRN. 4. Please Encourage po intake, assist with meals and menu selections, provide alternatives PRN. 5. Monitor weights, labs, BM's, skin integrity, p.o. intake.

## 2018-08-12 NOTE — PROGRESS NOTE ADULT - SUBJECTIVE AND OBJECTIVE BOX
Kirti Avalos MD   PGY 3  Pager [phone]  Page [phone] or [phone] after 7 pm ([facility])  Page [phone] or [phone] after 7 pm ([facility])      Patient is a 38y old  Male who presents with a chief complaint of Neutropenic sepsis (09 Aug 2018 17:50)      INTERVAL HPI/OVERNIGHT EVENTS: pt has no complaints and would like to go home.     REVIEW OF SYSTEMS:  CONSTITUTIONAL: No fever, chills  ENMT:  No difficulty hearing, no change in vision  NECK: No pain or stiffness  RESPIRATORY: No cough, SOB  CARDIOVASCULAR: No chest pain, palpitations  GASTROINTESTINAL: No abdominal pain. No nausea, vomiting, or diarrhea  GENITOURINARY: No dysuria  NEUROLOGICAL: No HA  SKIN: No itching, burning, rashes, or lesions   LYMPH NODES: No enlarged glands  ENDOCRINE: No heat or cold intolerance; No hair loss  MUSCULOSKELETAL: No joint pain or swelling; No muscle, back, or extremity pain  PSYCHIATRIC: No depression, anxiety  HEME/LYMPH: No easy bruising, or bleeding gums    T(C): 36.8 (08-12-18 @ 04:58), Max: 36.9 (08-11-18 @ 21:50)  HR: 96 (08-12-18 @ 04:58) (96 - 102)  BP: 106/73 (08-12-18 @ 04:58) (92/58 - 108/68)  RR: 18 (08-12-18 @ 04:58) (17 - 18)  SpO2: 99% (08-12-18 @ 04:58) (98% - 99%)  Wt(kg): --Vital Signs Last 24 Hrs  T(C): 36.8 (12 Aug 2018 04:58), Max: 36.9 (11 Aug 2018 21:50)  T(F): 98.3 (12 Aug 2018 04:58), Max: 98.5 (11 Aug 2018 21:50)  HR: 96 (12 Aug 2018 04:58) (96 - 102)  BP: 106/73 (12 Aug 2018 04:58) (92/58 - 108/68)  BP(mean): --  RR: 18 (12 Aug 2018 04:58) (17 - 18)  SpO2: 99% (12 Aug 2018 04:58) (98% - 99%)    PHYSICAL EXAM:  GENERAL: NAD  EYES: clear conjunctiva  ENMT: Dry  mucous membranes  CHEST/LUNG: Clear to auscultation bilaterally; No rales, rhonchi, wheezing, or rubs  HEART: S1, S2, Regular rate and rhythm  ABDOMEN: Soft, Nontender, Nondistended; Bowel sounds present  NEURO: Alert & Oriented X3  EXTREMITIES: No LE edema, no calf tenderness  SKIN: No rashes or lesions    Consultant(s) Notes Reviewed:  [x ] YES  [ ] NO  Care Discussed with Consultants/Other Providers [ x] YES  [ ] NO    LABS:                        11.9   3.62  )-----------( 28       ( 12 Aug 2018 05:15 )             35.4     08-12    137  |  95<L>  |  14  ----------------------------<  114<H>  3.5   |  29  |  0.85    Ca    9.0      12 Aug 2018 05:15  Phos  2.4     08-12  Mg     1.9     08-12          CAPILLARY BLOOD GLUCOSE                RADIOLOGY & ADDITIONAL TESTS:    Imaging Personally Reviewed:  [ ] YES  [ ] NO

## 2018-08-13 ENCOUNTER — INBOUND DOCUMENT (OUTPATIENT)
Age: 38
End: 2018-08-13

## 2018-08-13 PROBLEM — C80.1 MALIGNANT (PRIMARY) NEOPLASM, UNSPECIFIED: Chronic | Status: ACTIVE | Noted: 2018-08-06

## 2018-08-14 LAB
O+P SPEC CONC: SIGNIFICANT CHANGE UP
SPECIMEN SOURCE: SIGNIFICANT CHANGE UP
TRI STN SPEC: SIGNIFICANT CHANGE UP

## 2018-08-15 ENCOUNTER — APPOINTMENT (OUTPATIENT)
Dept: HEMATOLOGY ONCOLOGY | Facility: CLINIC | Age: 38
End: 2018-08-15
Payer: MEDICAID

## 2018-08-15 ENCOUNTER — APPOINTMENT (OUTPATIENT)
Dept: CARDIOLOGY | Facility: CLINIC | Age: 38
End: 2018-08-15
Payer: MEDICAID

## 2018-08-15 ENCOUNTER — RESULT REVIEW (OUTPATIENT)
Age: 38
End: 2018-08-15

## 2018-08-15 ENCOUNTER — NON-APPOINTMENT (OUTPATIENT)
Age: 38
End: 2018-08-15

## 2018-08-15 VITALS
SYSTOLIC BLOOD PRESSURE: 108 MMHG | TEMPERATURE: 99.2 F | BODY MASS INDEX: 32.29 KG/M2 | DIASTOLIC BLOOD PRESSURE: 71 MMHG | WEIGHT: 244.71 LBS | RESPIRATION RATE: 16 BRPM | HEART RATE: 96 BPM | OXYGEN SATURATION: 98 %

## 2018-08-15 VITALS
SYSTOLIC BLOOD PRESSURE: 103 MMHG | OXYGEN SATURATION: 97 % | DIASTOLIC BLOOD PRESSURE: 71 MMHG | WEIGHT: 244 LBS | HEART RATE: 100 BPM | BODY MASS INDEX: 32.19 KG/M2

## 2018-08-15 DIAGNOSIS — T45.1X5A AGRANULOCYTOSIS SECONDARY TO CANCER CHEMOTHERAPY: ICD-10-CM

## 2018-08-15 DIAGNOSIS — D70.1 AGRANULOCYTOSIS SECONDARY TO CANCER CHEMOTHERAPY: ICD-10-CM

## 2018-08-15 LAB
HCT VFR BLD CALC: 34.8 % — LOW (ref 39–50)
HGB BLD-MCNC: 11.9 G/DL — LOW (ref 13–17)
MCHC RBC-ENTMCNC: 30.6 PG — SIGNIFICANT CHANGE UP (ref 27–34)
MCHC RBC-ENTMCNC: 34.1 G/DL — SIGNIFICANT CHANGE UP (ref 32–36)
MCV RBC AUTO: 89.7 FL — SIGNIFICANT CHANGE UP (ref 80–100)
PLATELET # BLD AUTO: 85 K/UL — LOW (ref 150–400)
RBC # BLD: 3.88 M/UL — LOW (ref 4.2–5.8)
RBC # FLD: 11.6 % — SIGNIFICANT CHANGE UP (ref 10.3–14.5)
WBC # BLD: 9.3 K/UL — SIGNIFICANT CHANGE UP (ref 3.8–10.5)
WBC # FLD AUTO: 9.3 K/UL — SIGNIFICANT CHANGE UP (ref 3.8–10.5)

## 2018-08-15 PROCEDURE — 93000 ELECTROCARDIOGRAM COMPLETE: CPT

## 2018-08-15 PROCEDURE — 99215 OFFICE O/P EST HI 40 MIN: CPT

## 2018-08-15 RX ORDER — CARVEDILOL 6.25 MG/1
6.25 TABLET, FILM COATED ORAL
Qty: 60 | Refills: 0 | Status: DISCONTINUED | COMMUNITY
Start: 2018-06-27

## 2018-08-16 LAB
ALBUMIN SERPL ELPH-MCNC: 4.1 G/DL
ALP BLD-CCNC: 93 U/L
ALT SERPL-CCNC: 120 U/L
ANION GAP SERPL CALC-SCNC: 9 MMOL/L
AST SERPL-CCNC: 40 U/L
BILIRUB SERPL-MCNC: <0.2 MG/DL
BUN SERPL-MCNC: 13 MG/DL
CALCIUM SERPL-MCNC: 9 MG/DL
CHLORIDE SERPL-SCNC: 98 MMOL/L
CO2 SERPL-SCNC: 33 MMOL/L
CREAT SERPL-MCNC: 0.82 MG/DL
GLUCOSE SERPL-MCNC: 96 MG/DL
MAGNESIUM SERPL-MCNC: 1.7 MG/DL
POTASSIUM SERPL-SCNC: 3.9 MMOL/L
PROT SERPL-MCNC: 6.6 G/DL
SODIUM SERPL-SCNC: 140 MMOL/L

## 2018-08-17 ENCOUNTER — INBOUND DOCUMENT (OUTPATIENT)
Age: 38
End: 2018-08-17

## 2018-08-19 ENCOUNTER — INPATIENT (INPATIENT)
Facility: HOSPITAL | Age: 38
LOS: 0 days | Discharge: HOME | End: 2018-08-20
Attending: HOSPITALIST | Admitting: HOSPITALIST
Payer: MEDICAID

## 2018-08-19 VITALS
OXYGEN SATURATION: 99 % | HEART RATE: 108 BPM | SYSTOLIC BLOOD PRESSURE: 109 MMHG | RESPIRATION RATE: 18 BRPM | TEMPERATURE: 98 F | WEIGHT: 246.92 LBS | DIASTOLIC BLOOD PRESSURE: 78 MMHG | HEIGHT: 72 IN

## 2018-08-19 DIAGNOSIS — D69.6 THROMBOCYTOPENIA, UNSPECIFIED: ICD-10-CM

## 2018-08-19 DIAGNOSIS — Z90.79 ACQUIRED ABSENCE OF OTHER GENITAL ORGAN(S): Chronic | ICD-10-CM

## 2018-08-19 DIAGNOSIS — C80.1 MALIGNANT (PRIMARY) NEOPLASM, UNSPECIFIED: ICD-10-CM

## 2018-08-19 DIAGNOSIS — Z98.890 OTHER SPECIFIED POSTPROCEDURAL STATES: Chronic | ICD-10-CM

## 2018-08-19 DIAGNOSIS — I48.92 UNSPECIFIED ATRIAL FLUTTER: ICD-10-CM

## 2018-08-19 DIAGNOSIS — Z29.9 ENCOUNTER FOR PROPHYLACTIC MEASURES, UNSPECIFIED: ICD-10-CM

## 2018-08-19 DIAGNOSIS — Z95.828 PRESENCE OF OTHER VASCULAR IMPLANTS AND GRAFTS: Chronic | ICD-10-CM

## 2018-08-19 DIAGNOSIS — C63.2 MALIGNANT NEOPLASM OF SCROTUM: ICD-10-CM

## 2018-08-19 DIAGNOSIS — I42.9 CARDIOMYOPATHY, UNSPECIFIED: ICD-10-CM

## 2018-08-19 LAB
ALBUMIN SERPL ELPH-MCNC: 3.3 G/DL — SIGNIFICANT CHANGE UP (ref 3.3–5)
ALP SERPL-CCNC: 75 U/L — SIGNIFICANT CHANGE UP (ref 40–120)
ALT FLD-CCNC: 65 U/L — HIGH (ref 4–41)
AST SERPL-CCNC: 39 U/L — SIGNIFICANT CHANGE UP (ref 4–40)
BILIRUB SERPL-MCNC: < 0.2 MG/DL — LOW (ref 0.2–1.2)
BLD GP AB SCN SERPL QL: NEGATIVE — SIGNIFICANT CHANGE UP
BUN SERPL-MCNC: 10 MG/DL — SIGNIFICANT CHANGE UP (ref 7–23)
CALCIUM SERPL-MCNC: 9.1 MG/DL — SIGNIFICANT CHANGE UP (ref 8.4–10.5)
CHLORIDE SERPL-SCNC: 101 MMOL/L — SIGNIFICANT CHANGE UP (ref 98–107)
CO2 SERPL-SCNC: 26 MMOL/L — SIGNIFICANT CHANGE UP (ref 22–31)
CREAT SERPL-MCNC: 0.8 MG/DL — SIGNIFICANT CHANGE UP (ref 0.5–1.3)
GLUCOSE SERPL-MCNC: 104 MG/DL — HIGH (ref 70–99)
HCT VFR BLD CALC: 31.8 % — LOW (ref 39–50)
HGB BLD-MCNC: 10.6 G/DL — LOW (ref 13–17)
MAGNESIUM SERPL-MCNC: 1.8 MG/DL — SIGNIFICANT CHANGE UP (ref 1.6–2.6)
MCHC RBC-ENTMCNC: 30 PG — SIGNIFICANT CHANGE UP (ref 27–34)
MCHC RBC-ENTMCNC: 33.3 % — SIGNIFICANT CHANGE UP (ref 32–36)
MCV RBC AUTO: 90.1 FL — SIGNIFICANT CHANGE UP (ref 80–100)
PHOSPHATE SERPL-MCNC: 3.3 MG/DL — SIGNIFICANT CHANGE UP (ref 2.5–4.5)
PLATELET # BLD AUTO: 298 K/UL — SIGNIFICANT CHANGE UP (ref 150–400)
PMV BLD: 9.9 FL — SIGNIFICANT CHANGE UP (ref 7–13)
POTASSIUM SERPL-MCNC: 4.5 MMOL/L — SIGNIFICANT CHANGE UP (ref 3.5–5.3)
POTASSIUM SERPL-SCNC: 4.5 MMOL/L — SIGNIFICANT CHANGE UP (ref 3.5–5.3)
PROT SERPL-MCNC: 6.3 G/DL — SIGNIFICANT CHANGE UP (ref 6–8.3)
RBC # BLD: 3.53 M/UL — LOW (ref 4.2–5.8)
RBC # FLD: 12.2 % — SIGNIFICANT CHANGE UP (ref 10.3–14.5)
RH IG SCN BLD-IMP: POSITIVE — SIGNIFICANT CHANGE UP
SODIUM SERPL-SCNC: 141 MMOL/L — SIGNIFICANT CHANGE UP (ref 135–145)
WBC # BLD: 11 K/UL — HIGH (ref 3.8–10.5)
WBC # FLD AUTO: 11 K/UL — HIGH (ref 3.8–10.5)

## 2018-08-19 PROCEDURE — 99223 1ST HOSP IP/OBS HIGH 75: CPT

## 2018-08-19 PROCEDURE — 71045 X-RAY EXAM CHEST 1 VIEW: CPT | Mod: 26

## 2018-08-19 PROCEDURE — 99223 1ST HOSP IP/OBS HIGH 75: CPT | Mod: GC

## 2018-08-19 RX ORDER — MESNA 100 MG/ML
936 INJECTION, SOLUTION INTRAVENOUS ONCE
Qty: 0 | Refills: 0 | Status: DISCONTINUED | OUTPATIENT
Start: 2018-08-20 | End: 2018-08-20

## 2018-08-19 RX ORDER — FOSAPREPITANT DIMEGLUMINE 150 MG/5ML
150 INJECTION, POWDER, LYOPHILIZED, FOR SOLUTION INTRAVENOUS ONCE
Qty: 0 | Refills: 0 | Status: DISCONTINUED | OUTPATIENT
Start: 2018-08-19 | End: 2018-08-20

## 2018-08-19 RX ORDER — MORPHINE SULFATE 50 MG/1
2 CAPSULE, EXTENDED RELEASE ORAL EVERY 4 HOURS
Qty: 0 | Refills: 0 | Status: DISCONTINUED | OUTPATIENT
Start: 2018-08-19 | End: 2018-08-20

## 2018-08-19 RX ORDER — DEXAMETHASONE 0.5 MG/5ML
12 ELIXIR ORAL ONCE
Qty: 0 | Refills: 0 | Status: DISCONTINUED | OUTPATIENT
Start: 2018-08-19 | End: 2018-08-20

## 2018-08-19 RX ORDER — SODIUM CHLORIDE 9 MG/ML
1000 INJECTION, SOLUTION INTRAVENOUS
Qty: 0 | Refills: 0 | Status: COMPLETED | OUTPATIENT
Start: 2018-08-20 | End: 2018-08-20

## 2018-08-19 RX ORDER — SODIUM CHLORIDE 9 MG/ML
1000 INJECTION INTRAMUSCULAR; INTRAVENOUS; SUBCUTANEOUS
Qty: 0 | Refills: 0 | Status: DISCONTINUED | OUTPATIENT
Start: 2018-08-19 | End: 2018-08-20

## 2018-08-19 RX ORDER — PACLITAXEL 6 MG/ML
585 INJECTION, SOLUTION, CONCENTRATE INTRAVENOUS ONCE
Qty: 0 | Refills: 0 | Status: DISCONTINUED | OUTPATIENT
Start: 2018-08-19 | End: 2018-08-20

## 2018-08-19 RX ORDER — CISPLATIN 1 MG/ML
47 INJECTION, SOLUTION INTRAVENOUS ONCE
Qty: 0 | Refills: 0 | Status: DISCONTINUED | OUTPATIENT
Start: 2018-08-20 | End: 2018-08-20

## 2018-08-19 RX ORDER — SODIUM CHLORIDE 9 MG/ML
500 INJECTION INTRAMUSCULAR; INTRAVENOUS; SUBCUTANEOUS ONCE
Qty: 0 | Refills: 0 | Status: COMPLETED | OUTPATIENT
Start: 2018-08-20 | End: 2018-08-20

## 2018-08-19 RX ORDER — LISINOPRIL 2.5 MG/1
2.5 TABLET ORAL DAILY
Qty: 0 | Refills: 0 | Status: DISCONTINUED | OUTPATIENT
Start: 2018-08-19 | End: 2018-08-20

## 2018-08-19 RX ORDER — DEXAMETHASONE 0.5 MG/5ML
12 ELIXIR ORAL ONCE
Qty: 0 | Refills: 0 | Status: DISCONTINUED | OUTPATIENT
Start: 2018-08-19 | End: 2018-08-19

## 2018-08-19 RX ORDER — PALONOSETRON HYDROCHLORIDE 0.25 MG/5ML
0.25 INJECTION, SOLUTION INTRAVENOUS ONCE
Qty: 0 | Refills: 0 | Status: DISCONTINUED | OUTPATIENT
Start: 2018-08-19 | End: 2018-08-20

## 2018-08-19 RX ORDER — FAMOTIDINE 10 MG/ML
20 INJECTION INTRAVENOUS ONCE
Qty: 0 | Refills: 0 | Status: DISCONTINUED | OUTPATIENT
Start: 2018-08-19 | End: 2018-08-20

## 2018-08-19 RX ORDER — CARVEDILOL PHOSPHATE 80 MG/1
12.5 CAPSULE, EXTENDED RELEASE ORAL EVERY 12 HOURS
Qty: 0 | Refills: 0 | Status: DISCONTINUED | OUTPATIENT
Start: 2018-08-19 | End: 2018-08-20

## 2018-08-19 RX ORDER — IFOSFAMIDE 1 G/1
2808 INJECTION, POWDER, LYOPHILIZED, FOR SOLUTION INTRAVENOUS ONCE
Qty: 0 | Refills: 0 | Status: DISCONTINUED | OUTPATIENT
Start: 2018-08-20 | End: 2018-08-20

## 2018-08-19 RX ORDER — DIPHENHYDRAMINE HCL 50 MG
25 CAPSULE ORAL EVERY 6 HOURS
Qty: 0 | Refills: 0 | Status: DISCONTINUED | OUTPATIENT
Start: 2018-08-19 | End: 2018-08-20

## 2018-08-19 RX ADMIN — CARVEDILOL PHOSPHATE 12.5 MILLIGRAM(S): 80 CAPSULE, EXTENDED RELEASE ORAL at 22:05

## 2018-08-19 RX ADMIN — SODIUM CHLORIDE 75 MILLILITER(S): 9 INJECTION INTRAMUSCULAR; INTRAVENOUS; SUBCUTANEOUS at 11:59

## 2018-08-19 NOTE — H&P ADULT - PROBLEM SELECTOR PLAN 1
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

## 2018-08-19 NOTE — H&P ADULT - HISTORY OF PRESENT ILLNESS
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 Patient reports anxiety about this upcoming cycle given the complications that were associated with the last one. He denies any fevers/chills, cough/N/V/D. Does state that he has neuropathy involving his hands, feet (L>>R). Asking for vitamin C and antihistamine to help attenuate neurological side effects from the chemo.     HPI:    38yMale PMH    PAST MEDICAL & SURGICAL HISTORY:  Mixed germ cell tumor  Obesity  Atrial mass: right artrial echo density per MRI 3/14/18.  Atrial flutter  Cardiomyopathy  Atrial fibrillation: s/p DCCV 1/18  Thrombus: Right atrial wall ( suspected from last TYLOR) On AC  Testicular cancer  Lumbar herniated disc  Testicular mass: left  History of abdominal surgery: Retroperitoneal lymph node dissection (June 2018)  History of abdominal surgery: Retroperitoneal lymph node dissection (June 2018)  History of cardioversion: 2/2018  Port-a-cath in place: placed 9/2017,, removed-10/18/2017  History of orchiectomy: left-7/2017      Review of Systems:   CONSTITUTIONAL: No fever, weight loss, or fatigue  EYES: No eye pain, visual disturbances, or discharge  ENMT:  No difficulty hearing, tinnitus, vertigo; No sinus or throat pain  NECK: No pain or stiffness  BREASTS: No pain, masses, or nipple discharge  RESPIRATORY: No cough, wheezing, chills or hemoptysis; No shortness of breath  CARDIOVASCULAR: No chest pain, palpitations, dizziness, or leg swelling  GASTROINTESTINAL: No abdominal or epigastric pain. No nausea, vomiting, or hematemesis; No diarrhea or constipation. No melena or hematochezia.  GENITOURINARY: No dysuria, frequency, hematuria, or incontinence  NEUROLOGICAL: No headaches, memory loss, loss of strength, numbness, or tremors  SKIN: No itching, burning, rashes, or lesions   LYMPH NODES: No enlarged glands  ENDOCRINE: No heat or cold intolerance; No hair loss  MUSCULOSKELETAL: No joint pain or swelling; No muscle, back, or extremity pain  PSYCHIATRIC: No depression, anxiety, mood swings, or difficulty sleeping  HEME/LYMPH: No easy bruising, or bleeding gums  ALLERGY AND IMMUNOLOGIC: No hives or eczema    Allergies    No Known Allergies    Intolerances        Social History:     FAMILY HISTORY:  Family history of hypertension in father (Father)  Family history of kidney stones (Mother): mother  Family history of cancer (Grandparent)  Family history of ischemic heart disease (Grandparent)  Family history of diabetes mellitus (Aunt)      MEDICATIONS  (STANDING):  dexamethasone   IVPB (Chemo) 12 milliGRAM(s) IV Intermittent once  famotidine  IVPB (Chemo) 20 milliGRAM(s) IV Intermittent once  fosaprepitant IVPB (Chemo) 150 milliGRAM(s) IV Intermittent once  PACLitaxel IVPB 585 milliGRAM(s) IV Intermittent once  palonosetron Injectable (Chemo) 0.25 milliGRAM(s) IV Push once  sodium chloride 0.9%. 1000 milliLiter(s) (75 mL/Hr) IV Continuous <Continuous>    MEDICATIONS  (PRN):  diphenhydrAMINE   Capsule 25 milliGRAM(s) Oral every 6 hours PRN Rash and/or Itching  morphine  - Injectable 2 milliGRAM(s) IV Push every 4 hours PRN Severe Pain (7 - 10)      T(C): 36.9 (08-19-18 @ 09:30), Max: 36.9 (08-19-18 @ 09:30)  HR: 108 (08-19-18 @ 09:30) (108 - 108)  BP: 109/78 (08-19-18 @ 09:30) (109/78 - 109/78)  RR: 18 (08-19-18 @ 09:30) (18 - 18)  SpO2: 99% (08-19-18 @ 09:30) (99% - 99%)  Height (cm): 182.88 (08-19-18 @ 09:30)  Weight (kg): 112 (08-19-18 @ 09:30)  BMI (kg/m2): 33.5 (08-19-18 @ 09:30)  BSA (m2): 2.33 (08-19-18 @ 09:30)  CAPILLARY BLOOD GLUCOSE      I&O's Summary      PHYSICAL EXAM:  GENERAL: NAD, well-developed  HEAD:  Atraumatic, Normocephalic  EYES: EOMI, PERRLA, conjunctiva and sclera clear  NECK: Supple, No elevated JVD  CHEST/LUNG: Clear to auscultation bilaterally; No wheeze  HEART: Regular rate and rhythm; No murmurs, rubs, or gallops  ABDOMEN: Soft, Nontender, Nondistended; Bowel sounds present  EXTREMITIES:  2+ Peripheral Pulses, No clubbing, cyanosis, or edema  PSYCH: AAOx3  NEUROLOGY: CN II-XII grossly intact, moving all extremities  SKIN: No rashes or lesions    LABS:                  RADIOLOGY & ADDITIONAL TESTS:    ECG Personally Reviewed -     Imaging Personally Reviewed:    Consultant(s) Notes Reviewed:      Care Discussed with Consultants/Other Providers: Personally spoke with Oncology regarding management HPI:  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 Patient presents today as a direct admission for cycle 2 of TIP. Patient reports anxiety about this upcoming cycle given the complications that were associated with the last one. He denies any fevers/chills, cough/N/V/D. Does state that he has neuropathy involving his hands, feet (L>>R). Asking for vitamin C and antihistamine to help attenuate neurological side effects from the chemo.     PAST MEDICAL & SURGICAL HISTORY:  Mixed germ cell tumor  Obesity  Atrial mass: right artrial echo density per MRI 3/14/18.  Atrial flutter  Cardiomyopathy  Atrial fibrillation: s/p DCCV 1/18  Thrombus: Right atrial wall ( suspected from last TYLOR) On AC  Testicular cancer  Lumbar herniated disc  Testicular mass: left  History of abdominal surgery: Retroperitoneal lymph node dissection (June 2018)  History of abdominal surgery: Retroperitoneal lymph node dissection (June 2018)  History of cardioversion: 2/2018  Port-a-cath in place: placed 9/2017,, removed-10/18/2017  History of orchiectomy: left-7/2017      Review of Systems:   CONSTITUTIONAL: No fever, weight loss, or fatigue  EYES: No eye pain, visual disturbances, or discharge  ENMT:  No difficulty hearing, tinnitus, vertigo; No sinus or throat pain  NECK: No pain or stiffness  BREASTS: No pain, masses, or nipple discharge  RESPIRATORY: No cough, wheezing, chills or hemoptysis; No shortness of breath  CARDIOVASCULAR: No chest pain, palpitations, dizziness, or leg swelling  GASTROINTESTINAL: No abdominal or epigastric pain. No nausea, vomiting, or hematemesis; No diarrhea or constipation. No melena or hematochezia.  GENITOURINARY: No dysuria, frequency, hematuria, or incontinence  NEUROLOGICAL: No headaches, memory loss, loss of strength, numbness, or tremors  SKIN: No itching, burning, rashes, or lesions   LYMPH NODES: No enlarged glands  ENDOCRINE: No heat or cold intolerance; No hair loss  MUSCULOSKELETAL: No joint pain or swelling; No muscle, back, or extremity pain  PSYCHIATRIC: No depression, anxiety, mood swings, or difficulty sleeping  HEME/LYMPH: No easy bruising, or bleeding gums  ALLERGY AND IMMUNOLOGIC: No hives or eczema    Allergies    No Known Allergies    Intolerances        Social History:     FAMILY HISTORY:  Family history of hypertension in father (Father)  Family history of kidney stones (Mother): mother  Family history of cancer (Grandparent)  Family history of ischemic heart disease (Grandparent)  Family history of diabetes mellitus (Aunt)      MEDICATIONS  (STANDING):  dexamethasone   IVPB (Chemo) 12 milliGRAM(s) IV Intermittent once  famotidine  IVPB (Chemo) 20 milliGRAM(s) IV Intermittent once  fosaprepitant IVPB (Chemo) 150 milliGRAM(s) IV Intermittent once  PACLitaxel IVPB 585 milliGRAM(s) IV Intermittent once  palonosetron Injectable (Chemo) 0.25 milliGRAM(s) IV Push once  sodium chloride 0.9%. 1000 milliLiter(s) (75 mL/Hr) IV Continuous <Continuous>    MEDICATIONS  (PRN):  diphenhydrAMINE   Capsule 25 milliGRAM(s) Oral every 6 hours PRN Rash and/or Itching  morphine  - Injectable 2 milliGRAM(s) IV Push every 4 hours PRN Severe Pain (7 - 10)      T(C): 36.9 (08-19-18 @ 09:30), Max: 36.9 (08-19-18 @ 09:30)  HR: 108 (08-19-18 @ 09:30) (108 - 108)  BP: 109/78 (08-19-18 @ 09:30) (109/78 - 109/78)  RR: 18 (08-19-18 @ 09:30) (18 - 18)  SpO2: 99% (08-19-18 @ 09:30) (99% - 99%)  Height (cm): 182.88 (08-19-18 @ 09:30)  Weight (kg): 112 (08-19-18 @ 09:30)  BMI (kg/m2): 33.5 (08-19-18 @ 09:30)  BSA (m2): 2.33 (08-19-18 @ 09:30)  CAPILLARY BLOOD GLUCOSE      I&O's Summary      PHYSICAL EXAM:  GENERAL: NAD, well-developed  HEAD:  Atraumatic, Normocephalic  EYES: EOMI, PERRLA, conjunctiva and sclera clear  NECK: Supple, No elevated JVD  CHEST/LUNG: Clear to auscultation bilaterally; No wheeze  HEART: Regular rate and rhythm; No murmurs, rubs, or gallops  ABDOMEN: Soft, Nontender, Nondistended; Bowel sounds present  EXTREMITIES:  2+ Peripheral Pulses, No clubbing, cyanosis, or edema  PSYCH: AAOx3  NEUROLOGY: CN II-XII grossly intact, moving all extremities  SKIN: No rashes or lesions    LABS:                  RADIOLOGY & ADDITIONAL TESTS:    ECG Personally Reviewed -     Imaging Personally Reviewed:    Consultant(s) Notes Reviewed:      Care Discussed with Consultants/Other Providers: Personally spoke with Oncology regarding management HPI:  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 Patient presents today as a direct admission for cycle 2 of TIP. Patient reports anxiety about this upcoming cycle given the complications that were associated with the last one. He denies any fevers/chills, cough/N/V/D. Does state that he has neuropathy involving his hands, feet (L>>R). Asking for vitamin C and antihistamine to help attenuate neurological side effects from the chemo.     PAST MEDICAL & SURGICAL HISTORY:  Mixed germ cell tumor  Obesity  Atrial mass: right artrial echo density per MRI 3/14/18.  Atrial flutter  Cardiomyopathy  Atrial fibrillation: s/p DCCV 1/18  Thrombus: Right atrial wall ( suspected from last TYLOR) On AC  Testicular cancer  Lumbar herniated disc  Testicular mass: left  History of abdominal surgery: Retroperitoneal lymph node dissection (June 2018)  History of abdominal surgery: Retroperitoneal lymph node dissection (June 2018)  History of cardioversion: 2/2018  Port-a-cath in place: placed 9/2017,, removed-10/18/2017  History of orchiectomy: left-7/2017    Review of Systems:   CONSTITUTIONAL: No fever, weight loss, or fatigue  EYES: No eye pain, visual disturbances, or discharge  ENMT:  No difficulty hearing, tinnitus, vertigo; No sinus or throat pain  NECK: No pain or stiffness  BREASTS: No pain, masses, or nipple discharge  RESPIRATORY: No cough, wheezing, chills or hemoptysis; No shortness of breath  CARDIOVASCULAR: No chest pain, palpitations, dizziness, or leg swelling  GASTROINTESTINAL: No abdominal or epigastric pain. No nausea, vomiting, or hematemesis; No diarrhea or constipation. No melena or hematochezia.  GENITOURINARY: No dysuria, frequency, hematuria, or incontinence  NEUROLOGICAL: No headaches, memory loss, loss of strength, numbness, or tremors  SKIN: No itching, burning, rashes, or lesions   LYMPH NODES: No enlarged glands  ENDOCRINE: No heat or cold intolerance; No hair loss  MUSCULOSKELETAL: No joint pain or swelling; No muscle, back, or extremity pain  PSYCHIATRIC: No depression, anxiety, mood swings, or difficulty sleeping  HEME/LYMPH: No easy bruising, or bleeding gums  ALLERGY AND IMMUNOLOGIC: No hives or eczema    Allergies    No Known Allergies    Intolerances        Social History:     FAMILY HISTORY:  Family history of hypertension in father (Father)  Family history of kidney stones (Mother): mother  Family history of cancer (Grandparent)  Family history of ischemic heart disease (Grandparent)  Family history of diabetes mellitus (Aunt)      MEDICATIONS  (STANDING):  dexamethasone   IVPB (Chemo) 12 milliGRAM(s) IV Intermittent once  famotidine  IVPB (Chemo) 20 milliGRAM(s) IV Intermittent once  fosaprepitant IVPB (Chemo) 150 milliGRAM(s) IV Intermittent once  PACLitaxel IVPB 585 milliGRAM(s) IV Intermittent once  palonosetron Injectable (Chemo) 0.25 milliGRAM(s) IV Push once  sodium chloride 0.9%. 1000 milliLiter(s) (75 mL/Hr) IV Continuous <Continuous>    MEDICATIONS  (PRN):  diphenhydrAMINE   Capsule 25 milliGRAM(s) Oral every 6 hours PRN Rash and/or Itching  morphine  - Injectable 2 milliGRAM(s) IV Push every 4 hours PRN Severe Pain (7 - 10)      T(C): 36.9 (08-19-18 @ 09:30), Max: 36.9 (08-19-18 @ 09:30)  HR: 108 (08-19-18 @ 09:30) (108 - 108)  BP: 109/78 (08-19-18 @ 09:30) (109/78 - 109/78)  RR: 18 (08-19-18 @ 09:30) (18 - 18)  SpO2: 99% (08-19-18 @ 09:30) (99% - 99%)  Height (cm): 182.88 (08-19-18 @ 09:30)  Weight (kg): 112 (08-19-18 @ 09:30)  BMI (kg/m2): 33.5 (08-19-18 @ 09:30)  BSA (m2): 2.33 (08-19-18 @ 09:30)  CAPILLARY BLOOD GLUCOSE      I&O's Summary      PHYSICAL EXAM:  GENERAL: NAD, well-developed  HEAD:  Atraumatic, Normocephalic  EYES: EOMI, PERRLA, conjunctiva and sclera clear  NECK: Supple, No elevated JVD  CHEST/LUNG: Clear to auscultation bilaterally; No wheeze  HEART: Regular rate and rhythm; No murmurs, rubs, or gallops  ABDOMEN: Soft, Nontender, Nondistended; Bowel sounds present  EXTREMITIES:  2+ Peripheral Pulses, No clubbing, cyanosis, or edema  PSYCH: AAOx3  NEUROLOGY: CN II-XII grossly intact, moving all extremities  SKIN: No rashes or lesions    LABS:                  RADIOLOGY & ADDITIONAL TESTS:    ECG Personally Reviewed -     Imaging Personally Reviewed:    Consultant(s) Notes Reviewed:      Care Discussed with Consultants/Other Providers: Personally spoke with Oncology regarding management

## 2018-08-19 NOTE — CONSULT NOTE ADULT - SUBJECTIVE AND OBJECTIVE BOX
Date of Admission:  8/19/18  CHIEF COMPLAINT:    HISTORY OF PRESENT ILLNESS:      Allergies    No Known Allergies    Intolerances    	    MEDICATIONS:  carvedilol 12.5 milliGRAM(s) Oral every 12 hours  lisinopril 2.5 milliGRAM(s) Oral daily        diphenhydrAMINE   Capsule 25 milliGRAM(s) Oral every 6 hours PRN  fosaprepitant IVPB (Chemo) 150 milliGRAM(s) IV Intermittent once  morphine  - Injectable 2 milliGRAM(s) IV Push every 4 hours PRN  palonosetron Injectable (Chemo) 0.25 milliGRAM(s) IV Push once    famotidine  IVPB (Chemo) 20 milliGRAM(s) IV Intermittent once    dexamethasone   IVPB (Chemo) 12 milliGRAM(s) IV Intermittent once    PACLitaxel IVPB 585 milliGRAM(s) IV Intermittent once  sodium chloride 0.9%. 1000 milliLiter(s) IV Continuous <Continuous>      PAST MEDICAL & SURGICAL HISTORY:  Mixed germ cell tumor  Obesity  Atrial mass: right artrial echo density per MRI 3/14/18.  Atrial flutter  Cardiomyopathy  Atrial fibrillation: s/p DCCV 1/18  Thrombus: Right atrial wall ( suspected from last TYLOR) On AC  Testicular cancer  Lumbar herniated disc  Testicular mass: left  History of abdominal surgery: Retroperitoneal lymph node dissection (June 2018)  History of abdominal surgery: Retroperitoneal lymph node dissection (June 2018)  History of cardioversion: 2/2018  Port-a-cath in place: placed 9/2017,, removed-10/18/2017  History of orchiectomy: left-7/2017      FAMILY HISTORY:  Family history of hypertension in father (Father)  Family history of kidney stones (Mother): mother  Family history of cancer (Grandparent)  Family history of ischemic heart disease (Grandparent)  Family history of diabetes mellitus (Aunt)      SOCIAL HISTORY:    [ ] Non-smoker  [ ] Smoker  [ ] Alcohol      REVIEW OF SYSTEMS:  See HPI. Otherwise, 10 point ROS done and otherwise negative.      T(C): 37.1 (08-19-18 @ 15:05), Max: 37.1 (08-19-18 @ 15:05)  HR: 76 (08-19-18 @ 15:05) (76 - 108)  BP: 110/72 (08-19-18 @ 15:05) (109/78 - 110/72)  RR: 18 (08-19-18 @ 15:05) (18 - 18)  SpO2: 98% (08-19-18 @ 15:05) (98% - 99%)  Wt(kg): --  I&O's Summary      Physical Exam:  General: NAD  Cardiovascular: Normal S1 S2, No JVD, No murmurs, No edema  Respiratory: Lungs clear to auscultation	  Gastrointestinal:  Soft, Non-tender, + BS	  Skin: warm and dry, No rashes, No ecchymoses, No cyanosis	  Extremities:  No clubbing, cyanosis or edema  Vascular: Peripheral pulses palpable 2+ bilaterally    LABS:	   	    CBC Full  -  ( 19 Aug 2018 18:36 )  WBC Count : 11.00 K/uL  Hemoglobin : 10.6 g/dL  Hematocrit : 31.8 %  Platelet Count - Automated : 298 K/uL  Mean Cell Volume : 90.1 fL  Mean Cell Hemoglobin : 30.0 pg  Mean Cell Hemoglobin Concentration : 33.3 %  Auto Neutrophil # : x  Auto Lymphocyte # : x  Auto Monocyte # : x  Auto Eosinophil # : x  Auto Basophil # : x  Auto Neutrophil % : x  Auto Lymphocyte % : x  Auto Monocyte % : x  Auto Eosinophil % : x  Auto Basophil % : x    08-19    141  |  101  |  10  ----------------------------<  104<H>  4.5   |  26  |  0.80    Ca    9.1      19 Aug 2018 12:00  Phos  3.3     08-19  Mg     1.8     08-19    TPro  6.3  /  Alb  3.3  /  TBili  < 0.2<L>  /  DBili  x   /  AST  39  /  ALT  65<H>  /  AlkPhos  75  08-19      proBNP:   Lipid Profile:   HgA1c:   TSH:       CARDIAC MARKERS:            TELEMETRY: 	    ECG:  	  RADIOLOGY:  OTHER: 	    PREVIOUS DIAGNOSTIC TESTING:    [ ] Echocardiogram:  [ ]  Catheterization:  [ ] Stress Test:  	  	  ASSESSMENT/PLAN: 	    [place] [id number] Date of Admission:  8/19/18  CHIEF COMPLAINT:    HISTORY OF PRESENT ILLNESS:  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 Patient presents today as a direct admission for cycle 2 of TIP. Patient reports anxiety about this upcoming cycle given the complications that were associated with the last one. Cardiology called for clarification regarding atrial mass and cardiac MRI, as well as follow up while he undergoes treatment.  No Known Allergies    Intolerances    	    MEDICATIONS:  carvedilol 12.5 milliGRAM(s) Oral every 12 hours  lisinopril 2.5 milliGRAM(s) Oral daily  diphenhydrAMINE   Capsule 25 milliGRAM(s) Oral every 6 hours PRN  fosaprepitant IVPB (Chemo) 150 milliGRAM(s) IV Intermittent once  morphine  - Injectable 2 milliGRAM(s) IV Push every 4 hours PRN  palonosetron Injectable (Chemo) 0.25 milliGRAM(s) IV Push once    famotidine  IVPB (Chemo) 20 milliGRAM(s) IV Intermittent once    dexamethasone   IVPB (Chemo) 12 milliGRAM(s) IV Intermittent once    PACLitaxel IVPB 585 milliGRAM(s) IV Intermittent once  sodium chloride 0.9%. 1000 milliLiter(s) IV Continuous <Continuous>      PAST MEDICAL & SURGICAL HISTORY:  Mixed germ cell tumor  Obesity  Atrial mass: right artrial echo density per MRI 3/14/18.  Atrial flutter  Cardiomyopathy  Atrial fibrillation: s/p DCCV 1/18  Thrombus: Right atrial wall ( suspected from last TYLOR) On AC  Testicular cancer  Lumbar herniated disc  Testicular mass: left  History of abdominal surgery: Retroperitoneal lymph node dissection (June 2018)  History of abdominal surgery: Retroperitoneal lymph node dissection (June 2018)  History of cardioversion: 2/2018  Port-a-cath in place: placed 9/2017,, removed-10/18/2017  History of orchiectomy: left-7/2017      FAMILY HISTORY:  Family history of hypertension in father (Father)  Family history of kidney stones (Mother): mother  Family history of cancer (Grandparent)  Family history of ischemic heart disease (Grandparent)  Family history of diabetes mellitus (Aunt)      SOCIAL HISTORY:    [ ] Non-smoker  [ ] Smoker  [ ] Alcohol      REVIEW OF SYSTEMS:  See HPI. Otherwise, 10 point ROS done and otherwise negative.      T(C): 37.1 (08-19-18 @ 15:05), Max: 37.1 (08-19-18 @ 15:05)  HR: 76 (08-19-18 @ 15:05) (76 - 108)  BP: 110/72 (08-19-18 @ 15:05) (109/78 - 110/72)  RR: 18 (08-19-18 @ 15:05) (18 - 18)  SpO2: 98% (08-19-18 @ 15:05) (98% - 99%)  Wt(kg): --  I&O's Summary      Physical Exam:  General: NAD  Cardiovascular: Normal S1 S2, No JVD, No murmurs, No edema  Respiratory: Lungs clear to auscultation	  Gastrointestinal:  Soft, Non-tender, + BS	  Skin: warm and dry, No rashes, No ecchymoses, No cyanosis	  Extremities:  No clubbing, cyanosis or edema  Vascular: Peripheral pulses palpable 2+ bilaterally    LABS:	   	    CBC Full  -  ( 19 Aug 2018 18:36 )  WBC Count : 11.00 K/uL  Hemoglobin : 10.6 g/dL  Hematocrit : 31.8 %  Platelet Count - Automated : 298 K/uL  Mean Cell Volume : 90.1 fL  Mean Cell Hemoglobin : 30.0 pg  Mean Cell Hemoglobin Concentration : 33.3 %  Auto Neutrophil # : x  Auto Lymphocyte # : x  Auto Monocyte # : x  Auto Eosinophil # : x  Auto Basophil # : x  Auto Neutrophil % : x  Auto Lymphocyte % : x  Auto Monocyte % : x  Auto Eosinophil % : x  Auto Basophil % : x    08-19    141  |  101  |  10  ----------------------------<  104<H>  4.5   |  26  |  0.80    Ca    9.1      19 Aug 2018 12:00  Phos  3.3     08-19  Mg     1.8     08-19    TPro  6.3  /  Alb  3.3  /  TBili  < 0.2<L>  /  DBili  x   /  AST  39  /  ALT  65<H>  /  AlkPhos  75  08-19  TELE: Normal sinus rhythm

## 2018-08-19 NOTE — CONSULT NOTE ADULT - ASSESSMENT
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 7/2017, 3 cycles of cisplatin/etoposide 9/2017, RPLND 6/2018, found to have residual disease, s/p cycle 1 of TIP 7/29/2018, completed 8/3/2018, s/p neulasta onpro 8/3/2018, aflutter 2/2 catheter irritation of Rt atrium with a Rt atrial thrombus s/p successful TYLOR/DCCV into sinus rhythm 2/2018 currently off of AC in setting of chemo-related thrombocytopenia, nonischemic CM (EF 35-40%) and recent admission 8/6-8/12 for neutropenic sepsis, now with a cardiomyopathy, likely 2/2 to treatments   RECOMMENDATIONS:  For clarification with cardiac MRI, Dr.David Marquez is the cardiologist who follows this patient, he recommends a cardiac MRI with contrast in the setting of normal renal function to further evaluate atrial mass thrombus vs. tumor. This study is a follow up to the previous study done in March. Continue current cardiac meds, BB, and ACE. Will follow up with cardiac MRI results. Plan discussed with

## 2018-08-19 NOTE — H&P ADULT - ASSESSMENT
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 Patient presents today as a direct admission for cycle 2 of TIP. 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

## 2018-08-19 NOTE — H&P ADULT - PROBLEM SELECTOR PLAN 4
Likely 2/2 chemotherapy, improved since last admission  Continue to monitor CBC  ICD for DVT ppx until onc clears AC

## 2018-08-19 NOTE — PATIENT PROFILE ADULT. - ABILITY TO HEAR (WITH HEARING AID OR HEARING APPLIANCE IF NORMALLY USED):
tinitis from chemo/Mildly to Moderately Impaired: difficulty hearing in some environments or speaker may need to increase volume or speak distinctly

## 2018-08-19 NOTE — CONSULT NOTE ADULT - ASSESSMENT
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

## 2018-08-19 NOTE — CONSULT NOTE ADULT - CONSULT REASON
continue to follow while undergoing cycle of chemotherapy in setting of cardiomyopathy. Cardiology called to request clarification on CARDIAC MRI.

## 2018-08-19 NOTE — CONSULT NOTE ADULT - ATTENDING COMMENTS
planned admission for TIP cycle #2, recent labs reviewed and ok to proceed with chemo   agree with above

## 2018-08-19 NOTE — CONSULT NOTE ADULT - SUBJECTIVE AND OBJECTIVE BOX
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  Atrial flutter  Cardiomyopathy  Atrial fibrillation: s/p DCCV 1/18  Thrombus: Right atrial wall ( suspected from last TYLOR) On AC  Testicular cancer  Lumbar herniated disc  Testicular mass: left  History of abdominal surgery: Retroperitoneal lymph node dissection (June 2018)  History of abdominal surgery: Retroperitoneal lymph node dissection (June 2018)  History of cardioversion: 2/2018  Port-a-cath in place: placed 9/2017,, removed-10/18/2017  History of orchiectomy: left-7/2017      Allergies    No Known Allergies    Intolerances        MEDICATIONS  (STANDING):  dexamethasone   IVPB (Chemo) 12 milliGRAM(s) IV Intermittent once  famotidine  IVPB (Chemo) 20 milliGRAM(s) IV Intermittent once  fosaprepitant IVPB (Chemo) 150 milliGRAM(s) IV Intermittent once  PACLitaxel IVPB 585 milliGRAM(s) IV Intermittent once  palonosetron Injectable (Chemo) 0.25 milliGRAM(s) IV Push once  sodium chloride 0.9%. 1000 milliLiter(s) (75 mL/Hr) IV Continuous <Continuous>    MEDICATIONS  (PRN):      FAMILY HISTORY:  Family history of hypertension in father (Father)  Family history of kidney stones (Mother): mother  Family history of cancer (Grandparent)  Family history of ischemic heart disease (Grandparent)  Family history of diabetes mellitus (Aunt)      SOCIAL HISTORY: No EtOH, no tobacco    REVIEW OF SYSTEMS:    CONSTITUTIONAL: No weakness, fevers or chills  EYES/ENT: No visual changes;  No vertigo or throat pain   NECK: No pain or stiffness  RESPIRATORY: No cough, wheezing, hemoptysis; No shortness of breath  CARDIOVASCULAR: No chest pain or palpitations  GASTROINTESTINAL: No abdominal or epigastric pain. No nausea, vomiting, or hematemesis; No diarrhea or constipation. No melena or hematochezia.  GENITOURINARY: No dysuria, frequency or hematuria  NEUROLOGICAL: No numbness or weakness  SKIN: No itching, burning, rashes, or lesions   All other review of systems is negative unless indicated above.    Height (cm): 182.88 (08-19 @ 09:30)  Weight (kg): 112 (08-19 @ 09:30)  BMI (kg/m2): 33.5 (08-19 @ 09:30)  BSA (m2): 2.33 (08-19 @ 09:30)    T(F): 98.4 (08-19-18 @ 09:30), Max: 98.4 (08-19-18 @ 09:30)  HR: 108 (08-19-18 @ 09:30)  BP: 109/78 (08-19-18 @ 09:30)  RR: 18 (08-19-18 @ 09:30)  SpO2: 99% (08-19-18 @ 09:30)  Wt(kg): --    GENERAL: NAD, well-developed  HEAD:  Atraumatic, Normocephalic  EYES: EOMI, PERRLA, conjunctiva and sclera clear  NECK: Supple, No JVD  CHEST/LUNG: Clear to auscultation bilaterally; No wheeze  HEART: Regular rate and rhythm; No murmurs, rubs, or gallops  ABDOMEN: Soft, Nontender, Nondistended; Bowel sounds present  EXTREMITIES:  2+ Peripheral Pulses, No clubbing, cyanosis, or edema  NEUROLOGY: non-focal  SKIN: No rashes or lesions                        FECES  08-08 @ 12:06 --  --  --      FECES  08-08 @ 04:12 --  --  --      BLOOD PERIPHERAL  08-06 @ 14:38 --    NO ORGANISMS ISOLATED  --      URINE MIDSTREAM  08-06 @ 13:33 --  --  --      BLOOD VENOUS  08-06 @ 10:54 --    NO ORGANISMS ISOLATED  --      BLOOD PERIPHERAL  06-08 @ 16:20 --    NO ORGANISMS ISOLATED  --      BLOOD  06-08 @ 13:39 --    NO ORGANISMS ISOLATED  --      URINE CATHETER  06-08 @ 11:21 --  --  --

## 2018-08-19 NOTE — H&P ADULT - PROBLEM SELECTOR PLAN 3
Echo shows reduced LVEF (35-40%)  Continue carvedilol and lisinopril   F/U cardiac MRI as outpatient cardiac recommendations Echo shows reduced LVEF (35-40%)  Continue carvedilol and lisinopril   F/U cardiac MRI as per outpatient cardiac recommendations Echo shows reduced LVEF (35-40%)  Continue carvedilol and lisinopril   Cardiology consult, will consider inpatient cardiac MRI as per outpatient recommendations

## 2018-08-19 NOTE — H&P ADULT - PROBLEM SELECTOR PLAN 2
NSR, HR stable  Warfarin for anticoagulation on hold in setting of chemo-related thrombocytopenia   Continue BB, ACEI

## 2018-08-20 ENCOUNTER — TRANSCRIPTION ENCOUNTER (OUTPATIENT)
Age: 38
End: 2018-08-20

## 2018-08-20 VITALS
OXYGEN SATURATION: 100 % | SYSTOLIC BLOOD PRESSURE: 113 MMHG | RESPIRATION RATE: 18 BRPM | DIASTOLIC BLOOD PRESSURE: 72 MMHG | HEART RATE: 72 BPM

## 2018-08-20 LAB
ALBUMIN SERPL ELPH-MCNC: 3.5 G/DL — SIGNIFICANT CHANGE UP (ref 3.3–5)
ALP SERPL-CCNC: 78 U/L — SIGNIFICANT CHANGE UP (ref 40–120)
ALT FLD-CCNC: 62 U/L — HIGH (ref 4–41)
AST SERPL-CCNC: 35 U/L — SIGNIFICANT CHANGE UP (ref 4–40)
BILIRUB SERPL-MCNC: 0.3 MG/DL — SIGNIFICANT CHANGE UP (ref 0.2–1.2)
BUN SERPL-MCNC: 8 MG/DL — SIGNIFICANT CHANGE UP (ref 7–23)
CALCIUM SERPL-MCNC: 8.6 MG/DL — SIGNIFICANT CHANGE UP (ref 8.4–10.5)
CHLORIDE SERPL-SCNC: 101 MMOL/L — SIGNIFICANT CHANGE UP (ref 98–107)
CO2 SERPL-SCNC: 21 MMOL/L — LOW (ref 22–31)
CREAT SERPL-MCNC: 0.68 MG/DL — SIGNIFICANT CHANGE UP (ref 0.5–1.3)
GLUCOSE SERPL-MCNC: 133 MG/DL — HIGH (ref 70–99)
HCT VFR BLD CALC: 32.3 % — LOW (ref 39–50)
HGB BLD-MCNC: 10.8 G/DL — LOW (ref 13–17)
MAGNESIUM SERPL-MCNC: 1.9 MG/DL — SIGNIFICANT CHANGE UP (ref 1.6–2.6)
MCHC RBC-ENTMCNC: 29.8 PG — SIGNIFICANT CHANGE UP (ref 27–34)
MCHC RBC-ENTMCNC: 33.4 % — SIGNIFICANT CHANGE UP (ref 32–36)
MCV RBC AUTO: 89.2 FL — SIGNIFICANT CHANGE UP (ref 80–100)
NRBC # FLD: 0.11 — SIGNIFICANT CHANGE UP
NRBC FLD-RTO: 1.1 — SIGNIFICANT CHANGE UP
PHOSPHATE SERPL-MCNC: 2.8 MG/DL — SIGNIFICANT CHANGE UP (ref 2.5–4.5)
PLATELET # BLD AUTO: 287 K/UL — SIGNIFICANT CHANGE UP (ref 150–400)
PMV BLD: 10.2 FL — SIGNIFICANT CHANGE UP (ref 7–13)
POTASSIUM SERPL-MCNC: 4.7 MMOL/L — SIGNIFICANT CHANGE UP (ref 3.5–5.3)
POTASSIUM SERPL-SCNC: 4.7 MMOL/L — SIGNIFICANT CHANGE UP (ref 3.5–5.3)
PROT SERPL-MCNC: 6.7 G/DL — SIGNIFICANT CHANGE UP (ref 6–8.3)
RBC # BLD: 3.62 M/UL — LOW (ref 4.2–5.8)
RBC # FLD: 12.3 % — SIGNIFICANT CHANGE UP (ref 10.3–14.5)
SODIUM SERPL-SCNC: 137 MMOL/L — SIGNIFICANT CHANGE UP (ref 135–145)
WBC # BLD: 9.75 K/UL — SIGNIFICANT CHANGE UP (ref 3.8–10.5)
WBC # FLD AUTO: 9.75 K/UL — SIGNIFICANT CHANGE UP (ref 3.8–10.5)

## 2018-08-20 PROCEDURE — 99233 SBSQ HOSP IP/OBS HIGH 50: CPT

## 2018-08-20 PROCEDURE — 99239 HOSP IP/OBS DSCHRG MGMT >30: CPT

## 2018-08-20 PROCEDURE — 99232 SBSQ HOSP IP/OBS MODERATE 35: CPT

## 2018-08-20 RX ORDER — ENOXAPARIN SODIUM 100 MG/ML
110 INJECTION SUBCUTANEOUS EVERY 12 HOURS
Qty: 0 | Refills: 0 | Status: DISCONTINUED | OUTPATIENT
Start: 2018-08-20 | End: 2018-08-20

## 2018-08-20 RX ORDER — ENOXAPARIN SODIUM 100 MG/ML
110 INJECTION SUBCUTANEOUS
Qty: 30 | Refills: 0 | OUTPATIENT
Start: 2018-08-20 | End: 2018-09-18

## 2018-08-20 RX ADMIN — SODIUM CHLORIDE 500 MILLILITER(S): 9 INJECTION, SOLUTION INTRAVENOUS at 18:08

## 2018-08-20 RX ADMIN — CARVEDILOL PHOSPHATE 12.5 MILLIGRAM(S): 80 CAPSULE, EXTENDED RELEASE ORAL at 17:19

## 2018-08-20 RX ADMIN — ENOXAPARIN SODIUM 110 MILLIGRAM(S): 100 INJECTION SUBCUTANEOUS at 17:33

## 2018-08-20 RX ADMIN — SODIUM CHLORIDE 500 MILLILITER(S): 9 INJECTION INTRAMUSCULAR; INTRAVENOUS; SUBCUTANEOUS at 15:07

## 2018-08-20 RX ADMIN — CARVEDILOL PHOSPHATE 12.5 MILLIGRAM(S): 80 CAPSULE, EXTENDED RELEASE ORAL at 05:13

## 2018-08-20 RX ADMIN — LISINOPRIL 2.5 MILLIGRAM(S): 2.5 TABLET ORAL at 06:40

## 2018-08-20 NOTE — DISCHARGE NOTE ADULT - REASON FOR ADMISSION
Direct admission for chemo-  Atrial Thrombus- started lovenox injections Direct admission for chemo-  Atrial Thrombus- started Lovenox injections Direct admission for chemo-for Germ cell tumor  Atrial Thrombus- started Lovenox injections

## 2018-08-20 NOTE — PROGRESS NOTE ADULT - SUBJECTIVE AND OBJECTIVE BOX
Patient seen and examined at bedside.    Overnight Events: No acute events.    Review Of Systems: No chest pain, shortness of breath, or palpitations            Current Meds:  carvedilol 12.5 milliGRAM(s) Oral every 12 hours  CISplatin IVPB 47 milliGRAM(s) IV Intermittent once  dexamethasone   IVPB (Chemo) 12 milliGRAM(s) IV Intermittent once  diphenhydrAMINE   Capsule 25 milliGRAM(s) Oral every 6 hours PRN  famotidine  IVPB (Chemo) 20 milliGRAM(s) IV Intermittent once  fosaprepitant IVPB (Chemo) 150 milliGRAM(s) IV Intermittent once  ifosfamide IVPB 2808 milliGRAM(s) IV Intermittent once  lisinopril 2.5 milliGRAM(s) Oral daily  mesna IVPB (Chemo) 936 milliGRAM(s) IV Intermittent once  mesna IVPB (Chemo) 936 milliGRAM(s) IV Intermittent once  morphine  - Injectable 2 milliGRAM(s) IV Push every 4 hours PRN  PACLitaxel IVPB 585 milliGRAM(s) IV Intermittent once  palonosetron Injectable (Chemo) 0.25 milliGRAM(s) IV Push once  sodium chloride 0.9% 1000 milliLiter(s) IV Continuous <Continuous>  sodium chloride 0.9% Bolus 500 milliLiter(s) IV Bolus once  sodium chloride 0.9%. 1000 milliLiter(s) IV Continuous <Continuous>      PAST MEDICAL & SURGICAL HISTORY:  Mixed germ cell tumor  Obesity  Atrial mass: right artrial echo density per MRI 3/14/18.  Atrial flutter  Cardiomyopathy  Atrial fibrillation: s/p DCCV 1/18  Thrombus: Right atrial wall ( suspected from last TYLOR) On AC  Testicular cancer  Lumbar herniated disc  Testicular mass: left  History of abdominal surgery: Retroperitoneal lymph node dissection (June 2018)  History of abdominal surgery: Retroperitoneal lymph node dissection (June 2018)  History of cardioversion: 2/2018  Port-a-cath in place: placed 9/2017,, removed-10/18/2017  History of orchiectomy: left-7/2017      Vitals:  T(F): 98.3 (08-20), Max: 98.7 (08-19)  HR: 63 (08-20) (63 - 108)  BP: 104/66 (08-20) (103/76 - 110/72)  RR: 18 (08-20)  SpO2: 99% (08-20)  I&O's Summary      Physical Exam:  Appearance: No acute distress; well appearing  Eyes: PERRL, EOMI, pink conjunctiva  HENT: Normal oral mucosa  Cardiovascular: RRR, S1, S2, no murmurs, rubs, or gallops; no edema; no JVD  Respiratory: Clear to auscultation bilaterally  Gastrointestinal: soft, non-tender, non-distended with normal bowel sounds  Musculoskeletal: No clubbing; no joint deformity   Neurologic: Non-focal  Lymphatic: No lymphadenopathy  Psychiatry: AAOx3, mood & affect appropriate  Skin: No rashes, ecchymoses, or cyanosis                          10.8   9.75  )-----------( 287      ( 20 Aug 2018 07:09 )             32.3     08-20    137  |  101  |  8   ----------------------------<  133<H>  4.7   |  21<L>  |  0.68    Ca    8.6      20 Aug 2018 07:09  Phos  2.8     08-20  Mg     1.9     08-20    TPro  6.7  /  Alb  3.5  /  TBili  0.3  /  DBili  x   /  AST  35  /  ALT  62<H>  /  AlkPhos  78  08-20                  New ECG(s): Personally reviewed    Echo:    Stress Testing:     Cath:    Imaging:    Interpretation of Telemetry:

## 2018-08-20 NOTE — PROGRESS NOTE ADULT - SUBJECTIVE AND OBJECTIVE BOX
Patient is a 38y old  Male who presents with a chief complaint of Direct admission for chemo (19 Aug 2018 12:42)      SUBJECTIVE / OVERNIGHT EVENTS:  Patient is comfortable.  Seen with chemo RN- getting Iv Chemotherapy.  Patient notes he is to get No CP /sob coumadin or AC while on chemotherapy- also he wants Mri of heart but this is not to interfere with his chemotherapy- he cannot go to MRI with iv pole/ chemo- also he wants to be d/c today    MEDICATIONS  (STANDING):  carvedilol 12.5 milliGRAM(s) Oral every 12 hours  CISplatin IVPB 47 milliGRAM(s) IV Intermittent once  dexamethasone   IVPB (Chemo) 12 milliGRAM(s) IV Intermittent once  famotidine  IVPB (Chemo) 20 milliGRAM(s) IV Intermittent once  fosaprepitant IVPB (Chemo) 150 milliGRAM(s) IV Intermittent once  ifosfamide IVPB 2808 milliGRAM(s) IV Intermittent once  lisinopril 2.5 milliGRAM(s) Oral daily  mesna IVPB (Chemo) 936 milliGRAM(s) IV Intermittent once  mesna IVPB (Chemo) 936 milliGRAM(s) IV Intermittent once  PACLitaxel IVPB 585 milliGRAM(s) IV Intermittent once  palonosetron Injectable (Chemo) 0.25 milliGRAM(s) IV Push once  sodium chloride 0.9% 1000 milliLiter(s) (500 mL/Hr) IV Continuous <Continuous>  sodium chloride 0.9% Bolus 500 milliLiter(s) IV Bolus once  sodium chloride 0.9%. 1000 milliLiter(s) (75 mL/Hr) IV Continuous <Continuous>    MEDICATIONS  (PRN):  diphenhydrAMINE   Capsule 25 milliGRAM(s) Oral every 6 hours PRN Rash and/or Itching  morphine  - Injectable 2 milliGRAM(s) IV Push every 4 hours PRN Severe Pain (7 - 10)      Vital Signs Last 24 Hrs  T(C): 36.8 (20 Aug 2018 05:10), Max: 37.1 (19 Aug 2018 15:05)  T(F): 98.3 (20 Aug 2018 05:10), Max: 98.7 (19 Aug 2018 15:05)  HR: 63 (20 Aug 2018 05:10) (63 - 76)  BP: 104/66 (20 Aug 2018 05:10) (103/76 - 110/72)  BP(mean): --  RR: 18 (20 Aug 2018 05:10) (18 - 18)  SpO2: 99% (20 Aug 2018 05:10) (97% - 99%)  PHYSICAL EXAM:  GENERAL: NAD, well-developed  HEAD:  Atraumatic, Normocephalic  EYES: EOMI, PERRLA, conjunctiva and sclera clear  NECK: Supple, No JVD  CHEST/LUNG: Clear to auscultation bilaterally; No wheeze  HEART: Regular rate and rhythm; No murmurs, rubs, or gallops  ABDOMEN: Soft, Nontender, Nondistended; Bowel sounds present  EXTREMITIES:  2+ Peripheral Pulses, No clubbing, cyanosis, or edema  PSYCH: AAOx3  NEUROLOGY: non-focal  SKIN: No rashes or lesions    LABS:                        10.8   9.75  )-----------( 287      ( 20 Aug 2018 07:09 )             32.3     08-20    137  |  101  |  8   ----------------------------<  133<H>  4.7   |  21<L>  |  0.68    Ca    8.6      20 Aug 2018 07:09  Phos  2.8     08-20  Mg     1.9     08-20    TPro  6.7  /  Alb  3.5  /  TBili  0.3  /  DBili  x   /  AST  35  /  ALT  62<H>  /  AlkPhos  78  08-20              RADIOLOGY & ADDITIONAL TESTS:    Imaging Personally Reviewed:    Consultant(s) Notes Reviewed:      Care Discussed with Consultants/Other Providers: Patient is a 38y old  Male who presents with a chief complaint of Direct admission for chemo (19 Aug 2018 12:42)      SUBJECTIVE / OVERNIGHT EVENTS:  Patient is comfortable.  Seen with chemo RN- getting Iv Chemotherapy.  Patient notes he is to get No CP /sob coumadin or AC while on chemotherapy- also he wants Mri of heart but this is not to interfere with his chemotherapy- he cannot go to MRI with iv pole/ chemo- also he wants to be d/c today    MEDICATIONS  (STANDING):  carvedilol 12.5 milliGRAM(s) Oral every 12 hours  CISplatin IVPB 47 milliGRAM(s) IV Intermittent once  dexamethasone   IVPB (Chemo) 12 milliGRAM(s) IV Intermittent once  famotidine  IVPB (Chemo) 20 milliGRAM(s) IV Intermittent once  fosaprepitant IVPB (Chemo) 150 milliGRAM(s) IV Intermittent once  ifosfamide IVPB 2808 milliGRAM(s) IV Intermittent once  lisinopril 2.5 milliGRAM(s) Oral daily  mesna IVPB (Chemo) 936 milliGRAM(s) IV Intermittent once  mesna IVPB (Chemo) 936 milliGRAM(s) IV Intermittent once  PACLitaxel IVPB 585 milliGRAM(s) IV Intermittent once  palonosetron Injectable (Chemo) 0.25 milliGRAM(s) IV Push once  sodium chloride 0.9% 1000 milliLiter(s) (500 mL/Hr) IV Continuous <Continuous>  sodium chloride 0.9% Bolus 500 milliLiter(s) IV Bolus once  sodium chloride 0.9%. 1000 milliLiter(s) (75 mL/Hr) IV Continuous <Continuous>    MEDICATIONS  (PRN):  diphenhydrAMINE   Capsule 25 milliGRAM(s) Oral every 6 hours PRN Rash and/or Itching  morphine  - Injectable 2 milliGRAM(s) IV Push every 4 hours PRN Severe Pain (7 - 10)      Vital Signs Last 24 Hrs  T(C): 36.8 (20 Aug 2018 05:10), Max: 37.1 (19 Aug 2018 15:05)  T(F): 98.3 (20 Aug 2018 05:10), Max: 98.7 (19 Aug 2018 15:05)  HR: 63 (20 Aug 2018 05:10) (63 - 76)  BP: 104/66 (20 Aug 2018 05:10) (103/76 - 110/72)  BP(mean): --  RR: 18 (20 Aug 2018 05:10) (18 - 18)  SpO2: 99% (20 Aug 2018 05:10) (97% - 99%)  PHYSICAL EXAM:  GENERAL: NAD, well-developed  HEAD:  Atraumatic, Normocephalic  EYES: EOMI, PERRLA, conjunctiva and sclera clear  NECK: Supple, No JVD  CHEST/LUNG: Clear to auscultation bilaterally; No wheeze  HEART: Regular rate and rhythm; No murmurs, rubs, or gallops  ABDOMEN: Soft, Nontender, Nondistended; Bowel sounds present  EXTREMITIES:  2+ Peripheral Pulses, No clubbing, cyanosis, or edema  R arm per- IV  L arm PICC line  PSYCH: AAOx3  NEUROLOGY: non-focal  SKIN: No rashes or lesions    LABS:                        10.8   9.75  )-----------( 287      ( 20 Aug 2018 07:09 )             32.3     08-20    137  |  101  |  8   ----------------------------<  133<H>  4.7   |  21<L>  |  0.68    Ca    8.6      20 Aug 2018 07:09  Phos  2.8     08-20  Mg     1.9     08-20    TPro  6.7  /  Alb  3.5  /  TBili  0.3  /  DBili  x   /  AST  35  /  ALT  62<H>  /  AlkPhos  78  08-20              RADIOLOGY & ADDITIONAL TESTS:    Imaging Personally Reviewed:    Consultant(s) Notes Reviewed:      Care Discussed with Consultants/Other Providers:

## 2018-08-20 NOTE — DISCHARGE NOTE ADULT - MEDICATION SUMMARY - MEDICATIONS TO TAKE
I will START or STAY ON the medications listed below when I get home from the hospital:    oxyCODONE 15 mg oral tablet  -- 1 tab(s) by mouth every 6 hours, As Needed  -- Indication: For Pain management    lisinopril 2.5 mg oral tablet  -- 1 tab(s) by mouth once a day  -- Indication: For Heart    enoxaparin 120 mg/0.8 mL injectable solution  -- 110 milligram(s) subcutaneously every 12 hours   -- It is very important that you take or use this exactly as directed.  Do not skip doses or discontinue unless directed by your doctor.    -- Indication: For Atrial flutter, unspecified type    Benadryl 50 mg oral capsule  -- 1 cap(s) by mouth prior to chemotherapy   -- Indication: For Prophylaxis    Mesnex 400 mg oral tablet  -- Take 8 hours after every cycle of chemotherapy   -- Indication: For Prophylactic measure    carvedilol 12.5 mg oral tablet  -- 1 tab(s) by mouth 2 times a day  -- Indication: For heart    Prevacid 30 mg oral delayed release capsule  -- 1 cap(s) by mouth once a day  -- Indication: For GERD

## 2018-08-20 NOTE — PROGRESS NOTE ADULT - ASSESSMENT
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    -Patient can take therapeutic dose lovenox while plt>60k given h/o VTE  -Will ask outpatient oncologist dr. Chirinos to monitor plt and assess for need for AC s/p chemo given expected thrombocytopenia  -Pt to be discharged today after chemotherapy is completed and will continue D3 chemo at [facility].     Please do not hesitate to call back with questions.     Charito Mccall MD  Medical Oncology Attending
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    1. Atrial mass      - Please obtain cardiac MRI while pt is inpt for further eval    2. Atrial flutter      - RRR on physical exam w recent EKG showing sinus rhythm      - Pt's anticoagulation was stopped due to thrombocytopenia, however he now has a normal platelet count. Pt should be on anticoagulation given the atrial thrombus and h/o arrhythmia, will need to confirm with heme/onc if ok.    3. HFrEF      - Clinically euvolemic. c/w Coreg and Lisinopril. Please add hold parameters as systolic BP in the 100s.
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

## 2018-08-20 NOTE — PROGRESS NOTE ADULT - PROBLEM SELECTOR PLAN 3
Echo shows reduced LVEF (35-40%)  Continue carvedilol and lisinopril   Cardiology consult, will consider inpatient cardiac MRI as per outpatient recommendations

## 2018-08-20 NOTE — DISCHARGE NOTE ADULT - CARE PROVIDERS DIRECT ADDRESSES
[email or web address]

## 2018-08-20 NOTE — DISCHARGE NOTE ADULT - PATIENT PORTAL LINK FT
You can access the [facility], offered by [facility], by registering with the following website: [email or web address]

## 2018-08-20 NOTE — DISCHARGE NOTE ADULT - HOSPITAL COURSE
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 Patient presents today as a direct admission for cycle 2 of TIP. Patient reports anxiety about this upcoming cycle given the complications that were associated with the last one. He denies any fevers/chills, cough/N/V/D. Does state that he has neuropathy involving his hands, feet (L>>R). Asking for vitamin C and antihistamine to help attenuate neurological side effects from the chemo.   HOSPITAL COURSE:  Patient was started on chemotherapy of.......  He was noted to have R Atrial Thrombus but now off AC due to Low platelets with chemotherapy in past.  After d/w Cardiology and Oncology it was decided that Lovenox should be restarted and MDs at [facility] will monitor his platelet .  He completed his chemo on 8/20/18 and was d/c to home to f/u at [facility] 8/21/18 for further treatment.  He will get his Cardiac MRI out patient. 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 Patient presents today as a direct admission for cycle 2 of TIP. Patient reports anxiety about this upcoming cycle given the complications that were associated with the last one. He denies any fevers/chills, cough/N/V/D. Does state that he has neuropathy involving his hands, feet (L>>R). Asking for vitamin C and antihistamine to help attenuate neurological side effects from the chemo.   HOSPITAL COURSE:  Patient was started on chemotherapy of cisplatin, ifofamide, mesna, dexamethasone, famotidine,fosaprepitant, taxol, and palonosetron.  He was noted to have R Atrial Thrombus but now off AC due to Low platelets with chemotherapy in past.  After d/w Cardiology and Oncology it was decided that Lovenox should be restarted and MDs at [facility] will monitor his platelet .  He completed his chemo on 8/20/18 and was d/c to home to f/u at [facility] 8/21/18 for further treatment.  He will get his Cardiac MRI out patient.  Picc line care to resume at Home.

## 2018-08-20 NOTE — PROGRESS NOTE ADULT - ATTENDING COMMENTS
Plans fto [facility] 8/21/ for chemo   No role for AC on chemo as per patient.  Await Heme/ onc input.  Hold Mri off Mri of hear- out patient.  Patients wants to do MRI of heart outpatient since it clashes with in patient chemo- therapy. Plans fto [facility] 8/21/ for chemo   No role for AC on chemo as per patient.  Await Heme/ onc input.  Hold Mri off Mri of heart- out patient.- as patient requests.  Patients wants to do MRI of heart outpatient since it clashes with in patient chemo- therapy. Plans fto [facility] 8/21/ for chemo   No role for AC on chemo as per patient.  Await Heme/ onc input.  Hold Mri off Mri of heart- out patient.- as patient requests.  Patients wants to do MRI of heart outpatient since it clashes with in patient chemo- therapy.      Addendum- patient seen with parents at bedside.  3:30 Pm  Out patient Cardiac MRI.  D/w Onc- start Lovenox 110 q12 SQ injectos- out patient platelet f/u at [facility] with Dr Chirinos.  d/c home today after chemo so patient can keep f/u at [facility] on 8/21/18 Plans fto [facility] 8/21/ for chemo   No role for AC on chemo as per patient.  Await Heme/ onc input.  Hold Mri off Mri of heart- out patient.- as patient requests.  Patients wants to do MRI of heart outpatient since it clashes with in patient chemo- therapy.      Addendum- patient seen with parents at bedside.  3:30 Pm  Out patient Cardiac MRI.  D/w Onc- start Lovenox 110 q12 SQ injectos- out patient platelet f/u at [facility] with Dr Chirinos.  d/c home today after chemo so patient can keep f/u at [facility] on 8/21/18.  patient already has Home care for Picc line Plans fto [facility] 8/21/ for chemo   No role for AC on chemo as per patient.  Await Heme/ onc input.  Hold Mri off Mri of heart- out patient.- as patient requests.  Patients wants to do MRI of heart outpatient since it clashes with in patient chemo- therapy.      Addendum- patient seen with parents at bedside.  3:30 Pm  Out patient Cardiac MRI.  D/w Onc- start Lovenox 110 q12 SQ injections- out patient platelet f/u at [facility] with Dr Chirinos.  d/c home today after chemo so patient can keep f/u at [facility] on 8/21/18.  patient already has Home care for Picc line.    60 minutes coordinating discharge.

## 2018-08-20 NOTE — DISCHARGE NOTE ADULT - PLAN OF CARE
Treat Please follow up at restart Lovenox 110 sq q12 Watch for Patient No  longer thrombocytopenic .  Now on Lovenox - started in hospital- will monitor platelets at [facility] restart Lovenox 110 sq q12 for atrial thrumbus.  patient is to get MRI of HEART restart Lovenox 110 sq q12 for atrial thrombus.  patient is to get MRI of HEART

## 2018-08-20 NOTE — DISCHARGE NOTE ADULT - CARE PROVIDER_API CALL
Neo Chirinos), Hematology; Internal Medicine; Medical Oncology  [address] [place] [address]  Phone: [phone]  Fax: [phone] Neo Chirinos), Hematology; Internal Medicine; Medical Oncology  [address] [place] [address]  Phone: [phone]  Fax: [phone]    Tra Brown (MD; PhD), Cardiology; Internal Medicine; Vascular Medicine  [address] [place] [id number]  Phone: [phone]  Fax: [phone]

## 2018-08-20 NOTE — DISCHARGE NOTE ADULT - ABILITY TO HEAR (WITH HEARING AID OR HEARING APPLIANCE IF NORMALLY USED):
tinitis from chemo/Mildly to Moderately Impaired: difficulty hearing in some environments or speaker may need to increase volume or speak distinctly Mildly to Moderately Impaired: difficulty hearing in some environments or speaker may need to increase volume or speak distinctly/tinnitus from chemo

## 2018-08-20 NOTE — DISCHARGE NOTE ADULT - CARE PLAN
Principal Discharge DX:	Mixed germ cell tumor  Goal:	Treat  Assessment and plan of treatment:	Please follow up at  Secondary Diagnosis:	Atrial mass  Assessment and plan of treatment:	restart Lovenox 110 sq q12  Secondary Diagnosis:	Thrombocytopenia  Goal:	Watch for  Assessment and plan of treatment:	Patient No  longer thrombocytopenic .  Now on Lovenox - started in hospital- will monitor platelets at [facility] Principal Discharge DX:	Mixed germ cell tumor  Goal:	Treat  Assessment and plan of treatment:	Please follow up at  Secondary Diagnosis:	Atrial mass  Assessment and plan of treatment:	restart Lovenox 110 sq q12 for atrial thrumbus.  patient is to get MRI of HEART  Secondary Diagnosis:	Thrombocytopenia  Goal:	Watch for  Assessment and plan of treatment:	Patient No  longer thrombocytopenic .  Now on Lovenox - started in hospital- will monitor platelets at [facility] Principal Discharge DX:	Mixed germ cell tumor  Goal:	Treat  Assessment and plan of treatment:	Please follow up at  Secondary Diagnosis:	Atrial mass  Assessment and plan of treatment:	restart Lovenox 110 sq q12 for atrial thrombus.  patient is to get MRI of HEART  Secondary Diagnosis:	Thrombocytopenia  Goal:	Watch for  Assessment and plan of treatment:	Patient No  longer thrombocytopenic .  Now on Lovenox - started in hospital- will monitor platelets at [facility]

## 2018-08-20 NOTE — PROGRESS NOTE ADULT - SUBJECTIVE AND OBJECTIVE BOX
INTERVAL HPI/OVERNIGHT EVENTS:  Patient seen at bedside. He is very concerned about the chemotherapy side effects that he is expecting to experience in a few days. He is asking about any prophylactic measures including high dose vit c, herbal antihistamines etc...       VITAL SIGNS:  T(F): 98.3 (08-20-18 @ 14:55)  HR: 72 (08-20-18 @ 17:18)  BP: 113/72 (08-20-18 @ 17:18)  RR: 18 (08-20-18 @ 17:18)  SpO2: 100% (08-20-18 @ 17:18)  Wt(kg): --    PHYSICAL EXAM:    Constitutional: NAD, sitting in bed comfortably  Eyes: EOMI, sclera non-icteric  Neck: supple, no LAP  Respiratory: CTA b/l, good air entry b/l, no wheezing, rhonchi or crackels  Cardiovascular: RRR, normal S1S2, no M/R/G  Gastrointestinal: soft, NTND  Extremities: no edema  Neurological: AAOx3, non focal  Skin: Normal temperature    MEDICATIONS  (STANDING):  carvedilol 12.5 milliGRAM(s) Oral every 12 hours  CISplatin IVPB 47 milliGRAM(s) IV Intermittent once  dexamethasone   IVPB (Chemo) 12 milliGRAM(s) IV Intermittent once  enoxaparin Injectable 110 milliGRAM(s) SubCutaneous every 12 hours  famotidine  IVPB (Chemo) 20 milliGRAM(s) IV Intermittent once  fosaprepitant IVPB (Chemo) 150 milliGRAM(s) IV Intermittent once  ifosfamide IVPB 2808 milliGRAM(s) IV Intermittent once  lisinopril 2.5 milliGRAM(s) Oral daily  mesna IVPB (Chemo) 936 milliGRAM(s) IV Intermittent once  mesna IVPB (Chemo) 936 milliGRAM(s) IV Intermittent once  PACLitaxel IVPB 585 milliGRAM(s) IV Intermittent once  palonosetron Injectable (Chemo) 0.25 milliGRAM(s) IV Push once  sodium chloride 0.9% 1000 milliLiter(s) (500 mL/Hr) IV Continuous <Continuous>  sodium chloride 0.9%. 1000 milliLiter(s) (75 mL/Hr) IV Continuous <Continuous>    MEDICATIONS  (PRN):  diphenhydrAMINE   Capsule 25 milliGRAM(s) Oral every 6 hours PRN Rash and/or Itching  morphine  - Injectable 2 milliGRAM(s) IV Push every 4 hours PRN Severe Pain (7 - 10)      Allergies    No Known Allergies    Intolerances        LABS:                        10.8   9.75  )-----------( 287      ( 20 Aug 2018 07:09 )             32.3     08-20    137  |  101  |  8   ----------------------------<  133<H>  4.7   |  21<L>  |  0.68    Ca    8.6      20 Aug 2018 07:09  Phos  2.8     08-20  Mg     1.9     08-20    TPro  6.7  /  Alb  3.5  /  TBili  0.3  /  DBili  x   /  AST  35  /  ALT  62<H>  /  AlkPhos  78  08-20          RADIOLOGY & ADDITIONAL TESTS:  Studies reviewed.

## 2018-08-21 ENCOUNTER — RESULT REVIEW (OUTPATIENT)
Age: 38
End: 2018-08-21

## 2018-08-21 ENCOUNTER — APPOINTMENT (OUTPATIENT)
Dept: INFUSION THERAPY | Facility: HOSPITAL | Age: 38
End: 2018-08-21

## 2018-08-21 ENCOUNTER — INBOUND DOCUMENT (OUTPATIENT)
Age: 38
End: 2018-08-21

## 2018-08-21 DIAGNOSIS — Z71.89 OTHER SPECIFIED COUNSELING: ICD-10-CM

## 2018-08-21 LAB
APPEARANCE UR: CLEAR — SIGNIFICANT CHANGE UP
BACTERIA # UR AUTO: NEGATIVE — SIGNIFICANT CHANGE UP
BASOPHILS # BLD AUTO: 0 K/UL — SIGNIFICANT CHANGE UP (ref 0–0.2)
BASOPHILS NFR BLD AUTO: 0.3 % — SIGNIFICANT CHANGE UP (ref 0–2)
BILIRUB UR-MCNC: NEGATIVE — SIGNIFICANT CHANGE UP
COLOR SPEC: YELLOW — SIGNIFICANT CHANGE UP
DIFF PNL FLD: NEGATIVE — SIGNIFICANT CHANGE UP
EOSINOPHIL # BLD AUTO: 0.1 K/UL — SIGNIFICANT CHANGE UP (ref 0–0.5)
EOSINOPHIL NFR BLD AUTO: 1.6 % — SIGNIFICANT CHANGE UP (ref 0–6)
EPI CELLS # UR: 2 /HPF — SIGNIFICANT CHANGE UP (ref 0–5)
GLUCOSE UR QL: NEGATIVE MG/DL — SIGNIFICANT CHANGE UP
HCT VFR BLD CALC: 29 % — LOW (ref 39–50)
HGB BLD-MCNC: 10.2 G/DL — LOW (ref 13–17)
HYALINE CASTS # UR AUTO: 5 /LPF — SIGNIFICANT CHANGE UP (ref 0–7)
KETONES UR-MCNC: ABNORMAL
LEUKOCYTE ESTERASE UR-ACNC: NEGATIVE — SIGNIFICANT CHANGE UP
LYMPHOCYTES # BLD AUTO: 0.9 K/UL — LOW (ref 1–3.3)
LYMPHOCYTES # BLD AUTO: 25.9 % — SIGNIFICANT CHANGE UP (ref 13–44)
MCHC RBC-ENTMCNC: 31.4 PG — SIGNIFICANT CHANGE UP (ref 27–34)
MCHC RBC-ENTMCNC: 35.2 G/DL — SIGNIFICANT CHANGE UP (ref 32–36)
MCV RBC AUTO: 89.1 FL — SIGNIFICANT CHANGE UP (ref 80–100)
MONOCYTES # BLD AUTO: 0.3 K/UL — SIGNIFICANT CHANGE UP (ref 0–0.9)
MONOCYTES NFR BLD AUTO: 9.2 % — SIGNIFICANT CHANGE UP (ref 2–14)
NEUTROPHILS # BLD AUTO: 2.2 K/UL — SIGNIFICANT CHANGE UP (ref 1.8–7.4)
NEUTROPHILS NFR BLD AUTO: 63 % — SIGNIFICANT CHANGE UP (ref 43–77)
NITRITE UR-MCNC: NEGATIVE — SIGNIFICANT CHANGE UP
PH UR: 5.5 — SIGNIFICANT CHANGE UP (ref 5–8)
PLATELET # BLD AUTO: 290 K/UL — SIGNIFICANT CHANGE UP (ref 150–400)
PROT UR-MCNC: ABNORMAL MG/DL
RBC # BLD: 3.25 M/UL — LOW (ref 4.2–5.8)
RBC # FLD: 12.5 % — SIGNIFICANT CHANGE UP (ref 10.3–14.5)
RBC CASTS # UR COMP ASSIST: 2 /HPF — SIGNIFICANT CHANGE UP (ref 0–4)
SP GR SPEC: 1.02 — SIGNIFICANT CHANGE UP (ref 1.01–1.02)
UROBILINOGEN FLD QL: NEGATIVE MG/DL — SIGNIFICANT CHANGE UP
WBC # BLD: 3.5 K/UL — LOW (ref 3.8–10.5)
WBC # FLD AUTO: 3.5 K/UL — LOW (ref 3.8–10.5)
WBC UR QL: 7 /HPF — HIGH (ref 0–5)

## 2018-08-22 ENCOUNTER — APPOINTMENT (OUTPATIENT)
Dept: INFUSION THERAPY | Facility: HOSPITAL | Age: 38
End: 2018-08-22

## 2018-08-22 ENCOUNTER — RESULT REVIEW (OUTPATIENT)
Age: 38
End: 2018-08-22

## 2018-08-22 LAB
APPEARANCE UR: CLEAR — SIGNIFICANT CHANGE UP
BACTERIA # UR AUTO: NEGATIVE — SIGNIFICANT CHANGE UP
BASOPHILS # BLD AUTO: 0 K/UL — SIGNIFICANT CHANGE UP (ref 0–0.2)
BASOPHILS NFR BLD AUTO: 0 % — SIGNIFICANT CHANGE UP (ref 0–2)
BILIRUB UR-MCNC: NEGATIVE — SIGNIFICANT CHANGE UP
COLOR SPEC: YELLOW — SIGNIFICANT CHANGE UP
DIFF PNL FLD: NEGATIVE — SIGNIFICANT CHANGE UP
EOSINOPHIL # BLD AUTO: 0.1 K/UL — SIGNIFICANT CHANGE UP (ref 0–0.5)
EOSINOPHIL NFR BLD AUTO: 4.4 % — SIGNIFICANT CHANGE UP (ref 0–6)
EPI CELLS # UR: 2 /HPF — SIGNIFICANT CHANGE UP (ref 0–5)
GLUCOSE UR QL: NEGATIVE MG/DL — SIGNIFICANT CHANGE UP
HCT VFR BLD CALC: 28.5 % — LOW (ref 39–50)
HGB BLD-MCNC: 9.9 G/DL — LOW (ref 13–17)
HYALINE CASTS # UR AUTO: 0 /LPF — SIGNIFICANT CHANGE UP (ref 0–7)
KETONES UR-MCNC: ABNORMAL
LEUKOCYTE ESTERASE UR-ACNC: NEGATIVE — SIGNIFICANT CHANGE UP
LYMPHOCYTES # BLD AUTO: 0.4 K/UL — LOW (ref 1–3.3)
LYMPHOCYTES # BLD AUTO: 12.8 % — LOW (ref 13–44)
MCHC RBC-ENTMCNC: 31 PG — SIGNIFICANT CHANGE UP (ref 27–34)
MCHC RBC-ENTMCNC: 34.8 G/DL — SIGNIFICANT CHANGE UP (ref 32–36)
MCV RBC AUTO: 89.2 FL — SIGNIFICANT CHANGE UP (ref 80–100)
MONOCYTES # BLD AUTO: 0 K/UL — SIGNIFICANT CHANGE UP (ref 0–0.9)
MONOCYTES NFR BLD AUTO: 1.2 % — LOW (ref 2–14)
NEUTROPHILS # BLD AUTO: 2.5 K/UL — SIGNIFICANT CHANGE UP (ref 1.8–7.4)
NEUTROPHILS NFR BLD AUTO: 81.6 % — HIGH (ref 43–77)
NITRITE UR-MCNC: NEGATIVE — SIGNIFICANT CHANGE UP
PH UR: 6 — SIGNIFICANT CHANGE UP (ref 5–8)
PLATELET # BLD AUTO: 257 K/UL — SIGNIFICANT CHANGE UP (ref 150–400)
PROT UR-MCNC: NEGATIVE MG/DL — SIGNIFICANT CHANGE UP
RBC # BLD: 3.19 M/UL — LOW (ref 4.2–5.8)
RBC # FLD: 12.4 % — SIGNIFICANT CHANGE UP (ref 10.3–14.5)
RBC CASTS # UR COMP ASSIST: 2 /HPF — SIGNIFICANT CHANGE UP (ref 0–4)
SP GR SPEC: 1.02 — SIGNIFICANT CHANGE UP (ref 1.01–1.02)
UROBILINOGEN FLD QL: NEGATIVE MG/DL — SIGNIFICANT CHANGE UP
WBC # BLD: 3.1 K/UL — LOW (ref 3.8–10.5)
WBC # FLD AUTO: 3.1 K/UL — LOW (ref 3.8–10.5)
WBC UR QL: 5 /HPF — SIGNIFICANT CHANGE UP (ref 0–5)

## 2018-08-23 ENCOUNTER — RESULT REVIEW (OUTPATIENT)
Age: 38
End: 2018-08-23

## 2018-08-23 ENCOUNTER — APPOINTMENT (OUTPATIENT)
Dept: INFUSION THERAPY | Facility: HOSPITAL | Age: 38
End: 2018-08-23

## 2018-08-23 DIAGNOSIS — T82.598A OTHER MECHANICAL COMPLICATION OF OTHER CARDIAC AND VASCULAR DEVICES AND IMPLANTS, INITIAL ENCOUNTER: ICD-10-CM

## 2018-08-23 LAB
APPEARANCE UR: CLEAR — SIGNIFICANT CHANGE UP
BACTERIA # UR AUTO: NEGATIVE — SIGNIFICANT CHANGE UP
BILIRUB UR-MCNC: NEGATIVE — SIGNIFICANT CHANGE UP
COLOR SPEC: YELLOW — SIGNIFICANT CHANGE UP
DIFF PNL FLD: NEGATIVE — SIGNIFICANT CHANGE UP
EPI CELLS # UR: 2 /HPF — SIGNIFICANT CHANGE UP (ref 0–5)
GLUCOSE UR QL: NEGATIVE MG/DL — SIGNIFICANT CHANGE UP
HYALINE CASTS # UR AUTO: 1 /LPF — SIGNIFICANT CHANGE UP (ref 0–7)
KETONES UR-MCNC: ABNORMAL
LEUKOCYTE ESTERASE UR-ACNC: NEGATIVE — SIGNIFICANT CHANGE UP
NITRITE UR-MCNC: NEGATIVE — SIGNIFICANT CHANGE UP
PH UR: 6.5 — SIGNIFICANT CHANGE UP (ref 5–8)
PROT UR-MCNC: NEGATIVE MG/DL — SIGNIFICANT CHANGE UP
RBC CASTS # UR COMP ASSIST: 2 /HPF — SIGNIFICANT CHANGE UP (ref 0–4)
SP GR SPEC: 1.02 — SIGNIFICANT CHANGE UP (ref 1.01–1.02)
UROBILINOGEN FLD QL: NEGATIVE MG/DL — SIGNIFICANT CHANGE UP
WBC UR QL: 9 /HPF — HIGH (ref 0–5)

## 2018-08-24 ENCOUNTER — RESULT REVIEW (OUTPATIENT)
Age: 38
End: 2018-08-24

## 2018-08-24 ENCOUNTER — APPOINTMENT (OUTPATIENT)
Dept: INFUSION THERAPY | Facility: HOSPITAL | Age: 38
End: 2018-08-24

## 2018-08-24 PROBLEM — M79.1 MYALGIA: Status: ACTIVE | Noted: 2018-08-24

## 2018-08-24 LAB
APPEARANCE UR: CLEAR — SIGNIFICANT CHANGE UP
BACTERIA # UR AUTO: NEGATIVE — SIGNIFICANT CHANGE UP
BILIRUB UR-MCNC: NEGATIVE — SIGNIFICANT CHANGE UP
BUN SERPL-MCNC: 12 MG/DL — SIGNIFICANT CHANGE UP (ref 7–23)
CA-I BLDA-SCNC: 1.07 MMOL/L — LOW (ref 1.12–1.3)
CHLORIDE SERPL-SCNC: 94 MMOL/L — LOW (ref 96–108)
CO2 SERPL-SCNC: 28 MMOL/L — SIGNIFICANT CHANGE UP (ref 22–31)
COLOR SPEC: YELLOW — SIGNIFICANT CHANGE UP
CREAT SERPL-MCNC: 0.7 MG/DL — SIGNIFICANT CHANGE UP (ref 0.5–1.3)
DIFF PNL FLD: ABNORMAL
EOSINOPHIL # BLD AUTO: 0 K/UL — SIGNIFICANT CHANGE UP (ref 0–0.5)
EPI CELLS # UR: 2 /HPF — SIGNIFICANT CHANGE UP (ref 0–5)
GLUCOSE SERPL-MCNC: 94 MG/DL — SIGNIFICANT CHANGE UP (ref 70–99)
GLUCOSE UR QL: NEGATIVE MG/DL — SIGNIFICANT CHANGE UP
HCT VFR BLD CALC: 31.1 % — LOW (ref 39–50)
HGB BLD-MCNC: 10.6 G/DL — LOW (ref 13–17)
HYALINE CASTS # UR AUTO: 1 /LPF — SIGNIFICANT CHANGE UP (ref 0–7)
KETONES UR-MCNC: NEGATIVE — SIGNIFICANT CHANGE UP
LEUKOCYTE ESTERASE UR-ACNC: NEGATIVE — SIGNIFICANT CHANGE UP
LYMPHOCYTES # BLD AUTO: 0.8 K/UL — LOW (ref 1–3.3)
LYMPHOCYTES # BLD AUTO: 26 % — SIGNIFICANT CHANGE UP (ref 13–44)
MCHC RBC-ENTMCNC: 30.4 PG — SIGNIFICANT CHANGE UP (ref 27–34)
MCHC RBC-ENTMCNC: 34.2 G/DL — SIGNIFICANT CHANGE UP (ref 32–36)
MCV RBC AUTO: 89.1 FL — SIGNIFICANT CHANGE UP (ref 80–100)
MONOCYTES # BLD AUTO: 0 K/UL — SIGNIFICANT CHANGE UP (ref 0–0.9)
MONOCYTES NFR BLD AUTO: 4 % — SIGNIFICANT CHANGE UP (ref 2–14)
NEUTROPHILS # BLD AUTO: 1.6 K/UL — LOW (ref 1.8–7.4)
NEUTROPHILS NFR BLD AUTO: 70 % — SIGNIFICANT CHANGE UP (ref 43–77)
NITRITE UR-MCNC: NEGATIVE — SIGNIFICANT CHANGE UP
PH UR: 7.5 — SIGNIFICANT CHANGE UP (ref 5–8)
PLAT MORPH BLD: NORMAL — SIGNIFICANT CHANGE UP
PLATELET # BLD AUTO: 322 K/UL — SIGNIFICANT CHANGE UP (ref 150–400)
POTASSIUM SERPL-MCNC: 3 MMOL/L — LOW (ref 3.5–5.3)
POTASSIUM SERPL-SCNC: 3 MMOL/L — LOW (ref 3.5–5.3)
PROT UR-MCNC: ABNORMAL MG/DL
RBC # BLD: 3.5 M/UL — LOW (ref 4.2–5.8)
RBC # FLD: 12.7 % — SIGNIFICANT CHANGE UP (ref 10.3–14.5)
RBC BLD AUTO: SIGNIFICANT CHANGE UP
RBC CASTS # UR COMP ASSIST: 2 /HPF — SIGNIFICANT CHANGE UP (ref 0–4)
SODIUM SERPL-SCNC: 136 MMOL/L — SIGNIFICANT CHANGE UP (ref 135–145)
SP GR SPEC: 1.01 — SIGNIFICANT CHANGE UP (ref 1.01–1.02)
UROBILINOGEN FLD QL: NEGATIVE MG/DL — SIGNIFICANT CHANGE UP
WBC # BLD: 2.5 K/UL — LOW (ref 3.8–10.5)
WBC # FLD AUTO: 2.5 K/UL — LOW (ref 3.8–10.5)
WBC UR QL: 6 /HPF — HIGH (ref 0–5)

## 2018-08-25 ENCOUNTER — APPOINTMENT (OUTPATIENT)
Dept: INFUSION THERAPY | Facility: HOSPITAL | Age: 38
End: 2018-08-25

## 2018-08-25 ENCOUNTER — INPATIENT (INPATIENT)
Facility: HOSPITAL | Age: 38
LOS: 10 days | Discharge: HOME HEALTH | End: 2018-09-05
Attending: HOSPITALIST | Admitting: HOSPITALIST
Payer: MEDICAID

## 2018-08-25 VITALS
TEMPERATURE: 99 F | SYSTOLIC BLOOD PRESSURE: 106 MMHG | OXYGEN SATURATION: 100 % | RESPIRATION RATE: 18 BRPM | HEART RATE: 104 BPM | DIASTOLIC BLOOD PRESSURE: 72 MMHG

## 2018-08-25 DIAGNOSIS — Z98.890 OTHER SPECIFIED POSTPROCEDURAL STATES: Chronic | ICD-10-CM

## 2018-08-25 DIAGNOSIS — Z90.79 ACQUIRED ABSENCE OF OTHER GENITAL ORGAN(S): Chronic | ICD-10-CM

## 2018-08-25 DIAGNOSIS — Z95.828 PRESENCE OF OTHER VASCULAR IMPLANTS AND GRAFTS: Chronic | ICD-10-CM

## 2018-08-25 PROCEDURE — 71046 X-RAY EXAM CHEST 2 VIEWS: CPT | Mod: 26

## 2018-08-25 RX ORDER — SODIUM CHLORIDE 9 MG/ML
1000 INJECTION INTRAMUSCULAR; INTRAVENOUS; SUBCUTANEOUS ONCE
Qty: 0 | Refills: 0 | Status: DISCONTINUED | OUTPATIENT
Start: 2018-08-25 | End: 2018-08-25

## 2018-08-25 RX ORDER — ONDANSETRON 8 MG/1
4 TABLET, FILM COATED ORAL ONCE
Qty: 0 | Refills: 0 | Status: COMPLETED | OUTPATIENT
Start: 2018-08-25 | End: 2018-08-25

## 2018-08-25 RX ORDER — SODIUM CHLORIDE 9 MG/ML
1000 INJECTION INTRAMUSCULAR; INTRAVENOUS; SUBCUTANEOUS ONCE
Qty: 0 | Refills: 0 | Status: COMPLETED | OUTPATIENT
Start: 2018-08-25 | End: 2018-08-25

## 2018-08-25 RX ADMIN — SODIUM CHLORIDE 200 MILLILITER(S): 9 INJECTION INTRAMUSCULAR; INTRAVENOUS; SUBCUTANEOUS at 23:54

## 2018-08-25 RX ADMIN — ONDANSETRON 4 MILLIGRAM(S): 8 TABLET, FILM COATED ORAL at 23:54

## 2018-08-25 NOTE — ED ADULT NURSE NOTE - OBJECTIVE STATEMENT
Pt with hx of metastatic testicular CA currently on chemotherapy. Last chemo yesterday, approximately 5-10 episodes of watery diarrhea daily. Pt went to [facility] for hydration today, received 1L NS and KCL IVPB. Pt reports fatigue, weakness. VS as documented. Afebrile rectally. Hx of cardiomyopathy. NSR on cardiac monitor. Recent addition for hydration, states was hyperkalemic and neutropenic. Reverse isolation precautions placed. Pt arrives with L sided picc line, pending XR for placement. Pt was evaluated by ED attending.

## 2018-08-25 NOTE — ED PROVIDER NOTE - MEDICAL DECISION MAKING DETAILS
pt with reaction to chemo  already received hydration at [facility]  still with sxs  will continue hydration  but not rapidly  check counts    check lytes

## 2018-08-25 NOTE — ED ADULT TRIAGE NOTE - CHIEF COMPLAINT QUOTE
RED CARD MCCOY-  dehydration    Pt has hx of testicular ca with mets...last chemo testerday... multiple episodes of vomiting and diarrhea, feeling weak and dehydrated. has picc line left arm

## 2018-08-25 NOTE — ED ADULT NURSE NOTE - NSIMPLEMENTINTERV_GEN_ALL_ED
Implemented All Fall Risk Interventions:  [place] to call system. Call bell, personal items and telephone within reach. Instruct patient to call for assistance. Room bathroom lighting operational. Non-slip footwear when patient is off stretcher. Physically safe environment: no spills, clutter or unnecessary equipment. Stretcher in lowest position, wheels locked, appropriate side rails in place. Provide visual cue, wrist band, yellow gown, etc. Monitor gait and stability. Monitor for mental status changes and reorient to person, place, and time. Review medications for side effects contributing to fall risk. Reinforce activity limits and safety measures with patient and family.

## 2018-08-25 NOTE — ED PROVIDER NOTE - OBJECTIVE STATEMENT
38y male with PMH of testicular cancer on chemo presenting with vomiting and diarrhea.  He states that these are the typical symptoms that he has with his current chemo regimen and was in the ED after his last round of treatment. 38y male with PMH of testicular cancer on chemo presenting with vomiting and diarrhea.  He states that these are the typical symptoms that he has with his current chemo regimen and was in the ED after his last round of treatment.  He had his last chemo treatment yesterday at [facility]. He has been unable to tolerate PO intake. Denies fever.

## 2018-08-25 NOTE — ED PROVIDER NOTE - ATTENDING CONTRIBUTION TO CARE
[place]  pt with CA  s/p second round of chemo with vomiting and diarrhea  similar to 1st round received 1 liter at [facility]  still not feeling well  denies fever or abd pain  no blood seen in emesis    exam  abd nontender  nondistended card RRR S1S2  no gallop heard  clear lungs    check labs  continue hydration  not rapipid  due to underlying cardiac  (baseline EF 40-45)  likely admit

## 2018-08-25 NOTE — ED ADULT NURSE NOTE - PMH
Atrial fibrillation  s/p DCCV 1/18  Atrial flutter    Atrial mass  right artrial echo density per MRI 3/14/18.  Cardiomyopathy    Lumbar herniated disc    Mixed germ cell tumor    Obesity    Testicular cancer    Testicular mass  left  Thrombus  Right atrial wall ( suspected from last TYLOR) On AC

## 2018-08-26 DIAGNOSIS — R74.0 NONSPECIFIC ELEVATION OF LEVELS OF TRANSAMINASE AND LACTIC ACID DEHYDROGENASE [LDH]: ICD-10-CM

## 2018-08-26 DIAGNOSIS — Z29.9 ENCOUNTER FOR PROPHYLACTIC MEASURES, UNSPECIFIED: ICD-10-CM

## 2018-08-26 DIAGNOSIS — I51.9 HEART DISEASE, UNSPECIFIED: ICD-10-CM

## 2018-08-26 DIAGNOSIS — C80.1 MALIGNANT (PRIMARY) NEOPLASM, UNSPECIFIED: ICD-10-CM

## 2018-08-26 DIAGNOSIS — R11.10 VOMITING, UNSPECIFIED: ICD-10-CM

## 2018-08-26 DIAGNOSIS — D70.1 AGRANULOCYTOSIS SECONDARY TO CANCER CHEMOTHERAPY: ICD-10-CM

## 2018-08-26 DIAGNOSIS — I48.92 UNSPECIFIED ATRIAL FLUTTER: ICD-10-CM

## 2018-08-26 DIAGNOSIS — I42.9 CARDIOMYOPATHY, UNSPECIFIED: ICD-10-CM

## 2018-08-26 LAB
ALBUMIN SERPL ELPH-MCNC: 3.5 G/DL — SIGNIFICANT CHANGE UP (ref 3.3–5)
ALP SERPL-CCNC: 57 U/L — SIGNIFICANT CHANGE UP (ref 40–120)
ALT FLD-CCNC: 218 U/L — HIGH (ref 4–41)
ANISOCYTOSIS BLD QL: SLIGHT — SIGNIFICANT CHANGE UP
AST SERPL-CCNC: 156 U/L — HIGH (ref 4–40)
BASE EXCESS BLDV CALC-SCNC: 2.2 MMOL/L — SIGNIFICANT CHANGE UP
BASOPHILS # BLD AUTO: 0.01 K/UL — SIGNIFICANT CHANGE UP (ref 0–0.2)
BASOPHILS # BLD AUTO: 0.01 K/UL — SIGNIFICANT CHANGE UP (ref 0–0.2)
BASOPHILS NFR BLD AUTO: 1.2 % — SIGNIFICANT CHANGE UP (ref 0–2)
BASOPHILS NFR BLD AUTO: 1.6 % — SIGNIFICANT CHANGE UP (ref 0–2)
BASOPHILS NFR SPEC: 1.3 % — SIGNIFICANT CHANGE UP (ref 0–2)
BILIRUB SERPL-MCNC: 0.3 MG/DL — SIGNIFICANT CHANGE UP (ref 0.2–1.2)
BLASTS # FLD: 0 % — SIGNIFICANT CHANGE UP (ref 0–0)
BLOOD GAS VENOUS - CREATININE: 0.96 MG/DL — SIGNIFICANT CHANGE UP (ref 0.5–1.3)
BUN SERPL-MCNC: 13 MG/DL — SIGNIFICANT CHANGE UP (ref 7–23)
CALCIUM SERPL-MCNC: 8.7 MG/DL — SIGNIFICANT CHANGE UP (ref 8.4–10.5)
CHLORIDE BLDV-SCNC: 99 MMOL/L — SIGNIFICANT CHANGE UP (ref 96–108)
CHLORIDE SERPL-SCNC: 97 MMOL/L — LOW (ref 98–107)
CO2 SERPL-SCNC: 23 MMOL/L — SIGNIFICANT CHANGE UP (ref 22–31)
CREAT SERPL-MCNC: 0.96 MG/DL — SIGNIFICANT CHANGE UP (ref 0.5–1.3)
DACRYOCYTES BLD QL SMEAR: SLIGHT — SIGNIFICANT CHANGE UP
EOSINOPHIL # BLD AUTO: 0 K/UL — SIGNIFICANT CHANGE UP (ref 0–0.5)
EOSINOPHIL # BLD AUTO: 0 K/UL — SIGNIFICANT CHANGE UP (ref 0–0.5)
EOSINOPHIL NFR BLD AUTO: 0 % — SIGNIFICANT CHANGE UP (ref 0–6)
EOSINOPHIL NFR BLD AUTO: 0 % — SIGNIFICANT CHANGE UP (ref 0–6)
EOSINOPHIL NFR FLD: 0 % — SIGNIFICANT CHANGE UP (ref 0–6)
GAS PNL BLDV: 132 MMOL/L — LOW (ref 136–146)
GIANT PLATELETS BLD QL SMEAR: PRESENT — SIGNIFICANT CHANGE UP
GLUCOSE BLDV-MCNC: 100 — HIGH (ref 70–99)
GLUCOSE SERPL-MCNC: 99 MG/DL — SIGNIFICANT CHANGE UP (ref 70–99)
HCO3 BLDV-SCNC: 25 MMOL/L — SIGNIFICANT CHANGE UP (ref 20–27)
HCT VFR BLD CALC: 28.4 % — LOW (ref 39–50)
HCT VFR BLD CALC: 29.8 % — LOW (ref 39–50)
HCT VFR BLDV CALC: 31 % — LOW (ref 39–51)
HGB BLD-MCNC: 10 G/DL — LOW (ref 13–17)
HGB BLD-MCNC: 9.5 G/DL — LOW (ref 13–17)
HGB BLDV-MCNC: 10 G/DL — LOW (ref 13–17)
IMM GRANULOCYTES # BLD AUTO: 0.01 # — SIGNIFICANT CHANGE UP
IMM GRANULOCYTES # BLD AUTO: 0.09 # — SIGNIFICANT CHANGE UP
IMM GRANULOCYTES NFR BLD AUTO: 1.6 % — HIGH (ref 0–1.5)
IMM GRANULOCYTES NFR BLD AUTO: 10.5 % — HIGH (ref 0–1.5)
LACTATE BLDV-MCNC: 1 MMOL/L — SIGNIFICANT CHANGE UP (ref 0.5–2)
LYMPHOCYTES # BLD AUTO: 0.31 K/UL — LOW (ref 1–3.3)
LYMPHOCYTES # BLD AUTO: 0.37 K/UL — LOW (ref 1–3.3)
LYMPHOCYTES # BLD AUTO: 43 % — SIGNIFICANT CHANGE UP (ref 13–44)
LYMPHOCYTES # BLD AUTO: 49.2 % — HIGH (ref 13–44)
LYMPHOCYTES NFR SPEC AUTO: 54.5 % — HIGH (ref 13–44)
MACROCYTES BLD QL: SLIGHT — SIGNIFICANT CHANGE UP
MAGNESIUM SERPL-MCNC: 1.8 MG/DL — SIGNIFICANT CHANGE UP (ref 1.6–2.6)
MCHC RBC-ENTMCNC: 29.4 PG — SIGNIFICANT CHANGE UP (ref 27–34)
MCHC RBC-ENTMCNC: 30.1 PG — SIGNIFICANT CHANGE UP (ref 27–34)
MCHC RBC-ENTMCNC: 33.5 % — SIGNIFICANT CHANGE UP (ref 32–36)
MCHC RBC-ENTMCNC: 33.6 % — SIGNIFICANT CHANGE UP (ref 32–36)
MCV RBC AUTO: 87.9 FL — SIGNIFICANT CHANGE UP (ref 80–100)
MCV RBC AUTO: 89.8 FL — SIGNIFICANT CHANGE UP (ref 80–100)
METAMYELOCYTES # FLD: 0 % — SIGNIFICANT CHANGE UP (ref 0–1)
MICROCYTES BLD QL: SLIGHT — SIGNIFICANT CHANGE UP
MONOCYTES # BLD AUTO: 0.03 K/UL — SIGNIFICANT CHANGE UP (ref 0–0.9)
MONOCYTES # BLD AUTO: 0.05 K/UL — SIGNIFICANT CHANGE UP (ref 0–0.9)
MONOCYTES NFR BLD AUTO: 4.8 % — SIGNIFICANT CHANGE UP (ref 2–14)
MONOCYTES NFR BLD AUTO: 5.8 % — SIGNIFICANT CHANGE UP (ref 2–14)
MONOCYTES NFR BLD: 2.6 % — SIGNIFICANT CHANGE UP (ref 2–9)
MYELOCYTES NFR BLD: 0 % — SIGNIFICANT CHANGE UP (ref 0–0)
NEUTROPHIL AB SER-ACNC: 29.9 % — LOW (ref 43–77)
NEUTROPHILS # BLD AUTO: 0.27 K/UL — LOW (ref 1.8–7.4)
NEUTROPHILS # BLD AUTO: 0.34 K/UL — LOW (ref 1.8–7.4)
NEUTROPHILS NFR BLD AUTO: 39.5 % — LOW (ref 43–77)
NEUTROPHILS NFR BLD AUTO: 42.8 % — LOW (ref 43–77)
NEUTS BAND # BLD: 0 % — SIGNIFICANT CHANGE UP (ref 0–6)
NRBC # FLD: 0 — SIGNIFICANT CHANGE UP
NRBC # FLD: 0 — SIGNIFICANT CHANGE UP
OTHER - HEMATOLOGY %: 0 — SIGNIFICANT CHANGE UP
OVALOCYTES BLD QL SMEAR: SLIGHT — SIGNIFICANT CHANGE UP
PCO2 BLDV: 51 MMHG — SIGNIFICANT CHANGE UP (ref 41–51)
PH BLDV: 7.35 PH — SIGNIFICANT CHANGE UP (ref 7.32–7.43)
PHOSPHATE SERPL-MCNC: 3.2 MG/DL — SIGNIFICANT CHANGE UP (ref 2.5–4.5)
PLATELET # BLD AUTO: 231 K/UL — SIGNIFICANT CHANGE UP (ref 150–400)
PLATELET # BLD AUTO: 262 K/UL — SIGNIFICANT CHANGE UP (ref 150–400)
PLATELET COUNT - ESTIMATE: NORMAL — SIGNIFICANT CHANGE UP
PMV BLD: 10.6 FL — SIGNIFICANT CHANGE UP (ref 7–13)
PMV BLD: 10.7 FL — SIGNIFICANT CHANGE UP (ref 7–13)
PO2 BLDV: 36 MMHG — SIGNIFICANT CHANGE UP (ref 35–40)
POIKILOCYTOSIS BLD QL AUTO: SLIGHT — SIGNIFICANT CHANGE UP
POLYCHROMASIA BLD QL SMEAR: SLIGHT — SIGNIFICANT CHANGE UP
POTASSIUM BLDV-SCNC: 3.6 MMOL/L — SIGNIFICANT CHANGE UP (ref 3.4–4.5)
POTASSIUM SERPL-MCNC: 3.8 MMOL/L — SIGNIFICANT CHANGE UP (ref 3.5–5.3)
POTASSIUM SERPL-SCNC: 3.8 MMOL/L — SIGNIFICANT CHANGE UP (ref 3.5–5.3)
PROMYELOCYTES # FLD: 0 % — SIGNIFICANT CHANGE UP (ref 0–0)
PROT SERPL-MCNC: 6.4 G/DL — SIGNIFICANT CHANGE UP (ref 6–8.3)
RBC # BLD: 3.23 M/UL — LOW (ref 4.2–5.8)
RBC # BLD: 3.32 M/UL — LOW (ref 4.2–5.8)
RBC # FLD: 12.7 % — SIGNIFICANT CHANGE UP (ref 10.3–14.5)
RBC # FLD: 12.8 % — SIGNIFICANT CHANGE UP (ref 10.3–14.5)
SAO2 % BLDV: 60.1 % — SIGNIFICANT CHANGE UP (ref 60–85)
SODIUM SERPL-SCNC: 133 MMOL/L — LOW (ref 135–145)
VARIANT LYMPHS # BLD: 5.2 % — SIGNIFICANT CHANGE UP
WBC # BLD: 0.63 K/UL — CRITICAL LOW (ref 3.8–10.5)
WBC # BLD: 0.86 K/UL — CRITICAL LOW (ref 3.8–10.5)
WBC # FLD AUTO: 0.63 K/UL — CRITICAL LOW (ref 3.8–10.5)
WBC # FLD AUTO: 0.86 K/UL — CRITICAL LOW (ref 3.8–10.5)

## 2018-08-26 PROCEDURE — 93010 ELECTROCARDIOGRAM REPORT: CPT

## 2018-08-26 PROCEDURE — 99223 1ST HOSP IP/OBS HIGH 75: CPT

## 2018-08-26 PROCEDURE — 12345: CPT | Mod: NC

## 2018-08-26 PROCEDURE — 99223 1ST HOSP IP/OBS HIGH 75: CPT | Mod: GC

## 2018-08-26 RX ORDER — LISINOPRIL 2.5 MG/1
2.5 TABLET ORAL DAILY
Qty: 0 | Refills: 0 | Status: DISCONTINUED | OUTPATIENT
Start: 2018-08-26 | End: 2018-08-26

## 2018-08-26 RX ORDER — ONDANSETRON 8 MG/1
4 TABLET, FILM COATED ORAL EVERY 6 HOURS
Qty: 0 | Refills: 0 | Status: DISCONTINUED | OUTPATIENT
Start: 2018-08-26 | End: 2018-09-05

## 2018-08-26 RX ORDER — CARVEDILOL PHOSPHATE 80 MG/1
6.25 CAPSULE, EXTENDED RELEASE ORAL EVERY 12 HOURS
Qty: 0 | Refills: 0 | Status: DISCONTINUED | OUTPATIENT
Start: 2018-08-26 | End: 2018-09-05

## 2018-08-26 RX ORDER — BENZOCAINE AND MENTHOL 5; 1 G/100ML; G/100ML
1 LIQUID ORAL EVERY 4 HOURS
Qty: 0 | Refills: 0 | Status: DISCONTINUED | OUTPATIENT
Start: 2018-08-26 | End: 2018-09-05

## 2018-08-26 RX ORDER — SODIUM CHLORIDE 9 MG/ML
1000 INJECTION INTRAMUSCULAR; INTRAVENOUS; SUBCUTANEOUS
Qty: 0 | Refills: 0 | Status: DISCONTINUED | OUTPATIENT
Start: 2018-08-26 | End: 2018-08-27

## 2018-08-26 RX ORDER — SODIUM CHLORIDE 9 MG/ML
1000 INJECTION INTRAMUSCULAR; INTRAVENOUS; SUBCUTANEOUS
Qty: 0 | Refills: 0 | Status: DISCONTINUED | OUTPATIENT
Start: 2018-08-26 | End: 2018-09-02

## 2018-08-26 RX ORDER — MECLIZINE HCL 12.5 MG
12.5 TABLET ORAL
Qty: 0 | Refills: 0 | Status: DISCONTINUED | OUTPATIENT
Start: 2018-08-26 | End: 2018-09-05

## 2018-08-26 RX ORDER — METOCLOPRAMIDE HCL 10 MG
5 TABLET ORAL ONCE
Qty: 0 | Refills: 0 | Status: COMPLETED | OUTPATIENT
Start: 2018-08-26 | End: 2018-08-26

## 2018-08-26 RX ORDER — ENOXAPARIN SODIUM 100 MG/ML
110 INJECTION SUBCUTANEOUS EVERY 12 HOURS
Qty: 0 | Refills: 0 | Status: DISCONTINUED | OUTPATIENT
Start: 2018-08-26 | End: 2018-08-29

## 2018-08-26 RX ADMIN — Medication 5 MILLIGRAM(S): at 17:28

## 2018-08-26 RX ADMIN — BENZOCAINE AND MENTHOL 1 LOZENGE: 5; 1 LIQUID ORAL at 06:46

## 2018-08-26 RX ADMIN — BENZOCAINE AND MENTHOL 1 LOZENGE: 5; 1 LIQUID ORAL at 20:09

## 2018-08-26 RX ADMIN — ONDANSETRON 4 MILLIGRAM(S): 8 TABLET, FILM COATED ORAL at 12:30

## 2018-08-26 RX ADMIN — ONDANSETRON 4 MILLIGRAM(S): 8 TABLET, FILM COATED ORAL at 21:32

## 2018-08-26 RX ADMIN — ENOXAPARIN SODIUM 110 MILLIGRAM(S): 100 INJECTION SUBCUTANEOUS at 06:46

## 2018-08-26 RX ADMIN — SODIUM CHLORIDE 1000 MILLILITER(S): 9 INJECTION INTRAMUSCULAR; INTRAVENOUS; SUBCUTANEOUS at 06:46

## 2018-08-26 RX ADMIN — SODIUM CHLORIDE 100 MILLILITER(S): 9 INJECTION INTRAMUSCULAR; INTRAVENOUS; SUBCUTANEOUS at 05:10

## 2018-08-26 RX ADMIN — ENOXAPARIN SODIUM 110 MILLIGRAM(S): 100 INJECTION SUBCUTANEOUS at 17:53

## 2018-08-26 NOTE — H&P ADULT - PROBLEM SELECTOR PLAN 5
- Patient with non-ischemic cardiomyopathy  - Most recent TTE in Feb 2018 reveals mild-moderate global left ventricular systolic dysfunction with EF of ~ 40%  - clinically patient appears to be euvolemic  - continue home Carvedilol and Lisinopril  - strict Is/Os  - daily standing weights  - will closely monitor fluid status as patient is receiving IVF - patient with history of AFlutter s/p successful TYLOR/DCCV into sinus rhythm 2/2018   - will obtain EKG  - will continue with home Lovenox 110 q12  - continue with home Carvedilol - patient with history of AFlutter s/p successful TYLOR/DCCV into sinus rhythm 2/2018   - will obtain EKG  - will continue with home Lovenox 110 q12 (platelets = 262)  - continue with home Carvedilol w/ holding parameters (pls discontinue if persistent hypotension)

## 2018-08-26 NOTE — H&P ADULT - PROBLEM SELECTOR PLAN 4
- - patient with history of AFlutter s/p successful TYLOR/DCCV into sinus rhythm 2/2018   - will obtain EKG  - will continue with home Lovenox 110 q12  - continue with home Carvedilol - patient with a mild transaminitis AST/ALT of 156/218 respectively  - patient with recent negative Hep B and C panel   - DDx includes chemo induced vs mild hypotension   - continue IVF resuscitation for now   - will continue to monitor with daily CMPs

## 2018-08-26 NOTE — H&P ADULT - PROBLEM SELECTOR PLAN 1
- patient with persistent N/V, likely 2/2 recent chemo therapy  - Zofran PRN N/V - patient with persistent N/V, likely 2/2 recent chemo therapy  - will continue NS at 200cc/hr  - will keep NPO for now and advance diet as tolerated  - Zofran PRN N/V - patient with persistent N/V, likely 2/2 recent chemo therapy  - will continue NS at 100cc/hr  - will keep NPO for now and advance diet as tolerated  - Zofran PRN N/V - patient with persistent dizziness, N/V, likely 2/2 recent chemo therapy  - will continue NS at 100cc/hr  - will keep NPO for now and advance diet as tolerated  - Zofran PRN N/V  - trial of meclizine since patient reports dizziness triggers nausea and vomiting

## 2018-08-26 NOTE — PROGRESS NOTE ADULT - PROBLEM SELECTOR PLAN 1
- patient with persistent dizziness, N/V, likely 2/2 recent chemo therapy  - will continue NS at 100cc/hr, closely monitor for e/o overload given hx of HFrEF  - clear diet with ensure as per pt request, advance as tolerated  - Zofran PRN N/V, EKG with QTc of 453  - c/w trial of meclizine since patient reports dizziness triggers nausea and vomiting

## 2018-08-26 NOTE — CONSULT NOTE ADULT - ASSESSMENT
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    1) Nausea/Vomiting/Diarrhea- likely side effect from recent chemo   - continue supportive care measures with anti-emetics and anti-diarrheals as needed  - continue IVFs while po intake limited  - pt is neutropenic so continue precautions; empiric antibiotics if spikes fever after panculture    2) Germ Cell Tumor- s/p 2 cycles TIP  - will need outpatient f/u with Dr. Chirinos once discharged  - s/p neulasta after chemo   - continue to monitor LFTs    Discussed with attending,  Deacon Gonzalez, PGY-4  Hematology-Oncology Fellow  [phone] ([place]) [phone] ([facility])

## 2018-08-26 NOTE — CONSULT NOTE ADULT - SUBJECTIVE AND OBJECTIVE BOX
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 He completed chemo with TIP on Friday and notes it beat him up. He underwent similar side effects from cycle 1. He is unable to keep anything down and has had watery, non foul diarrhea. He felt very weak and dehydrated, prompting evaluation at hospital. At present, he still has no appetite Diarrhea is slowing down. No fevers or abdominal pain.     PAST MEDICAL & SURGICAL HISTORY:  Mixed germ cell tumor  Obesity  Atrial mass: right artrial echo density per MRI 3/14/18.  Atrial flutter  Cardiomyopathy  Atrial fibrillation: s/p DCCV 1/18  Thrombus: Right atrial wall ( suspected from last TYLOR) On AC  Testicular cancer  Lumbar herniated disc  Testicular mass: left  History of abdominal surgery: Retroperitoneal lymph node dissection (June 2018)  History of abdominal surgery: Retroperitoneal lymph node dissection (June 2018)  History of cardioversion: 2/2018  Port-a-cath in place: placed 9/2017,, removed-10/18/2017  History of orchiectomy: left-7/2017      Allergies    No Known Allergies    Intolerances        MEDICATIONS  (STANDING):  carvedilol 6.25 milliGRAM(s) Oral every 12 hours  enoxaparin Injectable 110 milliGRAM(s) SubCutaneous every 12 hours  sodium chloride 0.9%. 1000 milliLiter(s) (100 mL/Hr) IV Continuous <Continuous>  sodium chloride 0.9%. 1000 milliLiter(s) (1000 mL/Hr) IV Continuous <Continuous>    MEDICATIONS  (PRN):  benzocaine 15 mG/menthol 3.6 mG Lozenge 1 Lozenge Oral every 4 hours PRN Sore Throat  meclizine 12.5 milliGRAM(s) Oral two times a day PRN Dizziness  ondansetron Injectable 4 milliGRAM(s) IV Push every 6 hours PRN Nausea and/or Vomiting      FAMILY HISTORY:  Family history of hypertension in father  Family history of kidney stones: mother  Family history of cancer (Grandparent)  Family history of ischemic heart disease (Grandparent)  Family history of diabetes mellitus (Aunt)      SOCIAL HISTORY: No EtOH, no tobacco    Height (cm): 185.42 (08-26 @ 03:22)  Weight (kg): 104 (08-26 @ 03:22)  BMI (kg/m2): 30.2 (08-26 @ 03:22)  BSA (m2): 2.28 (08-26 @ 03:22)    VITALS:   T(F): 98.6 (08-26-18 @ 06:30), Max: 99.4 (08-25-18 @ 21:20)  HR: 102 (08-26-18 @ 06:30)  BP: 94/59 (08-26-18 @ 06:30)  RR: 17 (08-26-18 @ 06:30)  SpO2: 97% (08-26-18 @ 06:30)  Wt(kg): --    PHYSICAL EXAM    GENERAL: NAD, appears weak and pale   HEAD:  Atraumatic, Normocephalic  EYES: EOMI, PERRLA, conjunctiva and sclera clear  NECK: Supple, No JVD  CHEST/LUNG: Clear to auscultation bilaterally; No wheeze  HEART: Tachycardic; No murmurs, rubs, or gallops  ABDOMEN: Soft, Nontender, Nondistended; Bowel sounds present  EXTREMITIES:  2+ Peripheral Pulses, No clubbing, cyanosis, or edema  NEUROLOGY: non-focal  SKIN: No rashes or lesions    LABS:                         9.5    0.63  )-----------( 231      ( 26 Aug 2018 06:00 )             28.4     08-25    133<L>  |  97<L>  |  13  ----------------------------<  99  3.8   |  23  |  0.96    Ca    8.7      25 Aug 2018 23:50  Phos  3.2     08-25  Mg     1.8     08-25    TPro  6.4  /  Alb  3.5  /  TBili  0.3  /  DBili  x   /  AST  156<H>  /  ALT  218<H>  /  AlkPhos  57  08-25    Magnesium, Serum: 1.8 mg/dL (08-25 @ 23:50)  Phosphorus Level, Serum: 3.2 mg/dL (08-25 @ 23:50)      FECES  08-08 @ 12:06 --  --  --      FECES  08-08 @ 04:12 --  --  --      BLOOD PERIPHERAL  08-06 @ 14:38 --    NO ORGANISMS ISOLATED  --      URINE MIDSTREAM  08-06 @ 13:33 --  --  --      BLOOD VENOUS  08-06 @ 10:54 --    NO ORGANISMS ISOLATED  --      BLOOD PERIPHERAL  06-08 @ 16:20 --    NO ORGANISMS ISOLATED  --      BLOOD  06-08 @ 13:39 --    NO ORGANISMS ISOLATED  --      URINE CATHETER  06-08 @ 11:21 --  --  --          IMAGING:

## 2018-08-26 NOTE — H&P ADULT - FAMILY HISTORY
Family history of kidney stones, mother     Family history of hypertension in father     Aunt  Still living? No  Family history of diabetes mellitus, Age at diagnosis: Age Unknown     Grandparent  Still living? No  Family history of ischemic heart disease, Age at diagnosis: Age Unknown  Family history of cancer, Age at diagnosis: Age Unknown

## 2018-08-26 NOTE — H&P ADULT - PSH
History of abdominal surgery  Retroperitoneal lymph node dissection (June 2018)  History of abdominal surgery  Retroperitoneal lymph node dissection (June 2018)  History of cardioversion  2/2018  History of orchiectomy  left-7/2017  Port-a-cath in place  placed 9/2017,, removed-10/18/2017

## 2018-08-26 NOTE — H&P ADULT - PROBLEM SELECTOR PLAN 6
- DVT ppx: patient is anticoagulated with Lovenox  - Diet: currently unable to tolerate PO. 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 Hold Lisinopril for now in the setting of soft BPs.   - strict Is/Os  - daily standing weights  - will closely monitor fluid status as patient is receiving IVF

## 2018-08-26 NOTE — H&P ADULT - PROBLEM SELECTOR PLAN 7
- DVT ppx: patient is anticoagulated with Lovenox  - Diet: currently unable to tolerate PO. 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

## 2018-08-26 NOTE — PATIENT PROFILE ADULT. - SPIRITUAL CULTURAL, RELIGIOUS PRACTICES/VALUES, PROFILE
Abdominal pain–  Possibly secondary to urinary tract infection and narcotic bowel  Patient has had multiple antibiotics recently and will follow culture data to guide regarding antibiotic choice.    please note that t has received mult abx recently and now on rocephin, inc risk c diff DW pt    Chronic pain syndrome, continue current home medications.  cont movantik as constip due to narcotics and also amitiza and miralax  Suspect abdominal distention/gas noted on x-rays secondary to lactulose use.  Would use magnesium citrate    Continue other treatment.  Consider stopping lactulose.  Increased ambulation.  Discussed with hospitalist      with cont sx, mg citrate   suggested inc activity and ambulation as she can tolerate and PT eval      Chronic constipation and will monitor patient for this, tx as above          Asthma and COPD–stable at this time to continue to taper steroids. Monitor for possible adrenal insufficiency.      cont UTI tx    hospitalist note and ID note  appreciated      reflux and inc protonix to bid 54 y/o female with a PMHx of CHF, s/p Epps catheter, lymphedema, afib s/p ablation, s/p gastric bypass, hypothyroidism, s/p hysterectomy, SBO s/p bowel resection, hypogammaglobulinemmia  s/p right chest mediport admitted on 5/2 for evaluation of severe abdominal pain, constipation and intermittent fevers. Patient had epps catheter replaced prior to admission; noted having increased asthma attacks which required higher doses of prednisone and was on multiple courses of different antibiotics.   1. Patient admitted with multiple medical problems, abdominal pain found to have urinary tract infection with E coli; noted with penicillin allergy but has tolerated cephalosporins in the past  - iv hydration and supportive care   - day #2 ceftriaxone  - tolerating antibiotics without rashes or side effects   - Monitor closely in view of history of penicillin allergy   - reviewed prior medical records to evaluate for resistant or atypical pathogens   - serial cbc and monitor temperature  - epps care   - constipation to be addressed with magnesium citrate  2. other issues; per medicine Abdominal pain–  Possibly secondary to urinary tract infection and narcotic bowel  Patient has had multiple antibiotics recently and will follow culture data to guide regarding antibiotic choice.    please note that t has received mult abx recently and now on rocephin, inc risk c diff DW pt    Chronic pain syndrome, continue current home medications.  start movantik as constip due to narcotics and also amitiza and miralax    with cont sx, mg citrate  suggested inc activity and ambulation as she can tolerate and PT eval      Chronic constipation and will monitor patient for this, tx as above          Asthma and COPD–stable at this time to continue to taper steroids. Monitor for possible adrenal insufficiency.      cont UTI tx    hospitalist note and ID note  appreciated      reflux and inc protonix to bid 54 y/o female with a PMHx of CHF, s/p Epps catheter, lymphedema, afib s/p ablation, s/p gastric bypass, hypothyroidism, s/p hysterectomy, SBO s/p bowel resection, hypogammaglobulinemmia  s/p right chest mediport admitted on 5/2 for evaluation of severe abdominal pain, constipation and intermittent fevers. Patient had epps catheter replaced prior to admission; noted having increased asthma attacks which required higher doses of prednisone and was on multiple courses of different antibiotics.   1. Patient admitted with multiple medical problems, abdominal pain found to have urinary tract infection with E coli; noted with penicillin allergy but has tolerated cephalosporins in the past  - iv hydration and supportive care   - day #4 ceftriaxone  - tolerating antibiotics without rashes or side effects   - will stop ceftriaxone after todays dose  - epps care   - constipation to be addressed with magnesium citrate  2. other issues; per medicine  If further ID issues please reconsult Abdominal pain–  Possibly secondary to urinary tract infection and narcotic bowel  Patient has had multiple antibiotics recently and will follow culture data to guide regarding antibiotic choice.    please note that t has received mult abx recently    Chronic pain syndrome, continue current home medications.  start movantik as constip due to narcotics      Chronic constipation and will monitor patient for this. She may need reinitiation of her constipation medications.      Leukocytosis, possibly secondary to steroids, she'll need to be monitored for this. and is improving    Asthma and COPD–stable at this time to continue to taper steroids. Monitor for possible adrenal insufficiency.      cont UTI tx    hospitalist noteappreciated Abdominal pain–  Possibly secondary to urinary tract infection and narcotic bowel  Patient has had multiple antibiotics recently and will follow culture data to guide regarding antibiotic choice.    please note that t has received mult abx recently    Chronic pain syndrome, continue current home medications.  start movantik as constip due to narcotics and also amitiza      Chronic constipation and will monitor patient for this, tx as above      Leukocytosis, possibly secondary to steroids, she'll need to be monitored for this. and is improving    Asthma and COPD–stable at this time to continue to taper steroids. Monitor for possible adrenal insufficiency.      cont UTI tx    hospitalist note appreciated      reflux and inc protonix to bid Abdominal pain–  Possibly secondary to urinary tract infection and narcotic bowel  Patient has had multiple antibiotics recently and will follow culture data to guide regarding antibiotic choice.    please note that t has received mult abx recently and now on rocephin, inc risk c diff DW pt    Chronic pain syndrome, continue current home medications.  cont movantik as constip due to narcotics and also amitiza and miralax    with cont sx, mg citrate and lactulose as needed  suggested inc activity and ambulation as she can tolerate and PT eval      Chronic constipation and will monitor patient for this, tx as above          Asthma and COPD–stable at this time to continue to taper steroids. Monitor for possible adrenal insufficiency.      cont UTI tx    hospitalist note and ID note  appreciated      reflux and inc protonix to bid Abdominal pain–  Possibly secondary to urinary tract infection and narcotic bowel  Patient has had multiple antibiotics recently and will follow culture data to guide regarding antibiotic choice.    please note that t has received mult abx recently and now on rocephin, inc risk c diff DW pt    Chronic pain syndrome, continue current home medications.  cont movantik as constip due to narcotics and also amitiza and miralax as out pt  DW pt  Suspect abdominal distention/gas noted on x-rays secondary to lactulose use.    Increased ambulation.  Discussed with hospitalist      with cont sx, mg citrate   suggested inc activity and ambulation as she can tolerate and PT eval      Chronic constipation and will monitor patient for this, tx as above          Asthma and COPD–stable at this time to continue to taper steroids. Monitor for possible adrenal insufficiency.      cont UTI tx    hospitalist note and ID note  appreciated, FABRICE planning, DW hospitalist  FU with own GI      reflux and inc protonix to bid Abdominal pain–  Possibly secondary to urinary tract infection and narcotic bowel  Patient has had multiple antibiotics recently and will follow culture data to guide regarding antibiotic choice.    please note that t has received mult abx recently and now on rocephin, inc risk c diff DW pt    Chronic pain syndrome, continue current home medications.  start movantik as constip due to narcotics and also amitiza and miralax      Chronic constipation and will monitor patient for this, tx as above      Leukocytosis, possibly secondary to steroids, she'll need to be monitored for this. and is improving    Asthma and COPD–stable at this time to continue to taper steroids. Monitor for possible adrenal insufficiency.      cont UTI tx    hospitalist note and ID note  appreciated      reflux and inc protonix to bid Abdominal pain–etiology unclear.  Possibly secondary to urinary tract infection. Patient has had multiple antibiotics recently and will follow culture data to guide regarding antibiotic choice.    please note that t has received mult abx recently    Chronic pain syndrome, continue current home medications.    Chronic constipation and will monitor patient for this. She may need reinitiation of her constipation medications.  pt agrees to hold off for now and monitor    Leukocytosis, possibly secondary to steroids, she'll need to be monitored for this. and is improving    Asthma and COPD–stable at this time to continue to taper steroids. Monitor for possible adrenal insufficiency.    Hyponatremia, received IV fluids in the ER, will follow in the morning. None

## 2018-08-26 NOTE — PROGRESS NOTE ADULT - PROBLEM SELECTOR PLAN 7
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

## 2018-08-26 NOTE — H&P ADULT - NSHPLABSRESULTS_GEN_ALL_CORE
( @ 23:50)                      10.0  0.86 )-----------( 262                 29.8    Neutrophils = 0.34 (39.5%)  Lymphocytes = 0.37 (43.0%)  Eosinophils = 0.00 (0.0%)  Basophils = 0.01 (1.2%)  Monocytes = 0.05 (5.8%)  Bands = 0%        133<L>  |  97<L>  |  13  ----------------------------<  99  3.8   |  23  |  0.96    Ca    8.7      25 Aug 2018 23:50  Phos  3.2       Mg     1.8         TPro  6.4  /  Alb  3.5  /  TBili  0.3  /  DBili  x   /  AST  156<H>  /  ALT  218<H>  /  AlkPhos  57            RVP:    Venous Blood Gas:   @ 23:50  7.35/51/36/25/60.1  VBG Lactate: 1.0        Tox:         Urinalysis Basic - ( 24 Aug 2018 08:00 )    Color: Yellow / Appearance: Clear / S.012 / pH: x  Gluc: x / Ketone: Negative  / Bili: Negative / Urobili: Negative mg/dL   Blood: x / Protein: Trace mg/dL / Nitrite: Negative   Leuk Esterase: Negative / RBC: 2 /HPF / WBC 6 /HPF   Sq Epi: x / Non Sq Epi: 2 /HPF / Bacteria: Negative

## 2018-08-26 NOTE — PROGRESS NOTE ADULT - PROBLEM SELECTOR PLAN 6
- Patient with non-ischemic cardiomyopathy  - Most recent TTE in Feb 2018 reveals mild-moderate global left ventricular systolic dysfunction with EF of ~ 40%  - clinically patient appears to be euvolemic  - continue home Carvedilol with hold parameters. Hold Lisinopril for now in the setting of soft BPs.   - strict Is/Os  - daily standing weights  - will closely monitor fluid status as patient is receiving IVF

## 2018-08-26 NOTE — H&P ADULT - PROBLEM SELECTOR PLAN 3
- patient with history of AFlutter s/p successful TYLOR/DCCV into sinus rhythm 2/2018   - will continue with home Lovenox 110 q12 - patient with history of LA mass seen on TTE  - DDx includes LA thrombus vs malignancy  - Per Cards note from last admission, will obtain Cardiac MRI to further evaluate - patient with history of LA mass seen on TTE  - DDx includes LA thrombus vs malignancy  - Per Cards note from last admission, will obtain Cardiac MRI to further evaluate (ordered, please f/u)

## 2018-08-26 NOTE — PROGRESS NOTE ADULT - PROBLEM SELECTOR PLAN 5
- patient with history of AFlutter s/p successful TYLOR/DCCV into sinus rhythm 2/2018   - will continue with home Lovenox 110 q12 (platelets = 262)  - continue with home Carvedilol w/ holding parameters (pls discontinue if persistent hypotension)

## 2018-08-26 NOTE — H&P ADULT - PROBLEM SELECTOR PLAN 2
- patient with history of LA mass seen on TTE  - DDx includes LA thrombus vs malignancy  - Per Cards note from last admission, will obtain Cardiac MRI to further evaluate - patient with neutropenia on presentation  - likely 2/2 chemo  - patient with Neulasta patch on admission  - will f/u with Heme/Onc regarding possible further neupogen doses  - will monitor daily CBC with Diff  - if patient develops signs of infection, will panculture and start neutropenic empiric abx coverage - patient with neutropenia on presentation  - likely 2/2 chemo  - patient with Neulasta patch on admission  - will f/u with Heme/Onc regarding possible further neupogen doses  - will monitor daily CBC with Diff  - will panculture   - consider staring neutropenic empiric abx coverage if any indication of infection

## 2018-08-26 NOTE — PROGRESS NOTE ADULT - PROBLEM SELECTOR PLAN 4
- patient with a mild transaminitis AST/ALT of 156/218 respectively  - patient with recent negative Hep B and C panel   - DDx includes chemo induced vs mild hypotension   - continue IVF resuscitation for now   - will continue to monitor with daily CMPs

## 2018-08-26 NOTE — H&P ADULT - ASSESSMENT
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

## 2018-08-26 NOTE — H&P ADULT - HISTORY OF PRESENT ILLNESS
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 Patient states that he was undergoing his last round of chemo yesterday (8/25/18) and began to experience these symptoms. Patient states that these are his typical side effects following chemo treatments. Patient reports that he is unable to tolerate anything PO.     In the ED, patient was found to be mildly tachycardic (HR of 104). Labs revealed WBC of 0.83, with Neutrophil count of 0.34, and a new transaminitis (AST/ALT of 156/218). CXR performed which was unremarkable. Patient admitted for further management. 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 Patient states that he was undergoing his last round of chemo on 8/24/18 and began to experience these symptoms after her returned home. Patient continued to experience multiple episodes of NBNB emesis, and watery diarrhea, and decided to come into the ED after he became progressively weak. Patient states that these are his typical side effects following chemo treatments. Patient reports that he is unable to tolerate anything PO.     In the ED, patient was found to be mildly tachycardic (HR of 104). Labs revealed WBC of 0.83, with Neutrophil count of 0.34, and a new transaminitis (AST/ALT of 156/218). CXR performed which was unremarkable. Patient admitted for further management. 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 Patient states that he was undergoing his last round of chemo on 8/24/18 and began to experience these symptoms after her returned home. Patient continued to experience multiple episodes of NBNB emesis, and watery diarrhea, and decided to come into the ED after he became progressively weak. Patient states that these are his typical side effects following chemo treatments. Patient reports that he is unable to tolerate anything PO.     In the ED, patient was found to be mildly tachycardic (HR of 104). Labs revealed WBC of 0.83, with Neutrophil count of 0.34, and a new transaminitis (AST/ALT of 156/218). CXR performed which was unremarkable. Patient admitted for further management.

## 2018-08-26 NOTE — H&P ADULT - PROBLEM SELECTOR PLAN 8
- DVT ppx: patient is anticoagulated with Lovenox  - Diet: currently unable to tolerate PO. Will advance diet as tolerated

## 2018-08-26 NOTE — PROGRESS NOTE ADULT - PROBLEM SELECTOR PLAN 3
- patient with history of LA mass seen on TTE  - DDx includes LA thrombus vs malignancy  - Per Cards note from last admission, will obtain Cardiac MRI to further evaluate (ordered on admission)

## 2018-08-27 ENCOUNTER — OTHER (OUTPATIENT)
Age: 38
End: 2018-08-27

## 2018-08-27 ENCOUNTER — APPOINTMENT (OUTPATIENT)
Dept: HEMATOLOGY ONCOLOGY | Facility: CLINIC | Age: 38
End: 2018-08-27

## 2018-08-27 DIAGNOSIS — M79.1 MYALGIA: ICD-10-CM

## 2018-08-27 DIAGNOSIS — E87.8 OTHER DISORDERS OF ELECTROLYTE AND FLUID BALANCE, NOT ELSEWHERE CLASSIFIED: ICD-10-CM

## 2018-08-27 LAB
ALBUMIN SERPL ELPH-MCNC: 3.7 G/DL — SIGNIFICANT CHANGE UP (ref 3.3–5)
ALP SERPL-CCNC: 63 U/L — SIGNIFICANT CHANGE UP (ref 40–120)
ALT FLD-CCNC: 340 U/L — HIGH (ref 4–41)
APPEARANCE UR: CLEAR — SIGNIFICANT CHANGE UP
AST SERPL-CCNC: 201 U/L — HIGH (ref 4–40)
B PERT DNA SPEC QL NAA+PROBE: SIGNIFICANT CHANGE UP
BACTERIA # UR AUTO: NEGATIVE — SIGNIFICANT CHANGE UP
BILIRUB SERPL-MCNC: 0.4 MG/DL — SIGNIFICANT CHANGE UP (ref 0.2–1.2)
BILIRUB UR-MCNC: NEGATIVE — SIGNIFICANT CHANGE UP
BLOOD UR QL VISUAL: SIGNIFICANT CHANGE UP
BUN SERPL-MCNC: 10 MG/DL — SIGNIFICANT CHANGE UP (ref 7–23)
C PNEUM DNA SPEC QL NAA+PROBE: NOT DETECTED — SIGNIFICANT CHANGE UP
CALCIUM SERPL-MCNC: 8.6 MG/DL — SIGNIFICANT CHANGE UP (ref 8.4–10.5)
CHLORIDE SERPL-SCNC: 94 MMOL/L — LOW (ref 98–107)
CO2 SERPL-SCNC: 23 MMOL/L — SIGNIFICANT CHANGE UP (ref 22–31)
COLOR SPEC: SIGNIFICANT CHANGE UP
CREAT SERPL-MCNC: 0.9 MG/DL — SIGNIFICANT CHANGE UP (ref 0.5–1.3)
FLUAV H1 2009 PAND RNA SPEC QL NAA+PROBE: NOT DETECTED — SIGNIFICANT CHANGE UP
FLUAV H1 RNA SPEC QL NAA+PROBE: NOT DETECTED — SIGNIFICANT CHANGE UP
FLUAV H3 RNA SPEC QL NAA+PROBE: NOT DETECTED — SIGNIFICANT CHANGE UP
FLUAV SUBTYP SPEC NAA+PROBE: SIGNIFICANT CHANGE UP
FLUBV RNA SPEC QL NAA+PROBE: NOT DETECTED — SIGNIFICANT CHANGE UP
GLUCOSE SERPL-MCNC: 104 MG/DL — HIGH (ref 70–99)
GLUCOSE UR-MCNC: NEGATIVE — SIGNIFICANT CHANGE UP
HADV DNA SPEC QL NAA+PROBE: NOT DETECTED — SIGNIFICANT CHANGE UP
HCOV 229E RNA SPEC QL NAA+PROBE: NOT DETECTED — SIGNIFICANT CHANGE UP
HCOV HKU1 RNA SPEC QL NAA+PROBE: NOT DETECTED — SIGNIFICANT CHANGE UP
HCOV NL63 RNA SPEC QL NAA+PROBE: NOT DETECTED — SIGNIFICANT CHANGE UP
HCOV OC43 RNA SPEC QL NAA+PROBE: NOT DETECTED — SIGNIFICANT CHANGE UP
HMPV RNA SPEC QL NAA+PROBE: NOT DETECTED — SIGNIFICANT CHANGE UP
HPIV1 RNA SPEC QL NAA+PROBE: NOT DETECTED — SIGNIFICANT CHANGE UP
HPIV2 RNA SPEC QL NAA+PROBE: NOT DETECTED — SIGNIFICANT CHANGE UP
HPIV3 RNA SPEC QL NAA+PROBE: NOT DETECTED — SIGNIFICANT CHANGE UP
HPIV4 RNA SPEC QL NAA+PROBE: NOT DETECTED — SIGNIFICANT CHANGE UP
HYALINE CASTS # UR AUTO: NEGATIVE — SIGNIFICANT CHANGE UP
KETONES UR-MCNC: SIGNIFICANT CHANGE UP
LEUKOCYTE ESTERASE UR-ACNC: NEGATIVE — SIGNIFICANT CHANGE UP
M PNEUMO DNA SPEC QL NAA+PROBE: NOT DETECTED — SIGNIFICANT CHANGE UP
MAGNESIUM SERPL-MCNC: 1.7 MG/DL — SIGNIFICANT CHANGE UP (ref 1.6–2.6)
NITRITE UR-MCNC: NEGATIVE — SIGNIFICANT CHANGE UP
PH UR: 7.5 — SIGNIFICANT CHANGE UP (ref 5–8)
PHOSPHATE SERPL-MCNC: 1.9 MG/DL — LOW (ref 2.5–4.5)
POTASSIUM SERPL-MCNC: 3.2 MMOL/L — LOW (ref 3.5–5.3)
POTASSIUM SERPL-SCNC: 3.2 MMOL/L — LOW (ref 3.5–5.3)
PROT SERPL-MCNC: 6.5 G/DL — SIGNIFICANT CHANGE UP (ref 6–8.3)
PROT UR-MCNC: HIGH
RBC CASTS # UR COMP ASSIST: SIGNIFICANT CHANGE UP (ref 0–?)
RSV RNA SPEC QL NAA+PROBE: NOT DETECTED — SIGNIFICANT CHANGE UP
RV+EV RNA SPEC QL NAA+PROBE: NOT DETECTED — SIGNIFICANT CHANGE UP
SODIUM SERPL-SCNC: 131 MMOL/L — LOW (ref 135–145)
SP GR SPEC: 1.02 — SIGNIFICANT CHANGE UP (ref 1–1.04)
SQUAMOUS # UR AUTO: SIGNIFICANT CHANGE UP
UROBILINOGEN FLD QL: NORMAL — SIGNIFICANT CHANGE UP
WBC UR QL: SIGNIFICANT CHANGE UP (ref 0–?)

## 2018-08-27 PROCEDURE — 99233 SBSQ HOSP IP/OBS HIGH 50: CPT | Mod: GC

## 2018-08-27 PROCEDURE — 71045 X-RAY EXAM CHEST 1 VIEW: CPT | Mod: 26

## 2018-08-27 PROCEDURE — 99232 SBSQ HOSP IP/OBS MODERATE 35: CPT

## 2018-08-27 PROCEDURE — 93010 ELECTROCARDIOGRAM REPORT: CPT

## 2018-08-27 RX ORDER — POTASSIUM PHOSPHATE, MONOBASIC POTASSIUM PHOSPHATE, DIBASIC 236; 224 MG/ML; MG/ML
15 INJECTION, SOLUTION INTRAVENOUS ONCE
Qty: 0 | Refills: 0 | Status: COMPLETED | OUTPATIENT
Start: 2018-08-27 | End: 2018-08-27

## 2018-08-27 RX ORDER — CHLORHEXIDINE GLUCONATE 213 G/1000ML
1 SOLUTION TOPICAL
Qty: 0 | Refills: 0 | Status: DISCONTINUED | OUTPATIENT
Start: 2018-08-27 | End: 2018-09-05

## 2018-08-27 RX ORDER — CEFEPIME 1 G/1
1000 INJECTION, POWDER, FOR SOLUTION INTRAMUSCULAR; INTRAVENOUS ONCE
Qty: 0 | Refills: 0 | Status: COMPLETED | OUTPATIENT
Start: 2018-08-27 | End: 2018-08-27

## 2018-08-27 RX ORDER — CEFEPIME 1 G/1
1000 INJECTION, POWDER, FOR SOLUTION INTRAMUSCULAR; INTRAVENOUS EVERY 8 HOURS
Qty: 0 | Refills: 0 | Status: DISCONTINUED | OUTPATIENT
Start: 2018-08-27 | End: 2018-09-02

## 2018-08-27 RX ORDER — CEFEPIME 1 G/1
INJECTION, POWDER, FOR SOLUTION INTRAMUSCULAR; INTRAVENOUS
Qty: 0 | Refills: 0 | Status: DISCONTINUED | OUTPATIENT
Start: 2018-08-27 | End: 2018-09-02

## 2018-08-27 RX ORDER — POTASSIUM CHLORIDE 20 MEQ
10 PACKET (EA) ORAL
Qty: 0 | Refills: 0 | Status: COMPLETED | OUTPATIENT
Start: 2018-08-27 | End: 2018-08-27

## 2018-08-27 RX ORDER — ACETAMINOPHEN 500 MG
650 TABLET ORAL EVERY 6 HOURS
Qty: 0 | Refills: 0 | Status: DISCONTINUED | OUTPATIENT
Start: 2018-08-27 | End: 2018-09-05

## 2018-08-27 RX ORDER — VANCOMYCIN HCL 1 G
VIAL (EA) INTRAVENOUS
Qty: 0 | Refills: 0 | Status: DISCONTINUED | OUTPATIENT
Start: 2018-08-27 | End: 2018-08-27

## 2018-08-27 RX ORDER — VANCOMYCIN HCL 1 G
VIAL (EA) INTRAVENOUS
Qty: 0 | Refills: 0 | Status: DISCONTINUED | OUTPATIENT
Start: 2018-08-27 | End: 2018-08-28

## 2018-08-27 RX ORDER — VANCOMYCIN HCL 1 G
1000 VIAL (EA) INTRAVENOUS EVERY 8 HOURS
Qty: 0 | Refills: 0 | Status: DISCONTINUED | OUTPATIENT
Start: 2018-08-27 | End: 2018-08-28

## 2018-08-27 RX ORDER — IBUPROFEN 200 MG
400 TABLET ORAL ONCE
Qty: 0 | Refills: 0 | Status: COMPLETED | OUTPATIENT
Start: 2018-08-27 | End: 2018-08-27

## 2018-08-27 RX ORDER — VANCOMYCIN HCL 1 G
1000 VIAL (EA) INTRAVENOUS ONCE
Qty: 0 | Refills: 0 | Status: COMPLETED | OUTPATIENT
Start: 2018-08-27 | End: 2018-08-27

## 2018-08-27 RX ADMIN — Medication 400 MILLIGRAM(S): at 18:39

## 2018-08-27 RX ADMIN — CHLORHEXIDINE GLUCONATE 1 APPLICATION(S): 213 SOLUTION TOPICAL at 16:59

## 2018-08-27 RX ADMIN — POTASSIUM PHOSPHATE, MONOBASIC POTASSIUM PHOSPHATE, DIBASIC 62.5 MILLIMOLE(S): 236; 224 INJECTION, SOLUTION INTRAVENOUS at 10:59

## 2018-08-27 RX ADMIN — ONDANSETRON 4 MILLIGRAM(S): 8 TABLET, FILM COATED ORAL at 03:59

## 2018-08-27 RX ADMIN — BENZOCAINE AND MENTHOL 1 LOZENGE: 5; 1 LIQUID ORAL at 04:04

## 2018-08-27 RX ADMIN — BENZOCAINE AND MENTHOL 1 LOZENGE: 5; 1 LIQUID ORAL at 16:31

## 2018-08-27 RX ADMIN — Medication 100 MILLIEQUIVALENT(S): at 09:56

## 2018-08-27 RX ADMIN — CEFEPIME 100 MILLIGRAM(S): 1 INJECTION, POWDER, FOR SOLUTION INTRAMUSCULAR; INTRAVENOUS at 16:58

## 2018-08-27 RX ADMIN — ENOXAPARIN SODIUM 110 MILLIGRAM(S): 100 INJECTION SUBCUTANEOUS at 16:59

## 2018-08-27 RX ADMIN — ENOXAPARIN SODIUM 110 MILLIGRAM(S): 100 INJECTION SUBCUTANEOUS at 05:38

## 2018-08-27 RX ADMIN — SODIUM CHLORIDE 100 MILLILITER(S): 9 INJECTION INTRAMUSCULAR; INTRAVENOUS; SUBCUTANEOUS at 12:19

## 2018-08-27 RX ADMIN — ONDANSETRON 4 MILLIGRAM(S): 8 TABLET, FILM COATED ORAL at 09:56

## 2018-08-27 RX ADMIN — ONDANSETRON 4 MILLIGRAM(S): 8 TABLET, FILM COATED ORAL at 16:10

## 2018-08-27 RX ADMIN — ONDANSETRON 4 MILLIGRAM(S): 8 TABLET, FILM COATED ORAL at 22:12

## 2018-08-27 RX ADMIN — Medication 100 MILLIEQUIVALENT(S): at 08:42

## 2018-08-27 RX ADMIN — SODIUM CHLORIDE 100 MILLILITER(S): 9 INJECTION INTRAMUSCULAR; INTRAVENOUS; SUBCUTANEOUS at 05:37

## 2018-08-27 RX ADMIN — Medication 650 MILLIGRAM(S): at 16:31

## 2018-08-27 NOTE — PROGRESS NOTE ADULT - PROBLEM SELECTOR PLAN 1
- patient with persistent dizziness, N/V, likely 2/2 recent chemo therapy  - will continue NS at 100cc/hr, closely monitor for e/o overload given hx of HFrEF  - clear diet with ensure as per pt request, advance as tolerated  - Zofran PRN N/V, EKG with QTc of 453   - c/w trial of meclizine since patient reports dizziness triggers nausea and vomiting - patient with persistent dizziness, N/V, likely 2/2 recent chemo therapy  - consider changing to d5+LR at 100cc/hr from NS, closely monitor for e/o overload given hx of HFrEF  - clear diet with ensure as per pt request, advance as tolerated  - Zofran PRN N/V, EKG with QTc of 453   - c/w trial of meclizine since patient reports dizziness triggers nausea and vomiting

## 2018-08-27 NOTE — PROGRESS NOTE ADULT - PROBLEM SELECTOR PLAN 5
- patient with history of AFlutter s/p successful TYLOR/DCCV into sinus rhythm 2/2018   - will continue with home Lovenox 110 q12 (platelets = 262)  - continue with home Carvedilol w/ holding parameters (pls discontinue if persistent hypotension) - patient with history of AFlutter s/p successful TYLOR/DCCV into sinus rhythm 2/2018   - will continue with home Lovenox 110 q12 (platelets = 231)  - continue with home Carvedilol w/ holding parameters (pls discontinue if persistent hypotension)

## 2018-08-27 NOTE — PROGRESS NOTE ADULT - SUBJECTIVE AND OBJECTIVE BOX
Patient is a 38y old  Male who presents with a chief complaint of N/V and Diarrhea (26 Aug 2018 01:59)      SUBJECTIVE / OVERNIGHT EVENTS:  afebrile overnight, denies SOB or chest pain. states he still having vomiting with attempted PO consumption along with nausea. States he is still have NB bowel movements     MEDICATIONS  (STANDING):  carvedilol 6.25 milliGRAM(s) Oral every 12 hours  chlorhexidine 4% Liquid 1 Application(s) Topical two times a day  enoxaparin Injectable 110 milliGRAM(s) SubCutaneous every 12 hours  potassium phosphate IVPB 15 milliMole(s) IV Intermittent once  sodium chloride 0.9%. 1000 milliLiter(s) (100 mL/Hr) IV Continuous <Continuous>  sodium chloride 0.9%. 1000 milliLiter(s) (1000 mL/Hr) IV Continuous <Continuous>    MEDICATIONS  (PRN):  benzocaine 15 mG/menthol 3.6 mG Lozenge 1 Lozenge Oral every 4 hours PRN Sore Throat  meclizine 12.5 milliGRAM(s) Oral two times a day PRN Dizziness  ondansetron Injectable 4 milliGRAM(s) IV Push every 6 hours PRN Nausea and/or Vomiting        CAPILLARY BLOOD GLUCOSE        I&O's Summary      Vital Signs Last 24 Hrs  T(C): 37.6 (27 Aug 2018 10:02), Max: 37.6 (27 Aug 2018 10:02)  T(F): 99.6 (27 Aug 2018 10:02), Max: 99.6 (27 Aug 2018 10:02)  HR: 98 (27 Aug 2018 10:02) (89 - 112)  BP: 100/66 (27 Aug 2018 10:02) (97/60 - 101/58)  BP(mean): --  RR: 18 (27 Aug 2018 10:02) (16 - 18)  SpO2: 97% (27 Aug 2018 10:02) (96% - 100%)  PHYSICAL EXAM:  GENERAL: NAD, well-developed  HEAD:  Atraumatic, Normocephalic  EYES: EOMI, PERRLA, conjunctiva and sclera clear  NECK: Supple, No JVD  CHEST/LUNG: Clear to auscultation bilaterally; No wheeze  HEART: Regular rate and rhythm; No murmurs, rubs, or gallops  ABDOMEN: Soft, Nontender, Nondistended; Bowel sounds present  EXTREMITIES:  2+ Peripheral Pulses, No clubbing, cyanosis, or edema  PSYCH: AAOx3  NEUROLOGY: non-focal  SKIN: No rashes or lesions    LABS:    WBC Trend: 0.63<--, 0.86<--, 2.5<--  Hb Trend: 9.5<--, 10.0<--, 10.6<--, 9.9<--, 10.2<--  Plt Trend: 231<--, 262<--, 322<--, 257<--, 290<--  08-27    131<L>  |  94<L>  |  10  ----------------------------<  104<H>  3.2<L>   |  23  |  0.90    Ca    8.6      27 Aug 2018 07:01  Phos  1.9     08-27  Mg     1.7     08-27    TPro  6.5  /  Alb  3.7  /  TBili  0.4  /  DBili  x   /  AST  201<H>  /  ALT  340<H>  /  AlkPhos  63  08-27    Creatinine Trend: 0.90<--, 0.96<--, 0.70<--, 0.68<--, 0.80<--, 0.85<--            Microbiology:  Urine Cx:  Blood Cx:    RADIOLOGY & ADDITIONAL TESTS:  X- Ray:  CT:  Ultrasound:  [ ] imaging personally reviewed and interpreted by me    Consultant(s) Notes Reviewed:    Hematology     Care Discussed with Consultants/Other Providers: Patient is a 38y old  Male who presents with a chief complaint of N/V and Diarrhea (26 Aug 2018 01:59)      SUBJECTIVE / OVERNIGHT EVENTS:  afebrile overnight, denies SOB or chest pain. states he still having vomiting with attempted PO consumption along with nausea. States he is still have NB bowel movements     MEDICATIONS  (STANDING):  carvedilol 6.25 milliGRAM(s) Oral every 12 hours  chlorhexidine 4% Liquid 1 Application(s) Topical two times a day  enoxaparin Injectable 110 milliGRAM(s) SubCutaneous every 12 hours  potassium phosphate IVPB 15 milliMole(s) IV Intermittent once  sodium chloride 0.9%. 1000 milliLiter(s) (100 mL/Hr) IV Continuous <Continuous>  sodium chloride 0.9%. 1000 milliLiter(s) (1000 mL/Hr) IV Continuous <Continuous>    MEDICATIONS  (PRN):  benzocaine 15 mG/menthol 3.6 mG Lozenge 1 Lozenge Oral every 4 hours PRN Sore Throat  meclizine 12.5 milliGRAM(s) Oral two times a day PRN Dizziness  ondansetron Injectable 4 milliGRAM(s) IV Push every 6 hours PRN Nausea and/or Vomiting        CAPILLARY BLOOD GLUCOSE        I&O's Summary      Vital Signs Last 24 Hrs  T(C): 37.6 (27 Aug 2018 10:02), Max: 37.6 (27 Aug 2018 10:02)  T(F): 99.6 (27 Aug 2018 10:02), Max: 99.6 (27 Aug 2018 10:02)  HR: 98 (27 Aug 2018 10:02) (89 - 112)  BP: 100/66 (27 Aug 2018 10:02) (97/60 - 101/58)  BP(mean): --  RR: 18 (27 Aug 2018 10:02) (16 - 18)  SpO2: 97% (27 Aug 2018 10:02) (96% - 100%)  PHYSICAL EXAM:  GENERAL: NAD, well-developed  EYES: EOMI, PERRLA, conjunctiva and sclera clear  NECK: Supple, No JVD  CHEST/LUNG: Clear to auscultation bilaterally; No wheeze  HEART: Regular rate and rhythm; No murmurs, rubs, or gallops  ABDOMEN: soft, nontender increased BS  EXTREMITIES:  2+ Peripheral Pulses, left UE PIC  PSYCH: AAOx3  SKIN: No rashes or lesions    LABS:    WBC Trend: 0.63<--, 0.86<--, 2.5<--  Hb Trend: 9.5<--, 10.0<--, 10.6<--, 9.9<--, 10.2<--  Plt Trend: 231<--, 262<--, 322<--, 257<--, 290<--  08-27    131<L>  |  94<L>  |  10  ----------------------------<  104<H>  3.2<L>   |  23  |  0.90    Ca    8.6      27 Aug 2018 07:01  Phos  1.9     08-27  Mg     1.7     08-27    TPro  6.5  /  Alb  3.7  /  TBili  0.4  /  DBili  x   /  AST  201<H>  /  ALT  340<H>  /  AlkPhos  63  08-27    Creatinine Trend: 0.90<--, 0.96<--, 0.70<--, 0.68<--, 0.80<--, 0.85<--            Microbiology:  Urine Cx:  Blood Cx:    RADIOLOGY & ADDITIONAL TESTS:  X- Ray:  CT:  Ultrasound:  [ ] imaging personally reviewed and interpreted by me    Consultant(s) Notes Reviewed:    Hematology     Care Discussed with Consultants/Other Providers:

## 2018-08-27 NOTE — PROGRESS NOTE ADULT - PROBLEM SELECTOR PLAN 7
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 Hold Lisinopril for now in the setting of soft BPs.   - strict Is/Os  - daily standing weights  - will closely monitor fluid status as patient is receiving IVF

## 2018-08-27 NOTE — PROGRESS NOTE ADULT - PROBLEM SELECTOR PLAN 8
- DVT ppx: patient is anticoagulated with Lovenox  - Diet: currently unable to tolerate PO. 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

## 2018-08-27 NOTE — PROGRESS NOTE ADULT - PROBLEM SELECTOR PLAN 4
- patient with a mild transaminitis AST/ALT of 156/218 respectively  - patient with recent negative Hep B and C panel   - DDx includes chemo induced vs mild hypotension   - continue IVF resuscitation for now   - will continue to monitor with daily CMPs - patient with a uptrending transaminsases   - patient with recent negative Hep B and C panel   - DDx includes chemo induced vs mild hypotension vs less likely ischemic  - continue IVF resuscitation for now   - will continue to monitor with daily CMPs

## 2018-08-27 NOTE — PROGRESS NOTE ADULT - PROBLEM SELECTOR PLAN 3
- patient with history of LA mass seen on TTE  - DDx includes LA thrombus vs malignancy  - Per Cards note from last admission, ordered  Cardiac MRI to further evaluate

## 2018-08-27 NOTE — PROGRESS NOTE ADULT - PROBLEM SELECTOR PLAN 6
- Patient with non-ischemic cardiomyopathy  - Most recent TTE in Feb 2018 reveals mild-moderate global left ventricular systolic dysfunction with EF of ~ 40%  - clinically patient appears to be euvolemic  - continue home Carvedilol with hold parameters. Hold Lisinopril for now in the setting of soft BPs.   - strict Is/Os  - daily standing weights  - will closely monitor fluid status as patient is receiving IVF hypophosphatemia and hypomagnesemia  - likely 2/2 poor po intake and diarrhea   - repleat Phosp to >2.5, mg to 2.0 hypophosphatemia and hypomagnesemia  - likely 2/2 poor po intake and diarrhea   - replete Phosp to >2.5, mg to 2.0 hypophosphatemia , hypomagnesemia, hypomagnesemia   - likely 2/2 poor po intake and diarrhea   - replete Phosp  >2.5, Mg to >2.0, k > 3.5

## 2018-08-27 NOTE — PROGRESS NOTE ADULT - SUBJECTIVE AND OBJECTIVE BOX
INTERVAL HPI/OVERNIGHT EVENTS:  Patient seen at bedside. He is feeling marginally better than when he was admitted. He has remained afebrile.   Reports diarrhea has improved, has poor appetite, has joint pain, requesting pain control.   I spoke to him about prophylactic cipro given neutropenia and he agrees to take it.     VITAL SIGNS:  T(F): 100.3 (08-27-18 @ 14:57)  HR: 112 (08-27-18 @ 14:57)  BP: 101/63 (08-27-18 @ 14:57)  RR: 17 (08-27-18 @ 14:57)  SpO2: 99% (08-27-18 @ 14:57)  Wt(kg): --    PHYSICAL EXAM:    Constitutional: NAD, resting in bed comfortably  Eyes: EOMI, sclera non-icteric  Neck: supple, no LAP  Respiratory: CTA b/l, good air entry b/l, no wheezing, rhonchi or crackels  Cardiovascular: RRR, normal S1S2, no M/R/G  Gastrointestinal: soft, NTND  Extremities: no edema  Neurological: AAOx3, non focal  Skin: Normal temperature    MEDICATIONS  (STANDING):  carvedilol 6.25 milliGRAM(s) Oral every 12 hours  chlorhexidine 4% Liquid 1 Application(s) Topical two times a day  enoxaparin Injectable 110 milliGRAM(s) SubCutaneous every 12 hours  sodium chloride 0.9%. 1000 milliLiter(s) (100 mL/Hr) IV Continuous <Continuous>  sodium chloride 0.9%. 1000 milliLiter(s) (1000 mL/Hr) IV Continuous <Continuous>    MEDICATIONS  (PRN):  benzocaine 15 mG/menthol 3.6 mG Lozenge 1 Lozenge Oral every 4 hours PRN Sore Throat  meclizine 12.5 milliGRAM(s) Oral two times a day PRN Dizziness  ondansetron Injectable 4 milliGRAM(s) IV Push every 6 hours PRN Nausea and/or Vomiting      Allergies    No Known Allergies    Intolerances      LABS:                        9.5    0.63  )-----------( 231      ( 26 Aug 2018 06:00 )         ANC-[id number]             28.4     08-27    131<L>  |  94<L>  |  10  ----------------------------<  104<H>  3.2<L>   |  23  |  0.90    Ca    8.6      27 Aug 2018 07:01  Phos  1.9     08-27  Mg     1.7     08-27    TPro  6.5  /  Alb  3.7  /  TBili  0.4  /  DBili  x   /  AST  201<H>  /  ALT  340<H>  /  AlkPhos  63  08-27          RADIOLOGY & ADDITIONAL TESTS:  Studies reviewed.

## 2018-08-28 DIAGNOSIS — D70.9 NEUTROPENIA, UNSPECIFIED: ICD-10-CM

## 2018-08-28 LAB
ALBUMIN SERPL ELPH-MCNC: 4 G/DL — SIGNIFICANT CHANGE UP (ref 3.3–5)
ALP SERPL-CCNC: 75 U/L — SIGNIFICANT CHANGE UP (ref 40–120)
ALT FLD-CCNC: 471 U/L — HIGH (ref 4–41)
AST SERPL-CCNC: 231 U/L — HIGH (ref 4–40)
BASOPHILS # BLD AUTO: 0 K/UL — SIGNIFICANT CHANGE UP (ref 0–0.2)
BASOPHILS NFR BLD AUTO: 0 % — SIGNIFICANT CHANGE UP (ref 0–2)
BILIRUB SERPL-MCNC: 0.4 MG/DL — SIGNIFICANT CHANGE UP (ref 0.2–1.2)
BUN SERPL-MCNC: 10 MG/DL — SIGNIFICANT CHANGE UP (ref 7–23)
CALCIUM SERPL-MCNC: 9.3 MG/DL — SIGNIFICANT CHANGE UP (ref 8.4–10.5)
CHLORIDE SERPL-SCNC: 95 MMOL/L — LOW (ref 98–107)
CO2 SERPL-SCNC: 24 MMOL/L — SIGNIFICANT CHANGE UP (ref 22–31)
CREAT SERPL-MCNC: 0.97 MG/DL — SIGNIFICANT CHANGE UP (ref 0.5–1.3)
EOSINOPHIL # BLD AUTO: 0 K/UL — SIGNIFICANT CHANGE UP (ref 0–0.5)
EOSINOPHIL NFR BLD AUTO: 0 % — SIGNIFICANT CHANGE UP (ref 0–6)
GLUCOSE SERPL-MCNC: 100 MG/DL — HIGH (ref 70–99)
HCT VFR BLD CALC: 33.3 % — LOW (ref 39–50)
HGB BLD-MCNC: 11.3 G/DL — LOW (ref 13–17)
IMM GRANULOCYTES # BLD AUTO: 0 # — SIGNIFICANT CHANGE UP
IMM GRANULOCYTES NFR BLD AUTO: 0 % — SIGNIFICANT CHANGE UP (ref 0–1.5)
LYMPHOCYTES # BLD AUTO: 0.21 K/UL — LOW (ref 1–3.3)
LYMPHOCYTES # BLD AUTO: 72.4 % — HIGH (ref 13–44)
MAGNESIUM SERPL-MCNC: 2 MG/DL — SIGNIFICANT CHANGE UP (ref 1.6–2.6)
MANUAL SMEAR VERIFICATION: SIGNIFICANT CHANGE UP
MCHC RBC-ENTMCNC: 29.2 PG — SIGNIFICANT CHANGE UP (ref 27–34)
MCHC RBC-ENTMCNC: 33.9 % — SIGNIFICANT CHANGE UP (ref 32–36)
MCV RBC AUTO: 86 FL — SIGNIFICANT CHANGE UP (ref 80–100)
MONOCYTES # BLD AUTO: 0.05 K/UL — SIGNIFICANT CHANGE UP (ref 0–0.9)
MONOCYTES NFR BLD AUTO: 17.2 % — HIGH (ref 2–14)
NEUTROPHILS # BLD AUTO: 0.03 K/UL — LOW (ref 1.8–7.4)
NEUTROPHILS NFR BLD AUTO: 10.4 % — LOW (ref 43–77)
NRBC # FLD: 0 — SIGNIFICANT CHANGE UP
PHOSPHATE SERPL-MCNC: 1.8 MG/DL — LOW (ref 2.5–4.5)
PLATELET # BLD AUTO: 136 K/UL — LOW (ref 150–400)
PMV BLD: 10.9 FL — SIGNIFICANT CHANGE UP (ref 7–13)
POTASSIUM SERPL-MCNC: 3.3 MMOL/L — LOW (ref 3.5–5.3)
POTASSIUM SERPL-SCNC: 3.3 MMOL/L — LOW (ref 3.5–5.3)
PROT SERPL-MCNC: 7.6 G/DL — SIGNIFICANT CHANGE UP (ref 6–8.3)
RBC # BLD: 3.87 M/UL — LOW (ref 4.2–5.8)
RBC # FLD: 12.5 % — SIGNIFICANT CHANGE UP (ref 10.3–14.5)
SODIUM SERPL-SCNC: 132 MMOL/L — LOW (ref 135–145)
SPECIMEN SOURCE: SIGNIFICANT CHANGE UP
SPECIMEN SOURCE: SIGNIFICANT CHANGE UP
WBC # BLD: 0.29 K/UL — CRITICAL LOW (ref 3.8–10.5)
WBC # FLD AUTO: 0.29 K/UL — CRITICAL LOW (ref 3.8–10.5)

## 2018-08-28 PROCEDURE — 99233 SBSQ HOSP IP/OBS HIGH 50: CPT | Mod: GC

## 2018-08-28 PROCEDURE — 99232 SBSQ HOSP IP/OBS MODERATE 35: CPT

## 2018-08-28 RX ORDER — POTASSIUM CHLORIDE 20 MEQ
40 PACKET (EA) ORAL ONCE
Qty: 0 | Refills: 0 | Status: COMPLETED | OUTPATIENT
Start: 2018-08-28 | End: 2018-08-28

## 2018-08-28 RX ORDER — CIPROFLOXACIN LACTATE 400MG/40ML
500 VIAL (ML) INTRAVENOUS EVERY 12 HOURS
Qty: 0 | Refills: 0 | Status: DISCONTINUED | OUTPATIENT
Start: 2018-08-28 | End: 2018-08-28

## 2018-08-28 RX ADMIN — CHLORHEXIDINE GLUCONATE 1 APPLICATION(S): 213 SOLUTION TOPICAL at 05:49

## 2018-08-28 RX ADMIN — ENOXAPARIN SODIUM 110 MILLIGRAM(S): 100 INJECTION SUBCUTANEOUS at 18:13

## 2018-08-28 RX ADMIN — Medication 40 MILLIEQUIVALENT(S): at 12:50

## 2018-08-28 RX ADMIN — ENOXAPARIN SODIUM 110 MILLIGRAM(S): 100 INJECTION SUBCUTANEOUS at 05:49

## 2018-08-28 RX ADMIN — SODIUM CHLORIDE 100 MILLILITER(S): 9 INJECTION INTRAMUSCULAR; INTRAVENOUS; SUBCUTANEOUS at 08:21

## 2018-08-28 RX ADMIN — Medication 63.75 MILLIMOLE(S): at 10:23

## 2018-08-28 RX ADMIN — CEFEPIME 100 MILLIGRAM(S): 1 INJECTION, POWDER, FOR SOLUTION INTRAMUSCULAR; INTRAVENOUS at 00:39

## 2018-08-28 RX ADMIN — CEFEPIME 100 MILLIGRAM(S): 1 INJECTION, POWDER, FOR SOLUTION INTRAMUSCULAR; INTRAVENOUS at 18:13

## 2018-08-28 RX ADMIN — SODIUM CHLORIDE 100 MILLILITER(S): 9 INJECTION INTRAMUSCULAR; INTRAVENOUS; SUBCUTANEOUS at 05:49

## 2018-08-28 RX ADMIN — CHLORHEXIDINE GLUCONATE 1 APPLICATION(S): 213 SOLUTION TOPICAL at 18:14

## 2018-08-28 RX ADMIN — CEFEPIME 100 MILLIGRAM(S): 1 INJECTION, POWDER, FOR SOLUTION INTRAMUSCULAR; INTRAVENOUS at 08:18

## 2018-08-28 NOTE — PROGRESS NOTE ADULT - PROBLEM SELECTOR PLAN 6
- patient with history of AFlutter s/p successful TYLOR/DCCV into sinus rhythm 2/2018   - will continue with home Lovenox 110 q12 (platelets = 231)  - continue with home Carvedilol w/ holding parameters (pls discontinue if persistent hypotension)

## 2018-08-28 NOTE — PROGRESS NOTE ADULT - SUBJECTIVE AND OBJECTIVE BOX
Patient is a 38y old  Male who presents with a chief complaint of N/V and Diarrhea (26 Aug 2018 01:59)      SUBJECTIVE / OVERNIGHT EVENTS:  febrile yesterday, started on cipro+ cefepime + vancomycin for neutropenic fevers. Pt. reports being afebrile overnight, states vomiting has improved was able to tolerate PO glucerna asking for diet to be advanced to regular. Denies chills, chest pain, abdominal pain. states diarrhea has improved.     MEDICATIONS  (STANDING):  carvedilol 6.25 milliGRAM(s) Oral every 12 hours  cefepime   IVPB      cefepime   IVPB 1000 milliGRAM(s) IV Intermittent every 8 hours  chlorhexidine 4% Liquid 1 Application(s) Topical two times a day  ciprofloxacin     Tablet 500 milliGRAM(s) Oral every 12 hours  enoxaparin Injectable 110 milliGRAM(s) SubCutaneous every 12 hours  potassium chloride    Tablet ER 40 milliEquivalent(s) Oral once  sodium chloride 0.9%. 1000 milliLiter(s) (100 mL/Hr) IV Continuous <Continuous>  sodium phosphate IVPB 15 milliMole(s) IV Intermittent once  vancomycin  IVPB 1000 milliGRAM(s) IV Intermittent every 8 hours  vancomycin  IVPB        MEDICATIONS  (PRN):  acetaminophen   Tablet 650 milliGRAM(s) Oral every 6 hours PRN For Temp greater than 38 C (100.4 F)  benzocaine 15 mG/menthol 3.6 mG Lozenge 1 Lozenge Oral every 4 hours PRN Sore Throat  meclizine 12.5 milliGRAM(s) Oral two times a day PRN Dizziness  ondansetron Injectable 4 milliGRAM(s) IV Push every 6 hours PRN Nausea and/or Vomiting        CAPILLARY BLOOD GLUCOSE        I&O's Summary      Vital Signs Last 24 Hrs  T(C): 37.3 (28 Aug 2018 05:46), Max: 38.6 (27 Aug 2018 16:09)  T(F): 99.2 (28 Aug 2018 05:46), Max: 101.4 (27 Aug 2018 16:09)  HR: 103 (28 Aug 2018 05:46) (103 - 112)  BP: 96/66 (28 Aug 2018 05:46) (96/66 - 101/67)  BP(mean): --  RR: 18 (28 Aug 2018 05:46) (17 - 18)  SpO2: 98% (28 Aug 2018 05:46) (96% - 100%)  PHYSICAL EXAM:  GENERAL: NAD, well-developed  EYES: EOMI, PERRLA, conjunctiva and sclera clear  NECK: Supple, No JVD  CHEST/LUNG: Clear to auscultation bilaterally; No wheeze  HEART: Regular rate and rhythm; No murmurs, rubs, or gallops  ABDOMEN: soft, nontender increased BS  EXTREMITIES:  2+ Peripheral Pulses, left UE PIC  PSYCH: AAOx3  SKIN: No rashes or lesions    LABS:  ( @ 06:40)                        11.3  0.29 )-----------( 136                 33.3    Neutrophils = 0.03 (10.4%)  Lymphocytes = 0.21 (72.4%)  Eosinophils = 0.00 (0.0%)  Basophils = 0.00 (0.0%)  Monocytes = 0.05 (17.2%)  Bands = --%    WBC Trend: 0.29<--, 0.63<--, 0.86<--  Hb Trend: 11.3<--, 9.5<--, 10.0<--, 10.6<--, 9.9<--  Plt Trend: 136<--, 231<--, 262<--, 322<--, 257<--      132<L>  |  95<L>  |  10  ----------------------------<  100<H>  3.3<L>   |  24  |  0.97    Ca    9.3      28 Aug 2018 06:40  Phos  1.8       Mg     2.0         TPro  7.6  /  Alb  4.0  /  TBili  0.4  /  DBili  x   /  AST  231<H>  /  ALT  471<H>  /  AlkPhos  75      Creatinine Trend: 0.97<--, 0.90<--, 0.96<--, 0.70<--, 0.68<--, 0.80<--        Urinalysis Basic - ( 27 Aug 2018 17:36 )    Color: LIGHT YELLOW / Appearance: CLEAR / S.018 / pH: 7.5  Gluc: NEGATIVE / Ketone: TRACE  / Bili: NEGATIVE / Urobili: NORMAL   Blood: SMALL / Protein: MODERATE / Nitrite: NEGATIVE   Leuk Esterase: NEGATIVE / RBC: 3-5 / WBC 3-5   Sq Epi: FEW / Non Sq Epi: x / Bacteria: NEGATIVE        Microbiology:  Urine Cx:  Blood Cx:    RADIOLOGY & ADDITIONAL TESTS:  X- Ray:  CT:  Ultrasound:  [ ] imaging personally reviewed and interpreted by me    Consultant(s) Notes Reviewed:  Heme/onc    Care Discussed with Consultants/Other Providers:

## 2018-08-28 NOTE — PROGRESS NOTE ADULT - PROBLEM SELECTOR PLAN 3
- patient with neutropenia on presentation  - likely 2/2 chemo  - patient received  Neulasta on 8/24  - heme-onc recs reviewed   - will monitor daily CBC with Diff  - closely monitor for e/o infection, s/p pan culture for fever yesterday  on empiric abx day #2

## 2018-08-28 NOTE — PROGRESS NOTE ADULT - PROBLEM SELECTOR PLAN 1
Febrile to 100.9 yesterday   blood cx , urine culture sent  - started on cipro+ cefepime + vancomycin day #2  - afebrile currently , c/w current antibiotics pending blood culture results. if blood culture results negative consider change to levaquin   - tylenol prn fever

## 2018-08-28 NOTE — PROGRESS NOTE ADULT - PROBLEM SELECTOR PLAN 9
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

## 2018-08-28 NOTE — PROGRESS NOTE ADULT - SUBJECTIVE AND OBJECTIVE BOX
INTERVAL HPI/OVERNIGHT EVENTS:  Patient seen at bedside. He reports feeling better, diarrhea and n/v resolved.   Unfortunately, he spiked a fever yesterday and was started on broad spectrum antibiotics for febrile neutropenia.       VITAL SIGNS:  T(F): 99.4 (18 @ 18:11)  HR: 103 (18 @ 18:11)  BP: 99/64 (18 @ 18:11)  RR: 18 (18 @ 18:11)  SpO2: 98% (18 @ 18:11)  Wt(kg): --    PHYSICAL EXAM:    Constitutional: NAD, resting in bed comfortably  Eyes: EOMI, sclera non-icteric  Neck: supple, no LAP  Respiratory: CTA b/l ant  Cardiovascular: RRR, normal S1S2, no M/R/G  Gastrointestinal: soft, NTND  Extremities: no edema  Neurological: AAOx3, non focal  Skin: Normal temperature    MEDICATIONS  (STANDING):  carvedilol 6.25 milliGRAM(s) Oral every 12 hours  cefepime   IVPB      cefepime   IVPB 1000 milliGRAM(s) IV Intermittent every 8 hours  chlorhexidine 4% Liquid 1 Application(s) Topical two times a day  ciprofloxacin     100 mG/mL Suspension 500 milliGRAM(s) Oral every 12 hours  enoxaparin Injectable 110 milliGRAM(s) SubCutaneous every 12 hours  sodium chloride 0.9%. 1000 milliLiter(s) (100 mL/Hr) IV Continuous <Continuous>  vancomycin  IVPB 1000 milliGRAM(s) IV Intermittent every 8 hours  vancomycin  IVPB        MEDICATIONS  (PRN):  acetaminophen   Tablet 650 milliGRAM(s) Oral every 6 hours PRN For Temp greater than 38 C (100.4 F)  benzocaine 15 mG/menthol 3.6 mG Lozenge 1 Lozenge Oral every 4 hours PRN Sore Throat  meclizine 12.5 milliGRAM(s) Oral two times a day PRN Dizziness  ondansetron Injectable 4 milliGRAM(s) IV Push every 6 hours PRN Nausea and/or Vomiting      Allergies    No Known Allergies    Intolerances        LABS:                        11.3   0.29  )-----------( 136      ( 28 Aug 2018 06:40 )             33.3         132<L>  |  95<L>  |  10  ----------------------------<  100<H>  3.3<L>   |  24  |  0.97    Ca    9.3      28 Aug 2018 06:40  Phos  1.8       Mg     2.0         TPro  7.6  /  Alb  4.0  /  TBili  0.4  /  DBili  x   /  AST  231<H>  /  ALT  471<H>  /  AlkPhos  75        Urinalysis Basic - ( 27 Aug 2018 17:36 )    Color: LIGHT YELLOW / Appearance: CLEAR / S.018 / pH: 7.5  Gluc: NEGATIVE / Ketone: TRACE  / Bili: NEGATIVE / Urobili: NORMAL   Blood: SMALL / Protein: MODERATE / Nitrite: NEGATIVE   Leuk Esterase: NEGATIVE / RBC: 3-5 / WBC 3-5   Sq Epi: FEW / Non Sq Epi: x / Bacteria: NEGATIVE        RADIOLOGY & ADDITIONAL TESTS:  Studies reviewed.

## 2018-08-28 NOTE — CHART NOTE - NSCHARTNOTEFT_GEN_A_CORE
Patient continues to refuse vancomycin as per history has caused diarrhea in the past - Also refusing cardiac MRI, for he feels he cannot lie flat because he will become dizzy and he does not feel up to having the test performed at this time. Attending aware Patient continues to refuse vancomycin as per history has caused diarrhea in the past - Also refusing cardiac MRI, for he feels he cannot lie flat because he will become dizzy and he does not feel up to having the test performed at this time. Attending aware.  In addition - was called by radiologist earlier in regards to "PICC line" not in the correct position and will need advancement, however, I clarified with interventional radiology - this is a midline and as per the IR team the line is in the correct position.

## 2018-08-28 NOTE — PROGRESS NOTE ADULT - PROBLEM SELECTOR PLAN 5
- patient with a uptrending transaminsases   - patient with recent negative Hep B and C panel   - DDx includes chemo induced vs mild hypotension vs less likely ischemic  - continue IVF resuscitation for now   - will continue to monitor with daily CMPs,

## 2018-08-28 NOTE — PROGRESS NOTE ADULT - PROBLEM SELECTOR PLAN 7
hypophosphatemia , hypomagnesemia, hypomagnesemia   - likely 2/2 poor po intake and diarrhea   - replete Phosp  >2.5, Mg to >2.0, k > 3.5

## 2018-08-29 ENCOUNTER — TRANSCRIPTION ENCOUNTER (OUTPATIENT)
Age: 38
End: 2018-08-29

## 2018-08-29 LAB
ALBUMIN SERPL ELPH-MCNC: 3.3 G/DL — SIGNIFICANT CHANGE UP (ref 3.3–5)
ALP SERPL-CCNC: 67 U/L — SIGNIFICANT CHANGE UP (ref 40–120)
ALT FLD-CCNC: 334 U/L — HIGH (ref 4–41)
AST SERPL-CCNC: 112 U/L — HIGH (ref 4–40)
BASOPHILS # BLD AUTO: 0.01 K/UL — SIGNIFICANT CHANGE UP (ref 0–0.2)
BASOPHILS NFR BLD AUTO: 1.4 % — SIGNIFICANT CHANGE UP (ref 0–2)
BASOPHILS NFR SPEC: 2.2 % — HIGH (ref 0–2)
BILIRUB SERPL-MCNC: 0.2 MG/DL — SIGNIFICANT CHANGE UP (ref 0.2–1.2)
BLASTS # FLD: 0 % — SIGNIFICANT CHANGE UP (ref 0–0)
BLD GP AB SCN SERPL QL: NEGATIVE — SIGNIFICANT CHANGE UP
BUN SERPL-MCNC: 9 MG/DL — SIGNIFICANT CHANGE UP (ref 7–23)
CALCIUM SERPL-MCNC: 8.5 MG/DL — SIGNIFICANT CHANGE UP (ref 8.4–10.5)
CHLORIDE SERPL-SCNC: 95 MMOL/L — LOW (ref 98–107)
CO2 SERPL-SCNC: 25 MMOL/L — SIGNIFICANT CHANGE UP (ref 22–31)
CREAT SERPL-MCNC: 0.78 MG/DL — SIGNIFICANT CHANGE UP (ref 0.5–1.3)
EOSINOPHIL # BLD AUTO: 0 K/UL — SIGNIFICANT CHANGE UP (ref 0–0.5)
EOSINOPHIL NFR BLD AUTO: 0 % — SIGNIFICANT CHANGE UP (ref 0–6)
EOSINOPHIL NFR FLD: 0 % — SIGNIFICANT CHANGE UP (ref 0–6)
GIANT PLATELETS BLD QL SMEAR: PRESENT — SIGNIFICANT CHANGE UP
GLUCOSE SERPL-MCNC: 119 MG/DL — HIGH (ref 70–99)
HCT VFR BLD CALC: 26.8 % — LOW (ref 39–50)
HGB BLD-MCNC: 9.1 G/DL — LOW (ref 13–17)
HYPOCHROMIA BLD QL: SLIGHT — SIGNIFICANT CHANGE UP
IMM GRANULOCYTES # BLD AUTO: 0 # — SIGNIFICANT CHANGE UP
IMM GRANULOCYTES NFR BLD AUTO: 0 % — SIGNIFICANT CHANGE UP (ref 0–1.5)
LYMPHOCYTES # BLD AUTO: 0.37 K/UL — LOW (ref 1–3.3)
LYMPHOCYTES # BLD AUTO: 50.7 % — HIGH (ref 13–44)
LYMPHOCYTES NFR SPEC AUTO: 58.9 % — HIGH (ref 13–44)
MAGNESIUM SERPL-MCNC: 1.7 MG/DL — SIGNIFICANT CHANGE UP (ref 1.6–2.6)
MCHC RBC-ENTMCNC: 29.2 PG — SIGNIFICANT CHANGE UP (ref 27–34)
MCHC RBC-ENTMCNC: 34 % — SIGNIFICANT CHANGE UP (ref 32–36)
MCV RBC AUTO: 85.9 FL — SIGNIFICANT CHANGE UP (ref 80–100)
METAMYELOCYTES # FLD: 0 % — SIGNIFICANT CHANGE UP (ref 0–1)
MONOCYTES # BLD AUTO: 0.33 K/UL — SIGNIFICANT CHANGE UP (ref 0–0.9)
MONOCYTES NFR BLD AUTO: 45.2 % — HIGH (ref 2–14)
MONOCYTES NFR BLD: 26.7 % — HIGH (ref 2–9)
MYELOCYTES NFR BLD: 0 % — SIGNIFICANT CHANGE UP (ref 0–0)
NEUTROPHIL AB SER-ACNC: 2.2 % — LOW (ref 43–77)
NEUTROPHILS # BLD AUTO: 0.02 K/UL — LOW (ref 1.8–7.4)
NEUTROPHILS NFR BLD AUTO: 2.7 % — LOW (ref 43–77)
NEUTS BAND # BLD: 1.1 % — SIGNIFICANT CHANGE UP (ref 0–6)
NRBC # FLD: 0 — SIGNIFICANT CHANGE UP
OTHER - HEMATOLOGY %: 0 — SIGNIFICANT CHANGE UP
PHOSPHATE SERPL-MCNC: 1 MG/DL — CRITICAL LOW (ref 2.5–4.5)
PLATELET # BLD AUTO: 61 K/UL — LOW (ref 150–400)
PLATELET COUNT - ESTIMATE: SIGNIFICANT CHANGE UP
PMV BLD: 10.4 FL — SIGNIFICANT CHANGE UP (ref 7–13)
POLYCHROMASIA BLD QL SMEAR: SLIGHT — SIGNIFICANT CHANGE UP
POTASSIUM SERPL-MCNC: 3 MMOL/L — LOW (ref 3.5–5.3)
POTASSIUM SERPL-SCNC: 3 MMOL/L — LOW (ref 3.5–5.3)
PROMYELOCYTES # FLD: 0 % — SIGNIFICANT CHANGE UP (ref 0–0)
PROT SERPL-MCNC: 6.5 G/DL — SIGNIFICANT CHANGE UP (ref 6–8.3)
RBC # BLD: 3.12 M/UL — LOW (ref 4.2–5.8)
RBC # FLD: 12.3 % — SIGNIFICANT CHANGE UP (ref 10.3–14.5)
RH IG SCN BLD-IMP: POSITIVE — SIGNIFICANT CHANGE UP
SODIUM SERPL-SCNC: 134 MMOL/L — LOW (ref 135–145)
VARIANT LYMPHS # BLD: 8.9 % — SIGNIFICANT CHANGE UP
WBC # BLD: 0.73 K/UL — CRITICAL LOW (ref 3.8–10.5)
WBC # FLD AUTO: 0.73 K/UL — CRITICAL LOW (ref 3.8–10.5)

## 2018-08-29 PROCEDURE — 99233 SBSQ HOSP IP/OBS HIGH 50: CPT

## 2018-08-29 RX ORDER — POTASSIUM PHOSPHATE, MONOBASIC POTASSIUM PHOSPHATE, DIBASIC 236; 224 MG/ML; MG/ML
15 INJECTION, SOLUTION INTRAVENOUS ONCE
Qty: 0 | Refills: 0 | Status: COMPLETED | OUTPATIENT
Start: 2018-08-29 | End: 2018-08-29

## 2018-08-29 RX ORDER — ALTEPLASE 100 MG
2 KIT INTRAVENOUS ONCE
Qty: 0 | Refills: 0 | Status: DISCONTINUED | OUTPATIENT
Start: 2018-08-29 | End: 2018-09-01

## 2018-08-29 RX ORDER — POTASSIUM CHLORIDE 20 MEQ
40 PACKET (EA) ORAL EVERY 4 HOURS
Qty: 0 | Refills: 0 | Status: COMPLETED | OUTPATIENT
Start: 2018-08-29 | End: 2018-08-30

## 2018-08-29 RX ADMIN — SODIUM CHLORIDE 100 MILLILITER(S): 9 INJECTION INTRAMUSCULAR; INTRAVENOUS; SUBCUTANEOUS at 06:29

## 2018-08-29 RX ADMIN — CHLORHEXIDINE GLUCONATE 1 APPLICATION(S): 213 SOLUTION TOPICAL at 17:50

## 2018-08-29 RX ADMIN — POTASSIUM PHOSPHATE, MONOBASIC POTASSIUM PHOSPHATE, DIBASIC 62.5 MILLIMOLE(S): 236; 224 INJECTION, SOLUTION INTRAVENOUS at 23:44

## 2018-08-29 RX ADMIN — SODIUM CHLORIDE 100 MILLILITER(S): 9 INJECTION INTRAMUSCULAR; INTRAVENOUS; SUBCUTANEOUS at 01:15

## 2018-08-29 RX ADMIN — ENOXAPARIN SODIUM 110 MILLIGRAM(S): 100 INJECTION SUBCUTANEOUS at 06:29

## 2018-08-29 RX ADMIN — ENOXAPARIN SODIUM 110 MILLIGRAM(S): 100 INJECTION SUBCUTANEOUS at 17:50

## 2018-08-29 RX ADMIN — CEFEPIME 100 MILLIGRAM(S): 1 INJECTION, POWDER, FOR SOLUTION INTRAMUSCULAR; INTRAVENOUS at 01:15

## 2018-08-29 RX ADMIN — CEFEPIME 100 MILLIGRAM(S): 1 INJECTION, POWDER, FOR SOLUTION INTRAMUSCULAR; INTRAVENOUS at 12:07

## 2018-08-29 RX ADMIN — SODIUM CHLORIDE 100 MILLILITER(S): 9 INJECTION INTRAMUSCULAR; INTRAVENOUS; SUBCUTANEOUS at 21:31

## 2018-08-29 RX ADMIN — CEFEPIME 100 MILLIGRAM(S): 1 INJECTION, POWDER, FOR SOLUTION INTRAMUSCULAR; INTRAVENOUS at 21:31

## 2018-08-29 RX ADMIN — Medication 40 MILLIEQUIVALENT(S): at 23:36

## 2018-08-29 RX ADMIN — CHLORHEXIDINE GLUCONATE 1 APPLICATION(S): 213 SOLUTION TOPICAL at 06:29

## 2018-08-29 NOTE — DISCHARGE NOTE ADULT - SECONDARY DIAGNOSIS.
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

## 2018-08-29 NOTE — PROGRESS NOTE ADULT - PROBLEM SELECTOR PLAN 3
- patient with neutropenia on presentation  - likely 2/2 chemo  - patient received  Neulasta on 8/24  - heme-onc recs reviewed   - will monitor daily CBC with Diff  - closely monitor for e/o infection, s/p pan culture for fever yesterday  on empiric abx day #3

## 2018-08-29 NOTE — DISCHARGE NOTE ADULT - ADDITIONAL INSTRUCTIONS
Continue your medications as directed and please follow-up as an outpatient with your primary care provider for further care and recommendations. Continue your medications as directed and please follow-up as an outpatient with your primary care provider for further care and recommendations.  ** In regards to thrombocytopenia - Your platelet level upon discharge is 25,000 - You may follow-up with your oncologist as outpatient within 1 week and monitor for signs of bleeding - If persistent bleeding is present please report to the emergency room. Continue your medications as directed and please follow-up as an outpatient with your primary care provider for further care and recommendations.  In regards to your blood pressure being low, please do not resume your Lisinopril or Carvedilol as outpatient until you have followed up with a care provider as outpatient to repeat your blood pressure - Be sure to eat and hydrate adequately.

## 2018-08-29 NOTE — PROGRESS NOTE ADULT - SUBJECTIVE AND OBJECTIVE BOX
Patient is a 38y old  Male who presents with a chief complaint of N/V and Diarrhea (29 Aug 2018 07:37)      SUBJECTIVE / OVERNIGHT EVENTS: afebrile overnight, reports nausea has improved, states he has not had diarrhea, denies fever chills, chest pain, SOB. reports feeling better with wish to go home when medically ready     MEDICATIONS  (STANDING):  alteplase for catheter clearance 2 milliGRAM(s) Catheter once  carvedilol 6.25 milliGRAM(s) Oral every 12 hours  cefepime   IVPB      cefepime   IVPB 1000 milliGRAM(s) IV Intermittent every 8 hours  chlorhexidine 4% Liquid 1 Application(s) Topical two times a day  enoxaparin Injectable 110 milliGRAM(s) SubCutaneous every 12 hours  sodium chloride 0.9%. 1000 milliLiter(s) (100 mL/Hr) IV Continuous <Continuous>    MEDICATIONS  (PRN):  acetaminophen   Tablet 650 milliGRAM(s) Oral every 6 hours PRN For Temp greater than 38 C (100.4 F)  benzocaine 15 mG/menthol 3.6 mG Lozenge 1 Lozenge Oral every 4 hours PRN Sore Throat  meclizine 12.5 milliGRAM(s) Oral two times a day PRN Dizziness  ondansetron Injectable 4 milliGRAM(s) IV Push every 6 hours PRN Nausea and/or Vomiting        CAPILLARY BLOOD GLUCOSE        I&O's Summary      Vital Signs Last 24 Hrs  T(C): 36.8 (29 Aug 2018 06:27), Max: 37.6 (28 Aug 2018 14:03)  T(F): 98.3 (29 Aug 2018 06:27), Max: 99.6 (28 Aug 2018 14:03)  HR: 104 (29 Aug 2018 06:27) (84 - 104)  BP: 98/60 (29 Aug 2018 06:27) (94/63 - 99/64)  BP(mean): --  RR: 17 (29 Aug 2018 06:27) (17 - 18)  SpO2: 99% (29 Aug 2018 06:27) (97% - 100%)  PHYSICAL EXAM:  GENERAL: NAD, well-developed  EYES: EOMI, PERRLA, conjunctiva and sclera clear  NECK: Supple, No JVD  CHEST/LUNG: Clear to auscultation bilaterally; No wheeze  HEART: Regular rate and rhythm; No murmurs, rubs, or gallops  ABDOMEN: soft, nontender increased BS  EXTREMITIES:  2+ Peripheral Pulses, left UE PIC  PSYCH: AAOx3  SKIN: No rashes or lesions      LABS:    WBC Trend: 0.29<--, 0.63<--, 0.86<--  Hb Trend: 11.3<--, 9.5<--, 10.0<--, 10.6<--, 9.9<--  Plt Trend: 136<--, 231<--, 262<--, 322<--, 257<--      132<L>  |  95<L>  |  10  ----------------------------<  100<H>  3.3<L>   |  24  |  0.97    Ca    9.3      28 Aug 2018 06:40  Phos  1.8       Mg     2.0         TPro  7.6  /  Alb  4.0  /  TBili  0.4  /  DBili  x   /  AST  231<H>  /  ALT  471<H>  /  AlkPhos  75      Creatinine Trend: 0.97<--, 0.90<--, 0.96<--, 0.70<--, 0.68<--, 0.80<--        Urinalysis Basic - ( 27 Aug 2018 17:36 )    Color: LIGHT YELLOW / Appearance: CLEAR / S.018 / pH: 7.5  Gluc: NEGATIVE / Ketone: TRACE  / Bili: NEGATIVE / Urobili: NORMAL   Blood: SMALL / Protein: MODERATE / Nitrite: NEGATIVE   Leuk Esterase: NEGATIVE / RBC: 3-5 / WBC 3-5   Sq Epi: FEW / Non Sq Epi: x / Bacteria: NEGATIVE        Microbiology:  Urine Cx:Urine culture     Blood Cx:  NO ORGANISMS ISOLATED  NO ORGANISMS ISOLATED AT 24 HOURS      RADIOLOGY & ADDITIONAL TESTS:  X- Ray:  CT:  Ultrasound:  [ ] imaging personally reviewed and interpreted by me    Consultant(s) Notes Reviewed:  Hematology     Care Discussed with Consultants/Other Providers:

## 2018-08-29 NOTE — DISCHARGE NOTE ADULT - CONTRAINDICATIONS & PRECAUTIONS (SELECT ALL THAT APPLY)
Moderate to severe acute illness with or without fever. Delay administration of vaccination until patient has been afebrile or illness resolved for 24hrs

## 2018-08-29 NOTE — DISCHARGE NOTE ADULT - PLAN OF CARE
Resolution and return to baseline You were treated with antibiotics and anti-nausea medications, as well as, supplemented with electrolytes during your hospital stay. Please follow-up with your oncologist as outpatient for further care/recommendations Continue your medications as directed and please follow-up as an outpatient with your primary care team for further care and recommendations. Cardiac MRI____ Please follow-up with your oncologist as outpatient to further monitor your blood counts. ECHO____ EKG___ Cardiac MRI and Echocardiogram should be obtained as outpatient Echocardiogram ordered while inpatient, but you stated you would rather have the test performed as outpatient. Stable - Follow-up as outpatient with PCP You were treated with antibiotics and anti-nausea medications, as well as, supplemented with electrolytes during your hospital stay. Please follow-up with your oncologist as outpatient for further care/recommendations. Likely a result from chemotherapy - Liver enzymes should be monitored as outpatient with primary oncology team - Follow-up within 1 week upon hospital discharge Please follow-up with your oncologist as outpatient to further monitor your blood counts. Please follow up with Dr. Chirinos for chemotherapy treatment. Your platelet level upon discharge is 25,000 - You may follow-up with your oncologist as outpatient within 1 week and monitor for signs of bleeding - If persistent bleeding is present please report to the emergency room. Do not restart Lovenox unless platelets above 60,000. Please follow up with Dr. Chirinos to repeat platelet level. You were treated with antibiotics and anti-nausea medications, as well as, supplemented with electrolytes during your hospital stay. Please follow-up with Dr. Chirinos as outpatient for further care/recommendations. Continue your medications as directed and please follow-up as an outpatient with your primary care team for further care and recommendations. Follow-up with Dr. Chirinos in regards to chemotherapy scheduling.

## 2018-08-29 NOTE — DISCHARGE NOTE ADULT - CARE PROVIDER_API CALL
Charito Mccall), Internal Medicine  [address] [place] [address]  Phone: [phone]  Fax: [phone] Neo Chirinos), Hematology; Internal Medicine; Medical Oncology  [address] [place] [address]  Phone: [phone]  Fax: [phone]

## 2018-08-29 NOTE — DISCHARGE NOTE ADULT - MEDICATION SUMMARY - MEDICATIONS TO STOP TAKING
I will STOP taking the medications listed below when I get home from the hospital:    lisinopril 2.5 mg oral tablet  -- 1 tab(s) by mouth once a day    carvedilol 12.5 mg oral tablet  -- 1 tab(s) by mouth 2 times a day    enoxaparin 120 mg/0.8 mL injectable solution  -- 110 milligram(s) subcutaneously every 12 hours   -- It is very important that you take or use this exactly as directed.  Do not skip doses or discontinue unless directed by your doctor.

## 2018-08-29 NOTE — PROGRESS NOTE ADULT - PROBLEM SELECTOR PLAN 1
Febrile to 100.9 yesterday   blood cx , urine culture sent  - started on cipro+ cefepime + vancomycin day #2, refused vancomycin   - would d/c cipro   c/w cefepime day #3  - afebrile currently ,   if blood culture results negative 48 hrs consider change to levaquin   - tylenol prn fever

## 2018-08-29 NOTE — DISCHARGE NOTE ADULT - CARE PLAN
Principal Discharge DX:	Vomiting, persistent, in adult  Goal:	Resolution and return to baseline  Assessment and plan of treatment:	You were treated with antibiotics and anti-nausea medications, as well as, supplemented with electrolytes during your hospital stay. Please follow-up with your oncologist as outpatient for further care/recommendations  Secondary Diagnosis:	Transaminitis  Secondary Diagnosis:	Mixed germ cell tumor  Assessment and plan of treatment:	Continue your medications as directed and please follow-up as an outpatient with your primary care team for further care and recommendations.  Secondary Diagnosis:	Left atrial mass  Assessment and plan of treatment:	Cardiac MRI____  Secondary Diagnosis:	Chemotherapy-induced neutropenia  Assessment and plan of treatment:	Please follow-up with your oncologist as outpatient to further monitor your blood counts.  Secondary Diagnosis:	Cardiomyopathy, unspecified type  Assessment and plan of treatment:	ECHO____  Secondary Diagnosis:	Atrial flutter, unspecified type  Assessment and plan of treatment:	EKG___ Principal Discharge DX:	Vomiting, persistent, in adult  Goal:	Resolution and return to baseline  Assessment and plan of treatment:	You were treated with antibiotics and anti-nausea medications, as well as, supplemented with electrolytes during your hospital stay. Please follow-up with your oncologist as outpatient for further care/recommendations  Secondary Diagnosis:	Transaminitis  Secondary Diagnosis:	Mixed germ cell tumor  Assessment and plan of treatment:	Continue your medications as directed and please follow-up as an outpatient with your primary care team for further care and recommendations.  Secondary Diagnosis:	Left atrial mass  Assessment and plan of treatment:	Cardiac MRI and Echocardiogram should be obtained as outpatient  Secondary Diagnosis:	Chemotherapy-induced neutropenia  Assessment and plan of treatment:	Please follow-up with your oncologist as outpatient to further monitor your blood counts.  Secondary Diagnosis:	Cardiomyopathy, unspecified type  Assessment and plan of treatment:	Echocardiogram ordered while inpatient, but you stated you would rather have the test performed as outpatient.  Secondary Diagnosis:	Atrial flutter, unspecified type  Assessment and plan of treatment:	Stable - Follow-up as outpatient with PCP Principal Discharge DX:	Vomiting, persistent, in adult  Goal:	Resolution and return to baseline  Assessment and plan of treatment:	You were treated with antibiotics and anti-nausea medications, as well as, supplemented with electrolytes during your hospital stay. Please follow-up with your oncologist as outpatient for further care/recommendations.  Secondary Diagnosis:	Transaminitis  Assessment and plan of treatment:	Likely a result from chemotherapy - Liver enzymes should be monitored as outpatient with primary oncology team - Follow-up within 1 week upon hospital discharge  Secondary Diagnosis:	Mixed germ cell tumor  Assessment and plan of treatment:	Continue your medications as directed and please follow-up as an outpatient with your primary care team for further care and recommendations.  Secondary Diagnosis:	Left atrial mass  Assessment and plan of treatment:	Cardiac MRI and Echocardiogram should be obtained as outpatient  Secondary Diagnosis:	Chemotherapy-induced neutropenia  Assessment and plan of treatment:	Please follow-up with your oncologist as outpatient to further monitor your blood counts.  Secondary Diagnosis:	Cardiomyopathy, unspecified type  Assessment and plan of treatment:	Echocardiogram ordered while inpatient, but you stated you would rather have the test performed as outpatient.  Secondary Diagnosis:	Atrial flutter, unspecified type  Assessment and plan of treatment:	Stable - Follow-up as outpatient with PCP Principal Discharge DX:	Vomiting, persistent, in adult  Goal:	Resolution and return to baseline  Assessment and plan of treatment:	You were treated with antibiotics and anti-nausea medications, as well as, supplemented with electrolytes during your hospital stay. Please follow-up with your oncologist as outpatient for further care/recommendations.  Secondary Diagnosis:	Transaminitis  Assessment and plan of treatment:	Likely a result from chemotherapy - Liver enzymes should be monitored as outpatient with primary oncology team - Follow-up within 1 week upon hospital discharge  Secondary Diagnosis:	Mixed germ cell tumor  Assessment and plan of treatment:	Continue your medications as directed and please follow-up as an outpatient with your primary care team for further care and recommendations.  Secondary Diagnosis:	Left atrial mass  Assessment and plan of treatment:	Cardiac MRI and Echocardiogram should be obtained as outpatient  Secondary Diagnosis:	Chemotherapy-induced neutropenia  Assessment and plan of treatment:	Please follow-up with your oncologist as outpatient to further monitor your blood counts. Please follow up with Dr. Chirinos for chemotherapy treatment.  Secondary Diagnosis:	Cardiomyopathy, unspecified type  Assessment and plan of treatment:	Echocardiogram ordered while inpatient, but you stated you would rather have the test performed as outpatient.  Secondary Diagnosis:	Thrombocytopenia  Assessment and plan of treatment:	Your platelet level upon discharge is 25,000 - You may follow-up with your oncologist as outpatient within 1 week and monitor for signs of bleeding - If persistent bleeding is present please report to the emergency room. Do not restart Lovenox unless platelets above 60,000. Please follow up with Dr. Chirinos to repeat platelet level. Principal Discharge DX:	Vomiting, persistent, in adult  Goal:	Resolution and return to baseline  Assessment and plan of treatment:	You were treated with antibiotics and anti-nausea medications, as well as, supplemented with electrolytes during your hospital stay. Please follow-up with Dr. Chirinos as outpatient for further care/recommendations.  Secondary Diagnosis:	Transaminitis  Assessment and plan of treatment:	Likely a result from chemotherapy - Liver enzymes should be monitored as outpatient with primary oncology team - Follow-up within 1 week upon hospital discharge  Secondary Diagnosis:	Mixed germ cell tumor  Assessment and plan of treatment:	Continue your medications as directed and please follow-up as an outpatient with your primary care team for further care and recommendations. Follow-up with Dr. Chirinos in regards to chemotherapy scheduling.  Secondary Diagnosis:	Left atrial mass  Assessment and plan of treatment:	Cardiac MRI and Echocardiogram should be obtained as outpatient  Secondary Diagnosis:	Chemotherapy-induced neutropenia  Assessment and plan of treatment:	Please follow-up with your oncologist as outpatient to further monitor your blood counts. Please follow up with Dr. Chirinos for chemotherapy treatment.  Secondary Diagnosis:	Cardiomyopathy, unspecified type  Assessment and plan of treatment:	Echocardiogram ordered while inpatient, but you stated you would rather have the test performed as outpatient.  Secondary Diagnosis:	Thrombocytopenia  Assessment and plan of treatment:	Your platelet level upon discharge is 25,000 - You may follow-up with your oncologist as outpatient within 1 week and monitor for signs of bleeding - If persistent bleeding is present please report to the emergency room. Do not restart Lovenox unless platelets above 60,000. Please follow up with Dr. Chirinos to repeat platelet level.

## 2018-08-29 NOTE — DISCHARGE NOTE ADULT - CARE PROVIDERS DIRECT ADDRESSES
,DirectAddress_Unknown ,[email or web address]

## 2018-08-29 NOTE — DISCHARGE NOTE ADULT - PATIENT PORTAL LINK FT
You can access the [facility] Patient Portal, offered by [facility], by registering with the following website: [email or web address]

## 2018-08-29 NOTE — PROGRESS NOTE ADULT - PROBLEM SELECTOR PLAN 5
- patient with a uptrending transaminsases   - patient with recent negative Hep B and C panel   - DDx includes chemo induced vs mild hypotension vs less likely ischemic  - will continue to monitor with daily CMPs,  - avoid hepatotoxic agents

## 2018-08-29 NOTE — DISCHARGE NOTE ADULT - HOSPITAL COURSE
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 7/2017, 3 cycles of cisplatin/etoposide 9/2017, RPLND 6/2018, found to have residual disease, s/p cycle 1 of TIP 7/29/2018, completed 8/3/2018, s/p neulasta onpro 8/3/2018, AFlutter (2/2 catheter irritation of R atrium), R atrial thrombus s/p successful TYLOR/DCCV into sinus rhythm 2/2018 (on home Lovenox), nonischemic CM (EF 35-40%) who presents today with complaints of N/V, and diarrhea, likely 2/2 recent chemo therapy     Vomiting  - Likley 2/2 recent chemo therapy  - Keep NPO for now and advance diet as tolerated  - Zofran PRN N/V    Chemotherapy-induced neutropenia  - Patient with Neulasta patch on admission  - Heme/Onc consulted   - Monitor daily CBC with Diff  - If develops signs of infection, then panculture and start empiric abx coverage  - RVP negative  - Fever 8/27: CXR: Clear Lungs   - Blood NGTD  - Midline placed 7/27    Left atrial mass  - Patient with history of LA mass seen on TTE  - DDx includes LA thrombus vs malignancy  - Per Cards note from last admission, will obtain Cardiac MRI to further evaluate.     Transaminitis.    - Recent negative Hep B and C panel   - DDx includes chemo induced vs mild hypotension   - Monitor with daily CMPs.     Atrial flutter  - History of AFlutter s/p successful TYLOR/DCCV into sinus rhythm 2/2018   - Monitor EKG  - C/with home Lovenox 110 q12 and Carvedilol    Cardiomyopathy  - Patient with non-ischemic cardiomyopathy  - Most recent TTE in Feb 2018 reveals mild-moderate global left ventricular systolic dysfunction with EF of ~ 40%  - Clinically patient appears to be euvolemic  - C/w Carvedilol and hold Lisinopril for now in the setting of soft BPs.   - Strict Is/Os  - Daily standing weights    Mixed germ cell tumor.    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    - DVT ppx: patient is anticoagulated with Lovenox  - Diet: currently unable to tolerate PO. Will advance diet as tolerated.     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 7/2017, 3 cycles of cisplatin/etoposide 9/2017, RPLND 6/2018, found to have residual disease, s/p cycle 1 of TIP 7/29/2018, completed 8/3/2018, s/p neulasta onpro 8/3/2018, AFlutter (2/2 catheter irritation of R atrium), R atrial thrombus s/p successful TYLOR/DCCV into sinus rhythm 2/2018 (on home Lovenox), nonischemic CM (EF 35-40%) who presents today with complaints of N/V, and diarrhea, likely 2/2 recent chemo therapy     Vomiting  - Likley 2/2 recent chemo therapy  - Keep NPO for now and advance diet as tolerated  - Zofran PRN N/V    Chemotherapy-induced neutropenia  - Patient with Neulasta patch on admission  - Heme/Onc consulted   - Monitor daily CBC with Diff  - If develops signs of infection, then panculture and start empiric abx coverage  - RVP negative  - Fever 8/27: CXR: Clear Lungs   - Blood NGTD  - Midline placed 7/27    Thrombocytopenia   - No signs of bleeding  - Trend  - Discharge if > 20,000 as per oncology     Left atrial mass  - Patient with history of LA mass seen on TTE  - DDx includes LA thrombus vs malignancy  - Per Cards note from last admission, will obtain Cardiac MRI to further evaluate.     Transaminitis.    - Recent negative Hep B and C panel   - DDx includes chemo induced vs mild hypotension   - Monitor with daily CMPs.     Atrial flutter  - History of AFlutter s/p successful TYLOR/DCCV into sinus rhythm 2/2018   - Monitor EKG  - C/with home Lovenox 110 q12 and Carvedilol    Cardiomyopathy  - Patient with non-ischemic cardiomyopathy  - Most recent TTE in Feb 2018 reveals mild-moderate global left ventricular systolic dysfunction with EF of ~ 40%  - Clinically patient appears to be euvolemic  - C/w Carvedilol and hold Lisinopril for now in the setting of soft BPs.   - Strict Is/Os  - Daily standing weights    Mixed germ cell tumor.    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    - DVT ppx: patient is anticoagulated with Lovenox  - Diet: currently unable to tolerate PO. Will advance diet as tolerated.     Dispo - Home

## 2018-08-29 NOTE — DISCHARGE NOTE ADULT - HOME CARE AGENCY
[facility] ([facility])   [phone] for PICC maintenance

## 2018-08-29 NOTE — DISCHARGE NOTE ADULT - MEDICATION SUMMARY - MEDICATIONS TO TAKE
I will START or STAY ON the medications listed below when I get home from the hospital:    oxyCODONE 15 mg oral tablet  -- 1 tab(s) by mouth every 6 hours, As Needed  -- Indication: For Pain    acetaminophen 325 mg oral tablet  -- 2 tab(s) by mouth every 6 hours, As needed, For Temp greater than 38 C (100.4 F)  -- Indication: For Fever/pain    meclizine 12.5 mg oral tablet  -- 1 tab(s) by mouth 2 times a day, As needed, Dizziness  -- Indication: For dizzy/nausea    Benadryl 50 mg oral capsule  -- 1 cap(s) by mouth prior to chemotherapy   -- Indication: For with chemo    Mesnex 400 mg oral tablet  -- Take 8 hours after every cycle of chemotherapy   -- Indication: For Mixed germ cell tumor    Prevacid 30 mg oral delayed release capsule  -- 1 cap(s) by mouth once a day  -- Indication: For GERD

## 2018-08-30 LAB
ALBUMIN SERPL ELPH-MCNC: 3.4 G/DL — SIGNIFICANT CHANGE UP (ref 3.3–5)
ALP SERPL-CCNC: 83 U/L — SIGNIFICANT CHANGE UP (ref 40–120)
ALT FLD-CCNC: 327 U/L — HIGH (ref 4–41)
ANISOCYTOSIS BLD QL: SLIGHT — SIGNIFICANT CHANGE UP
AST SERPL-CCNC: 100 U/L — HIGH (ref 4–40)
BASOPHILS # BLD AUTO: 0.01 K/UL — SIGNIFICANT CHANGE UP (ref 0–0.2)
BASOPHILS NFR BLD AUTO: 0.6 % — SIGNIFICANT CHANGE UP (ref 0–2)
BASOPHILS NFR SPEC: 0 % — SIGNIFICANT CHANGE UP (ref 0–2)
BILIRUB SERPL-MCNC: 0.3 MG/DL — SIGNIFICANT CHANGE UP (ref 0.2–1.2)
BLASTS # FLD: 0 % — SIGNIFICANT CHANGE UP (ref 0–0)
BUN SERPL-MCNC: 8 MG/DL — SIGNIFICANT CHANGE UP (ref 7–23)
CALCIUM SERPL-MCNC: 9 MG/DL — SIGNIFICANT CHANGE UP (ref 8.4–10.5)
CHLORIDE SERPL-SCNC: 97 MMOL/L — LOW (ref 98–107)
CO2 SERPL-SCNC: 25 MMOL/L — SIGNIFICANT CHANGE UP (ref 22–31)
CREAT SERPL-MCNC: 0.74 MG/DL — SIGNIFICANT CHANGE UP (ref 0.5–1.3)
EOSINOPHIL # BLD AUTO: 0 K/UL — SIGNIFICANT CHANGE UP (ref 0–0.5)
EOSINOPHIL NFR BLD AUTO: 0 % — SIGNIFICANT CHANGE UP (ref 0–6)
EOSINOPHIL NFR FLD: 0 % — SIGNIFICANT CHANGE UP (ref 0–6)
GIANT PLATELETS BLD QL SMEAR: PRESENT — SIGNIFICANT CHANGE UP
GLUCOSE SERPL-MCNC: 93 MG/DL — SIGNIFICANT CHANGE UP (ref 70–99)
HCT VFR BLD CALC: 28.7 % — LOW (ref 39–50)
HGB BLD-MCNC: 9.8 G/DL — LOW (ref 13–17)
HYPOCHROMIA BLD QL: SLIGHT — SIGNIFICANT CHANGE UP
IMM GRANULOCYTES # BLD AUTO: 0.01 # — SIGNIFICANT CHANGE UP
IMM GRANULOCYTES NFR BLD AUTO: 0.6 % — SIGNIFICANT CHANGE UP (ref 0–1.5)
LYMPHOCYTES # BLD AUTO: 0.54 K/UL — LOW (ref 1–3.3)
LYMPHOCYTES # BLD AUTO: 31.4 % — SIGNIFICANT CHANGE UP (ref 13–44)
LYMPHOCYTES NFR SPEC AUTO: 57.5 % — HIGH (ref 13–44)
MAGNESIUM SERPL-MCNC: 1.7 MG/DL — SIGNIFICANT CHANGE UP (ref 1.6–2.6)
MCHC RBC-ENTMCNC: 29.5 PG — SIGNIFICANT CHANGE UP (ref 27–34)
MCHC RBC-ENTMCNC: 34.1 % — SIGNIFICANT CHANGE UP (ref 32–36)
MCV RBC AUTO: 86.4 FL — SIGNIFICANT CHANGE UP (ref 80–100)
METAMYELOCYTES # FLD: 1.8 % — HIGH (ref 0–1)
MONOCYTES # BLD AUTO: 0.61 K/UL — SIGNIFICANT CHANGE UP (ref 0–0.9)
MONOCYTES NFR BLD AUTO: 35.5 % — HIGH (ref 2–14)
MONOCYTES NFR BLD: 13.3 % — HIGH (ref 2–9)
MYELOCYTES NFR BLD: 0.9 % — HIGH (ref 0–0)
NEUTROPHIL AB SER-ACNC: 23.9 % — LOW (ref 43–77)
NEUTROPHILS # BLD AUTO: 0.55 K/UL — LOW (ref 1.8–7.4)
NEUTROPHILS NFR BLD AUTO: 31.9 % — LOW (ref 43–77)
NEUTS BAND # BLD: 0 % — SIGNIFICANT CHANGE UP (ref 0–6)
NRBC # FLD: 0.02 — SIGNIFICANT CHANGE UP
NRBC FLD-RTO: 1.2 — SIGNIFICANT CHANGE UP
OTHER - HEMATOLOGY %: 0 — SIGNIFICANT CHANGE UP
PHOSPHATE SERPL-MCNC: 1.1 MG/DL — LOW (ref 2.5–4.5)
PLATELET # BLD AUTO: 35 K/UL — LOW (ref 150–400)
PLATELET COUNT - ESTIMATE: SIGNIFICANT CHANGE UP
PMV BLD: 10.9 FL — SIGNIFICANT CHANGE UP (ref 7–13)
POTASSIUM SERPL-MCNC: 3.4 MMOL/L — LOW (ref 3.5–5.3)
POTASSIUM SERPL-SCNC: 3.4 MMOL/L — LOW (ref 3.5–5.3)
PROMYELOCYTES # FLD: 0 % — SIGNIFICANT CHANGE UP (ref 0–0)
PROT SERPL-MCNC: 7 G/DL — SIGNIFICANT CHANGE UP (ref 6–8.3)
RBC # BLD: 3.32 M/UL — LOW (ref 4.2–5.8)
RBC # FLD: 12.4 % — SIGNIFICANT CHANGE UP (ref 10.3–14.5)
SODIUM SERPL-SCNC: 137 MMOL/L — SIGNIFICANT CHANGE UP (ref 135–145)
VARIANT LYMPHS # BLD: 2.6 % — SIGNIFICANT CHANGE UP
WBC # BLD: 1.72 K/UL — LOW (ref 3.8–10.5)
WBC # FLD AUTO: 1.72 K/UL — LOW (ref 3.8–10.5)

## 2018-08-30 PROCEDURE — 99233 SBSQ HOSP IP/OBS HIGH 50: CPT

## 2018-08-30 RX ADMIN — CHLORHEXIDINE GLUCONATE 1 APPLICATION(S): 213 SOLUTION TOPICAL at 17:39

## 2018-08-30 RX ADMIN — SODIUM CHLORIDE 100 MILLILITER(S): 9 INJECTION INTRAMUSCULAR; INTRAVENOUS; SUBCUTANEOUS at 05:10

## 2018-08-30 RX ADMIN — CEFEPIME 100 MILLIGRAM(S): 1 INJECTION, POWDER, FOR SOLUTION INTRAMUSCULAR; INTRAVENOUS at 06:11

## 2018-08-30 RX ADMIN — CHLORHEXIDINE GLUCONATE 1 APPLICATION(S): 213 SOLUTION TOPICAL at 05:10

## 2018-08-30 RX ADMIN — CEFEPIME 100 MILLIGRAM(S): 1 INJECTION, POWDER, FOR SOLUTION INTRAMUSCULAR; INTRAVENOUS at 20:11

## 2018-08-30 RX ADMIN — CEFEPIME 100 MILLIGRAM(S): 1 INJECTION, POWDER, FOR SOLUTION INTRAMUSCULAR; INTRAVENOUS at 13:16

## 2018-08-30 NOTE — PROGRESS NOTE ADULT - PROBLEM SELECTOR PLAN 7
hypophosphatemia , hypomagnesemia, hypomagnesemia   - likely 2/2 poor po intake and diarrhea   - replete Phosp  >2.5, Mg to >2.0, k > 3.5 hypophosphatemia , hypomagnesemia, hypomagnesemia   - replete Phosp  >2.5, Mg to >2.0, k > 3.5

## 2018-08-30 NOTE — PROGRESS NOTE ADULT - SUBJECTIVE AND OBJECTIVE BOX
Patient is a 38y old  Male who presents with a chief complaint of N/V and Diarrhea (29 Aug 2018 07:37)      SUBJECTIVE / OVERNIGHT EVENTS:    MEDICATIONS  (STANDING):  alteplase for catheter clearance 2 milliGRAM(s) Catheter once  carvedilol 6.25 milliGRAM(s) Oral every 12 hours  cefepime   IVPB      cefepime   IVPB 1000 milliGRAM(s) IV Intermittent every 8 hours  chlorhexidine 4% Liquid 1 Application(s) Topical two times a day  sodium chloride 0.9%. 1000 milliLiter(s) (100 mL/Hr) IV Continuous <Continuous>    MEDICATIONS  (PRN):  acetaminophen   Tablet 650 milliGRAM(s) Oral every 6 hours PRN For Temp greater than 38 C (100.4 F)  benzocaine 15 mG/menthol 3.6 mG Lozenge 1 Lozenge Oral every 4 hours PRN Sore Throat  meclizine 12.5 milliGRAM(s) Oral two times a day PRN Dizziness  ondansetron Injectable 4 milliGRAM(s) IV Push every 6 hours PRN Nausea and/or Vomiting      T(C): 37 (08-30-18 @ 14:00), Max: 37.3 (08-30-18 @ 01:45)  HR: 88 (08-30-18 @ 14:00) (69 - 100)  BP: 107/69 (08-30-18 @ 14:00) (96/62 - 110/67)  RR: 18 (08-30-18 @ 14:00) (17 - 18)  SpO2: 98% (08-30-18 @ 14:00) (97% - 100%)  CAPILLARY BLOOD GLUCOSE        I&O's Summary      PHYSICAL EXAM:  GENERAL: NAD, well-developed  HEAD:  Atraumatic, Normocephalic  EYES: EOMI, PERRLA, conjunctiva and sclera clear  NECK: Supple, No JVD  CHEST/LUNG: Clear to auscultation bilaterally; No wheeze  HEART: s1 s2, regular rhythm and rate   ABDOMEN: Soft, Nontender, Nondistended; Bowel sounds present  EXTREMITIES:  2+ Peripheral Pulses, No clubbing, cyanosis, or edema, left UE midline   PSYCH: AAOx3, calm   NEUROLOGY: non-focal  SKIN: No rashes or lesions    LABS:                        9.1    0.73  )-----------( 61       ( 29 Aug 2018 20:50 )             26.8     08-29    134<L>  |  95<L>  |  9   ----------------------------<  119<H>  3.0<L>   |  25  |  0.78    Ca    8.5      29 Aug 2018 20:50  Phos  1.0     08-29  Mg     1.7     08-29    TPro  6.5  /  Alb  3.3  /  TBili  0.2  /  DBili  x   /  AST  112<H>  /  ALT  334<H>  /  AlkPhos  67  08-29              RADIOLOGY & ADDITIONAL TESTS:    Imaging Personally Reviewed:    Consultant(s) Notes Reviewed:      Care Discussed with Consultants/Other Providers:

## 2018-08-30 NOTE — PROGRESS NOTE ADULT - PROBLEM SELECTOR PLAN 6
- patient with history of AFlutter s/p successful TYLOR/DCCV into sinus rhythm 2/2018   - will continue with home Lovenox 110 q12 (platelets = 231)  - continue with home Carvedilol w/ holding parameters (pls discontinue if persistent hypotension) - patient with history of AFlutter s/p successful TYLOR/DCCV into sinus rhythm 2/2018   - home Lovenox 110 q12 on hold now due to profound thrombocytopenia   - continue with home Carvedilol w/ holding parameters

## 2018-08-30 NOTE — PROGRESS NOTE ADULT - PROBLEM SELECTOR PLAN 9
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

## 2018-08-30 NOTE — PROGRESS NOTE ADULT - PROBLEM SELECTOR PLAN 3
2/2 chemo  patient received  Neulasta on 8/24  f/u onc recs   - will monitor daily CBC with Diff  also thrombocytopenia, worsening, hold off lovenox for now until plt > 60

## 2018-08-30 NOTE — PROGRESS NOTE ADULT - SUBJECTIVE AND OBJECTIVE BOX
INTERVAL HPI/OVERNIGHT EVENTS:  Patient seen at bedside. He is feeling better. He says that he is concerned about his heart, as his bps have been running low, although his carvedilol has been stopped. He has a h/o A flutter and cardiomyopathy. EF 35%-40%. Follows with Dr. Marquez, last seen 8/15/2018 with plan for cardiac MRI.      VITAL SIGNS:  T(F): 98.6 (08-30-18 @ 14:00)  HR: 88 (08-30-18 @ 14:00)  BP: 107/69 (08-30-18 @ 14:00)  RR: 18 (08-30-18 @ 14:00)  SpO2: 98% (08-30-18 @ 14:00)  Wt(kg): --    PHYSICAL EXAM:    Constitutional: NAD, resting in bed comfortably  Eyes: EOMI, sclera non-icteric  Neck: supple, no LAP  Respiratory: CTA b/l, good air entry b/l, no wheezing, rhonchi or crackels  Cardiovascular: RRR, normal S1S2, no M/R/G  Gastrointestinal: soft, NTND  Extremities: no edema  Neurological: AAOx3, non focal  Skin: Normal temperature    MEDICATIONS  (STANDING):  alteplase for catheter clearance 2 milliGRAM(s) Catheter once  carvedilol 6.25 milliGRAM(s) Oral every 12 hours  cefepime   IVPB      cefepime   IVPB 1000 milliGRAM(s) IV Intermittent every 8 hours  chlorhexidine 4% Liquid 1 Application(s) Topical two times a day  sodium chloride 0.9%. 1000 milliLiter(s) (100 mL/Hr) IV Continuous <Continuous>    MEDICATIONS  (PRN):  acetaminophen   Tablet 650 milliGRAM(s) Oral every 6 hours PRN For Temp greater than 38 C (100.4 F)  benzocaine 15 mG/menthol 3.6 mG Lozenge 1 Lozenge Oral every 4 hours PRN Sore Throat  meclizine 12.5 milliGRAM(s) Oral two times a day PRN Dizziness  ondansetron Injectable 4 milliGRAM(s) IV Push every 6 hours PRN Nausea and/or Vomiting      Allergies    No Known Allergies    Intolerances        LABS:                        9.1    0.73  )-----------( 61       ( 29 Aug 2018 20:50 )             26.8     08-29    134<L>  |  95<L>  |  9   ----------------------------<  119<H>  3.0<L>   |  25  |  0.78    Ca    8.5      29 Aug 2018 20:50  Phos  1.0     08-29  Mg     1.7     08-29    TPro  6.5  /  Alb  3.3  /  TBili  0.2  /  DBili  x   /  AST  112<H>  /  ALT  334<H>  /  AlkPhos  67  08-29          RADIOLOGY & ADDITIONAL TESTS:  Studies reviewed.

## 2018-08-31 LAB
ALBUMIN SERPL ELPH-MCNC: 3.4 G/DL — SIGNIFICANT CHANGE UP (ref 3.3–5)
ALP SERPL-CCNC: 74 U/L — SIGNIFICANT CHANGE UP (ref 40–120)
ALT FLD-CCNC: 242 U/L — HIGH (ref 4–41)
AST SERPL-CCNC: 59 U/L — HIGH (ref 4–40)
BILIRUB SERPL-MCNC: 0.2 MG/DL — SIGNIFICANT CHANGE UP (ref 0.2–1.2)
BUN SERPL-MCNC: 7 MG/DL — SIGNIFICANT CHANGE UP (ref 7–23)
CALCIUM SERPL-MCNC: 8.7 MG/DL — SIGNIFICANT CHANGE UP (ref 8.4–10.5)
CHLORIDE SERPL-SCNC: 98 MMOL/L — SIGNIFICANT CHANGE UP (ref 98–107)
CO2 SERPL-SCNC: 27 MMOL/L — SIGNIFICANT CHANGE UP (ref 22–31)
CREAT SERPL-MCNC: 0.7 MG/DL — SIGNIFICANT CHANGE UP (ref 0.5–1.3)
GLUCOSE SERPL-MCNC: 103 MG/DL — HIGH (ref 70–99)
HCT VFR BLD CALC: 28.2 % — LOW (ref 39–50)
HGB BLD-MCNC: 9.4 G/DL — LOW (ref 13–17)
MAGNESIUM SERPL-MCNC: 1.6 MG/DL — SIGNIFICANT CHANGE UP (ref 1.6–2.6)
MCHC RBC-ENTMCNC: 28.8 PG — SIGNIFICANT CHANGE UP (ref 27–34)
MCHC RBC-ENTMCNC: 33.3 % — SIGNIFICANT CHANGE UP (ref 32–36)
MCV RBC AUTO: 86.5 FL — SIGNIFICANT CHANGE UP (ref 80–100)
NRBC # FLD: 0.04 — SIGNIFICANT CHANGE UP
PHOSPHATE SERPL-MCNC: 2 MG/DL — LOW (ref 2.5–4.5)
PLATELET # BLD AUTO: 20 K/UL — CRITICAL LOW (ref 150–400)
PMV BLD: 11.3 FL — SIGNIFICANT CHANGE UP (ref 7–13)
POTASSIUM SERPL-MCNC: 3.4 MMOL/L — LOW (ref 3.5–5.3)
POTASSIUM SERPL-SCNC: 3.4 MMOL/L — LOW (ref 3.5–5.3)
PROT SERPL-MCNC: 6.5 G/DL — SIGNIFICANT CHANGE UP (ref 6–8.3)
RBC # BLD: 3.26 M/UL — LOW (ref 4.2–5.8)
RBC # FLD: 12.4 % — SIGNIFICANT CHANGE UP (ref 10.3–14.5)
SODIUM SERPL-SCNC: 138 MMOL/L — SIGNIFICANT CHANGE UP (ref 135–145)
WBC # BLD: 3.81 K/UL — SIGNIFICANT CHANGE UP (ref 3.8–10.5)
WBC # FLD AUTO: 3.81 K/UL — SIGNIFICANT CHANGE UP (ref 3.8–10.5)

## 2018-08-31 PROCEDURE — 99233 SBSQ HOSP IP/OBS HIGH 50: CPT

## 2018-08-31 RX ORDER — POTASSIUM CHLORIDE 20 MEQ
40 PACKET (EA) ORAL ONCE
Qty: 0 | Refills: 0 | Status: COMPLETED | OUTPATIENT
Start: 2018-08-31 | End: 2018-08-31

## 2018-08-31 RX ADMIN — CHLORHEXIDINE GLUCONATE 1 APPLICATION(S): 213 SOLUTION TOPICAL at 06:46

## 2018-08-31 RX ADMIN — CEFEPIME 100 MILLIGRAM(S): 1 INJECTION, POWDER, FOR SOLUTION INTRAMUSCULAR; INTRAVENOUS at 04:20

## 2018-08-31 RX ADMIN — SODIUM CHLORIDE 100 MILLILITER(S): 9 INJECTION INTRAMUSCULAR; INTRAVENOUS; SUBCUTANEOUS at 11:36

## 2018-08-31 RX ADMIN — Medication 40 MILLIEQUIVALENT(S): at 11:37

## 2018-08-31 RX ADMIN — CEFEPIME 100 MILLIGRAM(S): 1 INJECTION, POWDER, FOR SOLUTION INTRAMUSCULAR; INTRAVENOUS at 20:37

## 2018-08-31 RX ADMIN — CHLORHEXIDINE GLUCONATE 1 APPLICATION(S): 213 SOLUTION TOPICAL at 16:37

## 2018-08-31 RX ADMIN — CEFEPIME 100 MILLIGRAM(S): 1 INJECTION, POWDER, FOR SOLUTION INTRAMUSCULAR; INTRAVENOUS at 11:36

## 2018-08-31 RX ADMIN — SODIUM CHLORIDE 100 MILLILITER(S): 9 INJECTION INTRAMUSCULAR; INTRAVENOUS; SUBCUTANEOUS at 00:28

## 2018-08-31 RX ADMIN — Medication 63.75 MILLIMOLE(S): at 11:37

## 2018-08-31 NOTE — PROGRESS NOTE ADULT - SUBJECTIVE AND OBJECTIVE BOX
Patient is a 38y old  Male who presents with a chief complaint of N/V and Diarrhea (29 Aug 2018 07:37)      SUBJECTIVE / OVERNIGHT EVENTS:  Pt feels well, no complaints. wants go home asap    MEDICATIONS  (STANDING):  alteplase for catheter clearance 2 milliGRAM(s) Catheter once  carvedilol 6.25 milliGRAM(s) Oral every 12 hours  cefepime   IVPB      cefepime   IVPB 1000 milliGRAM(s) IV Intermittent every 8 hours  chlorhexidine 4% Liquid 1 Application(s) Topical two times a day  sodium chloride 0.9%. 1000 milliLiter(s) (100 mL/Hr) IV Continuous <Continuous>    MEDICATIONS  (PRN):  acetaminophen   Tablet 650 milliGRAM(s) Oral every 6 hours PRN For Temp greater than 38 C (100.4 F)  benzocaine 15 mG/menthol 3.6 mG Lozenge 1 Lozenge Oral every 4 hours PRN Sore Throat  meclizine 12.5 milliGRAM(s) Oral two times a day PRN Dizziness  ondansetron Injectable 4 milliGRAM(s) IV Push every 6 hours PRN Nausea and/or Vomiting      T(C): 36.6 (08-31-18 @ 06:48), Max: 36.9 (08-30-18 @ 20:09)  HR: 84 (08-31-18 @ 06:48) (84 - 95)  BP: 90/60 (08-31-18 @ 06:48) (90/60 - 101/69)  RR: 18 (08-31-18 @ 06:48) (18 - 18)  SpO2: 100% (08-31-18 @ 06:48) (97% - 100%)  CAPILLARY BLOOD GLUCOSE        I&O's Summary      PHYSICAL EXAM:  GENERAL: NAD, well-developed  HEAD:  Atraumatic, Normocephalic  EYES: EOMI, PERRLA, conjunctiva and sclera clear  NECK: Supple, No JVD  CHEST/LUNG: Clear to auscultation bilaterally; No wheeze  HEART: s1 s2, regular rhythm and rate   ABDOMEN: Soft, Nontender, Nondistended; Bowel sounds present  EXTREMITIES:  2+ Peripheral Pulses, No clubbing, cyanosis, or edema, LUE midline, site clean  PSYCH: AAOx3, calm   NEUROLOGY: non-focal  SKIN: No rashes or lesions    LABS:                        9.8    1.72  )-----------( 35       ( 30 Aug 2018 19:35 )             28.7     08-30    137  |  97<L>  |  8   ----------------------------<  93  3.4<L>   |  25  |  0.74    Ca    9.0      30 Aug 2018 19:35  Phos  1.1     08-30  Mg     1.7     08-30    TPro  7.0  /  Alb  3.4  /  TBili  0.3  /  DBili  x   /  AST  100<H>  /  ALT  327<H>  /  AlkPhos  83  08-30              RADIOLOGY & ADDITIONAL TESTS:    Imaging Personally Reviewed:    Consultant(s) Notes Reviewed:      Care Discussed with Consultants/Other Providers:

## 2018-08-31 NOTE — PROGRESS NOTE ADULT - PROBLEM SELECTOR PLAN 9
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

## 2018-08-31 NOTE — PROGRESS NOTE ADULT - PROBLEM SELECTOR PLAN 6
- patient with history of AFlutter s/p successful TYLOR/DCCV into sinus rhythm 2/2018   - home Lovenox 110 q12 on hold now due to profound thrombocytopenia   - continue with home Carvedilol w/ holding parameters

## 2018-08-31 NOTE — CHART NOTE - NSCHARTNOTEFT_GEN_A_CORE
ADS NIGHT COVERAGE:    Notified by RN of platelet count of 20 (from 35). Platelet count has been progressively decreasing - LMWH stopped until >60 as per heme/onc note. Attending made aware. Continue to monitor.     SERGIO JOHNSON PA-C  [id number]

## 2018-08-31 NOTE — PROGRESS NOTE ADULT - PROBLEM SELECTOR PLAN 4
- patient with history of LA mass seen on TTE  - DDx includes LA thrombus vs malignancy  - Per Cards note from last admission, ordered  Cardiac MRI to further evaluate, but pt refused

## 2018-09-01 LAB
ALBUMIN SERPL ELPH-MCNC: 3.4 G/DL — SIGNIFICANT CHANGE UP (ref 3.3–5)
ALP SERPL-CCNC: 76 U/L — SIGNIFICANT CHANGE UP (ref 40–120)
ALT FLD-CCNC: 192 U/L — HIGH (ref 4–41)
AST SERPL-CCNC: 50 U/L — HIGH (ref 4–40)
BACTERIA BLD CULT: SIGNIFICANT CHANGE UP
BACTERIA BLD CULT: SIGNIFICANT CHANGE UP
BILIRUB SERPL-MCNC: < 0.2 MG/DL — LOW (ref 0.2–1.2)
BUN SERPL-MCNC: 6 MG/DL — LOW (ref 7–23)
CALCIUM SERPL-MCNC: 8.3 MG/DL — LOW (ref 8.4–10.5)
CHLORIDE SERPL-SCNC: 96 MMOL/L — LOW (ref 98–107)
CO2 SERPL-SCNC: 31 MMOL/L — SIGNIFICANT CHANGE UP (ref 22–31)
CREAT SERPL-MCNC: 0.73 MG/DL — SIGNIFICANT CHANGE UP (ref 0.5–1.3)
GLUCOSE SERPL-MCNC: 115 MG/DL — HIGH (ref 70–99)
HCT VFR BLD CALC: 26.8 % — LOW (ref 39–50)
HGB BLD-MCNC: 9.2 G/DL — LOW (ref 13–17)
MAGNESIUM SERPL-MCNC: 1.4 MG/DL — LOW (ref 1.6–2.6)
MCHC RBC-ENTMCNC: 30.3 PG — SIGNIFICANT CHANGE UP (ref 27–34)
MCHC RBC-ENTMCNC: 34.3 % — SIGNIFICANT CHANGE UP (ref 32–36)
MCV RBC AUTO: 88.2 FL — SIGNIFICANT CHANGE UP (ref 80–100)
NRBC # FLD: 0.03 — SIGNIFICANT CHANGE UP
PHOSPHATE SERPL-MCNC: 1.6 MG/DL — LOW (ref 2.5–4.5)
PLATELET # BLD AUTO: 15 K/UL — CRITICAL LOW (ref 150–400)
PMV BLD: 12 FL — SIGNIFICANT CHANGE UP (ref 7–13)
POTASSIUM SERPL-MCNC: 2.8 MMOL/L — CRITICAL LOW (ref 3.5–5.3)
POTASSIUM SERPL-SCNC: 2.8 MMOL/L — CRITICAL LOW (ref 3.5–5.3)
PROT SERPL-MCNC: 6.5 G/DL — SIGNIFICANT CHANGE UP (ref 6–8.3)
RBC # BLD: 3.04 M/UL — LOW (ref 4.2–5.8)
RBC # FLD: 12.5 % — SIGNIFICANT CHANGE UP (ref 10.3–14.5)
SODIUM SERPL-SCNC: 138 MMOL/L — SIGNIFICANT CHANGE UP (ref 135–145)
WBC # BLD: 5.37 K/UL — SIGNIFICANT CHANGE UP (ref 3.8–10.5)
WBC # FLD AUTO: 5.37 K/UL — SIGNIFICANT CHANGE UP (ref 3.8–10.5)

## 2018-09-01 PROCEDURE — 99233 SBSQ HOSP IP/OBS HIGH 50: CPT

## 2018-09-01 RX ORDER — POTASSIUM CHLORIDE 20 MEQ
10 PACKET (EA) ORAL
Qty: 0 | Refills: 0 | Status: COMPLETED | OUTPATIENT
Start: 2018-09-01 | End: 2018-09-01

## 2018-09-01 RX ADMIN — Medication 100 MILLIEQUIVALENT(S): at 20:41

## 2018-09-01 RX ADMIN — SODIUM CHLORIDE 100 MILLILITER(S): 9 INJECTION INTRAMUSCULAR; INTRAVENOUS; SUBCUTANEOUS at 23:58

## 2018-09-01 RX ADMIN — Medication 63.75 MILLIMOLE(S): at 21:24

## 2018-09-01 RX ADMIN — SODIUM CHLORIDE 100 MILLILITER(S): 9 INJECTION INTRAMUSCULAR; INTRAVENOUS; SUBCUTANEOUS at 05:02

## 2018-09-01 RX ADMIN — CEFEPIME 100 MILLIGRAM(S): 1 INJECTION, POWDER, FOR SOLUTION INTRAMUSCULAR; INTRAVENOUS at 05:02

## 2018-09-01 RX ADMIN — CHLORHEXIDINE GLUCONATE 1 APPLICATION(S): 213 SOLUTION TOPICAL at 05:02

## 2018-09-01 RX ADMIN — CEFEPIME 100 MILLIGRAM(S): 1 INJECTION, POWDER, FOR SOLUTION INTRAMUSCULAR; INTRAVENOUS at 20:03

## 2018-09-01 RX ADMIN — Medication 100 MILLIEQUIVALENT(S): at 23:57

## 2018-09-01 RX ADMIN — CHLORHEXIDINE GLUCONATE 1 APPLICATION(S): 213 SOLUTION TOPICAL at 17:14

## 2018-09-01 RX ADMIN — Medication 100 MILLIEQUIVALENT(S): at 22:36

## 2018-09-01 RX ADMIN — CEFEPIME 100 MILLIGRAM(S): 1 INJECTION, POWDER, FOR SOLUTION INTRAMUSCULAR; INTRAVENOUS at 11:59

## 2018-09-01 NOTE — PROGRESS NOTE ADULT - PROBLEM SELECTOR PLAN 3
2/2 chemo  patient received  Neulasta on 8/24  f/u onc recs   - will monitor daily CBC with Diff  also thrombocytopenia, worsening, hold off lovenox for now until plt > 60, still dropping, no bleeding

## 2018-09-01 NOTE — PROGRESS NOTE ADULT - SUBJECTIVE AND OBJECTIVE BOX
Patient is a 38y old  Male who presents with a chief complaint of N/V and Diarrhea (29 Aug 2018 07:37)      SUBJECTIVE / OVERNIGHT EVENTS:  Pt clinically doing well, no n/v/d. but very upset that he is stuck in the hospital.     MEDICATIONS  (STANDING):  alteplase for catheter clearance 2 milliGRAM(s) Catheter once  carvedilol 6.25 milliGRAM(s) Oral every 12 hours  cefepime   IVPB      cefepime   IVPB 1000 milliGRAM(s) IV Intermittent every 8 hours  chlorhexidine 4% Liquid 1 Application(s) Topical two times a day  sodium chloride 0.9%. 1000 milliLiter(s) (100 mL/Hr) IV Continuous <Continuous>    MEDICATIONS  (PRN):  acetaminophen   Tablet 650 milliGRAM(s) Oral every 6 hours PRN For Temp greater than 38 C (100.4 F)  benzocaine 15 mG/menthol 3.6 mG Lozenge 1 Lozenge Oral every 4 hours PRN Sore Throat  meclizine 12.5 milliGRAM(s) Oral two times a day PRN Dizziness  ondansetron Injectable 4 milliGRAM(s) IV Push every 6 hours PRN Nausea and/or Vomiting      T(C): 36.7 (09-01-18 @ 13:08), Max: 37 (09-01-18 @ 05:01)  HR: 91 (09-01-18 @ 13:08) (88 - 93)  BP: 106/73 (09-01-18 @ 13:08) (101/63 - 106/73)  RR: 19 (09-01-18 @ 13:08) (18 - 19)  SpO2: 99% (09-01-18 @ 13:08) (95% - 99%)  CAPILLARY BLOOD GLUCOSE        I&O's Summary      PHYSICAL EXAM:  GENERAL: NAD, well-developed  HEAD:  Atraumatic, Normocephalic  EYES: EOMI, PERRLA, conjunctiva and sclera clear  NECK: Supple, No JVD  CHEST/LUNG: Clear to auscultation bilaterally; No wheeze  HEART: s1 s2, regular rhythm and rate   ABDOMEN: Soft, Nontender, Nondistended; Bowel sounds present  EXTREMITIES:  2+ Peripheral Pulses, No clubbing, cyanosis, or edema, midline in LUE  PSYCH: AAOx3, calm   NEUROLOGY: non-focal  SKIN: No rashes or lesions    LABS:                        9.4    3.81  )-----------( 20       ( 31 Aug 2018 18:30 )             28.2     08-31    138  |  98  |  7   ----------------------------<  103<H>  3.4<L>   |  27  |  0.70    Ca    8.7      31 Aug 2018 18:30  Phos  2.0     08-31  Mg     1.6     08-31    TPro  6.5  /  Alb  3.4  /  TBili  0.2  /  DBili  x   /  AST  59<H>  /  ALT  242<H>  /  AlkPhos  74  08-31              RADIOLOGY & ADDITIONAL TESTS:    Imaging Personally Reviewed:    Consultant(s) Notes Reviewed:      Care Discussed with Consultants/Other Providers: Dr Donnelly regarding dc plan

## 2018-09-01 NOTE — PROVIDER CONTACT NOTE (OTHER) - SITUATION
Patient refusing blood draws.   Two attempts by PCA and RN were done to draw bloods.   Patients stated he did not want to be stuck with a needle any more.

## 2018-09-01 NOTE — PROGRESS NOTE ADULT - PROBLEM SELECTOR PLAN 4
- patient with history of LA mass seen on TTE  - DDx includes LA thrombus vs malignancy  - Per Cards note from last admission, ordered  Cardiac MRI to further evaluate, but pt refused. He likes to follow up with Dr. Marquez as outpatient

## 2018-09-02 LAB
ALBUMIN SERPL ELPH-MCNC: 3.2 G/DL — LOW (ref 3.3–5)
ALP SERPL-CCNC: 73 U/L — SIGNIFICANT CHANGE UP (ref 40–120)
ALT FLD-CCNC: 164 U/L — HIGH (ref 4–41)
AST SERPL-CCNC: 46 U/L — HIGH (ref 4–40)
BASOPHILS # BLD AUTO: 0.01 K/UL — SIGNIFICANT CHANGE UP (ref 0–0.2)
BASOPHILS NFR BLD AUTO: 0.3 % — SIGNIFICANT CHANGE UP (ref 0–2)
BILIRUB DIRECT SERPL-MCNC: 0.1 MG/DL — SIGNIFICANT CHANGE UP (ref 0.1–0.2)
BILIRUB SERPL-MCNC: < 0.2 MG/DL — LOW (ref 0.2–1.2)
BLD GP AB SCN SERPL QL: NEGATIVE — SIGNIFICANT CHANGE UP
BUN SERPL-MCNC: 5 MG/DL — LOW (ref 7–23)
CALCIUM SERPL-MCNC: 8 MG/DL — LOW (ref 8.4–10.5)
CHLORIDE SERPL-SCNC: 100 MMOL/L — SIGNIFICANT CHANGE UP (ref 98–107)
CO2 SERPL-SCNC: 27 MMOL/L — SIGNIFICANT CHANGE UP (ref 22–31)
CREAT SERPL-MCNC: 0.66 MG/DL — SIGNIFICANT CHANGE UP (ref 0.5–1.3)
EOSINOPHIL # BLD AUTO: 0 K/UL — SIGNIFICANT CHANGE UP (ref 0–0.5)
EOSINOPHIL NFR BLD AUTO: 0 % — SIGNIFICANT CHANGE UP (ref 0–6)
GLUCOSE SERPL-MCNC: 104 MG/DL — HIGH (ref 70–99)
HCT VFR BLD CALC: 25.9 % — LOW (ref 39–50)
HCT VFR BLD CALC: 28.7 % — LOW (ref 39–50)
HGB BLD-MCNC: 8.8 G/DL — LOW (ref 13–17)
HGB BLD-MCNC: 9.6 G/DL — LOW (ref 13–17)
IMM GRANULOCYTES # BLD AUTO: 0.16 # — SIGNIFICANT CHANGE UP
IMM GRANULOCYTES NFR BLD AUTO: 4.5 % — HIGH (ref 0–1.5)
LYMPHOCYTES # BLD AUTO: 0.67 K/UL — LOW (ref 1–3.3)
LYMPHOCYTES # BLD AUTO: 18.9 % — SIGNIFICANT CHANGE UP (ref 13–44)
MAGNESIUM SERPL-MCNC: 1.3 MG/DL — LOW (ref 1.6–2.6)
MANUAL SMEAR VERIFICATION: SIGNIFICANT CHANGE UP
MCHC RBC-ENTMCNC: 29.2 PG — SIGNIFICANT CHANGE UP (ref 27–34)
MCHC RBC-ENTMCNC: 29.7 PG — SIGNIFICANT CHANGE UP (ref 27–34)
MCHC RBC-ENTMCNC: 33.4 % — SIGNIFICANT CHANGE UP (ref 32–36)
MCHC RBC-ENTMCNC: 34 % — SIGNIFICANT CHANGE UP (ref 32–36)
MCV RBC AUTO: 87.2 FL — SIGNIFICANT CHANGE UP (ref 80–100)
MCV RBC AUTO: 87.5 FL — SIGNIFICANT CHANGE UP (ref 80–100)
MONOCYTES # BLD AUTO: 0.72 K/UL — SIGNIFICANT CHANGE UP (ref 0–0.9)
MONOCYTES NFR BLD AUTO: 20.3 % — HIGH (ref 2–14)
NEUTROPHILS # BLD AUTO: 1.98 K/UL — SIGNIFICANT CHANGE UP (ref 1.8–7.4)
NEUTROPHILS NFR BLD AUTO: 56 % — SIGNIFICANT CHANGE UP (ref 43–77)
NRBC # FLD: 0 — SIGNIFICANT CHANGE UP
NRBC # FLD: 0.02 — SIGNIFICANT CHANGE UP
PHOSPHATE SERPL-MCNC: 2.5 MG/DL — SIGNIFICANT CHANGE UP (ref 2.5–4.5)
PLATELET # BLD AUTO: 13 K/UL — CRITICAL LOW (ref 150–400)
PLATELET # BLD AUTO: 17 K/UL — CRITICAL LOW (ref 150–400)
PMV BLD: 11.1 FL — SIGNIFICANT CHANGE UP (ref 7–13)
PMV BLD: 12.5 FL — SIGNIFICANT CHANGE UP (ref 7–13)
POTASSIUM SERPL-MCNC: 3.3 MMOL/L — LOW (ref 3.5–5.3)
POTASSIUM SERPL-SCNC: 3.3 MMOL/L — LOW (ref 3.5–5.3)
PROT SERPL-MCNC: 6.1 G/DL — SIGNIFICANT CHANGE UP (ref 6–8.3)
RBC # BLD: 2.96 M/UL — LOW (ref 4.2–5.8)
RBC # BLD: 3.29 M/UL — LOW (ref 4.2–5.8)
RBC # FLD: 12.5 % — SIGNIFICANT CHANGE UP (ref 10.3–14.5)
RBC # FLD: 12.6 % — SIGNIFICANT CHANGE UP (ref 10.3–14.5)
RH IG SCN BLD-IMP: POSITIVE — SIGNIFICANT CHANGE UP
SODIUM SERPL-SCNC: 142 MMOL/L — SIGNIFICANT CHANGE UP (ref 135–145)
WBC # BLD: 3.54 K/UL — LOW (ref 3.8–10.5)
WBC # BLD: 4.44 K/UL — SIGNIFICANT CHANGE UP (ref 3.8–10.5)
WBC # FLD AUTO: 3.54 K/UL — LOW (ref 3.8–10.5)
WBC # FLD AUTO: 4.44 K/UL — SIGNIFICANT CHANGE UP (ref 3.8–10.5)

## 2018-09-02 PROCEDURE — 99233 SBSQ HOSP IP/OBS HIGH 50: CPT

## 2018-09-02 PROCEDURE — 93010 ELECTROCARDIOGRAM REPORT: CPT

## 2018-09-02 RX ORDER — MAGNESIUM SULFATE 500 MG/ML
1 VIAL (ML) INJECTION ONCE
Qty: 0 | Refills: 0 | Status: COMPLETED | OUTPATIENT
Start: 2018-09-02 | End: 2018-09-02

## 2018-09-02 RX ORDER — POTASSIUM CHLORIDE 20 MEQ
40 PACKET (EA) ORAL EVERY 4 HOURS
Qty: 0 | Refills: 0 | Status: COMPLETED | OUTPATIENT
Start: 2018-09-02 | End: 2018-09-02

## 2018-09-02 RX ADMIN — CHLORHEXIDINE GLUCONATE 1 APPLICATION(S): 213 SOLUTION TOPICAL at 17:15

## 2018-09-02 RX ADMIN — CEFEPIME 100 MILLIGRAM(S): 1 INJECTION, POWDER, FOR SOLUTION INTRAMUSCULAR; INTRAVENOUS at 05:19

## 2018-09-02 RX ADMIN — CEFEPIME 100 MILLIGRAM(S): 1 INJECTION, POWDER, FOR SOLUTION INTRAMUSCULAR; INTRAVENOUS at 11:57

## 2018-09-02 RX ADMIN — Medication 40 MILLIEQUIVALENT(S): at 11:57

## 2018-09-02 RX ADMIN — Medication 100 GRAM(S): at 08:58

## 2018-09-02 RX ADMIN — CHLORHEXIDINE GLUCONATE 1 APPLICATION(S): 213 SOLUTION TOPICAL at 05:19

## 2018-09-02 RX ADMIN — Medication 40 MILLIEQUIVALENT(S): at 08:57

## 2018-09-02 RX ADMIN — SODIUM CHLORIDE 100 MILLILITER(S): 9 INJECTION INTRAMUSCULAR; INTRAVENOUS; SUBCUTANEOUS at 05:19

## 2018-09-02 NOTE — PROGRESS NOTE ADULT - PROBLEM SELECTOR PLAN 9
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

## 2018-09-02 NOTE — PROGRESS NOTE ADULT - SUBJECTIVE AND OBJECTIVE BOX
Patient is a 38y old  Male who presents with a chief complaint of N/V and Diarrhea (29 Aug 2018 07:37)      SUBJECTIVE / OVERNIGHT EVENTS:  Pt feels well, just upset about still being in the hospital. no n/v/d/cp/sob. tolerates diet well. No bleeding    MEDICATIONS  (STANDING):  carvedilol 6.25 milliGRAM(s) Oral every 12 hours  cefepime   IVPB      cefepime   IVPB 1000 milliGRAM(s) IV Intermittent every 8 hours  chlorhexidine 4% Liquid 1 Application(s) Topical two times a day  sodium chloride 0.9%. 1000 milliLiter(s) (100 mL/Hr) IV Continuous <Continuous>    MEDICATIONS  (PRN):  acetaminophen   Tablet 650 milliGRAM(s) Oral every 6 hours PRN For Temp greater than 38 C (100.4 F)  benzocaine 15 mG/menthol 3.6 mG Lozenge 1 Lozenge Oral every 4 hours PRN Sore Throat  meclizine 12.5 milliGRAM(s) Oral two times a day PRN Dizziness  ondansetron Injectable 4 milliGRAM(s) IV Push every 6 hours PRN Nausea and/or Vomiting      T(C): 36.7 (09-02-18 @ 12:57), Max: 36.9 (09-02-18 @ 05:17)  HR: 91 (09-02-18 @ 12:57) (88 - 95)  BP: 97/59 (09-02-18 @ 12:57) (91/59 - 98/64)  RR: 18 (09-02-18 @ 12:57) (17 - 18)  SpO2: 99% (09-02-18 @ 12:57) (96% - 99%)  CAPILLARY BLOOD GLUCOSE        I&O's Summary      PHYSICAL EXAM:  GENERAL: NAD, well-developed  HEAD:  Atraumatic, Normocephalic  EYES: EOMI, PERRLA, conjunctiva and sclera clear  NECK: Supple, No JVD  CHEST/LUNG: Clear to auscultation bilaterally; No wheeze  HEART: s1 s2, regular rhythm and rate   ABDOMEN: Soft, Nontender, Nondistended; Bowel sounds present  EXTREMITIES:  2+ Peripheral Pulses, No clubbing, cyanosis, or edema; midline in LUE, site clean   PSYCH: AAOx3, calm   NEUROLOGY: non-focal  SKIN: No rashes or lesions    LABS:                        8.8    3.54  )-----------( 13       ( 02 Sep 2018 06:16 )             25.9     09-02    142  |  100  |  5<L>  ----------------------------<  104<H>  3.3<L>   |  27  |  0.66    Ca    8.0<L>      02 Sep 2018 06:16  Phos  2.5     09-02  Mg     1.3     09-02    TPro  6.1  /  Alb  3.2<L>  /  TBili  < 0.2<L>  /  DBili  0.1  /  AST  46<H>  /  ALT  164<H>  /  AlkPhos  73  09-02              RADIOLOGY & ADDITIONAL TESTS:    Imaging Personally Reviewed:    Consultant(s) Notes Reviewed:      Care Discussed with Consultants/Other Providers: Dr. Russo regarding plt transfusion and midline duration

## 2018-09-02 NOTE — PROGRESS NOTE ADULT - PROBLEM SELECTOR PLAN 3
2/2 chemo, resolved now  patient received  Neulasta on 8/24  also thrombocytopenia, worsening, hold off lovenox for now until plt > 60, still dropping, no bleeding, I d/w Onc today, no need for plt transfusion at this time 2/2 chemo, resolved now  patient received  Neulasta on 8/24  also thrombocytopenia, worsening, hold off lovenox for now until plt > 60, still dropping, no bleeding, I d/w Onc today, no need for plt transfusion at this time per Onc

## 2018-09-03 LAB
BASOPHILS # BLD AUTO: 0.01 K/UL — SIGNIFICANT CHANGE UP (ref 0–0.2)
BASOPHILS NFR BLD AUTO: 0.3 % — SIGNIFICANT CHANGE UP (ref 0–2)
BUN SERPL-MCNC: 8 MG/DL — SIGNIFICANT CHANGE UP (ref 7–23)
CALCIUM SERPL-MCNC: 8.6 MG/DL — SIGNIFICANT CHANGE UP (ref 8.4–10.5)
CHLORIDE SERPL-SCNC: 100 MMOL/L — SIGNIFICANT CHANGE UP (ref 98–107)
CO2 SERPL-SCNC: 27 MMOL/L — SIGNIFICANT CHANGE UP (ref 22–31)
CREAT SERPL-MCNC: 0.64 MG/DL — SIGNIFICANT CHANGE UP (ref 0.5–1.3)
EOSINOPHIL # BLD AUTO: 0.01 K/UL — SIGNIFICANT CHANGE UP (ref 0–0.5)
EOSINOPHIL NFR BLD AUTO: 0.3 % — SIGNIFICANT CHANGE UP (ref 0–6)
GLUCOSE SERPL-MCNC: 101 MG/DL — HIGH (ref 70–99)
HCT VFR BLD CALC: 28.1 % — LOW (ref 39–50)
HGB BLD-MCNC: 9.5 G/DL — LOW (ref 13–17)
IMM GRANULOCYTES # BLD AUTO: 0.22 # — SIGNIFICANT CHANGE UP
IMM GRANULOCYTES NFR BLD AUTO: 6.7 % — HIGH (ref 0–1.5)
LYMPHOCYTES # BLD AUTO: 0.77 K/UL — LOW (ref 1–3.3)
LYMPHOCYTES # BLD AUTO: 23.4 % — SIGNIFICANT CHANGE UP (ref 13–44)
MAGNESIUM SERPL-MCNC: 1.6 MG/DL — SIGNIFICANT CHANGE UP (ref 1.6–2.6)
MCHC RBC-ENTMCNC: 30 PG — SIGNIFICANT CHANGE UP (ref 27–34)
MCHC RBC-ENTMCNC: 33.8 % — SIGNIFICANT CHANGE UP (ref 32–36)
MCV RBC AUTO: 88.6 FL — SIGNIFICANT CHANGE UP (ref 80–100)
MONOCYTES # BLD AUTO: 0.51 K/UL — SIGNIFICANT CHANGE UP (ref 0–0.9)
MONOCYTES NFR BLD AUTO: 15.5 % — HIGH (ref 2–14)
NEUTROPHILS # BLD AUTO: 1.77 K/UL — LOW (ref 1.8–7.4)
NEUTROPHILS NFR BLD AUTO: 53.8 % — SIGNIFICANT CHANGE UP (ref 43–77)
NRBC # FLD: 0 — SIGNIFICANT CHANGE UP
PHOSPHATE SERPL-MCNC: 2.6 MG/DL — SIGNIFICANT CHANGE UP (ref 2.5–4.5)
PLATELET # BLD AUTO: 13 K/UL — CRITICAL LOW (ref 150–400)
PMV BLD: 12.6 FL — SIGNIFICANT CHANGE UP (ref 7–13)
POTASSIUM SERPL-MCNC: 3.5 MMOL/L — SIGNIFICANT CHANGE UP (ref 3.5–5.3)
POTASSIUM SERPL-SCNC: 3.5 MMOL/L — SIGNIFICANT CHANGE UP (ref 3.5–5.3)
RBC # BLD: 3.17 M/UL — LOW (ref 4.2–5.8)
RBC # FLD: 12.6 % — SIGNIFICANT CHANGE UP (ref 10.3–14.5)
SODIUM SERPL-SCNC: 139 MMOL/L — SIGNIFICANT CHANGE UP (ref 135–145)
WBC # BLD: 3.29 K/UL — LOW (ref 3.8–10.5)
WBC # FLD AUTO: 3.29 K/UL — LOW (ref 3.8–10.5)

## 2018-09-03 PROCEDURE — 99233 SBSQ HOSP IP/OBS HIGH 50: CPT

## 2018-09-03 RX ORDER — ACETAMINOPHEN 500 MG
2 TABLET ORAL
Qty: 0 | Refills: 0 | COMMUNITY
Start: 2018-09-03

## 2018-09-03 RX ADMIN — CHLORHEXIDINE GLUCONATE 1 APPLICATION(S): 213 SOLUTION TOPICAL at 15:42

## 2018-09-03 RX ADMIN — CHLORHEXIDINE GLUCONATE 1 APPLICATION(S): 213 SOLUTION TOPICAL at 05:43

## 2018-09-03 NOTE — PROGRESS NOTE ADULT - PROBLEM SELECTOR PLAN 7
hypophosphatemia , hypomagnesemia, hypomagnesemia   - improved with supplement , will continue to monitor

## 2018-09-03 NOTE — PROGRESS NOTE ADULT - PROBLEM SELECTOR PLAN 3
2/2 chemo, resolved now  patient received  Neulasta on 8/24  also thrombocytopenia, worsening, hold off lovenox for now until plt > 60, , no bleeding, as per  Onc today, no need for plt transfusion at this time

## 2018-09-03 NOTE — PROVIDER CONTACT NOTE (OTHER) - SITUATION
Pt refuses to go to Echocardiogram.  Pt stated want to sign out AMA. And will do Echo cardiogram outpatient.

## 2018-09-03 NOTE — PROVIDER CONTACT NOTE (OTHER) - BACKGROUND
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

## 2018-09-03 NOTE — PROGRESS NOTE ADULT - SUBJECTIVE AND OBJECTIVE BOX
Patient is a 38y old  Male who presents with a chief complaint of N/V and Diarrhea (29 Aug 2018 07:37)      SUBJECTIVE / OVERNIGHT EVENTS: patient seen and examined by bedside, denies headache, dizziness, SOB, CP, Palpitations , N/V/D, abdominal pain  wants to go home today, frustrated with being in and out of the hospital  wanted to sigh out AMA, explained will discuss with hematology/oncology         MEDICATIONS  (STANDING):  carvedilol 6.25 milliGRAM(s) Oral every 12 hours  chlorhexidine 4% Liquid 1 Application(s) Topical two times a day    MEDICATIONS  (PRN):  acetaminophen   Tablet 650 milliGRAM(s) Oral every 6 hours PRN For Temp greater than 38 C (100.4 F)  benzocaine 15 mG/menthol 3.6 mG Lozenge 1 Lozenge Oral every 4 hours PRN Sore Throat  meclizine 12.5 milliGRAM(s) Oral two times a day PRN Dizziness  ondansetron Injectable 4 milliGRAM(s) IV Push every 6 hours PRN Nausea and/or Vomiting      Vital Signs Last 24 Hrs  T(C): 36.7 (03 Sep 2018 13:29), Max: 37.1 (03 Sep 2018 05:42)  T(F): 98.1 (03 Sep 2018 13:29), Max: 98.7 (03 Sep 2018 05:42)  HR: 86 (03 Sep 2018 13:29) (86 - 93)  BP: 99/64 (03 Sep 2018 13:29) (99/58 - 100/63)  BP(mean): --  RR: 18 (03 Sep 2018 13:29) (17 - 18)  SpO2: 98% (03 Sep 2018 13:29) (98% - 99%)  CAPILLARY BLOOD GLUCOSE        I&O's Summary    PHYSICAL EXAM:  GENERAL: NAD, well-developed  HEAD:  Atraumatic, Normocephalic  EYES: EOMI, PERRLA, conjunctiva and sclera clear  NECK: Supple, No JVD  CHEST/LUNG: Clear to auscultation bilaterally; No wheeze  HEART: s1 s2, regular rhythm and rate   ABDOMEN: Soft, Nontender, Nondistended; Bowel sounds present  EXTREMITIES:  2+ Peripheral Pulses, No clubbing, cyanosis, or edema; midline in LUE, site clean   PSYCH: AAOx3, calm   NEUROLOGY: non-focal  SKIN: No rashes or lesions          LABS:                        9.5    3.29  )-----------( 13       ( 03 Sep 2018 05:55 )             28.1     09-03    139  |  100  |  8   ----------------------------<  101<H>  3.5   |  27  |  0.64    Ca    8.6      03 Sep 2018 05:55  Phos  2.6     09-03  Mg     1.6     09-03    TPro  6.1  /  Alb  3.2<L>  /  TBili  < 0.2<L>  /  DBili  0.1  /  AST  46<H>  /  ALT  164<H>  /  AlkPhos  73  09-02              RADIOLOGY & ADDITIONAL TESTS:    Imaging Personally Reviewed:    Consultant(s) Notes Reviewed:      Care Discussed with Consultants/Other Providers:

## 2018-09-04 LAB
ALBUMIN SERPL ELPH-MCNC: 3.6 G/DL — SIGNIFICANT CHANGE UP (ref 3.3–5)
ALP SERPL-CCNC: 87 U/L — SIGNIFICANT CHANGE UP (ref 40–120)
ALT FLD-CCNC: 139 U/L — HIGH (ref 4–41)
AST SERPL-CCNC: 54 U/L — HIGH (ref 4–40)
BILIRUB SERPL-MCNC: 0.2 MG/DL — SIGNIFICANT CHANGE UP (ref 0.2–1.2)
BUN SERPL-MCNC: 11 MG/DL — SIGNIFICANT CHANGE UP (ref 7–23)
CALCIUM SERPL-MCNC: 9 MG/DL — SIGNIFICANT CHANGE UP (ref 8.4–10.5)
CHLORIDE SERPL-SCNC: 98 MMOL/L — SIGNIFICANT CHANGE UP (ref 98–107)
CO2 SERPL-SCNC: 29 MMOL/L — SIGNIFICANT CHANGE UP (ref 22–31)
CREAT SERPL-MCNC: 0.71 MG/DL — SIGNIFICANT CHANGE UP (ref 0.5–1.3)
GLUCOSE SERPL-MCNC: 99 MG/DL — SIGNIFICANT CHANGE UP (ref 70–99)
HCT VFR BLD CALC: 27.9 % — LOW (ref 39–50)
HGB BLD-MCNC: 9.5 G/DL — LOW (ref 13–17)
MAGNESIUM SERPL-MCNC: 1.5 MG/DL — LOW (ref 1.6–2.6)
MCHC RBC-ENTMCNC: 29.4 PG — SIGNIFICANT CHANGE UP (ref 27–34)
MCHC RBC-ENTMCNC: 34.1 % — SIGNIFICANT CHANGE UP (ref 32–36)
MCV RBC AUTO: 86.4 FL — SIGNIFICANT CHANGE UP (ref 80–100)
NRBC # FLD: 0.03 — SIGNIFICANT CHANGE UP
PHOSPHATE SERPL-MCNC: 2.9 MG/DL — SIGNIFICANT CHANGE UP (ref 2.5–4.5)
PLATELET # BLD AUTO: 15 K/UL — CRITICAL LOW (ref 150–400)
PMV BLD: 11.4 FL — SIGNIFICANT CHANGE UP (ref 7–13)
POTASSIUM SERPL-MCNC: 3.8 MMOL/L — SIGNIFICANT CHANGE UP (ref 3.5–5.3)
POTASSIUM SERPL-SCNC: 3.8 MMOL/L — SIGNIFICANT CHANGE UP (ref 3.5–5.3)
PROT SERPL-MCNC: 6.6 G/DL — SIGNIFICANT CHANGE UP (ref 6–8.3)
RBC # BLD: 3.23 M/UL — LOW (ref 4.2–5.8)
RBC # FLD: 12.7 % — SIGNIFICANT CHANGE UP (ref 10.3–14.5)
SODIUM SERPL-SCNC: 137 MMOL/L — SIGNIFICANT CHANGE UP (ref 135–145)
WBC # BLD: 3.86 K/UL — SIGNIFICANT CHANGE UP (ref 3.8–10.5)
WBC # FLD AUTO: 3.86 K/UL — SIGNIFICANT CHANGE UP (ref 3.8–10.5)

## 2018-09-04 PROCEDURE — 99233 SBSQ HOSP IP/OBS HIGH 50: CPT

## 2018-09-04 RX ORDER — MAGNESIUM SULFATE 500 MG/ML
1 VIAL (ML) INJECTION ONCE
Qty: 0 | Refills: 0 | Status: COMPLETED | OUTPATIENT
Start: 2018-09-04 | End: 2018-09-04

## 2018-09-04 RX ADMIN — Medication 100 GRAM(S): at 11:43

## 2018-09-04 RX ADMIN — CHLORHEXIDINE GLUCONATE 1 APPLICATION(S): 213 SOLUTION TOPICAL at 05:08

## 2018-09-04 RX ADMIN — CHLORHEXIDINE GLUCONATE 1 APPLICATION(S): 213 SOLUTION TOPICAL at 18:32

## 2018-09-04 NOTE — PROGRESS NOTE ADULT - SUBJECTIVE AND OBJECTIVE BOX
INTERVAL HPI/OVERNIGHT EVENTS:  Patient seen at bedside. He is feeling desperate to go home and feels depressed and anxious. He says although he understands that the goal of the treatment is cure, he does not feel it is worth it, given how difficult chemotherapy has been for him. Denies fever, pain, n/v.       VITAL SIGNS:  T(F): 98.3 (09-04-18 @ 15:10)  HR: 91 (09-04-18 @ 15:10)  BP: 96/62 (09-04-18 @ 18:33)  RR: 18 (09-04-18 @ 15:10)  SpO2: 99% (09-04-18 @ 15:10)  Wt(kg): --    PHYSICAL EXAM:    Constitutional: NAD, resting in bed comfortably  Eyes: EOMI, sclera non-icteric  Neck: supple, no LAP  Respiratory: CTA b/l, good air entry b/l, no wheezing, rhonchi or crackels  Cardiovascular: RRR, normal S1S2, no M/R/G  Gastrointestinal: soft, NTND  Extremities: no edema  Neurological: AAOx3, non focal  Skin: Normal temperature    MEDICATIONS  (STANDING):  carvedilol 6.25 milliGRAM(s) Oral every 12 hours  chlorhexidine 4% Liquid 1 Application(s) Topical two times a day    MEDICATIONS  (PRN):  acetaminophen   Tablet 650 milliGRAM(s) Oral every 6 hours PRN For Temp greater than 38 C (100.4 F)  benzocaine 15 mG/menthol 3.6 mG Lozenge 1 Lozenge Oral every 4 hours PRN Sore Throat  meclizine 12.5 milliGRAM(s) Oral two times a day PRN Dizziness  ondansetron Injectable 4 milliGRAM(s) IV Push every 6 hours PRN Nausea and/or Vomiting      Allergies    No Known Allergies    Intolerances        LABS:                        9.5    3.86  )-----------( 15       ( 04 Sep 2018 05:35 )             27.9     09-04    137  |  98  |  11  ----------------------------<  99  3.8   |  29  |  0.71    Ca    9.0      04 Sep 2018 05:35  Phos  2.9     09-04  Mg     1.5     09-04    TPro  6.6  /  Alb  3.6  /  TBili  0.2  /  DBili  x   /  AST  54<H>  /  ALT  139<H>  /  AlkPhos  87  09-04          RADIOLOGY & ADDITIONAL TESTS:  Studies reviewed.

## 2018-09-04 NOTE — PROVIDER CONTACT NOTE (CRITICAL VALUE NOTIFICATION) - RECOMMENDATIONS
Provider aware.
ADs made aware
No new order
No new order
No new orders
Provider aware.
Provider aware.
potassium supplement

## 2018-09-04 NOTE — PROGRESS NOTE ADULT - PROBLEM SELECTOR PLAN 3
2/2 chemo, resolved now  patient received  Neulasta on 8/24  also thrombocytopenia, 13-->15 today ,  hold off lovenox for now until plt > 60, , no bleeding, as per  Onc, no need for plt transfusion at this time   case d/w oncology, aware pt wants to go home, oncology d/w pt risk of bleeding and continued monitoring , pt agrees to stay   A per onc, if platelets > 20, can discharge home

## 2018-09-04 NOTE — PROVIDER CONTACT NOTE (CRITICAL VALUE NOTIFICATION) - ASSESSMENT
Patient asymptomatic.
Afebrile; asymptomatic; IVF in progress; on Neutropenic precautions
Patient alert and oriented to baseline. No s/s of distress noted. Patient denies chest pain.
Patient asymptomatic.
Patient asymptomatic.
Pt is alert and oriented x3, vital sign stable, no acute distress noted.
no distress
pt is alert and oriented x4, vital sign stable, no acute distress noted.

## 2018-09-04 NOTE — PROVIDER CONTACT NOTE (CRITICAL VALUE NOTIFICATION) - BACKGROUND
Patient admitted for vomitting
Patient admitted for vomiting.
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
Pt admitted for vomiting and weakness
cancer

## 2018-09-04 NOTE — PROGRESS NOTE ADULT - SUBJECTIVE AND OBJECTIVE BOX
Patient is a 38y old  Male who presents with a chief complaint of N/V and Diarrhea (29 Aug 2018 07:37)      SUBJECTIVE / OVERNIGHT EVENTS: patient seen and examined by bedside, denies headache, dizziness, SOB, CP, Palpitations , N/V/D, abdominal pain, appears very depressed , says he does not want to get anymore chemo         MEDICATIONS  (STANDING):  carvedilol 6.25 milliGRAM(s) Oral every 12 hours  chlorhexidine 4% Liquid 1 Application(s) Topical two times a day    MEDICATIONS  (PRN):  acetaminophen   Tablet 650 milliGRAM(s) Oral every 6 hours PRN For Temp greater than 38 C (100.4 F)  benzocaine 15 mG/menthol 3.6 mG Lozenge 1 Lozenge Oral every 4 hours PRN Sore Throat  meclizine 12.5 milliGRAM(s) Oral two times a day PRN Dizziness  ondansetron Injectable 4 milliGRAM(s) IV Push every 6 hours PRN Nausea and/or Vomiting      Vital Signs Last 24 Hrs  T(C): 36.8 (04 Sep 2018 15:10), Max: 37.3 (03 Sep 2018 20:19)  T(F): 98.3 (04 Sep 2018 15:10), Max: 99.2 (03 Sep 2018 20:19)  HR: 91 (04 Sep 2018 15:10) (85 - 91)  BP: 104/66 (04 Sep 2018 15:10) (95/58 - 104/66)  BP(mean): --  RR: 18 (04 Sep 2018 15:10) (18 - 18)  SpO2: 99% (04 Sep 2018 15:10) (97% - 99%)  CAPILLARY BLOOD GLUCOSE        I&O's Summary      PHYSICAL EXAM:  GENERAL: NAD, well-developed  HEAD:  Atraumatic, Normocephalic  EYES: EOMI, PERRLA, conjunctiva and sclera clear  NECK: Supple, No JVD  CHEST/LUNG: Clear to auscultation bilaterally; No wheeze  HEART: Regular rate and rhythm;   ABDOMEN: Soft, Nontender, Nondistended; Bowel sounds present  EXTREMITIES:  2+ Peripheral Pulses, No clubbing, cyanosis, or edema, midline in LUE, site clean   PSYCH: AAOx3, depressed , flat affect   NEUROLOGY: non-focal  SKIN: No rashes or lesions    LABS:                        9.5    3.86  )-----------( 15       ( 04 Sep 2018 05:35 )             27.9     09-04    137  |  98  |  11  ----------------------------<  99  3.8   |  29  |  0.71    Ca    9.0      04 Sep 2018 05:35  Phos  2.9     09-04  Mg     1.5     09-04    TPro  6.6  /  Alb  3.6  /  TBili  0.2  /  DBili  x   /  AST  54<H>  /  ALT  139<H>  /  AlkPhos  87  09-04              RADIOLOGY & ADDITIONAL TESTS:    Imaging Personally Reviewed:    Consultant(s) Notes Reviewed:      Care Discussed with Consultants/Other Providers: Oncology

## 2018-09-04 NOTE — PROVIDER CONTACT NOTE (CRITICAL VALUE NOTIFICATION) - TEST AND RESULT REPORTED:
Platelets 20,000
K 2.8
Platelet count of 15,000
Platelets 15,000
Platelets 17,000
WBC 0.29
phos and wbc
platelet 13

## 2018-09-04 NOTE — PROVIDER CONTACT NOTE (CRITICAL VALUE NOTIFICATION) - ACTION/TREATMENT ORDERED:
Provider aware.
No new order
No new order
Provider aware.
Provider aware.
potassium supplement given
will continue to monitor.

## 2018-09-04 NOTE — PROVIDER CONTACT NOTE (CRITICAL VALUE NOTIFICATION) - NAME OF MD/NP/PA/DO NOTIFIED:
Mel Velazquez
ADS
ADS Mel Velazquez
Alek Vickers MD
Bert Vickers MD
Ena MULTANI
Ena MULTANI ADS
RANGEL Casas

## 2018-09-04 NOTE — PROVIDER CONTACT NOTE (CRITICAL VALUE NOTIFICATION) - SITUATION
Patient has critical value of platelets of 20,000
K 2.8
Patient has critical lab value of platelets 15,000.
Patient has critical lab value of platelets 17,000.
Platelet count of 15,000
WBC 0.29
phos 1.0, wbc 0.73
platelet 13

## 2018-09-05 ENCOUNTER — OUTPATIENT (OUTPATIENT)
Dept: OUTPATIENT SERVICES | Facility: HOSPITAL | Age: 38
LOS: 1 days | Discharge: HOME | End: 2018-09-05

## 2018-09-05 VITALS
RESPIRATION RATE: 18 BRPM | DIASTOLIC BLOOD PRESSURE: 68 MMHG | SYSTOLIC BLOOD PRESSURE: 97 MMHG | TEMPERATURE: 98 F | HEART RATE: 100 BPM | OXYGEN SATURATION: 97 %

## 2018-09-05 DIAGNOSIS — Z98.890 OTHER SPECIFIED POSTPROCEDURAL STATES: Chronic | ICD-10-CM

## 2018-09-05 DIAGNOSIS — Z90.79 ACQUIRED ABSENCE OF OTHER GENITAL ORGAN(S): Chronic | ICD-10-CM

## 2018-09-05 DIAGNOSIS — Z95.828 PRESENCE OF OTHER VASCULAR IMPLANTS AND GRAFTS: Chronic | ICD-10-CM

## 2018-09-05 DIAGNOSIS — C62.92 MALIGNANT NEOPLASM OF LEFT TESTIS, UNSPECIFIED WHETHER DESCENDED OR UNDESCENDED: ICD-10-CM

## 2018-09-05 LAB
ALBUMIN SERPL ELPH-MCNC: 3.5 G/DL — SIGNIFICANT CHANGE UP (ref 3.3–5)
ALP SERPL-CCNC: 85 U/L — SIGNIFICANT CHANGE UP (ref 40–120)
ALT FLD-CCNC: 140 U/L — HIGH (ref 4–41)
AST SERPL-CCNC: 56 U/L — HIGH (ref 4–40)
BASOPHILS # BLD AUTO: 0.03 K/UL — SIGNIFICANT CHANGE UP (ref 0–0.2)
BASOPHILS NFR BLD AUTO: 0.6 % — SIGNIFICANT CHANGE UP (ref 0–2)
BILIRUB SERPL-MCNC: 0.2 MG/DL — SIGNIFICANT CHANGE UP (ref 0.2–1.2)
BUN SERPL-MCNC: 15 MG/DL — SIGNIFICANT CHANGE UP (ref 7–23)
CALCIUM SERPL-MCNC: 9.3 MG/DL — SIGNIFICANT CHANGE UP (ref 8.4–10.5)
CHLORIDE SERPL-SCNC: 100 MMOL/L — SIGNIFICANT CHANGE UP (ref 98–107)
CO2 SERPL-SCNC: 28 MMOL/L — SIGNIFICANT CHANGE UP (ref 22–31)
CREAT SERPL-MCNC: 0.71 MG/DL — SIGNIFICANT CHANGE UP (ref 0.5–1.3)
EOSINOPHIL # BLD AUTO: 0 K/UL — SIGNIFICANT CHANGE UP (ref 0–0.5)
EOSINOPHIL NFR BLD AUTO: 0 % — SIGNIFICANT CHANGE UP (ref 0–6)
GLUCOSE SERPL-MCNC: 102 MG/DL — HIGH (ref 70–99)
HCT VFR BLD CALC: 27.3 % — LOW (ref 39–50)
HGB BLD-MCNC: 9.2 G/DL — LOW (ref 13–17)
IMM GRANULOCYTES # BLD AUTO: 0.53 # — SIGNIFICANT CHANGE UP
IMM GRANULOCYTES NFR BLD AUTO: 11.2 % — HIGH (ref 0–1.5)
LYMPHOCYTES # BLD AUTO: 0.91 K/UL — LOW (ref 1–3.3)
LYMPHOCYTES # BLD AUTO: 19.2 % — SIGNIFICANT CHANGE UP (ref 13–44)
MAGNESIUM SERPL-MCNC: 1.7 MG/DL — SIGNIFICANT CHANGE UP (ref 1.6–2.6)
MCHC RBC-ENTMCNC: 29.2 PG — SIGNIFICANT CHANGE UP (ref 27–34)
MCHC RBC-ENTMCNC: 33.7 % — SIGNIFICANT CHANGE UP (ref 32–36)
MCV RBC AUTO: 86.7 FL — SIGNIFICANT CHANGE UP (ref 80–100)
MONOCYTES # BLD AUTO: 0.57 K/UL — SIGNIFICANT CHANGE UP (ref 0–0.9)
MONOCYTES NFR BLD AUTO: 12 % — SIGNIFICANT CHANGE UP (ref 2–14)
NEUTROPHILS # BLD AUTO: 2.7 K/UL — SIGNIFICANT CHANGE UP (ref 1.8–7.4)
NEUTROPHILS NFR BLD AUTO: 57 % — SIGNIFICANT CHANGE UP (ref 43–77)
NRBC # FLD: 0.06 — SIGNIFICANT CHANGE UP
NRBC FLD-RTO: 1.3 — SIGNIFICANT CHANGE UP
PHOSPHATE SERPL-MCNC: 3.4 MG/DL — SIGNIFICANT CHANGE UP (ref 2.5–4.5)
PLATELET # BLD AUTO: 25 K/UL — LOW (ref 150–400)
PMV BLD: 12.4 FL — SIGNIFICANT CHANGE UP (ref 7–13)
POTASSIUM SERPL-MCNC: 4.1 MMOL/L — SIGNIFICANT CHANGE UP (ref 3.5–5.3)
POTASSIUM SERPL-SCNC: 4.1 MMOL/L — SIGNIFICANT CHANGE UP (ref 3.5–5.3)
PROT SERPL-MCNC: 6.6 G/DL — SIGNIFICANT CHANGE UP (ref 6–8.3)
RBC # BLD: 3.15 M/UL — LOW (ref 4.2–5.8)
RBC # FLD: 12.6 % — SIGNIFICANT CHANGE UP (ref 10.3–14.5)
SODIUM SERPL-SCNC: 141 MMOL/L — SIGNIFICANT CHANGE UP (ref 135–145)
WBC # BLD: 4.74 K/UL — SIGNIFICANT CHANGE UP (ref 3.8–10.5)
WBC # FLD AUTO: 4.74 K/UL — SIGNIFICANT CHANGE UP (ref 3.8–10.5)

## 2018-09-05 PROCEDURE — 99239 HOSP IP/OBS DSCHRG MGMT >30: CPT

## 2018-09-05 RX ORDER — LISINOPRIL 2.5 MG/1
1 TABLET ORAL
Qty: 0 | Refills: 0 | COMMUNITY

## 2018-09-05 RX ORDER — CARVEDILOL PHOSPHATE 80 MG/1
1 CAPSULE, EXTENDED RELEASE ORAL
Qty: 0 | Refills: 0 | COMMUNITY

## 2018-09-05 RX ORDER — MECLIZINE HCL 12.5 MG
1 TABLET ORAL
Qty: 28 | Refills: 0 | OUTPATIENT
Start: 2018-09-05 | End: 2018-09-18

## 2018-09-05 RX ADMIN — CHLORHEXIDINE GLUCONATE 1 APPLICATION(S): 213 SOLUTION TOPICAL at 05:23

## 2018-09-05 NOTE — PROGRESS NOTE ADULT - PROBLEM SELECTOR PLAN 2
- patient with neutropenia on presentation  - likely 2/2 chemo  - patient received  Neulasta on 8/24  - heme-onc recs reviewed   - will monitor daily CBC with Diff  - closely monitor for e/o infection, low threshold for pan culture and neutropenic empiric abx coverage if any indication of infection
- patient with neutropenia on presentation  - likely 2/2 chemo  - patient with Neulasta on 8/24  -fu heme-onc recs  - will monitor daily CBC with Diff  - closely monitor for e/o infection, low threshold for pan culture and neutropenic empiric abx coverage if any indication of infection
no n/v/d at this time  Pt feels well, tolerates oral diet
- patient reports vomiting has improved, was able to tolerate po glucerna wishes for regular diet will advance diet , vomiting likely 2/2 recent chemo therapy  - consider changing to d5+LR at 100cc/hr from NS, closely monitor for e/o overload given hx of HFrEF  - clear diet with ensure as per pt request, advance as tolerated  - Zofran PRN N/V, EKG with QTc of 453   - c/w trial of meclizine since patient reports dizziness triggers nausea and vomiting
no n/v/d at this time  Pt feels better, tolerates oral diet
no n/v/d at this time  Pt feels well, tolerates oral diet
- patient reports vomiting has resolved was able to tolerate po glucerna wishes for regular diet will advance diet , vomiting likely 2/2 recent chemo therapy  - consider d/c of IVF   - clear diet with ensure as per pt request, advance as tolerated  - Zofran PRN N/V, EKG with QTc of 453   - consider d/c of meclizine

## 2018-09-05 NOTE — PROGRESS NOTE ADULT - PROBLEM SELECTOR PROBLEM 3
Chemotherapy-induced neutropenia
Left atrial mass
Left atrial mass
Chemotherapy-induced neutropenia

## 2018-09-05 NOTE — PROGRESS NOTE ADULT - SUBJECTIVE AND OBJECTIVE BOX
Patient is a 38y old  Male who presents with a chief complaint of N/V and Diarrhea (04 Sep 2018 18:56)      SUBJECTIVE / OVERNIGHT EVENTS: patient seen and examined by bedside at 9: 50 Am, pt appears in a better mood, denies headache, dizziness, SOB, CP, Palpitations , N/V/D, abdominal pain  pt happy about going home but is skeptical about next  dose of chemo         MEDICATIONS  (STANDING):  carvedilol 6.25 milliGRAM(s) Oral every 12 hours  chlorhexidine 4% Liquid 1 Application(s) Topical two times a day    MEDICATIONS  (PRN):  acetaminophen   Tablet 650 milliGRAM(s) Oral every 6 hours PRN For Temp greater than 38 C (100.4 F)  benzocaine 15 mG/menthol 3.6 mG Lozenge 1 Lozenge Oral every 4 hours PRN Sore Throat  meclizine 12.5 milliGRAM(s) Oral two times a day PRN Dizziness  ondansetron Injectable 4 milliGRAM(s) IV Push every 6 hours PRN Nausea and/or Vomiting      Vital Signs Last 24 Hrs  T(C): 36.9 (05 Sep 2018 05:21), Max: 36.9 (04 Sep 2018 20:39)  T(F): 98.4 (05 Sep 2018 05:21), Max: 98.4 (04 Sep 2018 20:39)  HR: 100 (05 Sep 2018 05:21) (91 - 100)  BP: 97/68 (05 Sep 2018 05:21) (96/62 - 104/66)  BP(mean): --  RR: 18 (05 Sep 2018 05:21) (18 - 18)  SpO2: 97% (05 Sep 2018 05:21) (97% - 99%)  CAPILLARY BLOOD GLUCOSE        I&O's Summary        PHYSICAL EXAM:  GENERAL: NAD, well-developed  HEAD:  Atraumatic, Normocephalic  EYES: EOMI, PERRLA, conjunctiva and sclera clear  NECK: Supple, No JVD  CHEST/LUNG: Clear to auscultation bilaterally; No wheeze  HEART: Regular rate and rhythm;   ABDOMEN: Soft, Nontender, Nondistended; Bowel sounds present  EXTREMITIES:  2+ Peripheral Pulses, No clubbing, cyanosis, or edema, midline in LUE, site clean   PSYCH: AAOx3,  mood improved today   NEUROLOGY: non-focal  SKIN: No rashes or lesions      LABS:                        9.2    4.74  )-----------( 25       ( 05 Sep 2018 06:30 )             27.3     09-05    141  |  100  |  15  ----------------------------<  102<H>  4.1   |  28  |  0.71    Ca    9.3      05 Sep 2018 06:30  Phos  3.4     09-05  Mg     1.7     09-05    TPro  6.6  /  Alb  3.5  /  TBili  0.2  /  DBili  x   /  AST  56<H>  /  ALT  140<H>  /  AlkPhos  85  09-05              RADIOLOGY & ADDITIONAL TESTS:    Imaging Personally Reviewed:    Consultant(s) Notes Reviewed:  oncology     Care Discussed with Consultants/Other Providers:

## 2018-09-05 NOTE — PROGRESS NOTE ADULT - PROBLEM SELECTOR PROBLEM 1
Neutropenic fever
Neutropenic fever
Vomiting, persistent, in adult
Vomiting, persistent, in adult
Neutropenic fever

## 2018-09-05 NOTE — PROGRESS NOTE ADULT - PROVIDER SPECIALTY LIST ADULT
Heme/Onc
Hospitalist

## 2018-09-05 NOTE — PROGRESS NOTE ADULT - PROBLEM SELECTOR PLAN 8
- Patient with non-ischemic cardiomyopathy  - Most recent TTE in Feb 2018 reveals mild-moderate global left ventricular systolic dysfunction with EF of ~ 40%  - clinically patient appears to be euvolemic  - continue home Carvedilol with hold parameters. Hold Lisinopril for now in the setting of soft BPs.   - strict Is/Os  - daily standing weights  pt advised to f/u with cardiology as outpt

## 2018-09-05 NOTE — PROGRESS NOTE ADULT - ATTENDING COMMENTS
Pt seen and examined. Pt feels better each day. diarrhea resolved. good oral intake. remains afebrile.   await today's lab. RN's aware.
Pt wants to sig out AMA, Onco f/u requested, will f/u rec  risk of leaving AMA was explained to patient
will DC home today  pt advised to f/u with DR Chirinos, repeat CBC and consider starting Lovenox once platelets >60   pt advised to f/u with his cardiologist  Dr Marquez   case d/w oncology , OK for discharge with [facility] as pt still needs two more cycles of chemo   Patient hemodynamically stable for discharge home  Time spent in discharge process is  55 min
Pt appears depressed, Psychiatry eval or palliative eval was offered to him by oncology but does not want at this time  Pt encouraged to walk in his room, asked if he would like to walk in the hallway , refused   DC planning once platelets > 20
Pt seen and examined. Pt feels better each day. diarrhea resolved. good oral intake. remains afebrile.   await today's lab. RN's aware.
Pt seen and examined. Pt reports diarrhea improved, likes to try regular diet. Pt appears comfortable, non-toxic   c/w vanco/cefepime for neutropenic fever, f/u infectious work up
Pt seen and examined. Pt developed fever. Neutropenic fever, vanco/cefepime for empiric coverage. infectious workup

## 2018-09-05 NOTE — PROGRESS NOTE ADULT - PROBLEM SELECTOR PROBLEM 4
Left atrial mass
Transaminitis
Transaminitis
Left atrial mass

## 2018-09-05 NOTE — PROGRESS NOTE ADULT - PROBLEM SELECTOR PROBLEM 5
Transaminitis
Atrial flutter, unspecified type
Atrial flutter, unspecified type
Transaminitis

## 2018-09-05 NOTE — PROGRESS NOTE ADULT - PROBLEM SELECTOR PROBLEM 9
Mixed germ cell tumor
Need for prophylactic measure

## 2018-09-05 NOTE — PROGRESS NOTE ADULT - PROBLEM SELECTOR PLAN 6
- patient with history of AFlutter s/p successful TYLOR/DCCV into sinus rhythm 2/2018   - home Lovenox 110 q12 on hold now due to profound thrombocytopenia   - continue with home Carvedilol w/ holding parameters - patient with history of AFlutter s/p successful TYLOR/DCCV into sinus rhythm 2/2018   - home Lovenox 110 q12 on hold now due to profound thrombocytopenia   -  Carvedilol w/ holding parameters ordered , but pt not getting secondary to hypotension, , will recommend f/u with his cardiologist prior to resuming coreg and lisinopril   pt had refused TTE and MRI  as inpt

## 2018-09-05 NOTE — PROGRESS NOTE ADULT - PROBLEM SELECTOR PROBLEM 2
Chemotherapy-induced neutropenia
Chemotherapy-induced neutropenia
Vomiting, persistent, in adult

## 2018-09-05 NOTE — PROGRESS NOTE ADULT - ASSESSMENT
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 will check with Onc regarding if stay in prior to discharge
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 Pt spiked a fever 8/27 and was started on cefepime/vanco for febrile neutropenia. Infectious work up unrevealing.   ANC has recovered to 1770 yesterday, however plt still not recovered, 15 today.  Patient appears depressed and anxious. Chemotherapy has been very hard to tolerate for him and although he knows that the goal is cure, he says that he cannot tolerate more treatment. Thinking of the next cycle of chemotherapy makes him very anxious.   I offered psychiatry consult or palliative care for support during this difficult time, however patient refused.     -Daily CBC with diff  -C/w Cefepime, should be continued as long as neutropenic  -Consider echo and cardiology consult. Taxol and ifosfamide can cause cardiac toxicity.   -Monitor liver tests daily  -LMWH was dced as plt 60, can restart when plt>60 again  -Maintain active type and screen  -Pain control and supportive care  -Rest of care per primary team    Will follow. Please do not hesitate to call back with questions.     Charito Mccall MD  Medical Oncology Attending
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    Pt spiked a fever and was started on cefepime/vanco for febrile neutropenia. Infectious work up so far unrevealing.   LFTs continue to rise and blood counts continue to decrease.     -C/w Cefepime, patient has been refusing vanco, no need to add if he is afebrile on cefepime alone. Please dc cipro.   -F/u infectious work up  -Monitor CBC and liver tests daily  -Dc heparin when/if plt<60,000 (expect drop s/p chemo)  -Maintain active type and screen  -Consider Motrin prn for pain, please consider low dose short acting opioids if pain not controlled with Motrin and keep patient comfortable, consider avoiding acetaminophen given transaminitis.   -Continue with supportive care, replace lytes as you are doing     Will follow. Please do not hesitate to call back with questions.     Charito Mccall MD  Medical Oncology Attending
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  Diarrhea has improved. Patient still feels nauseated and has poor appetite. Reports joint pain.    -Please start Cipro 500mg BID for prophylaxis given neutropenia  -Monitor CBC and liver tests daily  -Dc heparin when/if plt<60,000 (expect drop s/p chemo)  -Patient reports uncontrolled joint pain, please consider Motrin prn, please consider low dose short acting opioids if pain not controlled with Motrin and keep patient comfortable  -Monitor vital signs, if patient develops a fever, please start treatment for febrile neutropenia with cefepime/vanco  -Continue with supportive care as you are doing       Will follow. Please do not hesitate to call back with questions.     Charito Mccall MD  Medical Oncology Attending
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 Pt spiked a fever 8/27 and was started on cefepime/vanco for febrile neutropenia. Infectious work up so far unrevealing.   Cleve is concerned about his cardiac function, his bp has been running lower that usual and his carvedilol and ACEi had to be held. He is concerned that the new chemotherapy (TIP) might be worsening his cardiomyopathy. He has not been able to get a cardiac MRI because he is not able to tolerate the exam. He follows with Dr. Marquez outpatient, last seen 8/15/2018.     -C/w Cefepime, should be continued as long as neutropenic  -Consider echo and cardiology consult given lower than baseline bps. Taxol and ifosfamide can cause cardiac toxicity.   -F/u infectious work up  -Monitor CBC and liver tests daily  -LMWH was dced as plt 60, I expect that plt will fall further, can restart when plt>60 again  -Maintain active type and screen  -Pain control and supportive care  -Rest of care per primary team    Will follow. Please do not hesitate to call back with questions.     Charito Mccall MD  Medical Oncology Attending
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 will check with Onc regarding if stay in prior to discharge
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 will check with Onc regarding if stay in prior to discharge
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 will check with Onc regarding if stay in prior to discharge

## 2018-09-05 NOTE — PROGRESS NOTE ADULT - PROBLEM SELECTOR PLAN 3
2/2 chemo, resolved now  patient received  Neulasta on 8/24  also thrombocytopenia, 13-->15 -->25 today ,  hold off lovenox for now until plt > 60, , no bleeding, as per  Onc, no need for plt transfusion at this time  A per onc, if platelets > 20, can discharge home, platelets 25 today , will DC home

## 2018-09-06 ENCOUNTER — APPOINTMENT (OUTPATIENT)
Dept: CT IMAGING | Facility: IMAGING CENTER | Age: 38
End: 2018-09-06

## 2018-09-06 ENCOUNTER — INBOUND DOCUMENT (OUTPATIENT)
Age: 38
End: 2018-09-06

## 2018-09-06 ENCOUNTER — APPOINTMENT (OUTPATIENT)
Dept: UROLOGY | Facility: CLINIC | Age: 38
End: 2018-09-06

## 2018-09-06 ENCOUNTER — RESULT REVIEW (OUTPATIENT)
Age: 38
End: 2018-09-06

## 2018-09-06 ENCOUNTER — APPOINTMENT (OUTPATIENT)
Dept: HEMATOLOGY ONCOLOGY | Facility: CLINIC | Age: 38
End: 2018-09-06
Payer: MEDICAID

## 2018-09-06 VITALS
HEART RATE: 119 BPM | DIASTOLIC BLOOD PRESSURE: 73 MMHG | OXYGEN SATURATION: 98 % | BODY MASS INDEX: 30.31 KG/M2 | SYSTOLIC BLOOD PRESSURE: 102 MMHG | RESPIRATION RATE: 16 BRPM | WEIGHT: 229.72 LBS | TEMPERATURE: 98.6 F

## 2018-09-06 LAB
ALBUMIN SERPL ELPH-MCNC: 4.5 G/DL
ALP BLD-CCNC: 106 U/L
ALT SERPL-CCNC: 154 U/L
ANION GAP SERPL CALC-SCNC: 15 MMOL/L
AST SERPL-CCNC: 62 U/L
BILIRUB SERPL-MCNC: 0.2 MG/DL
BUN SERPL-MCNC: 15 MG/DL
CALCIUM SERPL-MCNC: 9.7 MG/DL
CHLORIDE SERPL-SCNC: 99 MMOL/L
CO2 SERPL-SCNC: 25 MMOL/L
CREAT SERPL-MCNC: 0.8 MG/DL
GLUCOSE SERPL-MCNC: 123 MG/DL
HCT VFR BLD CALC: 32.4 % — LOW (ref 39–50)
HGB BLD-MCNC: 11.4 G/DL — LOW (ref 13–17)
MAGNESIUM SERPL-MCNC: 1.6 MG/DL
MCHC RBC-ENTMCNC: 30.5 PG — SIGNIFICANT CHANGE UP (ref 27–34)
MCHC RBC-ENTMCNC: 35.2 G/DL — SIGNIFICANT CHANGE UP (ref 32–36)
MCV RBC AUTO: 86.9 FL — SIGNIFICANT CHANGE UP (ref 80–100)
PLATELET # BLD AUTO: 60 K/UL — LOW (ref 150–400)
POTASSIUM SERPL-SCNC: 4.2 MMOL/L
PROT SERPL-MCNC: 7.4 G/DL
RBC # BLD: 3.73 M/UL — LOW (ref 4.2–5.8)
RBC # FLD: 12.5 % — SIGNIFICANT CHANGE UP (ref 10.3–14.5)
SODIUM SERPL-SCNC: 139 MMOL/L
WBC # BLD: 8.9 K/UL — SIGNIFICANT CHANGE UP (ref 3.8–10.5)
WBC # FLD AUTO: 8.9 K/UL — SIGNIFICANT CHANGE UP (ref 3.8–10.5)

## 2018-09-06 PROCEDURE — 99214 OFFICE O/P EST MOD 30 MIN: CPT

## 2018-09-06 RX ORDER — CIPROFLOXACIN HYDROCHLORIDE 500 MG/1
500 TABLET, FILM COATED ORAL
Qty: 14 | Refills: 0 | Status: DISCONTINUED | COMMUNITY
Start: 2018-08-16 | End: 2018-09-06

## 2018-09-06 RX ORDER — LISINOPRIL 2.5 MG/1
2.5 TABLET ORAL
Qty: 30 | Refills: 0 | Status: DISCONTINUED | COMMUNITY
Start: 2018-06-14 | End: 2018-09-06

## 2018-09-06 RX ORDER — LISINOPRIL 5 MG/1
5 TABLET ORAL DAILY
Qty: 90 | Refills: 3 | Status: DISCONTINUED | COMMUNITY
Start: 2017-10-21 | End: 2018-09-06

## 2018-09-06 RX ORDER — WARFARIN 5 MG/1
5 TABLET ORAL
Qty: 135 | Refills: 3 | Status: DISCONTINUED | COMMUNITY
Start: 2017-12-05 | End: 2018-09-06

## 2018-09-10 PROBLEM — D69.59 THROMBOCYTOPENIA DUE TO DRUGS: Status: ACTIVE | Noted: 2018-08-13

## 2018-09-13 ENCOUNTER — LABORATORY RESULT (OUTPATIENT)
Age: 38
End: 2018-09-13

## 2018-09-13 ENCOUNTER — APPOINTMENT (OUTPATIENT)
Dept: HEMATOLOGY ONCOLOGY | Facility: CLINIC | Age: 38
End: 2018-09-13
Payer: MEDICAID

## 2018-09-13 ENCOUNTER — RESULT REVIEW (OUTPATIENT)
Age: 38
End: 2018-09-13

## 2018-09-13 VITALS
SYSTOLIC BLOOD PRESSURE: 104 MMHG | BODY MASS INDEX: 30.54 KG/M2 | WEIGHT: 231.46 LBS | RESPIRATION RATE: 16 BRPM | TEMPERATURE: 98.7 F | DIASTOLIC BLOOD PRESSURE: 72 MMHG | OXYGEN SATURATION: 95 % | HEART RATE: 102 BPM

## 2018-09-13 DIAGNOSIS — T50.905A OTHER SECONDARY THROMBOCYTOPENIA: ICD-10-CM

## 2018-09-13 DIAGNOSIS — D69.59 OTHER SECONDARY THROMBOCYTOPENIA: ICD-10-CM

## 2018-09-13 LAB
HCT VFR BLD CALC: 31.9 % — LOW (ref 39–50)
HGB BLD-MCNC: 10.6 G/DL — LOW (ref 13–17)
MCHC RBC-ENTMCNC: 30.2 PG — SIGNIFICANT CHANGE UP (ref 27–34)
MCHC RBC-ENTMCNC: 33.4 G/DL — SIGNIFICANT CHANGE UP (ref 32–36)
MCV RBC AUTO: 90.6 FL — SIGNIFICANT CHANGE UP (ref 80–100)
PLATELET # BLD AUTO: 371 K/UL — SIGNIFICANT CHANGE UP (ref 150–400)
RBC # BLD: 3.52 M/UL — LOW (ref 4.2–5.8)
RBC # FLD: 15.1 % — HIGH (ref 10.3–14.5)
WBC # BLD: 5.6 K/UL — SIGNIFICANT CHANGE UP (ref 3.8–10.5)
WBC # FLD AUTO: 5.6 K/UL — SIGNIFICANT CHANGE UP (ref 3.8–10.5)

## 2018-09-13 PROCEDURE — 99214 OFFICE O/P EST MOD 30 MIN: CPT

## 2018-09-14 LAB
ALBUMIN SERPL ELPH-MCNC: 4.2 G/DL
ALP BLD-CCNC: 288 U/L
ALT SERPL-CCNC: 416 U/L
ANION GAP SERPL CALC-SCNC: 12 MMOL/L
APPEARANCE: CLEAR
AST SERPL-CCNC: 343 U/L
BILIRUB SERPL-MCNC: 0.5 MG/DL
BILIRUBIN URINE: ABNORMAL
BLOOD URINE: NEGATIVE
BUN SERPL-MCNC: 11 MG/DL
CALCIUM SERPL-MCNC: 9.9 MG/DL
CHLORIDE SERPL-SCNC: 99 MMOL/L
CO2 SERPL-SCNC: 31 MMOL/L
COLOR: ABNORMAL
CREAT SERPL-MCNC: 0.82 MG/DL
GLUCOSE QUALITATIVE U: NEGATIVE MG/DL
GLUCOSE SERPL-MCNC: 114 MG/DL
KETONES URINE: NEGATIVE
LEUKOCYTE ESTERASE URINE: ABNORMAL
MAGNESIUM SERPL-MCNC: 1.8 MG/DL
NITRITE URINE: NEGATIVE
PH URINE: 7
POTASSIUM SERPL-SCNC: 4.6 MMOL/L
PROT SERPL-MCNC: 6.9 G/DL
PROTEIN URINE: 100 MG/DL
SODIUM SERPL-SCNC: 142 MMOL/L
SPECIFIC GRAVITY URINE: 1.02
UROBILINOGEN URINE: 1 MG/DL

## 2018-09-18 ENCOUNTER — APPOINTMENT (OUTPATIENT)
Dept: INFUSION THERAPY | Facility: HOSPITAL | Age: 38
End: 2018-09-18

## 2018-09-19 ENCOUNTER — APPOINTMENT (OUTPATIENT)
Dept: INFUSION THERAPY | Facility: HOSPITAL | Age: 38
End: 2018-09-19

## 2018-09-20 ENCOUNTER — RESULT REVIEW (OUTPATIENT)
Age: 38
End: 2018-09-20

## 2018-09-20 ENCOUNTER — APPOINTMENT (OUTPATIENT)
Dept: HEMATOLOGY ONCOLOGY | Facility: CLINIC | Age: 38
End: 2018-09-20
Payer: MEDICAID

## 2018-09-20 ENCOUNTER — APPOINTMENT (OUTPATIENT)
Dept: INFUSION THERAPY | Facility: HOSPITAL | Age: 38
End: 2018-09-20

## 2018-09-20 ENCOUNTER — APPOINTMENT (OUTPATIENT)
Dept: CARDIOLOGY | Facility: CLINIC | Age: 38
End: 2018-09-20

## 2018-09-20 VITALS
SYSTOLIC BLOOD PRESSURE: 106 MMHG | WEIGHT: 235.89 LBS | OXYGEN SATURATION: 98 % | DIASTOLIC BLOOD PRESSURE: 75 MMHG | BODY MASS INDEX: 31.12 KG/M2 | TEMPERATURE: 98.3 F | RESPIRATION RATE: 17 BRPM | HEART RATE: 87 BPM

## 2018-09-20 LAB
HCT VFR BLD CALC: 35.8 % — LOW (ref 39–50)
HGB BLD-MCNC: 11.9 G/DL — LOW (ref 13–17)
MCHC RBC-ENTMCNC: 30.6 PG — SIGNIFICANT CHANGE UP (ref 27–34)
MCHC RBC-ENTMCNC: 33.2 G/DL — SIGNIFICANT CHANGE UP (ref 32–36)
MCV RBC AUTO: 92.1 FL — SIGNIFICANT CHANGE UP (ref 80–100)
PLATELET # BLD AUTO: 257 K/UL — SIGNIFICANT CHANGE UP (ref 150–400)
RBC # BLD: 3.89 M/UL — LOW (ref 4.2–5.8)
RBC # FLD: 15.5 % — HIGH (ref 10.3–14.5)
WBC # BLD: 8 K/UL — SIGNIFICANT CHANGE UP (ref 3.8–10.5)
WBC # FLD AUTO: 8 K/UL — SIGNIFICANT CHANGE UP (ref 3.8–10.5)

## 2018-09-20 PROCEDURE — 99214 OFFICE O/P EST MOD 30 MIN: CPT

## 2018-09-21 ENCOUNTER — APPOINTMENT (OUTPATIENT)
Dept: INFUSION THERAPY | Facility: HOSPITAL | Age: 38
End: 2018-09-21

## 2018-09-21 LAB
ALBUMIN SERPL ELPH-MCNC: 4.4 G/DL
ALP BLD-CCNC: 122 U/L
ALT SERPL-CCNC: 83 U/L
ANION GAP SERPL CALC-SCNC: 14 MMOL/L
AST SERPL-CCNC: 42 U/L
BILIRUB SERPL-MCNC: 0.3 MG/DL
BUN SERPL-MCNC: 14 MG/DL
CALCIUM SERPL-MCNC: 9.4 MG/DL
CHLORIDE SERPL-SCNC: 103 MMOL/L
CO2 SERPL-SCNC: 25 MMOL/L
CREAT SERPL-MCNC: 0.73 MG/DL
GLUCOSE SERPL-MCNC: 96 MG/DL
MAGNESIUM SERPL-MCNC: 1.8 MG/DL
POTASSIUM SERPL-SCNC: 3.9 MMOL/L
PROT SERPL-MCNC: 7.1 G/DL
SODIUM SERPL-SCNC: 142 MMOL/L

## 2018-09-24 ENCOUNTER — INPATIENT (INPATIENT)
Facility: HOSPITAL | Age: 38
LOS: 0 days | Discharge: HOME | End: 2018-09-25
Attending: STUDENT IN AN ORGANIZED HEALTH CARE EDUCATION/TRAINING PROGRAM | Admitting: STUDENT IN AN ORGANIZED HEALTH CARE EDUCATION/TRAINING PROGRAM
Payer: MEDICAID

## 2018-09-24 VITALS
RESPIRATION RATE: 19 BRPM | TEMPERATURE: 98 F | HEART RATE: 97 BPM | SYSTOLIC BLOOD PRESSURE: 117 MMHG | OXYGEN SATURATION: 98 % | DIASTOLIC BLOOD PRESSURE: 81 MMHG | WEIGHT: 238.76 LBS | HEIGHT: 74 IN

## 2018-09-24 DIAGNOSIS — E83.39 OTHER DISORDERS OF PHOSPHORUS METABOLISM: ICD-10-CM

## 2018-09-24 DIAGNOSIS — C62.92 MALIGNANT NEOPLASM OF LEFT TESTIS, UNSPECIFIED WHETHER DESCENDED OR UNDESCENDED: ICD-10-CM

## 2018-09-24 DIAGNOSIS — Z98.890 OTHER SPECIFIED POSTPROCEDURAL STATES: Chronic | ICD-10-CM

## 2018-09-24 DIAGNOSIS — I48.2 CHRONIC ATRIAL FIBRILLATION: ICD-10-CM

## 2018-09-24 DIAGNOSIS — C62.90 MALIGNANT NEOPLASM OF UNSPECIFIED TESTIS, UNSPECIFIED WHETHER DESCENDED OR UNDESCENDED: ICD-10-CM

## 2018-09-24 DIAGNOSIS — I51.9 HEART DISEASE, UNSPECIFIED: ICD-10-CM

## 2018-09-24 DIAGNOSIS — Z90.79 ACQUIRED ABSENCE OF OTHER GENITAL ORGAN(S): Chronic | ICD-10-CM

## 2018-09-24 DIAGNOSIS — Z95.828 PRESENCE OF OTHER VASCULAR IMPLANTS AND GRAFTS: Chronic | ICD-10-CM

## 2018-09-24 LAB
ALBUMIN SERPL ELPH-MCNC: 3.8 G/DL — SIGNIFICANT CHANGE UP (ref 3.3–5)
ALP SERPL-CCNC: 83 U/L — SIGNIFICANT CHANGE UP (ref 40–120)
ALT FLD-CCNC: 57 U/L — HIGH (ref 4–41)
AST SERPL-CCNC: 40 U/L — SIGNIFICANT CHANGE UP (ref 4–40)
BASOPHILS # BLD AUTO: 0.02 K/UL — SIGNIFICANT CHANGE UP (ref 0–0.2)
BASOPHILS NFR BLD AUTO: 0.2 % — SIGNIFICANT CHANGE UP (ref 0–2)
BILIRUB SERPL-MCNC: 0.3 MG/DL — SIGNIFICANT CHANGE UP (ref 0.2–1.2)
BUN SERPL-MCNC: 11 MG/DL — SIGNIFICANT CHANGE UP (ref 7–23)
CALCIUM SERPL-MCNC: 8.7 MG/DL — SIGNIFICANT CHANGE UP (ref 8.4–10.5)
CHLORIDE SERPL-SCNC: 103 MMOL/L — SIGNIFICANT CHANGE UP (ref 98–107)
CO2 SERPL-SCNC: 24 MMOL/L — SIGNIFICANT CHANGE UP (ref 22–31)
CREAT SERPL-MCNC: 0.75 MG/DL — SIGNIFICANT CHANGE UP (ref 0.5–1.3)
EOSINOPHIL # BLD AUTO: 0.01 K/UL — SIGNIFICANT CHANGE UP (ref 0–0.5)
EOSINOPHIL NFR BLD AUTO: 0.1 % — SIGNIFICANT CHANGE UP (ref 0–6)
GLUCOSE SERPL-MCNC: 176 MG/DL — HIGH (ref 70–99)
HCT VFR BLD CALC: 32.4 % — LOW (ref 39–50)
HGB BLD-MCNC: 10.7 G/DL — LOW (ref 13–17)
IMM GRANULOCYTES # BLD AUTO: 0.25 # — SIGNIFICANT CHANGE UP
IMM GRANULOCYTES NFR BLD AUTO: 3.1 % — HIGH (ref 0–1.5)
LYMPHOCYTES # BLD AUTO: 0.99 K/UL — LOW (ref 1–3.3)
LYMPHOCYTES # BLD AUTO: 12.1 % — LOW (ref 13–44)
MAGNESIUM SERPL-MCNC: 1.7 MG/DL — SIGNIFICANT CHANGE UP (ref 1.6–2.6)
MCHC RBC-ENTMCNC: 30.7 PG — SIGNIFICANT CHANGE UP (ref 27–34)
MCHC RBC-ENTMCNC: 33 % — SIGNIFICANT CHANGE UP (ref 32–36)
MCV RBC AUTO: 92.8 FL — SIGNIFICANT CHANGE UP (ref 80–100)
MONOCYTES # BLD AUTO: 0.13 K/UL — SIGNIFICANT CHANGE UP (ref 0–0.9)
MONOCYTES NFR BLD AUTO: 1.6 % — LOW (ref 2–14)
NEUTROPHILS # BLD AUTO: 6.77 K/UL — SIGNIFICANT CHANGE UP (ref 1.8–7.4)
NEUTROPHILS NFR BLD AUTO: 82.9 % — HIGH (ref 43–77)
NRBC # FLD: 0 — SIGNIFICANT CHANGE UP
PHOSPHATE SERPL-MCNC: 1.3 MG/DL — LOW (ref 2.5–4.5)
PLATELET # BLD AUTO: 174 K/UL — SIGNIFICANT CHANGE UP (ref 150–400)
PMV BLD: 10.1 FL — SIGNIFICANT CHANGE UP (ref 7–13)
POTASSIUM SERPL-MCNC: 4.5 MMOL/L — SIGNIFICANT CHANGE UP (ref 3.5–5.3)
POTASSIUM SERPL-SCNC: 4.5 MMOL/L — SIGNIFICANT CHANGE UP (ref 3.5–5.3)
PROT SERPL-MCNC: 6.9 G/DL — SIGNIFICANT CHANGE UP (ref 6–8.3)
RBC # BLD: 3.49 M/UL — LOW (ref 4.2–5.8)
RBC # FLD: 16.6 % — HIGH (ref 10.3–14.5)
SODIUM SERPL-SCNC: 139 MMOL/L — SIGNIFICANT CHANGE UP (ref 135–145)
WBC # BLD: 8.17 K/UL — SIGNIFICANT CHANGE UP (ref 3.8–10.5)
WBC # FLD AUTO: 8.17 K/UL — SIGNIFICANT CHANGE UP (ref 3.8–10.5)

## 2018-09-24 PROCEDURE — 99223 1ST HOSP IP/OBS HIGH 75: CPT

## 2018-09-24 PROCEDURE — 99222 1ST HOSP IP/OBS MODERATE 55: CPT

## 2018-09-24 RX ORDER — SODIUM,POTASSIUM PHOSPHATES 278-250MG
1 POWDER IN PACKET (EA) ORAL THREE TIMES A DAY
Qty: 0 | Refills: 0 | Status: DISCONTINUED | OUTPATIENT
Start: 2018-09-24 | End: 2018-09-24

## 2018-09-24 RX ORDER — FAMOTIDINE 10 MG/ML
20 INJECTION INTRAVENOUS ONCE
Qty: 0 | Refills: 0 | Status: COMPLETED | OUTPATIENT
Start: 2018-09-24 | End: 2018-09-24

## 2018-09-24 RX ORDER — OXYCODONE HYDROCHLORIDE 5 MG/1
5 TABLET ORAL EVERY 6 HOURS
Qty: 0 | Refills: 0 | Status: DISCONTINUED | OUTPATIENT
Start: 2018-09-24 | End: 2018-09-25

## 2018-09-24 RX ORDER — HYDROCORTISONE 20 MG
100 TABLET ORAL ONCE
Qty: 0 | Refills: 0 | Status: DISCONTINUED | OUTPATIENT
Start: 2018-09-24 | End: 2018-09-25

## 2018-09-24 RX ORDER — DIPHENHYDRAMINE HCL 50 MG
25 CAPSULE ORAL ONCE
Qty: 0 | Refills: 0 | Status: DISCONTINUED | OUTPATIENT
Start: 2018-09-24 | End: 2018-09-25

## 2018-09-24 RX ORDER — PALONOSETRON HYDROCHLORIDE 0.25 MG/5ML
0.25 INJECTION, SOLUTION INTRAVENOUS ONCE
Qty: 0 | Refills: 0 | Status: COMPLETED | OUTPATIENT
Start: 2018-09-24 | End: 2018-09-24

## 2018-09-24 RX ORDER — DEXAMETHASONE 0.5 MG/5ML
12 ELIXIR ORAL ONCE
Qty: 0 | Refills: 0 | Status: COMPLETED | OUTPATIENT
Start: 2018-09-24 | End: 2018-09-24

## 2018-09-24 RX ORDER — MECLIZINE HCL 12.5 MG
12.5 TABLET ORAL
Qty: 0 | Refills: 0 | Status: DISCONTINUED | OUTPATIENT
Start: 2018-09-24 | End: 2018-09-25

## 2018-09-24 RX ORDER — PACLITAXEL 6 MG/ML
460 INJECTION, SOLUTION, CONCENTRATE INTRAVENOUS ONCE
Qty: 0 | Refills: 0 | Status: COMPLETED | OUTPATIENT
Start: 2018-09-24 | End: 2018-09-24

## 2018-09-24 RX ORDER — SODIUM CHLORIDE 9 MG/ML
1000 INJECTION INTRAMUSCULAR; INTRAVENOUS; SUBCUTANEOUS
Qty: 0 | Refills: 0 | Status: DISCONTINUED | OUTPATIENT
Start: 2018-09-24 | End: 2018-09-25

## 2018-09-24 RX ORDER — SODIUM CHLORIDE 9 MG/ML
1000 INJECTION, SOLUTION INTRAVENOUS
Qty: 0 | Refills: 0 | Status: COMPLETED | OUTPATIENT
Start: 2018-09-25 | End: 2018-09-25

## 2018-09-24 RX ORDER — SODIUM CHLORIDE 9 MG/ML
500 INJECTION INTRAMUSCULAR; INTRAVENOUS; SUBCUTANEOUS ONCE
Qty: 0 | Refills: 0 | Status: COMPLETED | OUTPATIENT
Start: 2018-09-25 | End: 2018-09-25

## 2018-09-24 RX ORDER — FOSAPREPITANT DIMEGLUMINE 150 MG/5ML
150 INJECTION, POWDER, LYOPHILIZED, FOR SOLUTION INTRAVENOUS ONCE
Qty: 0 | Refills: 0 | Status: COMPLETED | OUTPATIENT
Start: 2018-09-24 | End: 2018-09-24

## 2018-09-24 RX ADMIN — Medication 63.75 MILLIMOLE(S): at 22:10

## 2018-09-24 RX ADMIN — PACLITAXEL 41.67 MILLIGRAM(S): 6 INJECTION, SOLUTION, CONCENTRATE INTRAVENOUS at 14:34

## 2018-09-24 RX ADMIN — PALONOSETRON HYDROCHLORIDE 0.25 MILLIGRAM(S): 0.25 INJECTION, SOLUTION INTRAVENOUS at 11:49

## 2018-09-24 RX ADMIN — Medication 106 MILLIGRAM(S): at 12:29

## 2018-09-24 RX ADMIN — SODIUM CHLORIDE 75 MILLILITER(S): 9 INJECTION INTRAMUSCULAR; INTRAVENOUS; SUBCUTANEOUS at 11:48

## 2018-09-24 RX ADMIN — FOSAPREPITANT DIMEGLUMINE 465 MILLIGRAM(S): 150 INJECTION, POWDER, LYOPHILIZED, FOR SOLUTION INTRAVENOUS at 13:28

## 2018-09-24 RX ADMIN — FAMOTIDINE 104 MILLIGRAM(S): 10 INJECTION INTRAVENOUS at 11:49

## 2018-09-24 NOTE — CONSULT NOTE ADULT - ASSESSMENT
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 Admitted 9/24 for cycle 3.     #testicular cancer  -C3 TIP to start today (paclitaxel dose reduced by 20% given transaminitis).  -Daily CBC with diff, CMPy  -Maintain active type and screen  -Pain control and supportive care  -Rest of care per primary team  -outpatient follow up with Dr Chirinos    Will follow. Please do not hesitate to call back with questions.     Corinne Jimenez MD  Hematology/Oncology Fellow  Pager: [phone]

## 2018-09-24 NOTE — H&P ADULT - HISTORY OF PRESENT ILLNESS
38 year old male, with past history significant for Testicular cancer, Atrial flutter/fibrillation, Atrial mass, Cardiomyopathy, Orchiectomy, Lumbar herniated disc, Neuropathy of the fingertips, Obesity, and Port=a-cath, presented to the ED secondary to need for third cycle of paclitaxel, ifosfamide, cisplatin (TIP).  Seen and evaluated at bedside;    Vital signs upon ED presentation as follows: BP = 117/81, HR = 97, R = 19, T = 36.9 C (98.4 F), O2 Sat = 98% on RA.  Admitted due to Testicular cancer and need for chemotherapy. 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 7/2017, 3 cycles of cisplatin/etoposide 9/2017, RPLND 6/2018, found to have residual disease, s/p cycle 1 of TIP 7/29/2018, completed 8/3/2018, s/p Neulasta onpro 8/3/2018, A-Flutter (2/2 catheter irritation of R atrium), L atrial thrombus s/p successful TYLOR/DCCV into sinus rhythm 2/2018 (not currently on AC), nonischemic CM (EF 40-45%)       38 year old male, with past history significant for Testicular cancer, Atrial flutter/fibrillation, Atrial mass, Cardiomyopathy, Orchiectomy, Lumbar herniated disc, Neuropathy of the fingertips, Obesity, Cardioversion, and Port=a-cath, presented to the ED secondary to need for third cycle of paclitaxel, ifosfamide, cisplatin (TIP).  Seen and evaluated at bedside; NAD.  No complaints today.  Reports being mentally prepared for today's therapy.  Discusses wanting to resume, if possible, anticoagulation relative to the L-atrial thrombus and A-flutter/fibrillation.  No reports of fevers, chills, sweating, nausea, vomiting, headaches, dizziness, shortness of breath, palpitations, abdominal pain, diarrhea, constipation, dysuria or any other associated signs/symptoms.    Vital signs upon ED presentation as follows: BP = 117/81, HR = 97, R = 19, T = 36.9 C (98.4 F), O2 Sat = 98% on RA.  Admitted due to Testicular cancer and need for chemotherapy.

## 2018-09-24 NOTE — H&P ADULT - NSHPLABSRESULTS_GEN_ALL_CORE
10.7   8.17  )-----------( 174      ( 24 Sep 2018 18:36 )             32.4       09-24    139  |  103  |  11  ----------------------------<  176<H>  4.5   |  24  |  0.75    Ca    8.7      24 Sep 2018 18:35  Phos  1.3     09-24  Mg     1.7     09-24    TPro  6.9  /  Alb  3.8  /  TBili  0.3  /  DBili  x   /  AST  40  /  ALT  57<H>  /  AlkPhos  83  09-24         Lactate Trend      CAPILLARY BLOOD GLUCOSE

## 2018-09-24 NOTE — H&P ADULT - FAMILY HISTORY
Aunt  Still living? No  Family history of diabetes mellitus, Age at diagnosis: Age Unknown     Grandparent  Still living? No  Family history of ischemic heart disease, Age at diagnosis: Age Unknown  Family history of cancer, Age at diagnosis: Age Unknown     Mother  Still living? Unknown  Family history of kidney stones, Age at diagnosis: Age Unknown     Father  Still living? Unknown  Family history of hypertension in father, Age at diagnosis: Age Unknown

## 2018-09-24 NOTE — H&P ADULT - PSH
History of abdominal surgery  Retroperitoneal lymph node dissection (June 2018)  History of abdominal surgery  Retroperitoneal lymph node dissection (June 2018)  History of cardioversion  2/2018  History of orchiectomy  left-7/2017  Port-a-cath in place  placed 9/2017,, removed-10/18/2017 History of abdominal surgery  Retroperitoneal lymph node dissection (June 2018)  History of cardioversion  2/2018  History of orchiectomy  left-7/2017  Port-a-cath in place  placed 9/2017,, removed-10/18/2017

## 2018-09-24 NOTE — H&P ADULT - PMH
Anemia    Atrial fibrillation  s/p DCCV 1/18  Atrial flutter    Atrial mass  right artrial echo density per MRI 3/14/18.  Cardiomyopathy    Lumbar herniated disc    Mixed germ cell tumor    Obesity    Testicular cancer    Testicular mass  left  Thrombus  Right atrial wall ( suspected from last TYLOR) On AC Anemia    Atrial fibrillation  s/p DCCV 1/18  Atrial flutter    Atrial mass  right artrial echo density per MRI 3/14/18.  Cardiomyopathy    Lumbar herniated disc    Mixed germ cell tumor    Neuropathy  ~ fingers and toes  Obesity    Testicular cancer    Testicular mass  left  Thrombus  Right atrial wall ( suspected from last TYLOR) On AC

## 2018-09-24 NOTE — CONSULT NOTE ADULT - ATTENDING COMMENTS
Patient interviewed/examined.  Agree with history, ROS, PE, A/P as above.      Vineet Atkinson MD   [phone]

## 2018-09-24 NOTE — H&P ADULT - ASSESSMENT
38 year old male, with past history significant for Testicular cancer, Atrial flutter/fibrillation, Atrial mass, Cardiomyopathy, Orchiectomy, Lumbar herniated disc, Neuropathy of the fingertips, Obesity, and Port=a-cath, presented to the ED secondary to need for third cycle of paclitaxel, ifosfamide, cisplatin (TIP).  Vital signs upon ED presentation as follows: BP = 117/81, HR = 97, R = 19, T = 36.9 C (98.4 F), O2 Sat = 98% on RA.  Admitted due to Testicular cancer and need for chemotherapy, as well as the following:

## 2018-09-24 NOTE — H&P ADULT - PROBLEM SELECTOR PLAN 3
- asymptomatic  - reports that mass decreased from was on warfarin in the past, but no longer on AC  - wants to consider restarting AC again  - may need input from PTA cardiologist (Cleve Marquez MD)

## 2018-09-24 NOTE — CONSULT NOTE ADULT - SUBJECTIVE AND OBJECTIVE BOX
Oncology Consult Note    HPI:  39yo M with a history of mixed germ cell tumor who was admitted for cycle 3 of TIP (paclitaxel, ifosfamide, cisplatin) on 9/24/18. He was diagnosed in 2017. Initially he underwent orchiectomy followed by 3 cycles of EP (etoposide, cisplatin). He had difficulties with each cycle including an admission for AFlutter with RVR. June 2018 he underwent retroperitoneal lymph node dissection which came back with 2 positive LN extranodal extension) and pathology was positive for germ cell tumor (embryonal). Since then TIP was started with his most recent cycle completed 8/24/18. With his regimen he has experienced fatigue and more recently transaminitis. No fever/chills, CP, SOB, abdominal pain, change in bowel habit, rash or urinary complaints. He notes mild decrease in appetite. Some neuropathy in his fingertips though this has been a chronic issue.     PAST MEDICAL & SURGICAL HISTORY:  Mixed germ cell tumor  Obesity  Atrial mass: right artrial echo density per MRI 3/14/18.  Atrial flutter  Cardiomyopathy  Atrial fibrillation: s/p DCCV 1/18  Thrombus: Right atrial wall ( suspected from last TYLOR) On AC  Testicular cancer  Lumbar herniated disc  Testicular mass: left  History of abdominal surgery: Retroperitoneal lymph node dissection (June 2018)  History of cardioversion: 2/2018  Port-a-cath in place: placed 9/2017,, removed-10/18/2017  History of orchiectomy: left-7/2017      FAMILY HISTORY:  Family history of hypertension in father  Family history of kidney stones: mother  Family history of cancer (Grandparent)  Family history of ischemic heart disease (Grandparent)  Family history of diabetes mellitus (Aunt)      MEDICATIONS  (STANDING):  PACLitaxel IVPB (eMAR) 460 milliGRAM(s) IV Intermittent once  sodium chloride 0.9%. 1000 milliLiter(s) (75 mL/Hr) IV Continuous <Continuous>    MEDICATIONS  (PRN):  diphenhydrAMINE   Injectable 25 milliGRAM(s) IV Push once PRN PRN Chemotherapy Reaction  hydrocortisone sodium succinate Injectable 100 milliGRAM(s) IV Push once PRN PRN Chemotherapy Reaction      Allergies    No Known Allergies    Intolerances        SOCIAL HISTORY: denies smoking history, not actively working. lives with family.     REVIEW OF SYSTEMS:    CONSTITUTIONAL: No fevers or chills  EYES/ENT: No visual changes  NECK: No pain or stiffness  RESPIRATORY: No cough, wheezing; No shortness of breath  CARDIOVASCULAR: No chest pain or palpitations  GASTROINTESTINAL: No abdominal or epigastric pain. No nausea, vomiting  GENITOURINARY: No dysuria, frequency or hematuria  NEUROLOGICAL: No numbness or weakness  SKIN: No itching, burning, rashes, or lesions   All other review of systems is negative unless indicated above.    Height (cm): 187.96 (09-24 @ 10:38)  Weight (kg): 108.3 (09-24 @ 10:38)  BMI (kg/m2): 30.7 (09-24 @ 10:38)  BSA (m2): 2.34 (09-24 @ 10:38)    T(F): 97.8 (09-24-18 @ 13:29), Max: 98.4 (09-24-18 @ 10:38)  HR: 84 (09-24-18 @ 13:29)  BP: 100/64 (09-24-18 @ 13:29)  RR: 18 (09-24-18 @ 13:29)  SpO2: 100% (09-24-18 @ 13:29)  Wt(kg): --    GENERAL: NAD, well-developed  HEAD:  Atraumatic, Normocephalic  EYES: EOMI, PERRLA, conjunctiva and sclera clear  NECK: Supple, No JVD  CHEST/LUNG: Clear to auscultation bilaterally; No wheeze  HEART: Regular rate and rhythm  ABDOMEN: Soft, Nontender, Nondistended  EXTREMITIES:  2+ Peripheral Pulses, No clubbing, cyanosis, or edema  NEUROLOGY: non-focal  SKIN: circular lesion below umbilicus  ACCESS: LUE PICC line.

## 2018-09-24 NOTE — H&P ADULT - PROBLEM SELECTOR PLAN 1
- mixed germ cell tumor  - s/p 2 cycles of chemotherapy (paclitaxel, ifosfamide, cisplatin), and admitted now for 3rd cycle  - prior complications w/ chemotx - A-fib/flutter, transaminitis (f/u AM lab-work)  - followed by Oncology team (appreciated); chemotherapy in progress presently  - ensure optimal hydration

## 2018-09-25 ENCOUNTER — APPOINTMENT (OUTPATIENT)
Dept: INFUSION THERAPY | Facility: HOSPITAL | Age: 38
End: 2018-09-25

## 2018-09-25 ENCOUNTER — TRANSCRIPTION ENCOUNTER (OUTPATIENT)
Age: 38
End: 2018-09-25

## 2018-09-25 VITALS — WEIGHT: 240.74 LBS

## 2018-09-25 DIAGNOSIS — D63.0 ANEMIA IN NEOPLASTIC DISEASE: ICD-10-CM

## 2018-09-25 LAB
24R-OH-CALCIDIOL SERPL-MCNC: 6.5 NG/ML — LOW (ref 30–80)
ALBUMIN SERPL ELPH-MCNC: 2.8 G/DL — LOW (ref 3.3–5)
ALP SERPL-CCNC: 57 U/L — SIGNIFICANT CHANGE UP (ref 40–120)
ALT FLD-CCNC: 44 U/L — HIGH (ref 4–41)
AST SERPL-CCNC: 27 U/L — SIGNIFICANT CHANGE UP (ref 4–40)
BILIRUB SERPL-MCNC: 0.4 MG/DL — SIGNIFICANT CHANGE UP (ref 0.2–1.2)
BLD GP AB SCN SERPL QL: NEGATIVE — SIGNIFICANT CHANGE UP
BUN SERPL-MCNC: 10 MG/DL — SIGNIFICANT CHANGE UP (ref 7–23)
BUN SERPL-MCNC: 7 MG/DL — SIGNIFICANT CHANGE UP (ref 7–23)
BUN SERPL-MCNC: 7 MG/DL — SIGNIFICANT CHANGE UP (ref 7–23)
CA-I BLD-SCNC: 1.09 MMOL/L — SIGNIFICANT CHANGE UP (ref 1.03–1.23)
CALCIUM SERPL-MCNC: 6.5 MG/DL — CRITICAL LOW (ref 8.4–10.5)
CALCIUM SERPL-MCNC: 6.5 MG/DL — CRITICAL LOW (ref 8.4–10.5)
CALCIUM SERPL-MCNC: 8.5 MG/DL — SIGNIFICANT CHANGE UP (ref 8.4–10.5)
CHLORIDE SERPL-SCNC: 103 MMOL/L — SIGNIFICANT CHANGE UP (ref 98–107)
CHLORIDE SERPL-SCNC: 112 MMOL/L — HIGH (ref 98–107)
CHLORIDE SERPL-SCNC: 112 MMOL/L — HIGH (ref 98–107)
CO2 SERPL-SCNC: 18 MMOL/L — LOW (ref 22–31)
CO2 SERPL-SCNC: 18 MMOL/L — LOW (ref 22–31)
CO2 SERPL-SCNC: 21 MMOL/L — LOW (ref 22–31)
CREAT SERPL-MCNC: 0.56 MG/DL — SIGNIFICANT CHANGE UP (ref 0.5–1.3)
CREAT SERPL-MCNC: 0.56 MG/DL — SIGNIFICANT CHANGE UP (ref 0.5–1.3)
CREAT SERPL-MCNC: 0.67 MG/DL — SIGNIFICANT CHANGE UP (ref 0.5–1.3)
FOLATE SERPL-MCNC: 2 NG/ML — LOW (ref 4.7–20)
GLUCOSE SERPL-MCNC: 102 MG/DL — HIGH (ref 70–99)
GLUCOSE SERPL-MCNC: 102 MG/DL — HIGH (ref 70–99)
GLUCOSE SERPL-MCNC: 114 MG/DL — HIGH (ref 70–99)
HBA1C BLD-MCNC: 5.5 % — SIGNIFICANT CHANGE UP (ref 4–5.6)
HCT VFR BLD CALC: 23.1 % — LOW (ref 39–50)
HCT VFR BLD CALC: 32.4 % — LOW (ref 39–50)
HGB BLD-MCNC: 10.5 G/DL — LOW (ref 13–17)
HGB BLD-MCNC: 7.6 G/DL — LOW (ref 13–17)
MAGNESIUM SERPL-MCNC: 1.3 MG/DL — LOW (ref 1.6–2.6)
MCHC RBC-ENTMCNC: 30.2 PG — SIGNIFICANT CHANGE UP (ref 27–34)
MCHC RBC-ENTMCNC: 30.6 PG — SIGNIFICANT CHANGE UP (ref 27–34)
MCHC RBC-ENTMCNC: 32.4 % — SIGNIFICANT CHANGE UP (ref 32–36)
MCHC RBC-ENTMCNC: 32.9 % — SIGNIFICANT CHANGE UP (ref 32–36)
MCV RBC AUTO: 93.1 FL — SIGNIFICANT CHANGE UP (ref 80–100)
MCV RBC AUTO: 93.1 FL — SIGNIFICANT CHANGE UP (ref 80–100)
NRBC # FLD: 0 — SIGNIFICANT CHANGE UP
NRBC # FLD: 0.04 — SIGNIFICANT CHANGE UP
PHOSPHATE SERPL-MCNC: 2.1 MG/DL — LOW (ref 2.5–4.5)
PLATELET # BLD AUTO: 131 K/UL — LOW (ref 150–400)
PLATELET # BLD AUTO: 145 K/UL — LOW (ref 150–400)
PLATELET # BLD AUTO: 161 K/UL — SIGNIFICANT CHANGE UP (ref 150–400)
PMV BLD: 10.1 FL — SIGNIFICANT CHANGE UP (ref 7–13)
PMV BLD: 11 FL — SIGNIFICANT CHANGE UP (ref 7–13)
POTASSIUM SERPL-MCNC: 4.3 MMOL/L — SIGNIFICANT CHANGE UP (ref 3.5–5.3)
POTASSIUM SERPL-SCNC: 4.3 MMOL/L — SIGNIFICANT CHANGE UP (ref 3.5–5.3)
PROT SERPL-MCNC: 5.2 G/DL — LOW (ref 6–8.3)
RBC # BLD: 2.48 M/UL — LOW (ref 4.2–5.8)
RBC # BLD: 3.48 M/UL — LOW (ref 4.2–5.8)
RBC # FLD: 16.6 % — HIGH (ref 10.3–14.5)
RBC # FLD: 17.2 % — HIGH (ref 10.3–14.5)
RH IG SCN BLD-IMP: POSITIVE — SIGNIFICANT CHANGE UP
SODIUM SERPL-SCNC: 139 MMOL/L — SIGNIFICANT CHANGE UP (ref 135–145)
SODIUM SERPL-SCNC: 141 MMOL/L — SIGNIFICANT CHANGE UP (ref 135–145)
SODIUM SERPL-SCNC: 141 MMOL/L — SIGNIFICANT CHANGE UP (ref 135–145)
TSH SERPL-MCNC: 0.33 UIU/ML — SIGNIFICANT CHANGE UP (ref 0.27–4.2)
VIT B12 SERPL-MCNC: 349 PG/ML — SIGNIFICANT CHANGE UP (ref 200–900)
WBC # BLD: 6.04 K/UL — SIGNIFICANT CHANGE UP (ref 3.8–10.5)
WBC # BLD: 7.81 K/UL — SIGNIFICANT CHANGE UP (ref 3.8–10.5)
WBC # FLD AUTO: 6.04 K/UL — SIGNIFICANT CHANGE UP (ref 3.8–10.5)
WBC # FLD AUTO: 7.81 K/UL — SIGNIFICANT CHANGE UP (ref 3.8–10.5)

## 2018-09-25 PROCEDURE — 99222 1ST HOSP IP/OBS MODERATE 55: CPT | Mod: GC

## 2018-09-25 PROCEDURE — 99239 HOSP IP/OBS DSCHRG MGMT >30: CPT

## 2018-09-25 PROCEDURE — 71045 X-RAY EXAM CHEST 1 VIEW: CPT | Mod: 26

## 2018-09-25 RX ORDER — MESNA 100 MG/ML
924 INJECTION, SOLUTION INTRAVENOUS ONCE
Qty: 0 | Refills: 0 | Status: COMPLETED | OUTPATIENT
Start: 2018-09-25 | End: 2018-09-25

## 2018-09-25 RX ORDER — FOLIC ACID 0.8 MG
1 TABLET ORAL DAILY
Qty: 0 | Refills: 0 | Status: DISCONTINUED | OUTPATIENT
Start: 2018-09-25 | End: 2018-09-25

## 2018-09-25 RX ORDER — ERGOCALCIFEROL 1.25 MG/1
1 CAPSULE ORAL
Qty: 6 | Refills: 0 | OUTPATIENT
Start: 2018-09-25

## 2018-09-25 RX ORDER — FOLIC ACID 0.8 MG
1 TABLET ORAL
Qty: 30 | Refills: 0 | OUTPATIENT
Start: 2018-09-25 | End: 2018-10-24

## 2018-09-25 RX ORDER — IFOSFAMIDE 1 G/1
2772 INJECTION, POWDER, LYOPHILIZED, FOR SOLUTION INTRAVENOUS ONCE
Qty: 0 | Refills: 0 | Status: COMPLETED | OUTPATIENT
Start: 2018-09-25 | End: 2018-09-25

## 2018-09-25 RX ORDER — CISPLATIN 1 MG/ML
46 INJECTION, SOLUTION INTRAVENOUS ONCE
Qty: 0 | Refills: 0 | Status: COMPLETED | OUTPATIENT
Start: 2018-09-25 | End: 2018-09-25

## 2018-09-25 RX ORDER — ERGOCALCIFEROL 1.25 MG/1
50000 CAPSULE ORAL
Qty: 0 | Refills: 0 | Status: DISCONTINUED | OUTPATIENT
Start: 2018-09-25 | End: 2018-09-25

## 2018-09-25 RX ORDER — MAGNESIUM SULFATE 500 MG/ML
1 VIAL (ML) INJECTION
Qty: 0 | Refills: 0 | Status: COMPLETED | OUTPATIENT
Start: 2018-09-25 | End: 2018-09-25

## 2018-09-25 RX ADMIN — MESNA 436.96 MILLIGRAM(S): 100 INJECTION, SOLUTION INTRAVENOUS at 14:18

## 2018-09-25 RX ADMIN — IFOSFAMIDE 125 MILLIGRAM(S): 1 INJECTION, POWDER, LYOPHILIZED, FOR SOLUTION INTRAVENOUS at 14:35

## 2018-09-25 RX ADMIN — SODIUM CHLORIDE 500 MILLILITER(S): 9 INJECTION INTRAMUSCULAR; INTRAVENOUS; SUBCUTANEOUS at 14:45

## 2018-09-25 RX ADMIN — MESNA 436.96 MILLIGRAM(S): 100 INJECTION, SOLUTION INTRAVENOUS at 19:05

## 2018-09-25 RX ADMIN — SODIUM CHLORIDE 500 MILLILITER(S): 9 INJECTION, SOLUTION INTRAVENOUS at 17:51

## 2018-09-25 RX ADMIN — ERGOCALCIFEROL 50000 UNIT(S): 1.25 CAPSULE ORAL at 19:35

## 2018-09-25 RX ADMIN — CISPLATIN 300 MILLIGRAM(S): 1 INJECTION, SOLUTION INTRAVENOUS at 15:40

## 2018-09-25 RX ADMIN — Medication 100 GRAM(S): at 09:42

## 2018-09-25 RX ADMIN — Medication 1 MILLIGRAM(S): at 12:02

## 2018-09-25 RX ADMIN — Medication 63.75 MILLIMOLE(S): at 11:35

## 2018-09-25 NOTE — PROGRESS NOTE ADULT - SUBJECTIVE AND OBJECTIVE BOX
Patient is a 38y old  Male who presents with a chief complaint of     SUBJECTIVE / OVERNIGHT EVENTS:  Patient seen and examined this morning. Patient denies any fevers, chills, chest pains, nausea or vomiting. Looks forward to going home.     MEDICATIONS  (STANDING):  CISplatin IVPB (eMAR) 46 milliGRAM(s) IV Intermittent once  folic acid 1 milliGRAM(s) Oral daily  mesna IVPB (eMAR) 924 milliGRAM(s) IV Intermittent once  sodium chloride 0.9% 1000 milliLiter(s) (500 mL/Hr) IV Continuous <Continuous>  sodium chloride 0.9% Bolus 500 milliLiter(s) IV Bolus once  sodium chloride 0.9%. 1000 milliLiter(s) (75 mL/Hr) IV Continuous <Continuous>    MEDICATIONS  (PRN):  diphenhydrAMINE   Injectable 25 milliGRAM(s) IV Push once PRN PRN Chemotherapy Reaction  hydrocortisone sodium succinate Injectable 100 milliGRAM(s) IV Push once PRN PRN Chemotherapy Reaction  meclizine 12.5 milliGRAM(s) Oral two times a day PRN Dizziness  oxyCODONE    IR 5 milliGRAM(s) Oral every 6 hours PRN Moderate Pain (4 - 6)      PHYSICAL EXAM:  T(C): 36.6 (09-25-18 @ 05:40), Max: 36.7 (09-24-18 @ 21:27)  HR: 61 (09-25-18 @ 05:40) (61 - 64)  BP: 118/74 (09-25-18 @ 05:40) (114/68 - 118/74)  RR: 18 (09-25-18 @ 05:40) (18 - 18)  SpO2: 98% (09-25-18 @ 05:40) (98% - 98%)  I&O's Summary    GENERAL: NAD, well-developed  HEAD:  Atraumatic, Normocephalic  EYES: EOMI, PERRLA, conjunctiva and sclera clear  NECK: Supple, No elevated JVD  CHEST/LUNG: Clear to auscultation bilaterally; No wheeze  HEART: Regular rate and rhythm; No murmurs, rubs, or gallops  ABDOMEN: Soft, Nontender, Nondistended; Bowel sounds present  EXTREMITIES:  2+ Peripheral Pulses, No clubbing, cyanosis, or edema  PSYCH: AAOx3  NEUROLOGY: CN II-XII grossly intact, moving all extremities  SKIN: No rashes or lesions    LABS:  CAPILLARY BLOOD GLUCOSE                              x      x     )-----------( 145      ( 25 Sep 2018 12:20 )             x        09-25    139  |  103  |  10  ----------------------------<  114<H>  4.3   |  21<L>  |  0.67    Ca    8.5      25 Sep 2018 07:25  Phos  2.1     09-25  Mg     1.3     09-25    TPro  5.2<L>  /  Alb  2.8<L>  /  TBili  0.4  /  DBili  x   /  AST  27  /  ALT  44<H>  /  AlkPhos  57  09-25              RADIOLOGY & ADDITIONAL TESTS:    Imaging Personally Reviewed:    Consultant(s) Notes Reviewed:  Oncology input appreciated    Care Discussed with Consultants/Other Providers: Patient care discussed with oncology and palliative care during interdisciplinary rounds, case management, nursing management and social work were also present.

## 2018-09-25 NOTE — DISCHARGE NOTE ADULT - CARE PLAN
Principal Discharge DX:	Testicular cancer  Goal:	without nausea, vomiting, no fever  Assessment and plan of treatment:	follow up with Oncology at [facility]. Maintain scheduled appointment on 9/26 at 8am  Secondary Diagnosis:	Vitamin D deficiency  Assessment and plan of treatment:	continue with supplement as prescribed  Secondary Diagnosis:	Folate deficiency  Assessment and plan of treatment:	continue folate supplement as prescribed

## 2018-09-25 NOTE — DISCHARGE NOTE ADULT - PATIENT PORTAL LINK FT
You can access the [facility] Portal, offered by [facility], by registering with the following website: [email or web address]

## 2018-09-25 NOTE — DISCHARGE NOTE ADULT - PROVIDER TOKENS
FREE:[LAST:[Dr. Chirinos-Oncology],PHONE:[phone]],FAX:[(   )    -],ADDRESS:[[facility]]]

## 2018-09-25 NOTE — PROGRESS NOTE ADULT - SUBJECTIVE AND OBJECTIVE BOX
Oncology Follow-up    INTERVAL HPI/OVERNIGHT EVENTS:  No o/n events, patient resting comfortably. No complaints at this time.      VITAL SIGNS:  T(F): 97.8 (09-25-18 @ 05:40)  HR: 61 (09-25-18 @ 05:40)  BP: 118/74 (09-25-18 @ 05:40)  RR: 18 (09-25-18 @ 05:40)  SpO2: 98% (09-25-18 @ 05:40)  Wt(kg): --      PHYSICAL EXAM:    Constitutional: AAOx3, NAD   Eyes: PERRL, EOMI, sclera non-icteric  Neck: supple  Respiratory: CTA b/l, no wheezing, with normal respiratory effort  Cardiovascular: RRRR/G  Gastrointestinal: soft, NTND  Extremities:  no c/c/e  MSK: no obvious abnormalities.   Neurological: Grossly intact  Skin: Normal temperature, no rash  Psych: normal affect    MEDICATIONS  (STANDING):  CISplatin IVPB (eMAR) 46 milliGRAM(s) IV Intermittent once  folic acid 1 milliGRAM(s) Oral daily  ifosfamide IVPB (eMAR) 2772 milliGRAM(s) IV Intermittent once  mesna IVPB (eMAR) 924 milliGRAM(s) IV Intermittent once  mesna IVPB (eMAR) 924 milliGRAM(s) IV Intermittent once  sodium chloride 0.9% 1000 milliLiter(s) (500 mL/Hr) IV Continuous <Continuous>  sodium chloride 0.9% Bolus 500 milliLiter(s) IV Bolus once  sodium chloride 0.9%. 1000 milliLiter(s) (75 mL/Hr) IV Continuous <Continuous>  sodium phosphate IVPB 15 milliMole(s) IV Intermittent once    MEDICATIONS  (PRN):  diphenhydrAMINE   Injectable 25 milliGRAM(s) IV Push once PRN PRN Chemotherapy Reaction  hydrocortisone sodium succinate Injectable 100 milliGRAM(s) IV Push once PRN PRN Chemotherapy Reaction  meclizine 12.5 milliGRAM(s) Oral two times a day PRN Dizziness  oxyCODONE    IR 5 milliGRAM(s) Oral every 6 hours PRN Moderate Pain (4 - 6)      No Known Allergies      LABS:                        7.6    6.04  )-----------( 131      ( 25 Sep 2018 06:55 )             23.1     09-25    139  |  103  |  10  ----------------------------<  114<H>  4.3   |  21<L>  |  0.67    Ca    8.5      25 Sep 2018 07:25  Phos  2.1     09-25  Mg     1.3     09-25    TPro  5.2<L>  /  Alb  2.8<L>  /  TBili  0.4  /  DBili  x   /  AST  27  /  ALT  44<H>  /  AlkPhos  57  09-25           RADIOLOGY & ADDITIONAL TESTS:  Studies reviewed.

## 2018-09-25 NOTE — DISCHARGE NOTE ADULT - CARE PROVIDER_API CALL
Dr. Chirinos-Oncology,   [facility]  Phone: [phone]  Fax: (   )    -

## 2018-09-25 NOTE — DISCHARGE NOTE ADULT - HOSPITAL COURSE
38 year old male, with past history significant for Testicular cancer, Atrial flutter/fibrillation, Atrial mass, Cardiomyopathy, Orchiectomy, Lumbar herniated disc, Neuropathy of the fingertips, Obesity, Cardioversion, Midline cath, presented to the ED secondary to need for third cycle of paclitaxel, ifosfamide, cisplatin (TIP).  Pt s/p chemotherapy, Pt tolerated chemotherapy. Pt to f/u outpt at [facility], appointment scheduled for 9/26.

## 2018-09-25 NOTE — PROGRESS NOTE ADULT - ASSESSMENT
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 Admitted 9/24 for cycle 3.     #testicular cancer  -C3 TIP started 9/25. (paclitaxel dose reduced by 20% given transaminitis). Patient ok for discharge from an onc perspective once chemotherapy completed, has follow up for remainder of cycle at [facility] 9/26.  -Daily CBC with diff, CMP  -Pain control and supportive care  -Rest of care per primary team  -outpatient oncologist: Dr Taran Jimenez MD  Hematology/Oncology Fellow  Pager: [phone]

## 2018-09-25 NOTE — PROGRESS NOTE ADULT - PROBLEM SELECTOR PLAN 4
Drop in hb, could be that blood drawn from picc line/mid line. Will obtain another sample prior to discharging.

## 2018-09-25 NOTE — PROGRESS NOTE ADULT - PROBLEM SELECTOR PLAN 1
- mixed germ cell tumor  - s/p 2 cycles of chemotherapy (paclitaxel, ifosfamide, cisplatin), and admitted now for 3rd cycle  - prior complications w/ chemotx - A-fib/flutter, transaminitis (f/u AM lab-work)  - followed by Oncology team (appreciated); chemotherapy in progress presently  - ensure optimal hydration  - Cleared by oncology for discharge

## 2018-09-26 ENCOUNTER — APPOINTMENT (OUTPATIENT)
Dept: INFUSION THERAPY | Facility: HOSPITAL | Age: 38
End: 2018-09-26

## 2018-09-26 ENCOUNTER — RESULT REVIEW (OUTPATIENT)
Age: 38
End: 2018-09-26

## 2018-09-26 LAB
APPEARANCE UR: CLEAR — SIGNIFICANT CHANGE UP
BACTERIA # UR AUTO: NEGATIVE — SIGNIFICANT CHANGE UP
BILIRUB UR-MCNC: NEGATIVE — SIGNIFICANT CHANGE UP
COLOR SPEC: YELLOW — SIGNIFICANT CHANGE UP
DIFF PNL FLD: NEGATIVE — SIGNIFICANT CHANGE UP
EPI CELLS # UR: 5 /HPF — SIGNIFICANT CHANGE UP (ref 0–5)
GLUCOSE UR QL: NEGATIVE MG/DL — SIGNIFICANT CHANGE UP
HYALINE CASTS # UR AUTO: 12 /LPF — HIGH (ref 0–7)
KETONES UR-MCNC: ABNORMAL
LEUKOCYTE ESTERASE UR-ACNC: NEGATIVE — SIGNIFICANT CHANGE UP
NITRITE UR-MCNC: NEGATIVE — SIGNIFICANT CHANGE UP
PH UR: 6 — SIGNIFICANT CHANGE UP (ref 5–8)
PROT UR-MCNC: ABNORMAL MG/DL
RBC CASTS # UR COMP ASSIST: 3 /HPF — SIGNIFICANT CHANGE UP (ref 0–4)
SP GR SPEC: 1.02 — SIGNIFICANT CHANGE UP (ref 1.01–1.02)
UROBILINOGEN FLD QL: NEGATIVE MG/DL — SIGNIFICANT CHANGE UP
WBC UR QL: 11 /HPF — HIGH (ref 0–5)

## 2018-09-27 ENCOUNTER — APPOINTMENT (OUTPATIENT)
Dept: INFUSION THERAPY | Facility: HOSPITAL | Age: 38
End: 2018-09-27

## 2018-09-27 ENCOUNTER — RESULT REVIEW (OUTPATIENT)
Age: 38
End: 2018-09-27

## 2018-09-27 DIAGNOSIS — Z51.89 ENCOUNTER FOR OTHER SPECIFIED AFTERCARE: ICD-10-CM

## 2018-09-27 DIAGNOSIS — Z51.11 ENCOUNTER FOR ANTINEOPLASTIC CHEMOTHERAPY: ICD-10-CM

## 2018-09-27 DIAGNOSIS — R11.2 NAUSEA WITH VOMITING, UNSPECIFIED: ICD-10-CM

## 2018-09-27 DIAGNOSIS — E86.0 DEHYDRATION: ICD-10-CM

## 2018-09-27 LAB
APPEARANCE UR: CLEAR — SIGNIFICANT CHANGE UP
BACTERIA # UR AUTO: NEGATIVE — SIGNIFICANT CHANGE UP
BILIRUB UR-MCNC: NEGATIVE — SIGNIFICANT CHANGE UP
COLOR SPEC: YELLOW — SIGNIFICANT CHANGE UP
DIFF PNL FLD: NEGATIVE — SIGNIFICANT CHANGE UP
EPI CELLS # UR: 1 /HPF — SIGNIFICANT CHANGE UP (ref 0–5)
GLUCOSE UR QL: NEGATIVE MG/DL — SIGNIFICANT CHANGE UP
HYALINE CASTS # UR AUTO: 0 /LPF — SIGNIFICANT CHANGE UP (ref 0–7)
KETONES UR-MCNC: ABNORMAL
LEUKOCYTE ESTERASE UR-ACNC: NEGATIVE — SIGNIFICANT CHANGE UP
NITRITE UR-MCNC: NEGATIVE — SIGNIFICANT CHANGE UP
PH UR: 6.5 — SIGNIFICANT CHANGE UP (ref 5–8)
PROT UR-MCNC: NEGATIVE MG/DL — SIGNIFICANT CHANGE UP
RBC CASTS # UR COMP ASSIST: 1 /HPF — SIGNIFICANT CHANGE UP (ref 0–4)
SP GR SPEC: 1.02 — SIGNIFICANT CHANGE UP (ref 1.01–1.02)
UROBILINOGEN FLD QL: NEGATIVE MG/DL — SIGNIFICANT CHANGE UP
WBC UR QL: 4 /HPF — SIGNIFICANT CHANGE UP (ref 0–5)

## 2018-09-28 ENCOUNTER — RESULT REVIEW (OUTPATIENT)
Age: 38
End: 2018-09-28

## 2018-09-28 ENCOUNTER — APPOINTMENT (OUTPATIENT)
Dept: INFUSION THERAPY | Facility: HOSPITAL | Age: 38
End: 2018-09-28

## 2018-09-28 ENCOUNTER — LABORATORY RESULT (OUTPATIENT)
Age: 38
End: 2018-09-28

## 2018-09-28 LAB
BACTERIA # UR AUTO: NEGATIVE — SIGNIFICANT CHANGE UP
EPI CELLS # UR: 2 /HPF — SIGNIFICANT CHANGE UP (ref 0–5)
HCT VFR BLD CALC: 31.6 % — LOW (ref 39–50)
HGB BLD-MCNC: 11 G/DL — LOW (ref 13–17)
HYALINE CASTS # UR AUTO: 1 /LPF — SIGNIFICANT CHANGE UP (ref 0–7)
MCHC RBC-ENTMCNC: 32 PG — SIGNIFICANT CHANGE UP (ref 27–34)
MCHC RBC-ENTMCNC: 34.9 G/DL — SIGNIFICANT CHANGE UP (ref 32–36)
MCV RBC AUTO: 91.6 FL — SIGNIFICANT CHANGE UP (ref 80–100)
PLATELET # BLD AUTO: 134 K/UL — LOW (ref 150–400)
RBC # BLD: 3.45 M/UL — LOW (ref 4.2–5.8)
RBC # FLD: 15.5 % — HIGH (ref 10.3–14.5)
RBC CASTS # UR COMP ASSIST: 1 /HPF — SIGNIFICANT CHANGE UP (ref 0–4)
WBC # BLD: 5.4 K/UL — SIGNIFICANT CHANGE UP (ref 3.8–10.5)
WBC # FLD AUTO: 5.4 K/UL — SIGNIFICANT CHANGE UP (ref 3.8–10.5)
WBC UR QL: 8 /HPF — HIGH (ref 0–5)

## 2018-09-29 LAB
APPEARANCE UR: CLEAR — SIGNIFICANT CHANGE UP
BILIRUB UR-MCNC: NEGATIVE — SIGNIFICANT CHANGE UP
COLOR SPEC: YELLOW — SIGNIFICANT CHANGE UP
DIFF PNL FLD: NEGATIVE — SIGNIFICANT CHANGE UP
GLUCOSE UR QL: NEGATIVE — SIGNIFICANT CHANGE UP
KETONES UR-MCNC: NEGATIVE — SIGNIFICANT CHANGE UP
LEUKOCYTE ESTERASE UR-ACNC: NEGATIVE — SIGNIFICANT CHANGE UP
NITRITE UR-MCNC: NEGATIVE — SIGNIFICANT CHANGE UP
PH UR: 6.5 — SIGNIFICANT CHANGE UP (ref 5–8)
PROT UR-MCNC: ABNORMAL
SP GR SPEC: 1.02 — SIGNIFICANT CHANGE UP (ref 1.01–1.02)
UROBILINOGEN FLD QL: NEGATIVE — SIGNIFICANT CHANGE UP

## 2018-09-30 ENCOUNTER — INPATIENT (INPATIENT)
Facility: HOSPITAL | Age: 38
LOS: 5 days | Discharge: HOME | End: 2018-10-06
Attending: HOSPITALIST | Admitting: HOSPITALIST
Payer: MEDICAID

## 2018-09-30 VITALS
TEMPERATURE: 99 F | HEART RATE: 114 BPM | SYSTOLIC BLOOD PRESSURE: 93 MMHG | DIASTOLIC BLOOD PRESSURE: 66 MMHG | RESPIRATION RATE: 20 BRPM | OXYGEN SATURATION: 98 %

## 2018-09-30 DIAGNOSIS — Z95.828 PRESENCE OF OTHER VASCULAR IMPLANTS AND GRAFTS: Chronic | ICD-10-CM

## 2018-09-30 DIAGNOSIS — Z98.890 OTHER SPECIFIED POSTPROCEDURAL STATES: Chronic | ICD-10-CM

## 2018-09-30 DIAGNOSIS — Z90.79 ACQUIRED ABSENCE OF OTHER GENITAL ORGAN(S): Chronic | ICD-10-CM

## 2018-09-30 RX ORDER — SODIUM CHLORIDE 9 MG/ML
1000 INJECTION, SOLUTION INTRAVENOUS ONCE
Qty: 0 | Refills: 0 | Status: COMPLETED | OUTPATIENT
Start: 2018-09-30 | End: 2018-09-30

## 2018-09-30 RX ORDER — ONDANSETRON 8 MG/1
4 TABLET, FILM COATED ORAL ONCE
Qty: 0 | Refills: 0 | Status: COMPLETED | OUTPATIENT
Start: 2018-09-30 | End: 2018-09-30

## 2018-09-30 RX ADMIN — SODIUM CHLORIDE 1000 MILLILITER(S): 9 INJECTION, SOLUTION INTRAVENOUS at 23:49

## 2018-09-30 NOTE — ED ADULT TRIAGE NOTE - CHIEF COMPLAINT QUOTE
Pt walk in on a wheelchair, syncopal episode for few seconds. reports feels lightheaded prior falling. Vomiting multiple x, diarrhea body weakness, Dx with testicular CA, 3rd dose of 4th Chemo, done last Friday(9/28/2018)

## 2018-09-30 NOTE — ED PROVIDER NOTE - PMH
Anemia    Atrial fibrillation  s/p DCCV 1/18  Atrial flutter    Atrial mass  right artrial echo density per MRI 3/14/18.  Cardiomyopathy    Lumbar herniated disc    Mixed germ cell tumor    Neuropathy  ~ fingers and toes  Obesity    Testicular cancer    Testicular mass  left  Thrombus  Right atrial wall ( suspected from last TYLOR) On AC

## 2018-09-30 NOTE — ED ADULT NURSE NOTE - OBJECTIVE STATEMENT
Facilitator RN- Pt received to spot #19. AAOx4, c/o having 2 syncopal episodes earlier today. Pt states he was standing attempting to go to the bathroom and he then passed out. c/o body weakness and dizziness. Denies chest pain at this time. Pt states last chemo treatment was on 9/29. Pt has double lumen midline to left arm. NSR on cardiac monitor, heart rate in the 90's. MD grimm performed. #22g IVSL placed to right ac, labs drawn and sent. Report given off to primary RN in area. Family members at bedside. no acute distress. Awaiting further plan of care.

## 2018-09-30 NOTE — ED PROVIDER NOTE - NS ED ROS FT
CONST: no fevers, no chills, +lightheadedness, +syncope  EYES: no pain, no vision changes  ENT: no sore throat, no ear pain, no change in hearing  CV: no chest pain, no leg swelling  RESP: no shortness of breath, no cough  ABD: no abdominal pain, +nausea, +vomiting, +diarrhea  : no dysuria, no flank pain, no hematuria  MSK: no back pain, no extremity pain  NEURO: no headache or additional neurologic complaints  HEME: no easy bleeding  SKIN:  no rash

## 2018-09-30 NOTE — ED ADULT NURSE NOTE - NSIMPLEMENTINTERV_GEN_ALL_ED
Implemented All Fall Risk Interventions:  [place] to call system. Call bell, personal items and telephone within reach. Instruct patient to call for assistance. Room bathroom lighting operational. Non-slip footwear when patient is off stretcher. Physically safe environment: no spills, clutter or unnecessary equipment. Stretcher in lowest position, wheels locked, appropriate side rails in place. Provide visual cue, wrist band, yellow gown, etc. Monitor gait and stability. Monitor for mental status changes and reorient to person, place, and time. Review medications for side effects contributing to fall risk. Reinforce activity limits and safety measures with patient and family.

## 2018-09-30 NOTE — ED PROVIDER NOTE - PHYSICAL EXAMINATION
Elsa Kasper DO.:   GENERAL: Patient awake alert NAD.  HEENT: Moist mucous membranes, PERRL, EOMI  LUNGS: CTAB, no wheezes or crackles.   CARDIAC: RRR, no m/r/g.    ABDOMEN: Soft, NT, ND, No rebound, guarding. No CVA tenderness.   EXT: No edema. No calf tenderness.   NEURO: A&Ox3. Gait normal.    SKIN: Warm and dry. No rash. Elsa Kasper DO.:   GENERAL: Patient awake alert NAD.  HEENT: dry mucous membranes, PERRL, EOMI  LUNGS: CTAB, no wheezes or crackles.   CARDIAC: RRR, no m/r/g.    ABDOMEN: Soft, NT, ND, No rebound, guarding. No CVA tenderness.   EXT: No edema. No calf tenderness.   NEURO: A&Ox3. Gait normal.    SKIN: Warm and dry. No rash.

## 2018-09-30 NOTE — ED PROVIDER NOTE - OBJECTIVE STATEMENT
38M h/o Testicular CA (on chemo, last 9/28, 3rd of 4) with complications inc. afib/flutter with cardioversion, cardiomyopathy, ? orchiectomy presents with unwitnessed? syncopal episode x2, associated with N/V/D. He admits to similar episodes of lightheadedness after chemo but no syncope. Previous hospitalizations has had hyponatremia, hypokalemia and elevated LFT. Denies fevers, abdominal pain, leg swelling, chest pain.

## 2018-10-01 ENCOUNTER — OUTPATIENT (OUTPATIENT)
Dept: OUTPATIENT SERVICES | Facility: HOSPITAL | Age: 38
LOS: 1 days | End: 2018-10-01

## 2018-10-01 DIAGNOSIS — R11.2 NAUSEA WITH VOMITING, UNSPECIFIED: ICD-10-CM

## 2018-10-01 DIAGNOSIS — Z98.890 OTHER SPECIFIED POSTPROCEDURAL STATES: Chronic | ICD-10-CM

## 2018-10-01 DIAGNOSIS — C80.1 MALIGNANT (PRIMARY) NEOPLASM, UNSPECIFIED: ICD-10-CM

## 2018-10-01 DIAGNOSIS — C62.90 MALIGNANT NEOPLASM OF UNSPECIFIED TESTIS, UNSPECIFIED WHETHER DESCENDED OR UNDESCENDED: ICD-10-CM

## 2018-10-01 DIAGNOSIS — Z90.79 ACQUIRED ABSENCE OF OTHER GENITAL ORGAN(S): Chronic | ICD-10-CM

## 2018-10-01 DIAGNOSIS — I48.2 CHRONIC ATRIAL FIBRILLATION: ICD-10-CM

## 2018-10-01 DIAGNOSIS — Z95.828 PRESENCE OF OTHER VASCULAR IMPLANTS AND GRAFTS: Chronic | ICD-10-CM

## 2018-10-01 DIAGNOSIS — R55 SYNCOPE AND COLLAPSE: ICD-10-CM

## 2018-10-01 DIAGNOSIS — E86.0 DEHYDRATION: ICD-10-CM

## 2018-10-01 PROBLEM — D64.9 ANEMIA, UNSPECIFIED: Chronic | Status: ACTIVE | Noted: 2018-09-24

## 2018-10-01 PROBLEM — G62.9 POLYNEUROPATHY, UNSPECIFIED: Chronic | Status: ACTIVE | Noted: 2018-09-24

## 2018-10-01 LAB
ALBUMIN SERPL ELPH-MCNC: 3.7 G/DL — SIGNIFICANT CHANGE UP (ref 3.3–5)
ALBUMIN SERPL ELPH-MCNC: 4.6 G/DL — SIGNIFICANT CHANGE UP (ref 3.3–5)
ALP SERPL-CCNC: 61 U/L — SIGNIFICANT CHANGE UP (ref 40–120)
ALP SERPL-CCNC: 76 U/L — SIGNIFICANT CHANGE UP (ref 40–120)
ALT FLD-CCNC: 216 U/L — HIGH (ref 4–41)
ALT FLD-CCNC: 242 U/L — HIGH (ref 4–41)
ANISOCYTOSIS BLD QL: SLIGHT — SIGNIFICANT CHANGE UP
AST SERPL-CCNC: 135 U/L — HIGH (ref 4–40)
AST SERPL-CCNC: 157 U/L — HIGH (ref 4–40)
BASOPHILS # BLD AUTO: 0.01 K/UL — SIGNIFICANT CHANGE UP (ref 0–0.2)
BASOPHILS # BLD AUTO: 0.01 K/UL — SIGNIFICANT CHANGE UP (ref 0–0.2)
BASOPHILS NFR BLD AUTO: 0.3 % — SIGNIFICANT CHANGE UP (ref 0–2)
BASOPHILS NFR BLD AUTO: 0.6 % — SIGNIFICANT CHANGE UP (ref 0–2)
BASOPHILS NFR SPEC: 0 % — SIGNIFICANT CHANGE UP (ref 0–2)
BILIRUB SERPL-MCNC: 0.8 MG/DL — SIGNIFICANT CHANGE UP (ref 0.2–1.2)
BILIRUB SERPL-MCNC: 1.1 MG/DL — SIGNIFICANT CHANGE UP (ref 0.2–1.2)
BLASTS # FLD: 0 % — SIGNIFICANT CHANGE UP (ref 0–0)
BUN SERPL-MCNC: 19 MG/DL — SIGNIFICANT CHANGE UP (ref 7–23)
BUN SERPL-MCNC: 23 MG/DL — SIGNIFICANT CHANGE UP (ref 7–23)
CALCIUM SERPL-MCNC: 8.2 MG/DL — LOW (ref 8.4–10.5)
CALCIUM SERPL-MCNC: 9.3 MG/DL — SIGNIFICANT CHANGE UP (ref 8.4–10.5)
CHLORIDE SERPL-SCNC: 90 MMOL/L — LOW (ref 98–107)
CHLORIDE SERPL-SCNC: 95 MMOL/L — LOW (ref 98–107)
CK MB BLD-MCNC: 1 NG/ML — SIGNIFICANT CHANGE UP (ref 1–6.6)
CK MB BLD-MCNC: SIGNIFICANT CHANGE UP (ref 0–2.5)
CK SERPL-CCNC: 40 U/L — SIGNIFICANT CHANGE UP (ref 30–200)
CO2 SERPL-SCNC: 26 MMOL/L — SIGNIFICANT CHANGE UP (ref 22–31)
CO2 SERPL-SCNC: 27 MMOL/L — SIGNIFICANT CHANGE UP (ref 22–31)
CREAT SERPL-MCNC: 0.86 MG/DL — SIGNIFICANT CHANGE UP (ref 0.5–1.3)
CREAT SERPL-MCNC: 1.03 MG/DL — SIGNIFICANT CHANGE UP (ref 0.5–1.3)
EOSINOPHIL # BLD AUTO: 0.03 K/UL — SIGNIFICANT CHANGE UP (ref 0–0.5)
EOSINOPHIL # BLD AUTO: 0.04 K/UL — SIGNIFICANT CHANGE UP (ref 0–0.5)
EOSINOPHIL NFR BLD AUTO: 1 % — SIGNIFICANT CHANGE UP (ref 0–6)
EOSINOPHIL NFR BLD AUTO: 1.8 % — SIGNIFICANT CHANGE UP (ref 0–6)
EOSINOPHIL NFR FLD: 0 % — SIGNIFICANT CHANGE UP (ref 0–6)
GIANT PLATELETS BLD QL SMEAR: PRESENT — SIGNIFICANT CHANGE UP
GLUCOSE SERPL-MCNC: 101 MG/DL — HIGH (ref 70–99)
GLUCOSE SERPL-MCNC: 117 MG/DL — HIGH (ref 70–99)
HCT VFR BLD CALC: 26.6 % — LOW (ref 39–50)
HCT VFR BLD CALC: 33.6 % — LOW (ref 39–50)
HGB BLD-MCNC: 11.3 G/DL — LOW (ref 13–17)
HGB BLD-MCNC: 8.9 G/DL — LOW (ref 13–17)
IMM GRANULOCYTES # BLD AUTO: 0.11 # — SIGNIFICANT CHANGE UP
IMM GRANULOCYTES # BLD AUTO: 0.37 # — SIGNIFICANT CHANGE UP
IMM GRANULOCYTES NFR BLD AUTO: 6.5 % — HIGH (ref 0–1.5)
IMM GRANULOCYTES NFR BLD AUTO: 9.4 % — HIGH (ref 0–1.5)
LACTATE SERPL-SCNC: 0.9 MMOL/L — SIGNIFICANT CHANGE UP (ref 0.5–2)
LDH SERPL L TO P-CCNC: 202 U/L — SIGNIFICANT CHANGE UP (ref 135–225)
LYMPHOCYTES # BLD AUTO: 0.52 K/UL — LOW (ref 1–3.3)
LYMPHOCYTES # BLD AUTO: 0.55 K/UL — LOW (ref 1–3.3)
LYMPHOCYTES # BLD AUTO: 13.9 % — SIGNIFICANT CHANGE UP (ref 13–44)
LYMPHOCYTES # BLD AUTO: 31 % — SIGNIFICANT CHANGE UP (ref 13–44)
LYMPHOCYTES NFR SPEC AUTO: 23.7 % — SIGNIFICANT CHANGE UP (ref 13–44)
MACROCYTES BLD QL: SLIGHT — SIGNIFICANT CHANGE UP
MAGNESIUM SERPL-MCNC: 1.7 MG/DL — SIGNIFICANT CHANGE UP (ref 1.6–2.6)
MAGNESIUM SERPL-MCNC: 1.8 MG/DL — SIGNIFICANT CHANGE UP (ref 1.6–2.6)
MCHC RBC-ENTMCNC: 29.9 PG — SIGNIFICANT CHANGE UP (ref 27–34)
MCHC RBC-ENTMCNC: 30.5 PG — SIGNIFICANT CHANGE UP (ref 27–34)
MCHC RBC-ENTMCNC: 33.5 % — SIGNIFICANT CHANGE UP (ref 32–36)
MCHC RBC-ENTMCNC: 33.6 % — SIGNIFICANT CHANGE UP (ref 32–36)
MCV RBC AUTO: 89.3 FL — SIGNIFICANT CHANGE UP (ref 80–100)
MCV RBC AUTO: 90.6 FL — SIGNIFICANT CHANGE UP (ref 80–100)
METAMYELOCYTES # FLD: 0 % — SIGNIFICANT CHANGE UP (ref 0–1)
MONOCYTES # BLD AUTO: 0.02 K/UL — SIGNIFICANT CHANGE UP (ref 0–0.9)
MONOCYTES # BLD AUTO: 0.04 K/UL — SIGNIFICANT CHANGE UP (ref 0–0.9)
MONOCYTES NFR BLD AUTO: 0.5 % — LOW (ref 2–14)
MONOCYTES NFR BLD AUTO: 2.4 % — SIGNIFICANT CHANGE UP (ref 2–14)
MONOCYTES NFR BLD: 0.9 % — LOW (ref 2–9)
MYELOCYTES NFR BLD: 0 % — SIGNIFICANT CHANGE UP (ref 0–0)
NEUTROPHIL AB SER-ACNC: 68.4 % — SIGNIFICANT CHANGE UP (ref 43–77)
NEUTROPHILS # BLD AUTO: 0.97 K/UL — LOW (ref 1.8–7.4)
NEUTROPHILS # BLD AUTO: 2.96 K/UL — SIGNIFICANT CHANGE UP (ref 1.8–7.4)
NEUTROPHILS NFR BLD AUTO: 57.7 % — SIGNIFICANT CHANGE UP (ref 43–77)
NEUTROPHILS NFR BLD AUTO: 74.9 % — SIGNIFICANT CHANGE UP (ref 43–77)
NEUTS BAND # BLD: 5.3 % — SIGNIFICANT CHANGE UP (ref 0–6)
NRBC # FLD: 0 — SIGNIFICANT CHANGE UP
NRBC # FLD: 0 — SIGNIFICANT CHANGE UP
OTHER - HEMATOLOGY %: 0 — SIGNIFICANT CHANGE UP
PHOSPHATE SERPL-MCNC: 3 MG/DL — SIGNIFICANT CHANGE UP (ref 2.5–4.5)
PHOSPHATE SERPL-MCNC: 3.4 MG/DL — SIGNIFICANT CHANGE UP (ref 2.5–4.5)
PLATELET # BLD AUTO: 114 K/UL — LOW (ref 150–400)
PLATELET # BLD AUTO: 84 K/UL — LOW (ref 150–400)
PLATELET COUNT - ESTIMATE: SIGNIFICANT CHANGE UP
PMV BLD: 11.3 FL — SIGNIFICANT CHANGE UP (ref 7–13)
PMV BLD: 11.5 FL — SIGNIFICANT CHANGE UP (ref 7–13)
POTASSIUM SERPL-MCNC: 3.1 MMOL/L — LOW (ref 3.5–5.3)
POTASSIUM SERPL-MCNC: 3.5 MMOL/L — SIGNIFICANT CHANGE UP (ref 3.5–5.3)
POTASSIUM SERPL-SCNC: 3.1 MMOL/L — LOW (ref 3.5–5.3)
POTASSIUM SERPL-SCNC: 3.5 MMOL/L — SIGNIFICANT CHANGE UP (ref 3.5–5.3)
PROMYELOCYTES # FLD: 0 % — SIGNIFICANT CHANGE UP (ref 0–0)
PROT SERPL-MCNC: 6.4 G/DL — SIGNIFICANT CHANGE UP (ref 6–8.3)
PROT SERPL-MCNC: 7.9 G/DL — SIGNIFICANT CHANGE UP (ref 6–8.3)
RBC # BLD: 2.98 M/UL — LOW (ref 4.2–5.8)
RBC # BLD: 3.71 M/UL — LOW (ref 4.2–5.8)
RBC # FLD: 16.1 % — HIGH (ref 10.3–14.5)
RBC # FLD: 16.2 % — HIGH (ref 10.3–14.5)
SODIUM SERPL-SCNC: 133 MMOL/L — LOW (ref 135–145)
SODIUM SERPL-SCNC: 134 MMOL/L — LOW (ref 135–145)
TROPONIN T, HIGH SENSITIVITY: 11 NG/L — SIGNIFICANT CHANGE UP (ref ?–14)
TROPONIN T, HIGH SENSITIVITY: 9 NG/L — SIGNIFICANT CHANGE UP (ref ?–14)
URATE SERPL-MCNC: 6.8 MG/DL — SIGNIFICANT CHANGE UP (ref 3.4–8.8)
VARIANT LYMPHS # BLD: 1.7 % — SIGNIFICANT CHANGE UP
WBC # BLD: 1.68 K/UL — LOW (ref 3.8–10.5)
WBC # BLD: 3.95 K/UL — SIGNIFICANT CHANGE UP (ref 3.8–10.5)
WBC # FLD AUTO: 1.68 K/UL — LOW (ref 3.8–10.5)
WBC # FLD AUTO: 3.95 K/UL — SIGNIFICANT CHANGE UP (ref 3.8–10.5)

## 2018-10-01 PROCEDURE — 99232 SBSQ HOSP IP/OBS MODERATE 35: CPT | Mod: GC

## 2018-10-01 PROCEDURE — 71045 X-RAY EXAM CHEST 1 VIEW: CPT | Mod: 26

## 2018-10-01 PROCEDURE — 12345: CPT | Mod: NC

## 2018-10-01 PROCEDURE — 99223 1ST HOSP IP/OBS HIGH 75: CPT

## 2018-10-01 RX ORDER — HEPARIN SODIUM 5000 [USP'U]/ML
5000 INJECTION INTRAVENOUS; SUBCUTANEOUS EVERY 12 HOURS
Qty: 0 | Refills: 0 | Status: DISCONTINUED | OUTPATIENT
Start: 2018-10-01 | End: 2018-10-02

## 2018-10-01 RX ORDER — MECLIZINE HCL 12.5 MG
12.5 TABLET ORAL
Qty: 0 | Refills: 0 | Status: DISCONTINUED | OUTPATIENT
Start: 2018-10-01 | End: 2018-10-06

## 2018-10-01 RX ORDER — SODIUM CHLORIDE 9 MG/ML
1000 INJECTION INTRAMUSCULAR; INTRAVENOUS; SUBCUTANEOUS ONCE
Qty: 0 | Refills: 0 | Status: COMPLETED | OUTPATIENT
Start: 2018-10-01 | End: 2018-10-01

## 2018-10-01 RX ORDER — ONDANSETRON 8 MG/1
4 TABLET, FILM COATED ORAL EVERY 8 HOURS
Qty: 0 | Refills: 0 | Status: DISCONTINUED | OUTPATIENT
Start: 2018-10-01 | End: 2018-10-02

## 2018-10-01 RX ORDER — FOLIC ACID 0.8 MG
1 TABLET ORAL DAILY
Qty: 0 | Refills: 0 | Status: DISCONTINUED | OUTPATIENT
Start: 2018-10-01 | End: 2018-10-06

## 2018-10-01 RX ORDER — POTASSIUM CHLORIDE 20 MEQ
10 PACKET (EA) ORAL
Qty: 0 | Refills: 0 | Status: COMPLETED | OUTPATIENT
Start: 2018-10-01 | End: 2018-10-01

## 2018-10-01 RX ORDER — SODIUM CHLORIDE 9 MG/ML
1000 INJECTION INTRAMUSCULAR; INTRAVENOUS; SUBCUTANEOUS
Qty: 0 | Refills: 0 | Status: DISCONTINUED | OUTPATIENT
Start: 2018-10-01 | End: 2018-10-02

## 2018-10-01 RX ORDER — OXYCODONE HYDROCHLORIDE 5 MG/1
15 TABLET ORAL EVERY 6 HOURS
Qty: 0 | Refills: 0 | Status: DISCONTINUED | OUTPATIENT
Start: 2018-10-01 | End: 2018-10-04

## 2018-10-01 RX ORDER — ERGOCALCIFEROL 1.25 MG/1
50000 CAPSULE ORAL
Qty: 0 | Refills: 0 | Status: DISCONTINUED | OUTPATIENT
Start: 2018-10-01 | End: 2018-10-06

## 2018-10-01 RX ORDER — ACETAMINOPHEN 500 MG
650 TABLET ORAL EVERY 6 HOURS
Qty: 0 | Refills: 0 | Status: DISCONTINUED | OUTPATIENT
Start: 2018-10-01 | End: 2018-10-06

## 2018-10-01 RX ORDER — PANTOPRAZOLE SODIUM 20 MG/1
40 TABLET, DELAYED RELEASE ORAL DAILY
Qty: 0 | Refills: 0 | Status: DISCONTINUED | OUTPATIENT
Start: 2018-10-01 | End: 2018-10-06

## 2018-10-01 RX ADMIN — HEPARIN SODIUM 5000 UNIT(S): 5000 INJECTION INTRAVENOUS; SUBCUTANEOUS at 06:03

## 2018-10-01 RX ADMIN — PANTOPRAZOLE SODIUM 40 MILLIGRAM(S): 20 TABLET, DELAYED RELEASE ORAL at 11:30

## 2018-10-01 RX ADMIN — SODIUM CHLORIDE 1000 MILLILITER(S): 9 INJECTION INTRAMUSCULAR; INTRAVENOUS; SUBCUTANEOUS at 07:47

## 2018-10-01 RX ADMIN — Medication 100 MILLIEQUIVALENT(S): at 07:47

## 2018-10-01 RX ADMIN — ONDANSETRON 4 MILLIGRAM(S): 8 TABLET, FILM COATED ORAL at 00:08

## 2018-10-01 RX ADMIN — Medication 100 MILLIEQUIVALENT(S): at 12:36

## 2018-10-01 RX ADMIN — SODIUM CHLORIDE 75 MILLILITER(S): 9 INJECTION INTRAMUSCULAR; INTRAVENOUS; SUBCUTANEOUS at 10:09

## 2018-10-01 RX ADMIN — Medication 1 TABLET(S): at 11:30

## 2018-10-01 RX ADMIN — HEPARIN SODIUM 5000 UNIT(S): 5000 INJECTION INTRAVENOUS; SUBCUTANEOUS at 17:56

## 2018-10-01 RX ADMIN — Medication 650 MILLIGRAM(S): at 06:03

## 2018-10-01 RX ADMIN — Medication 1 MILLIGRAM(S): at 11:30

## 2018-10-01 RX ADMIN — SODIUM CHLORIDE 1000 MILLILITER(S): 9 INJECTION INTRAMUSCULAR; INTRAVENOUS; SUBCUTANEOUS at 03:37

## 2018-10-01 RX ADMIN — Medication 100 MILLIEQUIVALENT(S): at 09:19

## 2018-10-01 NOTE — CONSULT NOTE ADULT - PROBLEM SELECTOR RECOMMENDATION 2
-syncope in the setting of hypovolemia from N/V/D  -symptoms now improved   -continue IVF and anti-emetics

## 2018-10-01 NOTE — H&P ADULT - PROBLEM SELECTOR PLAN 4
- s/p TIP chemotherapy a few days prior  - now w/ s/e of agents; nausea, vomiting, diarrhea  - oncology consult in the AM (pls call)

## 2018-10-01 NOTE — H&P ADULT - NSHPSOCIALHISTORY_GEN_ALL_CORE
Worked with [employer]  No history of alcohol abuse  No history of illegal drug use Lives with family  Worked with [employer]  No history of alcohol abuse  No history of illegal drug use

## 2018-10-01 NOTE — ED ADULT NURSE REASSESSMENT NOTE - NS ED NURSE REASSESS COMMENT FT1
Patient resting in bed, appears comfortable and in no apparent distress.  Denies any s/sx at this time.  Vitals taken and will continue to monitor patient.

## 2018-10-01 NOTE — CONSULT NOTE ADULT - PROBLEM SELECTOR RECOMMENDATION 9
-patient s/p cycle 3 of TIP chemotherapy, RPLND shows embryonal carcinoma  -Still experiencing severe N/V despite dose reduction  -Per oncology notes, pt will require at least 4 cycles of chemotherapy

## 2018-10-01 NOTE — H&P ADULT - PROBLEM SELECTOR PLAN 2
- fell twice prior to coming to ED  - in the setting of dehydration (see above)  - on IVF hydration  - f/u vital signs  - fall precautions

## 2018-10-01 NOTE — H&P ADULT - PSH
History of abdominal surgery  Retroperitoneal lymph node dissection (June 2018)  History of cardioversion  2/2018  History of orchiectomy  left-7/2017  Port-a-cath in place  placed 9/2017,, removed-10/18/2017

## 2018-10-01 NOTE — CONSULT NOTE ADULT - ATTENDING COMMENTS
Pt seen and examined. on tx for testicular ca with TIP- here with syncope and nausea. Feeling better. Supportive care. A/w above assessment and recs.

## 2018-10-01 NOTE — CHART NOTE - NSCHARTNOTEFT_GEN_A_CORE
Spoke to Dr. Jim re: midline placement confirmation.  Dr. Murphy the line not in fare enough. Dr. Murphy stated that it was unchanged from 9/25. Spoke to Dr. Nolen and stated that we will hold off until AM to officially confirm placement.   Pt refused to have an IV placed. Pt consuming enough clear liquid. Will continue to monitor.

## 2018-10-01 NOTE — H&P ADULT - NSHPLABSRESULTS_GEN_ALL_CORE
11.3   3.95  )-----------( 114      ( 30 Sep 2018 23:40 )             33.6       09-30    133<L>  |  90<L>  |  23  ----------------------------<  117<H>  3.5   |  27  |  1.03    Ca    9.3      30 Sep 2018 23:40  Phos  3.4     09-30  Mg     1.8     09-30    TPro  7.9  /  Alb  4.6  /  TBili  1.1  /  DBili  x   /  AST  157<H>  /  ALT  242<H>  /  AlkPhos  76  09-30          Lactate Trend      CARDIAC MARKERS ( 30 Sep 2018 23:40 )  x     / x     / 40 u/L / 1.00 ng/mL / x            CAPILLARY BLOOD GLUCOSE      POCT Blood Glucose.: 122 mg/dL (30 Sep 2018 22:08)

## 2018-10-01 NOTE — CHART NOTE - NSCHARTNOTEFT_GEN_A_CORE
Medicine Second Touch:    Patient seen and examined. Feeling better but still weak. Has not been able to tolerate much po. Cr downtrending. LFT's overall stable. C/w IVF. Onc recs appreciated.    10-01    134<L>  |  95<L>  |  19  ----------------------------<  101<H>  3.1<L>   |  26  |  0.86    Ca    8.2<L>      01 Oct 2018 05:44  Phos  3.0     10-01  Mg     1.7     10-01    TPro  6.4  /  Alb  3.7  /  TBili  0.8  /  DBili  x   /  AST  135<H>  /  ALT  216<H>  /  AlkPhos  61  10-01    Aspartate Aminotransferase (AST/SGOT): 157 u/L (09.30.18 @ 23:40)  Alanine Aminotransferase (ALT/SGPT): 242 u/L (09.30.18 @ 23:40)    --------------------  Nena Nolen  [phone]

## 2018-10-01 NOTE — H&P ADULT - PROBLEM SELECTOR PLAN 3
- borderline hypotensive and on IVF hydration  - in the setting of deficient/no oral intake for the past few days, and with vomiting, diarrhea since 09/26/2018  - f/u vital signs  - f/u electrolytes  - eval for any sign of fluid overload, since patient has heart failure  - intake/output Q4H x 3

## 2018-10-01 NOTE — H&P ADULT - PROBLEM SELECTOR PLAN 5
- tachycardic to 114 when presented to ED, improved  - [id number] score = 0  - in sinus rhythm presently  - no need for cardiology eval at present

## 2018-10-01 NOTE — H&P ADULT - HISTORY OF PRESENT ILLNESS
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 7/2017, 3 cycles of cisplatin/etoposide 9/2017, RPLND 6/2018, found to have residual disease, s/p cycle 1 of TIP 7/29/2018, completed 8/3/2018, s/p Neulasta onpro 8/3/2018, A-Flutter (2/2 catheter irritation of R atrium), L atrial thrombus s/p successful TYLOR/DCCV into sinus rhythm 2/2018 (not currently on AC), nonischemic CM (EF 40-45%)       38 year old male, with past history significant for Testicular cancer, Atrial flutter/fibrillation, Atrial mass, Cardiomyopathy, Orchiectomy, Lumbar herniated disc, Neuropathy of the fingertips, Obesity, Cardioversion, Vitamin D deficiency, Folate deficiency, and Port=a-cath, presented to the ED secondary to need for third cycle of paclitaxel, ifosfamide, cisplatin (TIP).  Seen and evaluated at bedside; NAD.  No complaints today.  Reports being mentally prepared for today's therapy.  Discusses wanting to resume, if possible, anticoagulation relative to the L-atrial thrombus and A-flutter/fibrillation. 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 7/2017, 3 cycles of cisplatin/etoposide 9/2017, RPLND 6/2018, found to have residual disease, s/p cycle 1 of TIP 7/29/2018, completed 8/3/2018, s/p Neulasta onpro 8/3/2018, A-Flutter (2/2 catheter irritation of R atrium), L atrial thrombus s/p successful TYLOR/DCCV into sinus rhythm 2/2018 (not currently on AC), nonischemic CM (EF 40-45%)       38 year old male, with past history significant for Testicular cancer, Atrial flutter/fibrillation, Atrial mass, Cardiomyopathy, Orchiectomy, Lumbar herniated disc, Neuropathy of the fingertips, Obesity, Cardioversion, Vitamin D deficiency, Folate deficiency, and Port=a-cath, presented to the ED secondary to need for third cycle of paclitaxel, ifosfamide, cisplatin (TIP).  Seen and evaluated at bedside; ill appearing with face mask in place, but in NAD.  Patient recently underwent chemotherapy on 09/24 to 09/25/2018 and reports onset of nausea, vomiting since Friday (09/28/2018); no tolerance of oral nutrition since then.  Reports 2 episodes of syncope; during the first episode, patient felt faint and lowered himself to the floor and had LOC for a few seconds.  Family came to his aid and after feeling somewhat stable, patient attempted to go to the bathroom.  However, he again fell, but this time, family was present.  States no trauma to head or body.  Has suffered with similar episodes associated with prior chemotherapy treatments.  Complains of feeling cold.  No reports of headache, chest pain, shortness of breath, palpitations, abdominal pain, dysuria.    Vital signs upon ED presentation: BP = 93/66, HR = 114, RR = 20, T = 37C (98.6 F), O2 Sat = 98% on RA.  Diagnosed with Nausea, Vomiting and Diarrhea, as well as Syncope. 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 7/2017, 3 cycles of cisplatin/etoposide 9/2017, RPLND 6/2018, found to have residual disease, s/p cycle 1 of TIP 7/29/2018, completed 8/3/2018, s/p Neulasta onpro 8/3/2018, A-Flutter (2/2 catheter irritation of R atrium), L atrial thrombus s/p successful TYLOR/DCCV into sinus rhythm 2/2018 (not currently on AC), nonischemic CM (EF 40-45%)       38 year old male, with past history significant for Testicular cancer, Atrial flutter/fibrillation, Atrial mass, Cardiomyopathy, Orchiectomy, Lumbar herniated disc, Neuropathy of the fingertips, Obesity, Cardioversion, Vitamin D deficiency, Folate deficiency, and Port=a-cath, presented to the ED secondary to need for third cycle of paclitaxel, ifosfamide, cisplatin (TIP).  Seen and evaluated at bedside; ill appearing with face mask in place, but in NAD.  Patient recently underwent chemotherapy on 09/24 to 09/25/2018 and reports onset of nausea, vomiting since Friday (09/28/2018); no tolerance of oral nutrition since then.  Reports 2 episodes of syncope; during the first episode, patient felt faint and lowered himself to the floor and had LOC for a few seconds.  Family came to his aid and after feeling somewhat stable, patient attempted to go to the bathroom.  However, he again fell, but this time, family was present.  States no trauma to head or body.  Has suffered with similar episodes associated with prior chemotherapy treatments.  Complains of feeling cold.  No reports of headache, chest pain, shortness of breath, palpitations, abdominal pain, dysuria.    Vital signs upon ED presentation: BP = 93/66, HR = 114, RR = 20, T = 37C (98.6 F), O2 Sat = 98% on RA.  Diagnosed with Nausea, Vomiting and Diarrhea, as well as Syncope.  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 7/2017, 3 cycles of cisplatin/etoposide 9/2017, RPLND 6/2018, found to have residual disease, s/p cycle 1 of TIP 7/29/2018, completed 8/3/2018, s/p Neulasta onpro 8/3/2018, A-Flutter (2/2 catheter irritation of R atrium), L atrial thrombus s/p successful TYLOR/DCCV into sinus rhythm 2/2018 (not currently on AC), nonischemic CM (EF 40-45%)       38 year old male, with past history significant for Testicular cancer, Atrial flutter/fibrillation, Atrial mass, Cardiomyopathy, Orchiectomy, Lumbar herniated disc, Neuropathy of the fingertips, Obesity, Cardioversion, Vitamin D deficiency, Folate deficiency, and Port=a-cath, presented to the ED secondary to nausea, vomiting and diarrhea. .  Seen and evaluated at bedside; ill appearing with face mask in place, but in NAD.  Patient recently underwent chemotherapy on 09/24 to 09/25/2018 and reports onset of nausea, vomiting since Friday (09/28/2018); no tolerance of oral nutrition since then.  Reports 2 episodes of syncope; during the first episode, patient felt faint and lowered himself to the floor and had LOC for a few seconds.  Family came to his aid and after feeling somewhat stable, patient attempted to go to the bathroom.  However, he again fell, but this time, family was present.  States no trauma to head or body.  Has suffered with similar episodes associated with prior chemotherapy treatments.  Complains of feeling cold.  No reports of headache, chest pain, shortness of breath, palpitations, abdominal pain, dysuria.    Vital signs upon ED presentation: BP = 93/66, HR = 114, RR = 20, T = 37C (98.6 F), O2 Sat = 98% on RA.  Diagnosed with Nausea, Vomiting and Diarrhea, as well as Syncope.  Prescribed LR 1 liter, NS 1 liter, maintenance IVF NS at 75 mL/Hr x 24 hours, zofran 4 mg IV x one in the ED. 38 year old male, with past history significant for Testicular cancer, Atrial flutter/fibrillation, Atrial mass, Cardiomyopathy, Orchiectomy, Lumbar herniated disc, Neuropathy of the fingertips, Obesity, Cardioversion, Vitamin D deficiency, Folate deficiency, and Port=a-cath, presented to the ED secondary to nausea, vomiting and diarrhea.  Seen and evaluated at bedside; ill appearing with face mask in place, but in NAD.  Patient recently underwent his 4th cycle of TIP chemotherapy on 09/24 to 09/25/2018 and reports onset of nausea, vomiting since Friday (09/28/2018); no tolerance of oral nutrition since then.  Reports 2 episodes of syncope; during the first episode, patient felt faint and lowered himself to the floor and had LOC for a few seconds.  Family came to his aid and after feeling somewhat stable, patient attempted to go to the bathroom.  However, he again fell, but this time, family was present.  States no trauma to head or body.  Has suffered with similar episodes associated with prior chemotherapy treatments.  Complains of feeling cold.  No reports of headache, chest pain, shortness of breath, palpitations, abdominal pain, dysuria.    Vital signs upon ED presentation: BP = 93/66, HR = 114, RR = 20, T = 37C (98.6 F), O2 Sat = 98% on RA.  Diagnosed with Nausea, Vomiting and Diarrhea, as well as Syncope.  Prescribed LR 1 liter, NS 1 liter, maintenance IVF NS at 75 mL/Hr x 24 hours, zofran 4 mg IV x one in the ED.

## 2018-10-01 NOTE — CONSULT NOTE ADULT - SUBJECTIVE AND OBJECTIVE BOX
Hematology Consult Note    HPI:      38 y.o. M with PMH L testicular CA s/p orchiectomy, afib/flutter, atrial mass, HFrEF (EF 40%)  presented to the ED secondary to nausea, vomiting and diarrhea.  His last chemotherapy with TIP (cycle 4) took place on 9/24-9/25; he presents with onset of nausea, vomiting since Friday (09/28/2018); no tolerance of oral nutrition since then.  Reports 2 episodes of syncope; during the first episode, patient felt faint and lowered himself to the floor and had LOC for a few seconds.  Family came to his aid and after feeling somewhat stable, patient attempted to go to the bathroom.  However, he again fell, but this time, family was present.  States no trauma to head or body.  Has suffered with similar episodes associated with prior chemotherapy treatments.  Complains of feeling cold.  No reports of headache, chest pain, shortness of breath, palpitations, abdominal pain, dysuria. Patient has had similar reaction to chemotherapy before.     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 He initially received 3/4 cycles of etoposide and cisplatin, but did not complete a 4th. 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 Given the long delay until RPLND and enlargement of mass, it was then decided pt would then undergo TIP rather than BEP, potentially 4 cycles.     PAST MEDICAL & SURGICAL HISTORY:  Neuropathy: ~ fingers and toes  Anemia  Mixed germ cell tumor  Obesity  Atrial mass: right artrial echo density per MRI 3/14/18.  Atrial flutter  Cardiomyopathy  Atrial fibrillation: s/p DCCV 1/18  Thrombus: Right atrial wall ( suspected from last TYLOR) On AC  Testicular cancer  Lumbar herniated disc  Testicular mass: left  History of abdominal surgery: Retroperitoneal lymph node dissection (June 2018)  History of cardioversion: 2/2018  Port-a-cath in place: placed 9/2017,, removed-10/18/2017  History of orchiectomy: left-7/2017      FAMILY HISTORY:  Family history of hypertension in father (Mother)  Family history of kidney stones (Mother): mother  Family history of cancer (Grandparent)  Family history of ischemic heart disease (Grandparent)  Family history of diabetes mellitus (Aunt)      MEDICATIONS  (STANDING):  acetaminophen   Tablet .. 650 milliGRAM(s) Oral every 6 hours  ergocalciferol 10029 Unit(s) Oral <User Schedule>  folic acid 1 milliGRAM(s) Oral daily  heparin  Injectable 5000 Unit(s) SubCutaneous every 12 hours  multivitamin 1 Tablet(s) Oral daily  pantoprazole  Injectable 40 milliGRAM(s) IV Push daily  potassium chloride  10 mEq/100 mL IVPB 10 milliEquivalent(s) IV Intermittent every 1 hour  sodium chloride 0.9%. 1000 milliLiter(s) (75 mL/Hr) IV Continuous <Continuous>    MEDICATIONS  (PRN):  meclizine 12.5 milliGRAM(s) Oral two times a day PRN Dizziness  ondansetron Injectable 4 milliGRAM(s) IV Push every 8 hours PRN Nausea and/or Vomiting  oxyCODONE    IR 15 milliGRAM(s) Oral every 6 hours PRN Moderate Pain (4 - 6)      Allergies    No Known Allergies    Intolerances        SOCIAL HISTORY: No EtOH, no tobacco    REVIEW OF SYSTEMS:    CONSTITUTIONAL: No weakness, fevers or chills  EYES/ENT: No visual changes;  No vertigo or throat pain   NECK: No pain or stiffness  RESPIRATORY: No cough, wheezing, hemoptysis; No shortness of breath  CARDIOVASCULAR: No chest pain or palpitations  GASTROINTESTINAL: No abdominal or epigastric pain. No nausea, vomiting, or hematemesis; No diarrhea or constipation. No melena or hematochezia.  GENITOURINARY: No dysuria, frequency or hematuria  NEUROLOGICAL: No numbness or weakness  SKIN: No itching, burning, rashes, or lesions   All other review of systems is negative unless indicated above.        T(F): 98.6 (10-01-18 @ 05:57), Max: 98.8 (10-01-18 @ 03:45)  HR: 93 (10-01-18 @ 05:57)  BP: 88/64 (10-01-18 @ 05:57)  RR: 18 (10-01-18 @ 05:57)  SpO2: 99% (10-01-18 @ 05:57)  Wt(kg): --    GENERAL: NAD, well-developed  HEAD:  Atraumatic, Normocephalic  EYES: EOMI, PERRLA, conjunctiva and sclera clear  NECK: Supple, No JVD  CHEST/LUNG: Clear to auscultation bilaterally; No wheeze  HEART: Regular rate and rhythm; No murmurs, rubs, or gallops  ABDOMEN: Soft, Nontender, Nondistended; Bowel sounds present  EXTREMITIES:  2+ Peripheral Pulses, No clubbing, cyanosis, or edema  NEUROLOGY: non-focal  SKIN: No rashes or lesions                          8.9    1.68  )-----------( 84       ( 01 Oct 2018 06:14 )             26.6       10-01    134<L>  |  95<L>  |  19  ----------------------------<  101<H>  3.1<L>   |  26  |  0.86    Ca    8.2<L>      01 Oct 2018 05:44  Phos  3.0     10-01  Mg     1.7     10-01    TPro  6.4  /  Alb  3.7  /  TBili  0.8  /  DBili  x   /  AST  135<H>  /  ALT  216<H>  /  AlkPhos  61  10-01      Phosphorus Level, Serum: 3.0 mg/dL (10-01 @ 05:44)  Magnesium, Serum: 1.7 mg/dL (10-01 @ 05:44)  Uric Acid, Serum: 6.8 mg/dL (10-01 @ 05:44)  Lactate Dehydrogenase, Serum: 202 U/L (10-01 @ 05:44)  Magnesium, Serum: 1.8 mg/dL (09-30 @ 23:40)  Phosphorus Level, Serum: 3.4 mg/dL (09-30 @ 23:40) Hematology Consult Note    HPI:      38 y.o. M with PMH L testicular CA s/p orchiectomy, afib/flutter, atrial mass, HFrEF (EF 40%)  presented to the ED secondary to nausea, vomiting and diarrhea.  His last chemotherapy with TIP (cycle 3) took place on 9/24-9/25; he presents with onset of nausea, vomiting since Friday (09/28/2018); no tolerance of oral nutrition since then.  Reports 2 episodes of syncope; during the first episode, patient felt faint and lowered himself to the floor and had LOC for a few seconds.  Family came to his aid and after feeling somewhat stable, patient attempted to go to the bathroom.  However, he again fell, but this time, family was present.  States no trauma to head or body.  Has suffered with similar episodes associated with prior chemotherapy treatments.  Complains of feeling cold.  No reports of headache, chest pain, shortness of breath, palpitations, abdominal pain, dysuria. Patient has had similar reaction to chemotherapy before.     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 He initially received 3/4 cycles of etoposide and cisplatin, but did not complete a 4th. 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 Given the long delay until RPLND and enlargement of mass, it was then decided pt would then undergo TIP rather than BEP, potentially 4 cycles.     PAST MEDICAL & SURGICAL HISTORY:  Neuropathy: ~ fingers and toes  Anemia  Mixed germ cell tumor  Obesity  Atrial mass: right artrial echo density per MRI 3/14/18.  Atrial flutter  Cardiomyopathy  Atrial fibrillation: s/p DCCV 1/18  Thrombus: Right atrial wall ( suspected from last TYLOR) On AC  Testicular cancer  Lumbar herniated disc  Testicular mass: left  History of abdominal surgery: Retroperitoneal lymph node dissection (June 2018)  History of cardioversion: 2/2018  Port-a-cath in place: placed 9/2017,, removed-10/18/2017  History of orchiectomy: left-7/2017      FAMILY HISTORY:  Family history of hypertension in father (Mother)  Family history of kidney stones (Mother): mother  Family history of cancer (Grandparent)  Family history of ischemic heart disease (Grandparent)  Family history of diabetes mellitus (Aunt)      MEDICATIONS  (STANDING):  acetaminophen   Tablet .. 650 milliGRAM(s) Oral every 6 hours  ergocalciferol 45933 Unit(s) Oral <User Schedule>  folic acid 1 milliGRAM(s) Oral daily  heparin  Injectable 5000 Unit(s) SubCutaneous every 12 hours  multivitamin 1 Tablet(s) Oral daily  pantoprazole  Injectable 40 milliGRAM(s) IV Push daily  potassium chloride  10 mEq/100 mL IVPB 10 milliEquivalent(s) IV Intermittent every 1 hour  sodium chloride 0.9%. 1000 milliLiter(s) (75 mL/Hr) IV Continuous <Continuous>    MEDICATIONS  (PRN):  meclizine 12.5 milliGRAM(s) Oral two times a day PRN Dizziness  ondansetron Injectable 4 milliGRAM(s) IV Push every 8 hours PRN Nausea and/or Vomiting  oxyCODONE    IR 15 milliGRAM(s) Oral every 6 hours PRN Moderate Pain (4 - 6)      Allergies    No Known Allergies    Intolerances        SOCIAL HISTORY: No EtOH, no tobacco    REVIEW OF SYSTEMS:    CONSTITUTIONAL: Positive for generalized weakness   NECK: No pain or stiffness  RESPIRATORY: No cough, wheezing, hemoptysis; No shortness of breath  CARDIOVASCULAR: No chest pain or palpitations  GASTROINTESTINAL: No abdominal or epigastric pain. No nausea, vomiting, or hematemesis; No diarrhea or constipation. No melena or hematochezia.  GENITOURINARY: No dysuria, frequency or hematuria    All other review of systems is negative unless indicated above.        T(F): 98.6 (10-01-18 @ 05:57), Max: 98.8 (10-01-18 @ 03:45)  HR: 93 (10-01-18 @ 05:57)  BP: 88/64 (10-01-18 @ 05:57)  RR: 18 (10-01-18 @ 05:57)  SpO2: 99% (10-01-18 @ 05:57)  Wt(kg): --    GENERAL: NAD, well-developed  HEAD:  Atraumatic, Normocephalic  EYES: EOMI, PERRLA, conjunctiva and sclera clear  CHEST/LUNG: Clear to auscultation bilaterally; No wheeze  HEART: Regular rate and rhythm; No murmurs, rubs, or gallops  ABDOMEN: Soft, Nontender, Nondistended; Bowel sounds present  EXTREMITIES:  2+ Peripheral Pulses, No clubbing, cyanosis, or edema                            8.9    1.68  )-----------( 84       ( 01 Oct 2018 06:14 )             26.6       10-01    134<L>  |  95<L>  |  19  ----------------------------<  101<H>  3.1<L>   |  26  |  0.86    Ca    8.2<L>      01 Oct 2018 05:44  Phos  3.0     10-01  Mg     1.7     10-01    TPro  6.4  /  Alb  3.7  /  TBili  0.8  /  DBili  x   /  AST  135<H>  /  ALT  216<H>  /  AlkPhos  61  10-01      Phosphorus Level, Serum: 3.0 mg/dL (10-01 @ 05:44)  Magnesium, Serum: 1.7 mg/dL (10-01 @ 05:44)  Uric Acid, Serum: 6.8 mg/dL (10-01 @ 05:44)  Lactate Dehydrogenase, Serum: 202 U/L (10-01 @ 05:44)  Magnesium, Serum: 1.8 mg/dL (09-30 @ 23:40)  Phosphorus Level, Serum: 3.4 mg/dL (09-30 @ 23:40)

## 2018-10-01 NOTE — H&P ADULT - FAMILY HISTORY
Aunt  Still living? No  Family history of diabetes mellitus, Age at diagnosis: Age Unknown     Grandparent  Still living? No  Family history of ischemic heart disease, Age at diagnosis: Age Unknown  Family history of cancer, Age at diagnosis: Age Unknown     Mother  Still living? Unknown  Family history of kidney stones, Age at diagnosis: Age Unknown  Family history of hypertension in father, Age at diagnosis: Age Unknown

## 2018-10-01 NOTE — H&P ADULT - ASSESSMENT
38 year old male, with past history significant for Testicular cancer, Atrial flutter/fibrillation, Atrial mass, Cardiomyopathy, Orchiectomy, Lumbar herniated disc, Neuropathy of the fingertips, Obesity, Cardioversion, Vitamin D deficiency, Folate deficiency, and Port=a-cath, presented to the ED secondary to nausea, vomiting and diarrhea.  Vital signs upon ED presentation: BP = 93/66, HR = 114, RR = 20, T = 37C (98.6 F), O2 Sat = 98% on RA.  Diagnosed with Nausea, Vomiting and Diarrhea, as well as Syncope.  Admitted for further management of:

## 2018-10-02 ENCOUNTER — TRANSCRIPTION ENCOUNTER (OUTPATIENT)
Age: 38
End: 2018-10-02

## 2018-10-02 DIAGNOSIS — R74.0 NONSPECIFIC ELEVATION OF LEVELS OF TRANSAMINASE AND LACTIC ACID DEHYDROGENASE [LDH]: ICD-10-CM

## 2018-10-02 DIAGNOSIS — D70.1 AGRANULOCYTOSIS SECONDARY TO CANCER CHEMOTHERAPY: ICD-10-CM

## 2018-10-02 DIAGNOSIS — I50.22 CHRONIC SYSTOLIC (CONGESTIVE) HEART FAILURE: ICD-10-CM

## 2018-10-02 DIAGNOSIS — Z29.9 ENCOUNTER FOR PROPHYLACTIC MEASURES, UNSPECIFIED: ICD-10-CM

## 2018-10-02 DIAGNOSIS — R11.2 NAUSEA WITH VOMITING, UNSPECIFIED: ICD-10-CM

## 2018-10-02 DIAGNOSIS — Z02.9 ENCOUNTER FOR ADMINISTRATIVE EXAMINATIONS, UNSPECIFIED: ICD-10-CM

## 2018-10-02 LAB
ALBUMIN SERPL ELPH-MCNC: 3.7 G/DL — SIGNIFICANT CHANGE UP (ref 3.3–5)
ALP SERPL-CCNC: 62 U/L — SIGNIFICANT CHANGE UP (ref 40–120)
ALT FLD-CCNC: 293 U/L — HIGH (ref 4–41)
ANISOCYTOSIS BLD QL: SLIGHT — SIGNIFICANT CHANGE UP
AST SERPL-CCNC: 168 U/L — HIGH (ref 4–40)
BASOPHILS # BLD AUTO: 0 K/UL — SIGNIFICANT CHANGE UP (ref 0–0.2)
BASOPHILS NFR BLD AUTO: 0 % — SIGNIFICANT CHANGE UP (ref 0–2)
BASOPHILS NFR SPEC: 1.2 % — SIGNIFICANT CHANGE UP (ref 0–2)
BILIRUB SERPL-MCNC: 0.7 MG/DL — SIGNIFICANT CHANGE UP (ref 0.2–1.2)
BLASTS # FLD: 0 % — SIGNIFICANT CHANGE UP (ref 0–0)
BUN SERPL-MCNC: 15 MG/DL — SIGNIFICANT CHANGE UP (ref 7–23)
CALCIUM SERPL-MCNC: 8.3 MG/DL — LOW (ref 8.4–10.5)
CHLORIDE SERPL-SCNC: 94 MMOL/L — LOW (ref 98–107)
CO2 SERPL-SCNC: 25 MMOL/L — SIGNIFICANT CHANGE UP (ref 22–31)
CREAT SERPL-MCNC: 0.81 MG/DL — SIGNIFICANT CHANGE UP (ref 0.5–1.3)
EOSINOPHIL # BLD AUTO: 0.01 K/UL — SIGNIFICANT CHANGE UP (ref 0–0.5)
EOSINOPHIL NFR BLD AUTO: 2.6 % — SIGNIFICANT CHANGE UP (ref 0–6)
EOSINOPHIL NFR FLD: 4.8 % — SIGNIFICANT CHANGE UP (ref 0–6)
GIANT PLATELETS BLD QL SMEAR: PRESENT — SIGNIFICANT CHANGE UP
GLUCOSE SERPL-MCNC: 102 MG/DL — HIGH (ref 70–99)
HCT VFR BLD CALC: 25.9 % — LOW (ref 39–50)
HGB BLD-MCNC: 8.8 G/DL — LOW (ref 13–17)
IMM GRANULOCYTES # BLD AUTO: 0 # — SIGNIFICANT CHANGE UP
IMM GRANULOCYTES NFR BLD AUTO: 0 % — SIGNIFICANT CHANGE UP (ref 0–1.5)
LYMPHOCYTES # BLD AUTO: 0.32 K/UL — LOW (ref 1–3.3)
LYMPHOCYTES # BLD AUTO: 82.1 % — HIGH (ref 13–44)
LYMPHOCYTES NFR SPEC AUTO: 67.9 % — HIGH (ref 13–44)
MACROCYTES BLD QL: SLIGHT — SIGNIFICANT CHANGE UP
MAGNESIUM SERPL-MCNC: 1.6 MG/DL — SIGNIFICANT CHANGE UP (ref 1.6–2.6)
MCHC RBC-ENTMCNC: 30.6 PG — SIGNIFICANT CHANGE UP (ref 27–34)
MCHC RBC-ENTMCNC: 34 % — SIGNIFICANT CHANGE UP (ref 32–36)
MCV RBC AUTO: 89.9 FL — SIGNIFICANT CHANGE UP (ref 80–100)
METAMYELOCYTES # FLD: 0 % — SIGNIFICANT CHANGE UP (ref 0–1)
MONOCYTES # BLD AUTO: 0.03 K/UL — SIGNIFICANT CHANGE UP (ref 0–0.9)
MONOCYTES NFR BLD AUTO: 7.7 % — SIGNIFICANT CHANGE UP (ref 2–14)
MONOCYTES NFR BLD: 5.9 % — SIGNIFICANT CHANGE UP (ref 2–9)
MYELOCYTES NFR BLD: 0 % — SIGNIFICANT CHANGE UP (ref 0–0)
NEUTROPHIL AB SER-ACNC: 9.5 % — LOW (ref 43–77)
NEUTROPHILS # BLD AUTO: 0.03 K/UL — LOW (ref 1.8–7.4)
NEUTROPHILS NFR BLD AUTO: 7.6 % — LOW (ref 43–77)
NEUTS BAND # BLD: 0 % — SIGNIFICANT CHANGE UP (ref 0–6)
NRBC # FLD: 0 — SIGNIFICANT CHANGE UP
OTHER - HEMATOLOGY %: 0 — SIGNIFICANT CHANGE UP
PHOSPHATE SERPL-MCNC: 2.1 MG/DL — LOW (ref 2.5–4.5)
PLATELET # BLD AUTO: 63 K/UL — LOW (ref 150–400)
PLATELET COUNT - ESTIMATE: SIGNIFICANT CHANGE UP
PMV BLD: 11.4 FL — SIGNIFICANT CHANGE UP (ref 7–13)
POLYCHROMASIA BLD QL SMEAR: SLIGHT — SIGNIFICANT CHANGE UP
POTASSIUM SERPL-MCNC: 3.6 MMOL/L — SIGNIFICANT CHANGE UP (ref 3.5–5.3)
POTASSIUM SERPL-SCNC: 3.6 MMOL/L — SIGNIFICANT CHANGE UP (ref 3.5–5.3)
PROMYELOCYTES # FLD: 0 % — SIGNIFICANT CHANGE UP (ref 0–0)
PROT SERPL-MCNC: 6.5 G/DL — SIGNIFICANT CHANGE UP (ref 6–8.3)
RBC # BLD: 2.88 M/UL — LOW (ref 4.2–5.8)
RBC # FLD: 15.6 % — HIGH (ref 10.3–14.5)
REVIEW TO FOLLOW: YES — SIGNIFICANT CHANGE UP
SODIUM SERPL-SCNC: 131 MMOL/L — LOW (ref 135–145)
VARIANT LYMPHS # BLD: 10.7 % — SIGNIFICANT CHANGE UP
WBC # BLD: 0.39 K/UL — CRITICAL LOW (ref 3.8–10.5)
WBC # FLD AUTO: 0.39 K/UL — CRITICAL LOW (ref 3.8–10.5)

## 2018-10-02 PROCEDURE — 99233 SBSQ HOSP IP/OBS HIGH 50: CPT

## 2018-10-02 PROCEDURE — 93010 ELECTROCARDIOGRAM REPORT: CPT

## 2018-10-02 RX ORDER — ENOXAPARIN SODIUM 100 MG/ML
40 INJECTION SUBCUTANEOUS DAILY
Qty: 0 | Refills: 0 | Status: DISCONTINUED | OUTPATIENT
Start: 2018-10-02 | End: 2018-10-03

## 2018-10-02 RX ORDER — ONDANSETRON 8 MG/1
4 TABLET, FILM COATED ORAL EVERY 8 HOURS
Qty: 0 | Refills: 0 | Status: DISCONTINUED | OUTPATIENT
Start: 2018-10-02 | End: 2018-10-03

## 2018-10-02 RX ORDER — SODIUM,POTASSIUM PHOSPHATES 278-250MG
1 POWDER IN PACKET (EA) ORAL
Qty: 0 | Refills: 0 | Status: COMPLETED | OUTPATIENT
Start: 2018-10-02 | End: 2018-10-03

## 2018-10-02 RX ORDER — SODIUM CHLORIDE 9 MG/ML
1000 INJECTION, SOLUTION INTRAVENOUS
Qty: 0 | Refills: 0 | Status: DISCONTINUED | OUTPATIENT
Start: 2018-10-02 | End: 2018-10-05

## 2018-10-02 RX ADMIN — Medication 1 TABLET(S): at 11:41

## 2018-10-02 RX ADMIN — SODIUM CHLORIDE 75 MILLILITER(S): 9 INJECTION, SOLUTION INTRAVENOUS at 21:35

## 2018-10-02 RX ADMIN — Medication 650 MILLIGRAM(S): at 11:41

## 2018-10-02 RX ADMIN — Medication 1 TABLET(S): at 21:35

## 2018-10-02 RX ADMIN — Medication 1 MILLIGRAM(S): at 11:41

## 2018-10-02 RX ADMIN — Medication 650 MILLIGRAM(S): at 13:05

## 2018-10-02 RX ADMIN — HEPARIN SODIUM 5000 UNIT(S): 5000 INJECTION INTRAVENOUS; SUBCUTANEOUS at 05:02

## 2018-10-02 RX ADMIN — Medication 650 MILLIGRAM(S): at 18:50

## 2018-10-02 RX ADMIN — ERGOCALCIFEROL 50000 UNIT(S): 1.25 CAPSULE ORAL at 11:41

## 2018-10-02 RX ADMIN — Medication 650 MILLIGRAM(S): at 17:56

## 2018-10-02 RX ADMIN — ONDANSETRON 4 MILLIGRAM(S): 8 TABLET, FILM COATED ORAL at 21:35

## 2018-10-02 RX ADMIN — ONDANSETRON 4 MILLIGRAM(S): 8 TABLET, FILM COATED ORAL at 12:24

## 2018-10-02 RX ADMIN — ENOXAPARIN SODIUM 40 MILLIGRAM(S): 100 INJECTION SUBCUTANEOUS at 12:24

## 2018-10-02 RX ADMIN — Medication 63.75 MILLIMOLE(S): at 11:01

## 2018-10-02 RX ADMIN — Medication 1 TABLET(S): at 17:56

## 2018-10-02 RX ADMIN — PANTOPRAZOLE SODIUM 40 MILLIGRAM(S): 20 TABLET, DELAYED RELEASE ORAL at 11:42

## 2018-10-02 RX ADMIN — SODIUM CHLORIDE 75 MILLILITER(S): 9 INJECTION, SOLUTION INTRAVENOUS at 15:36

## 2018-10-02 NOTE — DISCHARGE NOTE ADULT - CARE PLAN
Principal Discharge DX:	Chemotherapy-induced nausea and vomiting  Goal:	Resolution and return to baseline  Secondary Diagnosis:	Dehydration  Assessment and plan of treatment:	Encourage oral intake of adequate fluid and food  Secondary Diagnosis:	Transaminitis  Assessment and plan of treatment:	Likely result from chemotherapy - Continue with monitoring liver enzymes as outpatient and refert o oncology for additional care/recommendations  Secondary Diagnosis:	Neutropenia  Assessment and plan of treatment:	NEUPOGEN??____  Secondary Diagnosis:	Chronic atrial fibrillation  Assessment and plan of treatment:	Please continue your medications as directed and follow-up with your primary provider/cardiologist to further manage your care. Monitor for signs/symptoms of uncontrolled atrial fibrillation, such as, increased heart rate, palpitations, chest pain, dizziness, or shortness of breath - Return to emergency room if these signs/symptoms are present.  Secondary Diagnosis:	Chronic HFrEF (heart failure with reduced ejection fraction)  Assessment and plan of treatment:	Continue recommended medication regimen. Monitor for signs/symptoms of fluid overload and electrolyte abnormalities, such as, shortness of breath, cough, swelling, chest discomfort, changes in heart rate, dizziness, fainting, or changes in mental status. Follow-up with your PCP/cardiologist outpatient after you've been discharged from the hospital.  Secondary Diagnosis:	Mixed germ cell tumor  Assessment and plan of treatment:	Continue to follow-up with oncology to discuss chemotherapy schedule Principal Discharge DX:	Chemotherapy-induced nausea and vomiting  Goal:	Resolution and return to baseline  Assessment and plan of treatment:	Follow up with Your Oncologist  Secondary Diagnosis:	Dehydration  Assessment and plan of treatment:	Encourage oral intake of adequate fluid and food  Secondary Diagnosis:	Transaminitis  Assessment and plan of treatment:	Likely result from chemotherapy - Continue with monitoring liver enzymes as outpatient and refert o oncology for additional care/recommendations  Secondary Diagnosis:	Neutropenia  Secondary Diagnosis:	Chronic atrial fibrillation  Assessment and plan of treatment:	Please continue your medications as directed and follow-up with your primary provider/cardiologist to further manage your care. Monitor for signs/symptoms of uncontrolled atrial fibrillation, such as, increased heart rate, palpitations, chest pain, dizziness, or shortness of breath - Return to emergency room if these signs/symptoms are present.  Secondary Diagnosis:	Chronic HFrEF (heart failure with reduced ejection fraction)  Assessment and plan of treatment:	Continue recommended medication regimen. Monitor for signs/symptoms of fluid overload and electrolyte abnormalities, such as, shortness of breath, cough, swelling, chest discomfort, changes in heart rate, dizziness, fainting, or changes in mental status. Follow-up with your PCP/cardiologist outpatient after you've been discharged from the hospital.  Secondary Diagnosis:	Mixed germ cell tumor  Assessment and plan of treatment:	Continue to follow-up with oncology to discuss chemotherapy schedule Principal Discharge DX:	Chemotherapy-induced nausea and vomiting  Goal:	Resolution and return to baseline  Assessment and plan of treatment:	Follow up with Your Oncologist  Secondary Diagnosis:	Dehydration  Assessment and plan of treatment:	Encourage oral intake of adequate fluid and food  Secondary Diagnosis:	Transaminitis  Assessment and plan of treatment:	Likely result from chemotherapy - Continue with monitoring liver enzymes as outpatient and refer to oncology for additional care/recommendations  Secondary Diagnosis:	Neutropenia  Assessment and plan of treatment:	Monitor your counts at [facility]  Secondary Diagnosis:	Chronic atrial fibrillation  Assessment and plan of treatment:	Please continue your medications as directed and follow-up with your primary provider/cardiologist to further manage your care. Monitor for signs/symptoms of uncontrolled atrial fibrillation, such as, increased heart rate, palpitations, chest pain, dizziness, or shortness of breath - Return to emergency room if these signs/symptoms are present.  Secondary Diagnosis:	Chronic HFrEF (heart failure with reduced ejection fraction)  Assessment and plan of treatment:	Continue recommended medication regimen. Monitor for signs/symptoms of fluid overload and electrolyte abnormalities, such as, shortness of breath, cough, swelling, chest discomfort, changes in heart rate, dizziness, fainting, or changes in mental status. Follow-up with your PCP/cardiologist outpatient after you've been discharged from the hospital.  Secondary Diagnosis:	Mixed germ cell tumor  Assessment and plan of treatment:	Continue to follow-up with oncology to discuss chemotherapy schedule Principal Discharge DX:	Chemotherapy-induced nausea and vomiting  Goal:	Resolution and return to baseline  Assessment and plan of treatment:	Follow up with Your Oncologist  Secondary Diagnosis:	Dehydration  Goal:	To prevent reoccurrence  Assessment and plan of treatment:	Encourage oral intake of adequate fluid and food  Secondary Diagnosis:	Transaminitis  Goal:	To maintain a normal level of liver function  Assessment and plan of treatment:	Likely result from chemotherapy - Continue with monitoring liver enzymes as outpatient and refer to oncology for additional care/recommendations  Secondary Diagnosis:	Neutropenia  Goal:	To prevent reoccurrence  Assessment and plan of treatment:	Monitor your counts at [facility]  Secondary Diagnosis:	Chronic atrial fibrillation  Goal:	To restore or maintain a normal heart rate and rhythm, to prevent blood clots, and decrease the risks of stroke CVA/TIA.  Assessment and plan of treatment:	Please continue your medications as directed and follow-up with your primary provider/cardiologist to further manage your care. Monitor for signs/symptoms of uncontrolled atrial fibrillation, such as, increased heart rate, palpitations, chest pain, dizziness, or shortness of breath - Return to emergency room if these signs/symptoms are present.  Secondary Diagnosis:	Chronic HFrEF (heart failure with reduced ejection fraction)  Goal:	To relieve and prevent worsening symptoms associated with congestive heart failure, to improve quality of life, and to treat underlying conditions such as coronary heart disease, high blood pressure, or diabetes, and to maintain euvolemia.  Assessment and plan of treatment:	Continue recommended medication regimen. Monitor for signs/symptoms of fluid overload and electrolyte abnormalities, such as, shortness of breath, cough, swelling, chest discomfort, changes in heart rate, dizziness, fainting, or changes in mental status. Follow-up with your PCP/cardiologist outpatient after you've been discharged from the hospital.  Secondary Diagnosis:	Mixed germ cell tumor  Assessment and plan of treatment:	Continue to follow-up with oncology to discuss chemotherapy schedule Principal Discharge DX:	Chemotherapy-induced nausea and vomiting  Goal:	Resolution and return to baseline  Assessment and plan of treatment:	Follow up with Your Oncologist  Secondary Diagnosis:	Dehydration  Goal:	To prevent reoccurrence  Assessment and plan of treatment:	Encourage oral intake of adequate fluid and food  Secondary Diagnosis:	Transaminitis  Goal:	To maintain a normal level of liver function  Assessment and plan of treatment:	Likely result from chemotherapy - Continue with monitoring liver enzymes as outpatient and refer to oncology for additional care/recommendations  Secondary Diagnosis:	Neutropenia  Goal:	To prevent reoccurrence  Assessment and plan of treatment:	Monitor your blood counts at [facility]  Secondary Diagnosis:	Chronic atrial fibrillation  Goal:	To restore or maintain a normal heart rate and rhythm, to prevent blood clots, and decrease the risks of stroke CVA/TIA.  Assessment and plan of treatment:	Please continue your medications as directed and follow-up with your primary provider/cardiologist to further manage your care. Monitor for signs/symptoms of uncontrolled atrial fibrillation, such as, increased heart rate, palpitations, chest pain, dizziness, or shortness of breath - Return to emergency room if these signs/symptoms are present.  Secondary Diagnosis:	Chronic HFrEF (heart failure with reduced ejection fraction)  Goal:	To relieve and prevent worsening symptoms associated with congestive heart failure, to improve quality of life, and to treat underlying conditions such as coronary heart disease, high blood pressure, or diabetes, and to maintain euvolemia.  Assessment and plan of treatment:	Continue recommended medication regimen. Monitor for signs/symptoms of fluid overload and electrolyte abnormalities, such as, shortness of breath, cough, swelling, chest discomfort, changes in heart rate, dizziness, fainting, or changes in mental status. Follow-up with your PCP/cardiologist outpatient after you've been discharged from the hospital.  Secondary Diagnosis:	Mixed germ cell tumor  Assessment and plan of treatment:	Continue to follow-up with oncology to discuss chemotherapy schedule Principal Discharge DX:	Chemotherapy-induced nausea and vomiting  Goal:	Resolution and return to baseline  Assessment and plan of treatment:	Follow up with Your Oncologist  Secondary Diagnosis:	Dehydration  Goal:	To prevent reoccurrence  Assessment and plan of treatment:	Encourage oral intake of adequate fluid and food  Secondary Diagnosis:	Transaminitis  Goal:	To maintain a normal level of liver function  Assessment and plan of treatment:	Likely result from chemotherapy - Continue with monitoring liver enzymes as outpatient and refer to oncology for additional care/recommendations  Secondary Diagnosis:	Neutropenia  Goal:	To prevent reoccurrence  Assessment and plan of treatment:	Monitor your blood counts at [facility]  Secondary Diagnosis:	Chronic atrial fibrillation  Goal:	To restore or maintain a normal heart rate and rhythm, to prevent blood clots, and decrease the risks of stroke CVA/TIA.  Assessment and plan of treatment:	Please continue your medications as directed and follow-up with your primary provider/cardiologist to further manage your care. Monitor for signs/symptoms of uncontrolled atrial fibrillation, such as, increased heart rate, palpitations, chest pain, dizziness, or shortness of breath - Return to emergency room if these signs/symptoms are present.  Secondary Diagnosis:	Chronic HFrEF (heart failure with reduced ejection fraction)  Goal:	To relieve and prevent worsening symptoms associated with congestive heart failure, to improve quality of life, and to treat underlying conditions such as coronary heart disease, high blood pressure, or diabetes, and to maintain euvolemia.  Assessment and plan of treatment:	Continue recommended medication regimen. Monitor for signs/symptoms of fluid overload and electrolyte abnormalities, such as, shortness of breath, cough, swelling, chest discomfort, changes in heart rate, dizziness, fainting, or changes in mental status. Follow-up with your PCP/cardiologist outpatient after you've been discharged from the hospital.  Secondary Diagnosis:	Mixed germ cell tumor  Assessment and plan of treatment:	Continue to follow-up with oncology to discuss chemotherapy schedule.  Also wound care and follow up with Dr Restrepo - Urology in 1 week

## 2018-10-02 NOTE — PROGRESS NOTE ADULT - PROBLEM SELECTOR PLAN 7
-Lovenox given active malignancy -There was concern overnight that patient had a misplaced PICC line. However, this has been previously been confirmed with IR who placed the line that it is a midline and that it is in place  -IR procedure note also confirms a midline was placed  -The midline may be used for IVF and medications as patient is refusing additional peripheral IV access

## 2018-10-02 NOTE — PROVIDER CONTACT NOTE (OTHER) - BACKGROUND
38 year old male, with past history significant for Testicular cancer, Atrial flutter/fibrillation, Atrial mass, Cardiomyopathy, Orchiectomy, Lumbar herniated disc, Neuropathy of the fingertips,

## 2018-10-02 NOTE — DISCHARGE NOTE ADULT - PLAN OF CARE
Resolution and return to baseline Encourage oral intake of adequate fluid and food Likely result from chemotherapy - Continue with monitoring liver enzymes as outpatient and refert o oncology for additional care/recommendations NEUPOGEN??____ Please continue your medications as directed and follow-up with your primary provider/cardiologist to further manage your care. Monitor for signs/symptoms of uncontrolled atrial fibrillation, such as, increased heart rate, palpitations, chest pain, dizziness, or shortness of breath - Return to emergency room if these signs/symptoms are present. Continue recommended medication regimen. Monitor for signs/symptoms of fluid overload and electrolyte abnormalities, such as, shortness of breath, cough, swelling, chest discomfort, changes in heart rate, dizziness, fainting, or changes in mental status. Follow-up with your PCP/cardiologist outpatient after you've been discharged from the hospital. Continue to follow-up with oncology to discuss chemotherapy schedule Follow up with Your Oncologist Likely result from chemotherapy - Continue with monitoring liver enzymes as outpatient and refer to oncology for additional care/recommendations Monitor your counts at [facility] To prevent reoccurrence To maintain a normal level of liver function To restore or maintain a normal heart rate and rhythm, to prevent blood clots, and decrease the risks of stroke CVA/TIA. To relieve and prevent worsening symptoms associated with congestive heart failure, to improve quality of life, and to treat underlying conditions such as coronary heart disease, high blood pressure, or diabetes, and to maintain euvolemia. Monitor your blood counts at [facility] Continue to follow-up with oncology to discuss chemotherapy schedule.  Also wound care and follow up with Dr Restrepo - Urology in 1 week

## 2018-10-02 NOTE — PROGRESS NOTE ADULT - SUBJECTIVE AND OBJECTIVE BOX
Patient is a 38y old  Male who presents with a chief complaint of Nausea, Vomiting and Diarrhea, Syncope (01 Oct 2018 10:28)      SUBJECTIVE / OVERNIGHT EVENTS: Patient seen and examined. No acute events. Reports he tolerated 2 pieces of lettuce from his salad. Refused IVF overnight because he wanted to use his "PICC" (prior chart review reveals it is a midline). No nausea, vomiting.    MEDICATIONS  (STANDING):  acetaminophen   Tablet .. 650 milliGRAM(s) Oral every 6 hours  ergocalciferol 67238 Unit(s) Oral <User Schedule>  folic acid 1 milliGRAM(s) Oral daily  heparin  Injectable 5000 Unit(s) SubCutaneous every 12 hours  lactated ringers. 1000 milliLiter(s) (75 mL/Hr) IV Continuous <Continuous>  multivitamin 1 Tablet(s) Oral daily  pantoprazole  Injectable 40 milliGRAM(s) IV Push daily  potassium acid phosphate/sodium acid phosphate tablet (K-PHOS No. 2) 1 Tablet(s) Oral four times a day with meals    MEDICATIONS  (PRN):  meclizine 12.5 milliGRAM(s) Oral two times a day PRN Dizziness  ondansetron Injectable 4 milliGRAM(s) IV Push every 8 hours PRN Nausea and/or Vomiting  oxyCODONE    IR 15 milliGRAM(s) Oral every 6 hours PRN Moderate Pain (4 - 6)      Vital Signs Last 24 Hrs  T(C): 37.1 (10-02-18 @ 08:49)  T(F): 98.8 (10-02-18 @ 08:49), Max: 98.9 (10-01-18 @ 17:56)  HR: 106 (10-02-18 @ 08:49) (94 - 106)  BP: 110/64 (10-02-18 @ 08:49)  BP(mean): --  RR: 17 (10-02-18 @ 08:49) (16 - 18)  SpO2: 99% (10-02-18 @ 08:49) (99% - 100%)  Wt(kg): --    CAPILLARY BLOOD GLUCOSE        I&O's Summary      PHYSICAL EXAM:  GENERAL: NAD, well-developed  HEAD:  Atraumatic, Normocephalic  EYES: EOMI, PERRLA, conjunctiva and sclera clear  NECK: Supple, No JVD  CHEST/LUNG: Clear to auscultation bilaterally; No wheeze  HEART: Regular rate and rhythm; No murmurs, rubs, or gallops  ABDOMEN: Soft, Nontender, Nondistended; Bowel sounds present  EXTREMITIES:  2+ Peripheral Pulses, No clubbing, cyanosis, or edema  PSYCH: AAOx3  NEUROLOGY: non-focal  SKIN: No rashes or lesions    LABS:                        8.8    0.39  )-----------( 63       ( 02 Oct 2018 08:45 )             25.9     10-02    131<L>  |  94<L>  |  15  ----------------------------<  102<H>  3.6   |  25  |  0.81    Ca    8.3<L>      02 Oct 2018 08:45  Phos  2.1     10-02  Mg     1.6     10-02    TPro  6.5  /  Alb  3.7  /  TBili  0.7  /  DBili  x   /  AST  168<H>  /  ALT  293<H>  /  AlkPhos  62  10-02      CARDIAC MARKERS ( 30 Sep 2018 23:40 )  x     / x     / 40 u/L / 1.00 ng/mL / x              RADIOLOGY & ADDITIONAL TESTS:    Imaging Personally Reviewed:    Consultant(s) Notes Reviewed:      Care Discussed with Consultants/Other Providers:    Assessment and Plan: Patient is a 38y old  Male who presents with a chief complaint of Nausea, Vomiting and Diarrhea, Syncope (01 Oct 2018 10:28)      SUBJECTIVE / OVERNIGHT EVENTS: Patient seen and examined. No acute events. Reports he tolerated 2 pieces of lettuce from his salad. Refused IVF overnight because he wanted to use his "PICC" (prior chart review reveals it is a midline). No nausea, vomiting.    MEDICATIONS  (STANDING):  acetaminophen   Tablet .. 650 milliGRAM(s) Oral every 6 hours  ergocalciferol 04460 Unit(s) Oral <User Schedule>  folic acid 1 milliGRAM(s) Oral daily  heparin  Injectable 5000 Unit(s) SubCutaneous every 12 hours  lactated ringers. 1000 milliLiter(s) (75 mL/Hr) IV Continuous <Continuous>  multivitamin 1 Tablet(s) Oral daily  pantoprazole  Injectable 40 milliGRAM(s) IV Push daily  potassium acid phosphate/sodium acid phosphate tablet (K-PHOS No. 2) 1 Tablet(s) Oral four times a day with meals    MEDICATIONS  (PRN):  meclizine 12.5 milliGRAM(s) Oral two times a day PRN Dizziness  ondansetron Injectable 4 milliGRAM(s) IV Push every 8 hours PRN Nausea and/or Vomiting  oxyCODONE    IR 15 milliGRAM(s) Oral every 6 hours PRN Moderate Pain (4 - 6)      Vital Signs Last 24 Hrs  T(C): 37.1 (10-02-18 @ 08:49)  T(F): 98.8 (10-02-18 @ 08:49), Max: 98.9 (10-01-18 @ 17:56)  HR: 106 (10-02-18 @ 08:49) (94 - 106)  BP: 110/64 (10-02-18 @ 08:49)  BP(mean): --  RR: 17 (10-02-18 @ 08:49) (16 - 18)  SpO2: 99% (10-02-18 @ 08:49) (99% - 100%)  Wt(kg): --    CAPILLARY BLOOD GLUCOSE        I&O's Summary      PHYSICAL EXAM:  GENERAL: NAD, well-developed, wearing mask  HEAD:  Atraumatic, Normocephalic  EYES: conjunctiva and sclera clear  NECK: Supple, No JVD  CHEST/LUNG: Clear to auscultation bilaterally; No wheeze  HEART: Regular rate and rhythm; No murmurs,  ABDOMEN: Soft, Nontender, Nondistended; Bowel sounds present  EXTREMITIES:  2+ Peripheral Pulses, No clubbing, cyanosis, or edema  PSYCH: AAOx3, flat affect  SKIN: No rashes or lesions, no tenting of the skin    LABS:                        8.8    0.39  )-----------( 63       ( 02 Oct 2018 08:45 )             25.9   Auto Neutrophil #: 0.03 K/uL (10.02.18 @ 08:45)    Band Neutrophils %: 0 % (10.02.18 @ 08:45)      10-02    131<L>  |  94<L>  |  15  ----------------------------<  102<H>  3.6   |  25  |  0.81    Ca    8.3<L>      02 Oct 2018 08:45  Phos  2.1     10-02  Mg     1.6     10-02    TPro  6.5  /  Alb  3.7  /  TBili  0.7  /  DBili  x   /  AST  168<H>  /  ALT  293<H>  /  AlkPhos  62  10-02      CARDIAC MARKERS ( 30 Sep 2018 23:40 )  x     / x     / 40 u/L / 1.00 ng/mL / x          RADIOLOGY & ADDITIONAL TESTS:    Imaging Personally Reviewed:    Consultant(s) Notes Reviewed:      Care Discussed with Consultants/Other Providers: Onc Alex     Assessment and Plan: Patient is a 38y old  Male who presents with a chief complaint of Nausea, Vomiting and Diarrhea, Syncope (01 Oct 2018 10:28)      SUBJECTIVE / OVERNIGHT EVENTS: Patient seen and examined. No acute events. Reports he tolerated 2 pieces of lettuce from his salad. Refused IVF overnight because he wanted to use his "PICC" (prior chart review reveals it is a midline). No nausea, vomiting.    MEDICATIONS  (STANDING):  acetaminophen   Tablet .. 650 milliGRAM(s) Oral every 6 hours  ergocalciferol 16111 Unit(s) Oral <User Schedule>  folic acid 1 milliGRAM(s) Oral daily  heparin  Injectable 5000 Unit(s) SubCutaneous every 12 hours  lactated ringers. 1000 milliLiter(s) (75 mL/Hr) IV Continuous <Continuous>  multivitamin 1 Tablet(s) Oral daily  pantoprazole  Injectable 40 milliGRAM(s) IV Push daily  potassium acid phosphate/sodium acid phosphate tablet (K-PHOS No. 2) 1 Tablet(s) Oral four times a day with meals    MEDICATIONS  (PRN):  meclizine 12.5 milliGRAM(s) Oral two times a day PRN Dizziness  ondansetron Injectable 4 milliGRAM(s) IV Push every 8 hours PRN Nausea and/or Vomiting  oxyCODONE    IR 15 milliGRAM(s) Oral every 6 hours PRN Moderate Pain (4 - 6)      Vital Signs Last 24 Hrs  T(C): 37.1 (10-02-18 @ 08:49)  T(F): 98.8 (10-02-18 @ 08:49), Max: 98.9 (10-01-18 @ 17:56)  HR: 106 (10-02-18 @ 08:49) (94 - 106)  BP: 110/64 (10-02-18 @ 08:49)  BP(mean): --  RR: 17 (10-02-18 @ 08:49) (16 - 18)  SpO2: 99% (10-02-18 @ 08:49) (99% - 100%)  Wt(kg): --    CAPILLARY BLOOD GLUCOSE        I&O's Summary      PHYSICAL EXAM:  GENERAL: NAD, well-developed, wearing mask  HEAD:  Atraumatic, Normocephalic  EYES: conjunctiva and sclera clear  NECK: Supple, No JVD  CHEST/LUNG: Clear to auscultation bilaterally; No wheeze  HEART: Regular rate and rhythm; No murmurs,  ABDOMEN: Soft, Nontender, Nondistended; Bowel sounds present  EXTREMITIES:  2+ Peripheral Pulses, No clubbing, cyanosis, or edema  PSYCH: AAOx3, flat affect  SKIN: No rashes or lesions, no tenting of the skin    LABS:                        8.8    0.39  )-----------( 63       ( 02 Oct 2018 08:45 )             25.9   Auto Neutrophil #: 0.03 K/uL (10.02.18 @ 08:45)    Band Neutrophils %: 0 % (10.02.18 @ 08:45)      10-02    131<L>  |  94<L>  |  15  ----------------------------<  102<H>  3.6   |  25  |  0.81    Ca    8.3<L>      02 Oct 2018 08:45  Phos  2.1     10-02  Mg     1.6     10-02    TPro  6.5  /  Alb  3.7  /  TBili  0.7  /  DBili  x   /  AST  168<H>  /  ALT  293<H>  /  AlkPhos  62  10-02      CARDIAC MARKERS ( 30 Sep 2018 23:40 )  x     / x     / 40 u/L / 1.00 ng/mL / x          RADIOLOGY & ADDITIONAL TESTS:    Imaging Personally Reviewed:    Consultant(s) Notes Reviewed:      Care Discussed with Consultants/Other Providers: Onc (Dr. Story) re: ?neupogen    Assessment and Plan:

## 2018-10-02 NOTE — PROGRESS NOTE ADULT - PROBLEM SELECTOR PLAN 1
-Patient with prior nausea and vomiting from RPLND, still with same symptoms, not tolerating po  -c/w clear liquid diet  -Need to provide strong encouragement for patient to try food as he is hesitant to do so  -Patient not asking for Zofran, will put it around the clock  -Repeat EKG in AM

## 2018-10-02 NOTE — DISCHARGE NOTE ADULT - CARE PROVIDERS DIRECT ADDRESSES
[email or web address]

## 2018-10-02 NOTE — DISCHARGE NOTE ADULT - SECONDARY DIAGNOSIS.
Dehydration Transaminitis Neutropenia Chronic atrial fibrillation Chronic HFrEF (heart failure with reduced ejection fraction) Mixed germ cell tumor

## 2018-10-02 NOTE — PROGRESS NOTE ADULT - PROBLEM SELECTOR PLAN 3
-Now neutropenic with an ANC of 30 today, likely 2/2 chemo  -Will d/w oncology re: role for Neupogen  -Trend CBC with diff daily to monitor ANC

## 2018-10-02 NOTE — PROGRESS NOTE ADULT - ASSESSMENT
37 yo M with 38 year old male testicular CA s/p orchiectomy (now on RPLND), atrial flutter/fibrillation, atrial mass, cardiomyopathy with resultant chronic HFrEF (EF=40%), who presents to the ED for chemotherapy induced severe nausea, vomiting, and inability to tolerate po. Still reports limited po intake. 39 yo M with 38 year old male testicular CA s/p orchiectomy (now on RPLND), atrial flutter/fibrillation, atrial mass, cardiomyopathy with resultant chronic HFrEF (EF=40%), who presents to the ED for chemotherapy induced severe nausea, vomiting, and inability to tolerate po. Still reports limited po intake; course now c/b chemotherapy induced neutropenia.

## 2018-10-02 NOTE — DISCHARGE NOTE ADULT - REASON FOR ADMISSION
Nausea, Vomiting and Diarrhea, Syncope Nausea, Vomiting and Diarrhea, Syncope sec to s/e of chemotherapy.  Neutropenia and pancytopenia sec to chemotherapy- improved

## 2018-10-02 NOTE — DISCHARGE NOTE ADULT - PROVIDER TOKENS
TOKJOAN:'[id number]',VLADIMIR:'[id number]' TOKEN:'[id number]',TOKEN:'[id number]:'[id number]'

## 2018-10-02 NOTE — PROGRESS NOTE ADULT - PROBLEM SELECTOR PLAN 2
-Likely in the setting of profound dehydration from nausea and vomiting  -Check orthostatics to assess volume status

## 2018-10-02 NOTE — DISCHARGE NOTE ADULT - HOSPITAL COURSE
38 year old male, with past history significant for Testicular cancer, Atrial flutter/fibrillation, Atrial mass, Cardiomyopathy, Orchiectomy, Lumbar herniated disc, Neuropathy of the fingertips, Obesity, Cardioversion, Vitamin D deficiency, Folate deficiency, and Port=a-cath, presented to the ED secondary to nausea, vomiting and diarrhea.  Vital signs upon ED presentation: BP = 93/66, HR = 114, RR = 20, T = 37C (98.6 F), O2 Sat = 98% on RA.  Diagnosed with Nausea, Vomiting and Diarrhea, as well as Syncope.     COURSE____ 38 year old male, with past history significant for Testicular cancer, Atrial flutter/fibrillation, Atrial mass, Cardiomyopathy, Orchiectomy, Lumbar herniated disc, Neuropathy of the fingertips, Obesity, Cardioversion, Vitamin D deficiency, Folate deficiency, and Port=a-cath, presented to the ED secondary to nausea, vomiting and diarrhea.  Vital signs upon ED presentation: BP = 93/66, HR = 114, RR = 20, T = 37C (98.6 F), O2 Sat = 98% on RA.  Diagnosed with Nausea, Vomiting and Diarrhea, as well as Syncope.     Hospital Course:    Nausea vomiting and diarrhea.   - onset since Friday (09/26/2018), and with intolerance of nutritional intake  - as sequela of TIP chemotherapy (3rd cycle) and resulting in dehydration, causing syncope  - IVF hydration; s/p 1 liter LR, 1 liter NS and continued on maintenance IVF of NS at 75 mL/Hr x 24 hours  - eval for any sign of fluid overload since patient with heart failure (EF = 40 to 45%; TTE of 02/22/2018)  - follow vital signs  - PPI  - continued for any signs of infection in neutropenic state  - antiemetic PRN.     Fever  -cefepime  -f/u bcx _____   -f/u UCX____      Vasovagal syncope.    - fell twice prior to coming to ED  - in the setting of dehydration   - on IVF hydration  - fall precautions.   - transfer to [facility] hypotension and tachycardia while standing. episodes of syncope**      Dehydration.    - borderline hypotensive and on IVF hydration  - in the setting of deficient/no oral intake for the past few days, and with vomiting, diarrhea since 09/26/2018  - eval for any sign of fluid overload, since patient has heart failure  - intake/output Q4H x 3.     Mixed germ cell tumor.  -s/p TIP chemotherapy a few days prior  - now w/ s/e of agents; nausea, vomiting, diarrhea  - oncology consult in the AM (pls call).     Chronic atrial fibrillation.  - tachycardic to 114 when presented to ED, improved  - [id number] score = 0  - in sinus rhythm presently  - no need for cardiology eval at present. AT ADMISSION: 38 year old male, with past history significant for Testicular cancer, Atrial flutter/fibrillation, Atrial mass, Cardiomyopathy, Orchiectomy, Lumbar herniated disc, Neuropathy of the fingertips, Obesity, Cardioversion, Vitamin D deficiency, Folate deficiency, and Port=a-cath, presented to the ED secondary to nausea, vomiting and diarrhea.  Seen and evaluated at bedside; ill appearing with face mask in place, but in NAD.  Patient recently underwent his 4th cycle of TIP chemotherapy on 09/24 to 09/25/2018 and reports onset of nausea, vomiting since Friday (09/28/2018); no tolerance of oral nutrition since then.  Reports 2 episodes of syncope; during the first episode, patient felt faint and lowered himself to the floor and had LOC for a few seconds.  Family came to his aid and after feeling somewhat stable, patient attempted to go to the bathroom.  However, he again fell, but this time, family was present.  States no trauma to head or body.  Has suffered with similar episodes associated with prior chemotherapy treatments.  Complains of feeling cold.  No reports of headache, chest pain, shortness of breath, palpitations, abdominal pain, dysuria.    Vital signs upon ED presentation: BP = 93/66, HR = 114, RR = 20, T = 37C (98.6 F), O2 Sat = 98% on RA.  Diagnosed with Nausea, Vomiting and Diarrhea, as well as Syncope.  Prescribed LR 1 liter, NS 1 liter, maintenance IVF NS at 75 mL/Hr x 24 hours, zofran 4 mg IV x one in the ED.    HOSPITAL COURSE: The patient continued to have significant nausea while inpatient with intermittent vomiting. He received aggressive IV hydration with IVF while he was no longer able to tolerate po. On hospital day 4, patient was able to tolerate some po and requested discharge home. His course was c/b neutropenic fever to 101. Cultures were sent and remained negative. Patient was also started on cefepime. His ANC started to improve towards the end of the hospital course. His platelets, however, downtrended. He was given 1U platelets as per oncology recommendations. His midline was noted to have been in since July. After discussion with his outpatient oncologist, the patient opted to have his midline removed and will receive his last dose of chemotherapy via peripheral IV. During his hospital course, patient was noted to have an episode of tachycardia to the 140's followed by bradycardia to the 20's during orthostatic vitals. He was seen by Dr. Brown of cardiology who feels this is chemo-induced orthostasis and no further inpatient w/u is needed. He is medically stable for dc home. AT ADMISSION: 38 year old male, with past history significant for Testicular cancer, Atrial flutter/fibrillation, Atrial mass, Cardiomyopathy, Orchiectomy, Lumbar herniated disc, Neuropathy of the fingertips, Obesity, Cardioversion, Vitamin D deficiency, Folate deficiency, and Port=a-cath, presented to the ED secondary to nausea, vomiting and diarrhea.  Seen and evaluated at bedside; ill appearing with face mask in place, but in NAD.  Patient recently underwent his 4th cycle of TIP chemotherapy on 09/24 to 09/25/2018 and reports onset of nausea, vomiting since Friday (09/28/2018); no tolerance of oral nutrition since then.  Reports 2 episodes of syncope; during the first episode, patient felt faint and lowered himself to the floor and had LOC for a few seconds.  Family came to his aid and after feeling somewhat stable, patient attempted to go to the bathroom.  However, he again fell, but this time, family was present.  States no trauma to head or body.  Has suffered with similar episodes associated with prior chemotherapy treatments.  Complains of feeling cold.  No reports of headache, chest pain, shortness of breath, palpitations, abdominal pain, dysuria.    Vital signs upon ED presentation: BP = 93/66, HR = 114, RR = 20, T = 37C (98.6 F), O2 Sat = 98% on RA.  Diagnosed with Nausea, Vomiting and Diarrhea, as well as Syncope.  Prescribed LR 1 liter, NS 1 liter, maintenance IVF NS at 75 mL/Hr x 24 hours, zofran 4 mg IV x one in the ED.    HOSPITAL COURSE: The patient continued to have significant nausea while inpatient with intermittent vomiting. He received aggressive IV hydration with IVF while he was no longer able to tolerate po. On hospital day 4, patient was able to tolerate some po and requested discharge home. His course was c/b neutropenic fever to 101. Cultures were sent and remained negative. Patient was also started on cefepime. His ANC started to improve towards the end of the hospital course. His platelets, however, downtrended. He was given 1U platelets as per oncology recommendations. His midline was noted to have been in since July. After discussion with his outpatient oncologist, the patient opted to have his midline removed and will receive his last dose of chemotherapy via peripheral IV. During his hospital course, patient was noted to have an episode of tachycardia to the 140's followed by bradycardia to the 20's during orthostatic vitals. He was seen by Dr. Brown of cardiology who feels this is chemo-induced orthostasis and no further inpatient w/u is needed. He is medically stable for dc home. Prior to discharge received additional platelet transfusion and MID-Line was removed after discussion with oncology.

## 2018-10-02 NOTE — DISCHARGE NOTE ADULT - PATIENT PORTAL LINK FT
You can access the [facility] Patient Portal, offered by [facility], by registering with the following website: [email or web address]

## 2018-10-02 NOTE — DISCHARGE NOTE ADULT - ADDITIONAL INSTRUCTIONS
Continue your medications as directed and please follow-up as an outpatient with your primary care provider for further care and recommendations. Continue your medications as directed and please follow-up as an outpatient with your primary care provider for further care and recommendations.  -Follow up in the office with Dr. Restrepo for continued management  -[facility] for Urology [phone]   -No acute  intervention, please call if questions Continue your medications as directed and please follow-up as an outpatient with your primary care provider for further care and recommendations.  -Follow up in the office with Dr. Restrepo for continued management  -[facility] for Urology [phone]   -No acute  intervention, please call if questions    follow up care at [facility] ([phone], [address] [place] [address]).

## 2018-10-02 NOTE — DISCHARGE NOTE ADULT - MEDICATION SUMMARY - MEDICATIONS TO TAKE
I will START or STAY ON the medications listed below when I get home from the hospital:    oxyCODONE 15 mg oral tablet  -- 1 tab(s) by mouth every 6 hours, As Needed  -- Indication: For pain    acetaminophen 325 mg oral tablet  -- 2 tab(s) by mouth every 6 hours, As needed, For Temp greater than 38 C (100.4 F)  -- Indication: For pain    meclizine 12.5 mg oral tablet  -- 1 tab(s) by mouth 2 times a day, As needed, Dizziness  -- Indication: For Dizziness    Benadryl 50 mg oral capsule  -- 1 cap(s) by mouth prior to chemotherapy   -- Indication: For Chemotherapy-induced nausea and vomiting    Mesnex 400 mg oral tablet  -- Take 8 hours after every cycle of chemotherapy   -- Indication: For Chemotherapy-induced nausea and vomiting    Prevacid 30 mg oral delayed release capsule  -- 1 cap(s) by mouth once a day  -- Indication: For GERD    folic acid 1 mg oral tablet  -- 1 tab(s) by mouth once a day  -- Indication: For Supplement    ergocalciferol 50,000 intl units (1.25 mg) oral capsule  -- 1 cap(s) by mouth once a week  -- Indication: For supplement

## 2018-10-02 NOTE — DISCHARGE NOTE ADULT - CARE PROVIDER_API CALL
Neo Chirinos), Hematology; Internal Medicine; Medical Oncology  [address] [place] [address]  Phone: [phone]  Fax: [phone]    Cleve Marquez), Cardiology; Internal Medicine  [facility] [address] [address]  Phone: [phone]  Fax: [phone] Neo Chirinos), Hematology; Internal Medicine; Medical Oncology  [address] [place] [address]  Phone: [phone]  Fax: [phone]    Cleve Marquez), Cardiology; Internal Medicine  [address] [place] [address]  Phone: [phone]  Fax: [phone]    Phong Restrepo), Urology  [address] [place] [address]  Phone: [phone]  Fax: [phone]

## 2018-10-03 LAB
ALBUMIN SERPL ELPH-MCNC: 4.1 G/DL — SIGNIFICANT CHANGE UP (ref 3.3–5)
ALP SERPL-CCNC: 68 U/L — SIGNIFICANT CHANGE UP (ref 40–120)
ALT FLD-CCNC: 436 U/L — HIGH (ref 4–41)
APPEARANCE UR: SIGNIFICANT CHANGE UP
AST SERPL-CCNC: 220 U/L — HIGH (ref 4–40)
BACTERIA # UR AUTO: NEGATIVE — SIGNIFICANT CHANGE UP
BASOPHILS # BLD AUTO: 0 K/UL — SIGNIFICANT CHANGE UP (ref 0–0.2)
BASOPHILS NFR BLD AUTO: 0 % — SIGNIFICANT CHANGE UP (ref 0–2)
BILIRUB SERPL-MCNC: 1.2 MG/DL — SIGNIFICANT CHANGE UP (ref 0.2–1.2)
BILIRUB UR-MCNC: NEGATIVE — SIGNIFICANT CHANGE UP
BLOOD UR QL VISUAL: NEGATIVE — SIGNIFICANT CHANGE UP
BUN SERPL-MCNC: 13 MG/DL — SIGNIFICANT CHANGE UP (ref 7–23)
CALCIUM SERPL-MCNC: 9.4 MG/DL — SIGNIFICANT CHANGE UP (ref 8.4–10.5)
CHLORIDE SERPL-SCNC: 95 MMOL/L — LOW (ref 98–107)
CO2 SERPL-SCNC: 26 MMOL/L — SIGNIFICANT CHANGE UP (ref 22–31)
COLOR SPEC: YELLOW — SIGNIFICANT CHANGE UP
CREAT SERPL-MCNC: 0.95 MG/DL — SIGNIFICANT CHANGE UP (ref 0.5–1.3)
EOSINOPHIL # BLD AUTO: 0 K/UL — SIGNIFICANT CHANGE UP (ref 0–0.5)
EOSINOPHIL NFR BLD AUTO: 0 % — SIGNIFICANT CHANGE UP (ref 0–6)
GLUCOSE SERPL-MCNC: 98 MG/DL — SIGNIFICANT CHANGE UP (ref 70–99)
GLUCOSE UR-MCNC: NEGATIVE — SIGNIFICANT CHANGE UP
HCT VFR BLD CALC: 28.1 % — LOW (ref 39–50)
HGB BLD-MCNC: 9.4 G/DL — LOW (ref 13–17)
HYALINE CASTS # UR AUTO: NEGATIVE — SIGNIFICANT CHANGE UP
IMM GRANULOCYTES # BLD AUTO: 0.01 # — SIGNIFICANT CHANGE UP
IMM GRANULOCYTES NFR BLD AUTO: 1.4 % — SIGNIFICANT CHANGE UP (ref 0–1.5)
KETONES UR-MCNC: HIGH
LEUKOCYTE ESTERASE UR-ACNC: NEGATIVE — SIGNIFICANT CHANGE UP
LYMPHOCYTES # BLD AUTO: 0.59 K/UL — LOW (ref 1–3.3)
LYMPHOCYTES # BLD AUTO: 81.9 % — HIGH (ref 13–44)
MAGNESIUM SERPL-MCNC: 1.8 MG/DL — SIGNIFICANT CHANGE UP (ref 1.6–2.6)
MANUAL SMEAR VERIFICATION: SIGNIFICANT CHANGE UP
MCHC RBC-ENTMCNC: 30.4 PG — SIGNIFICANT CHANGE UP (ref 27–34)
MCHC RBC-ENTMCNC: 33.5 % — SIGNIFICANT CHANGE UP (ref 32–36)
MCV RBC AUTO: 90.9 FL — SIGNIFICANT CHANGE UP (ref 80–100)
MONOCYTES # BLD AUTO: 0.11 K/UL — SIGNIFICANT CHANGE UP (ref 0–0.9)
MONOCYTES NFR BLD AUTO: 15.3 % — HIGH (ref 2–14)
NEUTROPHILS # BLD AUTO: 0.01 K/UL — LOW (ref 1.8–7.4)
NEUTROPHILS NFR BLD AUTO: 1.4 % — LOW (ref 43–77)
NITRITE UR-MCNC: NEGATIVE — SIGNIFICANT CHANGE UP
NRBC # FLD: 0 — SIGNIFICANT CHANGE UP
PH UR: 8 — SIGNIFICANT CHANGE UP (ref 5–8)
PHOSPHATE SERPL-MCNC: 2.8 MG/DL — SIGNIFICANT CHANGE UP (ref 2.5–4.5)
PLATELET # BLD AUTO: 43 K/UL — LOW (ref 150–400)
PMV BLD: 11.4 FL — SIGNIFICANT CHANGE UP (ref 7–13)
POTASSIUM SERPL-MCNC: 3.4 MMOL/L — LOW (ref 3.5–5.3)
POTASSIUM SERPL-SCNC: 3.4 MMOL/L — LOW (ref 3.5–5.3)
PROT SERPL-MCNC: 7.5 G/DL — SIGNIFICANT CHANGE UP (ref 6–8.3)
PROT UR-MCNC: 50 — SIGNIFICANT CHANGE UP
RBC # BLD: 3.09 M/UL — LOW (ref 4.2–5.8)
RBC # FLD: 15.4 % — HIGH (ref 10.3–14.5)
RBC CASTS # UR COMP ASSIST: SIGNIFICANT CHANGE UP (ref 0–?)
SODIUM SERPL-SCNC: 134 MMOL/L — LOW (ref 135–145)
SP GR SPEC: 1.02 — SIGNIFICANT CHANGE UP (ref 1–1.04)
SQUAMOUS # UR AUTO: SIGNIFICANT CHANGE UP
UROBILINOGEN FLD QL: NORMAL — SIGNIFICANT CHANGE UP
WBC # BLD: 0.72 K/UL — CRITICAL LOW (ref 3.8–10.5)
WBC # FLD AUTO: 0.72 K/UL — CRITICAL LOW (ref 3.8–10.5)
WBC UR QL: SIGNIFICANT CHANGE UP (ref 0–?)

## 2018-10-03 PROCEDURE — 93010 ELECTROCARDIOGRAM REPORT: CPT

## 2018-10-03 PROCEDURE — 99232 SBSQ HOSP IP/OBS MODERATE 35: CPT

## 2018-10-03 RX ORDER — POTASSIUM CHLORIDE 20 MEQ
10 PACKET (EA) ORAL
Qty: 0 | Refills: 0 | Status: COMPLETED | OUTPATIENT
Start: 2018-10-03 | End: 2018-10-03

## 2018-10-03 RX ORDER — CEFEPIME 1 G/1
2000 INJECTION, POWDER, FOR SOLUTION INTRAMUSCULAR; INTRAVENOUS EVERY 8 HOURS
Qty: 0 | Refills: 0 | Status: DISCONTINUED | OUTPATIENT
Start: 2018-10-04 | End: 2018-10-04

## 2018-10-03 RX ORDER — CEFEPIME 1 G/1
INJECTION, POWDER, FOR SOLUTION INTRAMUSCULAR; INTRAVENOUS
Qty: 0 | Refills: 0 | Status: DISCONTINUED | OUTPATIENT
Start: 2018-10-03 | End: 2018-10-04

## 2018-10-03 RX ORDER — DIPHENHYDRAMINE HYDROCHLORIDE AND LIDOCAINE HYDROCHLORIDE AND ALUMINUM HYDROXIDE AND MAGNESIUM HYDRO
10 KIT
Qty: 0 | Refills: 0 | Status: DISCONTINUED | OUTPATIENT
Start: 2018-10-03 | End: 2018-10-06

## 2018-10-03 RX ORDER — ONDANSETRON 8 MG/1
4 TABLET, FILM COATED ORAL EVERY 8 HOURS
Qty: 0 | Refills: 0 | Status: DISCONTINUED | OUTPATIENT
Start: 2018-10-03 | End: 2018-10-06

## 2018-10-03 RX ORDER — MAGNESIUM SULFATE 500 MG/ML
1 VIAL (ML) INJECTION ONCE
Qty: 0 | Refills: 0 | Status: COMPLETED | OUTPATIENT
Start: 2018-10-03 | End: 2018-10-03

## 2018-10-03 RX ORDER — CEFEPIME 1 G/1
2000 INJECTION, POWDER, FOR SOLUTION INTRAMUSCULAR; INTRAVENOUS ONCE
Qty: 0 | Refills: 0 | Status: COMPLETED | OUTPATIENT
Start: 2018-10-03 | End: 2018-10-03

## 2018-10-03 RX ORDER — POTASSIUM CHLORIDE 20 MEQ
40 PACKET (EA) ORAL EVERY 4 HOURS
Qty: 0 | Refills: 0 | Status: COMPLETED | OUTPATIENT
Start: 2018-10-03 | End: 2018-10-03

## 2018-10-03 RX ADMIN — Medication 100 MILLIEQUIVALENT(S): at 17:03

## 2018-10-03 RX ADMIN — Medication 1 TABLET(S): at 09:53

## 2018-10-03 RX ADMIN — SODIUM CHLORIDE 75 MILLILITER(S): 9 INJECTION, SOLUTION INTRAVENOUS at 05:53

## 2018-10-03 RX ADMIN — Medication 650 MILLIGRAM(S): at 18:33

## 2018-10-03 RX ADMIN — Medication 1 MILLIGRAM(S): at 12:53

## 2018-10-03 RX ADMIN — Medication 1 TABLET(S): at 12:54

## 2018-10-03 RX ADMIN — Medication 40 MILLIEQUIVALENT(S): at 09:54

## 2018-10-03 RX ADMIN — Medication 100 MILLIEQUIVALENT(S): at 16:01

## 2018-10-03 RX ADMIN — ONDANSETRON 4 MILLIGRAM(S): 8 TABLET, FILM COATED ORAL at 21:03

## 2018-10-03 RX ADMIN — Medication 100 GRAM(S): at 09:53

## 2018-10-03 RX ADMIN — Medication 650 MILLIGRAM(S): at 01:40

## 2018-10-03 RX ADMIN — PANTOPRAZOLE SODIUM 40 MILLIGRAM(S): 20 TABLET, DELAYED RELEASE ORAL at 12:53

## 2018-10-03 RX ADMIN — Medication 100 MILLIEQUIVALENT(S): at 15:09

## 2018-10-03 RX ADMIN — CEFEPIME 2000 MILLIGRAM(S): 1 INJECTION, POWDER, FOR SOLUTION INTRAMUSCULAR; INTRAVENOUS at 20:54

## 2018-10-03 RX ADMIN — ONDANSETRON 4 MILLIGRAM(S): 8 TABLET, FILM COATED ORAL at 05:53

## 2018-10-03 RX ADMIN — ONDANSETRON 4 MILLIGRAM(S): 8 TABLET, FILM COATED ORAL at 12:54

## 2018-10-03 RX ADMIN — Medication 650 MILLIGRAM(S): at 00:56

## 2018-10-03 NOTE — PROGRESS NOTE ADULT - SUBJECTIVE AND OBJECTIVE BOX
Patient is a 38y old  Male who presents with a chief complaint of Nausea, Vomiting and Diarrhea, Syncope (02 Oct 2018 14:30)      SUBJECTIVE / OVERNIGHT EVENTS: Patient seen and examined. Feels a little better. Drinking some water and juice. Has not been up walking around, however as he feels too weak.    MEDICATIONS  (STANDING):  acetaminophen   Tablet .. 650 milliGRAM(s) Oral every 6 hours  ergocalciferol 05002 Unit(s) Oral <User Schedule>  folic acid 1 milliGRAM(s) Oral daily  lactated ringers. 1000 milliLiter(s) (75 mL/Hr) IV Continuous <Continuous>  multivitamin 1 Tablet(s) Oral daily  ondansetron Injectable 4 milliGRAM(s) IV Push every 8 hours  pantoprazole  Injectable 40 milliGRAM(s) IV Push daily    MEDICATIONS  (PRN):  meclizine 12.5 milliGRAM(s) Oral two times a day PRN Dizziness  MIRACLE MOUTHWASH 10 milliLiter(s) Swish and Spit two times a day PRN Mouth Care  oxyCODONE    IR 15 milliGRAM(s) Oral every 6 hours PRN Moderate Pain (4 - 6)      Vital Signs Last 24 Hrs  T(C): 37.6 (10-03-18 @ 13:08)  T(F): 99.6 (10-03-18 @ 13:08), Max: 99.8 (10-03-18 @ 09:50)  HR: 95 (10-03-18 @ 13:08) (90 - 110)  BP: 98/64 (10-03-18 @ 13:08)  BP(mean): --  RR: 18 (10-03-18 @ 13:08) (18 - 18)  SpO2: 100% (10-03-18 @ 13:08) (97% - 100%)  Wt(kg): --    10-02 @ 07:01  -  10-03 @ 07:00  --------------------------------------------------------  IN: 600 mL / OUT: 800 mL / NET: -200 mL      CAPILLARY BLOOD GLUCOSE        I&O's Summary    02 Oct 2018 07:01  -  03 Oct 2018 07:00  --------------------------------------------------------  IN: 600 mL / OUT: 800 mL / NET: -200 mL        PHYSICAL EXAM:  GENERAL: NAD, well-developed  HEAD:  Atraumatic, Normocephalic  EYES: EOMI, PERRLA, conjunctiva and sclera clear  NECK: Supple, No JVD  CHEST/LUNG: Clear to auscultation bilaterally; No wheeze  HEART: Regular rate and rhythm; No murmurs, rubs, or gallops  ABDOMEN: Soft, Nontender, Nondistended; Bowel sounds present  EXTREMITIES:  2+ Peripheral Pulses, No clubbing, cyanosis, or edema  PSYCH: AAOx3  NEUROLOGY: non-focal  SKIN: No rashes or lesions    LABS:                        9.4    0.72  )-----------( 43       ( 03 Oct 2018 06:30 )             28.1     10-03    134<L>  |  95<L>  |  13  ----------------------------<  98  3.4<L>   |  26  |  0.95    Ca    9.4      03 Oct 2018 06:30  Phos  2.8     10-03  Mg     1.8     10-03    TPro  7.5  /  Alb  4.1  /  TBili  1.2  /  DBili  x   /  AST  220<H>  /  ALT  436<H>  /  AlkPhos  68  10-03              RADIOLOGY & ADDITIONAL TESTS:    Imaging Personally Reviewed:    Consultant(s) Notes Reviewed:      Care Discussed with Consultants/Other Providers:    Assessment and Plan: Patient is a 38y old  Male who presents with a chief complaint of Nausea, Vomiting and Diarrhea, Syncope (02 Oct 2018 14:30)      SUBJECTIVE / OVERNIGHT EVENTS: Patient seen and examined. Feels a little better. Drinking some water and juice. Has not been up walking around, however as he feels too weak.    MEDICATIONS  (STANDING):  acetaminophen   Tablet .. 650 milliGRAM(s) Oral every 6 hours  ergocalciferol 83698 Unit(s) Oral <User Schedule>  folic acid 1 milliGRAM(s) Oral daily  lactated ringers. 1000 milliLiter(s) (75 mL/Hr) IV Continuous <Continuous>  multivitamin 1 Tablet(s) Oral daily  ondansetron Injectable 4 milliGRAM(s) IV Push every 8 hours  pantoprazole  Injectable 40 milliGRAM(s) IV Push daily    MEDICATIONS  (PRN):  meclizine 12.5 milliGRAM(s) Oral two times a day PRN Dizziness  MIRACLE MOUTHWASH 10 milliLiter(s) Swish and Spit two times a day PRN Mouth Care  oxyCODONE    IR 15 milliGRAM(s) Oral every 6 hours PRN Moderate Pain (4 - 6)      Vital Signs Last 24 Hrs  T(C): 37.6 (10-03-18 @ 13:08)  T(F): 99.6 (10-03-18 @ 13:08), Max: 99.8 (10-03-18 @ 09:50)  HR: 95 (10-03-18 @ 13:08) (90 - 110)  BP: 98/64 (10-03-18 @ 13:08)  BP(mean): --  RR: 18 (10-03-18 @ 13:08) (18 - 18)  SpO2: 100% (10-03-18 @ 13:08) (97% - 100%)  Wt(kg): --    10-02 @ 07:01  -  10-03 @ 07:00  --------------------------------------------------------  IN: 600 mL / OUT: 800 mL / NET: -200 mL      CAPILLARY BLOOD GLUCOSE        I&O's Summary    02 Oct 2018 07:01  -  03 Oct 2018 07:00  --------------------------------------------------------  IN: 600 mL / OUT: 800 mL / NET: -200 mL        PHYSICAL EXAM:  GENERAL: NAD, well-developed, wearing mask  HEAD:  Atraumatic, Normocephalic  EYES: conjunctiva and sclera clear  NECK: Supple, No JVD  CHEST/LUNG: Clear to auscultation bilaterally; No wheeze  HEART: Regular rate and rhythm; No murmurs,  ABDOMEN: Soft, Nontender, Nondistended; Bowel sounds present  EXTREMITIES:  2+ Peripheral Pulses, No clubbing, cyanosis, or edema  PSYCH: AAOx3, flat affect  SKIN: No rashes or lesions, no tenting of the skin    LABS:                        9.4    0.72  )-----------( 43       ( 03 Oct 2018 06:30 )             28.1     10-03    134<L>  |  95<L>  |  13  ----------------------------<  98  3.4<L>   |  26  |  0.95    Ca    9.4      03 Oct 2018 06:30  Phos  2.8     10-03  Mg     1.8     10-03    TPro  7.5  /  Alb  4.1  /  TBili  1.2  /  DBili  x   /  AST  220<H>  /  ALT  436<H>  /  AlkPhos  68  10-03              RADIOLOGY & ADDITIONAL TESTS:    Imaging Personally Reviewed:    Consultant(s) Notes Reviewed:      Care Discussed with Consultants/Other Providers: Dr. Jimenez (onc) re: neupogen-no need     Assessment and Plan:

## 2018-10-03 NOTE — PROGRESS NOTE ADULT - PROBLEM SELECTOR PLAN 1
-Patient with prior nausea and vomiting from RPLND, still with same symptoms, not tolerating much po  -c/w clear liquid diet  -Need to provide strong encouragement for patient to try food as he is hesitant to do so  -Will change to po Zofran ATC  -Repeat EKG in AM

## 2018-10-03 NOTE — PROGRESS NOTE ADULT - ASSESSMENT
37 yo M with 38 year old male testicular CA s/p orchiectomy (now on RPLND), atrial flutter/fibrillation, atrial mass, cardiomyopathy with resultant chronic HFrEF (EF=40%), who presents to the ED for chemotherapy induced severe nausea, vomiting, and inability to tolerate po. Still reports limited po intake; course now c/b chemotherapy induced neutropenia.

## 2018-10-03 NOTE — PROVIDER CONTACT NOTE (OTHER) - RECOMMENDATIONS
Blood c&s ua c&S  .antibiotcs tylenol given
Continue to monitor patient, continue with IV fluids
No new order.
additional 1mg dilaudid ivp

## 2018-10-03 NOTE — PROGRESS NOTE ADULT - PROBLEM SELECTOR PLAN 7
-There was concern that patient had a misplaced PICC line. However, this has been previously been confirmed with IR who placed the line that it is a midline and that it is in place  -IR procedure note also confirms a midline was placed  -The midline may be used for IVF and medications as patient is refusing additional peripheral IV access

## 2018-10-03 NOTE — PROGRESS NOTE ADULT - PROBLEM SELECTOR PLAN 3
-Now neutropenic with an ANC of 10 today, likely 2/2 chemo  -No role for Neupogen at this time per oncology  -Trend CBC with diff daily to monitor ANC

## 2018-10-03 NOTE — PROVIDER CONTACT NOTE (OTHER) - ASSESSMENT
Patient complained of generalized weakness, denies dizziness. Other vital signs within normal limits

## 2018-10-03 NOTE — PROVIDER CONTACT NOTE (OTHER) - REASON
pt is yelling and verbally abusive to staffs also disturbing other patients .stating he is in pain and pain meds not working

## 2018-10-03 NOTE — PROGRESS NOTE ADULT - PROBLEM SELECTOR PLAN 5
-Patient with clear lungs, currently appears euvolemic  -Monitor volume status carefully-currently clear

## 2018-10-04 DIAGNOSIS — D70.9 NEUTROPENIA, UNSPECIFIED: ICD-10-CM

## 2018-10-04 LAB
ALBUMIN SERPL ELPH-MCNC: 3.9 G/DL — SIGNIFICANT CHANGE UP (ref 3.3–5)
ALP SERPL-CCNC: 63 U/L — SIGNIFICANT CHANGE UP (ref 40–120)
ALT FLD-CCNC: 309 U/L — HIGH (ref 4–41)
AST SERPL-CCNC: 97 U/L — HIGH (ref 4–40)
BASOPHILS # BLD AUTO: 0 K/UL — SIGNIFICANT CHANGE UP (ref 0–0.2)
BASOPHILS NFR BLD AUTO: 0 % — SIGNIFICANT CHANGE UP (ref 0–2)
BASOPHILS NFR SPEC: 0 % — SIGNIFICANT CHANGE UP (ref 0–2)
BILIRUB SERPL-MCNC: 0.7 MG/DL — SIGNIFICANT CHANGE UP (ref 0.2–1.2)
BUN SERPL-MCNC: 12 MG/DL — SIGNIFICANT CHANGE UP (ref 7–23)
CALCIUM SERPL-MCNC: 9 MG/DL — SIGNIFICANT CHANGE UP (ref 8.4–10.5)
CHLORIDE SERPL-SCNC: 95 MMOL/L — LOW (ref 98–107)
CO2 SERPL-SCNC: 26 MMOL/L — SIGNIFICANT CHANGE UP (ref 22–31)
CREAT SERPL-MCNC: 0.84 MG/DL — SIGNIFICANT CHANGE UP (ref 0.5–1.3)
EOSINOPHIL # BLD AUTO: 0.01 K/UL — SIGNIFICANT CHANGE UP (ref 0–0.5)
EOSINOPHIL NFR BLD AUTO: 0.7 % — SIGNIFICANT CHANGE UP (ref 0–6)
EOSINOPHIL NFR FLD: 0 % — SIGNIFICANT CHANGE UP (ref 0–6)
GLUCOSE SERPL-MCNC: 88 MG/DL — SIGNIFICANT CHANGE UP (ref 70–99)
HCT VFR BLD CALC: 24.3 % — LOW (ref 39–50)
HGB BLD-MCNC: 8.5 G/DL — LOW (ref 13–17)
IMM GRANULOCYTES # BLD AUTO: 0 # — SIGNIFICANT CHANGE UP
IMM GRANULOCYTES NFR BLD AUTO: 0 % — SIGNIFICANT CHANGE UP (ref 0–1.5)
LYMPHOCYTES # BLD AUTO: 0.8 K/UL — LOW (ref 1–3.3)
LYMPHOCYTES # BLD AUTO: 56.7 % — HIGH (ref 13–44)
LYMPHOCYTES NFR SPEC AUTO: 68 % — HIGH (ref 13–44)
MAGNESIUM SERPL-MCNC: 1.7 MG/DL — SIGNIFICANT CHANGE UP (ref 1.6–2.6)
MANUAL SMEAR VERIFICATION: SIGNIFICANT CHANGE UP
MCHC RBC-ENTMCNC: 31 PG — SIGNIFICANT CHANGE UP (ref 27–34)
MCHC RBC-ENTMCNC: 35 % — SIGNIFICANT CHANGE UP (ref 32–36)
MCV RBC AUTO: 88.7 FL — SIGNIFICANT CHANGE UP (ref 80–100)
MONOCYTES # BLD AUTO: 0.36 K/UL — SIGNIFICANT CHANGE UP (ref 0–0.9)
MONOCYTES NFR BLD AUTO: 25.5 % — HIGH (ref 2–14)
MONOCYTES NFR BLD: 14 % — HIGH (ref 2–9)
MORPHOLOGY BLD-IMP: SIGNIFICANT CHANGE UP
MYELOCYTES NFR BLD: 2 % — HIGH (ref 0–0)
NEUTROPHIL AB SER-ACNC: 14 % — LOW (ref 43–77)
NEUTROPHILS # BLD AUTO: 0.24 K/UL — LOW (ref 1.8–7.4)
NEUTROPHILS NFR BLD AUTO: 17.1 % — LOW (ref 43–77)
NRBC # BLD: 0 /100WBC — SIGNIFICANT CHANGE UP
NRBC # FLD: 0 — SIGNIFICANT CHANGE UP
PHOSPHATE SERPL-MCNC: 1.9 MG/DL — LOW (ref 2.5–4.5)
PLATELET # BLD AUTO: 19 K/UL — CRITICAL LOW (ref 150–400)
PLATELET COUNT - ESTIMATE: SIGNIFICANT CHANGE UP
PMV BLD: 12 FL — SIGNIFICANT CHANGE UP (ref 7–13)
POTASSIUM SERPL-MCNC: 3.8 MMOL/L — SIGNIFICANT CHANGE UP (ref 3.5–5.3)
POTASSIUM SERPL-SCNC: 3.8 MMOL/L — SIGNIFICANT CHANGE UP (ref 3.5–5.3)
PROT SERPL-MCNC: 7 G/DL — SIGNIFICANT CHANGE UP (ref 6–8.3)
RBC # BLD: 2.74 M/UL — LOW (ref 4.2–5.8)
RBC # FLD: 15 % — HIGH (ref 10.3–14.5)
SODIUM SERPL-SCNC: 132 MMOL/L — LOW (ref 135–145)
SPECIMEN SOURCE: SIGNIFICANT CHANGE UP
SPECIMEN SOURCE: SIGNIFICANT CHANGE UP
VARIANT LYMPHS # BLD: 2 % — SIGNIFICANT CHANGE UP
WBC # BLD: 1.41 K/UL — LOW (ref 3.8–10.5)
WBC # FLD AUTO: 1.41 K/UL — LOW (ref 3.8–10.5)

## 2018-10-04 PROCEDURE — 99223 1ST HOSP IP/OBS HIGH 75: CPT

## 2018-10-04 PROCEDURE — 99233 SBSQ HOSP IP/OBS HIGH 50: CPT

## 2018-10-04 PROCEDURE — 93010 ELECTROCARDIOGRAM REPORT: CPT

## 2018-10-04 RX ORDER — FILGRASTIM 480MCG/1.6
480 VIAL (ML) INJECTION ONCE
Qty: 0 | Refills: 0 | Status: COMPLETED | OUTPATIENT
Start: 2018-10-04 | End: 2018-10-04

## 2018-10-04 RX ORDER — CEFEPIME 1 G/1
2000 INJECTION, POWDER, FOR SOLUTION INTRAMUSCULAR; INTRAVENOUS EVERY 8 HOURS
Qty: 0 | Refills: 0 | Status: COMPLETED | OUTPATIENT
Start: 2018-10-04 | End: 2018-10-05

## 2018-10-04 RX ORDER — OXYCODONE HYDROCHLORIDE 5 MG/1
15 TABLET ORAL EVERY 6 HOURS
Qty: 0 | Refills: 0 | Status: DISCONTINUED | OUTPATIENT
Start: 2018-10-04 | End: 2018-10-06

## 2018-10-04 RX ADMIN — ONDANSETRON 4 MILLIGRAM(S): 8 TABLET, FILM COATED ORAL at 05:18

## 2018-10-04 RX ADMIN — ONDANSETRON 4 MILLIGRAM(S): 8 TABLET, FILM COATED ORAL at 22:09

## 2018-10-04 RX ADMIN — Medication 1 TABLET(S): at 13:54

## 2018-10-04 RX ADMIN — SODIUM CHLORIDE 75 MILLILITER(S): 9 INJECTION, SOLUTION INTRAVENOUS at 05:31

## 2018-10-04 RX ADMIN — PANTOPRAZOLE SODIUM 40 MILLIGRAM(S): 20 TABLET, DELAYED RELEASE ORAL at 13:54

## 2018-10-04 RX ADMIN — CEFEPIME 100 MILLIGRAM(S): 1 INJECTION, POWDER, FOR SOLUTION INTRAMUSCULAR; INTRAVENOUS at 14:18

## 2018-10-04 RX ADMIN — CEFEPIME 2000 MILLIGRAM(S): 1 INJECTION, POWDER, FOR SOLUTION INTRAMUSCULAR; INTRAVENOUS at 05:16

## 2018-10-04 RX ADMIN — SODIUM CHLORIDE 75 MILLILITER(S): 9 INJECTION, SOLUTION INTRAVENOUS at 22:09

## 2018-10-04 RX ADMIN — Medication 1 MILLIGRAM(S): at 13:54

## 2018-10-04 RX ADMIN — CEFEPIME 100 MILLIGRAM(S): 1 INJECTION, POWDER, FOR SOLUTION INTRAMUSCULAR; INTRAVENOUS at 22:09

## 2018-10-04 NOTE — PROGRESS NOTE ADULT - PROBLEM SELECTOR PLAN 6
-Lovenox given active malignancy -Patient with clear lungs, currently appears euvolemic  -Monitor volume status carefully

## 2018-10-04 NOTE — PROGRESS NOTE ADULT - PROBLEM SELECTOR PLAN 2
-Likely in the setting of profound dehydration from nausea and vomiting  -Check orthostatics to assess volume status  -However, patient now with episodes of significant sinus tachycardia followed by bradycardia. Will tx to telemetry for continuous monitoring  -Dr. Brown (cards) to see -Awaiting AM labs to determine ANC  -Will give Neupogen 480mcg if remains neutropenic  -UA negative  -F/u blood cultures, urine culture  -c/w Cefepime 2g IV q8hrs

## 2018-10-04 NOTE — PROGRESS NOTE ADULT - ASSESSMENT
39 yo M with 38 year old male testicular CA s/p orchiectomy (now on RPLND), atrial flutter/fibrillation, atrial mass, cardiomyopathy with resultant chronic HFrEF (EF=40%), who presents to the ED for chemotherapy induced severe nausea, vomiting, and inability to tolerate po. Still reports limited po intake; course now c/b chemotherapy induced neutropenia and subsequently neutropenic fever.

## 2018-10-04 NOTE — PROGRESS NOTE ADULT - SUBJECTIVE AND OBJECTIVE BOX
Patient is a 38y old  Male who presents with a chief complaint of Nausea, Vomiting and Diarrhea, Syncope (04 Oct 2018 15:41)      SUBJECTIVE / OVERNIGHT EVENTS: Patient seen and examined. Still feels weak. Spiked a temp to 101 last night, cultures drawn, urine sent, started on cefepime. Patient refused blood work this AM; now amenable, staff attempting to get blood. Per staff, when orthostatics taken, pts HR went up to the 140's and then subsequently dropped to the 20's. He felt symptomatic (dizzy) at that time.    MEDICATIONS  (STANDING):  acetaminophen   Tablet .. 650 milliGRAM(s) Oral every 6 hours  cefepime   IVPB 2000 milliGRAM(s) IV Intermittent every 8 hours  ergocalciferol 26556 Unit(s) Oral <User Schedule>  folic acid 1 milliGRAM(s) Oral daily  lactated ringers. 1000 milliLiter(s) (75 mL/Hr) IV Continuous <Continuous>  multivitamin 1 Tablet(s) Oral daily  ondansetron    Tablet 4 milliGRAM(s) Oral every 8 hours  pantoprazole  Injectable 40 milliGRAM(s) IV Push daily    MEDICATIONS  (PRN):  meclizine 12.5 milliGRAM(s) Oral two times a day PRN Dizziness  MIRACLE MOUTHWASH 10 milliLiter(s) Swish and Spit two times a day PRN Mouth Care  oxyCODONE    IR 15 milliGRAM(s) Oral every 6 hours PRN Moderate Pain (4 - 6)      Vital Signs Last 24 Hrs  T(C): 37.1 (10-04-18 @ 14:01)  T(F): 98.7 (10-04-18 @ 14:01), Max: 101 (10-03-18 @ 18:33)  HR: 103 (10-04-18 @ 14:01) (98 - 104)  BP: 102/71 (10-04-18 @ 14:01)  BP(mean): --  RR: 18 (10-04-18 @ 14:01) (18 - 20)  SpO2: 99% (10-04-18 @ 14:01) (99% - 100%)  Wt(kg): --    CAPILLARY BLOOD GLUCOSE        I&O's Summary      PHYSICAL EXAM:  GENERAL: NAD, ill appearing  HEAD:  Atraumatic, Normocephalic  EYES: EOMI, PERRLA, conjunctiva and sclera clear  NECK: Supple, No JVD  CHEST/LUNG: Clear to auscultation bilaterally; No wheeze  HEART: Regular rate and rhythm; No murmurs,   ABDOMEN: Soft, Nontender, Nondistended; Bowel sounds present  EXTREMITIES:  2+ Peripheral Pulses, No clubbing, cyanosis, or edema  PSYCH: AAOx3, cooperative  NEUROLOGY: non-focal  SKIN: No rashes or lesions    LABS:                        9.4    0.72  )-----------( 43       ( 03 Oct 2018 06:30 )             28.1   Auto Neutrophil #: 0.01 K/uL (10.03.18 @ 06:30)      10-    134<L>  |  95<L>  |  13  ----------------------------<  98  3.4<L>   |  26  |  0.95    Ca    9.4      03 Oct 2018 06:30  Phos  2.8     10-  Mg     1.8     10-    TPro  7.5  /  Alb  4.1  /  TBili  1.2  /  DBili  x   /  AST  220<H>  /  ALT  436<H>  /  AlkPhos  68  10-    Urinalysis Basic - ( 03 Oct 2018 19:35 )    Color: YELLOW / Appearance: Lt TURBID / S.020 / pH: 8.0  Gluc: NEGATIVE / Ketone: MODERATE  / Bili: NEGATIVE / Urobili: NORMAL   Blood: NEGATIVE / Protein: 50 / Nitrite: NEGATIVE   Leuk Esterase: NEGATIVE / RBC: 0-2 / WBC 3-5   Sq Epi: FEW / Non Sq Epi: x / Bacteria: NEGATIVE        RADIOLOGY & ADDITIONAL TESTS:    Imaging Personally Reviewed:    Consultant(s) Notes Reviewed:      Care Discussed with Consultants/Other Providers: Dr. Jimenez (Onc) re: neupogen; Dr. Brown (cards) re: c/s for bradycardia    Assessment and Plan:

## 2018-10-04 NOTE — PROVIDER CONTACT NOTE (CRITICAL VALUE NOTIFICATION) - BACKGROUND
38 year old male, with past history significant for Testicular cancer, Atrial flutter/fibrillation, Atrial mass, Cardiomyopathy, Orchiectomy, Lumbar herniated disc,
Patient admitted for nausea and vomiting.
Previous WBC count 0.39. Patient admitted for n/v. Patient has testicular cancer.

## 2018-10-04 NOTE — CONSULT NOTE ADULT - SUBJECTIVE AND OBJECTIVE BOX
Cardiology/Vascular Medicine Inpatient Consultation Note    Date of Admission:        CHIEF COMPLAINT:        HISTORY OF PRESENT ILLNESS:  HPI:  38 year old male, with past history significant for Testicular cancer, Atrial flutter/fibrillation, Atrial mass, Cardiomyopathy, Orchiectomy, Lumbar herniated disc, Neuropathy of the fingertips, Obesity, Cardioversion, Vitamin D deficiency, Folate deficiency, and Port=a-cath, presented to the ED secondary to nausea, vomiting and diarrhea.  Seen and evaluated at bedside; ill appearing with face mask in place, but in NAD.  Patient recently underwent his 4th cycle of TIP chemotherapy on 09/24 to 09/25/2018 and reports onset of nausea, vomiting since Friday (09/28/2018); no tolerance of oral nutrition since then.  Reports 2 episodes of syncope; during the first episode, patient felt faint and lowered himself to the floor and had LOC for a few seconds.  Family came to his aid and after feeling somewhat stable, patient attempted to go to the bathroom.  However, he again fell, but this time, family was present.  States no trauma to head or body.  Has suffered with similar episodes associated with prior chemotherapy treatments.  Complains of feeling cold.  No reports of headache, chest pain, shortness of breath, palpitations, abdominal pain, dysuria.    Vital signs upon ED presentation: BP = 93/66, HR = 114, RR = 20, T = 37C (98.6 F), O2 Sat = 98% on RA.  Diagnosed with Nausea, Vomiting and Diarrhea, as well as Syncope.  Prescribed LR 1 liter, NS 1 liter, maintenance IVF NS at 75 mL/Hr x 24 hours, zofran 4 mg IV x one in the ED. (01 Oct 2018 02:35)          Allergies    No Known Allergies    Intolerances    	    MEDICATIONS:    cefepime   IVPB 2000 milliGRAM(s) IV Intermittent every 8 hours      acetaminophen   Tablet .. 650 milliGRAM(s) Oral every 6 hours  meclizine 12.5 milliGRAM(s) Oral two times a day PRN  ondansetron    Tablet 4 milliGRAM(s) Oral every 8 hours  oxyCODONE    IR 15 milliGRAM(s) Oral every 6 hours PRN    pantoprazole  Injectable 40 milliGRAM(s) IV Push daily      ergocalciferol 05524 Unit(s) Oral <User Schedule>  folic acid 1 milliGRAM(s) Oral daily  lactated ringers. 1000 milliLiter(s) IV Continuous <Continuous>  MIRACLE MOUTHWASH 10 milliLiter(s) Swish and Spit two times a day PRN  multivitamin 1 Tablet(s) Oral daily      PAST MEDICAL & SURGICAL HISTORY:  Neuropathy: ~ fingers and toes  Anemia  Mixed germ cell tumor  Obesity  Atrial mass: right artrial echo density per MRI 3/14/18.  Atrial flutter  Cardiomyopathy  Atrial fibrillation: s/p DCCV 1/18  Thrombus: Right atrial wall ( suspected from last TYLOR) On AC  Testicular cancer  Lumbar herniated disc  Testicular mass: left  History of abdominal surgery: Retroperitoneal lymph node dissection (June 2018)  History of cardioversion: 2/2018  Port-a-cath in place: placed 9/2017,, removed-10/18/2017  History of orchiectomy: left-7/2017      FAMILY HISTORY:  Family history of hypertension in father (Mother)  Family history of kidney stones (Mother): mother  Family history of cancer (Grandparent)  Family history of ischemic heart disease (Grandparent)  Family history of diabetes mellitus (Aunt)      SOCIAL HISTORY:    [ ] Non-smoker  [ ] Smoker  [ ] Alcohol    REVIEW OF SYSTEMS:  CONSTITUTIONAL: No fever, weight loss, or fatigue  EYES: No eye pain, visual disturbances, or discharge  ENMT:  No difficulty hearing, tinnitus, vertigo; No sinus or throat pain  NECK: No pain or stiffness  RESPIRATORY: No cough, wheezing, chills or hemoptysis; No Shortness of Breath  CARDIOVASCULAR: No chest pain, palpitations, passing out, dizziness, or leg swelling  GASTROINTESTINAL: No abdominal or epigastric pain. No nausea, vomiting, or hematemesis; No diarrhea or constipation. No melena or hematochezia.  GENITOURINARY: No dysuria, frequency, hematuria, or incontinence  NEUROLOGICAL: No headaches, memory loss, loss of strength, numbness, or tremors  SKIN: No itching, burning, rashes, or lesions   LYMPH Nodes: No enlarged glands  ENDOCRINE: No heat or cold intolerance; No hair loss  MUSCULOSKELETAL: No joint pain or swelling; No muscle, back, or extremity pain  PSYCHIATRIC: No depression, anxiety, mood swings, or difficulty sleeping  HEME/LYMPH: No easy bruising, or bleeding gums  ALLERY AND IMMUNOLOGIC: No hives or eczema	    [ ] All others negative	  [ ] Unable to obtain    PHYSICAL EXAM:  T(C): 37.1 (10-04-18 @ 14:01), Max: 38.3 (10-03-18 @ 18:33)  HR: 103 (10-04-18 @ 14:01) (98 - 104)  BP: 102/71 (10-04-18 @ 14:01) (93/63 - 106/60)  RR: 18 (10-04-18 @ 14:01) (18 - 20)  SpO2: 99% (10-04-18 @ 14:01) (99% - 100%)  Wt(kg): --  I&O's Summary      Appearance: NAD	  HEENT:   Normal oral mucosa, PERRL, EOMI	  Cardiovascular: Normal S1 S2, No JVD, No murmurs, No edema  Respiratory: Decreased BS b/l bases	  Psychiatry: Awake, alert  Gastrointestinal:  Soft, Non-tender, + BS	  Skin: No rashes, No ecchymoses, No cyanosis	  Neurologic: Non-focal  Extremities: Normal range of motion, No clubbing, cyanosis or edema  Vascular: Peripheral pulses palpable 2+ bilaterally      LABS:	 	                          9.4    0.72  )-----------( 43       ( 03 Oct 2018 06:30 )             28.1     10-03    134<L>  |  95<L>  |  13  ----------------------------<  98  3.4<L>   |  26  |  0.95    Ca    9.4      03 Oct 2018 06:30  Phos  2.8     10-03  Mg     1.8     10-03    TPro  7.5  /  Alb  4.1  /  TBili  1.2  /  DBili  x   /  AST  220<H>  /  ALT  436<H>  /  AlkPhos  68  10-03      < from: Transthoracic Echocardiogram (02.22.18 @ 13:10) >    Patient name: KAYLEY GUERRIER  YOB: 1980   Age: 37 (M)   MR#: [id number]  Study Date: 2/22/2018  [facility] [place] [facility]  Study quality: Technically fair  Referring Physician: ELIAS TALBOT MD  Blood Pressure: 110/67 mmHg  Height: 188 cm  Weight: 117 kg  BSA: 2.4 m2  ------------------------------------------------------------------------  PROCEDURE: Transthoracic echocardiogram with 2-D, M-Mode  and complete spectral and color flow Doppler.  INDICATION: Unspecified atrial fibrillation (I48.91)  ------------------------------------------------------------------------  Dimensions:    Normal Values:  LA:     4.8    2.0 - 4.0 cm  Ao:     3.5    2.0 - 3.8 cm  SEPTUM: 1.1    0.6 - 1.2 cm  PWT:    1.0    0.6 - 1.1 cm  LVIDd:  5.7    3.0 - 5.6 cm  LVIDs:  4.4    1.8 - 4.0 cm  Derived variables:  LVMI: 99 g/m2  RWT: 0.35  Fractional short: 23 %  EF (Visual Estimate): 40-45 %  ------------------------------------------------------------------------  Observations:  Mitral Valve: Normal mitral valve.  Aortic Valve/Aorta: Normal trileaflet aortic valve.  Normal aortic root.  Left Atrium: Normal left atrium.  LA volume index = 27  cc/m2.  Left Ventricle: Mild-moderate global left ventricular  systolic dysfunction. Normal left ventricular internal  dimensions and wall thicknesses.  Right Heart: Normal right atrium. Echogenic density  is  seen on posterior aspect of RA measuring 2.1 cm x 3.3 cm  which  appears to have  increased in size since last TYLOR  from 1/17/2018. The right ventricle is not well visualized;  grossly low normal right ventricular systolic function.  Normal tricuspid valve. Normal pulmonic valve.  Pericardium/Pleura: Normal pericardium with no pericardial  effusion.  Hemodynamic: Estimated right atrial pressure is 8 mm Hg.  ------------------------------------------------------------------------  Conclusions:  1. Mild-moderate global left ventricular systolic  dysfunction.  2. Normal right atrium. Echogenic density/mass   is seen on  posterior aspect of RA measuring 2.1 cm x 3.3 cm which  appears to have  increased in size since last TYLOR from  1/17/2018. Recommend Cardiac MRI for further evaluation.  3. The right ventricle is not well visualized; grossly low  normal right ventricular systolic function.  4. Estimated pulmonary artery systolic pressure equals 17  mm Hg, assuming right atrial pressure equals 8  mm Hg,  consistent with normal pulmonary pressures.  Discussed Dr. TEN Talbot  *** Compared with echocardiogram of 1/17/2018, right atrial  echodensity likely representing a mass appears to have  enlarged.  ------------------------------------------------------------------------  Confirmed on  2/22/2018 - 18:19:09 by Rosaura Lewis M.D.  ------------------------------------------------------------------------    < from: Xray Chest 1 View- PORTABLE-Urgent (10.01.18 @ 19:02) >    EXAM:  XR CHEST PORTABLE URGENT 1V        PROCEDURE DATE:  Oct  1 2018         INTERPRETATION:  TIME OF EXAM: October 1, 2018 at 6:42 PM.    CLINICAL INFORMATION: Check PICC placement.    COMPARISON:  September 25, 2018.    TECHNIQUE:   AP Portablechest x-ray.    INTERPRETATION:     The heart is not enlarged.  The mediastinum and america appear unremarkable.  A left upper extremity PICC line is again seen. The tip is at the level   of the mid left subclavian vein, not significantly changed.  The lungs are clear.  No pleural effusion or pneumothorax is seen.        IMPRESSION:  Left upper extremity PICC line unchanged in position with   tip at the level of the mid left subclavian vein.        AKOSUA ADAMSON M.D., ATTENDING RADIOLOGIST  This document has been electronically signed. Oct  2 2018 10:15AM                < from: MR Cardiac w/wo IV Cont (03.14.18 @ 10:30) >    EXAM:  MR CARD MORPH/FUNCTION WAW IC        PROCEDURE DATE:  03/14/2018           INTERPRETATION:  MRI CARDIAC WITHOUT AND WITH CONTRAST      INDICATION: History of germ cell tumor. Therapy esophageal   echocardiogram. Presumed right atrial mass. Evaluate for possible disease.    COMPARISON: No prior studies available for comparison.    TECHNIQUE: Multi-sequential, multi-planar cardiac MRI was performed   before and after the intravenous administration of 10 mL of Gadavist; 0   mL was discarded. Resting myocardial perfusion imaging was performed.   Late gadolinium enhanced images were obtained.    SCANNER: Siemens1.5 T Aera platform using a cardiac coil.    QUALITY: Good.      FINDINGS:      Centered along the wall of the right atrium, justlateral to the inflow   of the IVC is a 2.6 x 0.8 cm ovoid shaped mass. The mass shows   intermediate signal to myocardium on T1-weighted images and slightly   increased signal on the T2-weighted images with no macroscopic foci of   fat. There is no definite enhancement during the first pass perfusion   images. However, on the late gadolinium enhancement sequences there is   subtle, increased signal within the lesion (series 11 image 21).      NONCARDIAC FINDINGS: The other visualized thoracoabdominal structures are   unremarkable.      IMPRESSION:    1.  The right atrial mass which is likely in continuity with the franklin   terminalis has decreased in size since 10/31/2017. This could represent   thrombus or tumor rather than a conspicuous franklin terminalis. A 3 month   follow-up is recommended to evaluate for stability or resolution.      CARMINA ELIZONDO M.D., ATTENDING RADIOLOGIST  This document has been electronically signed. Mar 14 2018  2:51PM Cardiology/Vascular Medicine Inpatient Consultation Note    [facility] by Dr. Nolen/CLEMENTINE Marquez    HISTORY OF PRESENT ILLNESS:  Patient is a 38 year old man, with past history significant for Testicular cancer, Atrial flutter/fibrillation, Atrial mass, Cardiomyopathy, Orchiectomy, Lumbar herniated disc, Neuropathy of the fingertips, Obesity, Cardioversion, Vitamin D deficiency, Folate deficiency, and Port=a-cath, presented to the ED secondary to nausea, vomiting and diarrhea.  Seen and evaluated at bedside; ill appearing with face mask in place, but in NAD.  Patient recently underwent his 4th cycle of TIP chemotherapy on 09/24 to 09/25/2018 and reports onset of nausea, vomiting since Friday (09/28/2018); no tolerance of oral nutrition since then.  Reports 2 episodes of syncope; during the first episode, patient felt faint and lowered himself to the floor and had LOC for a few seconds.  Family came to his aid and after feeling somewhat stable, patient attempted to go to the bathroom.  However, he again fell, but this time, family was present.  States no trauma to head or body.  Has suffered with similar episodes associated with prior chemotherapy treatments.  Complains of feeling cold.  No reports of headache, chest pain, shortness of breath, palpitations, abdominal pain, dysuria.    Vital signs upon ED presentation: BP = 93/66, HR = 114, RR = 20, T = 37C (98.6 F), O2 Sat = 98% on RA.  Diagnosed with Nausea, Vomiting and Diarrhea, as well as Syncope.  Prescribed LR 1 liter, NS 1 liter, maintenance IVF NS at 75 mL/Hr x 24 hours, zofran 4 mg IV x one in the ED. (01 Oct 2018 02:35)    At the time of my exam, patient reports feeling better.  Had noted moments of dizziness and pre-syncope, but no syncopal events.  HR was noted to be as low as 20s bpm during episodes.  Currently in SR on ECG.  Will transfer to telemetry for monitoring x 24 hrs.  Currently receiving IV NS @ 75 cc/hr without respiratory complaints.    This is likely orthostasis, possibly from dehydration.  Given his other symptoms of peripheral neuropathy (states that his feet and fingers feel numb), his positional variation in BP/HR can also be attributed to autonomic dysfunction, which can be caused by recent chemotherapy.  Agree with gentle hydration, telemetry monitoring, and re-checking orthostatics.  Also recommend LORNE stockings to help promote venous return.      Allergies  No Known Allergies      MEDICATIONS:  cefepime   IVPB 2000 milliGRAM(s) IV Intermittent every 8 hours  acetaminophen   Tablet .. 650 milliGRAM(s) Oral every 6 hours  meclizine 12.5 milliGRAM(s) Oral two times a day PRN  ondansetron    Tablet 4 milliGRAM(s) Oral every 8 hours  oxyCODONE    IR 15 milliGRAM(s) Oral every 6 hours PRN  pantoprazole  Injectable 40 milliGRAM(s) IV Push daily  ergocalciferol 03978 Unit(s) Oral <User Schedule>  folic acid 1 milliGRAM(s) Oral daily  lactated ringers. 1000 milliLiter(s) IV Continuous <Continuous>  MIRACLE MOUTHWASH 10 milliLiter(s) Swish and Spit two times a day PRN  multivitamin 1 Tablet(s) Oral daily      PAST MEDICAL & SURGICAL HISTORY:  Neuropathy: ~ fingers and toes  Anemia  Mixed germ cell tumor  Obesity  Atrial mass: right artrial echo density per MRI 3/14/18.  Atrial flutter  Cardiomyopathy  Atrial fibrillation: s/p DCCV 1/18  Thrombus: Right atrial wall ( suspected from last TYLOR) On AC  Testicular cancer  Lumbar herniated disc  Testicular mass: left  History of abdominal surgery: Retroperitoneal lymph node dissection (June 2018)  History of cardioversion: 2/2018  Port-a-cath in place: placed 9/2017,, removed-10/18/2017  History of orchiectomy: left-7/2017      FAMILY HISTORY:  Family history of hypertension in father (Mother)  Family history of kidney stones (Mother): mother  Family history of cancer (Grandparent)  Family history of ischemic heart disease (Grandparent)  Family history of diabetes mellitus (Aunt)      SOCIAL HISTORY:    NC    REVIEW OF SYSTEMS:  As above    [ ] Otherwise, all others negative	    PHYSICAL EXAM:  T(C): 37.1 (10-04-18 @ 14:01), Max: 38.3 (10-03-18 @ 18:33)  HR: 103 (10-04-18 @ 14:01) (98 - 104)  BP: 102/71 (10-04-18 @ 14:01) (93/63 - 106/60)  RR: 18 (10-04-18 @ 14:01) (18 - 20)  SpO2: 99% (10-04-18 @ 14:01) (99% - 100%)    Appearance: NAD	  HEENT:   Normal oral mucosa, PERRL, EOMI	  Cardiovascular: Normal S1 S2, No JVD, No murmurs, No edema  Respiratory: Decreased BS b/l bases	  Psychiatry: Awake, alert  Gastrointestinal:  Soft, Non-tender, + BS	  Skin: No rashes, No ecchymoses, No cyanosis	  Neurologic: Non-focal  Extremities: Normal range of motion, No clubbing, cyanosis or edema  Vascular: Peripheral pulses palpable 2+ bilaterally      LABS:	 	                          9.4    0.72  )-----------( 43       ( 03 Oct 2018 06:30 )             28.1     10-03    134<L>  |  95<L>  |  13  ----------------------------<  98  3.4<L>   |  26  |  0.95    Ca    9.4      03 Oct 2018 06:30  Phos  2.8     10-03  Mg     1.8     10-03    TPro  7.5  /  Alb  4.1  /  TBili  1.2  /  DBili  x   /  AST  220<H>  /  ALT  436<H>  /  AlkPhos  68  10-03      < from: Transthoracic Echocardiogram (02.22.18 @ 13:10) >    Patient name: KAYLEY GUERRIER  YOB: 1980   Age: 37 (M)   MR#: [id number]  Study Date: 2/22/2018  [facility] [place] [facility]  Study quality: Technically fair  Referring Physician: ELIAS PIZARRO MD  Blood Pressure: 110/67 mmHg  Height: 188 cm  Weight: 117 kg  BSA: 2.4 m2  ------------------------------------------------------------------------  PROCEDURE: Transthoracic echocardiogram with 2-D, M-Mode  and complete spectral and color flow Doppler.  INDICATION: Unspecified atrial fibrillation (I48.91)  ------------------------------------------------------------------------  Dimensions:    Normal Values:  LA:     4.8    2.0 - 4.0 cm  Ao:     3.5    2.0 - 3.8 cm  SEPTUM: 1.1    0.6 - 1.2 cm  PWT:    1.0    0.6 - 1.1 cm  LVIDd:  5.7    3.0 - 5.6 cm  LVIDs:  4.4    1.8 - 4.0 cm  Derived variables:  LVMI: 99 g/m2  RWT: 0.35  Fractional short: 23 %  EF (Visual Estimate): 40-45 %  ------------------------------------------------------------------------  Observations:  Mitral Valve: Normal mitral valve.  Aortic Valve/Aorta: Normal trileaflet aortic valve.  Normal aortic root.  Left Atrium: Normal left atrium.  LA volume index = 27  cc/m2.  Left Ventricle: Mild-moderate global left ventricular  systolic dysfunction. Normal left ventricular internal  dimensions and wall thicknesses.  Right Heart: Normal right atrium. Echogenic density  is  seen on posterior aspect of RA measuring 2.1 cm x 3.3 cm  which  appears to have  increased in size since last TYLOR  from 1/17/2018. The right ventricle is not well visualized;  grossly low normal right ventricular systolic function.  Normal tricuspid valve. Normal pulmonic valve.  Pericardium/Pleura: Normal pericardium with no pericardial  effusion.  Hemodynamic: Estimated right atrial pressure is 8 mm Hg.  ------------------------------------------------------------------------  Conclusions:  1. Mild-moderate global left ventricular systolic  dysfunction.  2. Normal right atrium. Echogenic density/mass   is seen on  posterior aspect of RA measuring 2.1 cm x 3.3 cm which  appears to have  increased in size since last TYLOR from  1/17/2018. Recommend Cardiac MRI for further evaluation.  3. The right ventricle is not well visualized; grossly low  normal right ventricular systolic function.  4. Estimated pulmonary artery systolic pressure equals 17  mm Hg, assuming right atrial pressure equals 8  mm Hg,  consistent with normal pulmonary pressures.  Discussed Dr. TEN Pizarro  *** Compared with echocardiogram of 1/17/2018, right atrial  echodensity likely representing a mass appears to have  enlarged.  ------------------------------------------------------------------------  Confirmed on  2/22/2018 - 18:19:09 by Rosaura Lewis M.D.  ------------------------------------------------------------------------    < from: Xray Chest 1 View- PORTABLE-Urgent (10.01.18 @ 19:02) >    EXAM:  XR CHEST PORTABLE URGENT 1V        PROCEDURE DATE:  Oct  1 2018         INTERPRETATION:  TIME OF EXAM: October 1, 2018 at 6:42 PM.    CLINICAL INFORMATION: Check PICC placement.    COMPARISON:  September 25, 2018.    TECHNIQUE:   AP Portablechest x-ray.    INTERPRETATION:     The heart is not enlarged.  The mediastinum and america appear unremarkable.  A left upper extremity PICC line is again seen. The tip is at the level   of the mid left subclavian vein, not significantly changed.  The lungs are clear.  No pleural effusion or pneumothorax is seen.        IMPRESSION:  Left upper extremity PICC line unchanged in position with   tip at the level of the mid left subclavian vein.        AKOSUA ADAMSON M.D., ATTENDING RADIOLOGIST  This document has been electronically signed. Oct  2 2018 10:15AM                < from: MR Cardiac w/wo IV Cont (03.14.18 @ 10:30) >    EXAM:  MR CARD MORPH/FUNCTION WAW IC        PROCEDURE DATE:  03/14/2018           INTERPRETATION:  MRI CARDIAC WITHOUT AND WITH CONTRAST      INDICATION: History of germ cell tumor. Therapy esophageal   echocardiogram. Presumed right atrial mass. Evaluate for possible disease.    COMPARISON: No prior studies available for comparison.    TECHNIQUE: Multi-sequential, multi-planar cardiac MRI was performed   before and after the intravenous administration of 10 mL of Gadavist; 0   mL was discarded. Resting myocardial perfusion imaging was performed.   Late gadolinium enhanced images were obtained.    SCANNER: Siemens1.5 T Aera platform using a cardiac coil.    QUALITY: Good.      FINDINGS:      Centered along the wall of the right atrium, justlateral to the inflow   of the IVC is a 2.6 x 0.8 cm ovoid shaped mass. The mass shows   intermediate signal to myocardium on T1-weighted images and slightly   increased signal on the T2-weighted images with no macroscopic foci of   fat. There is no definite enhancement during the first pass perfusion   images. However, on the late gadolinium enhancement sequences there is   subtle, increased signal within the lesion (series 11 image 21).      NONCARDIAC FINDINGS: The other visualized thoracoabdominal structures are   unremarkable.      IMPRESSION:    1.  The right atrial mass which is likely in continuity with the franklin   terminalis has decreased in size since 10/31/2017. This could represent   thrombus or tumor rather than a conspicuous franklin terminalis. A 3 month   follow-up is recommended to evaluate for stability or resolution.      CARMINA ELIZONDO M.D., ATTENDING RADIOLOGIST  This document has been electronically signed. Mar 14 2018  2:51PM

## 2018-10-04 NOTE — PROGRESS NOTE ADULT - PROBLEM SELECTOR PLAN 7
-There was concern overnight that patient had a misplaced PICC line. However, this has been previously been confirmed with IR who placed the line that it is a midline and that it is in place  -Patient has had midline in since July so will need to be replaced. As per chart review, patient has a midline because he previously had a Mediport which resulted in development of an RA thrombus and concerns for arrythmia induction. Since then, he has had a Mediport for chemo so preference is to keep midline for infusions. -Lovenox given active malignancy

## 2018-10-04 NOTE — PROGRESS NOTE ADULT - PROBLEM SELECTOR PLAN 3
-Awaiting AM labs; patient initially refused but now amenable  -Staff attempting to obtain them at this time as we need to monitor patient's ANC -Likely in the setting of profound dehydration from nausea and vomiting  -Check orthostatics to assess volume status  -However, patient now with episodes of significant sinus tachycardia followed by bradycardia. Will tx to telemetry for continuous monitoring  -Dr. Brown (cards) to see

## 2018-10-04 NOTE — PROGRESS NOTE ADULT - PROBLEM SELECTOR PLAN 1
-Patient with prior nausea and vomiting from RPLND, still with same symptoms, not tolerating po  -c/w clear liquid diet  -Need to provide strong encouragement for patient to try food as he is hesitant to do so  -Continue ATC Zofran  -Repeat EKG in AM

## 2018-10-04 NOTE — PROGRESS NOTE ADULT - PROBLEM SELECTOR PLAN 5
-Patient with clear lungs, currently appears euvolemic  -Monitor volume status carefully -Likely 2/2 chemotherapy  -Trend LFT's daily

## 2018-10-04 NOTE — ADVANCED PRACTICE NURSE CONSULT - REASON FOR CONSULT
Patient seen on skin care rounds after wound care referral received for assessment of skin impairment and recommendations of topical management. Chart reviewed: Serum albumin 4.1, serum WBC 0.72, BMI 27.7kg/m2, patient interviewed: reports using bacitracin on wound. Patient H/O Testicular cancer, Atrial flutter/fibrillation, Atrial mass, Cardiomyopathy, Orchiectomy, Lumbar herniated disc, Neuropathy of the fingertips, Obesity, Cardioversion, Vitamin D deficiency, Folate deficiency, and Port=a-cath, presented to the ED secondary to nausea, vomiting and diarrhea.  Admitted with nausea and vomiting.

## 2018-10-04 NOTE — PROGRESS NOTE ADULT - PROBLEM SELECTOR PLAN 4
-Likely 2/2 chemotherapy  -Trend LFT's daily -Awaiting AM labs; patient initially refused but now amenable  -Staff attempting to obtain them at this time as we need to monitor patient's ANC

## 2018-10-04 NOTE — ADVANCED PRACTICE NURSE CONSULT - RECOMMEDATIONS
Recommend follow up care at [facility] ([phone], [address] [place] [address]).     Abdominal wound: Cleanse with NS (at home can cleanse in shower with soap and water), pat dry. Apply Liquid barrier film to periwound skin. Apply Aquacel AG hydrofiber, cover with foam with border. Change every 48 hours while in hospital, can change every 72 hours at home setting.    Please call wound care service line is further assistance is needed ([phone]).

## 2018-10-05 DIAGNOSIS — D69.6 THROMBOCYTOPENIA, UNSPECIFIED: ICD-10-CM

## 2018-10-05 LAB
ALBUMIN SERPL ELPH-MCNC: 3.9 G/DL — SIGNIFICANT CHANGE UP (ref 3.3–5)
ALP SERPL-CCNC: 68 U/L — SIGNIFICANT CHANGE UP (ref 40–120)
ALT FLD-CCNC: 263 U/L — HIGH (ref 4–41)
APTT BLD: 25.4 SEC — LOW (ref 27.5–37.4)
AST SERPL-CCNC: 71 U/L — HIGH (ref 4–40)
BACTERIA UR CULT: SIGNIFICANT CHANGE UP
BASOPHILS # BLD AUTO: 0.01 K/UL — SIGNIFICANT CHANGE UP (ref 0–0.2)
BASOPHILS # BLD AUTO: 0.02 K/UL — SIGNIFICANT CHANGE UP (ref 0–0.2)
BASOPHILS NFR BLD AUTO: 0.8 % — SIGNIFICANT CHANGE UP (ref 0–2)
BASOPHILS NFR BLD AUTO: 1 % — SIGNIFICANT CHANGE UP (ref 0–2)
BILIRUB SERPL-MCNC: 0.6 MG/DL — SIGNIFICANT CHANGE UP (ref 0.2–1.2)
BLD GP AB SCN SERPL QL: NEGATIVE — SIGNIFICANT CHANGE UP
BUN SERPL-MCNC: 10 MG/DL — SIGNIFICANT CHANGE UP (ref 7–23)
CALCIUM SERPL-MCNC: 8.6 MG/DL — SIGNIFICANT CHANGE UP (ref 8.4–10.5)
CHLORIDE SERPL-SCNC: 97 MMOL/L — LOW (ref 98–107)
CO2 SERPL-SCNC: 26 MMOL/L — SIGNIFICANT CHANGE UP (ref 22–31)
CREAT SERPL-MCNC: 0.76 MG/DL — SIGNIFICANT CHANGE UP (ref 0.5–1.3)
EOSINOPHIL # BLD AUTO: 0.01 K/UL — SIGNIFICANT CHANGE UP (ref 0–0.5)
EOSINOPHIL # BLD AUTO: 0.01 K/UL — SIGNIFICANT CHANGE UP (ref 0–0.5)
EOSINOPHIL NFR BLD AUTO: 0.5 % — SIGNIFICANT CHANGE UP (ref 0–6)
EOSINOPHIL NFR BLD AUTO: 0.8 % — SIGNIFICANT CHANGE UP (ref 0–6)
GLUCOSE SERPL-MCNC: 113 MG/DL — HIGH (ref 70–99)
HCT VFR BLD CALC: 23.6 % — LOW (ref 39–50)
HCT VFR BLD CALC: 24.4 % — LOW (ref 39–50)
HGB BLD-MCNC: 8.2 G/DL — LOW (ref 13–17)
HGB BLD-MCNC: 8.5 G/DL — LOW (ref 13–17)
IMM GRANULOCYTES # BLD AUTO: 0.01 # — SIGNIFICANT CHANGE UP
IMM GRANULOCYTES # BLD AUTO: 0.05 # — SIGNIFICANT CHANGE UP
IMM GRANULOCYTES NFR BLD AUTO: 0.8 % — SIGNIFICANT CHANGE UP (ref 0–1.5)
IMM GRANULOCYTES NFR BLD AUTO: 2.5 % — HIGH (ref 0–1.5)
INR BLD: 1.1 — SIGNIFICANT CHANGE UP (ref 0.88–1.17)
LYMPHOCYTES # BLD AUTO: 0.53 K/UL — LOW (ref 1–3.3)
LYMPHOCYTES # BLD AUTO: 0.75 K/UL — LOW (ref 1–3.3)
LYMPHOCYTES # BLD AUTO: 37.7 % — SIGNIFICANT CHANGE UP (ref 13–44)
LYMPHOCYTES # BLD AUTO: 41.1 % — SIGNIFICANT CHANGE UP (ref 13–44)
MAGNESIUM SERPL-MCNC: 1.7 MG/DL — SIGNIFICANT CHANGE UP (ref 1.6–2.6)
MANUAL SMEAR VERIFICATION: SIGNIFICANT CHANGE UP
MANUAL SMEAR VERIFICATION: SIGNIFICANT CHANGE UP
MCHC RBC-ENTMCNC: 30.9 PG — SIGNIFICANT CHANGE UP (ref 27–34)
MCHC RBC-ENTMCNC: 31.3 PG — SIGNIFICANT CHANGE UP (ref 27–34)
MCHC RBC-ENTMCNC: 34.7 % — SIGNIFICANT CHANGE UP (ref 32–36)
MCHC RBC-ENTMCNC: 34.8 % — SIGNIFICANT CHANGE UP (ref 32–36)
MCV RBC AUTO: 89.1 FL — SIGNIFICANT CHANGE UP (ref 80–100)
MCV RBC AUTO: 89.7 FL — SIGNIFICANT CHANGE UP (ref 80–100)
MONOCYTES # BLD AUTO: 0.25 K/UL — SIGNIFICANT CHANGE UP (ref 0–0.9)
MONOCYTES # BLD AUTO: 0.26 K/UL — SIGNIFICANT CHANGE UP (ref 0–0.9)
MONOCYTES NFR BLD AUTO: 12.6 % — SIGNIFICANT CHANGE UP (ref 2–14)
MONOCYTES NFR BLD AUTO: 20.2 % — HIGH (ref 2–14)
NEUTROPHILS # BLD AUTO: 0.47 K/UL — LOW (ref 1.8–7.4)
NEUTROPHILS # BLD AUTO: 0.91 K/UL — LOW (ref 1.8–7.4)
NEUTROPHILS NFR BLD AUTO: 36.3 % — LOW (ref 43–77)
NEUTROPHILS NFR BLD AUTO: 45.7 % — SIGNIFICANT CHANGE UP (ref 43–77)
NRBC # FLD: 0 — SIGNIFICANT CHANGE UP
NRBC # FLD: 0 — SIGNIFICANT CHANGE UP
PHOSPHATE SERPL-MCNC: 2.4 MG/DL — LOW (ref 2.5–4.5)
PLATELET # BLD AUTO: 14 K/UL — CRITICAL LOW (ref 150–400)
PLATELET # BLD AUTO: 33 K/UL — LOW (ref 150–400)
PMV BLD: 11.6 FL — SIGNIFICANT CHANGE UP (ref 7–13)
PMV BLD: 12.5 FL — SIGNIFICANT CHANGE UP (ref 7–13)
POTASSIUM SERPL-MCNC: 3.5 MMOL/L — SIGNIFICANT CHANGE UP (ref 3.5–5.3)
POTASSIUM SERPL-SCNC: 3.5 MMOL/L — SIGNIFICANT CHANGE UP (ref 3.5–5.3)
PROT SERPL-MCNC: 6.9 G/DL — SIGNIFICANT CHANGE UP (ref 6–8.3)
PROTHROM AB SERPL-ACNC: 12.2 SEC — SIGNIFICANT CHANGE UP (ref 9.8–13.1)
RBC # BLD: 2.65 M/UL — LOW (ref 4.2–5.8)
RBC # BLD: 2.72 M/UL — LOW (ref 4.2–5.8)
RBC # FLD: 14.7 % — HIGH (ref 10.3–14.5)
RBC # FLD: 15.1 % — HIGH (ref 10.3–14.5)
RH IG SCN BLD-IMP: POSITIVE — SIGNIFICANT CHANGE UP
SODIUM SERPL-SCNC: 135 MMOL/L — SIGNIFICANT CHANGE UP (ref 135–145)
SPECIMEN SOURCE: SIGNIFICANT CHANGE UP
WBC # BLD: 1.29 K/UL — LOW (ref 3.8–10.5)
WBC # BLD: 1.99 K/UL — LOW (ref 3.8–10.5)
WBC # FLD AUTO: 1.29 K/UL — LOW (ref 3.8–10.5)
WBC # FLD AUTO: 1.99 K/UL — LOW (ref 3.8–10.5)

## 2018-10-05 PROCEDURE — 99232 SBSQ HOSP IP/OBS MODERATE 35: CPT

## 2018-10-05 PROCEDURE — 99232 SBSQ HOSP IP/OBS MODERATE 35: CPT | Mod: GC

## 2018-10-05 PROCEDURE — 99239 HOSP IP/OBS DSCHRG MGMT >30: CPT

## 2018-10-05 RX ORDER — POTASSIUM PHOSPHATE, MONOBASIC POTASSIUM PHOSPHATE, DIBASIC 236; 224 MG/ML; MG/ML
15 INJECTION, SOLUTION INTRAVENOUS ONCE
Qty: 0 | Refills: 0 | Status: COMPLETED | OUTPATIENT
Start: 2018-10-05 | End: 2018-10-05

## 2018-10-05 RX ORDER — FILGRASTIM 480MCG/1.6
480 VIAL (ML) INJECTION DAILY
Qty: 0 | Refills: 0 | Status: DISCONTINUED | OUTPATIENT
Start: 2018-10-05 | End: 2018-10-05

## 2018-10-05 RX ADMIN — CEFEPIME 100 MILLIGRAM(S): 1 INJECTION, POWDER, FOR SOLUTION INTRAMUSCULAR; INTRAVENOUS at 23:12

## 2018-10-05 RX ADMIN — CEFEPIME 100 MILLIGRAM(S): 1 INJECTION, POWDER, FOR SOLUTION INTRAMUSCULAR; INTRAVENOUS at 06:13

## 2018-10-05 RX ADMIN — Medication 1 TABLET(S): at 11:06

## 2018-10-05 RX ADMIN — CEFEPIME 100 MILLIGRAM(S): 1 INJECTION, POWDER, FOR SOLUTION INTRAMUSCULAR; INTRAVENOUS at 15:22

## 2018-10-05 RX ADMIN — Medication 1 MILLIGRAM(S): at 11:06

## 2018-10-05 RX ADMIN — POTASSIUM PHOSPHATE, MONOBASIC POTASSIUM PHOSPHATE, DIBASIC 62.5 MILLIMOLE(S): 236; 224 INJECTION, SOLUTION INTRAVENOUS at 11:06

## 2018-10-05 NOTE — CONSULT NOTE ADULT - SUBJECTIVE AND OBJECTIVE BOX
HPI: 38 year old male, with past history significant for Testicular cancer, Atrial flutter/fibrillation, Atrial mass, Cardiomyopathy, Orchiectomy, Lumbar herniated disc, Neuropathy of the fingertips, Obesity, Cardioversion, Vitamin D deficiency, Folate deficiency, and Port=a-cath, presented to the ED secondary to nausea, vomiting and diarrhea.  Pt s/p post-chemo RPLND 18 presents with non-healing lower midline wound from surgical incision.    Patient recently underwent his 4th cycle of TIP chemotherapy on  to 2018 and reports onset of nausea, vomiting since Friday (2018); no tolerance of oral nutrition since then.  Reports 2 episodes of syncope; during the first episode, patient felt faint and lowered himself to the floor and had LOC for a few seconds.  States no trauma to head or body.  Has suffered with similar episodes associated with prior chemotherapy treatments.     PAST MEDICAL & SURGICAL HISTORY:  Neuropathy: ~ fingers and toes  Anemia  Mixed germ cell tumor  Obesity  Atrial mass: right artrial echo density per MRI 3/14/18.  Atrial flutter  Cardiomyopathy  Atrial fibrillation: s/p DCCV   Thrombus: Right atrial wall ( suspected from last TYLOR) On AC  Testicular cancer  Lumbar herniated disc  Testicular mass: left  History of abdominal surgery: Retroperitoneal lymph node dissection (2018)  History of cardioversion: 2018  Port-a-cath in place: placed 2017,, removed-10/18/2017  History of orchiectomy: left-2017    MEDICATIONS  (STANDING):  acetaminophen   Tablet .. 650 milliGRAM(s) Oral every 6 hours  cefepime   IVPB 2000 milliGRAM(s) IV Intermittent every 8 hours  ergocalciferol 92803 Unit(s) Oral <User Schedule>  folic acid 1 milliGRAM(s) Oral daily  lactated ringers. 1000 milliLiter(s) (75 mL/Hr) IV Continuous <Continuous>  multivitamin 1 Tablet(s) Oral daily  ondansetron    Tablet 4 milliGRAM(s) Oral every 8 hours  pantoprazole  Injectable 40 milliGRAM(s) IV Push daily    FAMILY HISTORY:  Family history of hypertension in father (Mother)  Family history of kidney stones (Mother): mother  Family history of cancer (Grandparent)  Family history of ischemic heart disease (Grandparent)  Family history of diabetes mellitus (Aunt)    No Known Allergies    REVIEW OF SYSTEMS: Pertinent positives and negatives as stated in HPI, otherwise negative    Vital signs  T(C): 36.8, Max: 37.3 (10-04 @ 10:00)  HR: 84  BP: 94/63  SpO2: 100%    Physical Exam  Gen: NAD  Pulm: No respiratory distress, no subcostal retractions  CV: RRR, no JVD  Abd: Soft, NT, ND, midline surgical incision well healed on upper aspect.  Lower aspect within a fold of tissue is erythematous, granulation tissue present, no purulence.  Wound closed, no dehiscence.    LABS:  10-05 @ 07:00  WBC 1.29  / Hct 23.6  / SCr 0.76     10-04 @ 18:30  WBC 1.41  / Hct 24.3  / SCr 0.84     10-05  135  |  97<L>  |  10  ----------------------------<  113<H>  3.5   |  26  |  0.76    Ca    8.6      05 Oct 2018 07:00  Phos  2.4     10-  Mg     1.7     10-    TPro  6.9  /  Alb  3.9  /  TBili  0.6  /  DBili  x   /  AST  71<H>  /  ALT  263<H>  /  AlkPhos  68  10-05    PT/INR - ( 05 Oct 2018 07:00 )   PT: 12.2 SEC;   INR: 1.10     PTT - ( 05 Oct 2018 07:00 )  PTT:25.4 SEC    Urinalysis Basic - ( 03 Oct 2018 19:35 )  Color: YELLOW / Appearance: Lt TURBID / S.020 / pH: 8.0  Gluc: NEGATIVE / Ketone: MODERATE  / Bili: NEGATIVE / Urobili: NORMAL   Blood: NEGATIVE / Protein: 50 / Nitrite: NEGATIVE   Leuk Esterase: NEGATIVE / RBC: 0-2 / WBC 3-5   Sq Epi: FEW / Non Sq Epi: x / Bacteria: NEGATIVE    Blood Cx: NGTD

## 2018-10-05 NOTE — PROGRESS NOTE ADULT - ASSESSMENT
This is likely orthostasis, possibly from dehydration.  Given his other symptoms of peripheral neuropathy (states that his feet and fingers feel numb), his positional variation in BP/HR can also be attributed to autonomic dysfunction, which can be caused by recent chemotherapy.  Agree with gentle hydration, telemetry monitoring, and re-checking orthostatics.  Also recommend LORNE stockings to help promote venous return.      Telemetry: SR 70s-140s bpm, no heart block or bradycardia  Remains mildly orthostatic this AM (HR increased to 140s bpm standing, but SBPs dropped ~10 mmHg)    Recommend rechecking orthostatics later this morning/early afternoon after additional hydration  Ambulate and reassess symptoms  If stable, possible D/C ok later today or tomorrow.      D/W Yosi Nolen/Jimmy.    Thank you,  ANA Brown  Pager [phone]

## 2018-10-05 NOTE — PROVIDER CONTACT NOTE (CRITICAL VALUE NOTIFICATION) - ACTION/TREATMENT ORDERED:
Continue to monitor patient
No new order.
Platelet transfusion completed. Lab redraw two hours post procedure to reassess platelet level. Possible discharge pending results.
Will monitor.

## 2018-10-05 NOTE — PROGRESS NOTE ADULT - ASSESSMENT
38 y.o. M with PMH L testicular CA s/p orchiectomy, afib/flutter, atrial mass, HFrEF (EF 40%)  presented to the ED secondary to nausea, vomiting and diarrhea after completing chemotherapy last week.     #mixed germ cell tumor, currently on TIP  -received 3rd cycle of TIP 9/24, will receive 4 in total.  -pancytopenia post chemotherapy: WBC/ANC starting to uptrend without the Neupogen. Hb stable.  -midline/thrombocytopenia: would favor giving 1U PLTs then pull midline today.  -neutropenic fevers: afebrile x24 hours, awaiting BCx.   -to follow up with Dr Chirinos next week and next cycle of chemotherapy will be arranged       Corinne Jimenez MD  Hematology/Oncology Fellow  Pager: [phone]

## 2018-10-05 NOTE — PROGRESS NOTE ADULT - SUBJECTIVE AND OBJECTIVE BOX
Cardiology/Vascular Medicine Inpatient Progress Note    Feels better this AM  No episodes of presyncope/syncope/CP/SOB    Telemetry: SR 70s-140s bpm, no heart block or bradycardia  Remains mildly orthostatic this AM (HR increased to 140s bpm standing, but SBPs dropped ~10 mmHg)    Recommend rechecking orthostatics later this morning/early afternoon after additional hydration  Ambulate and reassess symptoms  If stable, possible D/C ok later today or tomorrow.    Vital Signs Last 24 Hrs  T(C): 36.8 (05 Oct 2018 06:25), Max: 37.3 (04 Oct 2018 10:00)  T(F): 98.3 (05 Oct 2018 06:25), Max: 99.1 (04 Oct 2018 10:00)  HR: 84 (05 Oct 2018 06:25) (84 - 104)  BP: 94/63 (05 Oct 2018 06:25) (93/63 - 106/72)  BP(mean): --  RR: 16 (05 Oct 2018 06:25) (16 - 20)  SpO2: 100% (05 Oct 2018 06:25) (99% - 100%)    Appearance: NAD	  HEENT:   Normal oral mucosa, PERRL, EOMI	  Cardiovascular: Normal S1 S2, No JVD, No murmurs, No edema  Respiratory: Decreased BS b/l bases	  Psychiatry: Awake, alert  Gastrointestinal:  Soft, Non-tender, + BS	  Skin: No rashes, No ecchymoses, No cyanosis	  Neurologic: Non-focal  Extremities: Normal range of motion, No clubbing, cyanosis or edema  Vascular: Peripheral pulses palpable 2+ bilaterally    MEDICATIONS  (STANDING):  acetaminophen   Tablet .. 650 milliGRAM(s) Oral every 6 hours  cefepime   IVPB 2000 milliGRAM(s) IV Intermittent every 8 hours  ergocalciferol 63794 Unit(s) Oral <User Schedule>  folic acid 1 milliGRAM(s) Oral daily  lactated ringers. 1000 milliLiter(s) (75 mL/Hr) IV Continuous <Continuous>  multivitamin 1 Tablet(s) Oral daily  ondansetron    Tablet 4 milliGRAM(s) Oral every 8 hours  pantoprazole  Injectable 40 milliGRAM(s) IV Push daily    MEDICATIONS  (PRN):  meclizine 12.5 milliGRAM(s) Oral two times a day PRN Dizziness  MIRACLE MOUTHWASH 10 milliLiter(s) Swish and Spit two times a day PRN Mouth Care  oxyCODONE    IR 15 milliGRAM(s) Oral every 6 hours PRN Moderate Pain (4 - 6)      LABS:	 	                                   8.5    1.41  )-----------( 19       ( 04 Oct 2018 18:30 )             24.3     10-05    135  |  97<L>  |  10  ----------------------------<  113<H>  3.5   |  26  |  0.76    Ca    8.6      05 Oct 2018 07:00  Phos  2.4     10-05  Mg     1.7     10-05    TPro  6.9  /  Alb  3.9  /  TBili  0.6  /  DBili  x   /  AST  71<H>  /  ALT  263<H>  /  AlkPhos  68  10-05    PT/INR - ( 05 Oct 2018 07:00 )   PT: 12.2 SEC;   INR: 1.10          PTT - ( 05 Oct 2018 07:00 )  PTT:25.4 SEC    < from: Transthoracic Echocardiogram (02.22.18 @ 13:10) >    Patient name: KAYLEY GUERRIER  YOB: 1980   Age: 37 (M)   MR#: [id number]  Study Date: 2/22/2018  [facility]  Study quality: Technically fair  Referring Physician: ELIAS TALBOT MD  Blood Pressure: 110/67 mmHg  Height: 188 cm  Weight: 117 kg  BSA: 2.4 m2  ------------------------------------------------------------------------  PROCEDURE: Transthoracic echocardiogram with 2-D, M-Mode  and complete spectral and color flow Doppler.  INDICATION: Unspecified atrial fibrillation (I48.91)  ------------------------------------------------------------------------  Dimensions:    Normal Values:  LA:     4.8    2.0 - 4.0 cm  Ao:     3.5    2.0 - 3.8 cm  SEPTUM: 1.1    0.6 - 1.2 cm  PWT:    1.0    0.6 - 1.1 cm  LVIDd:  5.7    3.0 - 5.6 cm  LVIDs:  4.4    1.8 - 4.0 cm  Derived variables:  LVMI: 99 g/m2  RWT: 0.35  Fractional short: 23 %  EF (Visual Estimate): 40-45 %  ------------------------------------------------------------------------  Observations:  Mitral Valve: Normal mitral valve.  Aortic Valve/Aorta: Normal trileaflet aortic valve.  Normal aortic root.  Left Atrium: Normal left atrium.  LA volume index = 27  cc/m2.  Left Ventricle: Mild-moderate global left ventricular  systolic dysfunction. Normal left ventricular internal  dimensions and wall thicknesses.  Right Heart: Normal right atrium. Echogenic density  is  seen on posterior aspect of RA measuring 2.1 cm x 3.3 cm  which  appears to have  increased in size since last TYLOR  from 1/17/2018. The right ventricle is not well visualized;  grossly low normal right ventricular systolic function.  Normal tricuspid valve. Normal pulmonic valve.  Pericardium/Pleura: Normal pericardium with no pericardial  effusion.  Hemodynamic: Estimated right atrial pressure is 8 mm Hg.  ------------------------------------------------------------------------  Conclusions:  1. Mild-moderate global left ventricular systolic  dysfunction.  2. Normal right atrium. Echogenic density/mass   is seen on  posterior aspect of RA measuring 2.1 cm x 3.3 cm which  appears to have  increased in size since last TYLOR from  1/17/2018. Recommend Cardiac MRI for further evaluation.  3. The right ventricle is not well visualized; grossly low  normal right ventricular systolic function.  4. Estimated pulmonary artery systolic pressure equals 17  mm Hg, assuming right atrial pressure equals 8  mm Hg,  consistent with normal pulmonary pressures.  Discussed Dr. TEN Talbot  *** Compared with echocardiogram of 1/17/2018, right atrial  echodensity likely representing a mass appears to have  enlarged.  ------------------------------------------------------------------------  Confirmed on  2/22/2018 - 18:19:09 by Rosaura Lewis M.D.  ------------------------------------------------------------------------    < from: Xray Chest 1 View- PORTABLE-Urgent (10.01.18 @ 19:02) >    EXAM:  XR CHEST PORTABLE URGENT 1V        PROCEDURE DATE:  Oct  1 2018         INTERPRETATION:  TIME OF EXAM: October 1, 2018 at 6:42 PM.    CLINICAL INFORMATION: Check PICC placement.    COMPARISON:  September 25, 2018.    TECHNIQUE:   AP Portablechest x-ray.    INTERPRETATION:     The heart is not enlarged.  The mediastinum and america appear unremarkable.  A left upper extremity PICC line is again seen. The tip is at the level   of the mid left subclavian vein, not significantly changed.  The lungs are clear.  No pleural effusion or pneumothorax is seen.        IMPRESSION:  Left upper extremity PICC line unchanged in position with   tip at the level of the mid left subclavian vein.        AKOSUA ADAMSON M.D., ATTENDING RADIOLOGIST  This document has been electronically signed. Oct  2 2018 10:15AM                < from: MR Cardiac w/wo IV Cont (03.14.18 @ 10:30) >    EXAM:  MR CARD MORPH/FUNCTION WAW IC        PROCEDURE DATE:  03/14/2018           INTERPRETATION:  MRI CARDIAC WITHOUT AND WITH CONTRAST      INDICATION: History of germ cell tumor. Therapy esophageal   echocardiogram. Presumed right atrial mass. Evaluate for possible disease.    COMPARISON: No prior studies available for comparison.    TECHNIQUE: Multi-sequential, multi-planar cardiac MRI was performed   before and after the intravenous administration of 10 mL of Gadavist; 0   mL was discarded. Resting myocardial perfusion imaging was performed.   Late gadolinium enhanced images were obtained.    SCANNER: Siemens1.5 T Aera platform using a cardiac coil.    QUALITY: Good.      FINDINGS:      Centered along the wall of the right atrium, justlateral to the inflow   of the IVC is a 2.6 x 0.8 cm ovoid shaped mass. The mass shows   intermediate signal to myocardium on T1-weighted images and slightly   increased signal on the T2-weighted images with no macroscopic foci of   fat. There is no definite enhancement during the first pass perfusion   images. However, on the late gadolinium enhancement sequences there is   subtle, increased signal within the lesion (series 11 image 21).      NONCARDIAC FINDINGS: The other visualized thoracoabdominal structures are   unremarkable.      IMPRESSION:    1.  The right atrial mass which is likely in continuity with the franklin   terminalis has decreased in size since 10/31/2017. This could represent   thrombus or tumor rather than a conspicuous franklin terminalis. A 3 month   follow-up is recommended to evaluate for stability or resolution.      CARMINA ELIZONDO M.D., ATTENDING RADIOLOGIST  This document has been electronically signed. Mar 14 2018  2:51PM

## 2018-10-05 NOTE — PROGRESS NOTE ADULT - PROBLEM SELECTOR PLAN 2
-Remains neutropenic, but counts improving (reports he received Neulasta as outpt)  -UA negative  -F/u blood cultures, urine culture  -c/w Cefepime 2g IV q8hrs  -If cx negative through 7pm tonight, can dc home (ok with onc as well)

## 2018-10-05 NOTE — CHART NOTE - NSCHARTNOTEFT_GEN_A_CORE
Patient Age: __________38_________________________________________________________  Patient Gender: _______M_________________________________________________________  Procedure (including site / side if known): ___MIdline________________________________________  Diagnosis / Indication: _____________Testicular Ca, Needs Chemo_____________________________________________  Interventional Radiology Attending Physician: _______________________________________  Ordering Attending Physician: Dr.Kluger____________________________________________________  Pertinent medical history: testicular ca, A Fib, atrial Mass________________________________________________________  Pertinent labs: _________________________________________________________________  Patient and Family aware? Yes

## 2018-10-05 NOTE — PROGRESS NOTE ADULT - SUBJECTIVE AND OBJECTIVE BOX
Oncology Follow-up    INTERVAL HPI/OVERNIGHT EVENTS:  No o/n events, patient resting comfortably. Reports feeling better since admission. Tolerating PO and looking forward to home-cooked food. Still with some dizziness on standing. Spending most of time in bed due to concern he'll fall with low PLTs. He also spoke with Dr Chirinos by phone with a plan to remove the midline.      VITAL SIGNS:  T(F): 98.3 (10-05-18 @ 06:25)  HR: 84 (10-05-18 @ 06:25)  BP: 94/63 (10-05-18 @ 06:25)  RR: 16 (10-05-18 @ 06:25)  SpO2: 100% (10-05-18 @ 06:25)  Wt(kg): --    10-04-18 @ 07:01  -  10-05-18 @ 07:00  --------------------------------------------------------  IN: 1150 mL / OUT: 1680 mL / NET: -530 mL    10-05-18 @ 07:01  -  10-05-18 @ 11:00  --------------------------------------------------------  IN: 120 mL / OUT: 0 mL / NET: 120 mL        PHYSICAL EXAM:    Constitutional: AAOx3, NAD, pale but non toxic   Eyes: PERRL, EOMI  Neck: supple, no masses, no JVD  Respiratory: CTA b/l  with normal respiratory effort  Cardiovascular: RRR, normal S1S2, no M/R/G  Gastrointestinal: soft, NTND  Extremities:  no c/c/e  MSK: no obvious abnormalities.   Neurological: Grossly intact  Skin: Normal temperature, no rash    MEDICATIONS  (STANDING):  acetaminophen   Tablet .. 650 milliGRAM(s) Oral every 6 hours  cefepime   IVPB 2000 milliGRAM(s) IV Intermittent every 8 hours  ergocalciferol 91751 Unit(s) Oral <User Schedule>  [id number] Injectable 480 MICROGram(s) SubCutaneous daily  folic acid 1 milliGRAM(s) Oral daily  lactated ringers. 1000 milliLiter(s) (75 mL/Hr) IV Continuous <Continuous>  multivitamin 1 Tablet(s) Oral daily  ondansetron    Tablet 4 milliGRAM(s) Oral every 8 hours  pantoprazole  Injectable 40 milliGRAM(s) IV Push daily  potassium phosphate IVPB 15 milliMole(s) IV Intermittent once    MEDICATIONS  (PRN):  meclizine 12.5 milliGRAM(s) Oral two times a day PRN Dizziness  MIRACLE MOUTHWASH 10 milliLiter(s) Swish and Spit two times a day PRN Mouth Care  oxyCODONE    IR 15 milliGRAM(s) Oral every 6 hours PRN Moderate Pain (4 - 6)      No Known Allergies      LABS:                        8.2    1.29  )-----------( 14       ( 05 Oct 2018 07:00 )             23.6     10-05    135  |  97<L>  |  10  ----------------------------<  113<H>  3.5   |  26  |  0.76    Ca    8.6      05 Oct 2018 07:00  Phos  2.4     10-05  Mg     1.7     10-05    TPro  6.9  /  Alb  3.9  /  TBili  0.6  /  DBili  x   /  AST  71<H>  /  ALT  263<H>  /  AlkPhos  68  10-05    PT/INR - ( 05 Oct 2018 07:00 )   PT: 12.2 SEC;   INR: 1.10          PTT - ( 05 Oct 2018 07:00 )  PTT:25.4 SEC   Urinalysis Basic - ( 03 Oct 2018 19:35 )    Color: YELLOW / Appearance: Lt TURBID / S.020 / pH: 8.0  Gluc: NEGATIVE / Ketone: MODERATE  / Bili: NEGATIVE / Urobili: NORMAL   Blood: NEGATIVE / Protein: 50 / Nitrite: NEGATIVE   Leuk Esterase: NEGATIVE / RBC: 0-2 / WBC 3-5   Sq Epi: FEW / Non Sq Epi: x / Bacteria: NEGATIVE        RADIOLOGY & ADDITIONAL TESTS:  Studies reviewed.

## 2018-10-05 NOTE — PROGRESS NOTE ADULT - ASSESSMENT
37 yo M with 38 year old male testicular CA s/p orchiectomy (now on RPLND), atrial flutter/fibrillation, atrial mass, cardiomyopathy with resultant chronic HFrEF (EF=40%), who presents to the ED for chemotherapy induced severe nausea, vomiting, and inability to tolerate po. Still reports limited po intake; course now c/b chemotherapy induced neutropenia and subsequently neutropenic fever, much improved today.

## 2018-10-05 NOTE — CONSULT NOTE ADULT - ASSESSMENT
38yoM s/p RPLND 6/6/18 with lower midline abdominal wound    -Wound care consult  -Continue local wound care  -Patient has not followed up as scheduled s/p surgery  -Follow up in the office with Dr. Restrepo for continued management  -[facility] [phone]   -No acute  intervention, please call if questions
38 y.o. M with PMH L testicular CA s/p orchiectomy, afib/flutter, atrial mass, HFrEF (EF 40%)  presented to the ED secondary to nausea, vomiting and diarrhea after completing chemotherapy last week.
Patient is a 38 year old man, with past history significant for Testicular cancer, Atrial flutter/fibrillation, Atrial mass, Cardiomyopathy, Orchiectomy, Lumbar herniated disc, Neuropathy of the fingertips, Obesity, Cardioversion, Vitamin D deficiency, Folate deficiency, and Port=a-cath, presented to the ED secondary to nausea, vomiting and diarrhea.  Seen and evaluated at bedside; ill appearing with face mask in place, but in NAD.  Patient recently underwent his 4th cycle of TIP chemotherapy on 09/24 to 09/25/2018 and reports onset of nausea, vomiting since Friday (09/28/2018); no tolerance of oral nutrition since then.  Reports 2 episodes of syncope; during the first episode, patient felt faint and lowered himself to the floor and had LOC for a few seconds.  Family came to his aid and after feeling somewhat stable, patient attempted to go to the bathroom.  However, he again fell, but this time, family was present.  States no trauma to head or body.  Has suffered with similar episodes associated with prior chemotherapy treatments.  Complains of feeling cold.  No reports of headache, chest pain, shortness of breath, palpitations, abdominal pain, dysuria.    Vital signs upon ED presentation: BP = 93/66, HR = 114, RR = 20, T = 37C (98.6 F), O2 Sat = 98% on RA.  Diagnosed with Nausea, Vomiting and Diarrhea, as well as Syncope.  Prescribed LR 1 liter, NS 1 liter, maintenance IVF NS at 75 mL/Hr x 24 hours, zofran 4 mg IV x one in the ED. (01 Oct 2018 02:35)    At the time of my exam, patient reports feeling better.  Had noted moments of dizziness and pre-syncope, but no syncopal events.  HR was noted to be as low as 20s bpm during episodes.  Currently in SR on ECG.  Will transfer to telemetry for monitoring x 24 hrs.  Currently receiving IV NS @ 75 cc/hr without respiratory complaints.    This is likely orthostasis, possibly from dehydration.  Given his other symptoms of peripheral neuropathy (states that his feet and fingers feel numb), his positional variation in BP/HR can also be attributed to autonomic dysfunction, which can be caused by recent chemotherapy.  Agree with gentle hydration, telemetry monitoring, and re-checking orthostatics.  Also recommend LORNE stockings to help promote venous return.      D/W Yosi Nolen/Jimmy.    Thank you,  ANA Brown  Pager [phone]

## 2018-10-05 NOTE — PROGRESS NOTE ADULT - ATTENDING COMMENTS
Spoke with onc and cards. Plan to give 1U platelets today, followed by dc of midline. If blood cultures remain negative through 7pm, will dc home with outpatient oncology f/u.    TIME SPENT ON DISCHARGE PLANNINmins

## 2018-10-05 NOTE — CONSULT NOTE ADULT - REASON FOR ADMISSION
Nausea, Vomiting and Diarrhea, Syncope

## 2018-10-05 NOTE — PROGRESS NOTE ADULT - SUBJECTIVE AND OBJECTIVE BOX
Patient is a 38y old  Male who presents with a chief complaint of Nausea, Vomiting and Diarrhea, Syncope (05 Oct 2018 10:57)      SUBJECTIVE / OVERNIGHT EVENTS: Patient reports he is starting to come around and feels much better. Has walked to the bathroom wo issue. Tolerated some Doritos.    MEDICATIONS  (STANDING):  acetaminophen   Tablet .. 650 milliGRAM(s) Oral every 6 hours  cefepime   IVPB 2000 milliGRAM(s) IV Intermittent every 8 hours  ergocalciferol 00561 Unit(s) Oral <User Schedule>  folic acid 1 milliGRAM(s) Oral daily  multivitamin 1 Tablet(s) Oral daily  ondansetron    Tablet 4 milliGRAM(s) Oral every 8 hours  pantoprazole  Injectable 40 milliGRAM(s) IV Push daily    MEDICATIONS  (PRN):  meclizine 12.5 milliGRAM(s) Oral two times a day PRN Dizziness  MIRACLE MOUTHWASH 10 milliLiter(s) Swish and Spit two times a day PRN Mouth Care  oxyCODONE    IR 15 milliGRAM(s) Oral every 6 hours PRN Moderate Pain (4 - 6)      Vital Signs Last 24 Hrs  T(C): 36.8 (10-05-18 @ 14:41)  T(F): 98.3 (10-05-18 @ 14:41), Max: 98.6 (10-05-18 @ 02:00)  HR: 96 (10-05-18 @ 14:41) (84 - 96)  BP: 110/73 (10-05-18 @ 14:41)  BP(mean): --  RR: 18 (10-05-18 @ 14:41) (16 - 18)  SpO2: 100% (10-05-18 @ 14:41) (100% - 100%)  Wt(kg): --    10-04 @ 07:01  -  10-05 @ 07:00  --------------------------------------------------------  IN: 1150 mL / OUT: 1680 mL / NET: -530 mL    10-05 @ 07:01  -  10-05 @ 16:44  --------------------------------------------------------  IN: 240 mL / OUT: 400 mL / NET: -160 mL      CAPILLARY BLOOD GLUCOSE        I&O's Summary    04 Oct 2018 07:  -  05 Oct 2018 07:00  --------------------------------------------------------  IN: 1150 mL / OUT: 1680 mL / NET: -530 mL    05 Oct 2018 07:01  -  05 Oct 2018 16:44  --------------------------------------------------------  IN: 240 mL / OUT: 400 mL / NET: -160 mL        PHYSICAL EXAM:  GENERAL: NAD, well-developed  HEAD:  Atraumatic, Normocephalic  EYES: EOMI, PERRLA, conjunctiva and sclera clear  NECK: Supple, No JVD  CHEST/LUNG: Clear to auscultation bilaterally; No wheeze  HEART: Regular rate and rhythm; No murmurs, rubs, or gallops  ABDOMEN: Soft, Nontender, Nondistended; Bowel sounds present  EXTREMITIES:  2+ Peripheral Pulses, No clubbing, cyanosis, or edema  PSYCH: AAOx3  NEUROLOGY: non-focal  SKIN: No rashes or lesions    LABS:                        8.2    1.29  )-----------( 14       ( 05 Oct 2018 07:00 )             23.6     10-05    135  |  97<L>  |  10  ----------------------------<  113<H>  3.5   |  26  |  0.76    Ca    8.6      05 Oct 2018 07:00  Phos  2.4     10-05  Mg     1.7     10-05    TPro  6.9  /  Alb  3.9  /  TBili  0.6  /  DBili  x   /  AST  71<H>  /  ALT  263<H>  /  AlkPhos  68  10-05    PT/INR - ( 05 Oct 2018 07:00 )   PT: 12.2 SEC;   INR: 1.10          PTT - ( 05 Oct 2018 07:00 )  PTT:25.4 SEC      Urinalysis Basic - ( 03 Oct 2018 19:35 )    Color: YELLOW / Appearance: Lt TURBID / S.020 / pH: 8.0  Gluc: NEGATIVE / Ketone: MODERATE  / Bili: NEGATIVE / Urobili: NORMAL   Blood: NEGATIVE / Protein: 50 / Nitrite: NEGATIVE   Leuk Esterase: NEGATIVE / RBC: 0-2 / WBC 3-5   Sq Epi: FEW / Non Sq Epi: x / Bacteria: NEGATIVE        RADIOLOGY & ADDITIONAL TESTS:    Imaging Personally Reviewed:    Consultant(s) Notes Reviewed:      Care Discussed with Consultants/Other Providers:    Assessment and Plan: Patient is a 38y old  Male who presents with a chief complaint of Nausea, Vomiting and Diarrhea, Syncope (05 Oct 2018 10:57)      SUBJECTIVE / OVERNIGHT EVENTS: Patient reports he is starting to come around and feels much better. Has walked to the bathroom wo issue. Tolerated some Doritos.    TELE: one brief episode of sinus tachycardia,     MEDICATIONS  (STANDING):  acetaminophen   Tablet .. 650 milliGRAM(s) Oral every 6 hours  cefepime   IVPB 2000 milliGRAM(s) IV Intermittent every 8 hours  ergocalciferol 67865 Unit(s) Oral <User Schedule>  folic acid 1 milliGRAM(s) Oral daily  multivitamin 1 Tablet(s) Oral daily  ondansetron    Tablet 4 milliGRAM(s) Oral every 8 hours  pantoprazole  Injectable 40 milliGRAM(s) IV Push daily    MEDICATIONS  (PRN):  meclizine 12.5 milliGRAM(s) Oral two times a day PRN Dizziness  MIRACLE MOUTHWASH 10 milliLiter(s) Swish and Spit two times a day PRN Mouth Care  oxyCODONE    IR 15 milliGRAM(s) Oral every 6 hours PRN Moderate Pain (4 - 6)      Vital Signs Last 24 Hrs  T(C): 36.8 (10-05-18 @ 14:41)  T(F): 98.3 (10-05-18 @ 14:41), Max: 98.6 (10-05-18 @ 02:00)  HR: 96 (10-05-18 @ 14:41) (84 - 96)  BP: 110/73 (10-05-18 @ 14:41)  BP(mean): --  RR: 18 (10-05-18 @ 14:41) (16 - 18)  SpO2: 100% (10-05-18 @ 14:41) (100% - 100%)  Wt(kg): --    10-04 @ 07:01  -  10-05 @ 07:00  --------------------------------------------------------  IN: 1150 mL / OUT: 1680 mL / NET: -530 mL    10-05 @ 07:01  -  10-05 @ 16:44  --------------------------------------------------------  IN: 240 mL / OUT: 400 mL / NET: -160 mL      CAPILLARY BLOOD GLUCOSE        I&O's Summary    04 Oct 2018 07:  -  05 Oct 2018 07:00  --------------------------------------------------------  IN: 1150 mL / OUT: 1680 mL / NET: -530 mL    05 Oct 2018 07:  -  05 Oct 2018 16:44  --------------------------------------------------------  IN: 240 mL / OUT: 400 mL / NET: -160 mL        GENERAL: NAD, stronger appearing today  HEAD:  Atraumatic, Normocephalic  EYES: EOMI, PERRLA, conjunctiva and sclera clear  NECK: Supple, No JVD  CHEST/LUNG: Clear to auscultation bilaterally; No wheeze  HEART: Regular rate and rhythm; No murmurs,   ABDOMEN: Soft, Nontender, Nondistended; Bowel sounds present  EXTREMITIES:  2+ Peripheral Pulses, No clubbing, cyanosis, or edema  PSYCH: AAOx3, cooperative  NEUROLOGY: non-focal  SKIN: No rashes or lesions    LABS:                        8.2    1.29  )-----------(        ( 05 Oct 2018 07:00 )             23.6     10-05    135  |  97<L>  |  10  ----------------------------<  113<H>  3.5   |  26  |  0.76    Ca    8.6      05 Oct 2018 07:00  Phos  2.4     10-05  Mg     1.7     10-05    TPro  6.9  /  Alb  3.9  /  TBili  0.6  /  DBili  x   /  AST  71<H>  /  ALT  263<H>  /  AlkPhos  68  10-05    PT/INR - ( 05 Oct 2018 07:00 )   PT: 12.2 SEC;   INR: 1.10          PTT - ( 05 Oct 2018 07:00 )  PTT:25.4 SEC      Urinalysis Basic - ( 03 Oct 2018 19:35 )    Color: YELLOW / Appearance: Lt TURBID / S.020 / pH: 8.0  Gluc: NEGATIVE / Ketone: MODERATE  / Bili: NEGATIVE / Urobili: NORMAL   Blood: NEGATIVE / Protein: 50 / Nitrite: NEGATIVE   Leuk Esterase: NEGATIVE / RBC: 0-2 / WBC 3-5   Sq Epi: FEW / Non Sq Epi: x / Bacteria: NEGATIVE      RADIOLOGY & ADDITIONAL TESTS:    Imaging Personally Reviewed:    Consultant(s) Notes Reviewed:      Care Discussed with Consultants/Other Providers: Oncology (Dr. Jimenez) re: dc planning    Assessment and Plan:

## 2018-10-05 NOTE — PROGRESS NOTE ADULT - PROBLEM SELECTOR PLAN 1
-Patient with prior nausea and vomiting from RPLND, still with same symptoms, not tolerating po  -Tolerating po today, patient wishing to be discharged home

## 2018-10-05 NOTE — CHART NOTE - NSCHARTNOTEFT_GEN_A_CORE
Plan was made to f/u labs if ANC less than 1000 (#auto <1.0) or less then order neupogen  480mcg X 1   -->refused overnight stating he already received similar drug at [facility]-->d/w team in AM

## 2018-10-05 NOTE — PROGRESS NOTE ADULT - PROBLEM SELECTOR PLAN 4
-Likely in the setting of profound dehydration from nausea and vomiting  -Check orthostatics to assess volume status  -However, patient now with episodes of significant sinus tachycardia followed by bradycardia  -Likely chemo-induced orthostasis, nothing further to be done inpatient  -Telemetry unremarkable

## 2018-10-05 NOTE — PROGRESS NOTE ADULT - PROBLEM SELECTOR PLAN 3
-Spoke with onc, they would prefer patient to receive 1U platelets followed by removal of midline  -Will give 1U platelets, then dc midline

## 2018-10-05 NOTE — PROGRESS NOTE ADULT - PROBLEM SELECTOR PLAN 9
-There was concern overnight that patient had a misplaced PICC line. However, this has been previously been confirmed with IR who placed the line that it is a midline and that it is in place  -Patient has had midline in since July so will need to be replaced. As per chart review, patient has a midline because he previously had a Mediport which resulted in development of an RA thrombus and concerns for arrythmia induction.   -After discussion with patient and outpatient oncologist, will dc midline after platelet infusion. Patient will receive further chemo via peripheral line given discussion with his outpatient oncologist

## 2018-10-05 NOTE — PROVIDER CONTACT NOTE (CRITICAL VALUE NOTIFICATION) - ASSESSMENT
Pt alert and oriented, vital sign stable.
VS stable, Pt asymptomatic
Platelets reported at 19,000.
WBC count 0.72

## 2018-10-06 VITALS
HEART RATE: 89 BPM | TEMPERATURE: 98 F | RESPIRATION RATE: 16 BRPM | SYSTOLIC BLOOD PRESSURE: 96 MMHG | OXYGEN SATURATION: 100 % | DIASTOLIC BLOOD PRESSURE: 67 MMHG

## 2018-10-06 LAB
BUN SERPL-MCNC: 10 MG/DL — SIGNIFICANT CHANGE UP (ref 7–23)
CALCIUM SERPL-MCNC: 8.6 MG/DL — SIGNIFICANT CHANGE UP (ref 8.4–10.5)
CHLORIDE SERPL-SCNC: 99 MMOL/L — SIGNIFICANT CHANGE UP (ref 98–107)
CO2 SERPL-SCNC: 29 MMOL/L — SIGNIFICANT CHANGE UP (ref 22–31)
CREAT SERPL-MCNC: 0.69 MG/DL — SIGNIFICANT CHANGE UP (ref 0.5–1.3)
GLUCOSE SERPL-MCNC: 111 MG/DL — HIGH (ref 70–99)
HCT VFR BLD CALC: 21.9 % — LOW (ref 39–50)
HGB BLD-MCNC: 7.7 G/DL — LOW (ref 13–17)
MAGNESIUM SERPL-MCNC: 1.7 MG/DL — SIGNIFICANT CHANGE UP (ref 1.6–2.6)
MCHC RBC-ENTMCNC: 31.6 PG — SIGNIFICANT CHANGE UP (ref 27–34)
MCHC RBC-ENTMCNC: 35.2 % — SIGNIFICANT CHANGE UP (ref 32–36)
MCV RBC AUTO: 89.8 FL — SIGNIFICANT CHANGE UP (ref 80–100)
NRBC # FLD: 0 — SIGNIFICANT CHANGE UP
PLATELET # BLD AUTO: 44 K/UL — LOW (ref 150–400)
PMV BLD: 10.6 FL — SIGNIFICANT CHANGE UP (ref 7–13)
POTASSIUM SERPL-MCNC: 3.4 MMOL/L — LOW (ref 3.5–5.3)
POTASSIUM SERPL-SCNC: 3.4 MMOL/L — LOW (ref 3.5–5.3)
RBC # BLD: 2.44 M/UL — LOW (ref 4.2–5.8)
RBC # FLD: 15 % — HIGH (ref 10.3–14.5)
SODIUM SERPL-SCNC: 140 MMOL/L — SIGNIFICANT CHANGE UP (ref 135–145)
WBC # BLD: 2.28 K/UL — LOW (ref 3.8–10.5)
WBC # FLD AUTO: 2.28 K/UL — LOW (ref 3.8–10.5)

## 2018-10-06 PROCEDURE — 99233 SBSQ HOSP IP/OBS HIGH 50: CPT

## 2018-10-06 RX ADMIN — Medication 1 TABLET(S): at 11:57

## 2018-10-06 RX ADMIN — Medication 1 MILLIGRAM(S): at 11:57

## 2018-10-06 NOTE — PROGRESS NOTE ADULT - ASSESSMENT
37 yo M with 38 year old male testicular CA s/p orchiectomy (now on RPLND), atrial flutter/fibrillation, atrial mass, cardiomyopathy with resultant chronic HFrEF (EF=40%), who presents to the ED for chemotherapy induced severe nausea, vomiting, and inability to tolerate po. Still reports limited po intake; course now c/b chemotherapy induced neutropenia and subsequently neutropenic fever, much improved. 37 yo M with 38 year old male testicular CA s/p orchiectomy (now on RPLND), atrial flutter/fibrillation, atrial mass, cardiomyopathy with resultant chronic HFrEF (EF=40%), who presents to the ED for chemotherapy induced severe nausea, vomiting, and inability to tolerate po. Still reports limited po intake; course now c/b chemotherapy induced neutropenia and subsequently neutropenic fever, much improved.  Now optimized for home with out patient f/u with Oncology Dr Chirinos and with Urology Dr Restrepo.

## 2018-10-06 NOTE — PROGRESS NOTE ADULT - PROBLEM SELECTOR PLAN 2
-Remains neutropenic, but counts improving (reports he received Neulasta as outpt)  -UA negative  -F/u blood cultures, urine culture  -c/w Cefepime 2g IV q8hrs  -If cx negative through 7pm tonight, can dc home (ok with onc as well) -Remains neutropenic, but counts improving (reports he received Neulasta as outpt)  -UA negative  -F/u blood cultures, urine culture  -ciompleted Cefepime 2g IV q8hrs  -cx negative- can dc home (ok with onc as well)

## 2018-10-06 NOTE — PROGRESS NOTE ADULT - PROBLEM SELECTOR PLAN 1
-Patient with prior nausea and vomiting from RPLND, still with same symptoms, not tolerating po  -Tolerating po today, patient wishing to be discharged home -Patient with prior nausea and vomiting from RPLND, still with same symptoms, not tolerating po  -Tolerating po diet, patient wishing to be discharged home

## 2018-10-06 NOTE — PROGRESS NOTE ADULT - PROBLEM SELECTOR PLAN 10
s/p surgery  -Continue local wound care  -Patient has not followed up as scheduled s/p surgery  -Follow up in the office with Dr. Restrepo for continued management  -[facility] [phone]   -No acute  intervention, please call if questions

## 2018-10-06 NOTE — PROGRESS NOTE ADULT - REASON FOR ADMISSION
Nausea, Vomiting and Diarrhea, Syncope

## 2018-10-06 NOTE — PROVIDER CONTACT NOTE (OTHER) - ACTION/TREATMENT ORDERED:
Notified tele margarita Varner. Will continue to monitor.
same as above done
Continue to monitor patient, continue with IV fluids
No new order.
same given pt is resting now.

## 2018-10-06 NOTE — PROGRESS NOTE ADULT - PROBLEM SELECTOR PLAN 7
-Patient with clear lungs, currently appears euvolemic  -Monitor volume status carefully -Patient with clear lungs, currently appears euvolemic

## 2018-10-06 NOTE — PROGRESS NOTE ADULT - PROBLEM SELECTOR PROBLEM 1
Chemotherapy-induced nausea and vomiting
Chemotherapy-induced nausea and vomiting
Testicular cancer
Chemotherapy-induced nausea and vomiting

## 2018-10-06 NOTE — PROGRESS NOTE ADULT - PROBLEM SELECTOR PROBLEM 8
Need for prophylactic measure
Discharge planning issues
Need for prophylactic measure
Discharge planning issues

## 2018-10-06 NOTE — PROGRESS NOTE ADULT - PROBLEM SELECTOR PLAN 8
-Lovenox given active malignancy
-Lovenox given active malignancy
-There was concern overnight that patient had a misplaced PICC line. However, this has been previously been confirmed with IR who placed the line that it is a midline and that it is in place  -Patient has had midline in since July so will need to be replaced. As per chart review, patient has a midline because he previously had a Mediport which resulted in development of an RA thrombus and concerns for arrythmia induction. Since then, he has had a Mediport for chemo so preference is to keep midline for infusions. However, would prefer to hold off on changing line until patient no longer with neutropenic fever and cultures are negative

## 2018-10-06 NOTE — PROGRESS NOTE ADULT - SUBJECTIVE AND OBJECTIVE BOX
Patient is a 38y old  Male who presents with a chief complaint of Nausea, Vomiting and Diarrhea, Syncope (05 Oct 2018 16:44)      SUBJECTIVE / OVERNIGHT EVENTS:    MEDICATIONS  (STANDING):  acetaminophen   Tablet .. 650 milliGRAM(s) Oral every 6 hours  ergocalciferol 08410 Unit(s) Oral <User Schedule>  folic acid 1 milliGRAM(s) Oral daily  multivitamin 1 Tablet(s) Oral daily  ondansetron    Tablet 4 milliGRAM(s) Oral every 8 hours  pantoprazole  Injectable 40 milliGRAM(s) IV Push daily    MEDICATIONS  (PRN):  meclizine 12.5 milliGRAM(s) Oral two times a day PRN Dizziness  MIRACLE MOUTHWASH 10 milliLiter(s) Swish and Spit two times a day PRN Mouth Care  oxyCODONE    IR 15 milliGRAM(s) Oral every 6 hours PRN Moderate Pain (4 - 6    I&O's Summary    05 Oct 2018 07:01  -  06 Oct 2018 07:00  --------------------------------------------------------  IN: 480 mL / OUT: 600 mL / NET: -120 mL        PHYSICAL EXAM:  Vital Signs Last 24 Hrs  T(C): 36.4 (06 Oct 2018 06:01), Max: 36.8 (05 Oct 2018 14:41)  T(F): 97.5 (06 Oct 2018 06:01), Max: 98.3 (05 Oct 2018 14:41)  HR: 98 (06 Oct 2018 06:01) (85 - 98)  BP: 107/75 (06 Oct 2018 06:01) (95/65 - 110/73)  BP(mean): --  RR: 16 (06 Oct 2018 06:01) (16 - 18)  SpO2: 100% (06 Oct 2018 06:01) (100% - 100%)  GENERAL: NAD, well-developed  HEAD:  Atraumatic, Normocephalic  EYES: EOMI, PERRLA, conjunctiva and sclera clear  NECK: Supple, No JVD  CHEST/LUNG: Clear to auscultation bilaterally; No wheeze  HEART: Regular rate and rhythm; No murmurs, rubs, or gallops  ABDOMEN: Soft, Nontender, Nondistended; Bowel sounds present  EXTREMITIES:  2+ Peripheral Pulses, No clubbing, cyanosis, or edema  PSYCH: AAOx3  NEUROLOGY: non-focal  SKIN: No rashes or lesions    LABS:                        7.7    2.28  )-----------( 44       ( 06 Oct 2018 07:45 )             21.9     10-06    140  |  99  |  10  ----------------------------<  111<H>  3.4<L>   |  29  |  0.69    Ca    8.6      06 Oct 2018 07:45  Phos  2.4     10-05  Mg     1.7     10-06    TPro  6.9  /  Alb  3.9  /  TBili  0.6  /  DBili  x   /  AST  71<H>  /  ALT  263<H>  /  AlkPhos  68  10-05    PT/INR - ( 05 Oct 2018 07:00 )   PT: 12.2 SEC;   INR: 1.10          PTT - ( 05 Oct 2018 07:00 )  PTT:25.4 SEC          RADIOLOGY & ADDITIONAL TESTS:    Imaging Personally Reviewed:    Consultant(s) Notes Reviewed:      Care Discussed with Consultants/Other Providers: Patient is a 38y old  Male who presents with a chief complaint of Nausea, Vomiting and Diarrhea, Syncope (05 Oct 2018 16:44)      SUBJECTIVE / OVERNIGHT EVENTS:  No more N/V or diarrhea.  Wants Double lumen mid line d/c.  Ready for home    MEDICATIONS  (STANDING):  acetaminophen   Tablet .. 650 milliGRAM(s) Oral every 6 hours  ergocalciferol 38683 Unit(s) Oral <User Schedule>  folic acid 1 milliGRAM(s) Oral daily  multivitamin 1 Tablet(s) Oral daily  ondansetron    Tablet 4 milliGRAM(s) Oral every 8 hours  pantoprazole  Injectable 40 milliGRAM(s) IV Push daily    MEDICATIONS  (PRN):  meclizine 12.5 milliGRAM(s) Oral two times a day PRN Dizziness  MIRACLE MOUTHWASH 10 milliLiter(s) Swish and Spit two times a day PRN Mouth Care  oxyCODONE    IR 15 milliGRAM(s) Oral every 6 hours PRN Moderate Pain (4 - 6    I&O's Summary    05 Oct 2018 07:01  -  06 Oct 2018 07:00  --------------------------------------------------------  IN: 480 mL / OUT: 600 mL / NET: -120 mL        PHYSICAL EXAM:  Vital Signs Last 24 Hrs  T(C): 36.4 (06 Oct 2018 06:01), Max: 36.8 (05 Oct 2018 14:41)  T(F): 97.5 (06 Oct 2018 06:01), Max: 98.3 (05 Oct 2018 14:41)  HR: 98 (06 Oct 2018 06:01) (85 - 98)  BP: 107/75 (06 Oct 2018 06:01) (95/65 - 110/73)  BP(mean): --  RR: 16 (06 Oct 2018 06:01) (16 - 18)  SpO2: 100% (06 Oct 2018 06:01) (100% - 100%)  GENERAL: NAD, well-developed  HEAD:  Atraumatic, Normocephalic  EYES: EOMI, PERRLA, conjunctiva and sclera clear  NECK: Supple, No JVD  CHEST/LUNG: Clear to auscultation bilaterally; No wheeze  HEART: Regular rate and rhythm; No murmurs, rubs, or gallops  ABDOMEN: Soft, Nontender, Nondistended; Bowel sounds present  EXTREMITIES:  2+ Peripheral Pulses, No clubbing, cyanosis, or edema  L arm double lumen mid- line  PSYCH: AAOx3  NEUROLOGY: non-focal  SKIN: No rashes or lesions    LABS:                        7.7    2.28  )-----------( 44       ( 06 Oct 2018 07:45 )             21.9     10-06    140  |  99  |  10  ----------------------------<  111<H>  3.4<L>   |  29  |  0.69    Ca    8.6      06 Oct 2018 07:45  Phos  2.4     10-05  Mg     1.7     10-06    TPro  6.9  /  Alb  3.9  /  TBili  0.6  /  DBili  x   /  AST  71<H>  /  ALT  263<H>  /  AlkPhos  68  10-05    PT/INR - ( 05 Oct 2018 07:00 )   PT: 12.2 SEC;   INR: 1.10          PTT - ( 05 Oct 2018 07:00 )  PTT:25.4 SEC          RADIOLOGY & ADDITIONAL TESTS:    Imaging Personally Reviewed:    Consultant(s) Notes Reviewed:      Care Discussed with Consultants/Other Providers:

## 2018-10-06 NOTE — PROGRESS NOTE ADULT - ATTENDING COMMENTS
Urology Recs:  -Wound care consult  -Continue local wound care  -Patient has not followed up as scheduled s/p surgery  -Follow up in the office with Dr. Restrepo for continued management  -[facility] [phone]   -No acute  intervention, please call if questions Now optimized for home with out patient f/u with Oncology Dr Chirinos and with Urology Dr Restrepo.  Pull midline before HOME  d/w Heme- Onc  d/w patient and tele PA    45 min to coordinate d/c

## 2018-10-06 NOTE — PROGRESS NOTE ADULT - PROBLEM SELECTOR PLAN 9
-There was concern overnight that patient had a misplaced PICC line. However, this has been previously been confirmed with IR who placed the line that it is a midline and that it is in place  -Patient has had midline in since July so will need to be replaced. As per chart review, patient has a midline because he previously had a Mediport which resulted in development of an RA thrombus and concerns for arrythmia induction.   -After discussion with patient and outpatient oncologist, will dc midline after platelet infusion. Patient will receive further chemo via peripheral line given discussion with his outpatient oncologist -Now optimized for home with out patient f/u with Oncology Dr Chirinos and with Urology Dr Restrepo.  Pull midline before HOME

## 2018-10-06 NOTE — PROGRESS NOTE ADULT - PROBLEM SELECTOR PLAN 3
-Spoke with onc, they would prefer patient to receive 1U platelets followed by removal of midline  -Will give 1U platelets, then dc midline s/p platelets now 45

## 2018-10-08 ENCOUNTER — OUTPATIENT (OUTPATIENT)
Dept: OUTPATIENT SERVICES | Facility: HOSPITAL | Age: 38
LOS: 1 days | Discharge: HOME | End: 2018-10-08

## 2018-10-08 ENCOUNTER — INBOUND DOCUMENT (OUTPATIENT)
Age: 38
End: 2018-10-08

## 2018-10-08 DIAGNOSIS — Z98.890 OTHER SPECIFIED POSTPROCEDURAL STATES: Chronic | ICD-10-CM

## 2018-10-08 DIAGNOSIS — C62.92 MALIGNANT NEOPLASM OF LEFT TESTIS, UNSPECIFIED WHETHER DESCENDED OR UNDESCENDED: ICD-10-CM

## 2018-10-08 DIAGNOSIS — Z95.828 PRESENCE OF OTHER VASCULAR IMPLANTS AND GRAFTS: Chronic | ICD-10-CM

## 2018-10-08 DIAGNOSIS — Z90.79 ACQUIRED ABSENCE OF OTHER GENITAL ORGAN(S): Chronic | ICD-10-CM

## 2018-10-08 LAB
BACTERIA BLD CULT: SIGNIFICANT CHANGE UP
BACTERIA BLD CULT: SIGNIFICANT CHANGE UP

## 2018-10-09 ENCOUNTER — RESULT REVIEW (OUTPATIENT)
Age: 38
End: 2018-10-09

## 2018-10-09 ENCOUNTER — APPOINTMENT (OUTPATIENT)
Dept: HEMATOLOGY ONCOLOGY | Facility: CLINIC | Age: 38
End: 2018-10-09
Payer: MEDICAID

## 2018-10-09 ENCOUNTER — FORM ENCOUNTER (OUTPATIENT)
Age: 38
End: 2018-10-09

## 2018-10-09 VITALS
SYSTOLIC BLOOD PRESSURE: 94 MMHG | DIASTOLIC BLOOD PRESSURE: 65 MMHG | BODY MASS INDEX: 29.96 KG/M2 | HEART RATE: 89 BPM | OXYGEN SATURATION: 96 % | RESPIRATION RATE: 16 BRPM | WEIGHT: 227.07 LBS | TEMPERATURE: 98 F

## 2018-10-09 LAB
AFP-TM SERPL-MCNC: 5.4 NG/ML
ALBUMIN SERPL ELPH-MCNC: 4.1 G/DL
ALP BLD-CCNC: 85 U/L
ALT SERPL-CCNC: 109 U/L
ANION GAP SERPL CALC-SCNC: 11 MMOL/L
AST SERPL-CCNC: 28 U/L
BASOPHILS # BLD AUTO: 0 K/UL — SIGNIFICANT CHANGE UP (ref 0–0.2)
BASOPHILS NFR BLD AUTO: 0.3 % — SIGNIFICANT CHANGE UP (ref 0–2)
BILIRUB SERPL-MCNC: 0.4 MG/DL
BUN SERPL-MCNC: 19 MG/DL
CALCIUM SERPL-MCNC: 8.5 MG/DL
CHLORIDE SERPL-SCNC: 102 MMOL/L
CO2 SERPL-SCNC: 31 MMOL/L
CREAT SERPL-MCNC: 0.63 MG/DL
EOSINOPHIL # BLD AUTO: 0 K/UL — SIGNIFICANT CHANGE UP (ref 0–0.5)
EOSINOPHIL NFR BLD AUTO: 0.4 % — SIGNIFICANT CHANGE UP (ref 0–6)
GLUCOSE SERPL-MCNC: 99 MG/DL
HCT VFR BLD CALC: 25.4 % — LOW (ref 39–50)
HGB BLD-MCNC: 8.7 G/DL — LOW (ref 13–17)
LYMPHOCYTES # BLD AUTO: 0.9 K/UL — LOW (ref 1–3.3)
LYMPHOCYTES # BLD AUTO: 30.1 % — SIGNIFICANT CHANGE UP (ref 13–44)
MAGNESIUM SERPL-MCNC: 1.5 MG/DL
MCHC RBC-ENTMCNC: 31.3 PG — SIGNIFICANT CHANGE UP (ref 27–34)
MCHC RBC-ENTMCNC: 34.2 G/DL — SIGNIFICANT CHANGE UP (ref 32–36)
MCV RBC AUTO: 91.5 FL — SIGNIFICANT CHANGE UP (ref 80–100)
MONOCYTES # BLD AUTO: 0.2 K/UL — SIGNIFICANT CHANGE UP (ref 0–0.9)
MONOCYTES NFR BLD AUTO: 8 % — SIGNIFICANT CHANGE UP (ref 2–14)
NEUTROPHILS # BLD AUTO: 1.7 K/UL — LOW (ref 1.8–7.4)
NEUTROPHILS NFR BLD AUTO: 61.3 % — SIGNIFICANT CHANGE UP (ref 43–77)
PLAT MORPH BLD: NORMAL — SIGNIFICANT CHANGE UP
PLATELET # BLD AUTO: 23 K/UL — SIGNIFICANT CHANGE UP (ref 150–400)
POTASSIUM SERPL-SCNC: 4.6 MMOL/L
PROT SERPL-MCNC: 6.8 G/DL
RBC # BLD: 2.78 M/UL — LOW (ref 4.2–5.8)
RBC # FLD: 14.3 % — SIGNIFICANT CHANGE UP (ref 10.3–14.5)
RBC BLD AUTO: SIGNIFICANT CHANGE UP
SODIUM SERPL-SCNC: 144 MMOL/L
WBC # BLD: 2.8 K/UL — LOW (ref 3.8–10.5)
WBC # FLD AUTO: 2.8 K/UL — LOW (ref 3.8–10.5)

## 2018-10-09 PROCEDURE — 99214 OFFICE O/P EST MOD 30 MIN: CPT

## 2018-10-09 RX ORDER — LORAZEPAM 1 MG/1
1 TABLET ORAL 3 TIMES DAILY
Qty: 60 | Refills: 0 | Status: DISCONTINUED | COMMUNITY
Start: 2018-09-13 | End: 2018-10-09

## 2018-10-09 RX ORDER — METOCLOPRAMIDE 10 MG/1
10 TABLET ORAL EVERY 8 HOURS
Qty: 90 | Refills: 3 | Status: DISCONTINUED | COMMUNITY
Start: 2018-08-04 | End: 2018-10-09

## 2018-10-09 RX ORDER — PROCHLORPERAZINE 25 MG/1
25 SUPPOSITORY RECTAL
Qty: 24 | Refills: 2 | Status: DISCONTINUED | COMMUNITY
Start: 2018-08-15 | End: 2018-10-09

## 2018-10-10 ENCOUNTER — OUTPATIENT (OUTPATIENT)
Dept: OUTPATIENT SERVICES | Facility: HOSPITAL | Age: 38
LOS: 1 days | End: 2018-10-10
Payer: MEDICAID

## 2018-10-10 ENCOUNTER — APPOINTMENT (OUTPATIENT)
Dept: CARDIOLOGY | Facility: CLINIC | Age: 38
End: 2018-10-10

## 2018-10-10 ENCOUNTER — APPOINTMENT (OUTPATIENT)
Dept: CT IMAGING | Facility: IMAGING CENTER | Age: 38
End: 2018-10-10
Payer: MEDICAID

## 2018-10-10 DIAGNOSIS — Z90.79 ACQUIRED ABSENCE OF OTHER GENITAL ORGAN(S): Chronic | ICD-10-CM

## 2018-10-10 DIAGNOSIS — Z95.828 PRESENCE OF OTHER VASCULAR IMPLANTS AND GRAFTS: Chronic | ICD-10-CM

## 2018-10-10 DIAGNOSIS — C62.92 MALIGNANT NEOPLASM OF LEFT TESTIS, UNSPECIFIED WHETHER DESCENDED OR UNDESCENDED: ICD-10-CM

## 2018-10-10 DIAGNOSIS — Z98.890 OTHER SPECIFIED POSTPROCEDURAL STATES: Chronic | ICD-10-CM

## 2018-10-10 DIAGNOSIS — C77.2 SECONDARY AND UNSPECIFIED MALIGNANT NEOPLASM OF INTRA-ABDOMINAL LYMPH NODES: ICD-10-CM

## 2018-10-10 PROCEDURE — 74177 CT ABD & PELVIS W/CONTRAST: CPT | Mod: 26

## 2018-10-10 PROCEDURE — 71260 CT THORAX DX C+: CPT

## 2018-10-10 PROCEDURE — 74177 CT ABD & PELVIS W/CONTRAST: CPT

## 2018-10-10 PROCEDURE — 71260 CT THORAX DX C+: CPT | Mod: 26

## 2018-10-11 LAB — HCG-TM SERPL-MCNC: <1 MIU/ML

## 2018-10-18 ENCOUNTER — INPATIENT (INPATIENT)
Facility: HOSPITAL | Age: 38
LOS: 2 days | Discharge: HOME HEALTH | End: 2018-10-21
Attending: HOSPITALIST | Admitting: HOSPITALIST
Payer: MEDICAID

## 2018-10-18 ENCOUNTER — APPOINTMENT (OUTPATIENT)
Dept: HEMATOLOGY ONCOLOGY | Facility: CLINIC | Age: 38
End: 2018-10-18

## 2018-10-18 VITALS
DIASTOLIC BLOOD PRESSURE: 79 MMHG | OXYGEN SATURATION: 100 % | HEART RATE: 93 BPM | RESPIRATION RATE: 18 BRPM | TEMPERATURE: 98 F | SYSTOLIC BLOOD PRESSURE: 113 MMHG

## 2018-10-18 DIAGNOSIS — Z29.9 ENCOUNTER FOR PROPHYLACTIC MEASURES, UNSPECIFIED: ICD-10-CM

## 2018-10-18 DIAGNOSIS — R11.2 NAUSEA WITH VOMITING, UNSPECIFIED: ICD-10-CM

## 2018-10-18 DIAGNOSIS — R18.8 OTHER ASCITES: ICD-10-CM

## 2018-10-18 DIAGNOSIS — Z98.890 OTHER SPECIFIED POSTPROCEDURAL STATES: Chronic | ICD-10-CM

## 2018-10-18 DIAGNOSIS — Z90.79 ACQUIRED ABSENCE OF OTHER GENITAL ORGAN(S): Chronic | ICD-10-CM

## 2018-10-18 DIAGNOSIS — I42.8 OTHER CARDIOMYOPATHIES: ICD-10-CM

## 2018-10-18 DIAGNOSIS — Z95.828 PRESENCE OF OTHER VASCULAR IMPLANTS AND GRAFTS: Chronic | ICD-10-CM

## 2018-10-18 DIAGNOSIS — R42 DIZZINESS AND GIDDINESS: ICD-10-CM

## 2018-10-18 DIAGNOSIS — C62.90 MALIGNANT NEOPLASM OF UNSPECIFIED TESTIS, UNSPECIFIED WHETHER DESCENDED OR UNDESCENDED: ICD-10-CM

## 2018-10-18 LAB
ALBUMIN SERPL ELPH-MCNC: 4 G/DL — SIGNIFICANT CHANGE UP (ref 3.3–5)
ALP SERPL-CCNC: 151 U/L — HIGH (ref 40–120)
ALT FLD-CCNC: 142 U/L — HIGH (ref 4–41)
AST SERPL-CCNC: 72 U/L — HIGH (ref 4–40)
BASOPHILS # BLD AUTO: 0.01 K/UL — SIGNIFICANT CHANGE UP (ref 0–0.2)
BASOPHILS NFR BLD AUTO: 0.2 % — SIGNIFICANT CHANGE UP (ref 0–2)
BILIRUB SERPL-MCNC: 0.6 MG/DL — SIGNIFICANT CHANGE UP (ref 0.2–1.2)
BUN SERPL-MCNC: 13 MG/DL — SIGNIFICANT CHANGE UP (ref 7–23)
CALCIUM SERPL-MCNC: 9.2 MG/DL — SIGNIFICANT CHANGE UP (ref 8.4–10.5)
CHLORIDE SERPL-SCNC: 101 MMOL/L — SIGNIFICANT CHANGE UP (ref 98–107)
CO2 SERPL-SCNC: 25 MMOL/L — SIGNIFICANT CHANGE UP (ref 22–31)
CREAT SERPL-MCNC: 0.8 MG/DL — SIGNIFICANT CHANGE UP (ref 0.5–1.3)
EOSINOPHIL # BLD AUTO: 0 K/UL — SIGNIFICANT CHANGE UP (ref 0–0.5)
EOSINOPHIL NFR BLD AUTO: 0 % — SIGNIFICANT CHANGE UP (ref 0–6)
GLUCOSE SERPL-MCNC: 148 MG/DL — HIGH (ref 70–99)
HCT VFR BLD CALC: 23.6 % — LOW (ref 39–50)
HGB BLD-MCNC: 8 G/DL — LOW (ref 13–17)
IMM GRANULOCYTES # BLD AUTO: 0.08 # — SIGNIFICANT CHANGE UP
IMM GRANULOCYTES NFR BLD AUTO: 2 % — HIGH (ref 0–1.5)
LIDOCAIN IGE QN: 26.6 U/L — SIGNIFICANT CHANGE UP (ref 7–60)
LYMPHOCYTES # BLD AUTO: 0.72 K/UL — LOW (ref 1–3.3)
LYMPHOCYTES # BLD AUTO: 18 % — SIGNIFICANT CHANGE UP (ref 13–44)
MCHC RBC-ENTMCNC: 32.9 PG — SIGNIFICANT CHANGE UP (ref 27–34)
MCHC RBC-ENTMCNC: 33.9 % — SIGNIFICANT CHANGE UP (ref 32–36)
MCV RBC AUTO: 97.1 FL — SIGNIFICANT CHANGE UP (ref 80–100)
MONOCYTES # BLD AUTO: 0.38 K/UL — SIGNIFICANT CHANGE UP (ref 0–0.9)
MONOCYTES NFR BLD AUTO: 9.5 % — SIGNIFICANT CHANGE UP (ref 2–14)
NEUTROPHILS # BLD AUTO: 2.82 K/UL — SIGNIFICANT CHANGE UP (ref 1.8–7.4)
NEUTROPHILS NFR BLD AUTO: 70.3 % — SIGNIFICANT CHANGE UP (ref 43–77)
NRBC # FLD: 0.04 — SIGNIFICANT CHANGE UP
NRBC FLD-RTO: 1 — SIGNIFICANT CHANGE UP
PLATELET # BLD AUTO: 55 K/UL — LOW (ref 150–400)
PMV BLD: 11 FL — SIGNIFICANT CHANGE UP (ref 7–13)
POTASSIUM SERPL-MCNC: 4.3 MMOL/L — SIGNIFICANT CHANGE UP (ref 3.5–5.3)
POTASSIUM SERPL-SCNC: 4.3 MMOL/L — SIGNIFICANT CHANGE UP (ref 3.5–5.3)
PROT SERPL-MCNC: 7 G/DL — SIGNIFICANT CHANGE UP (ref 6–8.3)
RBC # BLD: 2.43 M/UL — LOW (ref 4.2–5.8)
RBC # FLD: 17.8 % — HIGH (ref 10.3–14.5)
SODIUM SERPL-SCNC: 139 MMOL/L — SIGNIFICANT CHANGE UP (ref 135–145)
WBC # BLD: 4.01 K/UL — SIGNIFICANT CHANGE UP (ref 3.8–10.5)
WBC # FLD AUTO: 4.01 K/UL — SIGNIFICANT CHANGE UP (ref 3.8–10.5)

## 2018-10-18 PROCEDURE — 74177 CT ABD & PELVIS W/CONTRAST: CPT | Mod: 26

## 2018-10-18 PROCEDURE — 99223 1ST HOSP IP/OBS HIGH 75: CPT

## 2018-10-18 RX ORDER — LANSOPRAZOLE 15 MG/1
1 CAPSULE, DELAYED RELEASE ORAL
Qty: 0 | Refills: 0 | COMMUNITY

## 2018-10-18 RX ORDER — FOLIC ACID 0.8 MG
1 TABLET ORAL DAILY
Qty: 0 | Refills: 0 | Status: DISCONTINUED | OUTPATIENT
Start: 2018-10-18 | End: 2018-10-21

## 2018-10-18 RX ORDER — SODIUM CHLORIDE 9 MG/ML
1000 INJECTION, SOLUTION INTRAVENOUS
Qty: 0 | Refills: 0 | Status: DISCONTINUED | OUTPATIENT
Start: 2018-10-18 | End: 2018-10-18

## 2018-10-18 RX ORDER — PANTOPRAZOLE SODIUM 20 MG/1
40 TABLET, DELAYED RELEASE ORAL
Qty: 0 | Refills: 0 | Status: DISCONTINUED | OUTPATIENT
Start: 2018-10-18 | End: 2018-10-21

## 2018-10-18 RX ORDER — SODIUM CHLORIDE 9 MG/ML
1000 INJECTION INTRAMUSCULAR; INTRAVENOUS; SUBCUTANEOUS
Qty: 0 | Refills: 0 | Status: DISCONTINUED | OUTPATIENT
Start: 2018-10-18 | End: 2018-10-19

## 2018-10-18 RX ORDER — SODIUM CHLORIDE 9 MG/ML
1000 INJECTION INTRAMUSCULAR; INTRAVENOUS; SUBCUTANEOUS ONCE
Qty: 0 | Refills: 0 | Status: COMPLETED | OUTPATIENT
Start: 2018-10-18 | End: 2018-10-18

## 2018-10-18 RX ORDER — ONDANSETRON 8 MG/1
4 TABLET, FILM COATED ORAL EVERY 6 HOURS
Qty: 0 | Refills: 0 | Status: DISCONTINUED | OUTPATIENT
Start: 2018-10-18 | End: 2018-10-21

## 2018-10-18 RX ORDER — ERGOCALCIFEROL 1.25 MG/1
50000 CAPSULE ORAL
Qty: 0 | Refills: 0 | Status: DISCONTINUED | OUTPATIENT
Start: 2018-10-18 | End: 2018-10-21

## 2018-10-18 RX ORDER — ONDANSETRON 8 MG/1
4 TABLET, FILM COATED ORAL ONCE
Qty: 0 | Refills: 0 | Status: COMPLETED | OUTPATIENT
Start: 2018-10-18 | End: 2018-10-18

## 2018-10-18 RX ORDER — MECLIZINE HCL 12.5 MG
12.5 TABLET ORAL
Qty: 0 | Refills: 0 | Status: DISCONTINUED | OUTPATIENT
Start: 2018-10-18 | End: 2018-10-20

## 2018-10-18 RX ADMIN — Medication 1 MILLIGRAM(S): at 12:13

## 2018-10-18 RX ADMIN — SODIUM CHLORIDE 1000 MILLILITER(S): 9 INJECTION INTRAMUSCULAR; INTRAVENOUS; SUBCUTANEOUS at 05:08

## 2018-10-18 RX ADMIN — ONDANSETRON 4 MILLIGRAM(S): 8 TABLET, FILM COATED ORAL at 20:27

## 2018-10-18 RX ADMIN — SODIUM CHLORIDE 1000 MILLILITER(S): 9 INJECTION, SOLUTION INTRAVENOUS at 07:03

## 2018-10-18 RX ADMIN — ONDANSETRON 4 MILLIGRAM(S): 8 TABLET, FILM COATED ORAL at 06:38

## 2018-10-18 RX ADMIN — ONDANSETRON 4 MILLIGRAM(S): 8 TABLET, FILM COATED ORAL at 05:08

## 2018-10-18 RX ADMIN — SODIUM CHLORIDE 60 MILLILITER(S): 9 INJECTION INTRAMUSCULAR; INTRAVENOUS; SUBCUTANEOUS at 09:43

## 2018-10-18 RX ADMIN — ONDANSETRON 4 MILLIGRAM(S): 8 TABLET, FILM COATED ORAL at 15:09

## 2018-10-18 RX ADMIN — ERGOCALCIFEROL 50000 UNIT(S): 1.25 CAPSULE ORAL at 12:13

## 2018-10-18 RX ADMIN — ONDANSETRON 4 MILLIGRAM(S): 8 TABLET, FILM COATED ORAL at 09:42

## 2018-10-18 NOTE — ED PROVIDER NOTE - CARE PLAN
Principal Discharge DX:	Nausea and vomiting, intractability of vomiting not specified, unspecified vomiting type Principal Discharge DX:	Nausea and vomiting, intractability of vomiting not specified, unspecified vomiting type  Secondary Diagnosis:	Dizziness

## 2018-10-18 NOTE — CONSULT NOTE ADULT - ASSESSMENT
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 Admitted with nausea/vomiting.    #nausea, vomiting  -differential is gastritis v delayed effects of chemotherapy v medication side effect  -lipase within normal  -CT scan from 10/10 with abdominal wall fluid collection but no other acute findings  -symptom control per primary team    #fluid collection  -unclear etiology though no infectious symptoms or fevers on admission.  -monitor clinically for now.    #mixed germ cell tumor  -completed 3 cycles of TIP. Plan for no more cycles at this time as discussed with Dr Chirinos.  -outpatient follow up with Dr Chirinos    Will follow. Please do not hesitate to page with questions.     Corinne Jimenez MD  Hematology/Oncology Fellow  Pager: [phone]

## 2018-10-18 NOTE — ED ADULT TRIAGE NOTE - CHIEF COMPLAINT QUOTE
pt. w/ hx. Testicular CA , c/o nausea , vomiting , weakness for a few days. Pt. appears very weak , and appears very uncomfortable in triage . Pt. states last chemo was 2x weeks.

## 2018-10-18 NOTE — CONSULT NOTE ADULT - SUBJECTIVE AND OBJECTIVE BOX
Oncology Consult Note    HPI:  37yo M with mixed germ cell tumor, AFlutter, nonischemic cardiomyopathy who presented on 10/18 with nausea/vomiting for the past few days. Patient reports he always have GI symptoms with chemo and he usually try to keep his oral hydration. Despite this he developed dizziness on standing one day prior to admission prompting to come to the ER. Patient states the vomitus is NBNB of usually food content. He could not keep any food down. Denied any abdominal pain or diarrhea with the episode. Denied any fever, chill, SOB, CP, abdominal pain, urinary changes or diarrhea. No recent travel. No sick contacts.          PAST MEDICAL & SURGICAL HISTORY:  Neuropathy: ~ fingers and toes  Anemia  Mixed germ cell tumor  Obesity  Atrial mass: right artrial echo density per MRI 3/14/18.  Atrial flutter  Cardiomyopathy  Atrial fibrillation: s/p DCCV 1/18  Thrombus: Right atrial wall ( suspected from last TYLOR) On AC  Testicular cancer  Lumbar herniated disc  Testicular mass: left  History of abdominal surgery: Retroperitoneal lymph node dissection (June 2018)  History of cardioversion: 2/2018  Port-a-cath in place: placed 9/2017,, removed-10/18/2017  History of orchiectomy: left-7/2017      FAMILY HISTORY:  Family history of hypertension in father  Family history of kidney stones: mother  Family history of cancer (Grandparent)  Family history of ischemic heart disease (Grandparent)  Family history of diabetes mellitus (Aunt)      MEDICATIONS  (STANDING):  ergocalciferol 89448 Unit(s) Oral every week  folic acid 1 milliGRAM(s) Oral daily  pantoprazole    Tablet 40 milliGRAM(s) Oral before breakfast  sodium chloride 0.9%. 1000 milliLiter(s) (60 mL/Hr) IV Continuous <Continuous>    MEDICATIONS  (PRN):  meclizine 12.5 milliGRAM(s) Oral two times a day PRN Dizziness  ondansetron Injectable 4 milliGRAM(s) IV Push every 6 hours PRN Nausea      Allergies    No Known Allergies    Intolerances        SOCIAL HISTORY: No EtOH, no tobacco    REVIEW OF SYSTEMS:    CONSTITUTIONAL: No weakness, fevers or chills  EYES/ENT: No visual changes;  No vertigo or throat pain   NECK: No pain or stiffness  RESPIRATORY: No cough, wheezing, hemoptysis; No shortness of breath  CARDIOVASCULAR: No chest pain or palpitations  GASTROINTESTINAL: No abdominal or epigastric pain. No nausea, vomiting, or hematemesis; No diarrhea or constipation. No melena or hematochezia.  GENITOURINARY: No dysuria, frequency or hematuria  NEUROLOGICAL: No numbness or weakness  SKIN: No itching, burning, rashes, or lesions   All other review of systems is negative unless indicated above.        T(F): 98 (10-18-18 @ 12:03), Max: 98 (10-18-18 @ 12:03)  HR: 88 (10-18-18 @ 12:03)  BP: 96/57 (10-18-18 @ 12:03)  RR: 18 (10-18-18 @ 12:03)  SpO2: 100% (10-18-18 @ 12:03)  Wt(kg): --    GENERAL: NAD, well-developed  HEAD:  Atraumatic, Normocephalic  EYES: EOMI, PERRLA, conjunctiva and sclera clear  NECK: Supple, No JVD  CHEST/LUNG: Clear to auscultation bilaterally; No wheeze  HEART: Regular rate and rhythm; No murmurs, rubs, or gallops  ABDOMEN: Soft, Nontender, Nondistended; Bowel sounds present  EXTREMITIES:  2+ Peripheral Pulses, No clubbing, cyanosis, or edema  NEUROLOGY: non-focal  SKIN: No rashes or lesions                          8.0    4.01  )-----------( 55       ( 18 Oct 2018 04:55 )             23.6       10-18    139  |  101  |  13  ----------------------------<  148<H>  4.3   |  25  |  0.80    Ca    9.2      18 Oct 2018 04:55    TPro  7.0  /  Alb  4.0  /  TBili  0.6  /  DBili  x   /  AST  72<H>  /  ALT  142<H>  /  AlkPhos  151<H>  10-18 Oncology Consult Note    HPI:  37yo M with mixed germ cell tumor, AFlutter, nonischemic cardiomyopathy who presented on 10/18 with nausea/vomiting for the past few days. Patient reports he always have GI symptoms with chemo and he usually try to keep his oral hydration. Despite this he developed dizziness on standing one day prior to admission prompting to come to the ER. Patient states the vomitus is NBNB of usually food content. He could not keep any food down. Denied any abdominal pain or diarrhea with the episode. Denied any fever, chill, SOB, CP, abdominal pain, urinary changes or diarrhea. No recent travel. No sick contacts.    He was diagnosed in 2017. Initially he underwent orchiectomy followed by 3 cycles of EP (etoposide, cisplatin). He had difficulties with each cycle including an admission for AFlutter with RVR. June 2018 he underwent retroperitoneal lymph node dissection which came back with 2 positive LN extranodal extension) and pathology was positive for germ cell tumor (embryonal). Since then TIP was started and he has completed 3 cycles so far (last one 9/24).      PAST MEDICAL & SURGICAL HISTORY:  Neuropathy: ~ fingers and toes  Anemia  Mixed germ cell tumor  Obesity  Atrial mass: right artrial echo density per MRI 3/14/18.  Atrial flutter  Cardiomyopathy  Atrial fibrillation: s/p DCCV 1/18  Thrombus: Right atrial wall ( suspected from last TYLOR) On AC  Testicular cancer  Lumbar herniated disc  Testicular mass: left  History of abdominal surgery: Retroperitoneal lymph node dissection (June 2018)  History of cardioversion: 2/2018  Port-a-cath in place: placed 9/2017,, removed-10/18/2017  History of orchiectomy: left-7/2017      FAMILY HISTORY:  Family history of hypertension in father  Family history of kidney stones: mother  Family history of cancer (Grandparent)  Family history of ischemic heart disease (Grandparent)  Family history of diabetes mellitus (Aunt)      MEDICATIONS  (STANDING):  ergocalciferol 73186 Unit(s) Oral every week  folic acid 1 milliGRAM(s) Oral daily  pantoprazole    Tablet 40 milliGRAM(s) Oral before breakfast  sodium chloride 0.9%. 1000 milliLiter(s) (60 mL/Hr) IV Continuous <Continuous>    MEDICATIONS  (PRN):  meclizine 12.5 milliGRAM(s) Oral two times a day PRN Dizziness  ondansetron Injectable 4 milliGRAM(s) IV Push every 6 hours PRN Nausea      Allergies    No Known Allergies    Intolerances        SOCIAL HISTORY: lives with family. no active tobacco use.    REVIEW OF SYSTEMS:    CONSTITUTIONAL: weak, dizzy  EYES/ENT: No visual changes;  No vertigo or throat pain   NECK: No pain or stiffness  RESPIRATORY: No cough, wheezing; No shortness of breath  CARDIOVASCULAR: No chest pain or palpitations  GASTROINTESTINAL: nausea and vomiting as above. No abdominal or epigastric pain; No diarrhea or constipation.   GENITOURINARY: No dysuria, frequency or hematuria  NEUROLOGICAL: No numbness or weakness  SKIN: No itching, burning, rashes, or lesions   All other review of systems is negative unless indicated above.        T(F): 98 (10-18-18 @ 12:03), Max: 98 (10-18-18 @ 12:03)  HR: 88 (10-18-18 @ 12:03)  BP: 96/57 (10-18-18 @ 12:03)  RR: 18 (10-18-18 @ 12:03)  SpO2: 100% (10-18-18 @ 12:03)  Wt(kg): --    GENERAL: chronically ill but non-toxic, pale  HEAD:  Atraumatic, Normocephalic  EYES: EOMI, PERRLA, conjunctiva and sclera clear  NECK: Supple, No JVD  CHEST/LUNG: Clear to auscultation bilaterally; No wheeze  HEART: Regular rate and rhythm; No murmurs, rubs, or gallops  ABDOMEN: Soft, Nontender, Nondistended  EXTREMITIES:  2+ Peripheral Pulses  NEUROLOGY: non-focal  SKIN: No rashes or lesions                          8.0    4.01  )-----------( 55       ( 18 Oct 2018 04:55 )             23.6       10-18    139  |  101  |  13  ----------------------------<  148<H>  4.3   |  25  |  0.80    Ca    9.2      18 Oct 2018 04:55    TPro  7.0  /  Alb  4.0  /  TBili  0.6  /  DBili  x   /  AST  72<H>  /  ALT  142<H>  /  AlkPhos  151<H>  10-18    10/10 CT TAP:  LUNGS AND LARGE AIRWAYS: Patent central airways. No pulmonary nodules.  PLEURA: No pleural effusion or pneumothorax.  VESSELS: Within normal limits. No pulmonary embolus.  HEART: Heart size is normal. No pericardial effusion.  MEDIASTINUM AND CARLOS A: No lymphadenopathy.  CHEST WALL AND LOWER NECK: Within normal limits.    ABDOMEN AND PELVIS:    LIVER: Within normal limits.  BILE DUCTS: Normal caliber.  GALLBLADDER: Cholelithiasis.  SPLEEN: Within normal limits.  PANCREAS: Within normal limits.  ADRENALS: Within normal limits.  KIDNEYS/URETERS: 1.2 cm heterogeneous enhancing focus of the right kidney   lower pole compared with the patient's previous study of 5/26/2018. In   light of patient's recent surgery, a small infarct is considered but   follow-up is suggested to exclude developing renal mass. Small bilateral   cortical renal cysts.No hydronephrosis    BLADDER: Within normal limits.  REPRODUCTIVE ORGANS: The prostate is within normal limits.    BOWEL: No bowel obstruction. Status post appendectomy.  PERITONEUM: No ascites. New fluid collection in the upper mid abdomen   measuring about 11.3 x 5.4 x 5.9 cm (2, 91; 602, 84).  VESSELS:  Within normal limits.  RETROPERITONEUM: Status post resection of multiple lymph nodes, including   the previously seen left periaortic lymph nodes. Left obturator lymph   node is unchanged from prior study.  ABDOMINAL WALL: Postsurgical changes.  BONES: Within normal limits.    IMPRESSION:     Status post resection of multiple retroperitoneal lymph nodes. No new   retroperitoneal lymphadenopathy.    New fluid collection in the upper midabdomen as measured above.    No evidence of metastatic disease in the chest.

## 2018-10-18 NOTE — ED ADULT NURSE NOTE - OBJECTIVE STATEMENT
pt received spot 5, alert and orientedx3. c.o nausea/vomiting and weakness x2 weeks since last chemo treatment. pt ambulatory with assistance currently. denies fever cp sob dizziness. respirations even unlabored. 20G iv placed right ac via ultrasound. labs sent. NAD noted. will monitor.

## 2018-10-18 NOTE — H&P ADULT - PROBLEM SELECTOR PLAN 3
found on CT A/P 10/10/18 as part of tumor surveillance.   unlikely related to his current symptoms.   Surgery consulted, will likely need IR for drainage.  Oncology paged for recs.   f/u surgery and oncology recommendations.

## 2018-10-18 NOTE — H&P ADULT - PROBLEM SELECTOR PLAN 2
related to dehydration. reports improved after IVF.  will c/w IVF as patient still could not tolerate po at this time.

## 2018-10-18 NOTE — ED PROVIDER NOTE - PROGRESS NOTE DETAILS
Pt refused rectal temp Pt labs reviewed with pt and family. Pt still dizzy, cannot tolerate PO, weakness, cannot ambulate without assistance. Will admit to hospitalist for hydration and symptomatic relief and FTT.

## 2018-10-18 NOTE — ED ADULT NURSE NOTE - NSIMPLEMENTINTERV_GEN_ALL_ED
Implemented All Fall Risk Interventions:  [place] to call system. Call bell, personal items and telephone within reach. Instruct patient to call for assistance. Room bathroom lighting operational. Non-slip footwear when patient is off stretcher. Physically safe environment: no spills, clutter or unnecessary equipment. Stretcher in lowest position, wheels locked, appropriate side rails in place. Provide visual cue, wrist band, yellow gown, etc. Monitor gait and stability. Monitor for mental status changes and reorient to person, place, and time. Review medications for side effects contributing to fall risk. Reinforce activity limits and safety measures with patient and family.

## 2018-10-18 NOTE — H&P ADULT - NSHPREVIEWOFSYSTEMS_GEN_ALL_CORE
CONSTITUTIONAL: generalized weakness; No fever, chills, night sweats, weight loss  EYES: No eye pain, blurry vision, or discharge  ENMT:  No difficulty hearing, tinnitus, vertigo; No sinus or throat pain  NECK: No pain or stiffness  BREASTS: No pain, masses, or nipple discharge  RESPIRATORY: No cough, wheezing, chills or hemoptysis; No shortness of breath  CARDIOVASCULAR: No chest pain, palpitations, dizziness, or leg swelling  GASTROINTESTINAL: Nausea and vomiting; No abdominal or epigastric pain. No hematemesis; No diarrhea or constipation. No melena or hematochezia.  GENITOURINARY: No dysuria, frequency, hematuria, or incontinence  NEUROLOGICAL: No headaches, memory loss, loss of strength, numbness, or tremors  SKIN: No itching, burning, rashes, or lesions   LYMPH NODES: No enlarged glands  ENDOCRINE: No heat or cold intolerance; No hair loss  MUSCULOSKELETAL: No joint pain or swelling; No muscle, back, or extremity pain  PSYCHIATRIC: No depression, anxiety, mood swings, or difficulty sleeping  HEME/LYMPH: No easy bruising, or bleeding gums  ALLERY AND IMMUNOLOGIC: No hives or eczema

## 2018-10-18 NOTE — CONSULT NOTE ADULT - ASSESSMENT
38M with PMHx testicular ca s/p orchiectomy on chemo, Afib/flutter, nonischemic cardiomyopathy, last chemo 2 weeks ago, with nausea/vomiting for the past few days and new fluid collection in abdomen. 38M with PMHx testicular ca s/p orchiectomy on chemo, Afib/flutter, nonischemic cardiomyopathy, last chemo 2 weeks ago, with nausea/vomiting for the past few days and new fluid collection in abdomen.      -- Repeat CT abdomen and pelvis with PO and IV contrast given change in clinical status  -- if collection stable, would observe - appears to be simple fluid, possible lymphocele  -- will monitor renal lesion - patient will be getting routine imaging for his testis ca

## 2018-10-18 NOTE — ED PROVIDER NOTE - MEDICAL DECISION MAKING DETAILS
39 yo M with testicular CA on chemo that presents with N/V x few days but worsening tonight. Was recently admitted for same and is well aware of his condition. Refused rectal temp and denies fever but has had chills. Will obtain labs, provide IVF and meds and reassess.

## 2018-10-18 NOTE — H&P ADULT - NSHPPHYSICALEXAM_GEN_ALL_CORE
Vital Signs Last 24 Hrs  T(C): 36.2 (18 Oct 2018 08:53), Max: 36.6 (18 Oct 2018 03:45)  T(F): 97.2 (18 Oct 2018 08:53), Max: 97.9 (18 Oct 2018 03:45)  HR: 80 (18 Oct 2018 08:53) (80 - 93)  BP: 97/60 (18 Oct 2018 08:53) (97/60 - 113/79)  BP(mean): --  RR: 18 (18 Oct 2018 08:53) (18 - 18)  SpO2: 100% (18 Oct 2018 08:53) (100% - 100%)    GENERAL: no apparent distress, on room air  HEAD:  Atraumatic, Normocephalic  EYES: EOMI, PERRLA, conjunctiva and sclera clear b/l  CHEST/LUNG: Clear to auscultation bilaterally; No wheezing or crackles  HEART: Regular rate and rhythm; No murmurs, rubs, or gallops  ABDOMEN: Soft, Nontender, Nondistended; Bowel sounds present; Lower abdomen wound still in place, draining clear liquid  EXTREMITIES:  2+ Peripheral Pulses, No clubbing, cyanosis, or edema  PSYCH: normal affect, calm demeanor  NEUROLOGY: AAOX3, no sensory or motor deficits, 5/5 muscle strength equal in all extremities, CN 2-12 grossly intact  SKIN: No rashes or lesions

## 2018-10-18 NOTE — H&P ADULT - PROBLEM SELECTOR PLAN 1
s/p 2L NS in ED  will continue with gentle hydration, encourage oral intake.  Zofran prn for nausea/vomiting  monitor for signs of fluid overload, given low EF.

## 2018-10-18 NOTE — H&P ADULT - PROBLEM SELECTOR PLAN 5
Last Echo was July 2018 with 14% EF  will need to clarify home meds  hold BP now, as patient pressure is borderline. Last Echo was July 2018 with 35% EF  will need to clarify home meds  hold BP now, as patient pressure is borderline.

## 2018-10-18 NOTE — ED PROVIDER NOTE - ENMT, MLM
Airway patent, Nasal mucosa clear. Mouth with normal mucosa. Throat has no vesicles, no oropharyngeal exudates and uvula is midline. Bald

## 2018-10-18 NOTE — CONSULT NOTE ADULT - SUBJECTIVE AND OBJECTIVE BOX
HPI:  38M with PMHx testicular ca s/p orchiectomy on chemo, Afib/flutter, nonischemic cardiomyopathy, last chemo 2 weeks ago, presented with nausea/vomiting for the past few days. Patient reports he always have GI symptoms with chemo, he usually try to keep his oral hydration. Yesterday patient started feeling dizzy, thus came to ED. Patient states the vomitus is NBNB of usually food content. He could not keep any food down. Denied any abdominal pain or diarrhea with the episode. Patient was also hospitalized over 1 week ago for similar GI symptoms related to chemo. Denied any fever, chill, SOB, CP, abdominal pain, urinary changes or diarrhea. Patient feels his S/Sx are likely related to his last chemo treatment.   called for finding of new fluid collection in the upper mid abdomen.      PAST MEDICAL & SURGICAL HISTORY:  Neuropathy: ~ fingers and toes  Anemia  Mixed germ cell tumor  Obesity  Atrial mass: right artrial echo density per MRI 3/14/18.  Atrial flutter  Cardiomyopathy  Atrial fibrillation: s/p DCCV 1/18  Thrombus: Right atrial wall ( suspected from last TYLOR) On AC  Testicular cancer  Lumbar herniated disc  Testicular mass: left  History of abdominal surgery: Retroperitoneal lymph node dissection (June 2018)  History of cardioversion: 2/2018  Port-a-cath in place: placed 9/2017,, removed-10/18/2017  History of orchiectomy: left-7/2017      MEDICATIONS  (STANDING):  ergocalciferol 10596 Unit(s) Oral every week  folic acid 1 milliGRAM(s) Oral daily  pantoprazole    Tablet 40 milliGRAM(s) Oral before breakfast  sodium chloride 0.9%. 1000 milliLiter(s) (60 mL/Hr) IV Continuous <Continuous>    MEDICATIONS  (PRN):  meclizine 12.5 milliGRAM(s) Oral two times a day PRN Dizziness  ondansetron Injectable 4 milliGRAM(s) IV Push every 6 hours PRN Nausea      FAMILY HISTORY:  Family history of hypertension in father  Family history of kidney stones: mother  Family history of cancer (Grandparent)  Family history of ischemic heart disease (Grandparent)  Family history of diabetes mellitus (Aunt)      Allergies    No Known Allergies          SOCIAL HISTORY:  Denied tobacco, etoh or illicit drug use.      REVIEW OF SYSTEMS: Otherwise negative as stated in HPI      Vital Signs Last 24 Hrs  T(C): 36.7 (18 Oct 2018 12:03), Max: 36.7 (18 Oct 2018 12:03)  T(F): 98 (18 Oct 2018 12:03), Max: 98 (18 Oct 2018 12:03)  HR: 88 (18 Oct 2018 12:03) (80 - 93)  BP: 96/57 (18 Oct 2018 12:03) (96/57 - 113/79)  BP(mean): --  RR: 18 (18 Oct 2018 12:03) (18 - 18)  SpO2: 100% (18 Oct 2018 12:03) (100% - 100%)    PHYSICAL EXAM:    General:	[x ] Awake and Alert in no acute distress    Respiratory and Thorax: [ x ] no resp distress   	  Cardiovascular: [x ] S1,S2 Reg Rate    Gastrointestinal: [x ]soft  well healed midline scar except for inferior portion that is still open covered with bandage with small amount of yellow brown discharge; no purulence.    Genitourinary: WNL; absent L testicle.                             LABS:                        8.0    4.01  )-----------( 55       ( 18 Oct 2018 04:55 )             23.6     10-18    139  |  101  |  13  ----------------------------<  148<H>  4.3   |  25  |  0.80    Ca    9.2      18 Oct 2018 04:55    TPro  7.0  /  Alb  4.0  /  TBili  0.6  /  DBili  x   /  AST  72<H>  /  ALT  142<H>  /  AlkPhos  151<H>  10-18        RADIOLOGY:      EXAM:  CT ABDOMEN AND PELVIS OC IC      EXAM:  CT CHEST IC        PROCEDURE DATE:  10/10/2018           INTERPRETATION:  CLINICAL INFORMATION: Patient's history of testicular   cancer status post resection. Follow-up evaluation.    COMPARISON: CT chest abdomen and pelvis dated 5/26/2018.    PROCEDURE:   CT of the Chest, Abdomen and Pelvis was performed with intravenous   contrast.   Intravenous contrast: 90 ml Omnipaque 350. 10 ml discarded.  Oral contrast: positive contrast was administered.  Sagittal and coronal reformats were performed.    FINDINGS:    CHEST:     LUNGS AND LARGE AIRWAYS: Patent central airways. No pulmonary nodules.  PLEURA: No pleural effusion or pneumothorax.  VESSELS: Within normal limits. No pulmonary embolus.  HEART: Heart size is normal. No pericardial effusion.  MEDIASTINUM AND CARLOS A: No lymphadenopathy.  CHEST WALL AND LOWER NECK: Within normal limits.    ABDOMEN AND PELVIS:    LIVER: Within normal limits.  BILE DUCTS: Normal caliber.  GALLBLADDER: Cholelithiasis.  SPLEEN: Within normal limits.  PANCREAS: Within normal limits.  ADRENALS: Within normal limits.  KIDNEYS/URETERS: 1.2 cm heterogeneous enhancing focus of the right kidney   lower pole compared with the patient's previous study of 5/26/2018. In   light of patient's recent surgery, a small infarct is considered but   follow-up is suggested to exclude developing renal mass. Small bilateral   cortical renal cysts.No hydronephrosis    BLADDER: Within normal limits.  REPRODUCTIVE ORGANS: The prostate is within normal limits.    BOWEL: No bowel obstruction. Status post appendectomy.  PERITONEUM: No ascites. New fluid collection in the upper mid abdomen   measuring about 11.3 x 5.4 x 5.9 cm (2, 91; 602, 84).  VESSELS:  Within normal limits.  RETROPERITONEUM: Status post resection of multiple lymph nodes, including   the previously seen left periaortic lymph nodes. Left obturator lymph   node is unchanged from prior study.  ABDOMINAL WALL: Postsurgical changes.  BONES: Within normal limits.    IMPRESSION:     Status post resection of multiple retroperitoneal lymph nodes. No new   retroperitoneal lymphadenopathy.    New fluid collection in the upper midabdomen as measured above.    No evidence of metastatic disease in the chest.               TONY PERRY M.D., RADIOLOGY RESIDENT  This document has been electronically signed.  KARLA WARNER M.D., ATTENDING RADIOLOGIST   This document has been electronically signed. Oct 15 2018  8:57AM

## 2018-10-18 NOTE — ED PROVIDER NOTE - OBJECTIVE STATEMENT
37 yo M with testicular CA, afib/flut, atrial mass, cardiomyopathy, orchietcomy, lumbar herniated disc, neuropathy, vit D def, folate Def that is on TIP chemo last was 2 wks ago that presents with N/V and generalized weakness. Has had this multiple times in past, tonight was unable to sleep, vomited 3 times with some bile mixed with 37 yo M with testicular CA, afib/flut, atrial mass, cardiomyopathy, orchietcomy, lumbar herniated disc, neuropathy, vit D def, folate Def that is on TIP chemo last was 2 wks ago that presents with N/V and generalized weakness. Has had this multiple times in past, tonight was unable to sleep, vomited 3 times with some bile mixed with food and fluid that he has tried to eat but not able to tolerate. Has weakness which he feels usually when dehydrated and has general muscle cramps which he feels when his potassium is low. He also is worried about his liver as his chemo has been damaging his liver in past. Usually has the worse N/Z 2-3 wks ago after chemo which this is typical for him. Denies any fever but has had chills. No falls, no travel, no other sick contacts. Denies smoking, drinking, drugs.

## 2018-10-19 DIAGNOSIS — D64.9 ANEMIA, UNSPECIFIED: ICD-10-CM

## 2018-10-19 DIAGNOSIS — I51.89 OTHER ILL-DEFINED HEART DISEASES: ICD-10-CM

## 2018-10-19 DIAGNOSIS — Z71.89 OTHER SPECIFIED COUNSELING: ICD-10-CM

## 2018-10-19 LAB
ALBUMIN SERPL ELPH-MCNC: 3.3 G/DL — SIGNIFICANT CHANGE UP (ref 3.3–5)
ALP SERPL-CCNC: 114 U/L — SIGNIFICANT CHANGE UP (ref 40–120)
ALT FLD-CCNC: 85 U/L — HIGH (ref 4–41)
APTT BLD: 28.4 SEC — SIGNIFICANT CHANGE UP (ref 27.5–37.4)
AST SERPL-CCNC: 33 U/L — SIGNIFICANT CHANGE UP (ref 4–40)
BASOPHILS # BLD AUTO: 0 K/UL — SIGNIFICANT CHANGE UP (ref 0–0.2)
BASOPHILS NFR BLD AUTO: 0 % — SIGNIFICANT CHANGE UP (ref 0–2)
BILIRUB SERPL-MCNC: 0.3 MG/DL — SIGNIFICANT CHANGE UP (ref 0.2–1.2)
BLD GP AB SCN SERPL QL: NEGATIVE — SIGNIFICANT CHANGE UP
BUN SERPL-MCNC: 8 MG/DL — SIGNIFICANT CHANGE UP (ref 7–23)
CALCIUM SERPL-MCNC: 8.7 MG/DL — SIGNIFICANT CHANGE UP (ref 8.4–10.5)
CHLORIDE SERPL-SCNC: 103 MMOL/L — SIGNIFICANT CHANGE UP (ref 98–107)
CO2 SERPL-SCNC: 26 MMOL/L — SIGNIFICANT CHANGE UP (ref 22–31)
CREAT SERPL-MCNC: 0.85 MG/DL — SIGNIFICANT CHANGE UP (ref 0.5–1.3)
EOSINOPHIL # BLD AUTO: 0 K/UL — SIGNIFICANT CHANGE UP (ref 0–0.5)
EOSINOPHIL NFR BLD AUTO: 0 % — SIGNIFICANT CHANGE UP (ref 0–6)
GLUCOSE SERPL-MCNC: 90 MG/DL — SIGNIFICANT CHANGE UP (ref 70–99)
HCT VFR BLD CALC: 20.8 % — CRITICAL LOW (ref 39–50)
HCT VFR BLD CALC: 21.8 % — LOW (ref 39–50)
HGB BLD-MCNC: 6.9 G/DL — CRITICAL LOW (ref 13–17)
HGB BLD-MCNC: 7.2 G/DL — LOW (ref 13–17)
IMM GRANULOCYTES # BLD AUTO: 0.03 # — SIGNIFICANT CHANGE UP
IMM GRANULOCYTES NFR BLD AUTO: 1.4 % — SIGNIFICANT CHANGE UP (ref 0–1.5)
INR BLD: 1.15 — SIGNIFICANT CHANGE UP (ref 0.88–1.17)
LYMPHOCYTES # BLD AUTO: 1.21 K/UL — SIGNIFICANT CHANGE UP (ref 1–3.3)
LYMPHOCYTES # BLD AUTO: 54.8 % — HIGH (ref 13–44)
MAGNESIUM SERPL-MCNC: 1.6 MG/DL — SIGNIFICANT CHANGE UP (ref 1.6–2.6)
MCHC RBC-ENTMCNC: 32.3 PG — SIGNIFICANT CHANGE UP (ref 27–34)
MCHC RBC-ENTMCNC: 32.4 PG — SIGNIFICANT CHANGE UP (ref 27–34)
MCHC RBC-ENTMCNC: 33 % — SIGNIFICANT CHANGE UP (ref 32–36)
MCHC RBC-ENTMCNC: 33.2 % — SIGNIFICANT CHANGE UP (ref 32–36)
MCV RBC AUTO: 97.7 FL — SIGNIFICANT CHANGE UP (ref 80–100)
MCV RBC AUTO: 97.8 FL — SIGNIFICANT CHANGE UP (ref 80–100)
MONOCYTES # BLD AUTO: 0.31 K/UL — SIGNIFICANT CHANGE UP (ref 0–0.9)
MONOCYTES NFR BLD AUTO: 14 % — SIGNIFICANT CHANGE UP (ref 2–14)
NEUTROPHILS # BLD AUTO: 0.66 K/UL — LOW (ref 1.8–7.4)
NEUTROPHILS NFR BLD AUTO: 29.8 % — LOW (ref 43–77)
NRBC # FLD: 0.04 — SIGNIFICANT CHANGE UP
NRBC # FLD: 0.04 — SIGNIFICANT CHANGE UP
NRBC FLD-RTO: 1.7 — SIGNIFICANT CHANGE UP
NRBC FLD-RTO: 1.8 — SIGNIFICANT CHANGE UP
PHOSPHATE SERPL-MCNC: 4.3 MG/DL — SIGNIFICANT CHANGE UP (ref 2.5–4.5)
PLATELET # BLD AUTO: 54 K/UL — LOW (ref 150–400)
PLATELET # BLD AUTO: 54 K/UL — LOW (ref 150–400)
PMV BLD: 10.6 FL — SIGNIFICANT CHANGE UP (ref 7–13)
PMV BLD: 10.8 FL — SIGNIFICANT CHANGE UP (ref 7–13)
POTASSIUM SERPL-MCNC: 3.8 MMOL/L — SIGNIFICANT CHANGE UP (ref 3.5–5.3)
POTASSIUM SERPL-SCNC: 3.8 MMOL/L — SIGNIFICANT CHANGE UP (ref 3.5–5.3)
PROT SERPL-MCNC: 6 G/DL — SIGNIFICANT CHANGE UP (ref 6–8.3)
PROTHROM AB SERPL-ACNC: 12.8 SEC — SIGNIFICANT CHANGE UP (ref 9.8–13.1)
RBC # BLD: 2.13 M/UL — LOW (ref 4.2–5.8)
RBC # BLD: 2.23 M/UL — LOW (ref 4.2–5.8)
RBC # FLD: 19.1 % — HIGH (ref 10.3–14.5)
RBC # FLD: 19.2 % — HIGH (ref 10.3–14.5)
RH IG SCN BLD-IMP: POSITIVE — SIGNIFICANT CHANGE UP
SODIUM SERPL-SCNC: 140 MMOL/L — SIGNIFICANT CHANGE UP (ref 135–145)
WBC # BLD: 2.21 K/UL — LOW (ref 3.8–10.5)
WBC # BLD: 2.29 K/UL — LOW (ref 3.8–10.5)
WBC # FLD AUTO: 2.21 K/UL — LOW (ref 3.8–10.5)
WBC # FLD AUTO: 2.29 K/UL — LOW (ref 3.8–10.5)

## 2018-10-19 PROCEDURE — 99232 SBSQ HOSP IP/OBS MODERATE 35: CPT

## 2018-10-19 PROCEDURE — 99223 1ST HOSP IP/OBS HIGH 75: CPT

## 2018-10-19 RX ORDER — ENOXAPARIN SODIUM 100 MG/ML
40 INJECTION SUBCUTANEOUS DAILY
Qty: 0 | Refills: 0 | Status: DISCONTINUED | OUTPATIENT
Start: 2018-10-19 | End: 2018-10-21

## 2018-10-19 RX ORDER — SODIUM CHLORIDE 9 MG/ML
1000 INJECTION INTRAMUSCULAR; INTRAVENOUS; SUBCUTANEOUS
Qty: 0 | Refills: 0 | Status: DISCONTINUED | OUTPATIENT
Start: 2018-10-19 | End: 2018-10-21

## 2018-10-19 RX ADMIN — SODIUM CHLORIDE 100 MILLILITER(S): 9 INJECTION INTRAMUSCULAR; INTRAVENOUS; SUBCUTANEOUS at 11:15

## 2018-10-19 RX ADMIN — PANTOPRAZOLE SODIUM 40 MILLIGRAM(S): 20 TABLET, DELAYED RELEASE ORAL at 06:49

## 2018-10-19 RX ADMIN — Medication 1 MILLIGRAM(S): at 11:30

## 2018-10-19 RX ADMIN — Medication 12.5 MILLIGRAM(S): at 06:49

## 2018-10-19 RX ADMIN — ENOXAPARIN SODIUM 40 MILLIGRAM(S): 100 INJECTION SUBCUTANEOUS at 11:30

## 2018-10-19 NOTE — CONSULT NOTE ADULT - SUBJECTIVE AND OBJECTIVE BOX
Cardiology/Vascular Medicine Inpatient Consultation Note      HISTORY OF PRESENT ILLNESS:  Patient is a 37 yo man with testicular ca s/p orchiectomy on chemotherapy, Afib/flutter, nonischemic cardiomyopathy, last chemotherapy treatment 2 weeks ago, presented with nausea/vomiting for the past few days. Patient reports he always have GI symptoms with chemo, he usually try to keep his oral hydration. Yesterday patient started feeling dizzy, thus came to ED. Patient states the vomitus is NBNB of usually food content. He could not keep any food down. Denied any abdominal pain or diarrhea with the episode. Patient was also hospitalized over 1 week ago for similar GI symptoms related to chemo. Denied any fever, chill, SOB, CP, abdominal pain, urinary changes or diarrhea.     In ED, patient was afebrile. BP soft. tachycardia. received 2L NS. (18 Oct 2018 09:16)          Allergies  No Known Allergies  	  MEDICATIONS:  enoxaparin Injectable 40 milliGRAM(s) SubCutaneous daily  meclizine 12.5 milliGRAM(s) Oral two times a day PRN  ondansetron Injectable 4 milliGRAM(s) IV Push every 6 hours PRN  pantoprazole    Tablet 40 milliGRAM(s) Oral before breakfast  ergocalciferol 78053 Unit(s) Oral every week  folic acid 1 milliGRAM(s) Oral daily  sodium chloride 0.9%. 1000 milliLiter(s) IV Continuous <Continuous>      PAST MEDICAL & SURGICAL HISTORY:  Neuropathy: ~ fingers and toes  Anemia  Mixed germ cell tumor  Obesity  Atrial mass: right artrial echo density per MRI 3/14/18.  Atrial flutter  Cardiomyopathy  Atrial fibrillation: s/p DCCV 1/18  Thrombus: Right atrial wall ( suspected from last TYLOR) On AC  Testicular cancer  Lumbar herniated disc  Testicular mass: left  History of abdominal surgery: Retroperitoneal lymph node dissection (June 2018)  History of cardioversion: 2/2018  Port-a-cath in place: placed 9/2017,, removed-10/18/2017  History of orchiectomy: left-7/2017      FAMILY HISTORY:  Family history of hypertension in father  Family history of kidney stones: mother  Family history of cancer (Grandparent)  Family history of ischemic heart disease (Grandparent)  Family history of diabetes mellitus (Aunt)    SOCIAL HISTORY:    NC    REVIEW OF SYSTEMS:  As above    PHYSICAL EXAM:  T(C): 36.7 (10-19-18 @ 06:48), Max: 37.2 (10-18-18 @ 20:38)  HR: 80 (10-19-18 @ 06:48) (80 - 88)  BP: 100/59 (10-19-18 @ 06:48) (96/57 - 102/63)  RR: 16 (10-19-18 @ 06:48) (16 - 18)  SpO2: 100% (10-19-18 @ 06:48) (98% - 100%)      Appearance: NAD  HEENT:   Normal oral mucosa, PERRL, EOMI	  Lymphatic: No lymphadenopathy  Cardiovascular: Normal S1 S2, No JVD, No murmurs, No edema  Respiratory: Decreased breath sounds bilaterally	  Psychiatry: Awake, alert  Gastrointestinal:  Soft, Non-tender, + BS	  Skin: No rashes, No ecchymoses, No cyanosis	  Neurologic: Non-focal  Extremities: Normal range of motion, No clubbing, cyanosis or edema  Vascular: Peripheral pulses palpable 2+ bilaterally      LABS:	 	                          7.2    2.29  )-----------( 54       ( 19 Oct 2018 09:53 )             21.8     10-19    140  |  103  |  8   ----------------------------<  90  3.8   |  26  |  0.85  10-18    139  |  101  |  13  ----------------------------<  148<H>  4.3   |  25  |  0.80    Ca    8.7      19 Oct 2018 06:45  Ca    9.2      18 Oct 2018 04:55  Phos  4.3     10-19  Mg     1.6     10-19    TPro  6.0  /  Alb  3.3  /  TBili  0.3  /  DBili  x   /  AST  33  /  ALT  85<H>  /  AlkPhos  114  10-19  TPro  7.0  /  Alb  4.0  /  TBili  0.6  /  DBili  x   /  AST  72<H>  /  ALT  142<H>  /  AlkPhos  151<H>  10-18    Patient name: KAYLEY GUERRIER  YOB: 1980   Age: 37 (M)   MR#: [id number]  Study Date: 2/22/2018  [facility] [place] [facility]  Study quality: Technically fair  Referring Physician: ELIAS TALBOT MD  Blood Pressure: 110/67 mmHg  Height: 188 cm  Weight: 117 kg  BSA: 2.4 m2  ------------------------------------------------------------------------  PROCEDURE: Transthoracic echocardiogram with 2-D, M-Mode  and complete spectral and color flow Doppler.  INDICATION: Unspecified atrial fibrillation (I48.91)  ------------------------------------------------------------------------  Dimensions:    Normal Values:  LA:     4.8    2.0 - 4.0 cm  Ao:     3.5    2.0 - 3.8 cm  SEPTUM: 1.1    0.6 - 1.2 cm  PWT:    1.0    0.6 - 1.1 cm  LVIDd:  5.7    3.0 - 5.6 cm  LVIDs:  4.4    1.8 - 4.0 cm  Derived variables:  LVMI: 99 g/m2  RWT: 0.35  Fractional short: 23 %  EF (Visual Estimate): 40-45 %  ------------------------------------------------------------------------  Observations:  Mitral Valve: Normal mitral valve.  Aortic Valve/Aorta: Normal trileaflet aortic valve.  Normal aortic root.  Left Atrium: Normal left atrium.  LA volume index = 27  cc/m2.  Left Ventricle: Mild-moderate global left ventricular  systolic dysfunction. Normal left ventricular internal  dimensions and wall thicknesses.  Right Heart: Normal right atrium. Echogenic density  is  seen on posterior aspect of RA measuring 2.1 cm x 3.3 cm  which  appears to have  increased in size since last TYLOR  from 1/17/2018. The right ventricle is not well visualized;  grossly low normal right ventricular systolic function.  Normal tricuspid valve. Normal pulmonic valve.  Pericardium/Pleura: Normal pericardium with no pericardial  effusion.  Hemodynamic: Estimated right atrial pressure is 8 mm Hg.  ------------------------------------------------------------------------  Conclusions:  1. Mild-moderate global left ventricular systolic  dysfunction.  2. Normal right atrium. Echogenic density/mass   is seen on  posterior aspect of RA measuring 2.1 cm x 3.3 cm which  appears to have  increased in size since last TYLOR from  1/17/2018. Recommend Cardiac MRI for further evaluation.  3. The right ventricle is not well visualized; grossly low  normal right ventricular systolic function.  4. Estimated pulmonary artery systolic pressure equals 17  mm Hg, assuming right atrial pressure equals 8  mm Hg,  consistent with normal pulmonary pressures.  Discussed Dr. TEN Talbot  *** Compared with echocardiogram of 1/17/2018, right atrial  echodensity likely representing a mass appears to have  enlarged.  ------------------------------------------------------------------------  Confirmed on  2/22/2018 - 18:19:09 by Rosaura Lewis M.D.  ------------------------------------------------------------------------    < end of copied text >    < from: CT Chest w/ IV Cont (10.10.18 @ 10:30) >    EXAM:  CT ABDOMEN AND PELVIS OC IC      EXAM:  CT CHEST IC        PROCEDURE DATE:  10/10/2018           INTERPRETATION:  CLINICAL INFORMATION: Patient's history of testicular   cancer status post resection. Follow-up evaluation.    COMPARISON: CT chest abdomen and pelvis dated 5/26/2018.    PROCEDURE:   CT of the Chest, Abdomen and Pelvis was performed with intravenous   contrast.   Intravenous contrast: 90 ml Omnipaque 350. 10 ml discarded.  Oral contrast: positive contrast was administered.  Sagittal and coronal reformats were performed.    FINDINGS:    CHEST:     LUNGS AND LARGE AIRWAYS: Patent central airways. No pulmonary nodules.  PLEURA: No pleural effusion or pneumothorax.  VESSELS: Within normal limits. No pulmonary embolus.  HEART: Heart size is normal. No pericardial effusion.  MEDIASTINUM AND CARLOS A: No lymphadenopathy.  CHEST WALL AND LOWER NECK: Within normal limits.    ABDOMEN AND PELVIS:    LIVER: Within normal limits.  BILE DUCTS: Normal caliber.  GALLBLADDER: Cholelithiasis.  SPLEEN: Within normal limits.  PANCREAS: Within normal limits.  ADRENALS: Within normal limits.  KIDNEYS/URETERS: 1.2 cm heterogeneous enhancing focus of the right kidney   lower pole compared with the patient's previous study of 5/26/2018. In   light of patient's recent surgery, a small infarct is considered but   follow-up is suggested to exclude developing renal mass. Small bilateral   cortical renal cysts.No hydronephrosis    BLADDER: Within normal limits.  REPRODUCTIVE ORGANS: The prostate is within normal limits.    BOWEL: No bowel obstruction. Status post appendectomy.  PERITONEUM: No ascites. New fluid collection in the upper mid abdomen   measuring about 11.3 x 5.4 x 5.9 cm (2, 91; 602, 84).  VESSELS:  Within normal limits.  RETROPERITONEUM: Status post resection of multiple lymph nodes, including   the previously seen left periaortic lymph nodes. Left obturator lymph   node is unchanged from prior study.  ABDOMINAL WALL: Postsurgical changes.  BONES: Within normal limits.    IMPRESSION:     Status post resection of multiple retroperitoneal lymph nodes. No new   retroperitoneal lymphadenopathy.    New fluid collection in the upper midabdomen as measured above.    No evidence of metastatic disease in the chest.     TONY PERRY M.D., RADIOLOGY RESIDENT  This document has been electronically signed.  KARLA WARNER M.D., ATTENDING RADIOLOGIST   This document has been electronically signed. Oct 15 2018  8:57AM          < from: MR Cardiac w/wo IV Cont (03.14.18 @ 10:30) >    EXAM:  MR CARD MORPH/FUNCTION WAW IC        PROCEDURE DATE:  03/14/2018           INTERPRETATION:  MRI CARDIAC WITHOUT AND WITH CONTRAST      INDICATION: History of germ cell tumor. Therapy esophageal   echocardiogram. Presumed right atrial mass. Evaluate for possible disease.    COMPARISON: No prior studies available for comparison.    TECHNIQUE: Multi-sequential, multi-planar cardiac MRI was performed   before and after the intravenous administration of 10 mL of Gadavist; 0   mL was discarded. Resting myocardial perfusion imaging was performed.   Late gadolinium enhanced images were obtained.    SCANNER: Siemens1.5 T Aera platform using a cardiac coil.    QUALITY: Good.      FINDINGS:      Centered along the wall of the right atrium, justlateral to the inflow   of the IVC is a 2.6 x 0.8 cm ovoid shaped mass. The mass shows   intermediate signal to myocardium on T1-weighted images and slightly   increased signal on the T2-weighted images with no macroscopic foci of   fat. There is no definite enhancement during the first pass perfusion   images. However, on the late gadolinium enhancement sequences there is   subtle, increased signal within the lesion (series 11 image 21).      NONCARDIAC FINDINGS: The other visualized thoracoabdominal structures are   unremarkable.      IMPRESSION:    1.  The right atrial mass which is likely in continuity with the franklin   terminalis has decreased in size since 10/31/2017. This could represent   thrombus or tumor rather than a conspicuous franklin terminalis. A 3 month   follow-up is recommended to evaluate for stability or resolution.        CARMINA ELIZONDO M.D., ATTENDING RADIOLOGIST  This document has been electronically signed. Mar 14 2018  2:51PM                < end of copied text > Cardiology/Vascular Medicine Inpatient Consultation Note      HISTORY OF PRESENT ILLNESS:  Patient is a 37 yo man with testicular ca s/p orchiectomy on chemotherapy, Afib/flutter NOT CURRENTLY ON ANTICOAGULATION (stoped warfarin in August), nonischemic cardiomyopathy, last chemotherapy treatment 4 weeks ago, presented with nausea/vomiting for the past few days. Patient reports he always have GI symptoms with chemo, he usually try to keep his oral hydration. Yesterday patient started feeling dizzy, thus came to ED. Patient states the vomitus is NBNB of usually food content. He could not keep any food down. Denied any abdominal pain or diarrhea with the episode. Patient was also hospitalized over 1 week ago for similar GI symptoms related to chemo. Denied any fever, chill, SOB, CP, abdominal pain, urinary changes or diarrhea.     In ED, patient was afebrile. BP soft. tachycardia. received 2L NS. (18 Oct 2018 09:16)    At the time of my exam, the patient reports feeling better, with less symptoms of dizziness and sense of imbalance., although still somewhat symptomatic after sitting up in bed or while standing.  He has no other cardiac complaints.      At this time, he continues to receive IVFs without pulmonary symptoms.  His cardiac medications (BB and CCB) are both held for now.  He is not on anticoagulation.    Spoke with his cardiologist Dr. Marquez.  Patient has not had a more recent evaluation of his cardiac mass, and would like a cardiac MRI and repeat echocardiogram.    Recommend continuing with IV hydration as tolerated and advise that he wear LORNE stockings.    Will clarify role of anticoagulation in this patient with his oncologist.  He has not been on anticoagulation since August (previously on warfarin).    Allergies  No Known Allergies  	  MEDICATIONS:  enoxaparin Injectable 40 milliGRAM(s) SubCutaneous daily  meclizine 12.5 milliGRAM(s) Oral two times a day PRN  ondansetron Injectable 4 milliGRAM(s) IV Push every 6 hours PRN  pantoprazole    Tablet 40 milliGRAM(s) Oral before breakfast  ergocalciferol 11811 Unit(s) Oral every week  folic acid 1 milliGRAM(s) Oral daily  sodium chloride 0.9%. 1000 milliLiter(s) IV Continuous <Continuous>      PAST MEDICAL & SURGICAL HISTORY:  Neuropathy: ~ fingers and toes  Anemia  Mixed germ cell tumor  Obesity  Atrial mass: right artrial echo density per MRI 3/14/18.  Atrial flutter  Cardiomyopathy  Atrial fibrillation: s/p DCCV 1/18  Thrombus: Right atrial wall ( suspected from last TYLOR) On AC  Testicular cancer  Lumbar herniated disc  Testicular mass: left  History of abdominal surgery: Retroperitoneal lymph node dissection (June 2018)  History of cardioversion: 2/2018  Port-a-cath in place: placed 9/2017,, removed-10/18/2017  History of orchiectomy: left-7/2017      FAMILY HISTORY:  Family history of hypertension in father  Family history of kidney stones: mother  Family history of cancer (Grandparent)  Family history of ischemic heart disease (Grandparent)  Family history of diabetes mellitus (Aunt)    SOCIAL HISTORY:    NC    REVIEW OF SYSTEMS:  As above    PHYSICAL EXAM:  T(C): 36.7 (10-19-18 @ 06:48), Max: 37.2 (10-18-18 @ 20:38)  HR: 80 (10-19-18 @ 06:48) (80 - 88)  BP: 100/59 (10-19-18 @ 06:48) (96/57 - 102/63)  RR: 16 (10-19-18 @ 06:48) (16 - 18)  SpO2: 100% (10-19-18 @ 06:48) (98% - 100%)      Appearance: NAD  HEENT:   Normal oral mucosa, PERRL, EOMI	  Lymphatic: No lymphadenopathy  Cardiovascular: Normal S1 S2, No JVD, No murmurs, No edema  Respiratory: Decreased breath sounds bilaterally	  Psychiatry: Awake, alert  Gastrointestinal:  Soft, Non-tender, + BS	  Skin: No rashes, No ecchymoses, No cyanosis	  Neurologic: Non-focal  Extremities: Normal range of motion, No clubbing, cyanosis or edema  Vascular: Peripheral pulses palpable 2+ bilaterally      LABS:	 	                          7.2    2.29  )-----------( 54       ( 19 Oct 2018 09:53 )             21.8     10-19    140  |  103  |  8   ----------------------------<  90  3.8   |  26  |  0.85  10-18    139  |  101  |  13  ----------------------------<  148<H>  4.3   |  25  |  0.80    Ca    8.7      19 Oct 2018 06:45  Ca    9.2      18 Oct 2018 04:55  Phos  4.3     10-19  Mg     1.6     10-19    TPro  6.0  /  Alb  3.3  /  TBili  0.3  /  DBili  x   /  AST  33  /  ALT  85<H>  /  AlkPhos  114  10-19  TPro  7.0  /  Alb  4.0  /  TBili  0.6  /  DBili  x   /  AST  72<H>  /  ALT  142<H>  /  AlkPhos  151<H>  10-18    Patient name: KAYLEY GUERRIER  YOB: 1980   Age: 37 (M)   MR#: [id number]  Study Date: 2/22/2018  [facility] [place] [facility]  Study quality: Technically fair  Referring Physician: ELIAS TALBOT MD  Blood Pressure: 110/67 mmHg  Height: 188 cm  Weight: 117 kg  BSA: 2.4 m2  ------------------------------------------------------------------------  PROCEDURE: Transthoracic echocardiogram with 2-D, M-Mode  and complete spectral and color flow Doppler.  INDICATION: Unspecified atrial fibrillation (I48.91)  ------------------------------------------------------------------------  Dimensions:    Normal Values:  LA:     4.8    2.0 - 4.0 cm  Ao:     3.5    2.0 - 3.8 cm  SEPTUM: 1.1    0.6 - 1.2 cm  PWT:    1.0    0.6 - 1.1 cm  LVIDd:  5.7    3.0 - 5.6 cm  LVIDs:  4.4    1.8 - 4.0 cm  Derived variables:  LVMI: 99 g/m2  RWT: 0.35  Fractional short: 23 %  EF (Visual Estimate): 40-45 %  ------------------------------------------------------------------------  Observations:  Mitral Valve: Normal mitral valve.  Aortic Valve/Aorta: Normal trileaflet aortic valve.  Normal aortic root.  Left Atrium: Normal left atrium.  LA volume index = 27  cc/m2.  Left Ventricle: Mild-moderate global left ventricular  systolic dysfunction. Normal left ventricular internal  dimensions and wall thicknesses.  Right Heart: Normal right atrium. Echogenic density  is  seen on posterior aspect of RA measuring 2.1 cm x 3.3 cm  which  appears to have  increased in size since last TYLOR  from 1/17/2018. The right ventricle is not well visualized;  grossly low normal right ventricular systolic function.  Normal tricuspid valve. Normal pulmonic valve.  Pericardium/Pleura: Normal pericardium with no pericardial  effusion.  Hemodynamic: Estimated right atrial pressure is 8 mm Hg.  ------------------------------------------------------------------------  Conclusions:  1. Mild-moderate global left ventricular systolic  dysfunction.  2. Normal right atrium. Echogenic density/mass   is seen on  posterior aspect of RA measuring 2.1 cm x 3.3 cm which  appears to have  increased in size since last TYLOR from  1/17/2018. Recommend Cardiac MRI for further evaluation.  3. The right ventricle is not well visualized; grossly low  normal right ventricular systolic function.  4. Estimated pulmonary artery systolic pressure equals 17  mm Hg, assuming right atrial pressure equals 8  mm Hg,  consistent with normal pulmonary pressures.  Discussed Dr. TEN Talbot  *** Compared with echocardiogram of 1/17/2018, right atrial  echodensity likely representing a mass appears to have  enlarged.  ------------------------------------------------------------------------  Confirmed on  2/22/2018 - 18:19:09 by Rosaura Lewis M.D.  ------------------------------------------------------------------------    < end of copied text >    < from: CT Chest w/ IV Cont (10.10.18 @ 10:30) >    EXAM:  CT ABDOMEN AND PELVIS OC IC      EXAM:  CT CHEST IC        PROCEDURE DATE:  10/10/2018           INTERPRETATION:  CLINICAL INFORMATION: Patient's history of testicular   cancer status post resection. Follow-up evaluation.    COMPARISON: CT chest abdomen and pelvis dated 5/26/2018.    PROCEDURE:   CT of the Chest, Abdomen and Pelvis was performed with intravenous   contrast.   Intravenous contrast: 90 ml Omnipaque 350. 10 ml discarded.  Oral contrast: positive contrast was administered.  Sagittal and coronal reformats were performed.    FINDINGS:    CHEST:     LUNGS AND LARGE AIRWAYS: Patent central airways. No pulmonary nodules.  PLEURA: No pleural effusion or pneumothorax.  VESSELS: Within normal limits. No pulmonary embolus.  HEART: Heart size is normal. No pericardial effusion.  MEDIASTINUM AND CARLOS A: No lymphadenopathy.  CHEST WALL AND LOWER NECK: Within normal limits.    ABDOMEN AND PELVIS:    LIVER: Within normal limits.  BILE DUCTS: Normal caliber.  GALLBLADDER: Cholelithiasis.  SPLEEN: Within normal limits.  PANCREAS: Within normal limits.  ADRENALS: Within normal limits.  KIDNEYS/URETERS: 1.2 cm heterogeneous enhancing focus of the right kidney   lower pole compared with the patient's previous study of 5/26/2018. In   light of patient's recent surgery, a small infarct is considered but   follow-up is suggested to exclude developing renal mass. Small bilateral   cortical renal cysts.No hydronephrosis    BLADDER: Within normal limits.  REPRODUCTIVE ORGANS: The prostate is within normal limits.    BOWEL: No bowel obstruction. Status post appendectomy.  PERITONEUM: No ascites. New fluid collection in the upper mid abdomen   measuring about 11.3 x 5.4 x 5.9 cm (2, 91; 602, 84).  VESSELS:  Within normal limits.  RETROPERITONEUM: Status post resection of multiple lymph nodes, including   the previously seen left periaortic lymph nodes. Left obturator lymph   node is unchanged from prior study.  ABDOMINAL WALL: Postsurgical changes.  BONES: Within normal limits.    IMPRESSION:     Status post resection of multiple retroperitoneal lymph nodes. No new   retroperitoneal lymphadenopathy.    New fluid collection in the upper midabdomen as measured above.    No evidence of metastatic disease in the chest.     TONY PERRY M.D., RADIOLOGY RESIDENT  This document has been electronically signed.  KARLA WARNER M.D., ATTENDING RADIOLOGIST   This document has been electronically signed. Oct 15 2018  8:57AM          < from: MR Cardiac w/wo IV Cont (03.14.18 @ 10:30) >    EXAM:  MR CARD MORPH/FUNCTION WAW IC        PROCEDURE DATE:  03/14/2018           INTERPRETATION:  MRI CARDIAC WITHOUT AND WITH CONTRAST      INDICATION: History of germ cell tumor. Therapy esophageal   echocardiogram. Presumed right atrial mass. Evaluate for possible disease.    COMPARISON: No prior studies available for comparison.    TECHNIQUE: Multi-sequential, multi-planar cardiac MRI was performed   before and after the intravenous administration of 10 mL of Gadavist; 0   mL was discarded. Resting myocardial perfusion imaging was performed.   Late gadolinium enhanced images were obtained.    SCANNER: Siemens1.5 T Aera platform using a cardiac coil.    QUALITY: Good.      FINDINGS:      Centered along the wall of the right atrium, justlateral to the inflow   of the IVC is a 2.6 x 0.8 cm ovoid shaped mass. The mass shows   intermediate signal to myocardium on T1-weighted images and slightly   increased signal on the T2-weighted images with no macroscopic foci of   fat. There is no definite enhancement during the first pass perfusion   images. However, on the late gadolinium enhancement sequences there is   subtle, increased signal within the lesion (series 11 image 21).      NONCARDIAC FINDINGS: The other visualized thoracoabdominal structures are   unremarkable.      IMPRESSION:    1.  The right atrial mass which is likely in continuity with the franklin   terminalis has decreased in size since 10/31/2017. This could represent   thrombus or tumor rather than a conspicuous franklin terminalis. A 3 month   follow-up is recommended to evaluate for stability or resolution.        CARMINA ELIZONDO M.D., ATTENDING RADIOLOGIST  This document has been electronically signed. Mar 14 2018  2:51PM                < end of copied text >

## 2018-10-19 NOTE — PROGRESS NOTE ADULT - ASSESSMENT
38M with PMHx testicular ca s/p orchiectomy on chemo, Afib/flutter, nonischemic cardiomyopathy, last chemo 2 weeks ago, with nausea/vomiting for the past few days and new fluid collection in abdomen.     -CT performed last night  -Collection appears stable, but awaiting official read of CT  -? IR drainage of collection 38M with PMHx testicular ca s/p orchiectomy on chemo, Afib/flutter, nonischemic cardiomyopathy, last chemo 2 weeks ago, with nausea/vomiting for the past few days and new fluid collection in abdomen.     -CT performed last night  -Collection appears stable, but awaiting official read of CT

## 2018-10-19 NOTE — CONSULT NOTE ADULT - ASSESSMENT
HISTORY OF PRESENT ILLNESS:  Patient is a 37 yo man with testicular ca s/p orchiectomy on chemotherapy, Afib/flutter NOT CURRENTLY ON ANTICOAGULATION (stoped warfarin in August), nonischemic cardiomyopathy, last chemotherapy treatment 4 weeks ago, presented with nausea/vomiting for the past few days. Patient reports he always have GI symptoms with chemo, he usually try to keep his oral hydration. Yesterday patient started feeling dizzy, thus came to ED. Patient states the vomitus is NBNB of usually food content. He could not keep any food down. Denied any abdominal pain or diarrhea with the episode. Patient was also hospitalized over 1 week ago for similar GI symptoms related to chemo. Denied any fever, chill, SOB, CP, abdominal pain, urinary changes or diarrhea.     In ED, patient was afebrile. BP soft. tachycardia. received 2L NS. (18 Oct 2018 09:16)    At the time of my exam, the patient reports feeling better, with less symptoms of dizziness and sense of imbalance., although still somewhat symptomatic after sitting up in bed or while standing.  He has no other cardiac complaints.      At this time, he continues to receive IVFs without pulmonary symptoms.  His cardiac medications (BB and CCB) are both held for now.  He is not on anticoagulation.    Spoke with his cardiologist Dr. Marquez.  Patient has not had a more recent evaluation of his cardiac mass, and would like a cardiac MRI and repeat echocardiogram.    Recommend continuing with IV hydration as tolerated and advise that he wear LORNE stockings.    Will clarify role of anticoagulation in this patient with his oncologist.  He has not been on anticoagulation since August (previously on warfarin).    Will follow.    Thank you,  Tra Brown  Pager [phone]

## 2018-10-19 NOTE — PROGRESS NOTE ADULT - PROBLEM SELECTOR PLAN 1
s/p 1L NS in ED  will continue with gentle hydration, encourage oral intake.  Zofran prn for nausea/vomiting  monitor for signs of fluid overload, given low EF.  Recheck orthostatics this PM

## 2018-10-19 NOTE — PROGRESS NOTE ADULT - PROBLEM SELECTOR PLAN 5
found on CT A/P 10/10/18 as part of tumor surveillance.   unlikely related to his current symptoms  fu official CTAP read  appreciate onc recs, will observe for now, no plans for drainage

## 2018-10-19 NOTE — PROGRESS NOTE ADULT - SUBJECTIVE AND OBJECTIVE BOX
Patient is a 38y old  Male who presents with a chief complaint of Nauesea and vomiting, dizziness (19 Oct 2018 06:08)      SUBJECTIVE / OVERNIGHT EVENTS: Pt reports persistent orthostatic symptoms, however improvement in nausea, would like to eat.    MEDICATIONS  (STANDING):  ergocalciferol 64421 Unit(s) Oral every week  folic acid 1 milliGRAM(s) Oral daily  pantoprazole    Tablet 40 milliGRAM(s) Oral before breakfast  sodium chloride 0.9%. 1000 milliLiter(s) (100 mL/Hr) IV Continuous <Continuous>    MEDICATIONS  (PRN):  meclizine 12.5 milliGRAM(s) Oral two times a day PRN Dizziness  ondansetron Injectable 4 milliGRAM(s) IV Push every 6 hours PRN Nausea      Vital Signs Last 24 Hrs  T(C): 36.7 (10-19-18 @ 06:48), Max: 37.2 (10-18-18 @ 20:38)  T(F): 98 (10-19-18 @ 06:48), Max: 98.9 (10-18-18 @ 20:38)  HR: 80 (10-19-18 @ 06:48) (80 - 88)  BP: 100/59 (10-19-18 @ 06:48) (96/57 - 102/63)  BP(mean): --  RR: 16 (10-19-18 @ 06:48) (16 - 18)  SpO2: 100% (10-19-18 @ 06:48) (98% - 100%)  CAPILLARY BLOOD GLUCOSE        I&O's Summary      PHYSICAL EXAM:  GENERAL: NAD, well-nourished  HEENT: mmm  CHEST/LUNG: CTABL  HEART: RRR, no m/r/g  ABDOMEN: soft, nt, nd, midline surgical scar with small ulceration at inferior end, dressing c/d/i  EXTREMITIES:  wwp, no c/c/e  PSYCH: AAOx3  NEUROLOGY: non-focal  SKIN: No rashes or lesions    LABS:                        7.2    2.29  )-----------( 54       ( 19 Oct 2018 09:53 )             21.8     10-19    140  |  103  |  8   ----------------------------<  90  3.8   |  26  |  0.85    Ca    8.7      19 Oct 2018 06:45  Phos  4.3     10-19  Mg     1.6     10-19    TPro  6.0  /  Alb  3.3  /  TBili  0.3  /  DBili  x   /  AST  33  /  ALT  85<H>  /  AlkPhos  114  10-19    PT/INR - ( 19 Oct 2018 06:15 )   PT: 12.8 SEC;   INR: 1.15          PTT - ( 19 Oct 2018 06:15 )  PTT:28.4 SEC          RADIOLOGY & ADDITIONAL TESTS:    Imaging Personally Reviewed:    Consultant(s) Notes Reviewed:      Care Discussed with Consultants/Other Providers:

## 2018-10-19 NOTE — PROGRESS NOTE ADULT - PROBLEM SELECTOR PLAN 2
Pt with known right atrial mass, unclear etiology, not currently on a/c  Dr. Brown consulted to consider a/c

## 2018-10-19 NOTE — PROGRESS NOTE ADULT - PROBLEM SELECTOR PLAN 3
Last Echo was feb 2018 with 35% EF  Holding lisinopril, coreg in setting of orthostatics  TTE ordered  fu Dr. Stephanie cruz

## 2018-10-19 NOTE — CONSULT NOTE ADULT - REASON FOR ADMISSION
Nausea and vomiting, dizziness
Nauesea and vomiting, dizziness

## 2018-10-19 NOTE — ADVANCED PRACTICE NURSE CONSULT - REASON FOR CONSULT
Patient seen on skin care rounds after wound care referral received for assessment of skin impairment and recommendations of topical management. Chart reviewed: WBC 2.21 K/uL, H/H 6.9/20.8, Serum albumin 3.3g/dL, 6.0g/dL Cornelio 18. Patient interviewed, surgical wound was from surgery June 2018, wound has decreased in depth, but has not closed since. Currently has been using bacitracin, gauze and tape changing dressings on his own daily. Patient H/O testicular ca s/p orchiectomy on chemo, Afib/flutter, nonischemic cardiomyopathy, last chemo 2 weeks ago, presented with nausea/vomiting for the past few days. Patient reports he always have GI symptoms with chemo, he usually try to keep his oral hydration. Prior to admission started feeling dizzy, thus came to ED. Patient states the vomit is NBNB of usually food content. He could not keep any food down. Denied any abdominal pain or diarrhea with the episode. Patient was also hospitalized over 1 week ago for similar GI symptoms related to chemo. Denied any fever, chill, SOB, CP, abdominal pain, urinary changes or diarrhea. Patient feels his S/Sx are likely related to his last chemo treatment. Pt seen and followed by Heme/Onc for testicular cancer, Urology for CT finding and abdominal collection.

## 2018-10-19 NOTE — PROGRESS NOTE ADULT - PROBLEM SELECTOR PLAN 4
likely dilutional in setting fo bone marrow suppression  fu repeat, if <7 transfuse 1u prbc slowly given down EF

## 2018-10-19 NOTE — CONSULT NOTE ADULT - CONSULT REASON
Anticoagulation, cardiac medication management< from: Transthoracic Echocardiogram (02.22.18 @ 13:10) >
new abdominal fluid collection
testicular cancer
CT finding of abdominal collection

## 2018-10-19 NOTE — ADVANCED PRACTICE NURSE CONSULT - ASSESSMENT
General: A&O x 4. Continent of urine and stool. Skin warm, dry with increased moisture in intertriginous folds, adequate skin turgor, scattered areas of hyperpigmentation and hypopigmentation. Noted midline abdominal incisional scar.    Chronic abdominal slow healing surgical wound (located at distal end of linear surgical scar)- linear surgical scar measures 25cm in length. At distal end there is a open ulceration measuring [id number] (previously measured as 1.2dgm2scj1.2cm), base 100% red-flat friable agranular tissue. Small amount of serosanguinous drainage, no odor. Periwound skin intact, linear surgical scar extending at 12 o'clock measuring 25cm in length, with palpable scar tissue. No increased warmth, no edema, no erythema, no induration noted. Area likely complicated by increased moisture as surgical wound is within intertriginous fold beneath abdominal pannus.     Findings and plan discussed with Dr. Hathaway.

## 2018-10-19 NOTE — ADVANCED PRACTICE NURSE CONSULT - RECOMMEDATIONS
Recommend follow up care at [facility] ([phone], [address] [place] [address]).     Topical Recommendations:  Abdominal wound: Cleanse with NS (at home can cleanse in shower with soap and water), pat dry. Apply Liquid barrier film to periwound skin. Apply Aquacel AG hydrofiber, cover with foam with border. Change every 48 hours while in hospital, can change every 72 hours at home setting.    Please call wound care service line is further assistance is needed ([phone]).

## 2018-10-19 NOTE — PROGRESS NOTE ADULT - ASSESSMENT
38M with PMHx Testicular ca s/p oorphorectomy on chemo, non-ischemic cardiomyopathy, Afib/Aflutter presented to ED with nausea vomiting and dizziness. Symptoms related to recent chemo therapy and dehydration. No systemic signs of fluid overload or infection. 38M with PMHx Testicular ca s/p oorphorectomy on chemo, non-ischemic cardiomyopathy (EF35%), Afib/Aflutter, right atrial mass presented to ED with nausea vomiting and dizziness. Symptoms related to recent chemo therapy and dehydration. No systemic signs of fluid overload or infection. Orthostatics likely multifactorial: dry from dec PO, autonomic 2/2 chemo, anemia.

## 2018-10-19 NOTE — PROGRESS NOTE ADULT - SUBJECTIVE AND OBJECTIVE BOX
CT performed last night, awaiting read.  Remains afebrile.    T(F): 98.9, Max: 98.9 (10-18-18 @ 20:38)  HR: 84  BP: 102/63  SpO2: 98%    Gen   Abd      10-18 @ 04:55  WBC 4.01  / Hct 23.6  / SCr 0.80 CT performed last night, awaiting read.  Remains afebrile.    T(F): 98.9, Max: 98.9 (10-18-18 @ 20:38)  HR: 84  BP: 102/63  SpO2: 98%    Gen NAD, resting  Resp: No resp distress    10-18 @ 04:55  WBC 4.01  / Hct 23.6  / SCr 0.80

## 2018-10-20 ENCOUNTER — TRANSCRIPTION ENCOUNTER (OUTPATIENT)
Age: 38
End: 2018-10-20

## 2018-10-20 LAB
BUN SERPL-MCNC: 5 MG/DL — LOW (ref 7–23)
CALCIUM SERPL-MCNC: 8.4 MG/DL — SIGNIFICANT CHANGE UP (ref 8.4–10.5)
CHLORIDE SERPL-SCNC: 103 MMOL/L — SIGNIFICANT CHANGE UP (ref 98–107)
CO2 SERPL-SCNC: 25 MMOL/L — SIGNIFICANT CHANGE UP (ref 22–31)
CREAT SERPL-MCNC: 0.8 MG/DL — SIGNIFICANT CHANGE UP (ref 0.5–1.3)
GLUCOSE SERPL-MCNC: 88 MG/DL — SIGNIFICANT CHANGE UP (ref 70–99)
HCT VFR BLD CALC: 20.4 % — CRITICAL LOW (ref 39–50)
HGB BLD-MCNC: 6.9 G/DL — CRITICAL LOW (ref 13–17)
MCHC RBC-ENTMCNC: 33.2 PG — SIGNIFICANT CHANGE UP (ref 27–34)
MCHC RBC-ENTMCNC: 33.8 % — SIGNIFICANT CHANGE UP (ref 32–36)
MCV RBC AUTO: 98.1 FL — SIGNIFICANT CHANGE UP (ref 80–100)
NRBC # FLD: 0.04 — SIGNIFICANT CHANGE UP
NRBC FLD-RTO: 1.8 — SIGNIFICANT CHANGE UP
PLATELET # BLD AUTO: 65 K/UL — LOW (ref 150–400)
PMV BLD: 10.6 FL — SIGNIFICANT CHANGE UP (ref 7–13)
POTASSIUM SERPL-MCNC: 3.6 MMOL/L — SIGNIFICANT CHANGE UP (ref 3.5–5.3)
POTASSIUM SERPL-SCNC: 3.6 MMOL/L — SIGNIFICANT CHANGE UP (ref 3.5–5.3)
RBC # BLD: 2.08 M/UL — LOW (ref 4.2–5.8)
RBC # FLD: 19.6 % — HIGH (ref 10.3–14.5)
SODIUM SERPL-SCNC: 140 MMOL/L — SIGNIFICANT CHANGE UP (ref 135–145)
WBC # BLD: 2.21 K/UL — LOW (ref 3.8–10.5)
WBC # FLD AUTO: 2.21 K/UL — LOW (ref 3.8–10.5)

## 2018-10-20 PROCEDURE — 99233 SBSQ HOSP IP/OBS HIGH 50: CPT

## 2018-10-20 RX ORDER — ACETAMINOPHEN 500 MG
650 TABLET ORAL ONCE
Qty: 0 | Refills: 0 | Status: COMPLETED | OUTPATIENT
Start: 2018-10-20 | End: 2018-10-20

## 2018-10-20 RX ORDER — MECLIZINE HCL 12.5 MG
12.5 TABLET ORAL EVERY 8 HOURS
Qty: 0 | Refills: 0 | Status: DISCONTINUED | OUTPATIENT
Start: 2018-10-20 | End: 2018-10-21

## 2018-10-20 RX ADMIN — Medication 1 MILLIGRAM(S): at 11:33

## 2018-10-20 RX ADMIN — ENOXAPARIN SODIUM 40 MILLIGRAM(S): 100 INJECTION SUBCUTANEOUS at 11:33

## 2018-10-20 RX ADMIN — PANTOPRAZOLE SODIUM 40 MILLIGRAM(S): 20 TABLET, DELAYED RELEASE ORAL at 06:54

## 2018-10-20 RX ADMIN — Medication 12.5 MILLIGRAM(S): at 16:08

## 2018-10-20 RX ADMIN — Medication 650 MILLIGRAM(S): at 12:01

## 2018-10-20 RX ADMIN — Medication 12.5 MILLIGRAM(S): at 20:57

## 2018-10-20 NOTE — DISCHARGE NOTE ADULT - CARE PROVIDERS DIRECT ADDRESSES
[email or web address]

## 2018-10-20 NOTE — PROGRESS NOTE ADULT - SUBJECTIVE AND OBJECTIVE BOX
Patient is a 38y old  Male who presents with a chief complaint of Nauesea and vomiting, dizziness (20 Oct 2018 13:11)      SUBJECTIVE / OVERNIGHT EVENTS: pt seen at 4p- was dizzy before on standing  will take it "one day at a time" to see how he's doing    MEDICATIONS  (STANDING):  enoxaparin Injectable 40 milliGRAM(s) SubCutaneous daily  ergocalciferol 86459 Unit(s) Oral every week  folic acid 1 milliGRAM(s) Oral daily  meclizine 12.5 milliGRAM(s) Oral every 8 hours  pantoprazole    Tablet 40 milliGRAM(s) Oral before breakfast  sodium chloride 0.9%. 1000 milliLiter(s) (100 mL/Hr) IV Continuous <Continuous>    MEDICATIONS  (PRN):  ondansetron Injectable 4 milliGRAM(s) IV Push every 6 hours PRN Nausea      Meds ordered within last 24hours  acetaminophen   Tablet ..: [Known as TYLENOL..]  650 milliGRAM(s), Oral, once, Stop After 1 Doses  Special Instructions: 30 min prior to transfusion  Administration Instructions: MAX DAILY DOSE:  ADULT = 4,000 mG/Day (10-20 @ 11:31)  meclizine: [Known as ANTIVERT]  12.5 milliGRAM(s), Oral, every 8 hours, Stop After 2 Days  Provider's Contact #: [phone] (10-20 @ 11:37)      T(C): 36.5 (10-20-18 @ 14:13), Max: 37.4 (10-19-18 @ 20:56)  HR: 71 (10-20-18 @ 14:13) (71 - 88)  BP: 106/66 (10-20-18 @ 14:13) (100/62 - 106/66)  RR: 18 (10-20-18 @ 14:13) (18 - 18)  SpO2: 100% (10-20-18 @ 14:13) (98% - 100%)    CAPILLARY BLOOD GLUCOSE        I&O's Summary      PHYSICAL EXAM:  GENERAL: NAD  CHEST/LUNG: Clear to auscultation bilaterally; No wheeze  HEART: Regular rate and rhythm; No murmurs, rubs, or gallops  ABDOMEN: Soft, Nontender, Nondistended; Bowel sounds present  EXTREMITIES:  No clubbing, cyanosis, or edema  NEURO: A&Ox3      LABS:                        6.9    2.21  )-----------( 65       ( 20 Oct 2018 06:20 )             20.4     10-20    140  |  103  |  5<L>  ----------------------------<  88  3.6   |  25  |  0.80    Ca    8.4      20 Oct 2018 06:20  Phos  4.3     10-19  Mg     1.6     10-19    TPro  6.0  /  Alb  3.3  /  TBili  0.3  /  DBili  x   /  AST  33  /  ALT  85<H>  /  AlkPhos  114  10-19    PT/INR - ( 19 Oct 2018 06:15 )   PT: 12.8 SEC;   INR: 1.15          PTT - ( 19 Oct 2018 06:15 )  PTT:28.4 SEC          RADIOLOGY & ADDITIONAL TESTS:    Imaging Personally Reviewed:    Consultant(s) Notes Reviewed:      Care Discussed with Consultants/Other Providers:

## 2018-10-20 NOTE — DISCHARGE NOTE ADULT - CARE PLAN
Principal Discharge DX:	Malignant neoplasm of testis, unspecified laterality, unspecified whether descended or undescended  Secondary Diagnosis:	Nausea and vomiting, intractability of vomiting not specified, unspecified vomiting type  Secondary Diagnosis:	Abdominal pain  Assessment and plan of treatment:	Abdominal wound: Cleanse with NS (at home can cleanse in shower with soap and water), pat dry. Apply Liquid barrier film to periwound skin. Apply Aquacel AG hydrofiber, cover with foam with border. Change every 48 hours while in hospital, can change every 72 hours at home setting. Principal Discharge DX:	Malignant neoplasm of testis, unspecified laterality, unspecified whether descended or undescended  Goal:	further follow up and management  Assessment and plan of treatment:	-completed 3 cycles of TIP. Call to make a follow up appt as outpatient  with Dr Chirinos  Secondary Diagnosis:	Nausea and vomiting, intractability of vomiting not specified, unspecified vomiting type  Goal:	symptoms control  Secondary Diagnosis:	Abdominal pain  Assessment and plan of treatment:	Abdominal wound: Cleanse with NS (at home can cleanse in shower with soap and water), pat dry. Apply Liquid barrier film to periwound skin. Apply Aquacel AG hydrofiber, cover with foam with border. Change every 48 hours while in hospital, can change every 72 hours at home setting. Principal Discharge DX:	Malignant neoplasm of testis, unspecified laterality, unspecified whether descended or undescended  Goal:	further follow up and management  Assessment and plan of treatment:	-completed 3 cycles of TIP. Call to make a follow up appt as outpatient  with Dr Chirinos  CT showed-1.2 cm heterogeneous enhancing focus of the right kidney lower pole, please make a follow up appt with Dr Chirinos and Dr Nj to discuss further plan  Secondary Diagnosis:	Nausea and vomiting, intractability of vomiting not specified, unspecified vomiting type  Goal:	symptoms control  Assessment and plan of treatment:	May take Zofran as needed  Secondary Diagnosis:	Abdominal pain  Goal:	wound healing  Assessment and plan of treatment:	Abdominal wound: Cleanse with NS (at home can cleanse in shower with soap and water), pat dry. Apply Liquid barrier film to periwound skin. Apply Aquacel AG hydrofiber, cover with foam with border. Change every 48 hours while in hospital, can change every 72 hours at home setting.  Secondary Diagnosis:	Cardiac mass  Goal:	Further follow up  Assessment and plan of treatment:	Last Echo dated 2/2018 showed: Mild-moderate global left ventricular systolic dysfunction. Normal right atrium. Echogenic density/mass on posterior aspect of RA measuring 2.1 cm x 3.3 cm  Secondary Diagnosis:	Atrial fibrillation  Goal:	rate control  Assessment and plan of treatment:	Make a follow up appt with Dr Marquez to discuss further plan for anticoagulation (Coumadin)  Secondary Diagnosis:	Anemia  Goal:	stable hemoglobin  Assessment and plan of treatment:	you have received 1 u PRBC, Hg on discharge was 8.3  Secondary Diagnosis:	Cardiomyopathy  Goal:	symptoms control  Assessment and plan of treatment:	Last Echo dated 2/ 2018 with 35% EF  Lisinopril and Coreg on hold in setting of low blood pressure. Use antiembolic stockings and follow up with Dr Marquez in 1 week

## 2018-10-20 NOTE — DISCHARGE NOTE ADULT - [id number]_GEN_ALL_CORE
rehabilitation services/medication teaching and assessment/observation and assessment/teaching and training/wound care and assessment

## 2018-10-20 NOTE — PROGRESS NOTE ADULT - ASSESSMENT
38M with PMHx testicular ca s/p orchiectomy on chemo, Afib/flutter, nonischemic cardiomyopathy, last chemo 2 weeks ago, with nausea/vomiting for the past few days and fluid collection in abdomen.     -Unchanged fluid collection unlikely to be responsible for acute change in clinical status  -May need IR drainage of collection in the future, but for now appears stable  -Will discuss with attending and update with further recs 38M with PMHx testicular ca s/p orchiectomy on chemo, Afib/flutter, nonischemic cardiomyopathy, last chemo 2 weeks ago, with nausea/vomiting for the past few days and fluid collection in abdomen.     -Unchanged fluid collection unlikely to be responsible for acute change in clinical status  -May need IR drainage of collection in the future, but for now appears stable 38M with PMHx testicular ca s/p orchiectomy on chemo, Afib/flutter, nonischemic cardiomyopathy, last chemo 2 weeks ago, with nausea/vomiting for the past few days and fluid collection in abdomen.     -Unchanged fluid collection unlikely to be responsible for acute change in clinical status  -May need IR drainage of collection in the future, but for now appears stable  -Follow up as outpatient with Dr. Kaye Lara [place] [facility] [phone]

## 2018-10-20 NOTE — PROGRESS NOTE ADULT - SUBJECTIVE AND OBJECTIVE BOX
Cardiology/Vascular Medicine Inpatient Progress Note      Feels better this AM.  Less dizzy this AM.     At the time of my exam, the patient reports feeling better, with less symptoms of dizziness and sense of imbalance., although still somewhat symptomatic after sitting up in bed or while standing.  He has no other cardiac complaints.      At this time, he continues to receive IVFs without pulmonary symptoms.  His cardiac medications (BB and CCB) are both held for now.  He is not on anticoagulation.    Spoke with his cardiologist Dr. Marquez yesterday.  Patient has not had a more recent evaluation of his cardiac mass, and would like a cardiac MRI and repeat echocardiogram.    Recommend continuing with IV hydration as tolerated (continue to monitor respiratory status) and advise that he wear LORNE stockings.    Will clarify role of anticoagulation in this patient with his oncologist Dr. Neo Chirinos.  He has not been on anticoagulation since August (previously on warfarin).    Vital Signs Last 24 Hrs  T(C): 36.8 (20 Oct 2018 05:36), Max: 37.4 (19 Oct 2018 20:56)  T(F): 98.3 (20 Oct 2018 05:36), Max: 99.3 (19 Oct 2018 20:56)  HR: 77 (20 Oct 2018 05:36) (77 - 88)  BP: 101/63 (20 Oct 2018 05:36) (93/56 - 101/63)  BP(mean): --  RR: 18 (20 Oct 2018 05:36) (17 - 18)  SpO2: 100% (20 Oct 2018 05:36) (98% - 100%)    Appearance: NAD, sitting at edge of bed  HEENT:   Normal oral mucosa, PERRL, EOMI	  Lymphatic: No lymphadenopathy  Cardiovascular: Normal S1 S2, No JVD, No murmurs, No edema  Respiratory: Decreased breath sounds bilaterally	  Psychiatry: Awake, alert  Gastrointestinal:  Soft, Non-tender, + BS	  Skin: No rashes, No ecchymoses, No cyanosis	  Neurologic: Non-focal  Extremities: Normal range of motion, No clubbing, cyanosis or edema  Vascular: Peripheral pulses palpable 2+ bilaterally    MEDICATIONS  (STANDING):  enoxaparin Injectable 40 milliGRAM(s) SubCutaneous daily  ergocalciferol 38601 Unit(s) Oral every week  folic acid 1 milliGRAM(s) Oral daily  meclizine 12.5 milliGRAM(s) Oral every 8 hours  pantoprazole    Tablet 40 milliGRAM(s) Oral before breakfast  sodium chloride 0.9%. 1000 milliLiter(s) (100 mL/Hr) IV Continuous <Continuous>    MEDICATIONS  (PRN):  ondansetron Injectable 4 milliGRAM(s) IV Push every 6 hours PRN Nausea      LABS:	 	                             6.9    2.21  )-----------( 65       ( 20 Oct 2018 06:20 )             20.4       10-20    140  |  103  |  5<L>  ----------------------------<  88  3.6   |  25  |  0.80    Ca    8.4      20 Oct 2018 06:20  Phos  4.3     10-19  Mg     1.6     10-19    TPro  6.0  /  Alb  3.3  /  TBili  0.3  /  DBili  x   /  AST  33  /  ALT  85<H>  /  AlkPhos  114  10-19    PT/INR - ( 19 Oct 2018 06:15 )   PT: 12.8 SEC;   INR: 1.15     PTT - ( 19 Oct 2018 06:15 )  PTT:28.4 SEC    Patient name: KAYLEY GUERRIER  YOB: 1980   Age: 37 (M)   MR#: [id number]  Study Date: 2/22/2018  [facility] [place]  Study quality: Technically fair  Referring Physician: ELIAS PZIARRO MD  Blood Pressure: 110/67 mmHg  Height: 188 cm  Weight: 117 kg  BSA: 2.4 m2  ------------------------------------------------------------------------  PROCEDURE: Transthoracic echocardiogram with 2-D, M-Mode  and complete spectral and color flow Doppler.  INDICATION: Unspecified atrial fibrillation (I48.91)  ------------------------------------------------------------------------  Dimensions:    Normal Values:  LA:     4.8    2.0 - 4.0 cm  Ao:     3.5    2.0 - 3.8 cm  SEPTUM: 1.1    0.6 - 1.2 cm  PWT:    1.0    0.6 - 1.1 cm  LVIDd:  5.7    3.0 - 5.6 cm  LVIDs:  4.4    1.8 - 4.0 cm  Derived variables:  LVMI: 99 g/m2  RWT: 0.35  Fractional short: 23 %  EF (Visual Estimate): 40-45 %  ------------------------------------------------------------------------  Observations:  Mitral Valve: Normal mitral valve.  Aortic Valve/Aorta: Normal trileaflet aortic valve.  Normal aortic root.  Left Atrium: Normal left atrium.  LA volume index = 27  cc/m2.  Left Ventricle: Mild-moderate global left ventricular  systolic dysfunction. Normal left ventricular internal  dimensions and wall thicknesses.  Right Heart: Normal right atrium. Echogenic density  is  seen on posterior aspect of RA measuring 2.1 cm x 3.3 cm  which  appears to have  increased in size since last TYLOR  from 1/17/2018. The right ventricle is not well visualized;  grossly low normal right ventricular systolic function.  Normal tricuspid valve. Normal pulmonic valve.  Pericardium/Pleura: Normal pericardium with no pericardial  effusion.  Hemodynamic: Estimated right atrial pressure is 8 mm Hg.  ------------------------------------------------------------------------  Conclusions:  1. Mild-moderate global left ventricular systolic  dysfunction.  2. Normal right atrium. Echogenic density/mass   is seen on  posterior aspect of RA measuring 2.1 cm x 3.3 cm which  appears to have  increased in size since last TYLOR from  1/17/2018. Recommend Cardiac MRI for further evaluation.  3. The right ventricle is not well visualized; grossly low  normal right ventricular systolic function.  4. Estimated pulmonary artery systolic pressure equals 17  mm Hg, assuming right atrial pressure equals 8  mm Hg,  consistent with normal pulmonary pressures.  Discussed Dr. TEN Pizarro  *** Compared with echocardiogram of 1/17/2018, right atrial  echodensity likely representing a mass appears to have  enlarged.  ------------------------------------------------------------------------  Confirmed on  2/22/2018 - 18:19:09 by Rosaura Lewis M.D.  ------------------------------------------------------------------------    < end of copied text >    < from: CT Chest w/ IV Cont (10.10.18 @ 10:30) >    EXAM:  CT ABDOMEN AND PELVIS OC IC      EXAM:  CT CHEST IC        PROCEDURE DATE:  10/10/2018           INTERPRETATION:  CLINICAL INFORMATION: Patient's history of testicular   cancer status post resection. Follow-up evaluation.    COMPARISON: CT chest abdomen and pelvis dated 5/26/2018.    PROCEDURE:   CT of the Chest, Abdomen and Pelvis was performed with intravenous   contrast.   Intravenous contrast: 90 ml Omnipaque 350. 10 ml discarded.  Oral contrast: positive contrast was administered.  Sagittal and coronal reformats were performed.    FINDINGS:    CHEST:     LUNGS AND LARGE AIRWAYS: Patent central airways. No pulmonary nodules.  PLEURA: No pleural effusion or pneumothorax.  VESSELS: Within normal limits. No pulmonary embolus.  HEART: Heart size is normal. No pericardial effusion.  MEDIASTINUM AND CARLOS A: No lymphadenopathy.  CHEST WALL AND LOWER NECK: Within normal limits.    ABDOMEN AND PELVIS:    LIVER: Within normal limits.  BILE DUCTS: Normal caliber.  GALLBLADDER: Cholelithiasis.  SPLEEN: Within normal limits.  PANCREAS: Within normal limits.  ADRENALS: Within normal limits.  KIDNEYS/URETERS: 1.2 cm heterogeneous enhancing focus of the right kidney   lower pole compared with the patient's previous study of 5/26/2018. In   light of patient's recent surgery, a small infarct is considered but   follow-up is suggested to exclude developing renal mass. Small bilateral   cortical renal cysts.No hydronephrosis    BLADDER: Within normal limits.  REPRODUCTIVE ORGANS: The prostate is within normal limits.    BOWEL: No bowel obstruction. Status post appendectomy.  PERITONEUM: No ascites. New fluid collection in the upper mid abdomen   measuring about 11.3 x 5.4 x 5.9 cm (2, 91; 602, 84).  VESSELS:  Within normal limits.  RETROPERITONEUM: Status post resection of multiple lymph nodes, including   the previously seen left periaortic lymph nodes. Left obturator lymph   node is unchanged from prior study.  ABDOMINAL WALL: Postsurgical changes.  BONES: Within normal limits.    IMPRESSION:     Status post resection of multiple retroperitoneal lymph nodes. No new   retroperitoneal lymphadenopathy.    New fluid collection in the upper midabdomen as measured above.    No evidence of metastatic disease in the chest.     TONY PERRY M.D., RADIOLOGY RESIDENT  This document has been electronically signed.  KARLA WARNER M.D., ATTENDING RADIOLOGIST   This document has been electronically signed. Oct 15 2018  8:57AM          < from: MR Cardiac w/wo IV Cont (03.14.18 @ 10:30) >    EXAM:  MR CARD MORPH/FUNCTION WAW IC        PROCEDURE DATE:  03/14/2018           INTERPRETATION:  MRI CARDIAC WITHOUT AND WITH CONTRAST      INDICATION: History of germ cell tumor. Therapy esophageal   echocardiogram. Presumed right atrial mass. Evaluate for possible disease.    COMPARISON: No prior studies available for comparison.    TECHNIQUE: Multi-sequential, multi-planar cardiac MRI was performed   before and after the intravenous administration of 10 mL of Gadavist; 0   mL was discarded. Resting myocardial perfusion imaging was performed.   Late gadolinium enhanced images were obtained.    SCANNER: Siemens1.5 T Aera platform using a cardiac coil.    QUALITY: Good.      FINDINGS:      Centered along the wall of the right atrium, justlateral to the inflow   of the IVC is a 2.6 x 0.8 cm ovoid shaped mass. The mass shows   intermediate signal to myocardium on T1-weighted images and slightly   increased signal on the T2-weighted images with no macroscopic foci of   fat. There is no definite enhancement during the first pass perfusion   images. However, on the late gadolinium enhancement sequences there is   subtle, increased signal within the lesion (series 11 image 21).      NONCARDIAC FINDINGS: The other visualized thoracoabdominal structures are   unremarkable.      IMPRESSION:    1.  The right atrial mass which is likely in continuity with the franklin   terminalis has decreased in size since 10/31/2017. This could represent   thrombus or tumor rather than a conspicuous franklin terminalis. A 3 month   follow-up is recommended to evaluate for stability or resolution.        CARMINA ELIZONDO M.D., ATTENDING RADIOLOGIST  This document has been electronically signed. Mar 14 2018  2:51PM                < end of copied text >

## 2018-10-20 NOTE — DISCHARGE NOTE ADULT - CARE PROVIDER_API CALL
Tra Brown; PhD), Cardiology; Internal Medicine; Vascular Medicine  [address] [place] [address]  [place] [address]  Phone: [phone]  Fax: [phone]    Neo Chirinos (MD), Hematology; Internal Medicine; Medical Oncology  [address] [place] [address]  Phone: [phone]  Fax: [phone]    Phong Restrepo), Urology  [address] [place], [place] [address]  [place] [address]  Phone: [phone]  Fax: [phone]

## 2018-10-20 NOTE — DISCHARGE NOTE ADULT - HOSPITAL COURSE
38M with PMHx Testicular ca s/p oorphorectomy on chemo, non-ischemic cardiomyopathy (EF35%), Afib/Aflutter, right atrial mass presented to ED with nausea vomiting and dizziness. Symptoms related to recent chemo therapy and dehydration. No systemic signs of fluid overload or infection. Orthostatics likely multifactorial: dry from dec PO, autonomic 2/2 chemo, anemia. s/p 1L NS in ED, continued with gentle hydration, pt with significant improvement in symptoms, was able to tolerated regular diet that was brought from home, dizziness improved after 1 u PRBC and use of Meclizine  Hx of Cardiac mass not currently on a/c, last use Coumadin back in 8/2018, evaluated by Dr. Brown who will need to discuss with Dr Chirinos. Pt informed on importance to make a follow up appt as outpt with Dr Marquez and oncology Dr Chirinos for outpt follow up about anticoagulation   Cardiomyopathy, Last Echo was feb 2018 with 35% EF, lisinopril and coreg held in setting of hypotensions   Symptomatic Anemia s/p 1 unit PRBC in 1/2 units   Abdominal fluid collection found on CT A/P 10/10/18 as part of tumor surveillance. Urology consulted, no plans for drainage at this time   Malignant neoplasm of testis, s/p 3rd round of chemo, no plans for additional at this time  Pt is optimized for discharge home with home care with outpt follow up 38M with PMHx Testicular ca s/p oorphorectomy on chemo, non-ischemic cardiomyopathy (EF35%), Afib/Aflutter, right atrial mass presented to ED with nausea vomiting and dizziness. Symptoms related to recent chemo therapy and dehydration. No systemic signs of fluid overload or infection. Orthostatics likely multifactorial: dry from dec PO, autonomic 2/2 chemo, anemia. s/p 1L NS in ED, continued with gentle hydration, pt with significant improvement in symptoms, was able to tolerated regular diet that was brought from home, dizziness improved after 1 u PRBC and use of Meclizine  Hx of Cardiac mass not currently on a/c, last use Coumadin back in 8/2018, evaluated by Dr. Brown who will need to discuss with Dr Chirinos. Pt informed on importance to make a follow up appt as outpt with Dr Marquez and oncology Dr Chirinos for outpt follow up about anticoagulation   Cardiomyopathy, Last Echo was feb 2018 with 35% EF, lisinopril and coreg held in setting of hypotensions   Symptomatic Anemia s/p 1 unit PRBC in 1/2 units   Abdominal fluid collection found on CT A/P 10/10/18 as part of tumor surveillance. Urology consulted, no plans for drainage at this time   Malignant neoplasm of testis, s/p 3rd round of chemo, no plans for additional at this time  Pt is optimized for discharge home with home care with outpt follow up  pt clinically improved

## 2018-10-20 NOTE — DISCHARGE NOTE ADULT - MEDICATION SUMMARY - MEDICATIONS TO TAKE
I will START or STAY ON the medications listed below when I get home from the hospital:    oxyCODONE 15 mg oral tablet  -- 1 tab(s) by mouth every 6 hours, As Needed  -- Indication: For Pain control     meclizine 12.5 mg oral tablet  -- 1 tab(s) by mouth every 8 hours as needed for diziness  -- Indication: For Dizziness    ondansetron 4 mg oral disintegrating strip  -- 1 each by mouth 3 times a day   -- Check with your doctor before becoming pregnant.  Do not chew, break, or crush.  Obtain medical advice before taking any non-prescription drugs as some may affect the action of this medication.    -- Indication: For Nausea and vomiting    Mesnex 400 mg oral tablet  --  as per oncologist recommendation   -- Indication: For Malignant neoplasm of testis, unspecified laterality    pantoprazole 40 mg oral delayed release tablet  -- 1 tab(s) by mouth once a day (before a meal)  -- Indication: For Prophylactic measure    folic acid 1 mg oral tablet  -- 1 tab(s) by mouth once a day  -- Indication: For Prophylactic measure    ergocalciferol 50,000 intl units (1.25 mg) oral capsule  -- 1 cap(s) by mouth once a week   -- Indication: For supplement I will START or STAY ON the medications listed below when I get home from the hospital:    oxyCODONE 15 mg oral tablet  -- 1 tab(s) by mouth every 6 hours, As Needed  -- Indication: For Abdominal pain    meclizine 12.5 mg oral tablet  -- 1 tab(s) by mouth every 8 hours as needed for diziness  -- Indication: For Dizziness    ondansetron 4 mg oral disintegrating strip  -- 1 each by mouth 3 times a day   -- Check with your doctor before becoming pregnant.  Do not chew, break, or crush.  Obtain medical advice before taking any non-prescription drugs as some may affect the action of this medication.    -- Indication: For Nausea and vomiting    Mesnex 400 mg oral tablet  --  as per oncologist recommendation   -- Indication: For Malignant neoplasm of testis, unspecified laterality    pantoprazole 40 mg oral delayed release tablet  -- 1 tab(s) by mouth once a day (before a meal)  -- Indication: For Prophylactic measure    folic acid 1 mg oral tablet  -- 1 tab(s) by mouth once a day  -- Indication: For Prophylactic measure    ergocalciferol 50,000 intl units (1.25 mg) oral capsule  -- 1 cap(s) by mouth once a week   -- Indication: For supplement

## 2018-10-20 NOTE — PROGRESS NOTE ADULT - ASSESSMENT
HISTORY OF PRESENT ILLNESS:  Patient is a 37 yo man with testicular ca s/p orchiectomy on chemotherapy, Afib/flutter NOT CURRENTLY ON ANTICOAGULATION (stoped warfarin in August), nonischemic cardiomyopathy, last chemotherapy treatment 4 weeks ago, presented with nausea/vomiting for the past few days. Patient reports he always have GI symptoms with chemo, he usually try to keep his oral hydration. Yesterday patient started feeling dizzy, thus came to ED. Patient states the vomitus is NBNB of usually food content. He could not keep any food down. Denied any abdominal pain or diarrhea with the episode. Patient was also hospitalized over 1 week ago for similar GI symptoms related to chemo. Denied any fever, chill, SOB, CP, abdominal pain, urinary changes or diarrhea.     In ED, patient was afebrile. BP soft. tachycardia. received 2L NS. (18 Oct 2018 09:16)    At the time of my exam, the patient reports feeling better, with less symptoms of dizziness and sense of imbalance., although still somewhat symptomatic after sitting up in bed or while standing.  He has no other cardiac complaints.   He was previously admitted with similar symptoms and was deemed to be orthostatic due to dehydration, with possibly underlying autonomic instability/dysfunction which can be caused by the chemotherapy.  He reports that he had fully recovered several days after the prior discharge, only to become symptomatic 2-3 weeks after his last chemotherapy session.    At this time, he continues to receive IVFs without pulmonary symptoms.  His cardiac medications (BB and CCB) are both held for now, and can possibly be restarted in the outpatient office by his PMD.  He is currently not on anticoagulation.    Spoke with his cardiologist Dr. Marquez.  Patient has not had a more recent evaluation of his cardiac mass, and would like a cardiac MRI and repeat echocardiogram.    Recommend continuing with IV hydration as tolerated and advise that he wear LORNE stockings.    Will clarify role of anticoagulation in this patient with his oncologist.  He has not been on anticoagulation since August (previously on warfarin).    Will follow.    Thank you,  Tra Brown  Pager [phone]

## 2018-10-20 NOTE — PROGRESS NOTE ADULT - SUBJECTIVE AND OBJECTIVE BOX
Patient feeling better today.  Nausea/vomiting improved.  Abdominal collection unchanged from prior exam.    T(F): 98.3, Max: 99.3 (10-19-18 @ 20:56)  HR: 77  BP: 101/63  SpO2: 100%    Gen NAD  Resp: No resp distress    10-20 @ 06:20  WBC 2.21  / Hct 20.4  / SCr 0.80     10-19 @ 09:53  WBC 2.29  / Hct 21.8  / SCr --

## 2018-10-20 NOTE — DISCHARGE NOTE ADULT - MEDICATION SUMMARY - MEDICATIONS TO CHANGE
I will SWITCH the dose or number of times a day I take the medications listed below when I get home from the hospital:    meclizine 12.5 mg oral tablet  -- 1 tab(s) by mouth 2 times a day, As needed, Dizziness

## 2018-10-20 NOTE — DISCHARGE NOTE ADULT - NS AS ACTIVITY OBS
Walking-Indoors allowed/Showering allowed/Walking-Outdoors allowed/as tolerated. DO NOT DRIVE WHEN TAKING  Pain medications

## 2018-10-20 NOTE — DISCHARGE NOTE ADULT - PLAN OF CARE
Abdominal wound: Cleanse with NS (at home can cleanse in shower with soap and water), pat dry. Apply Liquid barrier film to periwound skin. Apply Aquacel AG hydrofiber, cover with foam with border. Change every 48 hours while in hospital, can change every 72 hours at home setting. further follow up and management -completed 3 cycles of TIP. Call to make a follow up appt as outpatient  with Dr Chirinos symptoms control -completed 3 cycles of TIP. Call to make a follow up appt as outpatient  with Dr Chirinos  CT showed-1.2 cm heterogeneous enhancing focus of the right kidney lower pole, please make a follow up appt with Dr Chirinos and Dr Nj to discuss further plan May take Zofran as needed wound healing Further follow up Last Echo dated 2/2018 showed: Mild-moderate global left ventricular systolic dysfunction. Normal right atrium. Echogenic density/mass on posterior aspect of RA measuring 2.1 cm x 3.3 cm rate control Make a follow up appt with Dr Marquez to discuss further plan for anticoagulation (Coumadin) stable hemoglobin you have received 1 u PRBC, Hg on discharge was 8.3 Last Echo dated 2/ 2018 with 35% EF  Lisinopril and Coreg on hold in setting of low blood pressure. Use antiembolic stockings and follow up with Dr Marquez in 1 week

## 2018-10-20 NOTE — DISCHARGE NOTE ADULT - HOME CARE AGENCY
[facility]  [phone] .   Nurse to call and visit day after discharge 10/22/2018

## 2018-10-20 NOTE — PROGRESS NOTE ADULT - ASSESSMENT
38M with PMHx Testicular ca s/p oorphorectomy on chemo, non-ischemic cardiomyopathy (EF35%), Afib/Aflutter, right atrial mass presented to ED with nausea vomiting and dizziness. Symptoms related to recent chemo therapy and dehydration. No systemic signs of fluid overload or infection. Orthostatics likely multifactorial: dry from dec PO, autonomic 2/2 chemo, anemia.

## 2018-10-20 NOTE — DISCHARGE NOTE ADULT - SECONDARY DIAGNOSIS.
Nausea and vomiting, intractability of vomiting not specified, unspecified vomiting type Abdominal pain Cardiac mass Atrial fibrillation Anemia Cardiomyopathy

## 2018-10-20 NOTE — DISCHARGE NOTE ADULT - MEDICATION SUMMARY - MEDICATIONS TO STOP TAKING
I will STOP taking the medications listed below when I get home from the hospital:    Prevacid 30 mg oral delayed release capsule  -- 1 cap(s) by mouth once a day    acetaminophen 325 mg oral tablet  -- 2 tab(s) by mouth every 6 hours, As needed, For Temp greater than 38 C (100.4 F) I will STOP taking the medications listed below when I get home from the hospital:    Mesnex 400 mg oral tablet  -- Take 8 hours after every cycle of chemotherapy    Benadryl 50 mg oral capsule  -- 1 cap(s) by mouth prior to chemotherapy    Prevacid 30 mg oral delayed release capsule  -- 1 cap(s) by mouth once a day    acetaminophen 325 mg oral tablet  -- 2 tab(s) by mouth every 6 hours, As needed, For Temp greater than 38 C (100.4 F)

## 2018-10-20 NOTE — DISCHARGE NOTE ADULT - PATIENT PORTAL LINK FT
You can access the [facility], offered by [facility], by registering with the following website: [email or web address] [email or web address]

## 2018-10-21 VITALS
OXYGEN SATURATION: 100 % | RESPIRATION RATE: 18 BRPM | TEMPERATURE: 99 F | DIASTOLIC BLOOD PRESSURE: 78 MMHG | SYSTOLIC BLOOD PRESSURE: 114 MMHG | HEART RATE: 76 BPM

## 2018-10-21 LAB
BUN SERPL-MCNC: 5 MG/DL — LOW (ref 7–23)
CALCIUM SERPL-MCNC: 8.4 MG/DL — SIGNIFICANT CHANGE UP (ref 8.4–10.5)
CHLORIDE SERPL-SCNC: 103 MMOL/L — SIGNIFICANT CHANGE UP (ref 98–107)
CO2 SERPL-SCNC: 25 MMOL/L — SIGNIFICANT CHANGE UP (ref 22–31)
CREAT SERPL-MCNC: 0.83 MG/DL — SIGNIFICANT CHANGE UP (ref 0.5–1.3)
GLUCOSE SERPL-MCNC: 95 MG/DL — SIGNIFICANT CHANGE UP (ref 70–99)
HCT VFR BLD CALC: 25.2 % — LOW (ref 39–50)
HGB BLD-MCNC: 8.3 G/DL — LOW (ref 13–17)
MAGNESIUM SERPL-MCNC: 1.6 MG/DL — SIGNIFICANT CHANGE UP (ref 1.6–2.6)
MCHC RBC-ENTMCNC: 30.2 PG — SIGNIFICANT CHANGE UP (ref 27–34)
MCHC RBC-ENTMCNC: 32.9 % — SIGNIFICANT CHANGE UP (ref 32–36)
MCV RBC AUTO: 91.6 FL — SIGNIFICANT CHANGE UP (ref 80–100)
NRBC # FLD: 0.05 — SIGNIFICANT CHANGE UP
NRBC FLD-RTO: 1.9 — SIGNIFICANT CHANGE UP
PHOSPHATE SERPL-MCNC: 3.9 MG/DL — SIGNIFICANT CHANGE UP (ref 2.5–4.5)
PLATELET # BLD AUTO: 79 K/UL — LOW (ref 150–400)
PMV BLD: 10.2 FL — SIGNIFICANT CHANGE UP (ref 7–13)
POTASSIUM SERPL-MCNC: 3.4 MMOL/L — LOW (ref 3.5–5.3)
POTASSIUM SERPL-SCNC: 3.4 MMOL/L — LOW (ref 3.5–5.3)
RBC # BLD: 2.75 M/UL — LOW (ref 4.2–5.8)
RBC # FLD: 24 % — HIGH (ref 10.3–14.5)
SODIUM SERPL-SCNC: 140 MMOL/L — SIGNIFICANT CHANGE UP (ref 135–145)
WBC # BLD: 2.66 K/UL — LOW (ref 3.8–10.5)
WBC # FLD AUTO: 2.66 K/UL — LOW (ref 3.8–10.5)

## 2018-10-21 PROCEDURE — 99232 SBSQ HOSP IP/OBS MODERATE 35: CPT

## 2018-10-21 PROCEDURE — 99238 HOSP IP/OBS DSCHRG MGMT 30/<: CPT

## 2018-10-21 RX ORDER — POTASSIUM CHLORIDE 20 MEQ
20 PACKET (EA) ORAL
Qty: 0 | Refills: 0 | Status: COMPLETED | OUTPATIENT
Start: 2018-10-21 | End: 2018-10-21

## 2018-10-21 RX ORDER — MECLIZINE HCL 12.5 MG
1 TABLET ORAL
Qty: 15 | Refills: 0 | OUTPATIENT
Start: 2018-10-21 | End: 2018-10-25

## 2018-10-21 RX ORDER — DIPHENHYDRAMINE HCL 50 MG
1 CAPSULE ORAL
Qty: 0 | Refills: 0 | COMMUNITY

## 2018-10-21 RX ORDER — MAGNESIUM SULFATE 500 MG/ML
1 VIAL (ML) INJECTION ONCE
Qty: 0 | Refills: 0 | Status: COMPLETED | OUTPATIENT
Start: 2018-10-21 | End: 2018-10-21

## 2018-10-21 RX ORDER — MESNA 100 MG/ML
0 INJECTION, SOLUTION INTRAVENOUS
Qty: 0 | Refills: 0 | COMMUNITY

## 2018-10-21 RX ORDER — FOLIC ACID 0.8 MG
1 TABLET ORAL
Qty: 0 | Refills: 0 | COMMUNITY
Start: 2018-10-21

## 2018-10-21 RX ORDER — PANTOPRAZOLE SODIUM 20 MG/1
1 TABLET, DELAYED RELEASE ORAL
Qty: 30 | Refills: 0 | OUTPATIENT
Start: 2018-10-21 | End: 2018-11-19

## 2018-10-21 RX ORDER — ONDANSETRON 8 MG/1
1 TABLET, FILM COATED ORAL
Qty: 9 | Refills: 0 | OUTPATIENT
Start: 2018-10-21 | End: 2018-10-23

## 2018-10-21 RX ORDER — ERGOCALCIFEROL 1.25 MG/1
1 CAPSULE ORAL
Qty: 1 | Refills: 0 | OUTPATIENT
Start: 2018-10-21 | End: 2018-10-24

## 2018-10-21 RX ADMIN — Medication 12.5 MILLIGRAM(S): at 13:08

## 2018-10-21 RX ADMIN — PANTOPRAZOLE SODIUM 40 MILLIGRAM(S): 20 TABLET, DELAYED RELEASE ORAL at 06:03

## 2018-10-21 RX ADMIN — Medication 20 MILLIEQUIVALENT(S): at 10:53

## 2018-10-21 RX ADMIN — ENOXAPARIN SODIUM 40 MILLIGRAM(S): 100 INJECTION SUBCUTANEOUS at 10:54

## 2018-10-21 RX ADMIN — Medication 12.5 MILLIGRAM(S): at 06:03

## 2018-10-21 RX ADMIN — Medication 20 MILLIEQUIVALENT(S): at 14:36

## 2018-10-21 RX ADMIN — Medication 1 MILLIGRAM(S): at 10:53

## 2018-10-21 RX ADMIN — Medication 20 MILLIEQUIVALENT(S): at 13:07

## 2018-10-21 RX ADMIN — Medication 100 GRAM(S): at 14:36

## 2018-10-21 NOTE — PROGRESS NOTE ADULT - SUBJECTIVE AND OBJECTIVE BOX
Cardiology/Vascular Medicine Inpatient Progress Note      Feels better this AM.  Dizziness improved    At this time, he continues to receive IVFs without pulmonary symptoms.  His cardiac medications (BB and CCB) are both held for now.  He is not on anticoagulation.    Recommend continuing with IV hydration as tolerated (continue to monitor respiratory status) and advise that he wear LORNE stockings.    Will clarify role of anticoagulation in this patient with his oncologist Dr. Neo Chirinos.  He has not been on anticoagulation since August (previously on warfarin).    Vital Signs Last 24 Hrs  T(C): 37 (21 Oct 2018 05:40), Max: 37 (21 Oct 2018 05:40)  T(F): 98.6 (21 Oct 2018 05:40), Max: 98.6 (21 Oct 2018 05:40)  HR: 81 (21 Oct 2018 05:40) (71 - 81)  BP: 100/56 (21 Oct 2018 05:40) (94/60 - 106/66)  BP(mean): --  RR: 18 (21 Oct 2018 05:40) (18 - 18)  SpO2: 100% (21 Oct 2018 05:40) (100% - 100%)    Appearance: NAD, sitting at edge of bed  HEENT:   Normal oral mucosa, PERRL, EOMI	  Lymphatic: No lymphadenopathy  Cardiovascular: Normal S1 S2, No JVD, No murmurs, No edema  Respiratory: Decreased breath sounds bilaterally	  Psychiatry: Awake, alert  Gastrointestinal:  Soft, Non-tender, + BS	  Skin: No rashes, No ecchymoses, No cyanosis	  Neurologic: Non-focal  Extremities: Normal range of motion, No clubbing, cyanosis or edema  Vascular: Peripheral pulses palpable 2+ bilaterally    MEDICATIONS  (STANDING):  enoxaparin Injectable 40 milliGRAM(s) SubCutaneous daily  ergocalciferol 43303 Unit(s) Oral every week  folic acid 1 milliGRAM(s) Oral daily  meclizine 12.5 milliGRAM(s) Oral every 8 hours  pantoprazole    Tablet 40 milliGRAM(s) Oral before breakfast  sodium chloride 0.9%. 1000 milliLiter(s) (100 mL/Hr) IV Continuous <Continuous>    MEDICATIONS  (PRN):  ondansetron Injectable 4 milliGRAM(s) IV Push every 6 hours PRN Nausea      LABS:	 	                                    6.9    2.21  )-----------( 65       ( 20 Oct 2018 06:20 )             20.4     10-20    140  |  103  |  5<L>  ----------------------------<  88  3.6   |  25  |  0.80    Ca    8.4      20 Oct 2018 06:20        Patient name: KAYLEY GUERRIER  YOB: 1980   Age: 37 (M)   MR#: [id number]  Study Date: 2/22/2018  [facility]  Study quality: Technically fair  Referring Physician: ELIAS TALBOT MD  Blood Pressure: 110/67 mmHg  Height: 188 cm  Weight: 117 kg  BSA: 2.4 m2  ------------------------------------------------------------------------  PROCEDURE: Transthoracic echocardiogram with 2-D, M-Mode  and complete spectral and color flow Doppler.  INDICATION: Unspecified atrial fibrillation (I48.91)  ------------------------------------------------------------------------  Dimensions:    Normal Values:  LA:     4.8    2.0 - 4.0 cm  Ao:     3.5    2.0 - 3.8 cm  SEPTUM: 1.1    0.6 - 1.2 cm  PWT:    1.0    0.6 - 1.1 cm  LVIDd:  5.7    3.0 - 5.6 cm  LVIDs:  4.4    1.8 - 4.0 cm  Derived variables:  LVMI: 99 g/m2  RWT: 0.35  Fractional short: 23 %  EF (Visual Estimate): 40-45 %  ------------------------------------------------------------------------  Observations:  Mitral Valve: Normal mitral valve.  Aortic Valve/Aorta: Normal trileaflet aortic valve.  Normal aortic root.  Left Atrium: Normal left atrium.  LA volume index = 27  cc/m2.  Left Ventricle: Mild-moderate global left ventricular  systolic dysfunction. Normal left ventricular internal  dimensions and wall thicknesses.  Right Heart: Normal right atrium. Echogenic density  is  seen on posterior aspect of RA measuring 2.1 cm x 3.3 cm  which  appears to have  increased in size since last TYLOR  from 1/17/2018. The right ventricle is not well visualized;  grossly low normal right ventricular systolic function.  Normal tricuspid valve. Normal pulmonic valve.  Pericardium/Pleura: Normal pericardium with no pericardial  effusion.  Hemodynamic: Estimated right atrial pressure is 8 mm Hg.  ------------------------------------------------------------------------  Conclusions:  1. Mild-moderate global left ventricular systolic  dysfunction.  2. Normal right atrium. Echogenic density/mass   is seen on  posterior aspect of RA measuring 2.1 cm x 3.3 cm which  appears to have  increased in size since last TYLOR from  1/17/2018. Recommend Cardiac MRI for further evaluation.  3. The right ventricle is not well visualized; grossly low  normal right ventricular systolic function.  4. Estimated pulmonary artery systolic pressure equals 17  mm Hg, assuming right atrial pressure equals 8  mm Hg,  consistent with normal pulmonary pressures.  Discussed Dr. TEN Talbot  *** Compared with echocardiogram of 1/17/2018, right atrial  echodensity likely representing a mass appears to have  enlarged.  ------------------------------------------------------------------------  Confirmed on  2/22/2018 - 18:19:09 by Rosaura Joshua, M.D.  ------------------------------------------------------------------------    < end of copied text >    < from: CT Chest w/ IV Cont (10.10.18 @ 10:30) >    EXAM:  CT ABDOMEN AND PELVIS OC IC      EXAM:  CT CHEST IC        PROCEDURE DATE:  10/10/2018           INTERPRETATION:  CLINICAL INFORMATION: Patient's history of testicular   cancer status post resection. Follow-up evaluation.    COMPARISON: CT chest abdomen and pelvis dated 5/26/2018.    PROCEDURE:   CT of the Chest, Abdomen and Pelvis was performed with intravenous   contrast.   Intravenous contrast: 90 ml Omnipaque 350. 10 ml discarded.  Oral contrast: positive contrast was administered.  Sagittal and coronal reformats were performed.    FINDINGS:    CHEST:     LUNGS AND LARGE AIRWAYS: Patent central airways. No pulmonary nodules.  PLEURA: No pleural effusion or pneumothorax.  VESSELS: Within normal limits. No pulmonary embolus.  HEART: Heart size is normal. No pericardial effusion.  MEDIASTINUM AND CARLOS A: No lymphadenopathy.  CHEST WALL AND LOWER NECK: Within normal limits.    ABDOMEN AND PELVIS:    LIVER: Within normal limits.  BILE DUCTS: Normal caliber.  GALLBLADDER: Cholelithiasis.  SPLEEN: Within normal limits.  PANCREAS: Within normal limits.  ADRENALS: Within normal limits.  KIDNEYS/URETERS: 1.2 cm heterogeneous enhancing focus of the right kidney   lower pole compared with the patient's previous study of 5/26/2018. In   light of patient's recent surgery, a small infarct is considered but   follow-up is suggested to exclude developing renal mass. Small bilateral   cortical renal cysts.No hydronephrosis    BLADDER: Within normal limits.  REPRODUCTIVE ORGANS: The prostate is within normal limits.    BOWEL: No bowel obstruction. Status post appendectomy.  PERITONEUM: No ascites. New fluid collection in the upper mid abdomen   measuring about 11.3 x 5.4 x 5.9 cm (2, 91; 602, 84).  VESSELS:  Within normal limits.  RETROPERITONEUM: Status post resection of multiple lymph nodes, including   the previously seen left periaortic lymph nodes. Left obturator lymph   node is unchanged from prior study.  ABDOMINAL WALL: Postsurgical changes.  BONES: Within normal limits.    IMPRESSION:     Status post resection of multiple retroperitoneal lymph nodes. No new   retroperitoneal lymphadenopathy.    New fluid collection in the upper midabdomen as measured above.    No evidence of metastatic disease in the chest.     TONY PERRY M.D., RADIOLOGY RESIDENT  This document has been electronically signed.  KARLA WARNER M.D., ATTENDING RADIOLOGIST   This document has been electronically signed. Oct 15 2018  8:57AM          < from: MR Cardiac w/wo IV Cont (03.14.18 @ 10:30) >    EXAM:  MR CARD MORPH/FUNCTION WAW IC        PROCEDURE DATE:  03/14/2018           INTERPRETATION:  MRI CARDIAC WITHOUT AND WITH CONTRAST      INDICATION: History of germ cell tumor. Therapy esophageal   echocardiogram. Presumed right atrial mass. Evaluate for possible disease.    COMPARISON: No prior studies available for comparison.    TECHNIQUE: Multi-sequential, multi-planar cardiac MRI was performed   before and after the intravenous administration of 10 mL of Gadavist; 0   mL was discarded. Resting myocardial perfusion imaging was performed.   Late gadolinium enhanced images were obtained.    SCANNER: Siemens1.5 T Aera platform using a cardiac coil.    QUALITY: Good.      FINDINGS:      Centered along the wall of the right atrium, justlateral to the inflow   of the IVC is a 2.6 x 0.8 cm ovoid shaped mass. The mass shows   intermediate signal to myocardium on T1-weighted images and slightly   increased signal on the T2-weighted images with no macroscopic foci of   fat. There is no definite enhancement during the first pass perfusion   images. However, on the late gadolinium enhancement sequences there is   subtle, increased signal within the lesion (series 11 image 21).      NONCARDIAC FINDINGS: The other visualized thoracoabdominal structures are   unremarkable.      IMPRESSION:    1.  The right atrial mass which is likely in continuity with the franklin   terminalis has decreased in size since 10/31/2017. This could represent   thrombus or tumor rather than a conspicuous franklin terminalis. A 3 month   follow-up is recommended to evaluate for stability or resolution.        CARMINA ELIZONDO M.D., ATTENDING RADIOLOGIST  This document has been electronically signed. Mar 14 2018  2:51PM

## 2018-10-21 NOTE — PROGRESS NOTE ADULT - ASSESSMENT
HISTORY OF PRESENT ILLNESS:  Patient is a 39 yo man with testicular ca s/p orchiectomy on chemotherapy, Afib/flutter NOT CURRENTLY ON ANTICOAGULATION (stoped warfarin in August), nonischemic cardiomyopathy, last chemotherapy treatment 4 weeks ago, presented with nausea/vomiting for the past few days. Patient reports he always have GI symptoms with chemo, he usually try to keep his oral hydration. Yesterday patient started feeling dizzy, thus came to ED. Patient states the vomitus is NBNB of usually food content. He could not keep any food down. Denied any abdominal pain or diarrhea with the episode. Patient was also hospitalized over 1 week ago for similar GI symptoms related to chemo. Denied any fever, chill, SOB, CP, abdominal pain, urinary changes or diarrhea.     In ED, patient was afebrile. BP soft. tachycardia. received 2L NS. (18 Oct 2018 09:16)    At the time of my exam, the patient reports feeling better, with less symptoms of dizziness and sense of imbalance., although still somewhat symptomatic after sitting up in bed or while standing.  He has no other cardiac complaints.   He was previously admitted with similar symptoms and was deemed to be orthostatic due to dehydration, with possibly underlying autonomic instability/dysfunction which can be caused by the chemotherapy.  He reports that he had fully recovered several days after the prior discharge, only to become symptomatic 2-3 weeks after his last chemotherapy session.    At this time, he continues to receive IVFs without pulmonary symptoms.  His cardiac medications (BB and CCB) are both held for now, and can possibly be restarted in the outpatient office by his PMD.  He is currently not on anticoagulation.    Spoke with his cardiologist Dr. Marquez.  Patient has not had a more recent evaluation of his cardiac mass, and would like a cardiac MRI and repeat echocardiogram.    Recommend continuing with IV hydration as tolerated and advise that he wear LORNE stockings.    Will clarify role of anticoagulation in this patient with his oncologist.  He has not been on anticoagulation since August (previously on warfarin).    Will follow.    Thank you,  Tra Brown  Pager [phone]

## 2018-10-21 NOTE — CHART NOTE - NSCHARTNOTEFT_GEN_A_CORE
pt seen and examined at 130p- felt better after the blood transfusion  orthostatics improved somewhat  wishes to go home  op f/u w/ Dr. Chirinos of onc and Dr. Marquez of cardiology  see [facility] note as well  hypokalemia supplemented  time 40min

## 2018-10-21 NOTE — PROGRESS NOTE ADULT - REASON FOR ADMISSION
[place] and vomiting, dizziness

## 2018-10-22 ENCOUNTER — INBOUND DOCUMENT (OUTPATIENT)
Age: 38
End: 2018-10-22

## 2018-10-24 ENCOUNTER — APPOINTMENT (OUTPATIENT)
Dept: INFUSION THERAPY | Facility: HOSPITAL | Age: 38
End: 2018-10-24

## 2018-10-25 ENCOUNTER — APPOINTMENT (OUTPATIENT)
Dept: INFUSION THERAPY | Facility: HOSPITAL | Age: 38
End: 2018-10-25

## 2018-10-26 ENCOUNTER — APPOINTMENT (OUTPATIENT)
Dept: INFUSION THERAPY | Facility: HOSPITAL | Age: 38
End: 2018-10-26

## 2018-10-27 ENCOUNTER — APPOINTMENT (OUTPATIENT)
Dept: INFUSION THERAPY | Facility: HOSPITAL | Age: 38
End: 2018-10-27

## 2018-10-29 ENCOUNTER — RESULT REVIEW (OUTPATIENT)
Age: 38
End: 2018-10-29

## 2018-10-29 ENCOUNTER — APPOINTMENT (OUTPATIENT)
Dept: HEMATOLOGY ONCOLOGY | Facility: CLINIC | Age: 38
End: 2018-10-29
Payer: MEDICAID

## 2018-10-29 VITALS
DIASTOLIC BLOOD PRESSURE: 71 MMHG | SYSTOLIC BLOOD PRESSURE: 105 MMHG | HEART RATE: 80 BPM | BODY MASS INDEX: 30.1 KG/M2 | OXYGEN SATURATION: 99 % | RESPIRATION RATE: 16 BRPM | WEIGHT: 228.17 LBS | TEMPERATURE: 98.2 F

## 2018-10-29 LAB
ALBUMIN SERPL ELPH-MCNC: 4.4 G/DL
ALP BLD-CCNC: 154 U/L
ALT SERPL-CCNC: 205 U/L
ANION GAP SERPL CALC-SCNC: 13 MMOL/L
AST SERPL-CCNC: 140 U/L
BASOPHILS # BLD AUTO: 0 K/UL — SIGNIFICANT CHANGE UP (ref 0–0.2)
BASOPHILS NFR BLD AUTO: 0.3 % — SIGNIFICANT CHANGE UP (ref 0–2)
BILIRUB SERPL-MCNC: 0.4 MG/DL
BUN SERPL-MCNC: 11 MG/DL
CALCIUM SERPL-MCNC: 9.5 MG/DL
CHLORIDE SERPL-SCNC: 103 MMOL/L
CO2 SERPL-SCNC: 26 MMOL/L
CREAT SERPL-MCNC: 0.78 MG/DL
EOSINOPHIL # BLD AUTO: 0 K/UL — SIGNIFICANT CHANGE UP (ref 0–0.5)
EOSINOPHIL NFR BLD AUTO: 1.4 % — SIGNIFICANT CHANGE UP (ref 0–6)
GLUCOSE SERPL-MCNC: 105 MG/DL
HCT VFR BLD CALC: 33.5 % — LOW (ref 39–50)
HGB BLD-MCNC: 11.2 G/DL — LOW (ref 13–17)
LYMPHOCYTES # BLD AUTO: 1.3 K/UL — SIGNIFICANT CHANGE UP (ref 1–3.3)
LYMPHOCYTES # BLD AUTO: 41.1 % — SIGNIFICANT CHANGE UP (ref 13–44)
MAGNESIUM SERPL-MCNC: 1.7 MG/DL
MCHC RBC-ENTMCNC: 31.8 PG — SIGNIFICANT CHANGE UP (ref 27–34)
MCHC RBC-ENTMCNC: 33.3 G/DL — SIGNIFICANT CHANGE UP (ref 32–36)
MCV RBC AUTO: 95.3 FL — SIGNIFICANT CHANGE UP (ref 80–100)
MONOCYTES # BLD AUTO: 0.4 K/UL — SIGNIFICANT CHANGE UP (ref 0–0.9)
MONOCYTES NFR BLD AUTO: 13.6 % — SIGNIFICANT CHANGE UP (ref 2–14)
NEUTROPHILS # BLD AUTO: 1.4 K/UL — LOW (ref 1.8–7.4)
NEUTROPHILS NFR BLD AUTO: 43.6 % — SIGNIFICANT CHANGE UP (ref 43–77)
PLATELET # BLD AUTO: 222 K/UL — SIGNIFICANT CHANGE UP (ref 150–400)
POTASSIUM SERPL-SCNC: 4.8 MMOL/L
PROT SERPL-MCNC: 7.2 G/DL
RBC # BLD: 3.51 M/UL — LOW (ref 4.2–5.8)
RBC # FLD: 22 % — HIGH (ref 10.3–14.5)
SODIUM SERPL-SCNC: 142 MMOL/L
WBC # BLD: 3.2 K/UL — LOW (ref 3.8–10.5)
WBC # FLD AUTO: 3.2 K/UL — LOW (ref 3.8–10.5)

## 2018-10-29 PROCEDURE — 99214 OFFICE O/P EST MOD 30 MIN: CPT

## 2018-10-29 RX ORDER — MESNA 400 MG/1
400 TABLET, FILM COATED ORAL
Qty: 1 | Refills: 3 | Status: DISCONTINUED | COMMUNITY
Start: 2018-07-26 | End: 2018-10-29

## 2018-10-29 RX ORDER — DEXAMETHASONE 4 MG/1
4 TABLET ORAL
Qty: 30 | Refills: 0 | Status: DISCONTINUED | COMMUNITY
Start: 2018-07-19 | End: 2018-10-29

## 2018-11-05 ENCOUNTER — OUTPATIENT (OUTPATIENT)
Dept: OUTPATIENT SERVICES | Facility: HOSPITAL | Age: 38
LOS: 1 days | Discharge: HOME | End: 2018-11-05

## 2018-11-05 ENCOUNTER — APPOINTMENT (OUTPATIENT)
Dept: WOUND CARE | Facility: CLINIC | Age: 38
End: 2018-11-05

## 2018-11-05 ENCOUNTER — APPOINTMENT (OUTPATIENT)
Dept: INTERNAL MEDICINE | Facility: CLINIC | Age: 38
End: 2018-11-05
Payer: MEDICAID

## 2018-11-05 VITALS
WEIGHT: 234 LBS | BODY MASS INDEX: 32.04 KG/M2 | HEIGHT: 71.5 IN | DIASTOLIC BLOOD PRESSURE: 80 MMHG | SYSTOLIC BLOOD PRESSURE: 110 MMHG | HEART RATE: 93 BPM | TEMPERATURE: 98.4 F | OXYGEN SATURATION: 96 % | RESPIRATION RATE: 16 BRPM

## 2018-11-05 DIAGNOSIS — Z90.79 ACQUIRED ABSENCE OF OTHER GENITAL ORGAN(S): Chronic | ICD-10-CM

## 2018-11-05 DIAGNOSIS — Z87.891 PERSONAL HISTORY OF NICOTINE DEPENDENCE: ICD-10-CM

## 2018-11-05 DIAGNOSIS — Z86.79 PERSONAL HISTORY OF OTHER DISEASES OF THE CIRCULATORY SYSTEM: ICD-10-CM

## 2018-11-05 DIAGNOSIS — Z95.828 PRESENCE OF OTHER VASCULAR IMPLANTS AND GRAFTS: Chronic | ICD-10-CM

## 2018-11-05 DIAGNOSIS — Z98.890 OTHER SPECIFIED POSTPROCEDURAL STATES: Chronic | ICD-10-CM

## 2018-11-05 DIAGNOSIS — C62.92 MALIGNANT NEOPLASM OF LEFT TESTIS, UNSPECIFIED WHETHER DESCENDED OR UNDESCENDED: ICD-10-CM

## 2018-11-05 DIAGNOSIS — K59.01 SLOW TRANSIT CONSTIPATION: ICD-10-CM

## 2018-11-05 PROCEDURE — 99385 PREV VISIT NEW AGE 18-39: CPT

## 2018-11-05 RX ORDER — ONDANSETRON 4 MG/1
4 TABLET, ORALLY DISINTEGRATING ORAL
Refills: 0 | Status: DISCONTINUED | COMMUNITY
End: 2018-11-05

## 2018-11-05 RX ORDER — LISINOPRIL 5 MG/1
5 TABLET ORAL
Refills: 0 | Status: DISCONTINUED | COMMUNITY
End: 2018-11-05

## 2018-11-05 RX ORDER — CARVEDILOL 12.5 MG/1
12.5 TABLET, FILM COATED ORAL TWICE DAILY
Qty: 180 | Refills: 2 | Status: DISCONTINUED | COMMUNITY
Start: 2018-06-27 | End: 2018-11-05

## 2018-11-05 NOTE — HEALTH RISK ASSESSMENT
[Fair] :  ~his/her~ mood as fair [No falls in past year] : Patient reported no falls in the past year [0] : 2) Feeling down, depressed, or hopeless: Not at all (0) [None] : None [Feels Safe at Home] : Feels safe at home [Independent] : managing finances [Some assistance needed] : using transportation [Smoke Detector] : smoke detector [Carbon Monoxide Detector] : carbon monoxide detector [Seat Belt] :  uses seat belt [Sunscreen] : uses sunscreen [Discussed at today's visit] : Advance Directives Discussed at today's visit [FreeTextEntry1] : Check up\par  [] : No [de-identified] : HEMONC/CARD [[id number]] : 0 [Change in mental status noted] : No change in mental status noted [Reports changes in hearing] : Reports no changes in hearing [Reports changes in vision] : Reports no changes in vision [Reports changes in dental health] : Reports no changes in dental health

## 2018-11-05 NOTE — HISTORY OF PRESENT ILLNESS
[de-identified] : 38 year old male patient with history of stable Nonischemic Cardiomyopathy, s/p Cardioversion for Atrial Flutter, Left Testicular Cancer, Abnormal LFTs, Thrombocytopenia, Chemo induced Neutropenia, history as stated, presented for an initial annual preventative examination. C/O Constipation, intermittent N/V, postural dizziness, back pains and at times fatigue.\par Supplemental history was obtained from mother, who is accompanying the patient for today's examination.\par

## 2018-11-05 NOTE — REVIEW OF SYSTEMS
[Constipation] : constipation [Back Pain] : back pain [Dizziness] : dizziness [Unsteady Walk] : ataxia [Negative] : Heme/Lymph

## 2018-11-05 NOTE — ASSESSMENT
[FreeTextEntry1] : 38 year old male found to have stable Nonischemic Cardiomyopathy, s/p Cardioversion for Atrial Flutter, Left Testicular Cancer, s/p Left Orchiectomy and chemo, stable Abnormal LFTs, Thrombocytopenia, Chemo induced Neutropenia, with the current regimen, diet and life style modifications, as counseled. Prior results reviewed and discussed with the patient during today's examination. Plan as ordered.\par Immunizations as per HEMONC in the future.

## 2018-11-13 ENCOUNTER — APPOINTMENT (OUTPATIENT)
Dept: UROLOGY | Facility: CLINIC | Age: 38
End: 2018-11-13
Payer: MEDICAID

## 2018-11-13 PROBLEM — R74.0 TRANSAMINITIS: Status: ACTIVE | Noted: 2018-09-17

## 2018-11-13 PROCEDURE — 99213 OFFICE O/P EST LOW 20 MIN: CPT

## 2018-11-13 NOTE — CONSULT LETTER
[Dear  ___] : Dear  [unfilled], [Courtesy Letter:] : I had the pleasure of seeing your patient, [unfilled], in my office today. [Please see my note below.] : Please see my note below. [Consult Closing:] : Thank you very much for allowing me to participate in the care of this patient.  If you have any questions, please do not hesitate to contact me. [Sincerely,] : Sincerely, [DrBernice  ___] : Dr. MEHTA [DrBernice ___] : Dr. MEHTA

## 2018-11-14 ENCOUNTER — LABORATORY RESULT (OUTPATIENT)
Age: 38
End: 2018-11-14

## 2018-11-14 ENCOUNTER — APPOINTMENT (OUTPATIENT)
Dept: HEMATOLOGY ONCOLOGY | Facility: CLINIC | Age: 38
End: 2018-11-14
Payer: MEDICAID

## 2018-11-14 ENCOUNTER — RESULT REVIEW (OUTPATIENT)
Age: 38
End: 2018-11-14

## 2018-11-14 VITALS
OXYGEN SATURATION: 97 % | TEMPERATURE: 97.9 F | HEART RATE: 84 BPM | BODY MASS INDEX: 32.44 KG/M2 | RESPIRATION RATE: 16 BRPM | WEIGHT: 235.89 LBS | DIASTOLIC BLOOD PRESSURE: 71 MMHG | SYSTOLIC BLOOD PRESSURE: 107 MMHG

## 2018-11-14 DIAGNOSIS — R74.0 NONSPECIFIC ELEVATION OF LEVELS OF TRANSAMINASE AND LACTIC ACID DEHYDROGENASE [LDH]: ICD-10-CM

## 2018-11-14 LAB
AFP-TM SERPL-MCNC: 4.4 NG/ML
ALBUMIN SERPL ELPH-MCNC: 4.1 G/DL
ALP BLD-CCNC: 213 U/L
ALT SERPL-CCNC: 257 U/L
ANION GAP SERPL CALC-SCNC: 11 MMOL/L
APPEARANCE: CLEAR
AST SERPL-CCNC: 176 U/L
BILIRUB SERPL-MCNC: 0.6 MG/DL
BILIRUBIN URINE: NEGATIVE
BLOOD URINE: NEGATIVE
BUN SERPL-MCNC: 15 MG/DL
CALCIUM SERPL-MCNC: 9.3 MG/DL
CHLORIDE SERPL-SCNC: 102 MMOL/L
CO2 SERPL-SCNC: 27 MMOL/L
COLOR: ABNORMAL
CREAT SERPL-MCNC: 0.9 MG/DL
GLUCOSE QUALITATIVE U: NEGATIVE MG/DL
GLUCOSE SERPL-MCNC: 81 MG/DL
HCG-TM SERPL-MCNC: <1 MIU/ML
HCT VFR BLD CALC: 35.6 % — LOW (ref 39–50)
HGB BLD-MCNC: 12.1 G/DL — LOW (ref 13–17)
KETONES URINE: NEGATIVE
LDH SERPL-CCNC: 221 U/L
LEUKOCYTE ESTERASE URINE: NEGATIVE
MCHC RBC-ENTMCNC: 33.7 PG — SIGNIFICANT CHANGE UP (ref 27–34)
MCHC RBC-ENTMCNC: 34 G/DL — SIGNIFICANT CHANGE UP (ref 32–36)
MCV RBC AUTO: 99.1 FL — SIGNIFICANT CHANGE UP (ref 80–100)
NITRITE URINE: NEGATIVE
PH URINE: 7
PLATELET # BLD AUTO: 182 K/UL — SIGNIFICANT CHANGE UP (ref 150–400)
POTASSIUM SERPL-SCNC: 4.5 MMOL/L
PROT SERPL-MCNC: 7.1 G/DL
PROTEIN URINE: NEGATIVE MG/DL
RBC # BLD: 3.6 M/UL — LOW (ref 4.2–5.8)
RBC # FLD: 19 % — HIGH (ref 10.3–14.5)
SODIUM SERPL-SCNC: 140 MMOL/L
SPECIFIC GRAVITY URINE: 1.02
UROBILINOGEN URINE: 1 MG/DL
WBC # BLD: 5 K/UL — SIGNIFICANT CHANGE UP (ref 3.8–10.5)
WBC # FLD AUTO: 5 K/UL — SIGNIFICANT CHANGE UP (ref 3.8–10.5)

## 2018-11-14 PROCEDURE — 99214 OFFICE O/P EST MOD 30 MIN: CPT

## 2018-11-14 RX ORDER — STANDARDIZED SENNA CONCENTRATE 8.6 MG/1
8.6 TABLET ORAL
Qty: 60 | Refills: 5 | Status: DISCONTINUED | COMMUNITY
Start: 2018-11-05 | End: 2018-11-14

## 2018-11-14 RX ORDER — ACETAMINOPHEN, DEXTROMETHORPHAN HBR, DOXYLAMINE SUCCINATE, PHENYLEPHRINE HCL 10-5-325MG
1000 KIT ORAL DAILY
Qty: 30 | Refills: 5 | Status: DISCONTINUED | COMMUNITY
Start: 2018-11-05 | End: 2018-11-14

## 2018-11-14 NOTE — REVIEW OF SYSTEMS
[Fatigue] : fatigue [SOB on Exertion] : shortness of breath during exertion [Constipation] : constipation [Dysuria] : dysuria [Joint Pain] : joint pain [Muscle Pain] : muscle pain [Dizziness] : dizziness [Anxiety] : anxiety [Negative] : ENT [Fever] : no fever [Chills] : no chills [Night Sweats] : no night sweats [Recent Change In Weight] : ~T no recent weight change [Leg Claudication] : no intermittent leg claudication [Lower Ext Edema] : no lower extremity edema [Shortness Of Breath] : no shortness of breath [Wheezing] : no wheezing [Cough] : no cough [Abdominal Pain] : no abdominal pain [Vomiting] : no vomiting [Diarrhea] : no diarrhea [Incontinence] : no incontinence [Joint Stiffness] : no joint stiffness [Skin Rash] : no skin rash [Difficulty Walking] : no difficulty walking [Muscle Weakness] : no muscle weakness [Easy Bleeding] : no tendency for easy bleeding [Easy Bruising] : no tendency for easy bruising [FreeTextEntry5] : minor discomfor intermittently when lying on left side, left upper chest, a few palpitation incidents, mostly at night, thinks it may be related to anxety [FreeTextEntry7] : some nausea, altered taste [FreeTextEntry9] : posterior calves, knee pains and finger joints [de-identified] : dry skin [de-identified] : no headaches, paresthesias, see HPI [de-identified] : thinks he may be somewhat depressed

## 2018-11-14 NOTE — ASSESSMENT
[Palliative Care Plan] : not applicable at this time [FreeTextEntry1] : Cleve is seen in the office today and continues to have multiple complaints in regards to recovery from his chemotherapy. Fatigue is somewhat better. His appetite is somewhat improved with some continued altered taste. Some foods tastes differently. He complains of some occasional burning sensation in the urine, and a urinalysis was performed, which was negative. He continues to complain of paresthesias involving the left foot. The right foot is uninvolved. Paresthesias secondary to chemotherapy are generally symmetrical.\par \par He does seem somewhat more optimistic at this time. He continues to have chronic back pain for which he is taking oxycodone for quite some time. He still has occasional dizzy episodes and states that he had a blackout episode up to getting up and fell striking his left side without injury. He is not follow up with Dr. Marquez and has an appointment with him at the end of the month. He has not been taking his carvedilol or lisinopril for quite some time.\par \par Hopefully he will continue to improve from his chemotherapy. Tumor markers are normal today. His transaminase levels are still elevated. The alkaline phosphatase is also increasing. This may be residual from the chemotherapy that he received, but if it continues to trend upwards, he'll need to see a hepatologist in regards to these issues.\par \par All questions were answered to best of my ability and to his apparent satisfaction. I will see him once again in 4 weeks' time.

## 2018-11-14 NOTE — RESULTS/DATA
[FreeTextEntry1] : Today's labs reveal an alpha-fetoprotein level of 4.4. The beta hCG level was undetectable. The AST was 176 with an ALT of 257. The alkaline phosphatase was 213. These numbers are slightly increased from the prior values and he is not on any medication that causes hepatic issues. The LDH was 221. Urinalysis was negative. CBC revealed a white blood cell count of 5.0 with hemoglobin value of 12.1 g and a platelet count of 182,000.

## 2018-11-14 NOTE — HISTORY OF PRESENT ILLNESS
[Disease: _____________________] : Disease: [unfilled] [T: ___] : T[unfilled] [N: ___] : N[unfilled] [M: ___] : M[unfilled] [AJCC Stage: ____] : AJCC Stage: [unfilled] [de-identified] : Mr. Calderón is seen in consultation on 8/8/17. On July 11, 2017, he awoke with severe back pain extending into the left testicle. There was a heaviness and pulling sensation. He went to the ER at [facility] and a sonogram was done, revealing a mass. He was seen by Rivas Oconnor and underwent a left radical orchiectomy on July 20, 2017. The pain and pulling sensation resolved. He has been on oxycodone a time, since January 2017, for low back pains. No respiratory complaints. No urinary symptoms. Has had sperm cryopreservation. \par \par 8/30/17...Cleve comes in today for follow up was just discharged yesterday. He was in the hospital for 4 days and he was neutropenic, found a right arm superficial thrombosis at the site of IV. treated with vanco, blood cultures negative. Discharged on Levaquin. Overnight he had a temp of 104.  He is having some hair loss, numbness in his fingers and his toes. He has tinnitus intermittently, started two days after the last dose. He was having soreness in the back of his throat that now resolved. He complains of dysgeusia and he had a lot  of burning and acid reflux-like symptoms during his treatment Carafate, Protonix and the lower dose of Decadron improved his symptoms., Not sleeping well, likely due to steroids.\par \par 9/19/17...cycle 2 day 5 was 9/9/17\fabian Poon is seen in the office today. He has significant fatigue. He had vomiting for 5 days after the chemotherapy with nausea and he said that his complexion was green. He had acid reflux issues once again despite the dropping off the dose of his dexamethasone. He feels a burning sensation in his lower abdomen and he feels that the medications for nausea did not help. He also has significant fatigue which is improved today. He has intermittent tinnitus. He has tingling in the tips of his toes and fingers which are intermittent. His appetite has been good and he is gained weight while on chemotherapy but he says that he eats smaller portions. He denies dysgeusia or dry mouth.\par \par 10/9/17...Completed cycle 3 last week.  He states he feels "horrible." HE states he noticed bloating in the abdomen, He went a whole week after this treatment wit vomiting every day, last time he vomited was two days ago. He fell at one point secondary to being weak and feeling lightheaded. He feels generalized weakness.He had a blood clot, during his treatment, that resolved. His appetite is poor, He eats and then, he eats, he feels like he has to regurgitate his food up after eating. He has fatigue, tinnitus stopped. he denies any numbness of the feet. has had chronic numbness of two of his fingers.He has dysgeusia \par \par 10/30/17...Admission Date: \par -  Admission Date 16-Oct-2017 13:33. \par Discharge Date: \par - Discharge Date	21-Oct-2017 \par Chief Complaint/Reason for Visit: \par Chief Complaint/Reason for Admission	Atrial flutter with RVR\par *******************\par  36 yo M with PMHx of testicular cancer s/p left orchiectomy on chemotherapy who present to the ED before receiving chemo for tachycardia. \par The patient states that he has been feeling well for the past 2 weeks since his 3rd dose of chemotherapy. He denies CP, ABARCA, peripheral edema, cough, \par palpitations. He does states that he had one near syncopal event during a bout of emesis 2 weeks ago which caused him to fall into a door. Has not had any \par other syncopal or presyncopal episodes since then. In the ED he was found to have tachycardia found to be a.flutter after receiving 10mg IV diltiazem x2, a CXR with well placed portacath and no infiltrations. On bedside US found to have EF of 10-15% while tachycardic. \par The patient was initially started on metoprolol tartrate with increasing dosages unable to control the arrhythmia he was subsequently switch to a diltiazem drip. The metiport was removed after discussion with his Oncologist and cardiology for possible arrhythmia induction which was removed 10/19. He also received a TTE on 10/19 which showed EF of 35-40% with LV remodeling. \par Afterwards he was switched to metoprolol succinate 200mg qday. \par He was started on heparin for preparation for TYLOR and cardioversion which was performed on 10/20 and found a RA appendage thrombus with PFO and reduced LV \par function. No cardioversion was performed as a result and he was switched to rivaroxaban on 10/21, lisinopril 2.5mg qday and metoprolol succinate 200mg \par qday. He will follow with cardiology in 2 weeks and EP in 4 weeks. Treatments/Procedures/Significant Findings/Patient Condition: \par Other Significant Findings: \par - Other Significant Findings	 \par < from: Transesophageal Echocardiogram w/o TTE (10.20.17 @ 12:08) > \par Conclusions: \par 1. Left atrial enlargement.  No left atrial or left atrial appendage thrombus. \par 2. Moderate to severe global left ventricular systolic dysfunction. \par 3. There is an echodensity (likely thrombus vs mass) in the right atrial wall that measures (approximately 2.7 cm x 2.0 cm). This is near the distal point of the SVC and may \par represent thrombus from prior indwelling catheter. \par 4. Right ventricular enlargement with decreased right ventricular systolic function. \par 5. Color Doppler demonstrates evidence of a patent foramen ovale. \par 6. Trivial pericardial effusion. \par ****************************************************************************\par TYLOR showed right atrial appendage thrombus, 27 mm, On Xarelto. Feels "exhausted". Does not generally note palpitations. Anxiety level is high. Occasional ABARCA, not at rest. No cough, no sputum, no wheezing. No nausea at this time, has regurgitation. Occ vomiting noted. Fatigue is prominent. Appetite is good. \par \par 7/9/18...Had RPLND on 6/6/18, with viable GCT present. Embryonal pathology. Says he have fatigue, still "recovering" from the RLPND. He has some soreness of the abdomen and says he has some delayed healing. He say Dr Piper on 7/6 in lieu of Dr Restrepo and was told that it was healing well. He is upset that there is residual cancer present, which he says "bummed him out completely". He says he has been reviewing issues related to continued treatment, only after I called him recently as he had not made an appointment. he says his appetite is poor and he is eating small amounts multiple times as he cannot eat full full meals. He says he has had some nausea and he started OTC Prevacid, which has helped. No significant weight loss. Some ABARCA if he walks about 3 blocks or so, he has fatigue with walking as well. he has some paresthesias of the left thigh after the surgery. He notes some spasms of the muscles in that area at times. Urine flow is normal, no nocturia. Libido down, not sexually active, no masturbation since surgery. \par \par 7/19/18...Feels the same, "still recovering". Still has some discomfort near the wound. Appetite s good. Gained 2 pounds. no cough. Mild ABARCA with activities. No palpitations. No leg edema. He continues with some fatigue, 6 on 0-10 scale. Feels he is not motivated when the fatigue is high. He feels his hands are weaker than before. Lifting is fine, but twisting a bottle to open may be  impaired. Ambulates with a cane  due to paresthesias of the left anterior thigh. This from knee  to thigh area,. No paresthesias of the feet. He sometimes has some pains in his feet at night which he says are not cramps. \par \par 9/6/18...Cycle 2, TIP, day # 9. Admitted on Sunday, August 19. \par *********************************************\par Hospitalized again post chemo, this time for 9 days. ANC was <0.1, platelets were low. had low sodium, low potassium and low magnesium. He has had lots of muscle aches and bone pain secondary to the Taxol. Feeling better, still feels weak. Some SOB with exertion. No pains. No headaches, has paresthesias of the bottom of the feet along with some toes. It improves after the chemo. Somewhat more this time. No fevers, no chills. Some nausea with office visits. Appetite is "moderate". Tinnitus has improved, minor dizziness at times. \par \par 8/30/18...Delayed treatment #3 by one week. "Not a good week". Complains of hearing being sensitive. He has tinnitus. This is intermittent, more in the past 2-3 days. He has some vertigo, with some issue walking. He feels as if he is falling to the right side at times, no falls. He has to hold onto the door or furniture to walk. He has some vomiting without nausea. He vomited a few minutes ago. Appetite is decreased, He has altered taste as well. Fatigue is present, not as bad as before. he says he is a "little" fatigued at the moment. Paresthesias of feet have been intermittent, less notable at this time. Spends a lot of time sitting, sleeping. He feels as if his body is exhausted. His mother says he has a lot of anxiety. He agrees. He has thoughts running through his mind about chemo and adverse effects. \par \par 9/20/18...he feels "okay" at times. Occasionally feels nauseous and can't eat, occ feels dizzy. has some regurgitation at times. Has fatigue. No better. Tinnitus is "not as bad", persistent in the right, intermittent in the left. When dizzy,  he feels he is leaning to one side. Spends a lot of time sitting or lying in bed. Paresthesias of the feet, left foot more so than right. Right toes affects the 4th and 5th toe. Fingertips are affected. He notes some darkening of skin folds of the hand joints. Feels no SOB unless he is active. using a cane today. Not taking any meds at this point. \par \par 10/9/18...Admission Date: after cycle 3 TIP\par -  Admission Date 01-Oct-2018 02:09. \par Discharge Date: \par - Discharge Date	06-Oct-2018 \par ***********************************\par HOSPITAL COURSE: The patient continued to have significant nausea while inpatient with intermittent vomiting. He received aggressive IV hydration with \par IVF while he was no longer able to tolerate po. On hospital day 4, patient was able to tolerate some po and requested discharge home. His course was c/b \par neutropenic fever to 101. Cultures were sent and remained negative. Patient was also started on cefepime. His ANC started to improve towards the end of the \par hospital course. His platelets, however, downtrended. He was given 1U platelets as per oncology recommendations. His midline was noted to have been in since \par July. After discussion with his outpatient oncologist, the patient opted to have his midline removed and will receive his last dose of chemotherapy via \par peripheral IV. During his hospital course, patient was noted to have an episode of tachycardia to the 140's followed by bradycardia to the 20's during \par orthostatic vitals. He was seen by Dr. Brown of cardiology who feels this is chemo-induced orthostasis and no further inpatient w/u is needed. He is \par medically stable for dc home. Prior to discharge received additional platelet transfusion and MID-Line was removed after discussion with oncology. \par *******************************************************************\par He was admitted and told that the midline had to be changed. He did have fever with neutropenia. Discharged on the 6th. Hypotensive with orthostasis,seen by cardiology, restarted lisinopril and carvedilol. He has fallen twice since discharge. He has LOC for a few seconds. he is somewhat confused after the episodes. Walking with a cane. he also had an similar episode while in the hospital. Appetite returned, has altered taste, no N/V now, but did have on admission. No source of fever found. Has paresthesias of the left foot, all toes, not his right.  Had aches and pains with the Taxol, which he describes as "brutal". \par \par 10/29/18...called last week, was having difficulty keeping food down and felt weak and dizzy. We arranged for fluids, but he called Friday ad declined. Taking Zofran ADT after meals at times. Able to drink more at this time. Did not eat this AM. Yesterday, had a bagel and egg, for lunch, potatoes, pork and beans and Spam. For dinner had rice and Spam, small amount. No vomiting yesterday, but had nausea. Dizziness if he leis his head back, or if he gets up to fast. Walking with  a cane. No falls. Started Has paresthesias of the feet, on the left side. Continues on oxycodone, about  1 per day, chronic use for low back pains. No leg edema. No cough, ABARCA.  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 Metastatic tumor in 2 of 57 lymph nodes,  Extranodal extension is identified, 5.2 cm, pN3 [FreeTextEntry1] : etoposide and cisplatin started August 14, 2017. Cycle 4 delayed..then omitted due to tachycardia and low EF. RPLND delayed, residual viable embryonal cancer, TIP started July 29, 2018, cycle 3 delayed for grade 3 fatigue, then again one more week for transaminitis.  [de-identified] : has not followed with Dr Marquez and remains off of carvedilol, enoxaparin and lisinopril. He takes ondansetron on occasion. Says he had a "blackout" episode after getting up, fell, striking left side, no injury. No LOC. Neuropathy remains, mostly left leg, somewhat better at times, continues on therapy. Minimal on the right side. Considering returning to work. Fatigue is present, somewhat better. Appetite is still impaired, eats once a day. Gained a few pounds. Still has some taste alteration. No leg edema. No abdominal pains. Saw Phong Restrepo yesterday in follow up. He has constipation, and may go 4-5 days w/o bowel movement.

## 2018-11-14 NOTE — PHYSICAL EXAM
[Restricted in physically strenuous activity but ambulatory and able to carry out work of a light or sedentary nature] : Status 1- Restricted in physically strenuous activity but ambulatory and able to carry out work of a light or sedentary nature, e.g., light house work, office work [Normal] : affect appropriate [de-identified] : no gynecomastia [de-identified] : usual low back discomfort to percussion

## 2018-11-14 NOTE — REASON FOR VISIT
[Follow-Up Visit] : a follow-up [Family Member] : family member [FreeTextEntry2] : mixed germ cell tumor

## 2018-11-16 ENCOUNTER — CLINICAL ADVICE (OUTPATIENT)
Age: 38
End: 2018-11-16

## 2018-11-19 ENCOUNTER — APPOINTMENT (OUTPATIENT)
Dept: HEPATOLOGY | Facility: CLINIC | Age: 38
End: 2018-11-19
Payer: MEDICAID

## 2018-11-19 VITALS
BODY MASS INDEX: 32.45 KG/M2 | TEMPERATURE: 97.9 F | HEART RATE: 86 BPM | SYSTOLIC BLOOD PRESSURE: 113 MMHG | RESPIRATION RATE: 17 BRPM | HEIGHT: 71.5 IN | DIASTOLIC BLOOD PRESSURE: 76 MMHG | WEIGHT: 237 LBS

## 2018-11-19 DIAGNOSIS — K80.20 CALCULUS OF GALLBLADDER W/OUT CHOLECYSTITIS W/OUT OBSTRUCTION: ICD-10-CM

## 2018-11-19 PROCEDURE — 99204 OFFICE O/P NEW MOD 45 MIN: CPT

## 2018-11-20 LAB
ALBUMIN SERPL ELPH-MCNC: 4.3 G/DL
ALP BLD-CCNC: 112 U/L
ALT SERPL-CCNC: 95 U/L
ANION GAP SERPL CALC-SCNC: 10 MMOL/L
AST SERPL-CCNC: 49 U/L
BILIRUB SERPL-MCNC: 0.3 MG/DL
BUN SERPL-MCNC: 11 MG/DL
CALCIUM SERPL-MCNC: 9.7 MG/DL
CERULOPLASMIN SERPL-MCNC: 27 MG/DL
CHLORIDE SERPL-SCNC: 103 MMOL/L
CO2 SERPL-SCNC: 27 MMOL/L
CREAT SERPL-MCNC: 0.94 MG/DL
DEPRECATED KAPPA LC FREE/LAMBDA SER: 1.69 RATIO
GLUCOSE SERPL-MCNC: 83 MG/DL
HBV SURFACE AG SER QL: NONREACTIVE
HCV AB SER QL: NONREACTIVE
HCV S/CO RATIO: 0.18 S/CO
IGA SER QL IEP: 182 MG/DL
IGG SER QL IEP: 1500 MG/DL
IGM SER QL IEP: 74 MG/DL
INR PPP: 1.01 RATIO
IRON SATN MFR SERPL: 24 %
IRON SERPL-MCNC: 73 UG/DL
KAPPA LC CSF-MCNC: 1.34 MG/DL
KAPPA LC SERPL-MCNC: 2.27 MG/DL
POTASSIUM SERPL-SCNC: 4.5 MMOL/L
PROT SERPL-MCNC: 7.6 G/DL
PT BLD: 11.3 SEC
SODIUM SERPL-SCNC: 140 MMOL/L
TIBC SERPL-MCNC: 310 UG/DL
UIBC SERPL-MCNC: 237 UG/DL

## 2018-11-21 ENCOUNTER — APPOINTMENT (OUTPATIENT)
Dept: INTERNAL MEDICINE | Facility: CLINIC | Age: 38
End: 2018-11-21

## 2018-11-21 LAB
HBV DNA # SERPL NAA+PROBE: NOT DETECTED IU/ML
HCV RNA SERPL NAA DL=5-ACNC: NOT DETECTED IU/ML
HCV RNA SERPL NAA+PROBE-LOG IU: NOT DETECTED LOGIU/ML
HEPB DNA PCR LOG: NOT DETECTED LOGIU/ML
MITOCHONDRIA AB SER IF-ACNC: NORMAL
SMOOTH MUSCLE AB SER QL IF: NORMAL

## 2018-11-26 LAB
A1AT PHENOTYP SERPL-IMP: NORMAL BANDS
A1AT SERPL-MCNC: 112 MG/DL
ANA PAT FLD IF-IMP: ABNORMAL
ANA SER IF-ACNC: ABNORMAL
HEV AB SER QL: NEGATIVE

## 2018-11-29 ENCOUNTER — MEDICATION RENEWAL (OUTPATIENT)
Age: 38
End: 2018-11-29

## 2018-11-29 ENCOUNTER — APPOINTMENT (OUTPATIENT)
Dept: INTERNAL MEDICINE | Facility: CLINIC | Age: 38
End: 2018-11-29

## 2018-11-30 ENCOUNTER — APPOINTMENT (OUTPATIENT)
Dept: CARDIOLOGY | Facility: CLINIC | Age: 38
End: 2018-11-30
Payer: MEDICAID

## 2018-11-30 ENCOUNTER — NON-APPOINTMENT (OUTPATIENT)
Age: 38
End: 2018-11-30

## 2018-11-30 VITALS
DIASTOLIC BLOOD PRESSURE: 80 MMHG | WEIGHT: 237 LBS | HEIGHT: 71.5 IN | BODY MASS INDEX: 32.45 KG/M2 | SYSTOLIC BLOOD PRESSURE: 115 MMHG | OXYGEN SATURATION: 98 % | HEART RATE: 83 BPM

## 2018-11-30 PROCEDURE — 93000 ELECTROCARDIOGRAM COMPLETE: CPT

## 2018-11-30 PROCEDURE — 99214 OFFICE O/P EST MOD 30 MIN: CPT

## 2018-11-30 NOTE — DISCUSSION/SUMMARY
[FreeTextEntry1] : Patient is a 38 year-old with testicular cancer who developed atrial flutter secondary to catheter irritation of the right atrium and nonischemic cardiomyopathy, likely secondary to the tachycardia and not to the chemotherapy regimen. \par He has been maintaining sinus rhythm since flutter ablation in February 2018.\par Orthostatic dizziness is slowly improving since discontinuing the chemotherapy. Most recent chemo was given October 2018.\par \par Most recent echo (in July 2018) showed reduced LVEF (35-40%).\par  \par Plan to restart and uptitrate beta-blocker and ACEi as tolerated if LVEF remains reduced. Patient is not currently on any cardiac medications.

## 2018-11-30 NOTE — CARDIOLOGY SUMMARY
[Normal] : normal [LVEF ___%] : LVEF [unfilled]% [___] : [unfilled] [Moderate] : moderate LV dysfunction [Enlarged] : enlarged LA size [Mild] : mild mitral regurgitation

## 2018-11-30 NOTE — REASON FOR VISIT
[Follow-Up - From Hospitalization] : follow-up of a recent hospitalization for [Discharge Date: ___] : Discharge Date: [unfilled] [Admitted for Heart Failure] : patient was not admitted for heart failure [FreeTextEntry2] : neuotropenia [Parent] : parent [Other: _____] : [unfilled]

## 2018-11-30 NOTE — HISTORY OF PRESENT ILLNESS
[FreeTextEntry1] : Patient is a 38 year-old being actively treated for testicular cancer. In 2017, he had completed three cycles of chemotherapy. After the first two cycles, he received medi-port in the right chest wall. He got his third round of chemo through this port. He subsequently developed shortness of breath and chest tightness and was found to be in atrial flutter with rapid ventricular rate. He was admitted for rate control and planned TYLOR and DCCV. He was started on anticoagulation, but during the TYLOR, he was noted to have a right atrial thrombus and cardioversion was aborted. He was discharged on Toprol 200mg daily and Xarelto 15mg BID. \par \par At his initial visit here in November, I added Digoxin to his regimen for better rate control. This helped to better control his rate. In December 2017, TYLOR still showed RA thrombus. TYLOR performed in January 2018 showed improvement in thrombus size, and patient was successfully TYLOR/DCCV into sinus rhythm.\par February 5, 2018, he maintains normal sinus rhythm. He was taking a low dose ACEi (lisinopril 2.5mg daily) and Toprol 100mg daily.\par \par June 27, 2018 - patient recently admitted to [facility] for resection of retroperitoneal mass. The procedure was successful and the patient was discharged. He maintains normal sinus rhythm.\par \par Patient restarted chemotherapy and completed three cycles of TIP. He had a left upper extremity PICC line placed, which has been removed.. \par \par November 2018 - Patient has been taken off anticoagulation and guideline directed medical therapy for cardiomyopathy. He was having episodes that he describes as blacking out while getting chemotherapy, which lead to discontinuation of his medication.\par \par PMD: Amador Arambula MD [phone]\par Oncologist: Neo Chirinos MD [phone]

## 2018-11-30 NOTE — PHYSICAL EXAM
[General Appearance - Well Developed] : well developed [Normal Appearance] : normal appearance [Well Groomed] : well groomed [General Appearance - Well Nourished] : well nourished [No Deformities] : no deformities [General Appearance - In No Acute Distress] : no acute distress [Normal Oral Mucosa] : normal oral mucosa [No Oral Pallor] : no oral pallor [Normal Oropharynx] : normal oropharynx [Normal Jugular Venous A Waves Present] : normal jugular venous A waves present [Normal Jugular Venous V Waves Present] : normal jugular venous V waves present [No Jugular Venous Fernández A Waves] : no jugular venous fernández A waves [] : no respiratory distress [Respiration, Rhythm And Depth] : normal respiratory rhythm and effort [Exaggerated Use Of Accessory Muscles For Inspiration] : no accessory muscle use [Auscultation Breath Sounds / Voice Sounds] : lungs were clear to auscultation bilaterally [Bowel Sounds] : normal bowel sounds [Abdomen Soft] : soft [Abdomen Tenderness] : non-tender [Abnormal Walk] : normal gait [Gait - Sufficient For Exercise Testing] : the gait was sufficient for exercise testing [Nail Clubbing] : no clubbing of the fingernails [Cyanosis, Localized] : no localized cyanosis [Skin Color & Pigmentation] : normal skin color and pigmentation [No Venous Stasis] : no venous stasis [No Xanthoma] : no  xanthoma was observed [FreeTextEntry1] : site of R-chest medi-port clean/dry/intact [Oriented To Time, Place, And Person] : oriented to person, place, and time [Impaired Insight] : insight and judgment were intact [Affect] : the affect was normal [Mood] : the mood was normal [No Anxiety] : not feeling anxious [Conjunctiva] : the conjunctiva were normal in both eyes [Normal] : the eyelids were normal bilaterally [PERRL] : pupils were equal in size, round, and reactive to light [EOM Intact] : extraocular movements were intact [Yellow Sclera (Icteric)] : no scleral icterus was seen [Not Palpable] : not palpable [Normal Rate] : normal [Rhythm Regular] : regular [Normal S1] : normal S1 [Normal S2] : normal S2 [No Murmur] : no murmurs heard [Right Carotid Bruit] : no bruit heard over the right carotid [Left Carotid Bruit] : no bruit heard over the left carotid [No Pitting Edema] : no pitting edema present

## 2018-12-01 ENCOUNTER — OUTPATIENT (OUTPATIENT)
Dept: OUTPATIENT SERVICES | Facility: HOSPITAL | Age: 38
LOS: 1 days | End: 2018-12-01

## 2018-12-01 DIAGNOSIS — Z98.890 OTHER SPECIFIED POSTPROCEDURAL STATES: Chronic | ICD-10-CM

## 2018-12-01 DIAGNOSIS — Z95.828 PRESENCE OF OTHER VASCULAR IMPLANTS AND GRAFTS: Chronic | ICD-10-CM

## 2018-12-01 DIAGNOSIS — Z90.79 ACQUIRED ABSENCE OF OTHER GENITAL ORGAN(S): Chronic | ICD-10-CM

## 2018-12-03 ENCOUNTER — INPATIENT (INPATIENT)
Facility: HOSPITAL | Age: 38
LOS: 2 days | Discharge: HOME | End: 2018-12-06
Attending: HOSPITALIST | Admitting: HOSPITALIST
Payer: MEDICAID

## 2018-12-03 ENCOUNTER — LABORATORY RESULT (OUTPATIENT)
Age: 38
End: 2018-12-03

## 2018-12-03 ENCOUNTER — APPOINTMENT (OUTPATIENT)
Dept: INTERNAL MEDICINE | Facility: CLINIC | Age: 38
End: 2018-12-03
Payer: MEDICAID

## 2018-12-03 VITALS
SYSTOLIC BLOOD PRESSURE: 120 MMHG | TEMPERATURE: 98.1 F | OXYGEN SATURATION: 96 % | WEIGHT: 238 LBS | HEART RATE: 82 BPM | BODY MASS INDEX: 30.54 KG/M2 | RESPIRATION RATE: 16 BRPM | HEIGHT: 74 IN | DIASTOLIC BLOOD PRESSURE: 80 MMHG

## 2018-12-03 VITALS
SYSTOLIC BLOOD PRESSURE: 122 MMHG | HEART RATE: 103 BPM | DIASTOLIC BLOOD PRESSURE: 76 MMHG | RESPIRATION RATE: 20 BRPM | TEMPERATURE: 99 F | OXYGEN SATURATION: 100 %

## 2018-12-03 DIAGNOSIS — Z90.79 ACQUIRED ABSENCE OF OTHER GENITAL ORGAN(S): Chronic | ICD-10-CM

## 2018-12-03 DIAGNOSIS — Z98.890 OTHER SPECIFIED POSTPROCEDURAL STATES: Chronic | ICD-10-CM

## 2018-12-03 DIAGNOSIS — Z95.828 PRESENCE OF OTHER VASCULAR IMPLANTS AND GRAFTS: Chronic | ICD-10-CM

## 2018-12-03 LAB
ALBUMIN SERPL ELPH-MCNC: 4.9 G/DL — SIGNIFICANT CHANGE UP (ref 3.3–5)
ALP SERPL-CCNC: 177 U/L — HIGH (ref 40–120)
ALT FLD-CCNC: 219 U/L — HIGH (ref 4–41)
APPEARANCE UR: CLEAR — SIGNIFICANT CHANGE UP
AST SERPL-CCNC: 211 U/L — HIGH (ref 4–40)
BASE EXCESS BLDV CALC-SCNC: 4.6 MMOL/L — SIGNIFICANT CHANGE UP
BASE EXCESS BLDV CALC-SCNC: 5.8 MMOL/L — SIGNIFICANT CHANGE UP
BASOPHILS # BLD AUTO: 0.02 K/UL — SIGNIFICANT CHANGE UP (ref 0–0.2)
BASOPHILS NFR BLD AUTO: 0.2 % — SIGNIFICANT CHANGE UP (ref 0–2)
BILIRUB SERPL-MCNC: 0.8 MG/DL — SIGNIFICANT CHANGE UP (ref 0.2–1.2)
BILIRUB UR-MCNC: NEGATIVE — SIGNIFICANT CHANGE UP
BLOOD GAS VENOUS - CREATININE: 0.83 MG/DL — SIGNIFICANT CHANGE UP (ref 0.5–1.3)
BLOOD GAS VENOUS - CREATININE: 0.89 MG/DL — SIGNIFICANT CHANGE UP (ref 0.5–1.3)
BLOOD UR QL VISUAL: NEGATIVE — SIGNIFICANT CHANGE UP
BUN SERPL-MCNC: 15 MG/DL — SIGNIFICANT CHANGE UP (ref 7–23)
BUN SERPL-MCNC: 15 MG/DL — SIGNIFICANT CHANGE UP (ref 7–23)
CALCIUM SERPL-MCNC: 10.2 MG/DL — SIGNIFICANT CHANGE UP (ref 8.4–10.5)
CALCIUM SERPL-MCNC: 9.1 MG/DL — SIGNIFICANT CHANGE UP (ref 8.4–10.5)
CHLORIDE BLDV-SCNC: 102 MMOL/L — SIGNIFICANT CHANGE UP (ref 96–108)
CHLORIDE BLDV-SCNC: 102 MMOL/L — SIGNIFICANT CHANGE UP (ref 96–108)
CHLORIDE SERPL-SCNC: 100 MMOL/L — SIGNIFICANT CHANGE UP (ref 98–107)
CHLORIDE SERPL-SCNC: 99 MMOL/L — SIGNIFICANT CHANGE UP (ref 98–107)
CO2 SERPL-SCNC: 23 MMOL/L — SIGNIFICANT CHANGE UP (ref 22–31)
CO2 SERPL-SCNC: 25 MMOL/L — SIGNIFICANT CHANGE UP (ref 22–31)
COLOR SPEC: YELLOW — SIGNIFICANT CHANGE UP
CREAT SERPL-MCNC: 0.82 MG/DL — SIGNIFICANT CHANGE UP (ref 0.5–1.3)
CREAT SERPL-MCNC: 0.93 MG/DL — SIGNIFICANT CHANGE UP (ref 0.5–1.3)
EOSINOPHIL # BLD AUTO: 0 K/UL — SIGNIFICANT CHANGE UP (ref 0–0.5)
EOSINOPHIL NFR BLD AUTO: 0 % — SIGNIFICANT CHANGE UP (ref 0–6)
GAS PNL BLDV: 133 MMOL/L — LOW (ref 136–146)
GAS PNL BLDV: 141 MMOL/L — SIGNIFICANT CHANGE UP (ref 136–146)
GLUCOSE BLDV-MCNC: 117 — HIGH (ref 70–99)
GLUCOSE BLDV-MCNC: 134 — HIGH (ref 70–99)
GLUCOSE SERPL-MCNC: 112 MG/DL — HIGH (ref 70–99)
GLUCOSE SERPL-MCNC: 125 MG/DL — HIGH (ref 70–99)
GLUCOSE UR-MCNC: NEGATIVE — SIGNIFICANT CHANGE UP
HCO3 BLDV-SCNC: 25 MMOL/L — SIGNIFICANT CHANGE UP (ref 20–27)
HCO3 BLDV-SCNC: 28 MMOL/L — HIGH (ref 20–27)
HCT VFR BLD CALC: 44.8 % — SIGNIFICANT CHANGE UP (ref 39–50)
HCT VFR BLDV CALC: 37.2 % — LOW (ref 39–51)
HCT VFR BLDV CALC: 46.1 % — SIGNIFICANT CHANGE UP (ref 39–51)
HGB BLD-MCNC: 14.5 G/DL — SIGNIFICANT CHANGE UP (ref 13–17)
HGB BLDV-MCNC: 12.1 G/DL — LOW (ref 13–17)
HGB BLDV-MCNC: 15 G/DL — SIGNIFICANT CHANGE UP (ref 13–17)
IMM GRANULOCYTES # BLD AUTO: 0.04 # — SIGNIFICANT CHANGE UP
IMM GRANULOCYTES NFR BLD AUTO: 0.5 % — SIGNIFICANT CHANGE UP (ref 0–1.5)
KETONES UR-MCNC: NEGATIVE — SIGNIFICANT CHANGE UP
LACTATE BLDV-MCNC: 2.3 MMOL/L — HIGH (ref 0.5–2)
LACTATE BLDV-MCNC: 3.5 MMOL/L — HIGH (ref 0.5–2)
LEUKOCYTE ESTERASE UR-ACNC: NEGATIVE — SIGNIFICANT CHANGE UP
LYMPHOCYTES # BLD AUTO: 0.48 K/UL — LOW (ref 1–3.3)
LYMPHOCYTES # BLD AUTO: 5.4 % — LOW (ref 13–44)
MCHC RBC-ENTMCNC: 32.4 % — SIGNIFICANT CHANGE UP (ref 32–36)
MCHC RBC-ENTMCNC: 32.4 PG — SIGNIFICANT CHANGE UP (ref 27–34)
MCV RBC AUTO: 100.2 FL — HIGH (ref 80–100)
MONOCYTES # BLD AUTO: 0.24 K/UL — SIGNIFICANT CHANGE UP (ref 0–0.9)
MONOCYTES NFR BLD AUTO: 2.7 % — SIGNIFICANT CHANGE UP (ref 2–14)
NEUTROPHILS # BLD AUTO: 8.03 K/UL — HIGH (ref 1.8–7.4)
NEUTROPHILS NFR BLD AUTO: 91.2 % — HIGH (ref 43–77)
NITRITE UR-MCNC: NEGATIVE — SIGNIFICANT CHANGE UP
NRBC # FLD: 0 — SIGNIFICANT CHANGE UP
PCO2 BLDV: 55 MMHG — HIGH (ref 41–51)
PCO2 BLDV: 59 MMHG — HIGH (ref 41–51)
PH BLDV: 7.33 PH — SIGNIFICANT CHANGE UP (ref 7.32–7.43)
PH BLDV: 7.37 PH — SIGNIFICANT CHANGE UP (ref 7.32–7.43)
PH UR: 7.5 — SIGNIFICANT CHANGE UP (ref 5–8)
PLATELET # BLD AUTO: 217 K/UL — SIGNIFICANT CHANGE UP (ref 150–400)
PMV BLD: 10.2 FL — SIGNIFICANT CHANGE UP (ref 7–13)
PO2 BLDV: 33 MMHG — LOW (ref 35–40)
PO2 BLDV: < 24 MMHG — LOW (ref 35–40)
POTASSIUM BLDV-SCNC: 5.5 MMOL/L — HIGH (ref 3.4–4.5)
POTASSIUM BLDV-SCNC: SIGNIFICANT CHANGE UP MMOL/L (ref 3.4–4.5)
POTASSIUM SERPL-MCNC: 5.8 MMOL/L — HIGH (ref 3.5–5.3)
POTASSIUM SERPL-MCNC: SIGNIFICANT CHANGE UP MMOL/L (ref 3.5–5.3)
POTASSIUM SERPL-SCNC: 5.8 MMOL/L — HIGH (ref 3.5–5.3)
POTASSIUM SERPL-SCNC: SIGNIFICANT CHANGE UP MMOL/L (ref 3.5–5.3)
PROT SERPL-MCNC: 9 G/DL — HIGH (ref 6–8.3)
PROT UR-MCNC: 20 — SIGNIFICANT CHANGE UP
RBC # BLD: 4.47 M/UL — SIGNIFICANT CHANGE UP (ref 4.2–5.8)
RBC # FLD: 14.8 % — HIGH (ref 10.3–14.5)
SAO2 % BLDV: 16.2 % — LOW (ref 60–85)
SAO2 % BLDV: 56.5 % — LOW (ref 60–85)
SODIUM SERPL-SCNC: 134 MMOL/L — LOW (ref 135–145)
SODIUM SERPL-SCNC: 139 MMOL/L — SIGNIFICANT CHANGE UP (ref 135–145)
SP GR SPEC: 1.02 — SIGNIFICANT CHANGE UP (ref 1–1.04)
TROPONIN T, HIGH SENSITIVITY: 12 NG/L — SIGNIFICANT CHANGE UP (ref ?–14)
UROBILINOGEN FLD QL: NORMAL — SIGNIFICANT CHANGE UP
WBC # BLD: 8.81 K/UL — SIGNIFICANT CHANGE UP (ref 3.8–10.5)
WBC # FLD AUTO: 8.81 K/UL — SIGNIFICANT CHANGE UP (ref 3.8–10.5)

## 2018-12-03 PROCEDURE — 71046 X-RAY EXAM CHEST 2 VIEWS: CPT | Mod: 26

## 2018-12-03 PROCEDURE — 74177 CT ABD & PELVIS W/CONTRAST: CPT | Mod: 26

## 2018-12-03 PROCEDURE — 99214 OFFICE O/P EST MOD 30 MIN: CPT

## 2018-12-03 RX ORDER — VANCOMYCIN HCL 1 G
1000 VIAL (EA) INTRAVENOUS ONCE
Qty: 0 | Refills: 0 | Status: COMPLETED | OUTPATIENT
Start: 2018-12-03 | End: 2018-12-03

## 2018-12-03 RX ORDER — SODIUM CHLORIDE 9 MG/ML
1000 INJECTION, SOLUTION INTRAVENOUS ONCE
Qty: 0 | Refills: 0 | Status: COMPLETED | OUTPATIENT
Start: 2018-12-03 | End: 2018-12-03

## 2018-12-03 RX ORDER — ONDANSETRON 8 MG/1
4 TABLET, FILM COATED ORAL ONCE
Qty: 0 | Refills: 0 | Status: COMPLETED | OUTPATIENT
Start: 2018-12-03 | End: 2018-12-03

## 2018-12-03 RX ORDER — PIPERACILLIN AND TAZOBACTAM 4; .5 G/20ML; G/20ML
3.38 INJECTION, POWDER, LYOPHILIZED, FOR SOLUTION INTRAVENOUS ONCE
Qty: 0 | Refills: 0 | Status: COMPLETED | OUTPATIENT
Start: 2018-12-03 | End: 2018-12-03

## 2018-12-03 RX ORDER — ACETAMINOPHEN 500 MG
1000 TABLET ORAL ONCE
Qty: 0 | Refills: 0 | Status: COMPLETED | OUTPATIENT
Start: 2018-12-03 | End: 2018-12-03

## 2018-12-03 RX ADMIN — Medication 250 MILLIGRAM(S): at 23:51

## 2018-12-03 RX ADMIN — SODIUM CHLORIDE 2000 MILLILITER(S): 9 INJECTION, SOLUTION INTRAVENOUS at 18:46

## 2018-12-03 RX ADMIN — ONDANSETRON 4 MILLIGRAM(S): 8 TABLET, FILM COATED ORAL at 19:33

## 2018-12-03 RX ADMIN — Medication 400 MILLIGRAM(S): at 19:33

## 2018-12-03 RX ADMIN — PIPERACILLIN AND TAZOBACTAM 200 GRAM(S): 4; .5 INJECTION, POWDER, LYOPHILIZED, FOR SOLUTION INTRAVENOUS at 22:01

## 2018-12-03 NOTE — ED ADULT NURSE NOTE - OBJECTIVE STATEMENT
pt states since his last chemo round 6 weeks ago he has had nausea but able to keep it controlled, today he has been vomiting all day with nausea and abdominal pain. pt wit tenderness to diffuse abdomen.

## 2018-12-03 NOTE — ED ADULT NURSE NOTE - NSIMPLEMENTINTERV_GEN_ALL_ED
Implemented All Universal Safety Interventions:  [place] to call system. Call bell, personal items and telephone within reach. Instruct patient to call for assistance. Room bathroom lighting operational. Non-slip footwear when patient is off stretcher. Physically safe environment: no spills, clutter or unnecessary equipment. Stretcher in lowest position, wheels locked, appropriate side rails in place.

## 2018-12-03 NOTE — ED PROVIDER NOTE - MEDICAL DECISION MAKING DETAILS
38 M with constitutional symptoms and nausea/vomiting for several days. Last chemo 5 weeks ago. On exam ABD tenderness is a possible etiology of infection. Other etiologies include urine and respiratory. Dehydration is also a possible etiology due to decreased PO intake. Plan: Fluids, imaging

## 2018-12-03 NOTE — HEALTH RISK ASSESSMENT
[No falls in past year] : Patient reported no falls in the past year [0] : 2) Feeling down, depressed, or hopeless: Not at all (0) [] : No [de-identified] : URO/RANDOLPHONC/HEP/CARD [[id number]] : 0

## 2018-12-03 NOTE — ED PROVIDER NOTE - ATTENDING CONTRIBUTION TO CARE
DR. HDZ, ATTENDING MD-  I performed a face to face bedside interview with patient regarding history of present illness, review of symptoms and past medical history. I completed an independent physical exam.  I have discussed patient's plan of care with the resident.   Documentation as above in the note.    Leonardo: h/ testicular cancer, previously on chemo but stopped 5 weeks ago due to abnlo LFTs (per pt), co worsening of his baseline N/V/weakness, and fevers.  No CP, +ABARCA, + abd pain (epigastric), no dysuria.  No diarrhea. +_constipation    On exam: weak appearing, but awake, alert, oriented x4, anicteric sclera, OP benign, lungs clear, abd soft, nabs, + mild ttp LLQ/LUQ, no rebound/guarding.  suprapubic wound granulating well, no discharge or surrounding erythema    A/P: dehydration, ?sepsis in setting of immunocompromise.  Will treat presumptively for sepsis, IVF (monitoring carefully for fluid overload in settign of past chf).  admit

## 2018-12-03 NOTE — HISTORY OF PRESENT ILLNESS
[de-identified] : 38 year old  male patient with history of stable Left Testicular Cancer, Nonischemic Cardiomyopathy, Abnormal LFTs, history as stated, presented for follow up examination. Patient is compliant with all medications. Denies shortness of breath, chest pain or abdominal pains at this time. ROS as stated.\par Supplemental history was obtained from mother, who is accompanying the patient for today's examination.\par

## 2018-12-03 NOTE — ED ADULT TRIAGE NOTE - CHIEF COMPLAINT QUOTE
Pt c/o nausea/vomiting since this morning with intermittent periods of hot and cold.  Pt endorsing abdominal pain starting after vomiting.  Pt appears uncomfortable.  Last chemo approx 5 weeks ago.  PMHx:  testicular CA with mets to lymph nodes, anemia, aflutter, afib,

## 2018-12-03 NOTE — ED PROVIDER NOTE - PROGRESS NOTE DETAILS
CT AP with decreased size of prior fluid collection in abdomen. CXR negative. Cx pending. RVP negative. On Abx. Will d/w hospitalist.

## 2018-12-03 NOTE — ASSESSMENT
[FreeTextEntry1] : 38 year old male found to have stable Left Testicular Cancer, Nonischemic Cardiomyopathy, Abnormal LFTs,with the current regimen, diet and life style modifications, as counseled. Prior results reviewed and discussed with the patient during today's examination. Plan as ordered.\par Patient was recently evaluated by GI/CARD/HEMEONC/URO , findings and recommendations reviewed with the patient during today's examination.\par Non-healing surgical wound care, Vit C/Zinc, as counseled.\par Patient is refusing all immunizations, in spite of counseling risks and benefits.\par

## 2018-12-03 NOTE — ED PROVIDER NOTE - PHYSICAL EXAMINATION
General: Alert and Oriented. No apparent distress.  Head: Normocephalic and atraumatic.  Eyes: PERRLA with EOMI.  Neck: Supple. Trachea midline.   Cardiac: Normal S1 and S2 w/ RRR. No murmurs appreciated.   Pulmonary: Vesicular breath sounds bilaterally. No increased WOB. No wheezes or crackles.  Abdominal: Soft, TTP periumbilically and LUQ. Open suprapubic wound with slight erythema, nontender, not foul smelling.  Neurologic: No focal sensory or motor deficits.  Musculoskeletal: Strength appropriate in all 4 extremities for age with no limited ROM.  Psychiatric: Appropriate mood and affect. No apparent risk to self or others.

## 2018-12-03 NOTE — ED PROVIDER NOTE - CARE PLAN
Principal Discharge DX:	Fever  Secondary Diagnosis:	Testicular cancer  Secondary Diagnosis:	Vomiting  Secondary Diagnosis:	Weakness  Secondary Diagnosis:	Tachycardia

## 2018-12-03 NOTE — ED PROVIDER NOTE - OBJECTIVE STATEMENT
38 M withtesticular cancer w/ metastases sp orchiectomy, and last chemo 5 weeks ago, a fib/ a flutter with cardiomyopathy, p/w fevers, chills, nausea, vomiting, ABD pain for several days, as well as chills and decreased OP intake. Denies cp, diarrhea, skin changes, weight changes. last chemo 5 weeks ago. His oncologist is Dr Chirinos. 38 M with testicular cancer w/ metastases sp orchiectomy, and last chemo (iphosphamide, taxol, platin) 5 weeks ago stopped 2/2 LFTs, a fib/ a flutter (AC stopped 2/2 low Hb) with cardiomyopathy (last EF 2/2018 45%), p/w fevers, chills, nausea, vomiting, ABD pain for several days, as well as chills and decreased OP intake. Denies cp, diarrhea, skin changes, weight changes. His oncologist is Dr Chirinos.

## 2018-12-04 DIAGNOSIS — K76.9 LIVER DISEASE, UNSPECIFIED: ICD-10-CM

## 2018-12-04 DIAGNOSIS — I42.8 OTHER CARDIOMYOPATHIES: ICD-10-CM

## 2018-12-04 DIAGNOSIS — R50.9 FEVER, UNSPECIFIED: ICD-10-CM

## 2018-12-04 DIAGNOSIS — Z29.9 ENCOUNTER FOR PROPHYLACTIC MEASURES, UNSPECIFIED: ICD-10-CM

## 2018-12-04 DIAGNOSIS — E86.1 HYPOVOLEMIA: ICD-10-CM

## 2018-12-04 DIAGNOSIS — C62.92 MALIGNANT NEOPLASM OF LEFT TESTIS, UNSPECIFIED WHETHER DESCENDED OR UNDESCENDED: ICD-10-CM

## 2018-12-04 DIAGNOSIS — R11.2 NAUSEA WITH VOMITING, UNSPECIFIED: ICD-10-CM

## 2018-12-04 LAB
25(OH)D3 SERPL-MCNC: 20.3 NG/ML
ALBUMIN SERPL ELPH-MCNC: 3.7 G/DL — SIGNIFICANT CHANGE UP (ref 3.3–5)
ALBUMIN SERPL ELPH-MCNC: 4.3 G/DL
ALP BLD-CCNC: 139 U/L
ALP SERPL-CCNC: 120 U/L — SIGNIFICANT CHANGE UP (ref 40–120)
ALT FLD-CCNC: 137 U/L — HIGH (ref 4–41)
ALT SERPL-CCNC: 136 U/L
ANION GAP SERPL CALC-SCNC: 9 MMOL/L
APPEARANCE: CLEAR
APTT BLD: 29.4 SEC — SIGNIFICANT CHANGE UP (ref 27.5–36.3)
AST SERPL-CCNC: 139 U/L
AST SERPL-CCNC: 93 U/L — HIGH (ref 4–40)
B PERT DNA SPEC QL NAA+PROBE: NOT DETECTED — SIGNIFICANT CHANGE UP
BASE EXCESS BLDV CALC-SCNC: 4.2 MMOL/L — SIGNIFICANT CHANGE UP
BASOPHILS # BLD AUTO: 0.01 K/UL
BASOPHILS NFR BLD AUTO: 0.1 %
BILIRUB SERPL-MCNC: 0.3 MG/DL
BILIRUB SERPL-MCNC: 0.5 MG/DL — SIGNIFICANT CHANGE UP (ref 0.2–1.2)
BILIRUBIN URINE: NEGATIVE
BLOOD GAS VENOUS - CREATININE: 0.7 MG/DL — SIGNIFICANT CHANGE UP (ref 0.5–1.3)
BLOOD URINE: NEGATIVE
BUN SERPL-MCNC: 14 MG/DL — SIGNIFICANT CHANGE UP (ref 7–23)
BUN SERPL-MCNC: 14 MG/DL — SIGNIFICANT CHANGE UP (ref 7–23)
BUN SERPL-MCNC: 17 MG/DL
C PNEUM DNA SPEC QL NAA+PROBE: NOT DETECTED — SIGNIFICANT CHANGE UP
CALCIUM SERPL-MCNC: 9.2 MG/DL — SIGNIFICANT CHANGE UP (ref 8.4–10.5)
CALCIUM SERPL-MCNC: 9.2 MG/DL — SIGNIFICANT CHANGE UP (ref 8.4–10.5)
CALCIUM SERPL-MCNC: 9.5 MG/DL
CHLORIDE BLDV-SCNC: 103 MMOL/L — SIGNIFICANT CHANGE UP (ref 96–108)
CHLORIDE SERPL-SCNC: 101 MMOL/L — SIGNIFICANT CHANGE UP (ref 98–107)
CHLORIDE SERPL-SCNC: 102 MMOL/L
CHLORIDE SERPL-SCNC: 102 MMOL/L — SIGNIFICANT CHANGE UP (ref 98–107)
CHOLEST SERPL-MCNC: 130 MG/DL
CHOLEST/HDLC SERPL: 3.4 RATIO
CO2 SERPL-SCNC: 26 MMOL/L — SIGNIFICANT CHANGE UP (ref 22–31)
CO2 SERPL-SCNC: 26 MMOL/L — SIGNIFICANT CHANGE UP (ref 22–31)
CO2 SERPL-SCNC: 28 MMOL/L
COLOR: YELLOW
CREAT SERPL-MCNC: 0.85 MG/DL — SIGNIFICANT CHANGE UP (ref 0.5–1.3)
CREAT SERPL-MCNC: 0.92 MG/DL — SIGNIFICANT CHANGE UP (ref 0.5–1.3)
CREAT SERPL-MCNC: 0.94 MG/DL
CREAT SPEC-SCNC: 202 MG/DL
EOSINOPHIL # BLD AUTO: 0.11 K/UL
EOSINOPHIL NFR BLD AUTO: 1.1 %
ERYTHROCYTE [SEDIMENTATION RATE] IN BLOOD BY WESTERGREN METHOD: 32 MM/HR
FLUAV H1 2009 PAND RNA SPEC QL NAA+PROBE: NOT DETECTED — SIGNIFICANT CHANGE UP
FLUAV H1 RNA SPEC QL NAA+PROBE: NOT DETECTED — SIGNIFICANT CHANGE UP
FLUAV H3 RNA SPEC QL NAA+PROBE: NOT DETECTED — SIGNIFICANT CHANGE UP
FLUAV SUBTYP SPEC NAA+PROBE: SIGNIFICANT CHANGE UP
FLUBV RNA SPEC QL NAA+PROBE: NOT DETECTED — SIGNIFICANT CHANGE UP
FOLATE SERPL-MCNC: 10.6 NG/ML
GAS PNL BLDV: 130 MMOL/L — LOW (ref 136–146)
GGT SERPL-CCNC: 630 U/L
GLUCOSE BLDV-MCNC: 118 — HIGH (ref 70–99)
GLUCOSE QUALITATIVE U: NEGATIVE MG/DL
GLUCOSE SERPL-MCNC: 110 MG/DL — HIGH (ref 70–99)
GLUCOSE SERPL-MCNC: 70 MG/DL — SIGNIFICANT CHANGE UP (ref 70–99)
GLUCOSE SERPL-MCNC: 91 MG/DL
HADV DNA SPEC QL NAA+PROBE: NOT DETECTED — SIGNIFICANT CHANGE UP
HBA1C MFR BLD HPLC: 4.8 %
HCO3 BLDV-SCNC: 28 MMOL/L — HIGH (ref 20–27)
HCOV PNL SPEC NAA+PROBE: SIGNIFICANT CHANGE UP
HCT VFR BLD CALC: 36.8 % — LOW (ref 39–50)
HCT VFR BLD CALC: 41.1 %
HCT VFR BLDV CALC: 35.5 % — LOW (ref 39–51)
HDLC SERPL-MCNC: 38 MG/DL
HGB BLD-MCNC: 11.8 G/DL — LOW (ref 13–17)
HGB BLD-MCNC: 13.1 G/DL
HGB BLDV-MCNC: 11.5 G/DL — LOW (ref 13–17)
HMPV RNA SPEC QL NAA+PROBE: NOT DETECTED — SIGNIFICANT CHANGE UP
HPIV1 RNA SPEC QL NAA+PROBE: NOT DETECTED — SIGNIFICANT CHANGE UP
HPIV2 RNA SPEC QL NAA+PROBE: NOT DETECTED — SIGNIFICANT CHANGE UP
HPIV3 RNA SPEC QL NAA+PROBE: NOT DETECTED — SIGNIFICANT CHANGE UP
HPIV4 RNA SPEC QL NAA+PROBE: NOT DETECTED — SIGNIFICANT CHANGE UP
IMM GRANULOCYTES NFR BLD AUTO: 0.4 %
INR BLD: 1.18 — HIGH (ref 0.88–1.17)
IRON SATN MFR SERPL: 18 %
IRON SERPL-MCNC: 58 UG/DL
KETONES URINE: NEGATIVE
LACTATE BLDV-MCNC: 0.8 MMOL/L — SIGNIFICANT CHANGE UP (ref 0.5–2)
LDLC SERPL CALC-MCNC: 72 MG/DL
LEUKOCYTE ESTERASE URINE: ABNORMAL
LYMPHOCYTES # BLD AUTO: 1.91 K/UL
LYMPHOCYTES NFR BLD AUTO: 19.3 %
MAGNESIUM SERPL-MCNC: 1.8 MG/DL — SIGNIFICANT CHANGE UP (ref 1.6–2.6)
MAN DIFF?: NORMAL
MCHC RBC-ENTMCNC: 31.9 GM/DL
MCHC RBC-ENTMCNC: 32 PG
MCHC RBC-ENTMCNC: 32.1 % — SIGNIFICANT CHANGE UP (ref 32–36)
MCHC RBC-ENTMCNC: 33 PG — SIGNIFICANT CHANGE UP (ref 27–34)
MCV RBC AUTO: 100.2 FL
MCV RBC AUTO: 102.8 FL — HIGH (ref 80–100)
MICROALBUMIN 24H UR DL<=1MG/L-MCNC: 2.8 MG/DL
MICROALBUMIN/CREAT 24H UR-RTO: 14 MG/G
MONOCYTES # BLD AUTO: 0.68 K/UL
MONOCYTES NFR BLD AUTO: 6.9 %
NEUTROPHILS # BLD AUTO: 7.17 K/UL
NEUTROPHILS NFR BLD AUTO: 72.2 %
NITRITE URINE: NEGATIVE
NRBC # FLD: 0 — SIGNIFICANT CHANGE UP
PCO2 BLDV: 49 MMHG — SIGNIFICANT CHANGE UP (ref 41–51)
PH BLDV: 7.39 PH — SIGNIFICANT CHANGE UP (ref 7.32–7.43)
PH URINE: 6.5
PHOSPHATE SERPL-MCNC: 3.4 MG/DL — SIGNIFICANT CHANGE UP (ref 2.5–4.5)
PLATELET # BLD AUTO: 157 K/UL — SIGNIFICANT CHANGE UP (ref 150–400)
PLATELET # BLD AUTO: 206 K/UL
PMV BLD: 10 FL — SIGNIFICANT CHANGE UP (ref 7–13)
PO2 BLDV: 61 MMHG — HIGH (ref 35–40)
POTASSIUM BLDV-SCNC: 8.7 MMOL/L — CRITICAL HIGH (ref 3.4–4.5)
POTASSIUM SERPL-MCNC: 3.9 MMOL/L — SIGNIFICANT CHANGE UP (ref 3.5–5.3)
POTASSIUM SERPL-MCNC: 4 MMOL/L — SIGNIFICANT CHANGE UP (ref 3.5–5.3)
POTASSIUM SERPL-SCNC: 3.9 MMOL/L — SIGNIFICANT CHANGE UP (ref 3.5–5.3)
POTASSIUM SERPL-SCNC: 4 MMOL/L — SIGNIFICANT CHANGE UP (ref 3.5–5.3)
POTASSIUM SERPL-SCNC: 4.3 MMOL/L
PROT SERPL-MCNC: 6.8 G/DL — SIGNIFICANT CHANGE UP (ref 6–8.3)
PROT SERPL-MCNC: 7.9 G/DL
PROTEIN URINE: ABNORMAL MG/DL
PROTHROM AB SERPL-ACNC: 13.2 SEC — HIGH (ref 9.8–13.1)
RBC # BLD: 3.58 M/UL — LOW (ref 4.2–5.8)
RBC # BLD: 4.1 M/UL
RBC # FLD: 14.9 % — HIGH (ref 10.3–14.5)
RBC # FLD: 15.9 %
RSV RNA SPEC QL NAA+PROBE: NOT DETECTED — SIGNIFICANT CHANGE UP
RV+EV RNA SPEC QL NAA+PROBE: NOT DETECTED — SIGNIFICANT CHANGE UP
SAO2 % BLDV: 89.9 % — HIGH (ref 60–85)
SODIUM SERPL-SCNC: 138 MMOL/L — SIGNIFICANT CHANGE UP (ref 135–145)
SODIUM SERPL-SCNC: 139 MMOL/L
SODIUM SERPL-SCNC: 140 MMOL/L — SIGNIFICANT CHANGE UP (ref 135–145)
SPECIFIC GRAVITY URINE: 1.03
SPECIMEN SOURCE: SIGNIFICANT CHANGE UP
T3 SERPL-MCNC: 167 NG/DL
T4 FREE SERPL-MCNC: 1.3 NG/DL
TIBC SERPL-MCNC: 316 UG/DL
TRIGL SERPL-MCNC: 99 MG/DL
TSH SERPL-ACNC: 3.18 UIU/ML
UIBC SERPL-MCNC: 258 UG/DL
UROBILINOGEN URINE: NEGATIVE MG/DL
VIT B12 SERPL-MCNC: 936 PG/ML
WBC # BLD: 7.09 K/UL — SIGNIFICANT CHANGE UP (ref 3.8–10.5)
WBC # FLD AUTO: 7.09 K/UL — SIGNIFICANT CHANGE UP (ref 3.8–10.5)
WBC # FLD AUTO: 9.92 K/UL

## 2018-12-04 PROCEDURE — 99222 1ST HOSP IP/OBS MODERATE 55: CPT | Mod: GC

## 2018-12-04 PROCEDURE — 76705 ECHO EXAM OF ABDOMEN: CPT | Mod: 26

## 2018-12-04 PROCEDURE — 99223 1ST HOSP IP/OBS HIGH 75: CPT | Mod: GC

## 2018-12-04 PROCEDURE — 12345: CPT | Mod: NC,GC

## 2018-12-04 RX ORDER — MESNA 100 MG/ML
0 INJECTION, SOLUTION INTRAVENOUS
Qty: 0 | Refills: 0 | COMMUNITY

## 2018-12-04 RX ORDER — ONDANSETRON 8 MG/1
4 TABLET, FILM COATED ORAL EVERY 6 HOURS
Qty: 0 | Refills: 0 | Status: DISCONTINUED | OUTPATIENT
Start: 2018-12-04 | End: 2018-12-06

## 2018-12-04 RX ORDER — SODIUM CHLORIDE 9 MG/ML
1000 INJECTION, SOLUTION INTRAVENOUS
Qty: 0 | Refills: 0 | Status: DISCONTINUED | OUTPATIENT
Start: 2018-12-04 | End: 2018-12-06

## 2018-12-04 RX ORDER — ACETAMINOPHEN 500 MG
650 TABLET ORAL EVERY 6 HOURS
Qty: 0 | Refills: 0 | Status: DISCONTINUED | OUTPATIENT
Start: 2018-12-04 | End: 2018-12-04

## 2018-12-04 RX ORDER — PIPERACILLIN AND TAZOBACTAM 4; .5 G/20ML; G/20ML
3.38 INJECTION, POWDER, LYOPHILIZED, FOR SOLUTION INTRAVENOUS EVERY 8 HOURS
Qty: 0 | Refills: 0 | Status: DISCONTINUED | OUTPATIENT
Start: 2018-12-04 | End: 2018-12-04

## 2018-12-04 RX ORDER — SODIUM CHLORIDE 9 MG/ML
500 INJECTION, SOLUTION INTRAVENOUS ONCE
Qty: 0 | Refills: 0 | Status: COMPLETED | OUTPATIENT
Start: 2018-12-04 | End: 2018-12-04

## 2018-12-04 RX ORDER — ACETAMINOPHEN 500 MG
650 TABLET ORAL EVERY 8 HOURS
Qty: 0 | Refills: 0 | Status: DISCONTINUED | OUTPATIENT
Start: 2018-12-04 | End: 2018-12-06

## 2018-12-04 RX ORDER — PIPERACILLIN AND TAZOBACTAM 4; .5 G/20ML; G/20ML
3.38 INJECTION, POWDER, LYOPHILIZED, FOR SOLUTION INTRAVENOUS EVERY 8 HOURS
Qty: 0 | Refills: 0 | Status: DISCONTINUED | OUTPATIENT
Start: 2018-12-04 | End: 2018-12-06

## 2018-12-04 RX ORDER — ONDANSETRON 8 MG/1
8 TABLET, FILM COATED ORAL EVERY 8 HOURS
Qty: 0 | Refills: 0 | Status: DISCONTINUED | OUTPATIENT
Start: 2018-12-04 | End: 2018-12-04

## 2018-12-04 RX ORDER — ENOXAPARIN SODIUM 100 MG/ML
40 INJECTION SUBCUTANEOUS DAILY
Qty: 0 | Refills: 0 | Status: DISCONTINUED | OUTPATIENT
Start: 2018-12-04 | End: 2018-12-06

## 2018-12-04 RX ADMIN — PIPERACILLIN AND TAZOBACTAM 25 GRAM(S): 4; .5 INJECTION, POWDER, LYOPHILIZED, FOR SOLUTION INTRAVENOUS at 18:26

## 2018-12-04 RX ADMIN — SODIUM CHLORIDE 125 MILLILITER(S): 9 INJECTION, SOLUTION INTRAVENOUS at 10:45

## 2018-12-04 RX ADMIN — Medication 650 MILLIGRAM(S): at 18:52

## 2018-12-04 RX ADMIN — Medication 650 MILLIGRAM(S): at 18:51

## 2018-12-04 RX ADMIN — SODIUM CHLORIDE 2000 MILLILITER(S): 9 INJECTION, SOLUTION INTRAVENOUS at 06:32

## 2018-12-04 RX ADMIN — PIPERACILLIN AND TAZOBACTAM 25 GRAM(S): 4; .5 INJECTION, POWDER, LYOPHILIZED, FOR SOLUTION INTRAVENOUS at 07:09

## 2018-12-04 NOTE — CONSULT NOTE ADULT - SUBJECTIVE AND OBJECTIVE BOX
Chief Complaint:  Patient is a 38y old  Male who presents with a chief complaint of dehydration (04 Dec 2018 15:07)      HPI:    Allergies:  No Known Allergies      Home Medications:    Hospital Medications:  enoxaparin Injectable 40 milliGRAM(s) SubCutaneous daily  lactated ringers. 1000 milliLiter(s) IV Continuous <Continuous>  ondansetron Injectable 4 milliGRAM(s) IV Push every 6 hours PRN  piperacillin/tazobactam IVPB. 3.375 Gram(s) IV Intermittent every 8 hours      PMHX/PSHX:  Neuropathy  Anemia  Mixed germ cell tumor  Obesity  Atrial mass  Atrial flutter  Cardiomyopathy  Atrial fibrillation  Thrombus  Testicular cancer  Lumbar herniated disc  Testicular mass  No pertinent past medical history  No pertinent past medical history  History of abdominal surgery  History of abdominal surgery  History of cardioversion  Port-a-cath in place  History of orchiectomy  No significant past surgical history  No significant past surgical history  No significant past surgical history      Family history:  Family history of hypertension in father  Family history of kidney stones  Family history of cancer (Grandparent)  Family history of ischemic heart disease (Grandparent)  Family history of diabetes mellitus (Aunt)      Social History:     Review of systems: Negative, except as otherwise noted above      PHYSICAL EXAM:   Vital Signs:  Vital Signs Last 24 Hrs  T(C): 37.3 (04 Dec 2018 13:20), Max: 37.8 (03 Dec 2018 18:04)  T(F): 99.1 (04 Dec 2018 13:20), Max: 100.1 (03 Dec 2018 18:04)  HR: 91 (04 Dec 2018 13:20) (84 - 104)  BP: 97/57 (04 Dec 2018 13:20) (84/55 - 102/58)  BP(mean): --  RR: 18 (04 Dec 2018 13:20) (14 - 18)  SpO2: 99% (04 Dec 2018 13:20) (96% - 100%)  Daily Height in cm: 185.42 (04 Dec 2018 13:20)    Daily     GENERAL:  No acute distress  HEENT:  Anicteric, no thrush  CHEST:  Non-labored breathing, lungs clear b/l  HEART:  +s1, s2 heart sounds, no murmurs  ABDOMEN:    EXTREMITIES:  warm and well perfused, no edema  SKIN:    NEURO:          LABS:  CBC Full  -  ( 04 Dec 2018 12:03 )  WBC Count : 7.09 K/uL  Hemoglobin : 11.8 g/dL  Hematocrit : 36.8 %  Platelet Count - Automated : 157 K/uL  Mean Cell Volume : 102.8 fL  Auto Neutrophil # :   Auto Neutrophil % :     12-03 @ 23:59  Na 138 mmol/L  K 3.9 mmol/L  Cl 101 mmol/L  CO2 26 mmol/L  BUN 14 mg/dL  Creat 0.85 mg/dL  Glucose 110 mg/dL  Ca 9.2 mg/dL    Total protein --  Albumin --  T bili --  Alk phos --  AST --  ALT --    PT/INR - ( 03 Dec 2018 23:59 )   PT: 13.2 SEC;   INR: 1.18          PTT - ( 03 Dec 2018 23:59 )  PTT:29.4 SEC      Imaging: Chief Complaint:  Patient is a 38y old  Male who presents with a chief complaint of dehydration (04 Dec 2018 15:07)      HPI: 38M with Afib/flutter s/p DCCV, NICM (EF 45%), gallstones, and testicular cancer s/p orchiectomy and chemotherapy (last chemo 5 weeks ago, stopped in the setting of elevated liver tests) who presents with fever, chills, and nausea. Hepatology is consulted for elevated liver tests.    He was previously evaluated for elevated liver tests that were thought to be secondary to chemotherapy. He last received chemotherapy approximately 5 weeks ago. He has had ongoing, intermittent nausea and nonbloody emesis. He reports fever and chills prior to hospitalization. He denies jaundice, pruritus, cough, rash, diarrhea, melena, hematochezia, hematemesis. He has been taking Zofran and oxycodone. He denies other recent medications, vitamins, supplements, herbal preparations, or illicit drugs. He has no known family history of liver diseases.     Allergies:  No Known Allergies      Home Medications:  oxyCODONE 15 mg oral tablet: 1 tab(s) orally every 6 hours, As Needed (04 Dec 2018 05:00)      Hospital Medications:  enoxaparin Injectable 40 milliGRAM(s) SubCutaneous daily  lactated ringers. 1000 milliLiter(s) IV Continuous <Continuous>  ondansetron Injectable 4 milliGRAM(s) IV Push every 6 hours PRN  piperacillin/tazobactam IVPB. 3.375 Gram(s) IV Intermittent every 8 hours      PMHX/PSHX:  Neuropathy  Anemia  Mixed germ cell tumor  Obesity  Atrial mass  Atrial flutter  Cardiomyopathy  Atrial fibrillation  Thrombus  Testicular cancer  Lumbar herniated disc  Testicular mass  No pertinent past medical history  No pertinent past medical history  History of abdominal surgery  History of abdominal surgery  History of cardioversion  Port-a-cath in place  History of orchiectomy  No significant past surgical history  No significant past surgical history  No significant past surgical history      Family history:  Family history of hypertension in father  Family history of kidney stones  Family history of cancer (Grandparent)  Family history of ischemic heart disease (Grandparent)  Family history of diabetes mellitus (Aunt)      Social History: As above    Review of systems: Negative, except as otherwise noted above      PHYSICAL EXAM:   Vital Signs:  Vital Signs Last 24 Hrs  T(C): 37.3 (04 Dec 2018 13:20), Max: 37.8 (03 Dec 2018 18:04)  T(F): 99.1 (04 Dec 2018 13:20), Max: 100.1 (03 Dec 2018 18:04)  HR: 91 (04 Dec 2018 13:20) (84 - 104)  BP: 97/57 (04 Dec 2018 13:20) (84/55 - 102/58)  BP(mean): --  RR: 18 (04 Dec 2018 13:20) (14 - 18)  SpO2: 99% (04 Dec 2018 13:20) (96% - 100%)  Daily Height in cm: 185.42 (04 Dec 2018 13:20)    Daily     GENERAL:  No acute distress  HEENT:  Anicteric  CHEST:  Non-labored breathing, lungs clear b/l  HEART:  +s1, s2 heart sounds, no murmurs  ABDOMEN:  Soft, nontender, no rebound, no guarding  EXTREMITIES:  warm and well perfused, no edema  SKIN:  No rash  NEURO:  Awake, interactive, oriented, following commands        LABS:  CBC Full  -  ( 04 Dec 2018 12:03 )  WBC Count : 7.09 K/uL  Hemoglobin : 11.8 g/dL  Hematocrit : 36.8 %  Platelet Count - Automated : 157 K/uL  Mean Cell Volume : 102.8 fL  Auto Neutrophil # :   Auto Neutrophil % :     12-03 @ 23:59  Na 138 mmol/L  K 3.9 mmol/L  Cl 101 mmol/L  CO2 26 mmol/L  BUN 14 mg/dL  Creat 0.85 mg/dL  Glucose 110 mg/dL  Ca 9.2 mg/dL    12-04 @ 12:03  T bili 0.5 mg/dL  Alk phos 120 u/L  AST 93 u/L   u/L    12-03 @ 18:15  T bili 0.8 mg/dL  Alk phos 177 u/L   u/L   u/L      PT/INR - ( 03 Dec 2018 23:59 )   PT: 13.2 SEC;   INR: 1.18          PTT - ( 03 Dec 2018 23:59 )  PTT:29.4 SEC      Imaging:    < from: US Abdomen Limited (12.04.18 @ 09:52) >    EXAM:  US ABDOMEN LIMITED        PROCEDURE DATE:  Dec  4 2018         INTERPRETATION:  CLINICAL INFORMATION: 38-year-old man with nonischemic   cardiomyopathy. Transaminitis.    COMPARISON: CT scan 12/03/2018    TECHNIQUE: Sonography of the right upper quadrant.     FINDINGS:    Liver: Hepatic steatosis. Patent main, left, and right portal veins.   Hepatopedal flow.    Bile ducts: Normal caliber.     Gallbladder: Gallstones.    Pancreas: Visualized portions are within normal limits.    Right kidney: 10.7 cm. No hydronephrosis.    Ascites: None.    IVC: Visualized portions are within normal limits.    IMPRESSION:     Hepatic steatosis.  Gallstones.                  VELIA NIÑO M.D., ATTENDING RADIOLOGIST  This document has been electronically signed. Dec  4 2018 10:25AM                  < end of copied text >      < from: CT Abdomen and Pelvis w/ IV Cont (12.03.18 @ 22:24) >    EXAM:  CT ABDOMEN AND PELVIS IC        PROCEDURE DATE:  Dec  3 2018         INTERPRETATION:  CLINICAL INFORMATION: Abdominal pain. Check the change   of abdominal fluid collection.    COMPARISON: None.    PROCEDURE:   CT of the Abdomen and Pelvis was performed with intravenous contrast.   Intravenous contrast: 90 ml Omnipaque 350. 10 ml discarded.  Oral contrast: None.  Sagittal and coronal reformats were performed.    FINDINGS:    LOWER CHEST: Bibasilar subsegmental atelectasis.    LIVER: Within normal limits.  BILE DUCTS: Normal caliber.  GALLBLADDER: Cholelithiasis..  SPLEEN: Within normal limits.  PANCREAS: Within normal limits.  ADRENALS: Within normal limits.  KIDNEYS/URETERS: Bilateral renal lesions are too small to characterize to   characterize.    BLADDER: Within normal limits.  REPRODUCTIVE ORGANS: The prostate is within normal limits.    BOWEL: No bowel obstruction. Normal appendix.  PERITONEUM: No ascites. 3.2 x 4.6 cm fluid collection in the right lower   abdomen is decreased in size, without peripheral enhancement.  VESSELS:  Within normal limits.  RETROPERITONEUM: No lymphadenopathy.  Retroperitoneal surgical clips.  ABDOMINAL WALL: Anterior abdominal wall postsurgical changes.  BONES: Within normal limits.    IMPRESSION:   Fluid collection in the right lower abdomen is decrease in size and   without peripheral enhancement.                  UAZIR BOOTHE D.O., RADIOLOGY RESIDENT  This document has been electronically signed.  ARMIDA GOMEZ M.D., ATTENDING RADIOLOGIST  This document has been electronically signed. Dec  3 2018 10:46PM                  < end of copied text >

## 2018-12-04 NOTE — CONSULT NOTE ADULT - ASSESSMENT
38 year old male with PMH testicular cancer w/ metastases s/p orchiectomy, and last chemo (iphosphamide, taxol, platin) 5 weeks ago stopped 2/2 elevated LFTs, hx of afib/aflutter s/p DCCV now in NSR, NICM (last EF 2/2018 45%), chronic low back pain who presented to [facility] on 12/3/18 with fever and N/V. In the ED Tmax of 100.1, tachycardic to >100 and relatively hypotensive with SBP in the 80's. WBC of 8.81 with 91.2% PMN. Transaminitis with AST of 211, , Alk Phos of 177, T Bili of 0.8. Lactic acid of 3.5. U/A with neg nitrite and leuk esterase. RVP negative.  CT Abdomen/Pelvis with fluid collection in the right lower abdomen is decrease in size and without peripheral enhancement. CXR clear. RUQ US with no obstruction or biliary dilatation.     Overall, likely viral gastroenteritis with resulting dehydration from N/V contributing to presentation. Given lactic acidosis on presentation agree with covering with Zosyn while awaiting BCx. Suspect that the fluid collection in abdomen is noninfectious given presence for ~2 months without signs/symptoms of infection during this time period and given decrease in size without treatment.    --Recommend continuing Zosyn 3.375 mg IV Q8H  --Continue to trend Transaminases  --F/U Prelim BCx  --Patient agreeable to Influenza vaccination - would administer prior to discharge

## 2018-12-04 NOTE — PROGRESS NOTE ADULT - PROBLEM SELECTOR PLAN 1
doesn't meet SIRS criteria, unknown source: CXR clear lungs, RVP negative, UA negative, CT abdomen/pelvis without clear source (shows a fluid collection but no ring enhancement to suggest abscess)  will c/w zosyn since immunocompromised until bcx, ucx results, no indication for vancomycin at this time  initial lactate now downtrending 3.5 -> 2.3 s/p fluids doesn't meet SIRS criteria, unknown source: CXR clear lungs, RVP negative, UA negative, CT abdomen/pelvis without clear source (shows a fluid collection that has been improving since October when lymph node removal was done.)  - Given no signs of infections and no localizing symptoms and no fever; we will hold off on antibiotics for now and watch for now.  initial lactate now downtrending 3.5 -> 2.3 s/p fluids

## 2018-12-04 NOTE — H&P ADULT - NSHPREVIEWOFSYSTEMS_GEN_ALL_CORE
REVIEW OF SYSTEMS:    CONSTITUTIONAL: +ve fevers and chills, weakness  EYES/ENT: No visual changes;  No vertigo or throat pain   NECK: No pain or stiffness  RESPIRATORY: No cough, wheezing, hemoptysis; No shortness of breath  CARDIOVASCULAR: No chest pain or palpitations  GASTROINTESTINAL: +ve N/V with generalized abdominal pain during retching, no hematemesis; No diarrhea or constipation. No melena or hematochezia.  GENITOURINARY: +ve chronic dysuria, No frequency or hematuria  NEUROLOGICAL: No numbness or weakness  SKIN: No itching, burning, rashes, or lesions   All other review of systems is negative unless indicated above. REVIEW OF SYSTEMS:    CONSTITUTIONAL: +ve fevers and chills, weakness  EYES: No visual changes or eye discharge  ENT: No rhinorrhea or sore throat  NECK: No pain or stiffness  RESPIRATORY: No cough, wheezing, hemoptysis; No shortness of breath  CARDIOVASCULAR: No chest pain or palpitations  GASTROINTESTINAL: +ve N/V with generalized abdominal pain during retching, no hematemesis; No diarrhea or constipation. No melena or hematochezia.  GENITOURINARY: +ve chronic dysuria, No frequency or hematuria  NEUROLOGICAL: No numbness or weakness  SKIN: No itching, burning, rashes, or lesions

## 2018-12-04 NOTE — H&P ADULT - HISTORY OF PRESENT ILLNESS
38 M with testicular cancer w/ metastases sp orchiectomy, and last chemo (iphosphamide, taxol, platin) 5 weeks ago stopped 2/2 LFTs, hx of afib/aflutter now in NSR, NICM (last EF 2/2018 45%), chronic low back pain presents with chronic nausea/vomiting with new onset fever and chills. Patient has suffered from nausea and non bloody, bilious emesis since chemotherapy was initially started, denied episodes being worst. However now has fever and chills for one day. He also endorses reduced PO intake since anytime he eats, he has an episode of emesis. For N/V he has tried zofran 8 mg and reglan which initially helped him however now refractory. Also has non specific abdominal pain during retching episodes. Has chronic dysuria due to surgery, unchanged from prior. Otherwise denied cough, URI, chest pain, palpitations, lightheadedness at rest, dyspnea, diarrhea, skin rashes, headache.    In the ED Vitals Tmax 38 M with testicular cancer w/ metastases sp orchiectomy, and last chemo (iphosphamide, taxol, platin) 5 weeks ago stopped 2/2 LFTs, hx of afib/aflutter now in NSR, NICM (last EF 2/2018 45%), chronic low back pain presents with chronic nausea/vomiting with new onset fever and chills. Patient has suffered from nausea and non bloody, bilious emesis since chemotherapy was initially started, denied episodes being worst. However now has fever and chills for one day. He also endorses reduced PO intake since anytime he eats, he has an episode of emesis. For N/V he has tried zofran 8 mg and reglan which initially helped him however now refractory. Also has non specific abdominal pain during retching episodes. Has chronic dysuria due to surgery, unchanged from prior. Otherwise denied cough, URI, chest pain, palpitations, lightheadedness at rest, dyspnea, diarrhea, skin rashes, headache.    In the ED Vitals Tmax 100.1 rectal  BP 89/58 RR 14 SaO2 100% on RA  Received 1L LR, vanc/zosyn, tylenol, zofran 38 M with testicular cancer w/ metastases sp orchiectomy, and last chemo (iphosphamide, taxol, platin) 5 weeks ago stopped 2/2 elevated LFTs, hx of afib/aflutter s/p DCCV now in NSR, NICM (last EF 2/2018 45%), chronic low back pain presents with chronic nausea/vomiting with new onset fever and chills. Patient has suffered from nausea and non bloody, bilious emesis since chemotherapy was initially started, denied episodes being worst. However now has fever and chills for one day. He also endorses reduced PO intake since anytime he eats, he has an episode of emesis. For N/V he has tried zofran 8 mg and reglan which initially helped him however are now refractory. Also has non specific abdominal pain during retching episodes. Has chronic dysuria due to surgery, unchanged from prior. Otherwise denied cough, URI, chest pain, palpitations, lightheadedness at rest, dyspnea, diarrhea, skin rashes, headache.    On arrival to the ED, his vitals were T 98.6 (Tmax 100.1 rectal), P 103, /76 -> 89/58 (MAP 68, mentating well), R 20, O2 sat 100% RA. Lab work was most significant for transaminitis and lactate 3.5 (trended down to 0.8).  Received 1L LR, vanc/zosyn, tylenol, zofran

## 2018-12-04 NOTE — CONSULT NOTE ADULT - SUBJECTIVE AND OBJECTIVE BOX
HPI:    38 year old male with PMH testicular cancer w/ metastases s/p orchiectomy, and last chemo (iphosphamide, taxol, platin) 5 weeks ago stopped 2/2 elevated LFTs, hx of afib/aflutter s/p DCCV now in NSR, NICM (last EF 2018 45%), chronic low back pain who presented to [facility] on 12/3/18 with fever. Patient notes symptoms were occuring for ~24-48 hours prior to presentation. Notes nausea and vomiting around time of chemotherapy which has continued afterwards. Notes decreased PO intake and more emesis recently. N/V refractory to zofran and reglan. Notes nonspecific abdominal pain during these vomiting episodes. Denies cough or shortness of breath. Denies sore throat or rhinorrhea. Denies diarrhea.     In the ED Tmax of 100.1, tachycardic to >100 and relatively hypotensive with SBP in the 80's. WBC of 8.81 with 91.2% PMN. Transaminitis with AST of 211, , Alk Phos of 177, T Bili of 0.8. Lactic acid of 3.5. U/A with neg nitrite and leuk esterase. RVP negative.  CT Abdomen/Pelvis with fluid collection in the right lower abdomen is decrease in size and without peripheral enhancement. CXR clear. RUQ US with no obstruction or biliary dilatation.     PAST MEDICAL & SURGICAL HISTORY:  Neuropathy: ~ fingers and toes  Anemia  Mixed germ cell tumor  Obesity  Atrial mass: right artrial echo density per MRI 3/14/18.  Atrial flutter  Cardiomyopathy  Atrial fibrillation: s/p DCCV   Thrombus: Right atrial wall ( suspected from last TYLOR) On AC  Testicular cancer  Lumbar herniated disc  Testicular mass: left  History of abdominal surgery: Retroperitoneal lymph node dissection (2018)  History of cardioversion: 2018  Port-a-cath in place: placed 2017,, removed-10/18/2017  History of orchiectomy: left-2017      Allergies  No Known Allergies        ANTIMICROBIALS:      OTHER MEDS: MEDICATIONS  (STANDING):  enoxaparin Injectable 40 daily  ondansetron Injectable 4 every 6 hours PRN      SOCIAL HISTORY:  [ ] etoh [ ] tobacco [ ] former smoker [ ] IVDU    FAMILY HISTORY:  Family history of hypertension in father  Family history of kidney stones: mother  Family history of cancer (Grandparent)  Family history of ischemic heart disease (Grandparent)  Family history of diabetes mellitus (Aunt)      REVIEW OF SYSTEMS  [  ] ROS unobtainable because:    [  ] All other systems negative except as noted below:	    Constitutional:  [ ] fever [ ] chills  [ ] weight loss  [ ] weakness  Skin:  [ ] rash [ ] phlebitis	  Eyes: [ ] icterus [ ] pain  [ ] discharge	  ENMT: [ ] sore throat  [ ] thrush [ ] ulcers [ ] exudates  Respiratory: [ ] dyspnea [ ] hemoptysis [ ] cough [ ] sputum	  Cardiovascular:  [ ] chest pain [ ] palpitations [ ] edema	  Gastrointestinal:  [ ] nausea [ ] vomiting [ ] diarrhea [ ] constipation [ ] pain	  Genitourinary:  [ ] dysuria [ ] frequency [ ] hematuria [ ] discharge [ ] flank pain  [ ] incontinence  Musculoskeletal:  [ ] myalgias [ ] arthralgias [ ] arthritis  [ ] back pain  Neurological:  [ ] headache [ ] seizures  [ ] confusion/altered mental status  Psychiatric:  [ ] anxiety [ ] depression	  Hematology/Lymphatics:  [ ] lymphadenopathy  Endocrine:  [ ] adrenal [ ] thyroid  Allergic/Immunologic:	 [ ] transplant [ ] seasonal    Vital Signs Last 24 Hrs  T(F): 99.1 (18 @ 13:20), Max: 100.1 (18 @ 18:04)    Vital Signs Last 24 Hrs  HR: 91 (18 @ 13:20) (84 - 104)  BP: 97/57 (18 @ 13:20) (84/55 - 122/76)  RR: 18 (18 @ 13:20)  SpO2: 99% (18 @ 13:20) (96% - 100%)  Wt(kg): --    PHYSICAL EXAM:  General: non-toxic  HEAD/EYES: anicteric, PERRL  ENT:  supple  Cardiovascular:   S1, S2  Respiratory:  clear bilaterally  GI:  soft, non-tender, normal bowel sounds  :  no CVA tenderness   Musculoskeletal:  no synovitis  Neurologic:  grossly non-focal  Skin:  no rash  Lymph: no lymphadenopathy  Psychiatric:  appropriate affect  Vascular:  no phlebitis                                14.5   8.81  )-----------( 217      ( 03 Dec 2018 18:15 )             44.8       12-03    138  |  101  |  14  ----------------------------<  110<H>  3.9   |  26  |  0.85    Ca    9.2      03 Dec 2018 23:59  Phos  3.4       Mg     1.8         TPro  9.0<H>  /  Alb  4.9  /  TBili  0.8  /  DBili  x   /  AST  211<H>  /  ALT  219<H>  /  AlkPhos  177<H>        Urinalysis Basic - ( 03 Dec 2018 19:00 )    Color: YELLOW / Appearance: CLEAR / S.021 / pH: 7.5  Gluc: NEGATIVE / Ketone: NEGATIVE  / Bili: NEGATIVE / Urobili: NORMAL   Blood: NEGATIVE / Protein: 20 / Nitrite: NEGATIVE   Leuk Esterase: NEGATIVE / RBC: x / WBC x   Sq Epi: x / Non Sq Epi: x / Bacteria: x        MICROBIOLOGY:    none for review at time of consult     RADIOLOGY:    EXAM:  CT ABDOMEN AND PELVIS IC    PROCEDURE DATE:  Dec  3 2018   Fluid collection in the right lower abdomen is decrease in size and without peripheral enhancement.    EXAM:  XR CHEST PA LAT 2V    PROCEDURE DATE:  Dec  3 2018   Clear lungs.    EXAM:  US ABDOMEN LIMITED    PROCEDURE DATE:  Dec  4 2018   Hepatic steatosis.  Gallstones. HPI:    38 year old male with PMH testicular cancer w/ metastases s/p orchiectomy, and last chemo (iphosphamide, taxol, platin) 5 weeks ago stopped 2/2 elevated LFTs, hx of afib/aflutter s/p DCCV now in NSR, NICM (last EF 2018 45%), chronic low back pain who presented to [facility] on 12/3/18 with fever. Patient notes symptoms were occuring for ~24-48 hours prior to presentation. Notes nausea and vomiting around time of chemotherapy which has continued afterwards. Notes decreased PO intake and more emesis recently. N/V refractory to zofran and reglan. Notes nonspecific abdominal pain during these vomiting episodes. Denies cough or shortness of breath. Denies sore throat or rhinorrhea. Denies diarrhea.     In the ED Tmax of 100.1, tachycardic to >100 and relatively hypotensive with SBP in the 80's. WBC of 8.81 with 91.2% PMN. Transaminitis with AST of 211, , Alk Phos of 177, T Bili of 0.8. Lactic acid of 3.5. U/A with neg nitrite and leuk esterase. RVP negative.  CT Abdomen/Pelvis with fluid collection in the right lower abdomen is decrease in size and without peripheral enhancement. CXR clear. RUQ US with no obstruction or biliary dilatation.     PAST MEDICAL & SURGICAL HISTORY:  Neuropathy: ~ fingers and toes  Anemia  Mixed germ cell tumor  Obesity  Atrial mass: right artrial echo density per MRI 3/14/18.  Atrial flutter  Cardiomyopathy  Atrial fibrillation: s/p DCCV   Thrombus: Right atrial wall ( suspected from last TYLOR) On AC  Testicular cancer  Lumbar herniated disc  Testicular mass: left  History of abdominal surgery: Retroperitoneal lymph node dissection (2018)  History of cardioversion: 2018  Port-a-cath in place: placed 2017,, removed-10/18/2017  History of orchiectomy: left-2017      Allergies  No Known Allergies        ANTIMICROBIALS:      OTHER MEDS: MEDICATIONS  (STANDING):  enoxaparin Injectable 40 daily  ondansetron Injectable 4 every 6 hours PRN      SOCIAL HISTORY:  denies etoh or smoking history. no recreational drug use. no recent travel     FAMILY HISTORY:  Family history of hypertension in father  Family history of kidney stones: mother  Family history of cancer (Grandparent)  Family history of ischemic heart disease (Grandparent)  Family history of diabetes mellitus (Aunt)      REVIEW OF SYSTEMS  [  ] ROS unobtainable because:    [ x ] All other systems negative except as noted below:	    Constitutional:  [x ] fever [ x] chills  [ ] weight loss  [ ] weakness  Skin:  [ ] rash [ ] phlebitis	  Eyes: [ ] icterus [ ] pain  [ ] discharge	  ENMT: [ ] sore throat  [ ] thrush [ ] ulcers [ ] exudates  Respiratory: [ ] dyspnea [ ] hemoptysis [ ] cough [ ] sputum	  Cardiovascular:  [ ] chest pain [ ] palpitations [ ] edema	  Gastrointestinal:  [x ] nausea [x ] vomiting [ ] diarrhea [ ] constipation [ x] pain	  Genitourinary:  [ ] dysuria [ ] frequency [ ] hematuria [ ] discharge [ ] flank pain  [ ] incontinence  Musculoskeletal:  [ ] myalgias [ ] arthralgias [ ] arthritis  [ ] back pain  Neurological:  [ ] headache [ ] seizures  [ ] confusion/altered mental status  Psychiatric:  [ ] anxiety [ ] depression	  Hematology/Lymphatics:  [ ] lymphadenopathy  Endocrine:  [ ] adrenal [ ] thyroid  Allergic/Immunologic:	 [ ] transplant [ ] seasonal    Vital Signs Last 24 Hrs  T(F): 99.1 (18 @ 13:20), Max: 100.1 (18 @ 18:04)    Vital Signs Last 24 Hrs  HR: 91 (18 @ 13:20) (84 - 104)  BP: 97/57 (18 @ 13:20) (84/55 - 122/76)  RR: 18 (18 @ 13:20)  SpO2: 99% (18 @ 13:20) (96% - 100%)  Wt(kg): --    PHYSICAL EXAMINATION:  General: Alert and Awake, NAD  HEENT: PERRL, EOMI, No subconjunctival hemorrhages, Oropharynx Clear, MMM  Neck: Supple, No PRUDENCE  Cardiac: RRR, No M/R/G  Resp: CTAB, No Wh/Rh/Ra  Abdomen: NBS, Mild-Mod RUQ tenderness and epigastric tenderness. No rigidity or HSM  MSK: No LE edema. No stigmata of IE. No evidence of phlebitis. No evidence of synovitis.  : No richardson  Skin: No rashes or lesions. Skin is warm and dry to the touch.   Neuro: Alert and Awake. CN 2-12 Grossly intact. Moves all four extremities spontaneously.  Psych: Calm, Pleasant, Cooperative                          14.5   8.81  )-----------( 217      ( 03 Dec 2018 18:15 )             44.8           138  |  101  |  14  ----------------------------<  110<H>  3.9   |  26  |  0.85    Ca    9.2      03 Dec 2018 23:59  Phos  3.4       Mg     1.8         TPro  9.0<H>  /  Alb  4.9  /  TBili  0.8  /  DBili  x   /  AST  211<H>  /  ALT  219<H>  /  AlkPhos  177<H>        Urinalysis Basic - ( 03 Dec 2018 19:00 )    Color: YELLOW / Appearance: CLEAR / S.021 / pH: 7.5  Gluc: NEGATIVE / Ketone: NEGATIVE  / Bili: NEGATIVE / Urobili: NORMAL   Blood: NEGATIVE / Protein: 20 / Nitrite: NEGATIVE   Leuk Esterase: NEGATIVE / RBC: x / WBC x   Sq Epi: x / Non Sq Epi: x / Bacteria: x        MICROBIOLOGY:    none for review at time of consult     RADIOLOGY:    EXAM:  CT ABDOMEN AND PELVIS IC    PROCEDURE DATE:  Dec  3 2018   Fluid collection in the right lower abdomen is decrease in size and without peripheral enhancement.    EXAM:  XR CHEST PA LAT 2V    PROCEDURE DATE:  Dec  3 2018   Clear lungs.    EXAM:  US ABDOMEN LIMITED    PROCEDURE DATE:  Dec  4 2018   Hepatic steatosis.  Gallstones.

## 2018-12-04 NOTE — PROGRESS NOTE ADULT - PROBLEM SELECTOR PLAN 4
TIP chemotx was initially stopped due to rising LFTs, although last LFTs were wnl, unclear why they are elevated again to the 200s. elevated alk phos with nl T.bili  CT A/P showed nl liver and CBD, will obtain RUQ US and await read.  GI consulted via email, per last note on [employer] Dr. Munguia wanted to obtain MRI and MRCP to r/o metastatic disease and evaluate biliary tree TIP chemotx was initially stopped due to rising LFTs, although last LFTs were wnl, unclear why they are elevated again to the 200s. elevated alk phos with nl T.bili  CT A/P showed nl liver and CBD, RUQ showed hepatosteatosis and gallstones.  GI consulted via email, per last note on [employer] Dr. Munguia wanted to obtain MRI and MRCP to r/o metastatic disease and evaluate biliary tree

## 2018-12-04 NOTE — H&P ADULT - PROBLEM SELECTOR PLAN 3
unclear etiology since last chemo was 5 weeks ago, however still having persistent N/V now refractory to PO zofran and PO reglan  for now will c/w IV zofran 8mg q8h PRN  advance to clear liquid diet unclear etiology since last chemo was 5 weeks ago, however still having persistent N/V now refractory to PO zofran and PO reglan  for now will c/w IV zofran 4mg q6h PRN  advance to clear liquid diet

## 2018-12-04 NOTE — H&P ADULT - PROBLEM SELECTOR PLAN 1
doesn't meet SIRS criteria, unknown source: CXR clear lungs, RVP negative, UA negative, CT abdomen/pelvis without clear source  will c/w zosyn since immunocompromised until bcx, ucx results, no indication for vancomycin   initial lactate now downtrending 3.5 -> 0.8 s/p fluids doesn't meet SIRS criteria, unknown source: CXR clear lungs, RVP negative, UA negative, CT abdomen/pelvis without clear source (shows a fluid collection but no ring enhancement to suggest abscess)  will c/w zosyn since immunocompromised until bcx, ucx results, no indication for vancomycin at this time  initial lactate now downtrending 3.5 -> 0.8 s/p fluids

## 2018-12-04 NOTE — H&P ADULT - NSHPSOCIALHISTORY_GEN_ALL_CORE
Currently on disability, former smoker quit 2 years ago (smoked intermittently for 12 years), denied etoh use or other illicit drugs use. Lives with family. Not hopeful regarding prognosis and current state of health. Currently on disability, former smoker quit 2 years ago (smoked intermittently for 12 years), denied etoh use or other illicit drugs use. Lives with family.

## 2018-12-04 NOTE — H&P ADULT - ATTENDING COMMENTS
Patient seen and examined on 12/4/18, case discussed with Dr. Alida Cárdenas, agree with assessment and plan as above.

## 2018-12-04 NOTE — CONSULT NOTE ADULT - ATTENDING COMMENTS
38 year old with testicular cancer, on immunosuppression with recetn chemo, presents with fever and nausea and vomitting.    He had relative hypotension at presentation.    He feels better after fluid.    Associated transaminitis.     ? viral syndrome/ gastroenteritis.    Given elevated lactate, can continue zosyn until blood cultures are negative at 48 hours. If cultures negative at 48 hours and he is improved, stop abx at that time.    Follow transaminitis.     Unclear significance of fluid collection on abdomen.  Does not appear to be abscess..
Hepatocellular liver injury appears to be improving, with downtrending AST and ALT. Bilirubin and INR both within normal limits.  Prior liver work-up unremarkable aside from weakly positive OSCAR 1:80. Very low clinical suspicion for autoimmune hepatitis given improvement.  Temporal trend in liver tests is most consistent with chemotherapy-related drug-induced liver injury (DILI) that is resolving.  Fever work-up ongoing by primary team, but clinically improving on broad-spectrum empiric coverage with Zosyn.

## 2018-12-04 NOTE — PATIENT PROFILE ADULT - NSPRONUTRITIONRISK_GEN_A_NUR
Unintentional weight loss greater than 10 percent/Significant decrease of oral intake greater than 5 days prior to admission

## 2018-12-04 NOTE — H&P ADULT - NSHPOUTPATIENTPROVIDERS_GEN_ALL_CORE
Dr. Neo Chirinos (Heme/Onc)  Dr. Amador Arambula (PCP)  Dr. Cleve Marquez (Cardiology)  Dr. Joshua Cason (GI)  Dr. Phong Restrepo (urology)

## 2018-12-04 NOTE — CONSULT NOTE ADULT - ASSESSMENT
Impression:  1. Elevated liver enzymes (alk phos uptrending, transaminases downtrending) - DDx: viral syndrome/hepatitis, cholestasis of sepsis, DILI, biliary sludge/stones, infiltrative liver disease  2. Testicular cancer s/p orchiectomy and chemotherapy  3. Afib/flutter s/p DCCV  4. NICM    Plan:  - Trend CMP, INR  - Full infectious workup, follow up all culture data  - Will review imaging with Radiology, consider MRI/MRCP based on clinical course  - Antimicrobials per ID  - Avoid unnecessary/hepatotoxic agents  - Supportive care per primary team Impression:  1. Elevated liver enzymes (alk phos uptrending, transaminases downtrending) - DDx: DILI, much less likely viral syndrome/hepatitis, biliary sludge/stones, AIH (despite weakly positive OSCAR)  2. Fever of unclear etiology  3. Testicular cancer s/p orchiectomy and chemotherapy  4. Afib/flutter s/p DCCV  5. NICM    Plan:  - Trend CMP  - Full infectious workup, follow up all culture data  - Antimicrobials per ID  - Avoid unnecessary/hepatotoxic agents  - Supportive care per primary team

## 2018-12-04 NOTE — H&P ADULT - PROBLEM SELECTOR PLAN 5
currently not on active chemotherapy due to elevated LFTs  Oncology consult in AM, outpatient onc Dr. Neo Chirinos

## 2018-12-04 NOTE — H&P ADULT - PROBLEM SELECTOR PLAN 6
last echo in 2/18 showed EF 45% with moderate LV systolic dysfunction  per last all scripts note from Dr. Marquez (cardiology) wanted to repeat echo, was scheduled for it this wednesday  will obtain it inpatient  cardiology consult in AM

## 2018-12-04 NOTE — H&P ADULT - NSHPLABSRESULTS_GEN_ALL_CORE
EKG: NSR no JAC or TWI, QTc 462 m/s EKG: NSR no JAC or TWI, QTc 462 m/s    Of note, patient's potassium levels have been difficult to determine due to moderate to gross hemolysis. Most recent BMP at around midnight showed a potassium of 3.9, VBG at 130AM showed a potassium of 8.7 but that is likely spurious most likely from hemolysis.    < from: CT Abdomen and Pelvis w/ IV Cont (12.03.18 @ 22:24) >    FINDINGS:    LOWER CHEST: Bibasilar subsegmental atelectasis.    LIVER: Within normal limits.  BILE DUCTS: Normal caliber.  GALLBLADDER: Cholelithiasis..  SPLEEN: Within normal limits.  PANCREAS: Within normal limits.  ADRENALS: Within normal limits.  KIDNEYS/URETERS: Bilateral renal lesions are too small to characterize to   characterize.    BLADDER: Within normal limits.  REPRODUCTIVE ORGANS: The prostate is within normal limits.    BOWEL: No bowel obstruction. Normal appendix.  PERITONEUM: No ascites. 3.2 x 4.6 cm fluid collection in the right lower   abdomen is decreased in size, without peripheral enhancement.  VESSELS:  Within normal limits.  RETROPERITONEUM: No lymphadenopathy.  Retroperitoneal surgical clips.  ABDOMINAL WALL: Anterior abdominal wall postsurgical changes.  BONES: Within normal limits.    IMPRESSION:   Fluid collection in the right lower abdomen is decrease in size and   without peripheral enhancement.    < end of copied text >

## 2018-12-04 NOTE — H&P ADULT - NSHPPHYSICALEXAM_GEN_ALL_CORE
Vital Signs Last 24 Hrs  T(C): 37 (04 Dec 2018 04:39), Max: 37.8 (03 Dec 2018 18:04)  T(F): 98.6 (04 Dec 2018 04:39), Max: 100.1 (03 Dec 2018 18:04)  HR: 86 (04 Dec 2018 04:39) (86 - 104)  BP: 89/58 (04 Dec 2018 04:39) (89/58 - 122/76)  BP(mean): --  RR: 14 (04 Dec 2018 04:39) (14 - 20)  SpO2: 100% (04 Dec 2018 04:39) (100% - 100%)    General: WN/WD NAD  Neurology: A&Ox3, nonfocal, SCHILLING x 4  Eyes: PERRLA/ EOMI, Gross vision intact  ENT/Neck: Neck supple, trachea midline, No JVD, Gross hearing intact  Respiratory: CTA B/L, No wheezing, rales, rhonchi  CV: RRR, S1S2, no murmurs, rubs or gallops  Abdominal: wellhealed midline scar, has 1 cm x 1cm suprapubic non healing superficial wound, no drainage, surrounding erythema, Soft, NT, ND +BS,   Extremities: No edema, + peripheral pulses  Skin: No Rashes, Hematoma, Ecchymosis Vital Signs Last 24 Hrs  T(C): 37 (04 Dec 2018 04:39), Max: 37.8 (03 Dec 2018 18:04)  T(F): 98.6 (04 Dec 2018 04:39), Max: 100.1 (03 Dec 2018 18:04)  HR: 86 (04 Dec 2018 04:39) (86 - 104)  BP: 89/58 (04 Dec 2018 04:39) (89/58 - 122/76)  BP(mean): --  RR: 14 (04 Dec 2018 04:39) (14 - 20)  SpO2: 100% (04 Dec 2018 04:39) (100% - 100%)    General: WN/WD NAD  Neurology: A&Ox3, nonfocal, SCHILLING x 4  Eyes: PERRLA/ EOMI, Gross vision intact  ENT/Neck: Neck supple, trachea midline, No JVD, Gross hearing intact  Respiratory: CTA B/L, No wheezing, rales, rhonchi  CV: RRR, S1S2, no murmurs, rubs or gallops  Abdominal: well healed midline scar, has 1 cm x 1cm suprapubic non healing superficial wound, no drainage, surrounding erythema, Soft, NT, ND +BS,   Extremities: No edema, + peripheral pulses  Skin: No Rashes, Hematoma, Ecchymosis

## 2018-12-04 NOTE — H&P ADULT - PROBLEM SELECTOR PLAN 4
TIP chemotx was initially stopped due to rising LFTs, although last LFTs were wnl, unclear why they are elevated again. elevated alk phos with nl T.bili  CT A/P showed nl liver and CBD, will obtain RUQ US  GI consulted via email, per last note on [employer] Dr. Munguia wantedto obtain MRI and MRCP to r/o metastatic disease and evaluate biliary tree TIP chemotx was initially stopped due to rising LFTs, although last LFTs were wnl, unclear why they are elevated again. elevated alk phos with nl T.bili  CT A/P showed nl liver and CBD, will obtain RUQ US  GI consulted via email, per last note on [employer] Dr. Munguia wanted to obtain MRI and MRCP to r/o metastatic disease and evaluate biliary tree

## 2018-12-05 ENCOUNTER — APPOINTMENT (OUTPATIENT)
Dept: CARDIOLOGY | Facility: CLINIC | Age: 38
End: 2018-12-05

## 2018-12-05 LAB
ALBUMIN SERPL ELPH-MCNC: 3.5 G/DL — SIGNIFICANT CHANGE UP (ref 3.3–5)
ALP SERPL-CCNC: 97 U/L — SIGNIFICANT CHANGE UP (ref 40–120)
ALT FLD-CCNC: 129 U/L — HIGH (ref 4–41)
APTT BLD: 30.4 SEC — SIGNIFICANT CHANGE UP (ref 27.5–36.3)
AST SERPL-CCNC: 82 U/L — HIGH (ref 4–40)
BACTERIA UR CULT: SIGNIFICANT CHANGE UP
BILIRUB SERPL-MCNC: 0.5 MG/DL — SIGNIFICANT CHANGE UP (ref 0.2–1.2)
BUN SERPL-MCNC: 11 MG/DL — SIGNIFICANT CHANGE UP (ref 7–23)
CALCIUM SERPL-MCNC: 9.1 MG/DL — SIGNIFICANT CHANGE UP (ref 8.4–10.5)
CHLORIDE SERPL-SCNC: 99 MMOL/L — SIGNIFICANT CHANGE UP (ref 98–107)
CO2 SERPL-SCNC: 25 MMOL/L — SIGNIFICANT CHANGE UP (ref 22–31)
CREAT SERPL-MCNC: 0.92 MG/DL — SIGNIFICANT CHANGE UP (ref 0.5–1.3)
GLUCOSE SERPL-MCNC: 84 MG/DL — SIGNIFICANT CHANGE UP (ref 70–99)
HCT VFR BLD CALC: 34.3 % — LOW (ref 39–50)
HGB BLD-MCNC: 11.2 G/DL — LOW (ref 13–17)
INR BLD: 1.15 — SIGNIFICANT CHANGE UP (ref 0.88–1.17)
MAGNESIUM SERPL-MCNC: 1.8 MG/DL — SIGNIFICANT CHANGE UP (ref 1.6–2.6)
MCHC RBC-ENTMCNC: 32.1 PG — SIGNIFICANT CHANGE UP (ref 27–34)
MCHC RBC-ENTMCNC: 32.7 % — SIGNIFICANT CHANGE UP (ref 32–36)
MCV RBC AUTO: 98.3 FL — SIGNIFICANT CHANGE UP (ref 80–100)
NRBC # FLD: 0 — SIGNIFICANT CHANGE UP
PHOSPHATE SERPL-MCNC: 4 MG/DL — SIGNIFICANT CHANGE UP (ref 2.5–4.5)
PLATELET # BLD AUTO: 159 K/UL — SIGNIFICANT CHANGE UP (ref 150–400)
PMV BLD: 9.9 FL — SIGNIFICANT CHANGE UP (ref 7–13)
POTASSIUM SERPL-MCNC: 3.8 MMOL/L — SIGNIFICANT CHANGE UP (ref 3.5–5.3)
POTASSIUM SERPL-SCNC: 3.8 MMOL/L — SIGNIFICANT CHANGE UP (ref 3.5–5.3)
PROT SERPL-MCNC: 6.6 G/DL — SIGNIFICANT CHANGE UP (ref 6–8.3)
PROTHROM AB SERPL-ACNC: 13.2 SEC — HIGH (ref 9.8–13.1)
RBC # BLD: 3.49 M/UL — LOW (ref 4.2–5.8)
RBC # FLD: 14.8 % — HIGH (ref 10.3–14.5)
SODIUM SERPL-SCNC: 137 MMOL/L — SIGNIFICANT CHANGE UP (ref 135–145)
SPECIMEN SOURCE: SIGNIFICANT CHANGE UP
WBC # BLD: 6.59 K/UL — SIGNIFICANT CHANGE UP (ref 3.8–10.5)
WBC # FLD AUTO: 6.59 K/UL — SIGNIFICANT CHANGE UP (ref 3.8–10.5)

## 2018-12-05 PROCEDURE — 99233 SBSQ HOSP IP/OBS HIGH 50: CPT | Mod: GC

## 2018-12-05 PROCEDURE — 99232 SBSQ HOSP IP/OBS MODERATE 35: CPT | Mod: GC

## 2018-12-05 PROCEDURE — 99232 SBSQ HOSP IP/OBS MODERATE 35: CPT

## 2018-12-05 RX ADMIN — PIPERACILLIN AND TAZOBACTAM 25 GRAM(S): 4; .5 INJECTION, POWDER, LYOPHILIZED, FOR SOLUTION INTRAVENOUS at 10:13

## 2018-12-05 RX ADMIN — PIPERACILLIN AND TAZOBACTAM 25 GRAM(S): 4; .5 INJECTION, POWDER, LYOPHILIZED, FOR SOLUTION INTRAVENOUS at 18:53

## 2018-12-05 RX ADMIN — PIPERACILLIN AND TAZOBACTAM 25 GRAM(S): 4; .5 INJECTION, POWDER, LYOPHILIZED, FOR SOLUTION INTRAVENOUS at 02:36

## 2018-12-05 RX ADMIN — ENOXAPARIN SODIUM 40 MILLIGRAM(S): 100 INJECTION SUBCUTANEOUS at 14:24

## 2018-12-05 NOTE — PROGRESS NOTE ADULT - PROBLEM SELECTOR PLAN 1
doesn't meet SIRS criteria: CXR clear lungs, RVP negative, UA negative, CT abdomen/pelvis without clear source (shows a fluid collection that has been improving since October when lymph node removal was done.)  - Given that the patient had a fever we will continue with zosyn currently, but will hold off on vancomycin given no evidence of a gram positive infection.

## 2018-12-05 NOTE — PROGRESS NOTE ADULT - PROBLEM SELECTOR PLAN 4
Chemotherapy was initially stopped due to rising LFTs, although last LFTs were wnl as an outpatient, unclear why they are elevated again to the 200s. elevated alk phos with nl T.bili  CT A/P showed nl liver and CBD, RUQ showed hepatosteatosis and gallstones.  GI consulted via email, possible MRI/MRCP if clinically worsens for possible metastases.

## 2018-12-06 ENCOUNTER — TRANSCRIPTION ENCOUNTER (OUTPATIENT)
Age: 38
End: 2018-12-06

## 2018-12-06 VITALS
OXYGEN SATURATION: 98 % | RESPIRATION RATE: 18 BRPM | DIASTOLIC BLOOD PRESSURE: 69 MMHG | HEART RATE: 85 BPM | TEMPERATURE: 98 F | SYSTOLIC BLOOD PRESSURE: 106 MMHG

## 2018-12-06 LAB
ALBUMIN SERPL ELPH-MCNC: 3.4 G/DL — SIGNIFICANT CHANGE UP (ref 3.3–5)
ALP SERPL-CCNC: 92 U/L — SIGNIFICANT CHANGE UP (ref 40–120)
ALT FLD-CCNC: 110 U/L — HIGH (ref 4–41)
AST SERPL-CCNC: 63 U/L — HIGH (ref 4–40)
BILIRUB SERPL-MCNC: 0.4 MG/DL — SIGNIFICANT CHANGE UP (ref 0.2–1.2)
BUN SERPL-MCNC: 9 MG/DL — SIGNIFICANT CHANGE UP (ref 7–23)
CALCIUM SERPL-MCNC: 9.1 MG/DL — SIGNIFICANT CHANGE UP (ref 8.4–10.5)
CHLORIDE SERPL-SCNC: 101 MMOL/L — SIGNIFICANT CHANGE UP (ref 98–107)
CO2 SERPL-SCNC: 26 MMOL/L — SIGNIFICANT CHANGE UP (ref 22–31)
CREAT SERPL-MCNC: 0.93 MG/DL — SIGNIFICANT CHANGE UP (ref 0.5–1.3)
GLUCOSE SERPL-MCNC: 93 MG/DL — SIGNIFICANT CHANGE UP (ref 70–99)
HCT VFR BLD CALC: 35.2 % — LOW (ref 39–50)
HGB BLD-MCNC: 11.6 G/DL — LOW (ref 13–17)
MAGNESIUM SERPL-MCNC: 2 MG/DL — SIGNIFICANT CHANGE UP (ref 1.6–2.6)
MCHC RBC-ENTMCNC: 32.7 PG — SIGNIFICANT CHANGE UP (ref 27–34)
MCHC RBC-ENTMCNC: 33 % — SIGNIFICANT CHANGE UP (ref 32–36)
MCV RBC AUTO: 99.2 FL — SIGNIFICANT CHANGE UP (ref 80–100)
NRBC # FLD: 0 — SIGNIFICANT CHANGE UP
PHOSPHATE SERPL-MCNC: 4 MG/DL — SIGNIFICANT CHANGE UP (ref 2.5–4.5)
PLATELET # BLD AUTO: 160 K/UL — SIGNIFICANT CHANGE UP (ref 150–400)
PMV BLD: 9.9 FL — SIGNIFICANT CHANGE UP (ref 7–13)
POTASSIUM SERPL-MCNC: 3.7 MMOL/L — SIGNIFICANT CHANGE UP (ref 3.5–5.3)
POTASSIUM SERPL-SCNC: 3.7 MMOL/L — SIGNIFICANT CHANGE UP (ref 3.5–5.3)
PROT SERPL-MCNC: 6.8 G/DL — SIGNIFICANT CHANGE UP (ref 6–8.3)
RBC # BLD: 3.55 M/UL — LOW (ref 4.2–5.8)
RBC # FLD: 14.2 % — SIGNIFICANT CHANGE UP (ref 10.3–14.5)
SODIUM SERPL-SCNC: 140 MMOL/L — SIGNIFICANT CHANGE UP (ref 135–145)
WBC # BLD: 5.54 K/UL — SIGNIFICANT CHANGE UP (ref 3.8–10.5)
WBC # FLD AUTO: 5.54 K/UL — SIGNIFICANT CHANGE UP (ref 3.8–10.5)

## 2018-12-06 PROCEDURE — 99239 HOSP IP/OBS DSCHRG MGMT >30: CPT

## 2018-12-06 RX ORDER — ONDANSETRON 8 MG/1
1 TABLET, FILM COATED ORAL
Qty: 42 | Refills: 0 | OUTPATIENT
Start: 2018-12-06 | End: 2018-12-19

## 2018-12-06 RX ADMIN — ENOXAPARIN SODIUM 40 MILLIGRAM(S): 100 INJECTION SUBCUTANEOUS at 12:07

## 2018-12-06 RX ADMIN — PIPERACILLIN AND TAZOBACTAM 25 GRAM(S): 4; .5 INJECTION, POWDER, LYOPHILIZED, FOR SOLUTION INTRAVENOUS at 03:00

## 2018-12-06 NOTE — DISCHARGE NOTE ADULT - CARE PROVIDERS DIRECT ADDRESSES
[email or web address]

## 2018-12-06 NOTE — PROGRESS NOTE ADULT - ATTENDING COMMENTS
38 year old with testicular cancer presents with fever with n/v.    ? gastroenteritis.    Feels better.    Continue zosyn- can stop if blood cultures are negative at 48 hours.
No overnight events.  No futher nausea/vomiting and patient now tolerating a diet.  Cultures negative to date.  D/w ID.  Likely viral gastroenteritis.  Blood cultures remained negative.  Zosyn d/c and patient discharged home.  D/w patient at bedside regarding outpt f/u plans.  time spent 32 mins
No overnight events.  Tolerating diet.  Cultures negative to date.  D/w ID.  Possibly gastroenteritis.  C/w zosyn and then d/c when bld cx negative x 48 hours.

## 2018-12-06 NOTE — DISCHARGE NOTE ADULT - MEDICATION SUMMARY - MEDICATIONS TO STOP TAKING
I will STOP taking the medications listed below when I get home from the hospital:    oxyCODONE 15 mg oral tablet  -- 1 tab(s) by mouth every 6 hours, As Needed    ondansetron 4 mg oral disintegrating strip  -- 1 each by mouth 3 times a day   -- Check with your doctor before becoming pregnant.  Do not chew, break, or crush.  Obtain medical advice before taking any non-prescription drugs as some may affect the action of this medication.    ondansetron 4 mg oral tablet, disintegrating  -- 1 tab(s) by mouth 3 times a day, As Needed

## 2018-12-06 NOTE — DISCHARGE NOTE ADULT - HOSPITAL COURSE
38 M with testicular cancer w/ metastases sp orchiectomy, and last chemo (iphosphamide, taxol, platin) 5 weeks ago stopped 2/2 elevated LFTs, hx of afib/aflutter s/p DCCV now in NSR, NICM (last EF 2/2018 45%), chronic low back pain presents with chronic nausea/vomiting with new onset fever and chills. Patient has suffered from nausea and non bloody, bilious emesis since chemotherapy was initially started, denied episodes being worst. However now has fever and chills for one day. He also endorses reduced PO intake since anytime he eats, he has an episode of emesis. For N/V he has tried zofran 8 mg and reglan which initially helped him however are now refractory. Also has non specific abdominal pain during retching episodes. Has chronic dysuria due to surgery, unchanged from prior. Otherwise denied cough, URI, chest pain, palpitations, lightheadedness at rest, dyspnea, diarrhea, skin rashes, headache.    On arrival to the ED, his vitals were T 98.6 (Tmax 100.1 rectal), P 103, /76 -> 89/58 (MAP 68, mentating well), R 20, O2 sat 100% RA. Lab work was most significant for transaminitis and lactate 3.5 (trended down to 2.3).      Pt was admitted to the hospital and received IV LR at 125 ml/hr and Zosyn. He did develop one fever overnight; however, his transaminitis improved and his blood cx came back negative. Pt endorsed feeling a lot better with minimal intervention and was able to eat solid food. ID was consulted and stated that this was likely only a viral gastroenteritis. Patient was medically optimized for discharge.

## 2018-12-06 NOTE — DIETITIAN INITIAL EVALUATION ADULT. - ADHERENCE
reports cannot tolerate certain food including mostly protein sources (beef, turkey, egg, cheese, dairy) which he was able/liked to eat prior to chemotherapy. S/p chemotherapy can only tolerate minimal po intake mostly mashed potato, oatmeal, cereals and juices (V8 juices).

## 2018-12-06 NOTE — DISCHARGE NOTE ADULT - MEDICATION SUMMARY - MEDICATIONS TO TAKE
I will START or STAY ON the medications listed below when I get home from the hospital:    ondansetron 4 mg oral tablet, disintegrating  -- 1 tab(s) by mouth 3 times a day, As Needed  -- Indication: For Vomiting

## 2018-12-06 NOTE — PROGRESS NOTE ADULT - PROBLEM SELECTOR PLAN 5
currently not on active chemotherapy due to elevated LFTs  Oncology consult in AM, outpatient onc Dr. Neo Chirinos
currently not on active chemotherapy due to elevated LFTs  Oncology consulted, outpatient onc Dr. Neo Chirinos
currently not on active chemotherapy due to elevated LFTs  Oncology consulted, outpatient onc Dr. Neo Chirinos

## 2018-12-06 NOTE — DIETITIAN INITIAL EVALUATION ADULT. - NS AS NUTRI INTERV COLLABORAT
Suggest outpatient follow up with appropriate RD for the purposes of long-term nutrition evaluation and diet education./Collaboration with other nutrition professionals

## 2018-12-06 NOTE — DISCHARGE NOTE ADULT - CARE PROVIDER_API CALL
Amador Arambula), Internal Medicine  [address] [facility]  3rd [place] [address]  Phone: [phone]  Fax: [phone]    Neo Chirinos), Hematology; Internal Medicine; Medical Oncology  [address] [place] [address]  Phone: [phone]  Fax: [phone]

## 2018-12-06 NOTE — DIETITIAN INITIAL EVALUATION ADULT. - PROBLEM SELECTOR PLAN 4
TIP chemotx was initially stopped due to rising LFTs, although last LFTs were wnl, unclear why they are elevated again. elevated alk phos with nl T.bili  CT A/P showed nl liver and CBD, will obtain RUQ US  GI consulted via email, per last note on [facility] Dr. Munguia wanted to obtain MRI and MRCP to r/o metastatic disease and evaluate biliary tree

## 2018-12-06 NOTE — PROGRESS NOTE ADULT - PROBLEM SELECTOR PLAN 1
doesn't meet SIRS criteria: CXR clear lungs, RVP negative, UA negative, CT abdomen/pelvis without clear source (shows a fluid collection that has been improving since October when lymph node removal was done.)  - Blood cx negative at 48hrs. As per ID; likely a viral gastroenteritis that is now resolving.

## 2018-12-06 NOTE — PROGRESS NOTE ADULT - SUBJECTIVE AND OBJECTIVE BOX
CC: Pt with nausea/vomiting for the past 5 weeks.    Subjective/ON events: No overnight events. Pt endorses that he was able to eat his food last night without vomiting, but felt nauseous afterwards. He endorses some mild abdominal pain and a fever from overnight, but no fever currently. He denies any current chest pain, diarrhea, dysuria or shortness of breath       REVIEW OF SYSTEMS:    CONSTITUTIONAL: No weakness, fevers or chills at this moment  EYES/ENT: No visual changes;  No vertigo or throat pain   NECK: No pain or stiffness  RESPIRATORY: No cough, wheezing, hemoptysis; No shortness of breath  CARDIOVASCULAR: No chest pain or palpitations  GASTROINTESTINAL: Endorses some NBNB vomiting. Endorses some abdominal pain over lower abdomen over his wound.  GENITOURINARY: No dysuria, frequency or hematuria  NEUROLOGICAL: No numbness or weakness  SKIN: No itching, rashes      ICU Vital Signs Last 24 Hrs  T(C): 37.1 (05 Dec 2018 04:59), Max: 38.6 (04 Dec 2018 18:23)  T(F): 98.7 (05 Dec 2018 04:59), Max: 101.4 (04 Dec 2018 18:23)  HR: 92 (05 Dec 2018 04:59) (84 - 98)  BP: 114/81 (05 Dec 2018 04:59) (84/55 - 114/81)  BP(mean): --  ABP: --  ABP(mean): --  RR: 18 (05 Dec 2018 04:59) (17 - 18)  SpO2: 98% (05 Dec 2018 04:59) (96% - 100%)      PHYSICAL EXAM:  GENERAL: NAD, well-developed  HEAD:  Atraumatic, Normocephalic  EYES: EOMI, PERRLA, conjunctiva and sclera clear  NECK: Supple, No JVD  CHEST/LUNG: Clear to auscultation bilaterally; No wheeze  HEART: Regular rate and rhythm; No murmurs, rubs, or gallops  ABDOMEN: Soft, Nontender, Nondistended; Bowel sounds present. Wound over lower abdomen noted and covered with a bandage.  EXTREMITIES:  2+ Peripheral Pulses, No clubbing, cyanosis, or edema  PSYCH: AAOx3  NEUROLOGY: non-focal  SKIN: No rashes or lesions      Labs:  ( @ 06:50)                      11.2  6.59 )-----------( 159                 34.3    Neutrophils = -- (--%)  Lymphocytes = -- (--%)  Eosinophils = -- (--%)  Basophils = -- (--%)  Monocytes = -- (--%)  Bands = --%        140  |  102  |  14  ----------------------------<  70  4.0   |  26  |  0.92    Ca    9.2      04 Dec 2018 12:03  Phos  3.4     12  Mg     1.8         TPro  6.8  /  Alb  3.7  /  TBili  0.5  /  DBili  x   /  AST  93<H>  /  ALT  137<H>  /  AlkPhos  120      ( 05 Dec 2018 06:52 )   PT: 13.2 SEC;   INR: 1.15 ;       PTT:30.4 SEC    Urinalysis Basic - ( 03 Dec 2018 19:00 )    Color: YELLOW / Appearance: CLEAR / S.021 / pH: 7.5  Gluc: NEGATIVE / Ketone: NEGATIVE  / Bili: NEGATIVE / Urobili: NORMAL   Blood: NEGATIVE / Protein: 20 / Nitrite: NEGATIVE   Leuk Esterase: NEGATIVE / RBC: x / WBC x   Sq Epi: x / Non Sq Epi: x / Bacteria: x
CC: Pt with nausea/vomiting for the past 5 weeks.    Subjective/ON events: No overnight events. Pt endorses that he was able to eat without difficulties. He endorses a wish to go home. He also discussed some end of life plans and stated that he wouldn't want to be kept alive if he was mentally disabled in any way. We discussed his plans and I will bring him a MOLST form. He denies any fevers, chills, CP, abdominal pain, N/V/D overnight.      REVIEW OF SYSTEMS:    CONSTITUTIONAL: No weakness, fevers or chills at this moment  EYES/ENT: No visual changes;  No vertigo or throat pain   NECK: No pain or stiffness  RESPIRATORY: No cough, wheezing, hemoptysis; No shortness of breath  CARDIOVASCULAR: No chest pain or palpitations  GASTROINTESTINAL: Endorses some NBNB vomiting. Endorses some abdominal pain over lower abdomen over his wound.  GENITOURINARY: No dysuria, frequency or hematuria  NEUROLOGICAL: No numbness or weakness  SKIN: No itching, rashes    ICU Vital Signs Last 24 Hrs  T(C): 36.9 (06 Dec 2018 06:57), Max: 36.9 (05 Dec 2018 14:35)  T(F): 98.5 (06 Dec 2018 06:57), Max: 98.5 (06 Dec 2018 06:57)  HR: 82 (06 Dec 2018 06:57) (80 - 88)  BP: 115/77 (06 Dec 2018 06:57) (106/72 - 115/77)  BP(mean): --  ABP: --  ABP(mean): --  RR: 18 (06 Dec 2018 06:57) (18 - 18)  SpO2: 97% (06 Dec 2018 06:57) (97% - 99%)      PHYSICAL EXAM:  GENERAL: NAD, well-developed  HEAD:  Atraumatic, Normocephalic  EYES: EOMI, PERRLA, conjunctiva and sclera clear  NECK: Supple, No JVD  CHEST/LUNG: Clear to auscultation bilaterally; No wheeze  HEART: Regular rate and rhythm; No murmurs, rubs, or gallops  ABDOMEN: Soft, Nontender, Nondistended; Bowel sounds present. Wound over lower abdomen noted and covered with a bandage.  EXTREMITIES:  2+ Peripheral Pulses, No clubbing, cyanosis, or edema  PSYCH: AAOx3  NEUROLOGY: non-focal  SKIN: No rashes or lesions      Labs:      (12-06 @ 05:50)                      11.6  5.54 )-----------( 160                 35.2    Neutrophils = -- (--%)  Lymphocytes = -- (--%)  Eosinophils = -- (--%)  Basophils = -- (--%)  Monocytes = -- (--%)  Bands = --%    12-06    140  |  101  |  9   ----------------------------<  93  3.7   |  26  |  0.93    Ca    9.1      06 Dec 2018 05:50  Phos  4.0     12-06  Mg     2.0     12-06    TPro  6.8  /  Alb  3.4  /  TBili  0.4  /  DBili  x   /  AST  63<H>  /  ALT  110<H>  /  AlkPhos  92  12-06    ( 05 Dec 2018 06:52 )   PT: 13.2 SEC;   INR: 1.15 ;       PTT:30.4 SEC      RVP:          Tox:
Follow Up:  Fever / Sepsis    Interval History/ROS: Febrile yesterday evening to 101.4 and then afebrile afterwards. Notes improvement in N/V and in abdominal pain. Denies any BM yet (although notes this is usual for him).     Allergies  No Known Allergies        ANTIMICROBIALS:  piperacillin/tazobactam IVPB. 3.375 every 8 hours      OTHER MEDS:  MEDICATIONS  (STANDING):  acetaminophen   Tablet .. 650 every 8 hours PRN  enoxaparin Injectable 40 daily  ondansetron Injectable 4 every 6 hours PRN      Vital Signs Last 24 Hrs  T(C): 37.1 (05 Dec 2018 04:59), Max: 38.6 (04 Dec 2018 18:23)  T(F): 98.7 (05 Dec 2018 04:59), Max: 101.4 (04 Dec 2018 18:23)  HR: 92 (05 Dec 2018 04:59) (84 - 98)  BP: 114/81 (05 Dec 2018 04:59) (97/57 - 114/81)  BP(mean): --  RR: 18 (05 Dec 2018 04:59) (17 - 18)  SpO2: 98% (05 Dec 2018 04:59) (98% - 100%)    PHYSICAL EXAMINATION:  General: Alert and Awake, NAD  HEENT: PERRL, EOMI  Cardiac: RRR, No M/R/G  Resp: CTAB, No Wh/Rh/Ra  Abdomen: NBS, Mild RUQ tenderness and epigastric tenderness. No rigidity or HSM  MSK: No LE edema. No calf tenderness  : No richardson  Skin: No rashes or lesions. Skin is warm and dry to the touch.   Neuro: Alert and Awake. CN 2-12 Grossly intact. Moves all four extremities spontaneously.  Psych: Calm, Pleasant, Cooperative                            11.2   6.59  )-----------( 159      ( 05 Dec 2018 06:50 )             34.3       12-05    137  |  99  |  11  ----------------------------<  84  3.8   |  25  |  0.92    Ca    9.1      05 Dec 2018 06:50  Phos  4.0     12-05  Mg     1.8     12-05    TPro  6.6  /  Alb  3.5  /  TBili  0.5  /  DBili  x   /  AST  82<H>  /  ALT  129<H>  /  AlkPhos  97  12-05      Urinalysis Basic - ( 03 Dec 2018 19:00 )    Color: YELLOW / Appearance: CLEAR / S.021 / pH: 7.5  Gluc: NEGATIVE / Ketone: NEGATIVE  / Bili: NEGATIVE / Urobili: NORMAL   Blood: NEGATIVE / Protein: 20 / Nitrite: NEGATIVE   Leuk Esterase: NEGATIVE / RBC: x / WBC x   Sq Epi: x / Non Sq Epi: x / Bacteria: x    MICROBIOLOGY:  v  BLOOD  18 --  --  --      URINE MIDSTREAM  18 --  --  --    RADIOLOGY:    No new imaging for review
CC: Pt with nausea/vomiting for the past 5 weeks.    Subjective/ON events: No overnight events. Pt endorses feeling better since he first came in and having less vomiting. He endorses chronic abdominal pain from the surgery that he had and otherwise denies any diarrhea, constipation, chest pain or palpitations.      REVIEW OF SYSTEMS:    CONSTITUTIONAL: No weakness, fevers or chills  EYES/ENT: No visual changes;  No vertigo or throat pain   NECK: No pain or stiffness  RESPIRATORY: No cough, wheezing, hemoptysis; No shortness of breath  CARDIOVASCULAR: No chest pain or palpitations  GASTROINTESTINAL: . Endorses some NBNB vomiting. Endorses some abdominal pain over lower abdomen.  GENITOURINARY: No dysuria, frequency or hematuria  NEUROLOGICAL: No numbness or weakness  SKIN: No itching, rashes      ICU Vital Signs Last 24 Hrs  T(C): 36.8 (04 Dec 2018 10:25), Max: 37.8 (03 Dec 2018 18:04)  T(F): 98.2 (04 Dec 2018 10:25), Max: 100.1 (03 Dec 2018 18:04)  HR: 84 (04 Dec 2018 10:25) (84 - 104)  BP: 84/55 (04 Dec 2018 10:25) (84/55 - 122/76)  BP(mean): --  ABP: --  ABP(mean): --  RR: 18 (04 Dec 2018 10:25) (14 - 20)  SpO2: 96% (04 Dec 2018 10:25) (96% - 100%)      PHYSICAL EXAM:  GENERAL: NAD, well-developed  HEAD:  Atraumatic, Normocephalic  EYES: EOMI, PERRLA, conjunctiva and sclera clear  NECK: Supple, No JVD  CHEST/LUNG: Clear to auscultation bilaterally; No wheeze  HEART: Regular rate and rhythm; No murmurs, rubs, or gallops  ABDOMEN: Soft, Nontender, Nondistended; Bowel sounds present. Wound over lower abdomen noted and covered with a bandage.  EXTREMITIES:  2+ Peripheral Pulses, No clubbing, cyanosis, or edema  PSYCH: AAOx3  NEUROLOGY: non-focal  SKIN: No rashes or lesions    (12-03 @ 18:15)                      14.5  8.81 )-----------( 217                 44.8    Neutrophils = 8.03 (91.2%)  Lymphocytes = 0.48 (5.4%)  Eosinophils = 0.00 (0.0%)  Basophils = 0.02 (0.2%)  Monocytes = 0.24 (2.7%)  Bands = --%        138  |  101  |  14  ----------------------------<  110<H>  3.9   |  26  |  0.85    Ca    9.2      03 Dec 2018 23:59  Phos  3.4       Mg     1.8         TPro  9.0<H>  /  Alb  4.9  /  TBili  0.8  /  DBili  x   /  AST  211<H>  /  ALT  219<H>  /  AlkPhos  177<H>      ( 03 Dec 2018 23:59 )   PT: 13.2 SEC;   INR: 1.18 ;       PTT:29.4 SEC      RVP:    Venous Blood Gas:   @ 01:28  7.39/49/61/28/89.9  VBG Lactate: 0.8  Venous Blood Gas:   @ 21:15  7.37/55/33/28/56.5  VBG Lactate: 2.3  Venous Blood Gas:   @ 18:15  7.33/59/< 24/25/16.2  VBG Lactate: 3.5        Tox:         Urinalysis Basic - ( 03 Dec 2018 19:00 )    Color: YELLOW / Appearance: CLEAR / S.021 / pH: 7.5  Gluc: NEGATIVE / Ketone: NEGATIVE  / Bili: NEGATIVE / Urobili: NORMAL   Blood: NEGATIVE / Protein: 20 / Nitrite: NEGATIVE   Leuk Esterase: NEGATIVE / RBC: x / WBC x   Sq Epi: x / Non Sq Epi: x / Bacteria: x

## 2018-12-06 NOTE — DIETITIAN INITIAL EVALUATION ADULT. - PERTINENT MEDS FT
MEDICATIONS  (STANDING):  enoxaparin Injectable 40 milliGRAM(s) SubCutaneous daily  lactated ringers. 1000 milliLiter(s) (125 mL/Hr) IV Continuous <Continuous>    MEDICATIONS  (PRN):  acetaminophen   Tablet .. 650 milliGRAM(s) Oral every 8 hours PRN Temp greater or equal to 38C (100.4F)  ondansetron Injectable 4 milliGRAM(s) IV Push every 6 hours PRN Nausea and/or Vomiting

## 2018-12-06 NOTE — DIETITIAN INITIAL EVALUATION ADULT. - PROBLEM SELECTOR PLAN 1
doesn't meet SIRS criteria, unknown source: CXR clear lungs, RVP negative, UA negative, CT abdomen/pelvis without clear source (shows a fluid collection but no ring enhancement to suggest abscess)  will c/w zosyn since immunocompromised until bcx, ucx results, no indication for vancomycin at this time  initial lactate now downtrending 3.5 -> 0.8 s/p fluids

## 2018-12-06 NOTE — DIETITIAN INITIAL EVALUATION ADULT. - PROBLEM SELECTOR PLAN 3
unclear etiology since last chemo was 5 weeks ago, however still having persistent N/V now refractory to PO zofran and PO reglan  for now will c/w IV zofran 4mg q6h PRN  advance to clear liquid diet

## 2018-12-06 NOTE — PROGRESS NOTE ADULT - PROBLEM SELECTOR PLAN 2
secondary to vomiting. Received 1.5L in the ED and receiving 100ml/hr maintenance fluids of LR now.
secondary to vomiting. Received 1.5L in the ED and receiving 125ml/hr maintenance fluids of LR now.
secondary to vomiting. Received 1.5L in the ED and receiving 125ml/hr maintenance fluids of LR now.

## 2018-12-06 NOTE — DIETITIAN INITIAL EVALUATION ADULT. - PERTINENT LABORATORY DATA
12-06 Na140 mmol/L Glu 93 mg/dL K+ 3.7 mmol/L Cr  0.93 mg/dL BUN 9 mg/dL 12-06 Phos 4.0 mg/dL 12-06 Alb 3.4 g/dL

## 2018-12-06 NOTE — PROGRESS NOTE ADULT - PROBLEM SELECTOR PROBLEM 6
NICM (nonischemic cardiomyopathy)

## 2018-12-06 NOTE — DIETITIAN INITIAL EVALUATION ADULT. - NS FNS REASON FOR WEIGHT CHANG
decreased po intake/catabolic illness (testicular cancer) with metastases s/p orchiectomy and chemotherapy ~5 weeks ago

## 2018-12-06 NOTE — DIETITIAN INITIAL EVALUATION ADULT. - NS AS NUTRI DX NUTRIENT
Malnutrition/Severe,  increasing nutrient needs due to catabolic illness Malnutrition/Hx. of severe malnutrition, increasing nutrient needs due to catabolic illness

## 2018-12-06 NOTE — PROGRESS NOTE ADULT - ASSESSMENT
38 M with testicular cancer w/ metastases sp orchiectomy, and last chemo (iphosphamide, taxol, platin) 5 weeks ago stopped 2/2 LFTs, hx of afib/aflutter now in NSR, NICM (last EF 2/2018 45%), chronic low back pain admitted for hypovolemic hypotension and fever of unknown origin.
38 year old male with PMH testicular cancer w/ metastases s/p orchiectomy, and last chemo (iphosphamide, taxol, platin) 5 weeks ago stopped 2/2 elevated LFTs, hx of afib/aflutter s/p DCCV now in NSR, NICM (last EF 2/2018 45%), chronic low back pain who [facility] with fever and N/V. In the ED Tmax of 100.1, tachycardic to >100 and relatively hypotensive with SBP in the 80's. WBC of 8.81 with 91.2% PMN. Transaminitis with AST of 211, , Alk Phos of 177, T Bili of 0.8. Lactic acid of 3.5. U/A with neg nitrite and leuk esterase. RVP negative.  CT Abdomen/Pelvis with fluid collection in the right lower abdomen is decreased in size and without peripheral enhancement. CXR clear. RUQ US with no obstruction or biliary dilatation.     Overall, likely viral gastroenteritis with resulting dehydration from N/V contributing to presentation. Given lactic acidosis on presentation agree with covering with Zosyn while awaiting BCx. Suspect that the fluid collection in abdomen is noninfectious given presence for ~2 months without signs/symptoms of infection during this time period and given decrease in size without treatment.    --Recommend continuing Zosyn 3.375 mg IV Q8H  --Continue to trend Transaminases  --F/U Prelim BCx  --Patient agreeable to Influenza vaccination - would administer prior to discharge

## 2018-12-06 NOTE — PROGRESS NOTE ADULT - PROBLEM SELECTOR PLAN 4
Chemotherapy was initially stopped due to rising LFTs, although last LFTs were wnl as an outpatient, unclear why they are elevated again to the 200s. elevated alk phos with nl T.bili  CT A/P showed nl liver and CBD, RUQ showed hepatosteatosis and gallstones.  GI consulted via email, possible MRI/MRCP if clinically worsens for possible metastases.  -Given improvement since admission, possibly 2/2 to viral gastroenteritis and will f/u with hepatology as an outpatient.

## 2018-12-06 NOTE — DIETITIAN INITIAL EVALUATION ADULT. - SIGNS/SYMPTOMS
<75% nutritional needs >1 month, weight loss 7.5% <5 months <75% nutritional needs >5days, weight loss 7.5% <3-4 months

## 2018-12-06 NOTE — PROGRESS NOTE ADULT - PROBLEM SELECTOR PLAN 3
unclear etiology since last chemo was 5 weeks ago, however still having persistent N/V  for now will c/w IV zofran 4mg q6h PRN  will advance diet as tolerated.
unclear etiology since last chemo was 5 weeks ago, however improved now and able to tolerate meals.  for now will c/w IV zofran 4mg q6h PRN  will continue with a regular diet.
unclear etiology since last chemo was 5 weeks ago, however improved now and able to tolerate meals.  for now will c/w IV zofran 4mg q6h PRN  will continue with a regular diet.

## 2018-12-06 NOTE — DIETITIAN INITIAL EVALUATION ADULT. - NUTRITION INTERVENTION
Meals and Snack/Collaboration and Referral of Nutrition Care Meals and Snack/Strategies/Collaboration and Referral of Nutrition Care

## 2018-12-06 NOTE — DIETITIAN INITIAL EVALUATION ADULT. - OTHER INFO
Nutrition consult received for significant decreased oral intake >5d PTA. Patient w/ nausea and vomiting since initially started on chemotherapy was on Reglan and Zofran. S/p last chemo ~5 weeks ago admitted with nausea, vomiting and fever. Patient reports feeling better at time of visit. Tolerating PO intake, but still continues with suboptimal PO intake ~50% of meals. Patient denies any nausea/vomiting/diarrhea/constipation or difficulty chewing and swallowing. NKFA. However, cannot tolerate certain foods: meats (except chicken-roasted/bake), eggs, and dairy foods. Only able to consume small amount of 1%milk. Discussed strategies to increase po intake of nutrient and protein dense foods. Nutrition consult received for significant decreased oral intake >5d PTA. Patient w/ nausea and vomiting since initially started on chemotherapy was on Reglan and Zofran. S/p last chemo ~5 weeks ago admitted with nausea, vomiting and fever. Patient reports feeling better at time of visit. Tolerating PO intake, but still continues with suboptimal PO intake ~50% of meals. Patient denies any nausea/vomiting/diarrhea/constipation or difficulty chewing and swallowing. NKFA. However, cannot tolerate certain foods: meats (except chicken-roasted/bake), eggs, and dairy foods. Only able to consume small amount of 1%milk. Noted with weight loss of 7.5% < 5 months. Discussed strategies to increase po intake of nutrient and protein dense foods. Patient refuses po supplements, but willing to try protein supplement (prosource or protein powder) when discharged. Plan for d/c today.

## 2018-12-06 NOTE — DISCHARGE NOTE ADULT - PATIENT PORTAL LINK FT
You can access the [facility] Patient Portal, offered by [facility], by registering with the following website: [email or web address]

## 2018-12-06 NOTE — DIETITIAN INITIAL EVALUATION ADULT. - NS FNS WEIGHT CHANGE REASON
unintentional Patient reports weight loss since end of July at which point maintained weight of 253lbs, and has been trending down. He weighed 237.6lbs (8/6) per previous RDN note 8/12/18. NO new documented this admission. Patient reports he weighs about 234lbs or less at this time./unintentional

## 2018-12-06 NOTE — DISCHARGE NOTE ADULT - CARE PLAN
Principal Discharge DX:	Vomiting  Goal:	To get you eating solid food again  Assessment and plan of treatment:	You were found to be vomiting heavily prior to coming into the hospital and being unable to eat food. We gave you anti-nausea medications and your nausea also improved on it's own during this time. We prescribed you some anti-nausea medications that dissolve under your tongue that you may take if you develop vomiting again. Please follow up with your oncologist and your primary care doctor within 2 weeks of arriving home.  Secondary Diagnosis:	Hypovolemia  Goal:	To get your rehydrated  Assessment and plan of treatment:	You were found to be dehydrated when you came to the hospital due to your vomiting. We gave you IV fluids and you improved. Please continue to drink water and other fluids to keep yourself hydrated.  Secondary Diagnosis:	Hepatocellular injury  Goal:	To ensure your liver injury doesn't worsen  Assessment and plan of treatment:	You were found to have an elevated level of liver enzymes in your blood when you first came in. These levels went down during your hospital stay. We do not have a particular cause although the most likely reason is that you had a viral infection and that there was a transient rise in your enzymes. Please continue to see your hepatologist within 2 weeks of being discharged from the hospital. Principal Discharge DX:	Vomiting  Goal:	To get you eating solid food again  Assessment and plan of treatment:	You were found to be vomiting heavily prior to coming into the hospital and being unable to eat food. We gave you anti-nausea medications and your nausea also improved on it's own during this time. We prescribed you some anti-nausea medications that dissolve under your tongue that you may take if you develop vomiting again. Please follow up with your oncologist (Dr. Chirinos) and your primary care doctor (Dr. Arambula) within 2 weeks of arriving home.  Secondary Diagnosis:	Hypovolemia  Goal:	To get your rehydrated  Assessment and plan of treatment:	You were found to be dehydrated when you came to the hospital due to your vomiting. We gave you IV fluids and you improved. Please continue to drink water and other fluids to keep yourself hydrated.  Secondary Diagnosis:	Hepatocellular injury  Goal:	To ensure your liver injury doesn't worsen  Assessment and plan of treatment:	You were found to have an elevated level of liver enzymes in your blood when you first came in. These levels went down during your hospital stay. We do not have a particular cause although the most likely reason is that you had a viral infection and that there was a transient rise in your enzymes. Please continue to see your hepatologist (Dr. Casno) within 2 weeks of being discharged from the hospital.

## 2018-12-06 NOTE — DIETITIAN INITIAL EVALUATION ADULT. - ENERGY NEEDS
NO current weight documented in chart. Stated weight 234lbs Ht 73" BMI 30.9kg/m2  IBW: 184lbs (+/-10%) %IBW: 127%  No edema or pressure ulcers noted. Surgical incision.

## 2018-12-06 NOTE — DIETITIAN INITIAL EVALUATION ADULT. - ETIOLOGY
Patient meets criteria for severe malnutrition in the context of acute on chronic illness Patient meets criteria for severe malnutrition in the context of acute/chronic illness

## 2018-12-06 NOTE — PROGRESS NOTE ADULT - PROBLEM SELECTOR PLAN 6
last echo in 2/18 showed EF 45% with moderate LV systolic dysfunction  per last all scripts note from Dr. Marquez (cardiology) wanted to repeat echo, will obtain here.  cardiology consult in AM
last echo in 2/18 showed EF 45% with moderate LV systolic dysfunction  - Stable currently and will monitor.
last echo in 2/18 showed EF 45% with moderate LV systolic dysfunction  per last all scripts note from Dr. Marquez (cardiology) wanted to repeat echo, will obtain here.  cardiology consult in AM

## 2018-12-06 NOTE — DISCHARGE NOTE ADULT - PLAN OF CARE
To get you eating solid food again You were found to be vomiting heavily prior to coming into the hospital and being unable to eat food. We gave you anti-nausea medications and your nausea also improved on it's own during this time. We prescribed you some anti-nausea medications that dissolve under your tongue that you may take if you develop vomiting again. Please follow up with your oncologist and your primary care doctor within 2 weeks of arriving home. To get your rehydrated You were found to be dehydrated when you came to the hospital due to your vomiting. We gave you IV fluids and you improved. Please continue to drink water and other fluids to keep yourself hydrated. To ensure your liver injury doesn't worsen You were found to have an elevated level of liver enzymes in your blood when you first came in. These levels went down during your hospital stay. We do not have a particular cause although the most likely reason is that you had a viral infection and that there was a transient rise in your enzymes. Please continue to see your hepatologist within 2 weeks of being discharged from the hospital. You were found to be vomiting heavily prior to coming into the hospital and being unable to eat food. We gave you anti-nausea medications and your nausea also improved on it's own during this time. We prescribed you some anti-nausea medications that dissolve under your tongue that you may take if you develop vomiting again. Please follow up with your oncologist (Dr. Chirinos) and your primary care doctor (Dr. Arambula) within 2 weeks of arriving home. You were found to have an elevated level of liver enzymes in your blood when you first came in. These levels went down during your hospital stay. We do not have a particular cause although the most likely reason is that you had a viral infection and that there was a transient rise in your enzymes. Please continue to see your hepatologist (Dr. Cason) within 2 weeks of being discharged from the hospital.

## 2018-12-06 NOTE — PROGRESS NOTE ADULT - PROBLEM SELECTOR PROBLEM 3
Intractable vomiting with nausea, unspecified vomiting type

## 2018-12-06 NOTE — PROGRESS NOTE ADULT - PROBLEM SELECTOR PROBLEM 5
Malignant neoplasm of left testis, unspecified whether descended or undescended

## 2018-12-07 ENCOUNTER — OTHER (OUTPATIENT)
Age: 38
End: 2018-12-07

## 2018-12-08 LAB — BACTERIA BLD CULT: SIGNIFICANT CHANGE UP

## 2018-12-11 ENCOUNTER — APPOINTMENT (OUTPATIENT)
Dept: INTERNAL MEDICINE | Facility: CLINIC | Age: 38
End: 2018-12-11

## 2018-12-12 ENCOUNTER — APPOINTMENT (OUTPATIENT)
Dept: CARDIOLOGY | Facility: CLINIC | Age: 38
End: 2018-12-12
Payer: MEDICAID

## 2018-12-12 PROCEDURE — 0399T: CPT

## 2018-12-12 PROCEDURE — 93306 TTE W/DOPPLER COMPLETE: CPT

## 2018-12-14 DIAGNOSIS — Z71.89 OTHER SPECIFIED COUNSELING: ICD-10-CM

## 2018-12-18 ENCOUNTER — APPOINTMENT (OUTPATIENT)
Dept: INTERNAL MEDICINE | Facility: CLINIC | Age: 38
End: 2018-12-18

## 2018-12-19 ENCOUNTER — OUTPATIENT (OUTPATIENT)
Dept: OUTPATIENT SERVICES | Facility: HOSPITAL | Age: 38
LOS: 1 days | Discharge: HOME | End: 2018-12-19

## 2018-12-19 DIAGNOSIS — Z90.79 ACQUIRED ABSENCE OF OTHER GENITAL ORGAN(S): Chronic | ICD-10-CM

## 2018-12-19 DIAGNOSIS — Z98.890 OTHER SPECIFIED POSTPROCEDURAL STATES: Chronic | ICD-10-CM

## 2018-12-19 DIAGNOSIS — C62.92 MALIGNANT NEOPLASM OF LEFT TESTIS, UNSPECIFIED WHETHER DESCENDED OR UNDESCENDED: ICD-10-CM

## 2018-12-19 DIAGNOSIS — Z95.828 PRESENCE OF OTHER VASCULAR IMPLANTS AND GRAFTS: Chronic | ICD-10-CM

## 2018-12-20 ENCOUNTER — RX RENEWAL (OUTPATIENT)
Age: 38
End: 2018-12-20

## 2018-12-20 ENCOUNTER — INBOUND DOCUMENT (OUTPATIENT)
Age: 38
End: 2018-12-20

## 2018-12-24 ENCOUNTER — MEDICATION RENEWAL (OUTPATIENT)
Age: 38
End: 2018-12-24

## 2019-01-01 NOTE — PATIENT PROFILE ADULT. - ANESTHESIA, PREVIOUS REACTION, PROFILE
Caudal block    Patient location during procedure: OR  Block not for primary anesthetic.  Reason for block: at surgeon's request, post-op pain management  Post-op Pain Location: penis  Start time: 2019 7:42 AM  Timeout: 2019 7:42 AM  End time: 2019 7:45 AM    Staffing  Performing Provider: Colleen Arroyo MD  Authorizing Provider: Colleen Arroyo MD        Preanesthetic Checklist  Completed: patient identified, site marked, surgical consent, pre-op evaluation, timeout performed, IV checked, risks and benefits discussed, monitors and equipment checked, anesthesia consent given, hand hygiene performed and patient being monitored  Preparation  Patient position: right lateral decubitus  Prep: ChloraPrep  Patient monitoring: ECG, Pulse Ox, continuous capnometry and Blood Pressure  Epidural  Administration type: single shot  Approach: midline  Interspace: Sacral Hiatus    Needle and Epidural Catheter  Needle type: Tuohy   Needle gauge: 22  Additional Documentation: incremental injection, negative aspiration for heme and CSF and no signs/symptoms of IV or SA injection  Needle localization: anatomical landmarks  Medications:  Volume per aspiration: 2 mL  Assessment  Ease of block: easy  Patient's tolerance of the procedure: comfortable throughout blockNo inadvertent dural puncture with Tuohy.              
none

## 2019-01-04 ENCOUNTER — APPOINTMENT (OUTPATIENT)
Dept: HEMATOLOGY ONCOLOGY | Facility: CLINIC | Age: 39
End: 2019-01-04
Payer: MEDICAID

## 2019-01-04 ENCOUNTER — RESULT REVIEW (OUTPATIENT)
Age: 39
End: 2019-01-04

## 2019-01-04 VITALS
TEMPERATURE: 97.9 F | BODY MASS INDEX: 31.7 KG/M2 | HEART RATE: 84 BPM | OXYGEN SATURATION: 94 % | SYSTOLIC BLOOD PRESSURE: 121 MMHG | WEIGHT: 246.91 LBS | RESPIRATION RATE: 16 BRPM | DIASTOLIC BLOOD PRESSURE: 74 MMHG

## 2019-01-04 LAB
HCT VFR BLD CALC: 40 % — SIGNIFICANT CHANGE UP (ref 39–50)
HGB BLD-MCNC: 13.7 G/DL — SIGNIFICANT CHANGE UP (ref 13–17)
MCHC RBC-ENTMCNC: 32.6 PG — SIGNIFICANT CHANGE UP (ref 27–34)
MCHC RBC-ENTMCNC: 34.3 G/DL — SIGNIFICANT CHANGE UP (ref 32–36)
MCV RBC AUTO: 95.1 FL — SIGNIFICANT CHANGE UP (ref 80–100)
PLATELET # BLD AUTO: 176 K/UL — SIGNIFICANT CHANGE UP (ref 150–400)
RBC # BLD: 4.2 M/UL — SIGNIFICANT CHANGE UP (ref 4.2–5.8)
RBC # FLD: 13.5 % — SIGNIFICANT CHANGE UP (ref 10.3–14.5)
WBC # BLD: 6.7 K/UL — SIGNIFICANT CHANGE UP (ref 3.8–10.5)
WBC # FLD AUTO: 6.7 K/UL — SIGNIFICANT CHANGE UP (ref 3.8–10.5)

## 2019-01-04 PROCEDURE — 99215 OFFICE O/P EST HI 40 MIN: CPT

## 2019-01-04 RX ORDER — ONDANSETRON 8 MG/1
8 TABLET, ORALLY DISINTEGRATING ORAL
Qty: 30 | Refills: 1 | Status: DISCONTINUED | COMMUNITY
Start: 2018-10-29 | End: 2019-01-04

## 2019-01-04 RX ORDER — MULTIVIT-MIN/FOLIC/VIT K/LYCOP 400-300MCG
1000 TABLET ORAL DAILY
Qty: 30 | Refills: 5 | Status: DISCONTINUED | COMMUNITY
End: 2019-01-04

## 2019-01-04 NOTE — PHYSICAL EXAM
[Restricted in physically strenuous activity but ambulatory and able to carry out work of a light or sedentary nature] : Status 1- Restricted in physically strenuous activity but ambulatory and able to carry out work of a light or sedentary nature, e.g., light house work, office work [Normal] : affect appropriate [de-identified] : no gynecomastia [de-identified] : wound is slowly closing [de-identified] : minimal discomfort over L spine

## 2019-01-04 NOTE — HISTORY OF PRESENT ILLNESS
[Disease: _____________________] : Disease: [unfilled] [T: ___] : T[unfilled] [N: ___] : N[unfilled] [M: ___] : M[unfilled] [AJCC Stage: ____] : AJCC Stage: [unfilled] [de-identified] : Mr. Calderón is seen in consultation on 8/8/17. On July 11, 2017, he awoke with severe back pain extending into the left testicle. There was a heaviness and pulling sensation. He went to the ER at [facility] and a sonogram was done, revealing a mass. He was seen by Rivas Oconnor and underwent a left radical orchiectomy on July 20, 2017. The pain and pulling sensation resolved. He has been on oxycodone a time, since January 2017, for low back pains. No respiratory complaints. No urinary symptoms. Has had sperm cryopreservation. \par \par 8/30/17...Cleve comes in today for follow up was just discharged yesterday. He was in the hospital for 4 days and he was neutropenic, found a right arm superficial thrombosis at the site of IV. treated with vanco, blood cultures negative. Discharged on Levaquin. Overnight he had a temp of 104.  He is having some hair loss, numbness in his fingers and his toes. He has tinnitus intermittently, started two days after the last dose. He was having soreness in the back of his throat that now resolved. He complains of dysgeusia and he had a lot  of burning and acid reflux-like symptoms during his treatment Carafate, Protonix and the lower dose of Decadron improved his symptoms., Not sleeping well, likely due to steroids.\par \par 9/19/17...cycle 2 day 5 was 9/9/17\fabian Poon is seen in the office today. He has significant fatigue. He had vomiting for 5 days after the chemotherapy with nausea and he said that his complexion was green. He had acid reflux issues once again despite the dropping off the dose of his dexamethasone. He feels a burning sensation in his lower abdomen and he feels that the medications for nausea did not help. He also has significant fatigue which is improved today. He has intermittent tinnitus. He has tingling in the tips of his toes and fingers which are intermittent. His appetite has been good and he is gained weight while on chemotherapy but he says that he eats smaller portions. He denies dysgeusia or dry mouth.\par \par 10/9/17...Completed cycle 3 last week.  He states he feels "horrible." HE states he noticed bloating in the abdomen, He went a whole week after this treatment wit vomiting every day, last time he vomited was two days ago. He fell at one point secondary to being weak and feeling lightheaded. He feels generalized weakness.He had a blood clot, during his treatment, that resolved. His appetite is poor, He eats and then, he eats, he feels like he has to regurgitate his food up after eating. He has fatigue, tinnitus stopped. he denies any numbness of the feet. has had chronic numbness of two of his fingers.He has dysgeusia \par \par 10/30/17...Admission Date: \par -  Admission Date 16-Oct-2017 13:33. \par Discharge Date: \par - Discharge Date	21-Oct-2017 \par Chief Complaint/Reason for Visit: \par Chief Complaint/Reason for Admission	Atrial flutter with RVR\par *******************\par  36 yo M with PMHx of testicular cancer s/p left orchiectomy on chemotherapy who present to the ED before receiving chemo for tachycardia. \par The patient states that he has been feeling well for the past 2 weeks since his 3rd dose of chemotherapy. He denies CP, ABARCA, peripheral edema, cough, \par palpitations. He does states that he had one near syncopal event during a bout of emesis 2 weeks ago which caused him to fall into a door. Has not had any \par other syncopal or presyncopal episodes since then. In the ED he was found to have tachycardia found to be a.flutter after receiving 10mg IV diltiazem x2, a CXR with well placed portacath and no infiltrations. On bedside US found to have EF of 10-15% while tachycardic. \par The patient was initially started on metoprolol tartrate with increasing dosages unable to control the arrhythmia he was subsequently switch to a diltiazem drip. The metiport was removed after discussion with his Oncologist and cardiology for possible arrhythmia induction which was removed 10/19. He also received a TTE on 10/19 which showed EF of 35-40% with LV remodeling. \par Afterwards he was switched to metoprolol succinate 200mg qday. \par He was started on heparin for preparation for TYLOR and cardioversion which was performed on 10/20 and found a RA appendage thrombus with PFO and reduced LV \par function. No cardioversion was performed as a result and he was switched to rivaroxaban on 10/21, lisinopril 2.5mg qday and metoprolol succinate 200mg \par qday. He will follow with cardiology in 2 weeks and EP in 4 weeks. Treatments/Procedures/Significant Findings/Patient Condition: \par Other Significant Findings: \par - Other Significant Findings	 \par < from: Transesophageal Echocardiogram w/o TTE (10.20.17 @ 12:08) > \par Conclusions: \par 1. Left atrial enlargement.  No left atrial or left atrial appendage thrombus. \par 2. Moderate to severe global left ventricular systolic dysfunction. \par 3. There is an echodensity (likely thrombus vs mass) in the right atrial wall that measures (approximately 2.7 cm x 2.0 cm). This is near the distal point of the SVC and may \par represent thrombus from prior indwelling catheter. \par 4. Right ventricular enlargement with decreased right ventricular systolic function. \par 5. Color Doppler demonstrates evidence of a patent foramen ovale. \par 6. Trivial pericardial effusion. \par ****************************************************************************\par TYLOR showed right atrial appendage thrombus, 27 mm, On Xarelto. Feels "exhausted". Does not generally note palpitations. Anxiety level is high. Occasional ABARCA, not at rest. No cough, no sputum, no wheezing. No nausea at this time, has regurgitation. Occ vomiting noted. Fatigue is prominent. Appetite is good. \par \par 7/9/18...Had RPLND on 6/6/18, with viable GCT present. Embryonal pathology. Says he have fatigue, still "recovering" from the RLPND. He has some soreness of the abdomen and says he has some delayed healing. He say Dr Piper on 7/6 in lieu of Dr Restrepo and was told that it was healing well. He is upset that there is residual cancer present, which he says "bummed him out completely". He says he has been reviewing issues related to continued treatment, only after I called him recently as he had not made an appointment. he says his appetite is poor and he is eating small amounts multiple times as he cannot eat full full meals. He says he has had some nausea and he started OTC Prevacid, which has helped. No significant weight loss. Some ABARCA if he walks about 3 blocks or so, he has fatigue with walking as well. he has some paresthesias of the left thigh after the surgery. He notes some spasms of the muscles in that area at times. Urine flow is normal, no nocturia. Libido down, not sexually active, no masturbation since surgery. \par \par 7/19/18...Feels the same, "still recovering". Still has some discomfort near the wound. Appetite s good. Gained 2 pounds. no cough. Mild ABARCA with activities. No palpitations. No leg edema. He continues with some fatigue, 6 on 0-10 scale. Feels he is not motivated when the fatigue is high. He feels his hands are weaker than before. Lifting is fine, but twisting a bottle to open may be  impaired. Ambulates with a cane  due to paresthesias of the left anterior thigh. This from knee  to thigh area,. No paresthesias of the feet. He sometimes has some pains in his feet at night which he says are not cramps. \par \par 9/6/18...Cycle 2, TIP, day # 9. Admitted on Sunday, August 19. \par *********************************************\par Hospitalized again post chemo, this time for 9 days. ANC was <0.1, platelets were low. had low sodium, low potassium and low magnesium. He has had lots of muscle aches and bone pain secondary to the Taxol. Feeling better, still feels weak. Some SOB with exertion. No pains. No headaches, has paresthesias of the bottom of the feet along with some toes. It improves after the chemo. Somewhat more this time. No fevers, no chills. Some nausea with office visits. Appetite is "moderate". Tinnitus has improved, minor dizziness at times. \par \par 8/30/18...Delayed treatment #3 by one week. "Not a good week". Complains of hearing being sensitive. He has tinnitus. This is intermittent, more in the past 2-3 days. He has some vertigo, with some issue walking. He feels as if he is falling to the right side at times, no falls. He has to hold onto the door or furniture to walk. He has some vomiting without nausea. He vomited a few minutes ago. Appetite is decreased, He has altered taste as well. Fatigue is present, not as bad as before. he says he is a "little" fatigued at the moment. Paresthesias of feet have been intermittent, less notable at this time. Spends a lot of time sitting, sleeping. He feels as if his body is exhausted. His mother says he has a lot of anxiety. He agrees. He has thoughts running through his mind about chemo and adverse effects. \par \par 9/20/18...he feels "okay" at times. Occasionally feels nauseous and can't eat, occ feels dizzy. has some regurgitation at times. Has fatigue. No better. Tinnitus is "not as bad", persistent in the right, intermittent in the left. When dizzy,  he feels he is leaning to one side. Spends a lot of time sitting or lying in bed. Paresthesias of the feet, left foot more so than right. Right toes affects the 4th and 5th toe. Fingertips are affected. He notes some darkening of skin folds of the hand joints. Feels no SOB unless he is active. using a cane today. Not taking any meds at this point. \par \par 10/9/18...Admission Date: after cycle 3 TIP\par -  Admission Date 01-Oct-2018 02:09. \par Discharge Date: \par - Discharge Date	06-Oct-2018 \par ***********************************\par HOSPITAL COURSE: The patient continued to have significant nausea while inpatient with intermittent vomiting. He received aggressive IV hydration with \par IVF while he was no longer able to tolerate po. On hospital day 4, patient was able to tolerate some po and requested discharge home. His course was c/b \par neutropenic fever to 101. Cultures were sent and remained negative. Patient was also started on cefepime. His ANC started to improve towards the end of the \par hospital course. His platelets, however, downtrended. He was given 1U platelets as per oncology recommendations. His midline was noted to have been in since \par July. After discussion with his outpatient oncologist, the patient opted to have his midline removed and will receive his last dose of chemotherapy via \par peripheral IV. During his hospital course, patient was noted to have an episode of tachycardia to the 140's followed by bradycardia to the 20's during \par orthostatic vitals. He was seen by Dr. Brown of cardiology who feels this is chemo-induced orthostasis and no further inpatient w/u is needed. He is \par medically stable for dc home. Prior to discharge received additional platelet transfusion and MID-Line was removed after discussion with oncology. \par *******************************************************************\par He was admitted and told that the midline had to be changed. He did have fever with neutropenia. Discharged on the 6th. Hypotensive with orthostasis,seen by cardiology, restarted lisinopril and carvedilol. He has fallen twice since discharge. He has LOC for a few seconds. he is somewhat confused after the episodes. Walking with a cane. he also had an similar episode while in the hospital. Appetite returned, has altered taste, no N/V now, but did have on admission. No source of fever found. Has paresthesias of the left foot, all toes, not his right.  Had aches and pains with the Taxol, which he describes as "brutal". \par \par 10/29/18...called last week, was having difficulty keeping food down and felt weak and dizzy. We arranged for fluids, but he called Friday ad declined. Taking Zofran ADT after meals at times. Able to drink more at this time. Did not eat this AM. Yesterday, had a bagel and egg, for lunch, potatoes, pork and beans and Spam. For dinner had rice and Spam, small amount. No vomiting yesterday, but had nausea. Dizziness if he leis his head back, or if he gets up to fast. Walking with  a cane. No falls. Started Has paresthesias of the feet, on the left side. Continues on oxycodone, about  1 per day, chronic use for low back pains. No leg edema. No cough, ABARCA. \par \par 11/13/18...has not followed with Dr Marquez and remains off of carvedilol, enoxaparin and lisinopril. He takes ondansetron on occasion. Says he had a "blackout" episode after getting up, fell, striking left side, no injury. No LOC. Neuropathy remains, mostly left leg, somewhat better at times, continues on therapy. Minimal on the right side. Considering returning to work. Fatigue is present, somewhat better. Appetite is still impaired, eats once a day. Gained a few pounds. Still has some taste alteration. No leg edema. No abdominal pains. Saw Phong Restrepo yesterday in follow up. He has constipation, and may go 4-5 days w/o bowel movement.  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 Metastatic tumor in 2 of 57 lymph nodes,  Extranodal extension is identified, 5.2 cm, pN3 [FreeTextEntry1] : etoposide and cisplatin started August 14, 2017. Cycle 4 delayed..then omitted due to tachycardia and low EF. RPLND delayed, residual viable embryonal cancer, TIP started July 29, 2018, cycle 3 delayed for grade 3 fatigue, then again one more week for transaminitis.  [de-identified] : Had echo done 12/2018. "I'm okay", feeling better, saw Dr Marquez. Paresthesias are an issue. On gabapentin 300 TID, says it is not helping. He says his fingers are affected, works on computers, and hits wrong keys at times. Worse are his feet. Left foot is numb, right foot partially numb, annoying when walking, not sensing if he steps on something. He continues on oxycodone. Affect toes, senses that they are swollen, bottom of the feet as well as the top. Does not pass the ankle. In the hands, it is the 3rd and 4th finger distally on the right side, and 3-4-5 on the left. Echo said that LVEF is about 44-46 % with moderate global dysfunction. The LA appendage clot was not seen. Not as tired as before, walked 11 minutes yesterday with some fatigue. Occ mild nausea, no vomiting. Appetite is good, gained 4 kilos on the past month( points out that he is wearing heavy boots. . Still has some dysgeusia. Still avoids meats.  Occasional HA, lasts up to 1/2 day, occurs intermittently, once a week. More right side of the headaches. Prior sinus headaches, but not the same, has a sharp element to it. Stills feels dizzy at times when gets up too fast..

## 2019-01-04 NOTE — ASSESSMENT
[Palliative Care Plan] : not applicable at this time [FreeTextEntry1] : Cleve is seen in the office today in followup. He has a mixed germ cell tumor and had 3 cycles of BEP chemotherapy before having to stop treatment for cardiomyopathy with a left atrial appendage thrombus. Cycle 4 was administered, and he had multiple issues with dizziness and low blood pressure. He delayed CT scan revealed residual disease, and he underwent retroperitoneal lymph node dissection which revealed residual tumor with embryonal carcinoma. He then went on to receive TIP chemotherapy. The recommended duration was not clear, and I consulted with Dr. Vee at [facility] who suggested trying to give him 4 cycles of possible.\par \par Toxicity was very prominent, and after 3 cycles of therapy, we agreed to discontinue and observe him. A CT scan post treatment at that time revealed no evidence of disease.\par \par He continues to have some dizziness. He had a recent echocardiogram which shows an ejection fraction of 45% range. It was no left atrial appendage thrombus noted on this echocardiogram, and it was specifically looking for this on the TTE as it was visualized there previously. He has not been on any antithrombotic agents. He was not on any cardiac medications for a while, and he recently saw Dr. Marquez, who restarted him on carvedilol  at a low dose. He still has orthostatic dizziness at times, but is much improved at this time. He has paresthesias involving the toes, bottom of the foot, as well as the dorsal surface of the foot. It does not extend beyond the ankle. In the hands, he has some complaints of paresthesias, but these are from reflect the median nerve distribution rather than all fingers.\par \par His appetite is good, and he is gaining some weight. He is more active at this time. He has some shortness of breath after walking 11 minutes recently, he was encouraged to be active on a daily basis to improve his endurance as this time, as he was ill for such a long period of time.\par \par A CT scan has been ordered to assess his disease status at this time. Tumor markers have been requested today as well as normal blood work. Magnesium was also included. He does have anxiety and his very worried about the future. CT scan, if negative, will go a long way towards reassuring him that things are going well. I suggested that he meet with one of our social workers, but he declined. He is taking melatonin at bedtime, but feels he has difficulty in relaxing.\par \par All questions were answered the best of my ability and to his apparent satisfaction. There is no return to see me once again in 2 months time or sooner if any issues develops. He'll have a CT scan done in the interim I will notify him of those results once they are available.

## 2019-01-04 NOTE — REVIEW OF SYSTEMS
[Loss of Hearing] : loss of hearing [SOB on Exertion] : shortness of breath during exertion [Dizziness] : dizziness [Anxiety] : anxiety [Muscle Weakness] : muscle weakness [Negative] : Eyes [Fever] : no fever [Chills] : no chills [Night Sweats] : no night sweats [Dysphagia] : no dysphagia [Hoarseness] : no hoarseness [Odynophagia] : no odynophagia [Chest Pain] : no chest pain [Palpitations] : no palpitations [Lower Ext Edema] : no lower extremity edema [Wheezing] : no wheezing [Cough] : no cough [Abdominal Pain] : no abdominal pain [Vomiting] : no vomiting [Constipation] : no constipation [Diarrhea] : no diarrhea [Dysuria] : no dysuria [Incontinence] : no incontinence [Joint Pain] : no joint pain [Joint Stiffness] : no joint stiffness [Muscle Pain] : no muscle pain [Skin Rash] : no skin rash [Depression] : no depression [Easy Bleeding] : no tendency for easy bleeding [Easy Bruising] : no tendency for easy bruising [FreeTextEntry2] : mild fatigue, more active [FreeTextEntry3] : has new glasses [FreeTextEntry4] : dysgeusia persists, tinnitus intermittently [de-identified] : some HA, see HPI, neuropathy, sensory, see HPI [de-identified] : feels he has difficulty sleeping due to anxiety, denies depression, "once was".

## 2019-01-04 NOTE — RESULTS/DATA
[FreeTextEntry1] : 12/12/18...Ejection fraction was in the mid 40s. The left atrium appeared normal. The previously visualized clot was not seen on this study. There was moderate global left ventricular systolic dysfunction with a dilated left ventricle and normal relative wall thickness. There was normal diastolic function

## 2019-01-07 ENCOUNTER — FORM ENCOUNTER (OUTPATIENT)
Age: 39
End: 2019-01-07

## 2019-01-07 LAB
AFP-TM SERPL-MCNC: 3.3 NG/ML
ALBUMIN SERPL ELPH-MCNC: 4.3 G/DL
ALP BLD-CCNC: 61 U/L
ALT SERPL-CCNC: 43 U/L
ANION GAP SERPL CALC-SCNC: 11 MMOL/L
AST SERPL-CCNC: 36 U/L
BILIRUB SERPL-MCNC: 0.2 MG/DL
BUN SERPL-MCNC: 16 MG/DL
CALCIUM SERPL-MCNC: 8.9 MG/DL
CHLORIDE SERPL-SCNC: 103 MMOL/L
CO2 SERPL-SCNC: 27 MMOL/L
CREAT SERPL-MCNC: 0.86 MG/DL
GLUCOSE SERPL-MCNC: 117 MG/DL
HCG SERPL-MCNC: <1 MIU/ML
HCG-TM SERPL-MCNC: <1 MIU/ML
LDH SERPL-CCNC: 181 U/L
MAGNESIUM SERPL-MCNC: 2 MG/DL
POTASSIUM SERPL-SCNC: 4.6 MMOL/L
PROT SERPL-MCNC: 7.1 G/DL
SODIUM SERPL-SCNC: 141 MMOL/L

## 2019-01-08 ENCOUNTER — APPOINTMENT (OUTPATIENT)
Dept: CT IMAGING | Facility: IMAGING CENTER | Age: 39
End: 2019-01-08
Payer: MEDICAID

## 2019-01-08 ENCOUNTER — APPOINTMENT (OUTPATIENT)
Dept: INTERNAL MEDICINE | Facility: CLINIC | Age: 39
End: 2019-01-08

## 2019-01-08 ENCOUNTER — OUTPATIENT (OUTPATIENT)
Dept: OUTPATIENT SERVICES | Facility: HOSPITAL | Age: 39
LOS: 1 days | End: 2019-01-08
Payer: MEDICAID

## 2019-01-08 DIAGNOSIS — Z98.890 OTHER SPECIFIED POSTPROCEDURAL STATES: Chronic | ICD-10-CM

## 2019-01-08 DIAGNOSIS — Z90.79 ACQUIRED ABSENCE OF OTHER GENITAL ORGAN(S): Chronic | ICD-10-CM

## 2019-01-08 DIAGNOSIS — Z95.828 PRESENCE OF OTHER VASCULAR IMPLANTS AND GRAFTS: Chronic | ICD-10-CM

## 2019-01-08 DIAGNOSIS — C62.92 MALIGNANT NEOPLASM OF LEFT TESTIS, UNSPECIFIED WHETHER DESCENDED OR UNDESCENDED: ICD-10-CM

## 2019-01-08 PROCEDURE — 71260 CT THORAX DX C+: CPT

## 2019-01-08 PROCEDURE — 71260 CT THORAX DX C+: CPT | Mod: 26

## 2019-01-08 PROCEDURE — 82565 ASSAY OF CREATININE: CPT

## 2019-01-08 PROCEDURE — 74177 CT ABD & PELVIS W/CONTRAST: CPT | Mod: 26

## 2019-01-08 PROCEDURE — 74177 CT ABD & PELVIS W/CONTRAST: CPT

## 2019-01-30 ENCOUNTER — NON-APPOINTMENT (OUTPATIENT)
Age: 39
End: 2019-01-30

## 2019-01-30 ENCOUNTER — APPOINTMENT (OUTPATIENT)
Dept: CARDIOLOGY | Facility: CLINIC | Age: 39
End: 2019-01-30
Payer: MEDICAID

## 2019-01-30 VITALS
BODY MASS INDEX: 31.58 KG/M2 | WEIGHT: 246 LBS | HEART RATE: 81 BPM | DIASTOLIC BLOOD PRESSURE: 72 MMHG | SYSTOLIC BLOOD PRESSURE: 107 MMHG | OXYGEN SATURATION: 96 %

## 2019-01-30 PROCEDURE — 99214 OFFICE O/P EST MOD 30 MIN: CPT

## 2019-01-30 PROCEDURE — 93000 ELECTROCARDIOGRAM COMPLETE: CPT

## 2019-01-30 NOTE — PHYSICAL EXAM
[General Appearance - Well Developed] : well developed [Normal Appearance] : normal appearance [Well Groomed] : well groomed [General Appearance - Well Nourished] : well nourished [No Deformities] : no deformities [General Appearance - In No Acute Distress] : no acute distress [Normal Oral Mucosa] : normal oral mucosa [No Oral Pallor] : no oral pallor [Normal Oropharynx] : normal oropharynx [Normal Jugular Venous A Waves Present] : normal jugular venous A waves present [Normal Jugular Venous V Waves Present] : normal jugular venous V waves present [No Jugular Venous Fernnádez A Waves] : no jugular venous fernández A waves [] : no respiratory distress [Respiration, Rhythm And Depth] : normal respiratory rhythm and effort [Exaggerated Use Of Accessory Muscles For Inspiration] : no accessory muscle use [Auscultation Breath Sounds / Voice Sounds] : lungs were clear to auscultation bilaterally [Bowel Sounds] : normal bowel sounds [Abdomen Soft] : soft [Abdomen Tenderness] : non-tender [Abnormal Walk] : normal gait [Gait - Sufficient For Exercise Testing] : the gait was sufficient for exercise testing [Nail Clubbing] : no clubbing of the fingernails [Cyanosis, Localized] : no localized cyanosis [Skin Color & Pigmentation] : normal skin color and pigmentation [No Venous Stasis] : no venous stasis [No Xanthoma] : no  xanthoma was observed [Oriented To Time, Place, And Person] : oriented to person, place, and time [Impaired Insight] : insight and judgment were intact [Affect] : the affect was normal [Mood] : the mood was normal [No Anxiety] : not feeling anxious [Conjunctiva] : the conjunctiva were normal in both eyes [Normal] : the eyelids were normal bilaterally [PERRL] : pupils were equal in size, round, and reactive to light [EOM Intact] : extraocular movements were intact [Not Palpable] : not palpable [Normal Rate] : normal [Rhythm Regular] : regular [Normal S1] : normal S1 [Normal S2] : normal S2 [No Murmur] : no murmurs heard [No Pitting Edema] : no pitting edema present [FreeTextEntry1] : site of R-chest medi-port clean/dry/intact [Yellow Sclera (Icteric)] : no scleral icterus was seen [Right Carotid Bruit] : no bruit heard over the right carotid [Left Carotid Bruit] : no bruit heard over the left carotid

## 2019-01-30 NOTE — DISCUSSION/SUMMARY
[FreeTextEntry1] : Patient is a 38 year-old with testicular cancer who developed atrial flutter secondary to catheter irritation of the right atrium and nonischemic cardiomyopathy, likely secondary to the tachycardia and not to the chemotherapy regimen. \par He has been maintaining sinus rhythm since flutter ablation in February 2018.\par Orthostatic dizziness is improved since discontinuing the chemotherapy. Most recent chemo was given October 2018.\par \par Most recent echo (in December 2018) showed reduced LVEF (35-40%).\par  \par Plan to restart and uptitrate beta-blocker and ARB as tolerated if LVEF remains reduced. Patient is currently only taking carvedilol 3.125 mg BID. Will not uptitrate beta-blocker at this time as patient reporting fatigue.\par Will recommend discontinuing gabapentin and see if edema and fatigue improve. Will taper over at least 7 days.\par Will recommend starting ARB at next visit.

## 2019-01-30 NOTE — REASON FOR VISIT
[Follow-Up - Clinic] : a clinic follow-up of [Cardiomyopathy] : cardiomyopathy [Parent] : parent [Other: _____] : [unfilled]

## 2019-01-30 NOTE — HISTORY OF PRESENT ILLNESS
[FreeTextEntry1] : Patient is a 38 year-old being actively treated for testicular cancer. In 2017, he had completed three cycles of chemotherapy. After the first two cycles, he received medi-port in the right chest wall. He got his third round of chemo through this port. He subsequently developed shortness of breath and chest tightness and was found to be in atrial flutter with rapid ventricular rate. He was admitted for rate control and planned TYLOR and DCCV. He was started on anticoagulation, but during the TYLOR, he was noted to have a right atrial thrombus and cardioversion was aborted. He was discharged on Toprol 200mg daily and Xarelto 15mg BID. \par \par At his initial visit here in November, I added Digoxin to his regimen for better rate control. This helped to better control his rate. In December 2017, TYLOR still showed RA thrombus. TYLOR performed in January 2018 showed improvement in thrombus size, and patient was successfully TYLOR/DCCV into sinus rhythm.\par February 5, 2018, he maintains normal sinus rhythm. He was taking a low dose ACEi (lisinopril 2.5mg daily) and Toprol 100mg daily.\par \par June 27, 2018 - patient recently admitted to [facility] for resection of retroperitoneal mass. The procedure was successful and the patient was discharged. He maintains normal sinus rhythm.\par \par Patient restarted chemotherapy and completed three cycles of TIP. He had a left upper extremity PICC line placed, which has been removed.. \par \par November 2018 - Patient has been taken off anticoagulation and guideline directed medical therapy for cardiomyopathy. He was having episodes that he describes as blacking out while getting chemotherapy, which lead to discontinuation of his medication.\par \par January 2019 - Patient presents today in his usual state of health, but he is reporting ongoing fatigue. His only current medications are low dose beta-blocker (carvedilol 3.125 mg BID), gabapentin 900 mg daily, and oxycodone PRN. Several days ago, he experienced bilateral lower extremity edema, L > R, that improved spontaneously. \par \par PMD: Amador Arambula MD [phone]\par Oncologist: Neo Chirinos MD [phone]

## 2019-03-04 ENCOUNTER — TRANSCRIPTION ENCOUNTER (OUTPATIENT)
Age: 39
End: 2019-03-04

## 2019-03-05 ENCOUNTER — NON-APPOINTMENT (OUTPATIENT)
Age: 39
End: 2019-03-05

## 2019-03-05 ENCOUNTER — APPOINTMENT (OUTPATIENT)
Dept: CARDIOLOGY | Facility: CLINIC | Age: 39
End: 2019-03-05
Payer: MEDICAID

## 2019-03-05 VITALS
HEART RATE: 87 BPM | WEIGHT: 255 LBS | HEIGHT: 74 IN | SYSTOLIC BLOOD PRESSURE: 108 MMHG | DIASTOLIC BLOOD PRESSURE: 70 MMHG | OXYGEN SATURATION: 94 % | BODY MASS INDEX: 32.73 KG/M2

## 2019-03-05 DIAGNOSIS — G56.23 LESION OF ULNAR NERVE, BILATERAL UPPER LIMBS: ICD-10-CM

## 2019-03-05 PROCEDURE — 99214 OFFICE O/P EST MOD 30 MIN: CPT

## 2019-03-05 PROCEDURE — 93000 ELECTROCARDIOGRAM COMPLETE: CPT

## 2019-03-06 NOTE — PHYSICAL EXAM
[General Appearance - Well Developed] : well developed [Normal Appearance] : normal appearance [Well Groomed] : well groomed [General Appearance - Well Nourished] : well nourished [No Deformities] : no deformities [General Appearance - In No Acute Distress] : no acute distress [Normal Oral Mucosa] : normal oral mucosa [No Oral Pallor] : no oral pallor [Normal Oropharynx] : normal oropharynx [Normal Jugular Venous A Waves Present] : normal jugular venous A waves present [Normal Jugular Venous V Waves Present] : normal jugular venous V waves present [No Jugular Venous Fernández A Waves] : no jugular venous fernández A waves [] : no respiratory distress [Respiration, Rhythm And Depth] : normal respiratory rhythm and effort [Exaggerated Use Of Accessory Muscles For Inspiration] : no accessory muscle use [Auscultation Breath Sounds / Voice Sounds] : lungs were clear to auscultation bilaterally [Bowel Sounds] : normal bowel sounds [Abdomen Soft] : soft [Abdomen Tenderness] : non-tender [Abnormal Walk] : normal gait [Gait - Sufficient For Exercise Testing] : the gait was sufficient for exercise testing [Nail Clubbing] : no clubbing of the fingernails [Cyanosis, Localized] : no localized cyanosis [Skin Color & Pigmentation] : normal skin color and pigmentation [No Venous Stasis] : no venous stasis [No Xanthoma] : no  xanthoma was observed [Oriented To Time, Place, And Person] : oriented to person, place, and time [Impaired Insight] : insight and judgment were intact [Affect] : the affect was normal [Mood] : the mood was normal [No Anxiety] : not feeling anxious [Conjunctiva] : the conjunctiva were normal in both eyes [Normal] : the eyelids were normal bilaterally [PERRL] : pupils were equal in size, round, and reactive to light [EOM Intact] : extraocular movements were intact [Not Palpable] : not palpable [Normal Rate] : normal [Rhythm Regular] : regular [Normal S1] : normal S1 [Normal S2] : normal S2 [No Murmur] : no murmurs heard [1+] : left 1+ [No Pitting Edema] : no pitting edema present [FreeTextEntry1] : site of R-chest medi-port clean/dry/intact [Yellow Sclera (Icteric)] : no scleral icterus was seen [Right Carotid Bruit] : no bruit heard over the right carotid [Left Carotid Bruit] : no bruit heard over the left carotid

## 2019-03-06 NOTE — DISCUSSION/SUMMARY
[FreeTextEntry1] : Patient is a 38 year-old with testicular cancer who developed atrial flutter secondary to catheter irritation of the right atrium and nonischemic cardiomyopathy, likely secondary to the tachycardia and not to the chemotherapy regimen. \par He has been maintaining sinus rhythm since flutter ablation in February 2018.\par Orthostatic dizziness is improved since discontinuing the chemotherapy. Most recent chemo was given October 2018.\par \par Most recent echo (in December 2018) showed reduced LVEF (35-40%).\par  \par Plan to restart and uptitrate beta-blocker and ARB as tolerated if LVEF remains reduced. Patient is currently only taking carvedilol 3.125 mg BID. Will uptitrate beta-blocker at this time to carvedilol 6.25 mg BID.\par Will recommend starting ARB prior to next visit once his increased carvedilol is tolerated.\par I recommended discontinuing gabapentin, but patient's pain management physician put him back on it.\par \par Refer to neurology for numbness in feet.\par Refer to dermatology for blistering of palms and soles.\par Refer to vascular cardiology if LIAN/PVR suggests LE peripheral arterial disease.

## 2019-03-06 NOTE — HISTORY OF PRESENT ILLNESS
[FreeTextEntry1] : Patient is a 38 year-old being actively treated for testicular cancer. In 2017, he had completed three cycles of chemotherapy. After the first two cycles, he received medi-port in the right chest wall. He got his third round of chemo through this port. He subsequently developed shortness of breath and chest tightness and was found to be in atrial flutter with rapid ventricular rate. He was admitted for rate control and planned TYLOR and DCCV. He was started on anticoagulation, but during the TYLOR, he was noted to have a right atrial thrombus and cardioversion was aborted. He was discharged on Toprol 200mg daily and Xarelto 15mg BID. \par \par At his initial visit here in November, I added Digoxin to his regimen for better rate control. This helped to better control his rate. In December 2017, TYLOR still showed RA thrombus. TYLOR performed in January 2018 showed improvement in thrombus size, and patient was successfully TYLOR/DCCV into sinus rhythm.\par February 5, 2018, he maintains normal sinus rhythm. He was taking a low dose ACEi (lisinopril 2.5mg daily) and Toprol 100mg daily.\par \par June 27, 2018 - patient recently admitted to [facility] for resection of retroperitoneal mass. The procedure was successful and the patient was discharged. He maintains normal sinus rhythm.\par \par Patient restarted chemotherapy and completed three cycles of TIP. He had a left upper extremity PICC line placed, which has been removed.. \par \par November 2018 - Patient has been taken off anticoagulation and guideline directed medical therapy for cardiomyopathy. He was having episodes that he describes as blacking out while getting chemotherapy, which lead to discontinuation of his medication.\par \par January 2019 - Patient presents today in his usual state of health, but he is reporting ongoing fatigue. His only current medications are low dose beta-blocker (carvedilol 3.125 mg BID), gabapentin 900 mg daily, and oxycodone PRN. Several days ago, he experienced bilateral lower extremity edema, L > R, that improved spontaneously. \par \par March 2019 - Patient has fatigue, anxiety, numbness of hands and feet, and blistering of palms and soles. He is tolerating his carvedilol 3.125 mg BID.\par \par PMD: Amador Arambula MD [phone]\par Oncologist: Neo Chirinos MD [phone]

## 2019-03-08 ENCOUNTER — APPOINTMENT (OUTPATIENT)
Dept: CARDIOLOGY | Facility: CLINIC | Age: 39
End: 2019-03-08
Payer: MEDICAID

## 2019-03-08 PROCEDURE — 93923 UPR/LXTR ART STDY 3+ LVLS: CPT

## 2019-03-08 PROCEDURE — 93015 CV STRESS TEST SUPVJ I&R: CPT

## 2019-03-10 ENCOUNTER — FORM ENCOUNTER (OUTPATIENT)
Age: 39
End: 2019-03-10

## 2019-03-11 ENCOUNTER — OUTPATIENT (OUTPATIENT)
Dept: OUTPATIENT SERVICES | Facility: HOSPITAL | Age: 39
LOS: 1 days | Discharge: HOME | End: 2019-03-11

## 2019-03-11 ENCOUNTER — OUTPATIENT (OUTPATIENT)
Dept: OUTPATIENT SERVICES | Facility: HOSPITAL | Age: 39
LOS: 1 days | End: 2019-03-11
Payer: MEDICAID

## 2019-03-11 ENCOUNTER — APPOINTMENT (OUTPATIENT)
Dept: HEMATOLOGY ONCOLOGY | Facility: CLINIC | Age: 39
End: 2019-03-11
Payer: MEDICAID

## 2019-03-11 ENCOUNTER — APPOINTMENT (OUTPATIENT)
Dept: ULTRASOUND IMAGING | Facility: IMAGING CENTER | Age: 39
End: 2019-03-11
Payer: MEDICAID

## 2019-03-11 ENCOUNTER — RESULT REVIEW (OUTPATIENT)
Age: 39
End: 2019-03-11

## 2019-03-11 VITALS
WEIGHT: 258.6 LBS | OXYGEN SATURATION: 97 % | RESPIRATION RATE: 16 BRPM | DIASTOLIC BLOOD PRESSURE: 69 MMHG | HEART RATE: 86 BPM | TEMPERATURE: 98.1 F | SYSTOLIC BLOOD PRESSURE: 111 MMHG | BODY MASS INDEX: 33.2 KG/M2

## 2019-03-11 DIAGNOSIS — Z98.890 OTHER SPECIFIED POSTPROCEDURAL STATES: Chronic | ICD-10-CM

## 2019-03-11 DIAGNOSIS — C62.92 MALIGNANT NEOPLASM OF LEFT TESTIS, UNSPECIFIED WHETHER DESCENDED OR UNDESCENDED: ICD-10-CM

## 2019-03-11 DIAGNOSIS — Z90.79 ACQUIRED ABSENCE OF OTHER GENITAL ORGAN(S): Chronic | ICD-10-CM

## 2019-03-11 DIAGNOSIS — Z95.828 PRESENCE OF OTHER VASCULAR IMPLANTS AND GRAFTS: Chronic | ICD-10-CM

## 2019-03-11 LAB
HCT VFR BLD CALC: 39 % — SIGNIFICANT CHANGE UP (ref 39–50)
HGB BLD-MCNC: 13.5 G/DL — SIGNIFICANT CHANGE UP (ref 13–17)
MCHC RBC-ENTMCNC: 32.7 PG — SIGNIFICANT CHANGE UP (ref 27–34)
MCHC RBC-ENTMCNC: 34.5 G/DL — SIGNIFICANT CHANGE UP (ref 32–36)
MCV RBC AUTO: 94.7 FL — SIGNIFICANT CHANGE UP (ref 80–100)
PLATELET # BLD AUTO: 180 K/UL — SIGNIFICANT CHANGE UP (ref 150–400)
RBC # BLD: 4.12 M/UL — LOW (ref 4.2–5.8)
RBC # FLD: 12.2 % — SIGNIFICANT CHANGE UP (ref 10.3–14.5)
WBC # BLD: 5.9 K/UL — SIGNIFICANT CHANGE UP (ref 3.8–10.5)
WBC # FLD AUTO: 5.9 K/UL — SIGNIFICANT CHANGE UP (ref 3.8–10.5)

## 2019-03-11 PROCEDURE — 76870 US EXAM SCROTUM: CPT | Mod: 26

## 2019-03-11 PROCEDURE — 99214 OFFICE O/P EST MOD 30 MIN: CPT

## 2019-03-11 PROCEDURE — 76870 US EXAM SCROTUM: CPT

## 2019-03-11 NOTE — ASSESSMENT
[Palliative Care Plan] : not applicable at this time [FreeTextEntry1] : Cleve is seen in the office today at allergic concerns about his remaining testicle. He has noted some swelling and unusual sensations and was very concerned. He complains about a blistering-like rash of his hands and feet which appeared to be tinea, I have asked him to see dermatology. He was given the dermatology referral number.\par \par The right testicle is palpably normal. There is no induration present. There is some mild tenderness of the epididymis.\par \par He was sent for a testicular sonogram the results of which are embedded above. The testicle was normal. There was small to moderate hydrocele. He was contacted to reassure him that everything was fine. Laboratory results are pending.

## 2019-03-11 NOTE — REASON FOR VISIT
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

## 2019-03-11 NOTE — REVIEW OF SYSTEMS
[Fatigue] : fatigue [Recent Change In Weight] : ~T recent weight change [SOB on Exertion] : shortness of breath during exertion [Diarrhea] : diarrhea [Dysuria] : dysuria [Muscle Pain] : muscle pain [Difficulty Walking] : difficulty walking [Anxiety] : anxiety [Depression] : depression [Negative] : ENT [Fever] : no fever [Chills] : no chills [Night Sweats] : no night sweats [Chest Pain] : no chest pain [Palpitations] : no palpitations [Lower Ext Edema] : no lower extremity edema [Wheezing] : no wheezing [Cough] : no cough [Abdominal Pain] : no abdominal pain [Vomiting] : no vomiting [Constipation] : no constipation [Incontinence] : no incontinence [Joint Pain] : no joint pain [Joint Stiffness] : no joint stiffness [Dizziness] : no dizziness [Muscle Weakness] : no muscle weakness [Easy Bleeding] : no tendency for easy bleeding [Easy Bruising] : no tendency for easy bruising [FreeTextEntry4] : tinnituis [FreeTextEntry5] : occ left foot/ankle edema [FreeTextEntry7] : intermittent diarrhea, 2 per day, loose, no cramps, no blood [FreeTextEntry8] : feels that urine initially bursts out, not stopping or starting the way it normally should.  [FreeTextEntry9] : some cramps, left calf [de-identified] : skin changes of palms and soles, see HPI [de-identified] : walking issues due to neuropathy, headaches, migraines, AM upon awakening

## 2019-03-11 NOTE — HISTORY OF PRESENT ILLNESS
[Disease: _____________________] : Disease: [unfilled] [T: ___] : T[unfilled] [N: ___] : N[unfilled] [M: ___] : M[unfilled] [AJCC Stage: ____] : AJCC Stage: [unfilled] [de-identified] : Mr. Calderón is seen in consultation on 8/8/17. On July 11, 2017, he awoke with severe back pain extending into the left testicle. There was a heaviness and pulling sensation. He went to the ER at [facility] and a sonogram was done, revealing a mass. He was seen by Rivas Oconnor and underwent a left radical orchiectomy on July 20, 2017. The pain and pulling sensation resolved. He has been on oxycodone a time, since January 2017, for low back pains. No respiratory complaints. No urinary symptoms. Has had sperm cryopreservation. \par \par 8/30/17...Cleve comes in today for follow up was just discharged yesterday. He was in the hospital for 4 days and he was neutropenic, found a right arm superficial thrombosis at the site of IV. treated with vanco, blood cultures negative. Discharged on Levaquin. Overnight he had a temp of 104.  He is having some hair loss, numbness in his fingers and his toes. He has tinnitus intermittently, started two days after the last dose. He was having soreness in the back of his throat that now resolved. He complains of dysgeusia and he had a lot  of burning and acid reflux-like symptoms during his treatment Carafate, Protonix and the lower dose of Decadron improved his symptoms., Not sleeping well, likely due to steroids.\par \par 9/19/17...cycle 2 day 5 was 9/9/17\fabian Poon is seen in the office today. He has significant fatigue. He had vomiting for 5 days after the chemotherapy with nausea and he said that his complexion was green. He had acid reflux issues once again despite the dropping off the dose of his dexamethasone. He feels a burning sensation in his lower abdomen and he feels that the medications for nausea did not help. He also has significant fatigue which is improved today. He has intermittent tinnitus. He has tingling in the tips of his toes and fingers which are intermittent. His appetite has been good and he is gained weight while on chemotherapy but he says that he eats smaller portions. He denies dysgeusia or dry mouth.\par \par 10/9/17...Completed cycle 3 last week.  He states he feels "horrible." HE states he noticed bloating in the abdomen, He went a whole week after this treatment wit vomiting every day, last time he vomited was two days ago. He fell at one point secondary to being weak and feeling lightheaded. He feels generalized weakness.He had a blood clot, during his treatment, that resolved. His appetite is poor, He eats and then, he eats, he feels like he has to regurgitate his food up after eating. He has fatigue, tinnitus stopped. he denies any numbness of the feet. has had chronic numbness of two of his fingers.He has dysgeusia \par \par 10/30/17...Admission Date: \par -  Admission Date 16-Oct-2017 13:33. \par Discharge Date: \par - Discharge Date	21-Oct-2017 \par Chief Complaint/Reason for Visit: \par Chief Complaint/Reason for Admission	Atrial flutter with RVR\par *******************\par  38 yo M with PMHx of testicular cancer s/p left orchiectomy on chemotherapy who present to the ED before receiving chemo for tachycardia. \par The patient states that he has been feeling well for the past 2 weeks since his 3rd dose of chemotherapy. He denies CP, ABARCA, peripheral edema, cough, \par palpitations. He does states that he had one near syncopal event during a bout of emesis 2 weeks ago which caused him to fall into a door. Has not had any \par other syncopal or presyncopal episodes since then. In the ED he was found to have tachycardia found to be a.flutter after receiving 10mg IV diltiazem x2, a CXR with well placed portacath and no infiltrations. On bedside US found to have EF of 10-15% while tachycardic. \par The patient was initially started on metoprolol tartrate with increasing dosages unable to control the arrhythmia he was subsequently switch to a diltiazem drip. The metiport was removed after discussion with his Oncologist and cardiology for possible arrhythmia induction which was removed 10/19. He also received a TTE on 10/19 which showed EF of 35-40% with LV remodeling. \par Afterwards he was switched to metoprolol succinate 200mg qday. \par He was started on heparin for preparation for TYLOR and cardioversion which was performed on 10/20 and found a RA appendage thrombus with PFO and reduced LV \par function. No cardioversion was performed as a result and he was switched to rivaroxaban on 10/21, lisinopril 2.5mg qday and metoprolol succinate 200mg \par qday. He will follow with cardiology in 2 weeks and EP in 4 weeks. Treatments/Procedures/Significant Findings/Patient Condition: \par Other Significant Findings: \par - Other Significant Findings	 \par < from: Transesophageal Echocardiogram w/o TTE (10.20.17 @ 12:08) > \par Conclusions: \par 1. Left atrial enlargement.  No left atrial or left atrial appendage thrombus. \par 2. Moderate to severe global left ventricular systolic dysfunction. \par 3. There is an echodensity (likely thrombus vs mass) in the right atrial wall that measures (approximately 2.7 cm x 2.0 cm). This is near the distal point of the SVC and may \par represent thrombus from prior indwelling catheter. \par 4. Right ventricular enlargement with decreased right ventricular systolic function. \par 5. Color Doppler demonstrates evidence of a patent foramen ovale. \par 6. Trivial pericardial effusion. \par ****************************************************************************\par TYLOR showed right atrial appendage thrombus, 27 mm, On Xarelto. Feels "exhausted". Does not generally note palpitations. Anxiety level is high. Occasional ABARCA, not at rest. No cough, no sputum, no wheezing. No nausea at this time, has regurgitation. Occ vomiting noted. Fatigue is prominent. Appetite is good. \par \par 7/9/18...Had RPLND on 6/6/18, with viable GCT present. Embryonal pathology. Says he have fatigue, still "recovering" from the RLPND. He has some soreness of the abdomen and says he has some delayed healing. He say Dr Piper on 7/6 in lieu of Dr Restrepo and was told that it was healing well. He is upset that there is residual cancer present, which he says "bummed him out completely". He says he has been reviewing issues related to continued treatment, only after I called him recently as he had not made an appointment. he says his appetite is poor and he is eating small amounts multiple times as he cannot eat full full meals. He says he has had some nausea and he started OTC Prevacid, which has helped. No significant weight loss. Some ABARCA if he walks about 3 blocks or so, he has fatigue with walking as well. he has some paresthesias of the left thigh after the surgery. He notes some spasms of the muscles in that area at times. Urine flow is normal, no nocturia. Libido down, not sexually active, no masturbation since surgery. \par \par 7/19/18...Feels the same, "still recovering". Still has some discomfort near the wound. Appetite s good. Gained 2 pounds. no cough. Mild ABARCA with activities. No palpitations. No leg edema. He continues with some fatigue, 6 on 0-10 scale. Feels he is not motivated when the fatigue is high. He feels his hands are weaker than before. Lifting is fine, but twisting a bottle to open may be  impaired. Ambulates with a cane  due to paresthesias of the left anterior thigh. This from knee  to thigh area,. No paresthesias of the feet. He sometimes has some pains in his feet at night which he says are not cramps. \par \par 9/6/18...Cycle 2, TIP, day # 9. Admitted on Sunday, August 19. \par *********************************************\par Hospitalized again post chemo, this time for 9 days. ANC was <0.1, platelets were low. had low sodium, low potassium and low magnesium. He has had lots of muscle aches and bone pain secondary to the Taxol. Feeling better, still feels weak. Some SOB with exertion. No pains. No headaches, has paresthesias of the bottom of the feet along with some toes. It improves after the chemo. Somewhat more this time. No fevers, no chills. Some nausea with office visits. Appetite is "moderate". Tinnitus has improved, minor dizziness at times. \par \par 8/30/18...Delayed treatment #3 by one week. "Not a good week". Complains of hearing being sensitive. He has tinnitus. This is intermittent, more in the past 2-3 days. He has some vertigo, with some issue walking. He feels as if he is falling to the right side at times, no falls. He has to hold onto the door or furniture to walk. He has some vomiting without nausea. He vomited a few minutes ago. Appetite is decreased, He has altered taste as well. Fatigue is present, not as bad as before. he says he is a "little" fatigued at the moment. Paresthesias of feet have been intermittent, less notable at this time. Spends a lot of time sitting, sleeping. He feels as if his body is exhausted. His mother says he has a lot of anxiety. He agrees. He has thoughts running through his mind about chemo and adverse effects. \par \par 9/20/18...he feels "okay" at times. Occasionally feels nauseous and can't eat, occ feels dizzy. has some regurgitation at times. Has fatigue. No better. Tinnitus is "not as bad", persistent in the right, intermittent in the left. When dizzy,  he feels he is leaning to one side. Spends a lot of time sitting or lying in bed. Paresthesias of the feet, left foot more so than right. Right toes affects the 4th and 5th toe. Fingertips are affected. He notes some darkening of skin folds of the hand joints. Feels no SOB unless he is active. using a cane today. Not taking any meds at this point. \par \par 10/9/18...Admission Date: after cycle 3 TIP\par -  Admission Date 01-Oct-2018 02:09. \par Discharge Date: \par - Discharge Date	06-Oct-2018 \par ***********************************\par HOSPITAL COURSE: The patient continued to have significant nausea while inpatient with intermittent vomiting. He received aggressive IV hydration with \par IVF while he was no longer able to tolerate po. On hospital day 4, patient was able to tolerate some po and requested discharge home. His course was c/b \par neutropenic fever to 101. Cultures were sent and remained negative. Patient was also started on cefepime. His ANC started to improve towards the end of the \par hospital course. His platelets, however, downtrended. He was given 1U platelets as per oncology recommendations. His midline was noted to have been in since \par July. After discussion with his outpatient oncologist, the patient opted to have his midline removed and will receive his last dose of chemotherapy via \par peripheral IV. During his hospital course, patient was noted to have an episode of tachycardia to the 140's followed by bradycardia to the 20's during \par orthostatic vitals. He was seen by Dr. Brown of cardiology who feels this is chemo-induced orthostasis and no further inpatient w/u is needed. He is \par medically stable for dc home. Prior to discharge received additional platelet transfusion and MID-Line was removed after discussion with oncology. \par *******************************************************************\par He was admitted and told that the midline had to be changed. He did have fever with neutropenia. Discharged on the 6th. Hypotensive with orthostasis,seen by cardiology, restarted lisinopril and carvedilol. He has fallen twice since discharge. He has LOC for a few seconds. he is somewhat confused after the episodes. Walking with a cane. he also had an similar episode while in the hospital. Appetite returned, has altered taste, no N/V now, but did have on admission. No source of fever found. Has paresthesias of the left foot, all toes, not his right.  Had aches and pains with the Taxol, which he describes as "brutal". \par \par 10/29/18...called last week, was having difficulty keeping food down and felt weak and dizzy. We arranged for fluids, but he called Friday ad declined. Taking Zofran ADT after meals at times. Able to drink more at this time. Did not eat this AM. Yesterday, had a bagel and egg, for lunch, potatoes, pork and beans and Spam. For dinner had rice and Spam, small amount. No vomiting yesterday, but had nausea. Dizziness if he leis his head back, or if he gets up to fast. Walking with  a cane. No falls. Started Has paresthesias of the feet, on the left side. Continues on oxycodone, about  1 per day, chronic use for low back pains. No leg edema. No cough, ABARCA. \par \par 11/13/18...has not followed with Dr Marquez and remains off of carvedilol, enoxaparin and lisinopril. He takes ondansetron on occasion. Says he had a "blackout" episode after getting up, fell, striking left side, no injury. No LOC. Neuropathy remains, mostly left leg, somewhat better at times, continues on therapy. Minimal on the right side. Considering returning to work. Fatigue is present, somewhat better. Appetite is still impaired, eats once a day. Gained a few pounds. Still has some taste alteration. No leg edema. No abdominal pains. Saw Phong Restrepo yesterday in follow up. He has constipation, and may go 4-5 days w/o bowel movement. \par \par 1/4/19...Had echo done 12/2018. "I'm okay", feeling better, saw Dr Marquez. Paresthesias are an issue. On gabapentin 300 TID, says it is not helping. He says his fingers are affected, works on computers, and hits wrong keys at times. Worse are his feet. Left foot is numb, right foot partially numb, annoying when walking, not sensing if he steps on something. He continues on oxycodone. Affect toes, senses that they are swollen, bottom of the feet as well as the top. Does not pass the ankle. In the hands, it is the 3rd and 4th finger distally on the right side, and 3-4-5 on the left. Echo said that LVEF is about 44-46 % with moderate global dysfunction. The LA appendage clot was not seen. Not as tired as before, walked 11 minutes yesterday with some fatigue. Occ mild nausea, no vomiting. Appetite is good, gained 4 kilos on the past month( points out that he is wearing heavy boots. . Still has some dysgeusia. Still avoids meats.  Occasional HA, lasts up to 1/2 day, occurs intermittently, once a week. More right side of the headaches. Prior sinus headaches, but not the same, has a sharp element to it. Stills feels dizzy at times when gets up too fast.. 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 Metastatic tumor in 2 of 57 lymph nodes,  Extranodal extension is identified, 5.2 cm, pN3 [FreeTextEntry1] : etoposide and cisplatin started August 14, 2017. Cycle 4 delayed..then omitted due to tachycardia and low EF. RPLND delayed, residual viable embryonal cancer, TIP started July 29, 2018, cycle 3 delayed for grade 3 fatigue, then again one more week for transaminitis.  [de-identified] : Urgent visit. Called this AM stating that the right testicle is swollen, present for a few days, mild tenderness, mild discomfort/pain. No back pains except for his chronic back issue. He notes some skin changes of his hands and his feet.e is some peeling skin at the base of the toes. as well as the center of the foot. He has neuropathy of both feet, more on the left than the right, where it only affects the toes. He says the medial 3 fingers are numb and he drops things. His heart exam included a stress test, which he reports the outcome was good. Still reports tinnitus. Appetite is good, weight is increased 6 kilos since 1/30/19. Still has fatigue.

## 2019-03-11 NOTE — RESULTS/DATA
[FreeTextEntry1] : EXAM: US SCROTUM AND CONTENTS \par PROCEDURE DATE: 03/11/2019 \par INTERPRETATION: CLINICAL INFORMATION: 38-year-old man status post left orchiectomy for carcinoma. Right scrotal swelling. \par COMPARISON: None available. \par TECHNIQUE: Testicular ultrasound utilizing color and spectral Doppler. \par FINDINGS: \par RIGHT: \par Right testis: 4.8 x 3.1 x 2.8 cm. Normal echogenicity and echotexture with no masses or areas of architectural distortion. Normal blood flow pattern. \par Right epididymis: 3 mm cyst epididymal head \par Right hydrocele: Small to moderate \par IVC Right varicocele: None. \par LEFT:\par Status post left orchiectomy. Unremarkable left scrotal sac. \par IMPRESSION: \par Small to moderate right hydrocele. \par VELIA NIÑO M.D., ATTENDING RADIOLOGIST \par This document has been electronically signed. Mar 11 2019 5:19PM

## 2019-03-11 NOTE — PHYSICAL EXAM
[Restricted in physically strenuous activity but ambulatory and able to carry out work of a light or sedentary nature] : Status 1- Restricted in physically strenuous activity but ambulatory and able to carry out work of a light or sedentary nature, e.g., light house work, office work [Normal] : affect appropriate [de-identified] : fatty deposition, no breast enlargement per se [de-identified] : wound is near healed [de-identified] : uncircumcised, glans normal, left testicle absent. Right moderately enlarged, no masses/induration [de-identified] : tinea appearing rash of the palms and feet

## 2019-03-12 LAB
AFP-TM SERPL-MCNC: <1.8 NG/ML
ALBUMIN SERPL ELPH-MCNC: 4.1 G/DL
ALP BLD-CCNC: 60 U/L
ALT SERPL-CCNC: 45 U/L
ANION GAP SERPL CALC-SCNC: 6 MMOL/L
AST SERPL-CCNC: 36 U/L
BILIRUB SERPL-MCNC: <0.2 MG/DL
BUN SERPL-MCNC: 19 MG/DL
CALCIUM SERPL-MCNC: 8.9 MG/DL
CHLORIDE SERPL-SCNC: 106 MMOL/L
CO2 SERPL-SCNC: 29 MMOL/L
CREAT SERPL-MCNC: 1.1 MG/DL
GLUCOSE SERPL-MCNC: 91 MG/DL
HCG-TM SERPL-MCNC: <1 MIU/ML
LDH SERPL-CCNC: 197 U/L
MAGNESIUM SERPL-MCNC: 2.1 MG/DL
POTASSIUM SERPL-SCNC: 4.8 MMOL/L
PROT SERPL-MCNC: 7.1 G/DL
SODIUM SERPL-SCNC: 141 MMOL/L

## 2019-03-13 LAB
APPEARANCE: CLEAR
BILIRUBIN URINE: NEGATIVE
BLOOD URINE: NEGATIVE
COLOR: NORMAL
GLUCOSE QUALITATIVE U: NEGATIVE
KETONES URINE: NEGATIVE
LEUKOCYTE ESTERASE URINE: NEGATIVE
NITRITE URINE: NEGATIVE
PH URINE: 5
PROTEIN URINE: NEGATIVE
SPECIFIC GRAVITY URINE: 1.02
UROBILINOGEN URINE: NORMAL

## 2019-03-23 NOTE — H&P ADULT - GIT GEN HX ROS MEA POS PC
DATE OF ADMISSION:  03/20/2019



DATE OF DISCHARGE:  03/23/2019



ADMITTING DIAGNOSES:  

1. Intrauterine pregnancy at 32 weeks.

2. Drug abuse with methamphetamines.

3. Pregnancy-induced hypertension diagnosed in the clinic.



DISCHARGE DIAGNOSES:  

1. Intrauterine pregnancy at 32 weeks.

2. Drug abuse with methamphetamines.

3. Pregnancy-induced hypertension diagnosed in the clinic.



PROCEDURE:  Term spontaneous vaginal delivery.



CONSULTATIONS:  CPS and Case Management.



HOSPITAL COURSE:  Ms. Syeda Springer is a 22-year-old female, who was 
admitted to

the hospital for induction of labor secondary to PIH and active drug use with

multiple positive drug screen for methamphetamines including the drug screen 
here at

the time of admission.  The patient's induction course was uncomplicated and

delivered via uncomplicated term spontaneous vaginal delivery.  For complete

details, please refer to the delivery note.  Her postpartum course has been

uncomplicated from a medical standpoint.  She is now postpartum day 2.  CPS has

become involved and has made arrangements for the baby to be discharged to the

parents of the patient.  This morning, the patient on postpartum day 2, reports 
that

she is tolerating p.o., voiding on her own, having decreased lochia and good 
pain

control. 



PHYSICAL EXAMINATION:

VITAL SIGNS:  Blood pressure is 124/58, temperature 98.4, pulse of 94, 
respiratory

rate of 16, and saturating 98% on room air. 

GENERAL:  She appears to be in no acute distress.  She is alert and oriented,

cooperative and pleasant to interact with. 

HEENT:  Head is normocephalic, atraumatic.  Fundus is firm. 

EXTREMITIES:  Nontender, nonedematous.



DISCHARGE INSTRUCTIONS:  The patient is being discharged home with ibuprofen to 
be

taken as needed for pain control.  She has instructions to follow up with Dr. Espinoza

in 2 weeks and at 6 weeks and to seek medical attention if she experiences fever
,

increasing pain, or bleeding. 







Job ID:  [id number]



[facility]
nausea/vomiting/constipation

## 2019-04-05 ENCOUNTER — APPOINTMENT (OUTPATIENT)
Dept: HEMATOLOGY ONCOLOGY | Facility: CLINIC | Age: 39
End: 2019-04-05

## 2019-04-26 ENCOUNTER — OUTPATIENT (OUTPATIENT)
Dept: OUTPATIENT SERVICES | Facility: HOSPITAL | Age: 39
LOS: 1 days | Discharge: HOME | End: 2019-04-26

## 2019-04-26 DIAGNOSIS — C62.92 MALIGNANT NEOPLASM OF LEFT TESTIS, UNSPECIFIED WHETHER DESCENDED OR UNDESCENDED: ICD-10-CM

## 2019-04-26 DIAGNOSIS — Z90.79 ACQUIRED ABSENCE OF OTHER GENITAL ORGAN(S): Chronic | ICD-10-CM

## 2019-04-26 DIAGNOSIS — Z98.890 OTHER SPECIFIED POSTPROCEDURAL STATES: Chronic | ICD-10-CM

## 2019-04-26 DIAGNOSIS — Z95.828 PRESENCE OF OTHER VASCULAR IMPLANTS AND GRAFTS: Chronic | ICD-10-CM

## 2019-05-01 ENCOUNTER — NON-APPOINTMENT (OUTPATIENT)
Age: 39
End: 2019-05-01

## 2019-05-01 ENCOUNTER — APPOINTMENT (OUTPATIENT)
Dept: CARDIOLOGY | Facility: CLINIC | Age: 39
End: 2019-05-01
Payer: MEDICAID

## 2019-05-01 ENCOUNTER — APPOINTMENT (OUTPATIENT)
Dept: HEMATOLOGY ONCOLOGY | Facility: CLINIC | Age: 39
End: 2019-05-01
Payer: MEDICAID

## 2019-05-01 ENCOUNTER — RESULT REVIEW (OUTPATIENT)
Age: 39
End: 2019-05-01

## 2019-05-01 VITALS
HEART RATE: 77 BPM | WEIGHT: 263.01 LBS | TEMPERATURE: 98.6 F | SYSTOLIC BLOOD PRESSURE: 115 MMHG | BODY MASS INDEX: 33.77 KG/M2 | RESPIRATION RATE: 16 BRPM | DIASTOLIC BLOOD PRESSURE: 76 MMHG | OXYGEN SATURATION: 97 %

## 2019-05-01 VITALS
HEART RATE: 78 BPM | SYSTOLIC BLOOD PRESSURE: 112 MMHG | BODY MASS INDEX: 33.75 KG/M2 | DIASTOLIC BLOOD PRESSURE: 74 MMHG | OXYGEN SATURATION: 98 % | HEIGHT: 74 IN | WEIGHT: 263 LBS

## 2019-05-01 LAB
ALBUMIN SERPL ELPH-MCNC: 4.1 G/DL
ALP BLD-CCNC: 58 U/L
ALT SERPL-CCNC: 52 U/L
ANION GAP SERPL CALC-SCNC: 10 MMOL/L
AST SERPL-CCNC: 36 U/L
BILIRUB SERPL-MCNC: 0.4 MG/DL
BUN SERPL-MCNC: 18 MG/DL
CALCIUM SERPL-MCNC: 9.2 MG/DL
CHLORIDE SERPL-SCNC: 103 MMOL/L
CO2 SERPL-SCNC: 26 MMOL/L
CREAT SERPL-MCNC: 1.03 MG/DL
GLUCOSE SERPL-MCNC: 103 MG/DL
HCT VFR BLD CALC: 40.8 % — SIGNIFICANT CHANGE UP (ref 39–50)
HGB BLD-MCNC: 14.4 G/DL — SIGNIFICANT CHANGE UP (ref 13–17)
MAGNESIUM SERPL-MCNC: 2 MG/DL
MCHC RBC-ENTMCNC: 33.8 PG — SIGNIFICANT CHANGE UP (ref 27–34)
MCHC RBC-ENTMCNC: 35.3 G/DL — SIGNIFICANT CHANGE UP (ref 32–36)
MCV RBC AUTO: 95.6 FL — SIGNIFICANT CHANGE UP (ref 80–100)
NT-PROBNP SERPL-MCNC: 32 PG/ML
PLATELET # BLD AUTO: 190 K/UL — SIGNIFICANT CHANGE UP (ref 150–400)
POTASSIUM SERPL-SCNC: 4.2 MMOL/L
PROT SERPL-MCNC: 7.5 G/DL
RBC # BLD: 4.27 M/UL — SIGNIFICANT CHANGE UP (ref 4.2–5.8)
RBC # FLD: 12.1 % — SIGNIFICANT CHANGE UP (ref 10.3–14.5)
SODIUM SERPL-SCNC: 139 MMOL/L
WBC # BLD: 6.5 K/UL — SIGNIFICANT CHANGE UP (ref 3.8–10.5)
WBC # FLD AUTO: 6.5 K/UL — SIGNIFICANT CHANGE UP (ref 3.8–10.5)

## 2019-05-01 PROCEDURE — 93000 ELECTROCARDIOGRAM COMPLETE: CPT

## 2019-05-01 PROCEDURE — 99214 OFFICE O/P EST MOD 30 MIN: CPT

## 2019-05-01 RX ORDER — GABAPENTIN 300 MG/1
300 CAPSULE ORAL 3 TIMES DAILY
Qty: 90 | Refills: 3 | Status: COMPLETED | COMMUNITY
Start: 2018-12-20 | End: 2019-05-01

## 2019-05-01 NOTE — REVIEW OF SYSTEMS
[Palpitations] : palpitations [Lower Ext Edema] : lower extremity edema [SOB on Exertion] : shortness of breath during exertion [Muscle Pain] : muscle pain [Dizziness] : dizziness [Anxiety] : anxiety [Depression] : depression [Negative] : Gastrointestinal [Chills] : no chills [Fever] : no fever [Night Sweats] : no night sweats [Chest Pain] : no chest pain [Hoarseness] : no hoarseness [Wheezing] : no wheezing [Dysuria] : no dysuria [Cough] : no cough [Incontinence] : no incontinence [Joint Stiffness] : no joint stiffness [Joint Pain] : no joint pain [Easy Bleeding] : no tendency for easy bleeding [Easy Bruising] : no tendency for easy bruising [Muscle Weakness] : no muscle weakness [FreeTextEntry5] : at night, notes some palpitations [FreeTextEntry4] : tinnitus, intermittently [FreeTextEntry8] : notes some pain at times, deep pelvis, when initiating voiding [FreeTextEntry9] : cramps [de-identified] : No HA, paresthesias to mid calf [de-identified] : blistering, abd wound healed

## 2019-05-01 NOTE — RESULTS/DATA
[FreeTextEntry1] : EXAM:  US SCROTUM AND CONTENTS  \par PROCEDURE DATE:  03/11/2019  \par INTERPRETATION:  CLINICAL INFORMATION: 38-year-old man status post left orchiectomy for carcinoma. Right scrotal swelling.\par COMPARISON: None available.\par TECHNIQUE: Testicular ultrasound utilizing color and spectral Doppler.\par FINDINGS:\par RIGHT:\par Right testis: 4.8 x 3.1 x 2.8 cm. Normal echogenicity and echotexture with no masses or areas of architectural distortion. Normal blood flow pattern.\par Right epididymis: 3 mm cyst epididymal head\par Right hydrocele: Small to moderate\par IVC Right varicocele: None.\par LEFT: \par Status post left orchiectomy. Unremarkable left scrotal sac.\par IMPRESSION: \par Small to moderate right hydrocele.\par VELIA NIÑO M.D., ATTENDING RADIOLOGIST \par This document has been electronically signed. Mar 11 2019  5:19PM\par

## 2019-05-01 NOTE — ASSESSMENT
[FreeTextEntry1] : Cleve is seen in followup. He describes having fluctuating edema with blistering of his feet. He has a pop the blisters as they are painful. He also has neuropathy. He stopped taking gabapentin and he felt that it was not helping them at all. He has sensory neuropathy up to the midcalf on both legs. He also has sensory neuropathy involving the fingers and some fine motor issues with picking up things with his fingers. His appetite has been good. He has some shooting pains in the toes and he feels that he cannot straighten out his toes due to his neuropathy. He has performed some of the exercises that were prescribed to him by physical therapy. He has some shortness of breath after walking approximately 2 blocks after which she has to stop. He is due to see Dr. Marquez this afternoon, and I suspect that he has some degree of congestive heart failure as the reason. He did have global LV dysfunction as a result of tachyarrhythmias associated with his [id number]. He has been followed by Dr. Marquez for some time with improvement in his function. \par \par The CT scan in January showed no evidence of disease. He received 3 cycles of that chemotherapy. He was planned to receive 4 cycles, but this was stopped after 3 due to toxicity. He says that he feels well on Sundays and poorly on others. He does have some degree of anxiety as well as depression. I will ask our social workers to reach out to him in regards to this.\par \par All questions were answered to the best of my ability and to his apparent satisfaction. Blood work will be performed today and have asked him to return to see me in 2 months.

## 2019-05-01 NOTE — HISTORY OF PRESENT ILLNESS
[de-identified] : Mr. Calderón is seen in consultation on 8/8/17. On July 11, 2017, he awoke with severe back pain extending into the left testicle. There was a heaviness and pulling sensation. He went to the ER at [facility] and a sonogram was done, revealing a mass. He was seen by Rivas Oconnor and underwent a left radical orchiectomy on July 20, 2017. The pain and pulling sensation resolved. He has been on oxycodone a time, since January 2017, for low back pains. No respiratory complaints. No urinary symptoms. Has had sperm cryopreservation. \par \par 8/30/17...Cleve comes in today for follow up was just discharged yesterday. He was in the hospital for 4 days and he was neutropenic, found a right arm superficial thrombosis at the site of IV. treated with vanco, blood cultures negative. Discharged on Levaquin. Overnight he had a temp of 104.  He is having some hair loss, numbness in his fingers and his toes. He has tinnitus intermittently, started two days after the last dose. He was having soreness in the back of his throat that now resolved. He complains of dysgeusia and he had a lot  of burning and acid reflux-like symptoms during his treatment Carafate, Protonix and the lower dose of Decadron improved his symptoms., Not sleeping well, likely due to steroids.\par \par 9/19/17...cycle 2 day 5 was 9/9/17\fabian Poon is seen in the office today. He has significant fatigue. He had vomiting for 5 days after the chemotherapy with nausea and he said that his complexion was green. He had acid reflux issues once again despite the dropping off the dose of his dexamethasone. He feels a burning sensation in his lower abdomen and he feels that the medications for nausea did not help. He also has significant fatigue which is improved today. He has intermittent tinnitus. He has tingling in the tips of his toes and fingers which are intermittent. His appetite has been good and he is gained weight while on chemotherapy but he says that he eats smaller portions. He denies dysgeusia or dry mouth.\par \par 10/9/17...Completed cycle 3 last week.  He states he feels "horrible." HE states he noticed bloating in the abdomen, He went a whole week after this treatment wit vomiting every day, last time he vomited was two days ago. He fell at one point secondary to being weak and feeling lightheaded. He feels generalized weakness.He had a blood clot, during his treatment, that resolved. His appetite is poor, He eats and then, he eats, he feels like he has to regurgitate his food up after eating. He has fatigue, tinnitus stopped. he denies any numbness of the feet. has had chronic numbness of two of his fingers.He has dysgeusia \par \par 10/30/17...Admission Date: \par -  Admission Date 16-Oct-2017 13:33. \par Discharge Date: \par - Discharge Date	21-Oct-2017 \par Chief Complaint/Reason for Visit: \par Chief Complaint/Reason for Admission	Atrial flutter with RVR\par *******************\par  36 yo M with PMHx of testicular cancer s/p left orchiectomy on chemotherapy who present to the ED before receiving chemo for tachycardia. \par The patient states that he has been feeling well for the past 2 weeks since his 3rd dose of chemotherapy. He denies CP, ABARCA, peripheral edema, cough, \par palpitations. He does states that he had one near syncopal event during a bout of emesis 2 weeks ago which caused him to fall into a door. Has not had any \par other syncopal or presyncopal episodes since then. In the ED he was found to have tachycardia found to be a.flutter after receiving 10mg IV diltiazem x2, a CXR with well placed portacath and no infiltrations. On bedside US found to have EF of 10-15% while tachycardic. \par The patient was initially started on metoprolol tartrate with increasing dosages unable to control the arrhythmia he was subsequently switch to a diltiazem drip. The metiport was removed after discussion with his Oncologist and cardiology for possible arrhythmia induction which was removed 10/19. He also received a TTE on 10/19 which showed EF of 35-40% with LV remodeling. \par Afterwards he was switched to metoprolol succinate 200mg qday. \par He was started on heparin for preparation for TYLOR and cardioversion which was performed on 10/20 and found a RA appendage thrombus with PFO and reduced LV \par function. No cardioversion was performed as a result and he was switched to rivaroxaban on 10/21, lisinopril 2.5mg qday and metoprolol succinate 200mg \par qday. He will follow with cardiology in 2 weeks and EP in 4 weeks. Treatments/Procedures/Significant Findings/Patient Condition: \par Other Significant Findings: \par - Other Significant Findings	 \par < from: Transesophageal Echocardiogram w/o TTE (10.20.17 @ 12:08) > \par Conclusions: \par 1. Left atrial enlargement.  No left atrial or left atrial appendage thrombus. \par 2. Moderate to severe global left ventricular systolic dysfunction. \par 3. There is an echodensity (likely thrombus vs mass) in the right atrial wall that measures (approximately 2.7 cm x 2.0 cm). This is near the distal point of the SVC and may \par represent thrombus from prior indwelling catheter. \par 4. Right ventricular enlargement with decreased right ventricular systolic function. \par 5. Color Doppler demonstrates evidence of a patent foramen ovale. \par 6. Trivial pericardial effusion. \par ****************************************************************************\par TYLOR showed right atrial appendage thrombus, 27 mm, On Xarelto. Feels "exhausted". Does not generally note palpitations. Anxiety level is high. Occasional ABARCA, not at rest. No cough, no sputum, no wheezing. No nausea at this time, has regurgitation. Occ vomiting noted. Fatigue is prominent. Appetite is good. \par \par 7/9/18...Had RPLND on 6/6/18, with viable GCT present. Embryonal pathology. Says he have fatigue, still "recovering" from the RLPND. He has some soreness of the abdomen and says he has some delayed healing. He say Dr Piper on 7/6 in lieu of Dr Restrepo and was told that it was healing well. He is upset that there is residual cancer present, which he says "bummed him out completely". He says he has been reviewing issues related to continued treatment, only after I called him recently as he had not made an appointment. he says his appetite is poor and he is eating small amounts multiple times as he cannot eat full full meals. He says he has had some nausea and he started OTC Prevacid, which has helped. No significant weight loss. Some ABARCA if he walks about 3 blocks or so, he has fatigue with walking as well. he has some paresthesias of the left thigh after the surgery. He notes some spasms of the muscles in that area at times. Urine flow is normal, no nocturia. Libido down, not sexually active, no masturbation since surgery. \par \par 7/19/18...Feels the same, "still recovering". Still has some discomfort near the wound. Appetite s good. Gained 2 pounds. no cough. Mild ABARCA with activities. No palpitations. No leg edema. He continues with some fatigue, 6 on 0-10 scale. Feels he is not motivated when the fatigue is high. He feels his hands are weaker than before. Lifting is fine, but twisting a bottle to open may be  impaired. Ambulates with a cane  due to paresthesias of the left anterior thigh. This from knee  to thigh area,. No paresthesias of the feet. He sometimes has some pains in his feet at night which he says are not cramps. \par \par 9/6/18...Cycle 2, TIP, day # 9. Admitted on Sunday, August 19. \par *********************************************\par Hospitalized again post chemo, this time for 9 days. ANC was <0.1, platelets were low. had low sodium, low potassium and low magnesium. He has had lots of muscle aches and bone pain secondary to the Taxol. Feeling better, still feels weak. Some SOB with exertion. No pains. No headaches, has paresthesias of the bottom of the feet along with some toes. It improves after the chemo. Somewhat more this time. No fevers, no chills. Some nausea with office visits. Appetite is "moderate". Tinnitus has improved, minor dizziness at times. \par \par 8/30/18...Delayed treatment #3 by one week. "Not a good week". Complains of hearing being sensitive. He has tinnitus. This is intermittent, more in the past 2-3 days. He has some vertigo, with some issue walking. He feels as if he is falling to the right side at times, no falls. He has to hold onto the door or furniture to walk. He has some vomiting without nausea. He vomited a few minutes ago. Appetite is decreased, He has altered taste as well. Fatigue is present, not as bad as before. he says he is a "little" fatigued at the moment. Paresthesias of feet have been intermittent, less notable at this time. Spends a lot of time sitting, sleeping. He feels as if his body is exhausted. His mother says he has a lot of anxiety. He agrees. He has thoughts running through his mind about chemo and adverse effects. \par \par 9/20/18...he feels "okay" at times. Occasionally feels nauseous and can't eat, occ feels dizzy. has some regurgitation at times. Has fatigue. No better. Tinnitus is "not as bad", persistent in the right, intermittent in the left. When dizzy,  he feels he is leaning to one side. Spends a lot of time sitting or lying in bed. Paresthesias of the feet, left foot more so than right. Right toes affects the 4th and 5th toe. Fingertips are affected. He notes some darkening of skin folds of the hand joints. Feels no SOB unless he is active. using a cane today. Not taking any meds at this point. \par \par 10/9/18...Admission Date: after cycle 3 TIP\par -  Admission Date 01-Oct-2018 02:09. \par Discharge Date: \par - Discharge Date	06-Oct-2018 \par ***********************************\par HOSPITAL COURSE: The patient continued to have significant nausea while inpatient with intermittent vomiting. He received aggressive IV hydration with \par IVF while he was no longer able to tolerate po. On hospital day 4, patient was able to tolerate some po and requested discharge home. His course was c/b \par neutropenic fever to 101. Cultures were sent and remained negative. Patient was also started on cefepime. His ANC started to improve towards the end of the \par hospital course. His platelets, however, downtrended. He was given 1U platelets as per oncology recommendations. His midline was noted to have been in since \par July. After discussion with his outpatient oncologist, the patient opted to have his midline removed and will receive his last dose of chemotherapy via \par peripheral IV. During his hospital course, patient was noted to have an episode of tachycardia to the 140's followed by bradycardia to the 20's during \par orthostatic vitals. He was seen by Dr. Brown of cardiology who feels this is chemo-induced orthostasis and no further inpatient w/u is needed. He is \par medically stable for dc home. Prior to discharge received additional platelet transfusion and MID-Line was removed after discussion with oncology. \par *******************************************************************\par He was admitted and told that the midline had to be changed. He did have fever with neutropenia. Discharged on the 6th. Hypotensive with orthostasis,seen by cardiology, restarted lisinopril and carvedilol. He has fallen twice since discharge. He has LOC for a few seconds. he is somewhat confused after the episodes. Walking with a cane. he also had an similar episode while in the hospital. Appetite returned, has altered taste, no N/V now, but did have on admission. No source of fever found. Has paresthesias of the left foot, all toes, not his right.  Had aches and pains with the Taxol, which he describes as "brutal". \par \par 10/29/18...called last week, was having difficulty keeping food down and felt weak and dizzy. We arranged for fluids, but he called Friday ad declined. Taking Zofran ADT after meals at times. Able to drink more at this time. Did not eat this AM. Yesterday, had a bagel and egg, for lunch, potatoes, pork and beans and Spam. For dinner had rice and Spam, small amount. No vomiting yesterday, but had nausea. Dizziness if he leis his head back, or if he gets up to fast. Walking with  a cane. No falls. Started Has paresthesias of the feet, on the left side. Continues on oxycodone, about  1 per day, chronic use for low back pains. No leg edema. No cough, ABARCA. \par \par 11/13/18...has not followed with Dr Marquez and remains off of carvedilol, enoxaparin and lisinopril. He takes ondansetron on occasion. Says he had a "blackout" episode after getting up, fell, striking left side, no injury. No LOC. Neuropathy remains, mostly left leg, somewhat better at times, continues on therapy. Minimal on the right side. Considering returning to work. Fatigue is present, somewhat better. Appetite is still impaired, eats once a day. Gained a few pounds. Still has some taste alteration. No leg edema. No abdominal pains. Saw Phong Restrepo yesterday in follow up. He has constipation, and may go 4-5 days w/o bowel movement. \par \par 1/4/19...Had echo done 12/2018. "I'm okay", feeling better, saw Dr Marquez. Paresthesias are an issue. On gabapentin 300 TID, says it is not helping. He says his fingers are affected, works on computers, and hits wrong keys at times. Worse are his feet. Left foot is numb, right foot partially numb, annoying when walking, not sensing if he steps on something. He continues on oxycodone. Affect toes, senses that they are swollen, bottom of the feet as well as the top. Does not pass the ankle. In the hands, it is the 3rd and 4th finger distally on the right side, and 3-4-5 on the left. Echo said that LVEF is about 44-46 % with moderate global dysfunction. The LA appendage clot was not seen. Not as tired as before, walked 11 minutes yesterday with some fatigue. Occ mild nausea, no vomiting. Appetite is good, gained 4 kilos on the past month( points out that he is wearing heavy boots. . Still has some dysgeusia. Still avoids meats.  Occasional HA, lasts up to 1/2 day, occurs intermittently, once a week. More right side of the headaches. Prior sinus headaches, but not the same, has a sharp element to it. Stills feels dizzy at times when gets up too fast..\par \par 3/11/19...Urgent visit. Called this AM stating that the right testicle is swollen, present for a few days, mild tenderness, mild discomfort/pain. No back pains except for his chronic back issue. He notes some skin changes of his hands and his feet.e is some peeling skin at the base of the toes. as well as the center of the foot. He has neuropathy of both feet, more on the left than the right, where it only affects the toes. He says the medial 3 fingers are numb and he drops things. His heart exam included a stress test, which he reports the outcome was good. Still reports tinnitus. Appetite is good, weight is increased 6 kilos since 1/30/19. Still has fatigue.  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 Metastatic tumor in 2 of 57 lymph nodes,  Extranodal extension is identified, 5.2 cm, pN3 [de-identified] : Missed appt 4/5/19. I " have good days and bad days". He feels he puts on a lot of weight in  a short time, and then drops weight in a short period of time. He had edema of both feet and also feels he had edema of the hands as well. He says edema accumulates and then resolves. Neuropathy is "pretty bad", extends to the calves, worse at night. Ran out of gabapentin, was on 300 TID and did not feel it helped. He has discoloration of the left foot, edema both feet. Has cramps in the legs. Feels he cannot stand for a long time, and complains of blistering of the feet. He has shooting pains in the toes. There are fluid filled blisters that he he "has to pop", due to pain. He feels he s less active due to the blisters. He feels his toes are locked i position. Does exercises provided by PT. Appetite is good. He is thirsty many times. NO pains elsewhere except chronic back pain, for which he takes oxycodone. No cough, has some ABARCA, can walk 2 blocks prior to stopping. Sees DR Marquez later today. Fine motor issues with fingers with neuropathy, difficulty with typing on keyboard, with many errors.  [FreeTextEntry1] : etoposide and cisplatin started August 14, 2017. Cycle 4 delayed..then omitted due to tachycardia and low EF. RPLND delayed, residual viable embryonal cancer, TIP started July 29, 2018, cycle 3 delayed for grade 3 fatigue, then again one more week for transaminitis.

## 2019-05-01 NOTE — PHYSICAL EXAM
[Restricted in physically strenuous activity but ambulatory and able to carry out work of a light or sedentary nature] : Status 1- Restricted in physically strenuous activity but ambulatory and able to carry out work of a light or sedentary nature, e.g., light house work, office work [Obese] : obese [Normal] : full range of motion and no deformities appreciated [de-identified] : no gynecomastia [de-identified] : wound healed [de-identified] : flat affect [de-identified] : uncircumcised, glans normal, right testicle normal, hydrocele [de-identified] : dry skin, dry blisters on feet [de-identified] : low back pain to percussion, as before

## 2019-05-02 NOTE — PHYSICAL EXAM
[General Appearance - Well Developed] : well developed [Normal Appearance] : normal appearance [Well Groomed] : well groomed [General Appearance - Well Nourished] : well nourished [No Deformities] : no deformities [General Appearance - In No Acute Distress] : no acute distress [Normal Oral Mucosa] : normal oral mucosa [No Oral Pallor] : no oral pallor [Normal Oropharynx] : normal oropharynx [Normal Jugular Venous A Waves Present] : normal jugular venous A waves present [Normal Jugular Venous V Waves Present] : normal jugular venous V waves present [No Jugular Venous Fernández A Waves] : no jugular venous fernández A waves [] : no respiratory distress [Respiration, Rhythm And Depth] : normal respiratory rhythm and effort [Exaggerated Use Of Accessory Muscles For Inspiration] : no accessory muscle use [Auscultation Breath Sounds / Voice Sounds] : lungs were clear to auscultation bilaterally [Bowel Sounds] : normal bowel sounds [Abdomen Soft] : soft [Abdomen Tenderness] : non-tender [Abnormal Walk] : normal gait [Gait - Sufficient For Exercise Testing] : the gait was sufficient for exercise testing [Cyanosis, Localized] : no localized cyanosis [Nail Clubbing] : no clubbing of the fingernails [Skin Color & Pigmentation] : normal skin color and pigmentation [No Venous Stasis] : no venous stasis [No Xanthoma] : no  xanthoma was observed [Oriented To Time, Place, And Person] : oriented to person, place, and time [Impaired Insight] : insight and judgment were intact [Affect] : the affect was normal [Mood] : the mood was normal [No Anxiety] : not feeling anxious [Conjunctiva] : the conjunctiva were normal in both eyes [Normal] : the eyelids were normal bilaterally [PERRL] : pupils were equal in size, round, and reactive to light [EOM Intact] : extraocular movements were intact [Not Palpable] : not palpable [Normal Rate] : normal [Rhythm Regular] : regular [Normal S1] : normal S1 [Normal S2] : normal S2 [No Murmur] : no murmurs heard [1+] : left 1+ [No Pitting Edema] : no pitting edema present [FreeTextEntry1] : site of R-chest medi-port clean/dry/intact [Yellow Sclera (Icteric)] : no scleral icterus was seen [Right Carotid Bruit] : no bruit heard over the right carotid [Left Carotid Bruit] : no bruit heard over the left carotid

## 2019-05-02 NOTE — HISTORY OF PRESENT ILLNESS
[FreeTextEntry1] : Patient is a 38 year-old being actively treated for testicular cancer. In 2017, he had completed three cycles of chemotherapy. After the first two cycles, he received medi-port in the right chest wall. He got his third round of chemo through this port. He subsequently developed shortness of breath and chest tightness and was found to be in atrial flutter with rapid ventricular rate. He was admitted for rate control and planned TYLOR and DCCV. He was started on anticoagulation, but during the TYLOR, he was noted to have a right atrial thrombus and cardioversion was aborted. He was discharged on Toprol 200mg daily and Xarelto 15mg BID. \par \par At his initial visit here in November, I added Digoxin to his regimen for better rate control. This helped to better control his rate. In December 2017, TYLOR still showed RA thrombus. TYLOR performed in January 2018 showed improvement in thrombus size, and patient was successfully TYLOR/DCCV into sinus rhythm.\par February 5, 2018, he maintains normal sinus rhythm. He was taking a low dose ACEi (lisinopril 2.5mg daily) and Toprol 100mg daily.\par \par June 27, 2018 - patient recently admitted to [facility] for resection of retroperitoneal mass. The procedure was successful and the patient was discharged. He maintains normal sinus rhythm.\par \par Patient restarted chemotherapy and completed three cycles of TIP. He had a left upper extremity PICC line placed, which has been removed.. \par \par November 2018 - Patient has been taken off anticoagulation and guideline directed medical therapy for cardiomyopathy. He was having episodes that he describes as blacking out while getting chemotherapy, which lead to discontinuation of his medication.\par \par January 2019 - Patient presents today in his usual state of health, but he is reporting ongoing fatigue. His only current medications are low dose beta-blocker (carvedilol 3.125 mg BID), gabapentin 900 mg daily, and oxycodone PRN. Several days ago, he experienced bilateral lower extremity edema, L > R, that improved spontaneously. \par \par March 2019 - Patient has fatigue, anxiety, numbness of hands and feet, and blistering of palms and soles. He is tolerating his carvedilol 3.125 mg BID.\par \par May 2019 - Patient has been having labile weight with swelling of his hands and feet.\par \par PMD: Amador Arambula MD [phone]\par Oncologist: Neo Chirinos MD [phone]

## 2019-05-02 NOTE — DISCUSSION/SUMMARY
[FreeTextEntry1] : Patient is a 38 year-old with testicular cancer who developed atrial flutter secondary to catheter irritation of the right atrium and nonischemic cardiomyopathy, likely secondary to the tachycardia and not to the chemotherapy regimen. \par He has been maintaining sinus rhythm since flutter ablation in February 2018.\par Orthostatic dizziness is improved since discontinuing the chemotherapy. Most recent chemo was given October 2018.\par \par Most recent echo (in December 2018) showed reduced LVEF (35-40%).\par Plan to uptitrate beta-blocker and ARB as tolerated. \par Patient is currently only taking carvedilol 6.25 mg BID and valsartan 40 mg daily. Will uptitrate beta-blocker at this time to carvedilol 6.25 mg TID.\par \par Refer to neurology for numbness in feet.\par Refer to dermatology for blistering of palms and soles.

## 2019-05-15 ENCOUNTER — APPOINTMENT (OUTPATIENT)
Dept: CARDIOLOGY | Facility: CLINIC | Age: 39
End: 2019-05-15
Payer: MEDICAID

## 2019-05-15 ENCOUNTER — NON-APPOINTMENT (OUTPATIENT)
Age: 39
End: 2019-05-15

## 2019-05-15 VITALS
DIASTOLIC BLOOD PRESSURE: 70 MMHG | SYSTOLIC BLOOD PRESSURE: 100 MMHG | HEART RATE: 74 BPM | WEIGHT: 264 LBS | BODY MASS INDEX: 33.9 KG/M2 | OXYGEN SATURATION: 97 %

## 2019-05-15 PROCEDURE — 99214 OFFICE O/P EST MOD 30 MIN: CPT

## 2019-05-15 PROCEDURE — 93000 ELECTROCARDIOGRAM COMPLETE: CPT

## 2019-05-15 RX ORDER — VALSARTAN 40 MG/1
40 TABLET, COATED ORAL DAILY
Qty: 90 | Refills: 2 | Status: DISCONTINUED | COMMUNITY
Start: 2019-03-05 | End: 2019-05-15

## 2019-05-15 NOTE — PHYSICAL EXAM
[General Appearance - Well Developed] : well developed [Normal Appearance] : normal appearance [Well Groomed] : well groomed [No Deformities] : no deformities [General Appearance - Well Nourished] : well nourished [No Oral Pallor] : no oral pallor [General Appearance - In No Acute Distress] : no acute distress [Normal Oral Mucosa] : normal oral mucosa [Normal Jugular Venous V Waves Present] : normal jugular venous V waves present [Normal Oropharynx] : normal oropharynx [Normal Jugular Venous A Waves Present] : normal jugular venous A waves present [No Jugular Venous Fernández A Waves] : no jugular venous fernández A waves [Respiration, Rhythm And Depth] : normal respiratory rhythm and effort [] : no respiratory distress [Exaggerated Use Of Accessory Muscles For Inspiration] : no accessory muscle use [Auscultation Breath Sounds / Voice Sounds] : lungs were clear to auscultation bilaterally [Abdomen Soft] : soft [Bowel Sounds] : normal bowel sounds [Gait - Sufficient For Exercise Testing] : the gait was sufficient for exercise testing [Abdomen Tenderness] : non-tender [Abnormal Walk] : normal gait [Skin Color & Pigmentation] : normal skin color and pigmentation [Cyanosis, Localized] : no localized cyanosis [Nail Clubbing] : no clubbing of the fingernails [No Xanthoma] : no  xanthoma was observed [No Venous Stasis] : no venous stasis [Impaired Insight] : insight and judgment were intact [Oriented To Time, Place, And Person] : oriented to person, place, and time [Affect] : the affect was normal [No Anxiety] : not feeling anxious [Mood] : the mood was normal [Conjunctiva] : the conjunctiva were normal in both eyes [Normal] : the eyelids were normal bilaterally [PERRL] : pupils were equal in size, round, and reactive to light [EOM Intact] : extraocular movements were intact [Not Palpable] : not palpable [Rhythm Regular] : regular [Normal Rate] : normal [Normal S2] : normal S2 [Normal S1] : normal S1 [No Murmur] : no murmurs heard [1+] : right 1+ [No Pitting Edema] : no pitting edema present [FreeTextEntry1] : site of R-chest medi-port clean/dry/intact [Yellow Sclera (Icteric)] : no scleral icterus was seen [Right Carotid Bruit] : no bruit heard over the right carotid [Left Carotid Bruit] : no bruit heard over the left carotid

## 2019-05-15 NOTE — HISTORY OF PRESENT ILLNESS
[FreeTextEntry1] : Patient is a 38 year-old being actively treated for testicular cancer. In 2017, he had completed three cycles of chemotherapy. After the first two cycles, he received medi-port in the right chest wall. He got his third round of chemo through this port. He subsequently developed shortness of breath and chest tightness and was found to be in atrial flutter with rapid ventricular rate. He was admitted for rate control and planned TYLOR and DCCV. He was started on anticoagulation, but during the TYLOR, he was noted to have a right atrial thrombus and cardioversion was aborted. He was discharged on Toprol 200mg daily and Xarelto 15mg BID. \par \par At his initial visit here in November, I added Digoxin to his regimen for better rate control. This helped to better control his rate. In December 2017, TYLOR still showed RA thrombus. TYLOR performed in January 2018 showed improvement in thrombus size, and patient was successfully TYLOR/DCCV into sinus rhythm.\par February 5, 2018, he maintains normal sinus rhythm. He was taking a low dose ACEi (lisinopril 2.5mg daily) and Toprol 100mg daily.\par \par June 27, 2018 - patient recently admitted to [facility] for resection of retroperitoneal mass. The procedure was successful and the patient was discharged. He maintains normal sinus rhythm.\par \par Patient restarted chemotherapy and completed three cycles of TIP. He had a left upper extremity PICC line placed, which has been removed.. \par \par November 2018 - Patient has been taken off anticoagulation and guideline directed medical therapy for cardiomyopathy. He was having episodes that he describes as blacking out while getting chemotherapy, which lead to discontinuation of his medication.\par \par January 2019 - Patient presents today in his usual state of health, but he is reporting ongoing fatigue. His only current medications are low dose beta-blocker (carvedilol 3.125 mg BID), gabapentin 900 mg daily, and oxycodone PRN. Several days ago, he experienced bilateral lower extremity edema, L > R, that improved spontaneously. \par \par March 2019 - Patient has fatigue, anxiety, numbness of hands and feet, and blistering of palms and soles. He is tolerating his carvedilol 3.125 mg BID.\par \par May 2019 - Patient has been having labile weight with swelling of his hands and feet.\par May 15, 2019 - Patient has been tolerating carvedilol 12.5 mg BID and Entresto 24-26 mg BID. He gets occasional orthostatic symptoms, but he has not had syncope. He also sometimes gets a dizzy symptom when he lays down. And he reports a dysequilibrium such that if he closes his eyes in the shower, he worries about falling into the wall.\par He continues to require oxycodone for pain relief - this is managed by his pain management physician.\par \par PMD: Amador Arambula MD [phone]\par Oncologist: Neo Chirinos MD [phone]

## 2019-05-15 NOTE — REASON FOR VISIT
[Follow-Up - Clinic] : a clinic follow-up of [Cardiomyopathy] : cardiomyopathy [Other: _____] : [unfilled] [Parent] : parent

## 2019-05-15 NOTE — DISCUSSION/SUMMARY
[FreeTextEntry1] : Patient is a 38 year-old with testicular cancer who developed atrial flutter secondary to catheter irritation of the right atrium and nonischemic cardiomyopathy, likely secondary to the tachycardia and not to the chemotherapy regimen. \par He has been maintaining sinus rhythm since flutter ablation in February 2018.\par Orthostatic dizziness is improved since discontinuing the chemotherapy. Most recent chemo was given October 2018.\par \par Most recent echo (in December 2018) showed reduced LVEF (35-40%).\par Plan to uptitrate beta-blocker and Entresto as tolerated. \par Patient is currently taking carvedilol 12.5 mg BID and Entresto 24-26 mg BID. Follow-up in one month for medication titration.\par \par Would refer to neurology for numbness in feet. Although likely, patient's ongoing symptoms are secondary to post-chemotherapy toxicities, would include systemic diagnoses such as amyloid polyneuropathy in the differential.\par Would refer to dermatology for blistering of palms and soles.\par Recommend seeing pain management with the goal of weaning off opiates as these may be exacerbating symptoms.

## 2019-05-21 ENCOUNTER — CLINICAL ADVICE (OUTPATIENT)
Age: 39
End: 2019-05-21

## 2019-05-24 RX ORDER — VALSARTAN 40 MG/1
40 TABLET, COATED ORAL DAILY
Qty: 30 | Refills: 0 | Status: DISCONTINUED | COMMUNITY
Start: 2019-05-24 | End: 2019-05-24

## 2019-06-01 ENCOUNTER — OUTPATIENT (OUTPATIENT)
Dept: OUTPATIENT SERVICES | Facility: HOSPITAL | Age: 39
LOS: 1 days | End: 2019-06-01
Payer: MEDICAID

## 2019-06-01 DIAGNOSIS — Z98.890 OTHER SPECIFIED POSTPROCEDURAL STATES: Chronic | ICD-10-CM

## 2019-06-01 DIAGNOSIS — Z95.828 PRESENCE OF OTHER VASCULAR IMPLANTS AND GRAFTS: Chronic | ICD-10-CM

## 2019-06-01 DIAGNOSIS — Z90.79 ACQUIRED ABSENCE OF OTHER GENITAL ORGAN(S): Chronic | ICD-10-CM

## 2019-06-04 ENCOUNTER — INPATIENT (INPATIENT)
Facility: HOSPITAL | Age: 39
LOS: 1 days | Discharge: HOME | End: 2019-06-06
Attending: SURGERY | Admitting: SURGERY
Payer: MEDICAID

## 2019-06-04 ENCOUNTER — TRANSCRIPTION ENCOUNTER (OUTPATIENT)
Age: 39
End: 2019-06-04

## 2019-06-04 VITALS
HEART RATE: 76 BPM | DIASTOLIC BLOOD PRESSURE: 70 MMHG | SYSTOLIC BLOOD PRESSURE: 122 MMHG | OXYGEN SATURATION: 100 % | RESPIRATION RATE: 20 BRPM | TEMPERATURE: 99 F

## 2019-06-04 DIAGNOSIS — K81.0 ACUTE CHOLECYSTITIS: ICD-10-CM

## 2019-06-04 DIAGNOSIS — Z98.890 OTHER SPECIFIED POSTPROCEDURAL STATES: Chronic | ICD-10-CM

## 2019-06-04 DIAGNOSIS — Z95.828 PRESENCE OF OTHER VASCULAR IMPLANTS AND GRAFTS: Chronic | ICD-10-CM

## 2019-06-04 DIAGNOSIS — Z90.79 ACQUIRED ABSENCE OF OTHER GENITAL ORGAN(S): Chronic | ICD-10-CM

## 2019-06-04 LAB
ALBUMIN SERPL ELPH-MCNC: 4.2 G/DL — SIGNIFICANT CHANGE UP (ref 3.3–5)
ALP SERPL-CCNC: 55 U/L — SIGNIFICANT CHANGE UP (ref 40–120)
ALT FLD-CCNC: 50 U/L — HIGH (ref 4–41)
ANION GAP SERPL CALC-SCNC: 13 MMO/L — SIGNIFICANT CHANGE UP (ref 7–14)
APPEARANCE UR: CLEAR — SIGNIFICANT CHANGE UP
APTT BLD: 31.9 SEC — SIGNIFICANT CHANGE UP (ref 27.5–36.3)
AST SERPL-CCNC: 36 U/L — SIGNIFICANT CHANGE UP (ref 4–40)
BASOPHILS # BLD AUTO: 0.02 K/UL — SIGNIFICANT CHANGE UP (ref 0–0.2)
BASOPHILS NFR BLD AUTO: 0.2 % — SIGNIFICANT CHANGE UP (ref 0–2)
BILIRUB SERPL-MCNC: 0.4 MG/DL — SIGNIFICANT CHANGE UP (ref 0.2–1.2)
BILIRUB UR-MCNC: NEGATIVE — SIGNIFICANT CHANGE UP
BLD GP AB SCN SERPL QL: NEGATIVE — SIGNIFICANT CHANGE UP
BLOOD UR QL VISUAL: NEGATIVE — SIGNIFICANT CHANGE UP
BUN SERPL-MCNC: 16 MG/DL — SIGNIFICANT CHANGE UP (ref 7–23)
CALCIUM SERPL-MCNC: 9.1 MG/DL — SIGNIFICANT CHANGE UP (ref 8.4–10.5)
CHLORIDE SERPL-SCNC: 102 MMOL/L — SIGNIFICANT CHANGE UP (ref 98–107)
CO2 SERPL-SCNC: 25 MMOL/L — SIGNIFICANT CHANGE UP (ref 22–31)
COLOR SPEC: SIGNIFICANT CHANGE UP
CREAT SERPL-MCNC: 1.06 MG/DL — SIGNIFICANT CHANGE UP (ref 0.5–1.3)
EOSINOPHIL # BLD AUTO: 0.02 K/UL — SIGNIFICANT CHANGE UP (ref 0–0.5)
EOSINOPHIL NFR BLD AUTO: 0.2 % — SIGNIFICANT CHANGE UP (ref 0–6)
GLUCOSE SERPL-MCNC: 104 MG/DL — HIGH (ref 70–99)
GLUCOSE UR-MCNC: NEGATIVE — SIGNIFICANT CHANGE UP
HCT VFR BLD CALC: 44.2 % — SIGNIFICANT CHANGE UP (ref 39–50)
HGB BLD-MCNC: 14.9 G/DL — SIGNIFICANT CHANGE UP (ref 13–17)
IMM GRANULOCYTES NFR BLD AUTO: 0.8 % — SIGNIFICANT CHANGE UP (ref 0–1.5)
INR BLD: 1.07 — SIGNIFICANT CHANGE UP (ref 0.88–1.17)
KETONES UR-MCNC: NEGATIVE — SIGNIFICANT CHANGE UP
LEUKOCYTE ESTERASE UR-ACNC: NEGATIVE — SIGNIFICANT CHANGE UP
LIDOCAIN IGE QN: 20.3 U/L — SIGNIFICANT CHANGE UP (ref 7–60)
LYMPHOCYTES # BLD AUTO: 1.93 K/UL — SIGNIFICANT CHANGE UP (ref 1–3.3)
LYMPHOCYTES # BLD AUTO: 17 % — SIGNIFICANT CHANGE UP (ref 13–44)
MCHC RBC-ENTMCNC: 33 PG — SIGNIFICANT CHANGE UP (ref 27–34)
MCHC RBC-ENTMCNC: 33.7 % — SIGNIFICANT CHANGE UP (ref 32–36)
MCV RBC AUTO: 97.8 FL — SIGNIFICANT CHANGE UP (ref 80–100)
MONOCYTES # BLD AUTO: 0.73 K/UL — SIGNIFICANT CHANGE UP (ref 0–0.9)
MONOCYTES NFR BLD AUTO: 6.4 % — SIGNIFICANT CHANGE UP (ref 2–14)
NEUTROPHILS # BLD AUTO: 8.59 K/UL — HIGH (ref 1.8–7.4)
NEUTROPHILS NFR BLD AUTO: 75.4 % — SIGNIFICANT CHANGE UP (ref 43–77)
NITRITE UR-MCNC: NEGATIVE — SIGNIFICANT CHANGE UP
NRBC # FLD: 0 K/UL — SIGNIFICANT CHANGE UP (ref 0–0)
PH UR: 7.5 — SIGNIFICANT CHANGE UP (ref 5–8)
PLATELET # BLD AUTO: 201 K/UL — SIGNIFICANT CHANGE UP (ref 150–400)
PMV BLD: 9.4 FL — SIGNIFICANT CHANGE UP (ref 7–13)
POTASSIUM SERPL-MCNC: 4.6 MMOL/L — SIGNIFICANT CHANGE UP (ref 3.5–5.3)
POTASSIUM SERPL-SCNC: 4.6 MMOL/L — SIGNIFICANT CHANGE UP (ref 3.5–5.3)
PROT SERPL-MCNC: 7.5 G/DL — SIGNIFICANT CHANGE UP (ref 6–8.3)
PROT UR-MCNC: 10 — SIGNIFICANT CHANGE UP
PROTHROM AB SERPL-ACNC: 11.9 SEC — SIGNIFICANT CHANGE UP (ref 9.8–13.1)
RBC # BLD: 4.52 M/UL — SIGNIFICANT CHANGE UP (ref 4.2–5.8)
RBC # FLD: 12.3 % — SIGNIFICANT CHANGE UP (ref 10.3–14.5)
RH IG SCN BLD-IMP: POSITIVE — SIGNIFICANT CHANGE UP
SODIUM SERPL-SCNC: 140 MMOL/L — SIGNIFICANT CHANGE UP (ref 135–145)
SP GR SPEC: 1.02 — SIGNIFICANT CHANGE UP (ref 1–1.04)
UROBILINOGEN FLD QL: NORMAL — SIGNIFICANT CHANGE UP
WBC # BLD: 11.38 K/UL — HIGH (ref 3.8–10.5)
WBC # FLD AUTO: 11.38 K/UL — HIGH (ref 3.8–10.5)

## 2019-06-04 PROCEDURE — 71045 X-RAY EXAM CHEST 1 VIEW: CPT | Mod: 26

## 2019-06-04 PROCEDURE — 99223 1ST HOSP IP/OBS HIGH 75: CPT

## 2019-06-04 PROCEDURE — 78226 HEPATOBILIARY SYSTEM IMAGING: CPT | Mod: 26,GC

## 2019-06-04 PROCEDURE — 76705 ECHO EXAM OF ABDOMEN: CPT | Mod: 26

## 2019-06-04 PROCEDURE — 93306 TTE W/DOPPLER COMPLETE: CPT | Mod: 26

## 2019-06-04 RX ORDER — CEFOTETAN DISODIUM 1 G
2 VIAL (EA) INJECTION ONCE
Refills: 0 | Status: COMPLETED | OUTPATIENT
Start: 2019-06-04 | End: 2019-06-04

## 2019-06-04 RX ORDER — CEFOTETAN DISODIUM 1 G
VIAL (EA) INJECTION
Refills: 0 | Status: DISCONTINUED | OUTPATIENT
Start: 2019-06-04 | End: 2019-06-06

## 2019-06-04 RX ORDER — SODIUM CHLORIDE 9 MG/ML
500 INJECTION INTRAMUSCULAR; INTRAVENOUS; SUBCUTANEOUS ONCE
Refills: 0 | Status: COMPLETED | OUTPATIENT
Start: 2019-06-04 | End: 2019-06-04

## 2019-06-04 RX ORDER — ENOXAPARIN SODIUM 100 MG/ML
40 INJECTION SUBCUTANEOUS DAILY
Refills: 0 | Status: DISCONTINUED | OUTPATIENT
Start: 2019-06-04 | End: 2019-06-06

## 2019-06-04 RX ORDER — CEFOTETAN DISODIUM 1 G
2 VIAL (EA) INJECTION EVERY 12 HOURS
Refills: 0 | Status: DISCONTINUED | OUTPATIENT
Start: 2019-06-04 | End: 2019-06-06

## 2019-06-04 RX ORDER — MORPHINE SULFATE 50 MG/1
2 CAPSULE, EXTENDED RELEASE ORAL EVERY 4 HOURS
Refills: 0 | Status: DISCONTINUED | OUTPATIENT
Start: 2019-06-04 | End: 2019-06-06

## 2019-06-04 RX ORDER — SACUBITRIL AND VALSARTAN 24; 26 MG/1; MG/1
1 TABLET, FILM COATED ORAL
Refills: 0 | Status: DISCONTINUED | OUTPATIENT
Start: 2019-06-04 | End: 2019-06-05

## 2019-06-04 RX ORDER — MORPHINE SULFATE 50 MG/1
4 CAPSULE, EXTENDED RELEASE ORAL ONCE
Refills: 0 | Status: DISCONTINUED | OUTPATIENT
Start: 2019-06-04 | End: 2019-06-04

## 2019-06-04 RX ORDER — FAMOTIDINE 10 MG/ML
20 INJECTION INTRAVENOUS ONCE
Refills: 0 | Status: COMPLETED | OUTPATIENT
Start: 2019-06-04 | End: 2019-06-04

## 2019-06-04 RX ORDER — ACETAMINOPHEN 500 MG
650 TABLET ORAL ONCE
Refills: 0 | Status: COMPLETED | OUTPATIENT
Start: 2019-06-04 | End: 2019-06-04

## 2019-06-04 RX ORDER — ONDANSETRON 8 MG/1
4 TABLET, FILM COATED ORAL ONCE
Refills: 0 | Status: COMPLETED | OUTPATIENT
Start: 2019-06-04 | End: 2019-06-04

## 2019-06-04 RX ORDER — SODIUM CHLORIDE 9 MG/ML
1000 INJECTION, SOLUTION INTRAVENOUS
Refills: 0 | Status: DISCONTINUED | OUTPATIENT
Start: 2019-06-04 | End: 2019-06-06

## 2019-06-04 RX ORDER — CARVEDILOL PHOSPHATE 80 MG/1
12.5 CAPSULE, EXTENDED RELEASE ORAL EVERY 12 HOURS
Refills: 0 | Status: DISCONTINUED | OUTPATIENT
Start: 2019-06-04 | End: 2019-06-06

## 2019-06-04 RX ORDER — PIPERACILLIN AND TAZOBACTAM 4; .5 G/20ML; G/20ML
3.38 INJECTION, POWDER, LYOPHILIZED, FOR SOLUTION INTRAVENOUS ONCE
Refills: 0 | Status: COMPLETED | OUTPATIENT
Start: 2019-06-04 | End: 2019-06-04

## 2019-06-04 RX ADMIN — SACUBITRIL AND VALSARTAN 1 TABLET(S): 24; 26 TABLET, FILM COATED ORAL at 13:31

## 2019-06-04 RX ADMIN — PIPERACILLIN AND TAZOBACTAM 200 GRAM(S): 4; .5 INJECTION, POWDER, LYOPHILIZED, FOR SOLUTION INTRAVENOUS at 06:09

## 2019-06-04 RX ADMIN — SODIUM CHLORIDE 500 MILLILITER(S): 9 INJECTION INTRAMUSCULAR; INTRAVENOUS; SUBCUTANEOUS at 04:44

## 2019-06-04 RX ADMIN — MORPHINE SULFATE 4 MILLIGRAM(S): 50 CAPSULE, EXTENDED RELEASE ORAL at 08:43

## 2019-06-04 RX ADMIN — MORPHINE SULFATE 2 MILLIGRAM(S): 50 CAPSULE, EXTENDED RELEASE ORAL at 14:30

## 2019-06-04 RX ADMIN — MORPHINE SULFATE 2 MILLIGRAM(S): 50 CAPSULE, EXTENDED RELEASE ORAL at 14:02

## 2019-06-04 RX ADMIN — Medication 650 MILLIGRAM(S): at 04:43

## 2019-06-04 RX ADMIN — Medication 30 MILLILITER(S): at 04:43

## 2019-06-04 RX ADMIN — MORPHINE SULFATE 4 MILLIGRAM(S): 50 CAPSULE, EXTENDED RELEASE ORAL at 05:00

## 2019-06-04 RX ADMIN — FAMOTIDINE 20 MILLIGRAM(S): 10 INJECTION INTRAVENOUS at 04:43

## 2019-06-04 RX ADMIN — Medication 650 MILLIGRAM(S): at 05:50

## 2019-06-04 RX ADMIN — ENOXAPARIN SODIUM 40 MILLIGRAM(S): 100 INJECTION SUBCUTANEOUS at 21:43

## 2019-06-04 RX ADMIN — SODIUM CHLORIDE 125 MILLILITER(S): 9 INJECTION, SOLUTION INTRAVENOUS at 22:53

## 2019-06-04 RX ADMIN — SODIUM CHLORIDE 500 MILLILITER(S): 9 INJECTION INTRAMUSCULAR; INTRAVENOUS; SUBCUTANEOUS at 06:03

## 2019-06-04 RX ADMIN — MORPHINE SULFATE 4 MILLIGRAM(S): 50 CAPSULE, EXTENDED RELEASE ORAL at 04:44

## 2019-06-04 RX ADMIN — Medication 100 GRAM(S): at 13:15

## 2019-06-04 RX ADMIN — SODIUM CHLORIDE 125 MILLILITER(S): 9 INJECTION, SOLUTION INTRAVENOUS at 13:04

## 2019-06-04 RX ADMIN — ONDANSETRON 4 MILLIGRAM(S): 8 TABLET, FILM COATED ORAL at 04:43

## 2019-06-04 RX ADMIN — MORPHINE SULFATE 2 MILLIGRAM(S): 50 CAPSULE, EXTENDED RELEASE ORAL at 21:43

## 2019-06-04 NOTE — H&P ADULT - ASSESSMENT
Patient is a 38M with hx of metastatic testicular cancer, chemo induced cardiomyopathy, presenting with acute cholecystitis   - admit to surgery under Dr. Aleman  - NPO/IVF  - IV abx  - medicine/card consult for optimization/clearance  - plan for OR tomorrow once cleared  - discussed with Dr. Aleman    [id number]

## 2019-06-04 NOTE — ED ADULT TRIAGE NOTE - CHIEF COMPLAINT QUOTE
Pt arrives in wheelchair c/o severe RUQ abd pain, nausea/vomiting/diarrhea x 2 days - worsened tonight so came to ED.  Reports hx of Metastatic CA, last Chemo x 6 mo ago.  States similar type reaction to chemo but not on any current therapy. Pt uncomfortable appearing.  Denies travel/sick contacts.

## 2019-06-04 NOTE — CONSULT NOTE ADULT - SUBJECTIVE AND OBJECTIVE BOX
Patient seen and evaluated at bedside    Chief Complaint:    HPI:  37yo M w/ PMHx afib/aflutter s/p successful DCCV off AC, NICM (2018 EF 40-45%), RA mass (thrombus vs mass), metastatic testicular cancer s/p resection + chemo, and reported gall stones admitted for suspected cholecystitis. Cards consulted for pre-op clearance for likely lap cholecystectomy.    PMHx:   Neuropathy  Anemia  Mixed germ cell tumor  Obesity  Atrial mass  Atrial flutter  Cardiomyopathy  Atrial fibrillation  Thrombus  Testicular cancer  Lumbar herniated disc  Testicular mass  No pertinent past medical history  No pertinent past medical history      PSHx:   History of abdominal surgery  History of abdominal surgery  History of cardioversion  Port-a-cath in place  History of orchiectomy  No significant past surgical history  No significant past surgical history  No significant past surgical history      Allergies:  No Known Allergies      Home Meds:    Current Medications:   cefoTEtan  IVPB      cefoTEtan  IVPB 2 Gram(s) IV Intermittent once  cefoTEtan  IVPB 2 Gram(s) IV Intermittent every 12 hours  enoxaparin Injectable 40 milliGRAM(s) SubCutaneous daily  lactated ringers. 1000 milliLiter(s) IV Continuous <Continuous>      FAMILY HISTORY:  Family history of hypertension in father  Family history of kidney stones: mother  Family history of cancer (Grandparent)  Family history of ischemic heart disease (Grandparent)  Family history of diabetes mellitus (Aunt)      Social History:  Smoking History: denies  Alcohol Use: denies  Drug Use: denies    REVIEW OF SYSTEMS:  CONSTITUTIONAL: No weakness, fevers or chills  EYES/ENT: No visual changes;  No dysphagia  NECK: No pain or stiffness  RESPIRATORY: No cough, wheezing, hemoptysis; No shortness of breath  CARDIOVASCULAR: No chest pain or palpitations; No lower extremity edema  GASTROINTESTINAL: No abdominal or epigastric pain. No nausea, vomiting, or hematemesis; No diarrhea or constipation. No melena or hematochezia.  BACK: No back pain  GENITOURINARY: No dysuria, frequency or hematuria  NEUROLOGICAL: No numbness or weakness  SKIN: No itching, burning, rashes, or lesions   All other review of systems is negative unless indicated above.    Physical Exam:  T(F): 98.8 (06-04), Max: 98.8 (06-04)  HR: 74 (06-04) (70 - 77)  BP: 106/59 (06-04) (106/59 - 122/70)  RR: 17 (06-04)  SpO2: 98% (06-04)  GENERAL: No acute distress, well-developed  HEAD:  Atraumatic, Normocephalic  ENT: EOMI, PERRLA, conjunctiva and sclera clear, Neck supple, No JVD, moist mucosa  CHEST/LUNG: Clear to auscultation bilaterally; No wheeze, equal breath sounds bilaterally   BACK: No spinal tenderness  HEART: Regular rate and rhythm; No murmurs, rubs, or gallops  ABDOMEN: Soft, Nontender, Nondistended; Bowel sounds present  EXTREMITIES:  No clubbing, cyanosis, or edema  PSYCH: Nl behavior, nl affect  NEUROLOGY: AAOx3, non-focal, cranial nerves intact  SKIN: Normal color, No rashes or lesions  LINES:    Cardiovascular Diagnostic Testing:    ECG: Personally reviewed:    Echo: Personally reviewed:  < from: Transthoracic Echocardiogram (02.22.18 @ 13:10) >  EF (Visual Estimate): 40-45 %  ------------------------------------------------------------------------  Observations:  Mitral Valve: Normal mitral valve.  Aortic Valve/Aorta: Normal trileaflet aortic valve.  Normal aortic root.  Left Atrium: Normal left atrium.  LA volume index = 27  cc/m2.  Left Ventricle: Mild-moderate global left ventricular  systolic dysfunction. Normal left ventricular internal  dimensions and wall thicknesses.  Right Heart: Normal right atrium. Echogenic density  is  seen on posterior aspect of RA measuring 2.1 cm x 3.3 cm  which  appears to have  increased in size since last TYLOR  from 1/17/2018. The right ventricle is not well visualized;  grossly low normal right ventricular systolic function.  Normal tricuspid valve. Normal pulmonic valve.  Pericardium/Pleura: Normal pericardium with no pericardial  effusion.  Hemodynamic: Estimated right atrial pressure is 8 mm Hg.  ------------------------------------------------------------------------  Conclusions:  1. Mild-moderate global left ventricular systolic  dysfunction.  2. Normal right atrium. Echogenic density/mass   is seen on  posterior aspect of RA measuring 2.1 cm x 3.3 cm which  appears to have  increased in size since last TYLOR from  1/17/2018. Recommend Cardiac MRI for further evaluation.  3. The right ventricle is not well visualized; grossly low  normal right ventricular systolic function.  4. Estimated pulmonary artery systolic pressure equals 17  mm Hg, assuming right atrial pressure equals 8  mm Hg,  consistent with normal pulmonary pressures.  Discussed Dr. TEN Talbot  *** Compared with echocardiogram of 1/17/2018, right atrial  echodensity likely representing a mass appears to have  enlarged.    < end of copied text >    < from: Transesophageal Echocardiogram (01.17.18 @ 08:11) >  EF (Visual Estimate): 35-40 %  ------------------------------------------------------------------------  Observations:  Mitral Valve: Tethered mitral valve leaflets with normal  opening. Minimal mitral regurgitation.  Aortic Valve/Aorta: Normal trileaflet aortic valve. No  aortic valve regurgitation seen.  Normal aortic root (Ao: 3.8 cm at the sinuses of Valsalva).  Left Atrium: Normal left atrium. No left atrial or left  atrial appendage thrombus. Decreased left atrial appendage  velocities noted, 37cm/sec.  Left Ventricle: Moderate to severe global left ventricular  systolic dysfunction. Mild left ventricular enlargement.  Right Heart: Normal right atrium. Again seen, is a 1.5cm x  1.1cm echodensity attached to the inferior right atrial  wall. The mass is smaller in size when compared to TYLOR  performed 12/4/2017. This most likely represents a  thrombus, however malignancy cannot be excluded. Suggest  interval imaging followup to resolution. Right ventricular  enlargement with decreased right ventricular systolic  function. Normal tricuspid valve. Mild tricuspid  regurgitation. Normal pulmonic valve. Minimal pulmonic  regurgitation.  Pericardium/Pleura: Normal pericardium with no pericardial  effusion.  Hemodynamic: Estimated right atrial pressure is 8 mm Hg.  Estimated right ventricular systolic pressure equals 28 mm  Hg, assuming right atrial pressure equals 8 mm Hg,  consistent with normal pulmonary pressures. Contrast  injection demonstrates no evidence of a patent foramen  ovale.  ------------------------------------------------------------------------  Conclusions:  1. Normal left atrium. No left atrial or left atrial  appendage thrombus. Decreased left atrial appendage  velocities noted, 37cm/sec.  2. Mild left ventricular enlargement.  3. Moderate to severe global left ventricular systolic  dysfunction.  4. Normal right atrium. Again seen, is a 1.5cm x 1.1cm  echodensity attached to the inferior right atrial wall. The  mass is smaller in size when compared to TYLOR performed  12/4/2017. This most likely represents a thrombus, however  malignancy cannot be excluded. Suggest interval imaging  followup to resolution.  5. Right ventricular enlargement with decreased right  ventricular systolic function.  6. Estimated pulmonary artery systolic pressure equals 28  mm Hg, assuming right atrial pressure equals 8  mm Hg,  consistent with normal pulmonary pressures.  7. Contrast injection demonstrates no evidence of a patent  foramen ovale.  Findings and images discussed and reviewed with Dr. Mccauley  and Dr. Marquez at time of exam.    < end of copied text >    Stress Testing:    Cath:    Imaging:  < from: MR Cardiac w/wo IV Cont (03.14.18 @ 10:30) >  FINDINGS:      Centered along the wall of the right atrium, justlateral to the inflow   of the IVC is a 2.6 x 0.8 cm ovoid shaped mass. The mass shows   intermediate signal to myocardium on T1-weighted images and slightly   increased signal on the T2-weighted images with no macroscopic foci of   fat. There is no definite enhancement during the first pass perfusion   images. However, on the late gadolinium enhancement sequences there is   subtle, increased signal within the lesion (series 11 image 21).      NONCARDIAC FINDINGS: The other visualized thoracoabdominal structures are   unremarkable.      IMPRESSION:    1.  The right atrial mass which is likely in continuity with the franklin   terminalis has decreased in size since 10/31/2017. This could represent   thrombus or tumor rather than a conspicuous franklin terminalis. A 3 month   follow-up is recommended to evaluate for stability or resolution.    < end of copied text >    CXR: Personally reviewed    Labs: Personally reviewed                        14.9   11.38 )-----------( 201      ( 04 Jun 2019 04:10 )             44.2     06-04    140  |  102  |  16  ----------------------------<  104<H>  4.6   |  25  |  1.06    Ca    9.1      04 Jun 2019 04:10    TPro  7.5  /  Alb  4.2  /  TBili  0.4  /  DBili  x   /  AST  36  /  ALT  50<H>  /  AlkPhos  55  06-04    PT/INR - ( 04 Jun 2019 04:10 )   PT: 11.9 SEC;   INR: 1.07          PTT - ( 04 Jun 2019 04:10 )  PTT:31.9 SEC Patient seen and evaluated at bedside    Chief Complaint:    HPI:  37yo M w/ PMHx afib/aflutter s/p successful DCCV (2/2018) off AC, NICM (2018 EF 40-45%), RA mass (thrombus vs mass), metastatic testicular cancer s/p resection + chemo, and gallstones admitted for suspected acute cholecystitis. Cards consulted for pre-op clearance for likely lap cholecystectomy.    Pt current chest pain free. Denies any chest pressure, jaw/arm pain, unexplained diaphoresis, or palpitations. Endorses chronic orthopnea x 1-2 years, no acute changes in orthopnea over past 6 months. Denies any other SOB or ABARCA. Endorses intermittent BLE swelling, overall mildly improved since started on Entresto. Endorses continuing to have orthostatic hypotension (lightheadedness, no syncope episodes in last month) that has significantly improved since medication changes w/ Dr. Marquez. Denies any leg/calf pain, leg redness, hx DVTs. Not currently on any chemotherapy for testicular cancer. Per pt, no plans for further disease modifying treatments at this time.    W/ regards to cardiac history, pt follows closely w/ Dr. Marquez (last seen 5/15/2019). Has maintained NSR since 2/2018 after DCCV of afib/aflutter. Hx of NICM thought to be 2/2 chemotherapy (2018 EF 35-45%). Had RA mass detected on cardiac MRI (3/2018) and TYLOR (1/2018, 2017). Mass noted to be smaller in 2018 vs 2017 imaging. Was due to have repeat TTE + MRI but unable to due to insurance coverage issues. Per pt, was told by Onc that it is likely thrombus over tumor/metastasis.     PMHx:   Neuropathy  Anemia  Mixed germ cell tumor  Obesity  Atrial mass  Atrial flutter  Cardiomyopathy  Atrial fibrillation  Thrombus  Testicular cancer  Lumbar herniated disc  Testicular mass  No pertinent past medical history  No pertinent past medical history      PSHx:   History of abdominal surgery  History of abdominal surgery  History of cardioversion  Port-a-cath in place  History of orchiectomy  No significant past surgical history  No significant past surgical history  No significant past surgical history      Allergies:  No Known Allergies      Home Meds:    Current Medications:   cefoTEtan  IVPB      cefoTEtan  IVPB 2 Gram(s) IV Intermittent once  cefoTEtan  IVPB 2 Gram(s) IV Intermittent every 12 hours  enoxaparin Injectable 40 milliGRAM(s) SubCutaneous daily  lactated ringers. 1000 milliLiter(s) IV Continuous <Continuous>      FAMILY HISTORY:  Family history of hypertension in father  Family history of kidney stones: mother  Family history of cancer (Grandparent)  Family history of ischemic heart disease (Grandparent)  Family history of diabetes mellitus (Aunt)      Social History:  Smoking History: denies  Alcohol Use: denies  Drug Use: denies    REVIEW OF SYSTEMS:  CONSTITUTIONAL: No weakness, fevers or chills  EYES/ENT: No visual changes;    NECK: No pain or stiffness  RESPIRATORY: Per HPI above.   CARDIOVASCULAR: Per HPI above.   GASTROINTESTINAL: +RUQ pain w/ nausea + vomiting. No diarrhea or constipation. No melena or hematochezia.  BACK: +back pain at baseline  GENITOURINARY: No dysuria, frequency or hematuria  NEUROLOGICAL: +peripheral neuropathy of hands and feet x years (since chemo)  SKIN: +foot blisters   All other review of systems is negative unless indicated above.    Physical Exam:  T(F): 98.8 (06-04), Max: 98.8 (06-04)  HR: 74 (06-04) (70 - 77)  BP: 106/59 (06-04) (106/59 - 122/70)  RR: 17 (06-04)  SpO2: 98% (06-04)    GENERAL: Appears uncomfortable.   HEAD:  Atraumatic, Normocephalic  ENT: EOMI, PERRLA, conjunctiva and sclera clear, Neck supple, No JVD, moist mucosa  CHEST/LUNG: Mild bibasiliar crackles, L>R. Mildly improves w/ cough. No wheezing.   BACK: No spinal tenderness  HEART: Regular rate and rhythm; No murmurs, rubs, or gallops  ABDOMEN: RUQ tenderness to palpation. Large vertical scar, well healed. Bowel sounds present  EXTREMITIES:  BLE trace to 1+ pitting edema of foot to knee, mildly worse L>R. No erythema, pain w/ dorsiflexion, or tenderness to palpation.                        L foot w/ dusky toes. L foot plantar surface w/ large patch of denuded skin (friction blister)  PSYCH: Nl behavior, nl affect  NEUROLOGY: AAOx3, non-focal, cranial nerves intact  SKIN: Normal color, No rashes or lesions  LINES: PIV    Cardiovascular Diagnostic Testing:    ECG: Personally reviewed:    Echo: Personally reviewed:  < from: Transthoracic Echocardiogram (02.22.18 @ 13:10) >  EF (Visual Estimate): 40-45 %  ------------------------------------------------------------------------  Observations:  Mitral Valve: Normal mitral valve.  Aortic Valve/Aorta: Normal trileaflet aortic valve.  Normal aortic root.  Left Atrium: Normal left atrium.  LA volume index = 27  cc/m2.  Left Ventricle: Mild-moderate global left ventricular  systolic dysfunction. Normal left ventricular internal  dimensions and wall thicknesses.  Right Heart: Normal right atrium. Echogenic density  is  seen on posterior aspect of RA measuring 2.1 cm x 3.3 cm  which  appears to have  increased in size since last TYLOR  from 1/17/2018. The right ventricle is not well visualized;  grossly low normal right ventricular systolic function.  Normal tricuspid valve. Normal pulmonic valve.  Pericardium/Pleura: Normal pericardium with no pericardial  effusion.  Hemodynamic: Estimated right atrial pressure is 8 mm Hg.  ------------------------------------------------------------------------  Conclusions:  1. Mild-moderate global left ventricular systolic  dysfunction.  2. Normal right atrium. Echogenic density/mass   is seen on  posterior aspect of RA measuring 2.1 cm x 3.3 cm which  appears to have  increased in size since last TYLOR from  1/17/2018. Recommend Cardiac MRI for further evaluation.  3. The right ventricle is not well visualized; grossly low  normal right ventricular systolic function.  4. Estimated pulmonary artery systolic pressure equals 17  mm Hg, assuming right atrial pressure equals 8  mm Hg,  consistent with normal pulmonary pressures.  Discussed Dr. TEN Talbot  *** Compared with echocardiogram of 1/17/2018, right atrial  echodensity likely representing a mass appears to have  enlarged.    < end of copied text >    < from: Transesophageal Echocardiogram (01.17.18 @ 08:11) >  EF (Visual Estimate): 35-40 %  ------------------------------------------------------------------------  Observations:  Mitral Valve: Tethered mitral valve leaflets with normal  opening. Minimal mitral regurgitation.  Aortic Valve/Aorta: Normal trileaflet aortic valve. No  aortic valve regurgitation seen.  Normal aortic root (Ao: 3.8 cm at the sinuses of Valsalva).  Left Atrium: Normal left atrium. No left atrial or left  atrial appendage thrombus. Decreased left atrial appendage  velocities noted, 37cm/sec.  Left Ventricle: Moderate to severe global left ventricular  systolic dysfunction. Mild left ventricular enlargement.  Right Heart: Normal right atrium. Again seen, is a 1.5cm x  1.1cm echodensity attached to the inferior right atrial  wall. The mass is smaller in size when compared to TYLOR  performed 12/4/2017. This most likely represents a  thrombus, however malignancy cannot be excluded. Suggest  interval imaging followup to resolution. Right ventricular  enlargement with decreased right ventricular systolic  function. Normal tricuspid valve. Mild tricuspid  regurgitation. Normal pulmonic valve. Minimal pulmonic  regurgitation.  Pericardium/Pleura: Normal pericardium with no pericardial  effusion.  Hemodynamic: Estimated right atrial pressure is 8 mm Hg.  Estimated right ventricular systolic pressure equals 28 mm  Hg, assuming right atrial pressure equals 8 mm Hg,  consistent with normal pulmonary pressures. Contrast  injection demonstrates no evidence of a patent foramen  ovale.  ------------------------------------------------------------------------  Conclusions:  1. Normal left atrium. No left atrial or left atrial  appendage thrombus. Decreased left atrial appendage  velocities noted, 37cm/sec.  2. Mild left ventricular enlargement.  3. Moderate to severe global left ventricular systolic  dysfunction.  4. Normal right atrium. Again seen, is a 1.5cm x 1.1cm  echodensity attached to the inferior right atrial wall. The  mass is smaller in size when compared to TYLOR performed  12/4/2017. This most likely represents a thrombus, however  malignancy cannot be excluded. Suggest interval imaging  followup to resolution.  5. Right ventricular enlargement with decreased right  ventricular systolic function.  6. Estimated pulmonary artery systolic pressure equals 28  mm Hg, assuming right atrial pressure equals 8  mm Hg,  consistent with normal pulmonary pressures.  7. Contrast injection demonstrates no evidence of a patent  foramen ovale.  Findings and images discussed and reviewed with Dr. Mccauley  and Dr. Marquez at time of exam.    < end of copied text >    Stress Testing:    Cath:    Imaging:  < from: MR Cardiac w/wo IV Cont (03.14.18 @ 10:30) >  FINDINGS:      Centered along the wall of the right atrium, justlateral to the inflow   of the IVC is a 2.6 x 0.8 cm ovoid shaped mass. The mass shows   intermediate signal to myocardium on T1-weighted images and slightly   increased signal on the T2-weighted images with no macroscopic foci of   fat. There is no definite enhancement during the first pass perfusion   images. However, on the late gadolinium enhancement sequences there is   subtle, increased signal within the lesion (series 11 image 21).      NONCARDIAC FINDINGS: The other visualized thoracoabdominal structures are   unremarkable.      IMPRESSION:    1.  The right atrial mass which is likely in continuity with the franklin   terminalis has decreased in size since 10/31/2017. This could represent   thrombus or tumor rather than a conspicuous franklin terminalis. A 3 month   follow-up is recommended to evaluate for stability or resolution.    < end of copied text >    CXR: Personally reviewed    Labs: Personally reviewed                        14.9   11.38 )-----------( 201      ( 04 Jun 2019 04:10 )             44.2     06-04    140  |  102  |  16  ----------------------------<  104<H>  4.6   |  25  |  1.06    Ca    9.1      04 Jun 2019 04:10    TPro  7.5  /  Alb  4.2  /  TBili  0.4  /  DBili  x   /  AST  36  /  ALT  50<H>  /  AlkPhos  55  06-04    PT/INR - ( 04 Jun 2019 04:10 )   PT: 11.9 SEC;   INR: 1.07          PTT - ( 04 Jun 2019 04:10 )  PTT:31.9 SEC

## 2019-06-04 NOTE — ED PROVIDER NOTE - CLINICAL SUMMARY MEDICAL DECISION MAKING FREE TEXT BOX
38 Y M with RUQ pain since this am concerning for cholecystitis, will get labs RUQ scan and likely admit.

## 2019-06-04 NOTE — H&P ADULT - NSICDXPASTSURGICALHX_GEN_ALL_CORE_FT
PAST SURGICAL HISTORY:  History of abdominal surgery Retroperitoneal lymph node dissection (June 2018)    History of cardioversion 2/2018    History of orchiectomy left-7/2017    Port-a-cath in place placed 9/2017,, removed-10/18/2017

## 2019-06-04 NOTE — CONSULT NOTE ADULT - ASSESSMENT
37yo M w/ PMHx afib/aflutter s/p successful DCCV (in sinus since 2/2018), NICM (2018 EF 25-45%), RA mass (likely thrombus over metastasis), metastatic testicular cancer s/p resection + partial chemo (no further chemo due to adverse effects), and acute cholecystitis pending pre-op clearance for likely lap cholecystectomy.      #Pre-op medical optimization  -no chest pain or signs of ACS.   -hx of NICM HFrEF 35-45% w/ mild to mod global LV systolic dysfunction likely 2/2 chemotherapy    #RA mass  -initially seen in 2017, concern for thrombus vs metastasis  -needs repeat TTE and cardiac MRI to assess interval changes.            -appeared smaller in 2018 TYLOR and cardiac MRI. 39yo M w/ PMHx afib/aflutter s/p successful DCCV (in sinus since 2/2018), NICM (2018 EF 25-45%), RA mass (likely thrombus over metastasis), metastatic testicular cancer s/p resection + partial chemo (no further chemo due to adverse effects), and acute cholecystitis pending pre-op clearance for likely lap cholecystectomy.      #Pre-op medical optimization  -pt is has no cardiac contraindications for lap cholecystectomy at this time and should proceed per Surg team.  -no chest pain, new SOB, JVD. appears euvolemic on exam w/ no evidence pulm edema on CXR.  -hx of NICM HFrEF 35-45% w/ mild to mod global LV systolic dysfunction likely 2/2 chemotherapy    #RA mass  -initially seen in 2017, concern for thrombus vs metastasis  -needs repeat TTE and cardiac MRI to assess interval changes.            -appeared smaller in 2018 TYLOR and cardiac MRI.   -surgery does not need to be delayed for TTE results.     Discussed w/ Cards Attending + Fellow on 6/4/2019.    Jennyfer Bender MD PGY-1  [phone] 39yo M w/ PMHx afib/aflutter s/p successful DCCV (in sinus since 2/2018), NICM (2018 EF 25-45%), RA mass (likely thrombus over metastasis), metastatic testicular cancer s/p resection + partial chemo (no further chemo due to adverse effects), and acute cholecystitis pending pre-op clearance for likely lap cholecystectomy.      #Pre-op medical optimization  -pt is has no cardiac contraindications for lap cholecystectomy at this time and should proceed per Surg team.  -no chest pain, new SOB, JVD. appears euvolemic on exam w/ no evidence pulm edema on CXR.  -hx of NICM HFrEF 35-45% w/ mild to mod global LV systolic dysfunction likely 2/2 chemotherapy    #RA mass  -initially seen in 2017, concern for thrombus vs metastasis  -needs repeat TTE and cardiac MRI to assess interval changes.            -appeared smaller in 2018 TYLOR and cardiac MRI.   -surgery does not need to be delayed for TTE or MRI.    Discussed w/ Cards Attending + Fellow on 6/4/2019.    Jennyfer Bender MD PGY-1  [phone]

## 2019-06-04 NOTE — H&P ADULT - NSHPPHYSICALEXAM_GEN_ALL_CORE
T(C): 37.1 (06-04-19 @ 05:49), Max: 37.1 (06-04-19 @ 03:03)  HR: 74 (06-04-19 @ 08:22) (70 - 77)  BP: 106/59 (06-04-19 @ 08:22) (106/59 - 122/70)  RR: 17 (06-04-19 @ 08:22) (16 - 20)  SpO2: 98% (06-04-19 @ 08:22) (98% - 100%)  Wt(kg): --      Physical Exam:  General: WN/WD NAD  Neurology: A&Ox3, nonfocal, follows commands  Eyes: PERRLA/ EOMI  ENT/Neck: Neck supple, trachea midline, No JVD  Respiratory: CTA B/L  CV: Normal rate regular rhythm  Abdominal: Soft, ND, well healed midline incision, RUQ tenderness, pos murphys   Extremities: No edema,

## 2019-06-04 NOTE — ED PROVIDER NOTE - ATTENDING CONTRIBUTION TO CARE
37 yo with hx met testicular Ca w mets on chemo which had to be stopped due to LFT elevations presenting with severe non radiating RUQ pain with chills and associated NBNB emesis.  No diarrhea.  Hx of GB disease    Gen: Well appearing in NAD  Head: NC/AT  Neck: trachea midline  Resp:  No distress  Abd: soft TTP in the RUQ  Ext: no deformities  Neuro:  A&O appears non focal  Skin:  Warm and dry as visualized  Psych:  Normal affect and mood     Pt with RUQ pain and NV with chills concerning for GB disease.  Will get US and labs to look for cholecystitis.  Aggressive pain control.  Low threshold to admit.

## 2019-06-04 NOTE — H&P ADULT - ATTENDING COMMENTS
I have reviewed the history, pertinent labs and imaging, and discussed the care with the consult resident.         The Acute Care Surgery (B Team) Attending Group Practice:  Dr. Navin Velazquez, Dr. Donnie Arambula, Dr. Triston Manzo, Dr. Lorna Aleman, Dr. Sergio Barillas    urgent issues - spectra [id number] or [id number]  nonurgent issues - [phone]  patient appointments or afterhours - [phone]

## 2019-06-04 NOTE — CHART NOTE - NSCHARTNOTEFT_GEN_A_CORE
Preop Dx: Acute cholecystitis  Surgeon: Estela Kevin  Procedure: Lap sen    Vital Signs Last 24 Hrs  T(C): 36.8 (04 Jun 2019 15:34), Max: 37.1 (04 Jun 2019 03:03)  T(F): 98.2 (04 Jun 2019 15:34), Max: 98.8 (04 Jun 2019 05:49)  HR: 76 (04 Jun 2019 15:34) (70 - 78)  BP: 110/67 (04 Jun 2019 15:34) (106/59 - 122/70)  BP(mean): --  RR: 16 (04 Jun 2019 15:34) (16 - 20)  SpO2: 100% (04 Jun 2019 15:34) (98% - 100%)                        14.9   11.38 )-----------( 201      ( 04 Jun 2019 04:10 )             44.2     06-04    140  |  102  |  16  ----------------------------<  104<H>  4.6   |  25  |  1.06    Ca    9.1      04 Jun 2019 04:10    TPro  7.5  /  Alb  4.2  /  TBili  0.4  /  DBili  x   /  AST  36  /  ALT  50<H>  /  AlkPhos  55  06-04    PT/INR - ( 04 Jun 2019 04:10 )   PT: 11.9 SEC;   INR: 1.07          PTT - ( 04 Jun 2019 04:10 )  PTT:31.9 SEC  Daily     Daily     EKG: has been cleared by cardiology  CXR: clear lungs  Type and Screen: 6/4/19, ordered for am        A/P: 38y Male     - OR 6/4/19 for lap sen with Dr. Manzo  - NPO past midnight, except medications  - IVF while NPO  - Consent signed and in chart  - has been cleared by cardiology

## 2019-06-04 NOTE — ED PROVIDER NOTE - OBJECTIVE STATEMENT
38 y M with testicular cancer w/ metastases sp orchiectomy, and last chemo (iphosphamide, taxol, platin) 5 weeks ago stopped 2/2 elevated LFTs, hx of afib/aflutter s/p DCCV now in NSR, NICM (last EF 2/2018 45%), chronic low back pain presents with RUQ pain and chills, nausea and vomiting. He says he has gallstones in the past but never had an infection of the gallbladder and never had it removed. Denies chest pain, SOB, LOC.

## 2019-06-04 NOTE — H&P ADULT - NSICDXFAMILYHX_GEN_ALL_CORE_FT
FAMILY HISTORY:  Family history of hypertension in father  Family history of kidney stones, mother    Grandparent  Still living? No  Family history of cancer, Age at diagnosis: Age Unknown  Family history of ischemic heart disease, Age at diagnosis: Age Unknown    Aunt  Still living? No  Family history of diabetes mellitus, Age at diagnosis: Age Unknown

## 2019-06-04 NOTE — H&P ADULT - HISTORY OF PRESENT ILLNESS
Patient is a 38y M with metastatic testicular cancer sp orchiectomy 2017 and ex lap with RP lymph node dissection and mass excision 2018, last chemo (iphosphamide, taxol, platin) 5 weeks ago, hx of afib/aflutter s/p cardioversion, NICM (last EF 2/2018 45%), presented to the ED with RUQ pain, chills, nausea and vomiting. The pain started 1 day ago but continued to get worse.  He has had multiple episodes of emesis as well.  He has never had abdominal pain like this before.  Patient aware he has gallstones present but they have never given him any problems.  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  Patient also has chemo induced cardiomyopathy and can only walk about a block before developing SOB. Patient is a 38y M with metastatic testicular cancer sp orchiectomy 2017 and ex lap with RP lymph node dissection and mass excision 2018, last chemo (iphosphamide, taxol, platin) hx of afib/aflutter s/p cardioversion, NICM (last EF 2/2018 45%), presented to the ED with RUQ pain, chills, nausea and vomiting. The pain started 1 day ago but continued to get worse.  He has had multiple episodes of emesis as well.  He has never had abdominal pain like this before.  Patient aware he has gallstones present but they have never given him any problems.  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  Patient also has chemo induced cardiomyopathy and can only walk about a block before developing SOB.

## 2019-06-04 NOTE — H&P ADULT - NSHPLABSRESULTS_GEN_ALL_CORE
14.9   11.38 )-----------( 201      ( 04 Jun 2019 04:10 )             44.2     06-04    140  |  102  |  16  ----------------------------<  104<H>  4.6   |  25  |  1.06    Ca    9.1      04 Jun 2019 04:10    TPro  7.5  /  Alb  4.2  /  TBili  0.4  /  DBili  x   /  AST  36  /  ALT  50<H>  /  AlkPhos  55  06-04    LIVER FUNCTIONS - ( 04 Jun 2019 04:10 )  Alb: 4.2 g/dL / Pro: 7.5 g/dL / ALK PHOS: 55 u/L / ALT: 50 u/L / AST: 36 u/L / GGT: x           PT/INR - ( 04 Jun 2019 04:10 )   PT: 11.9 SEC;   INR: 1.07          PTT - ( 04 Jun 2019 04:10 )  PTT:31.9 SEC        < from: US Abdomen Limited (06.04.19 @ 05:29) >    IMPRESSION:     Nonmobile gallstone in the region of the gallbladder neck without   gallbladder wall thickening or pericholecystic fluid. Equivocal   sonographic Morris's sign. Further evaluation with dynamic HIDA may be   obtained to assess for acute cholecystitis, as clinically warranted.    < end of copied text >

## 2019-06-04 NOTE — H&P ADULT - NSICDXPASTMEDICALHX_GEN_ALL_CORE_FT
PAST MEDICAL HISTORY:  Anemia     Atrial fibrillation s/p DCCV 1/18    Atrial flutter     Atrial mass right artrial echo density per MRI 3/14/18.    Cardiomyopathy     Lumbar herniated disc     Mixed germ cell tumor     Neuropathy ~ fingers and toes    Obesity     Testicular cancer     Testicular mass left    Thrombus Right atrial wall ( suspected from last TYLOR) On AC

## 2019-06-05 ENCOUNTER — RESULT REVIEW (OUTPATIENT)
Age: 39
End: 2019-06-05

## 2019-06-05 DIAGNOSIS — Z71.89 OTHER SPECIFIED COUNSELING: ICD-10-CM

## 2019-06-05 LAB
ANION GAP SERPL CALC-SCNC: 9 MMO/L — SIGNIFICANT CHANGE UP (ref 7–14)
APTT BLD: 33.6 SEC — SIGNIFICANT CHANGE UP (ref 27.5–36.3)
BLD GP AB SCN SERPL QL: NEGATIVE — SIGNIFICANT CHANGE UP
BUN SERPL-MCNC: 12 MG/DL — SIGNIFICANT CHANGE UP (ref 7–23)
CALCIUM SERPL-MCNC: 8.7 MG/DL — SIGNIFICANT CHANGE UP (ref 8.4–10.5)
CHLORIDE SERPL-SCNC: 101 MMOL/L — SIGNIFICANT CHANGE UP (ref 98–107)
CO2 SERPL-SCNC: 29 MMOL/L — SIGNIFICANT CHANGE UP (ref 22–31)
CREAT SERPL-MCNC: 1.1 MG/DL — SIGNIFICANT CHANGE UP (ref 0.5–1.3)
GLUCOSE SERPL-MCNC: 87 MG/DL — SIGNIFICANT CHANGE UP (ref 70–99)
HCT VFR BLD CALC: 41.4 % — SIGNIFICANT CHANGE UP (ref 39–50)
HGB BLD-MCNC: 13.7 G/DL — SIGNIFICANT CHANGE UP (ref 13–17)
INR BLD: 1.14 — SIGNIFICANT CHANGE UP (ref 0.88–1.17)
MAGNESIUM SERPL-MCNC: 2.1 MG/DL — SIGNIFICANT CHANGE UP (ref 1.6–2.6)
MCHC RBC-ENTMCNC: 32.3 PG — SIGNIFICANT CHANGE UP (ref 27–34)
MCHC RBC-ENTMCNC: 33.1 % — SIGNIFICANT CHANGE UP (ref 32–36)
MCV RBC AUTO: 97.6 FL — SIGNIFICANT CHANGE UP (ref 80–100)
NRBC # FLD: 0 K/UL — SIGNIFICANT CHANGE UP (ref 0–0)
PHOSPHATE SERPL-MCNC: 3.6 MG/DL — SIGNIFICANT CHANGE UP (ref 2.5–4.5)
PLATELET # BLD AUTO: 154 K/UL — SIGNIFICANT CHANGE UP (ref 150–400)
PMV BLD: 9 FL — SIGNIFICANT CHANGE UP (ref 7–13)
POTASSIUM SERPL-MCNC: 3.9 MMOL/L — SIGNIFICANT CHANGE UP (ref 3.5–5.3)
POTASSIUM SERPL-SCNC: 3.9 MMOL/L — SIGNIFICANT CHANGE UP (ref 3.5–5.3)
PROTHROM AB SERPL-ACNC: 13 SEC — SIGNIFICANT CHANGE UP (ref 9.8–13.1)
RBC # BLD: 4.24 M/UL — SIGNIFICANT CHANGE UP (ref 4.2–5.8)
RBC # FLD: 12.6 % — SIGNIFICANT CHANGE UP (ref 10.3–14.5)
RH IG SCN BLD-IMP: POSITIVE — SIGNIFICANT CHANGE UP
SODIUM SERPL-SCNC: 139 MMOL/L — SIGNIFICANT CHANGE UP (ref 135–145)
WBC # BLD: 5.3 K/UL — SIGNIFICANT CHANGE UP (ref 3.8–10.5)
WBC # FLD AUTO: 5.3 K/UL — SIGNIFICANT CHANGE UP (ref 3.8–10.5)

## 2019-06-05 PROCEDURE — 99232 SBSQ HOSP IP/OBS MODERATE 35: CPT | Mod: GC

## 2019-06-05 PROCEDURE — 88304 TISSUE EXAM BY PATHOLOGIST: CPT | Mod: 26

## 2019-06-05 PROCEDURE — 47562 LAPAROSCOPIC CHOLECYSTECTOMY: CPT

## 2019-06-05 RX ORDER — SACUBITRIL AND VALSARTAN 24; 26 MG/1; MG/1
1 TABLET, FILM COATED ORAL
Refills: 0 | Status: DISCONTINUED | OUTPATIENT
Start: 2019-06-06 | End: 2019-06-06

## 2019-06-05 RX ORDER — ACETAMINOPHEN 500 MG
1000 TABLET ORAL ONCE
Refills: 0 | Status: COMPLETED | OUTPATIENT
Start: 2019-06-06 | End: 2019-06-06

## 2019-06-05 RX ORDER — HYDROMORPHONE HYDROCHLORIDE 2 MG/ML
1 INJECTION INTRAMUSCULAR; INTRAVENOUS; SUBCUTANEOUS
Refills: 0 | Status: DISCONTINUED | OUTPATIENT
Start: 2019-06-05 | End: 2019-06-05

## 2019-06-05 RX ORDER — OXYCODONE HYDROCHLORIDE 5 MG/1
10 TABLET ORAL EVERY 4 HOURS
Refills: 0 | Status: DISCONTINUED | OUTPATIENT
Start: 2019-06-05 | End: 2019-06-06

## 2019-06-05 RX ORDER — ACETAMINOPHEN 500 MG
1000 TABLET ORAL ONCE
Refills: 0 | Status: COMPLETED | OUTPATIENT
Start: 2019-06-05 | End: 2019-06-05

## 2019-06-05 RX ORDER — ONDANSETRON 8 MG/1
4 TABLET, FILM COATED ORAL ONCE
Refills: 0 | Status: DISCONTINUED | OUTPATIENT
Start: 2019-06-05 | End: 2019-06-05

## 2019-06-05 RX ORDER — OXYCODONE HYDROCHLORIDE 5 MG/1
5 TABLET ORAL EVERY 4 HOURS
Refills: 0 | Status: DISCONTINUED | OUTPATIENT
Start: 2019-06-05 | End: 2019-06-06

## 2019-06-05 RX ORDER — ACETAMINOPHEN 500 MG
1000 TABLET ORAL ONCE
Refills: 0 | Status: DISCONTINUED | OUTPATIENT
Start: 2019-06-05 | End: 2019-06-05

## 2019-06-05 RX ADMIN — Medication 100 GRAM(S): at 01:41

## 2019-06-05 RX ADMIN — Medication 400 MILLIGRAM(S): at 22:44

## 2019-06-05 RX ADMIN — Medication 100 GRAM(S): at 06:18

## 2019-06-05 RX ADMIN — Medication 100 GRAM(S): at 23:32

## 2019-06-05 RX ADMIN — SODIUM CHLORIDE 125 MILLILITER(S): 9 INJECTION, SOLUTION INTRAVENOUS at 11:39

## 2019-06-05 RX ADMIN — SODIUM CHLORIDE 125 MILLILITER(S): 9 INJECTION, SOLUTION INTRAVENOUS at 18:56

## 2019-06-05 RX ADMIN — Medication 1000 MILLIGRAM(S): at 23:14

## 2019-06-05 NOTE — PROGRESS NOTE ADULT - SUBJECTIVE AND OBJECTIVE BOX
Surgery POST-OP CHECK NOTE:    Procedure: Laparoscopic cholecystectomy    Subjective: Resting comfortably in bed. Pain controlled. No N/V, CP, or SOB.    Vital Signs Last 24 Hrs  T(C): 37 (05 Jun 2019 21:49), Max: 37.3 (04 Jun 2019 23:35)  T(F): 98.6 (05 Jun 2019 21:49), Max: 99.1 (04 Jun 2019 23:35)  HR: 88 (05 Jun 2019 21:49) (73 - 104)  BP: 103/66 (05 Jun 2019 21:49) (93/64 - 122/81)  BP(mean): 79 (05 Jun 2019 19:15) (72 - 90)  RR: 18 (05 Jun 2019 21:49) (5 - 24)  SpO2: 95% (05 Jun 2019 21:49) (95% - 100%)  I&O's Summary    04 Jun 2019 07:01  -  05 Jun 2019 07:00  --------------------------------------------------------  IN: 1100 mL / OUT: 0 mL / NET: 1100 mL    05 Jun 2019 07:01  -  05 Jun 2019 22:25  --------------------------------------------------------  IN: 775 mL / OUT: 450 mL / NET: 325 mL                            13.7   5.30  )-----------( 154      ( 05 Jun 2019 05:55 )             41.4     06-05    139  |  101  |  12  ----------------------------<  87  3.9   |  29  |  1.10    Ca    8.7      05 Jun 2019 05:55  Phos  3.6     06-05  Mg     2.1     06-05    TPro  7.5  /  Alb  4.2  /  TBili  0.4  /  DBili  x   /  AST  36  /  ALT  50<H>  /  AlkPhos  55  06-04   PT/INR - ( 05 Jun 2019 05:55 )   PT: 13.0 SEC;   INR: 1.14          PTT - ( 05 Jun 2019 05:55 )  PTT:33.6 SEC    PHYSICAL EXAM:  Gen: NAD, well-developed  Neuro: AAOX3, PERRL, CNII-XII grossly intact  CV: Pulse regularly present  Pulm: normal respiratory effort  GI: abd soft, nontender, nondistended  Ext: 2+ pulses throughout Surgery POST-OP CHECK NOTE:    Procedure: Laparoscopic cholecystectomy    Subjective: Resting comfortably in bed. Pain controlled. No N/V, CP, or SOB.    Vital Signs Last 24 Hrs  T(C): 37 (05 Jun 2019 21:49), Max: 37.3 (04 Jun 2019 23:35)  T(F): 98.6 (05 Jun 2019 21:49), Max: 99.1 (04 Jun 2019 23:35)  HR: 88 (05 Jun 2019 21:49) (73 - 104)  BP: 103/66 (05 Jun 2019 21:49) (93/64 - 122/81)  BP(mean): 79 (05 Jun 2019 19:15) (72 - 90)  RR: 18 (05 Jun 2019 21:49) (5 - 24)  SpO2: 95% (05 Jun 2019 21:49) (95% - 100%)  I&O's Summary    04 Jun 2019 07:01  -  05 Jun 2019 07:00  --------------------------------------------------------  IN: 1100 mL / OUT: 0 mL / NET: 1100 mL    05 Jun 2019 07:01  -  05 Jun 2019 22:25  --------------------------------------------------------  IN: 775 mL / OUT: 450 mL / NET: 325 mL                            13.7   5.30  )-----------( 154      ( 05 Jun 2019 05:55 )             41.4     06-05    139  |  101  |  12  ----------------------------<  87  3.9   |  29  |  1.10    Ca    8.7      05 Jun 2019 05:55  Phos  3.6     06-05  Mg     2.1     06-05    TPro  7.5  /  Alb  4.2  /  TBili  0.4  /  DBili  x   /  AST  36  /  ALT  50<H>  /  AlkPhos  55  06-04   PT/INR - ( 05 Jun 2019 05:55 )   PT: 13.0 SEC;   INR: 1.14          PTT - ( 05 Jun 2019 05:55 )  PTT:33.6 SEC    PHYSICAL EXAM:  Gen: NAD, well-developed  Neuro: AAOX3, PERRL, CNII-XII grossly intact  CV: Pulse regularly present  Pulm: normal respiratory effort  GI: abd soft, appropriately tender, port sites w/ dermabond  Ext: 2+ pulses throughout

## 2019-06-05 NOTE — CHART NOTE - NSCHARTNOTEFT_GEN_A_CORE
Spoke to pt's oncologist, Dr. Neo Chirinos (cell [phone]) regarding OR planning for lap sen in light of pt's testicular cancer treatment/prognosis.  Per oncologist, pt has been disease free since chemo was stopped about 6months ago.  Also, per oncologist, pt's cardiac issues were related to insertion of infusion port.  If EF is normal (which is 55% from TTE done yesterday), pt's oncologist finds to contraindications to surgery and believes pt should tolerate surg.

## 2019-06-05 NOTE — PROGRESS NOTE ADULT - SUBJECTIVE AND OBJECTIVE BOX
Patient seen and examined at bedside.    Overnight Events:     Review Of Systems: No chest pain, shortness of breath, or palpitations            Current Meds:  carvedilol 12.5 milliGRAM(s) Oral every 12 hours  cefoTEtan  IVPB      cefoTEtan  IVPB 2 Gram(s) IV Intermittent every 12 hours  enoxaparin Injectable 40 milliGRAM(s) SubCutaneous daily  lactated ringers. 1000 milliLiter(s) IV Continuous <Continuous>  morphine  - Injectable 2 milliGRAM(s) IV Push every 4 hours PRN  sacubitril 24 mG/valsartan 26 mG 1 Tablet(s) Oral two times a day      Vitals:  T(F): 98.3 (06-05), Max: 99.9 (06-04)  HR: 78 (06-05) (73 - 86)  BP: 93/64 (06-05) (93/64 - 119/74)  RR: 16 (06-05)  SpO2: 96% (06-05)  I&O's Summary    04 Jun 2019 07:01  -  05 Jun 2019 07:00  --------------------------------------------------------  IN: 1100 mL / OUT: 0 mL / NET: 1100 mL    05 Jun 2019 07:01  -  05 Jun 2019 11:37  --------------------------------------------------------  IN: 500 mL / OUT: 450 mL / NET: 50 mL        Physical Exam:  Appearance: No acute distress; well appearing, obese  HEENT:  EOMI, sclera anicteric, Normal oral mucosa  Cardiovascular: RRR, S1, S2, no murmurs, rubs, or gallops; no edema; no JVD  Respiratory: Clear to auscultation bilaterally, no wheezes, rales, rhonchi  Gastrointestinal: soft, non-tender, non-distended with normal bowel sounds  Musculoskeletal: No clubbing; no joint deformity   Neurologic: Non-focal  Lymphatic: No lymphadenopathy  Psychiatry: AAOx3, mood & affect appropriate  Skin: No rashes, ecchymoses, or cyanosis                          13.7   5.30  )-----------( 154      ( 05 Jun 2019 05:55 )             41.4     06-05    139  |  101  |  12  ----------------------------<  87  3.9   |  29  |  1.10    Ca    8.7      05 Jun 2019 05:55  Phos  3.6     06-05  Mg     2.1     06-05    TPro  7.5  /  Alb  4.2  /  TBili  0.4  /  DBili  x   /  AST  36  /  ALT  50<H>  /  AlkPhos  55  06-04    PT/INR - ( 05 Jun 2019 05:55 )   PT: 13.0 SEC;   INR: 1.14     PTT - ( 05 Jun 2019 05:55 )  PTT:33.6 SEC      Echo:  6/4/19  CONCLUSIONS:  1. Normal mitral valve. Minimal mitral regurgitation.  2. Normal left ventricular internal dimensions and wall  thicknesses.  3. Endocardium not well visualized; grossly low normal left  ventricular systolic function. Endocardial visualization  enhanced with intravenous injection of echo contrast  (Definity).  4. Unable to accurately evaluate right ventricular size or  systolic function.      cMRI 3/14/18  IMPRESSION:    1.  The right atrial mass which is likely in continuity with the franklin   terminalis has decreased in size since 10/31/2017. This could represent   thrombus or tumor rather than a conspicuous franklin terminalis. A 3 month   follow-up is recommended to evaluate for stability or resolution.

## 2019-06-05 NOTE — PROGRESS NOTE ADULT - ASSESSMENT
39 yo M with a PMH AF/AFl afib/aflutter s/p successful DCCV (in sinus since 2/2018), NICM (2018 EF 25-45%), RA mass (likely thrombus over metastasis), metastatic testicular cancer s/p resection + partial chemo (no further chemo due to adverse effects), and acute cholecystitis pending pre-op clearance for likely lap cholecystectomy.      #Pre-operative evaluation  No signs or symptoms of ACS, ADHF, unstable arrhythmia or severe valvular disease.  TTE with mildly depressed EF, no RA mass.  - can proceed with surgery      #RA mass  TTE without RA mass. TTE Dec 2018 also without RA mass. Mass last documented on TTE July 2018.    #NICM  - continue home carvedilol 12.5mg PO BID, Entresto 24-26 BID      Jim Jansen MD  Cardiology Fellow  [phone]  All Cardiology service information can be found 24/7 on [email or web address], password: [email or web address]

## 2019-06-05 NOTE — PROGRESS NOTE ADULT - ASSESSMENT
38M with hx of metastatic testicular cancer, chemo induced cardiomyopathy, presenting with acute cholecystitis. Now s/p lap sen on 6/5.    Plan  - Pain control: IV Tylenol, Oxy IR 5&10mg PO q4h PRN, morphine 2mg IV push q4h PRN  - Clear liquid diet      - advance as tolerated  - Cefotetan abx for 24hr post op (till 6/6 evening)  - DVT ppx: Lovenox 38M with hx of metastatic testicular cancer, chemo induced cardiomyopathy, presenting with acute cholecystitis. Now s/p lap sen on 6/5.    Plan  - Pain control: IV Tylenol, Oxy IR 5&10mg PO q4h PRN, morphine 2mg IV push q4h PRN  - Clear liquid diet      - advance as tolerated  - Cefotetan abx for 24hr post op (till 6/6 evening)  - Hold Entresto until 6/6  - DVT ppx: Lovenox

## 2019-06-05 NOTE — PROGRESS NOTE ADULT - SUBJECTIVE AND OBJECTIVE BOX
GENERAL SURGERY DAILY PROGRESS NOTE:     Subjective:  Pt seen and examined. No acute events overnight. Pain controlled. Plan for surgery today. Has been cleared by cardiology.     Objective:    MEDICATIONS  (STANDING):  carvedilol 12.5 milliGRAM(s) Oral every 12 hours  cefoTEtan  IVPB      cefoTEtan  IVPB 2 Gram(s) IV Intermittent every 12 hours  enoxaparin Injectable 40 milliGRAM(s) SubCutaneous daily  lactated ringers. 1000 milliLiter(s) (125 mL/Hr) IV Continuous <Continuous>  sacubitril 24 mG/valsartan 26 mG 1 Tablet(s) Oral two times a day    MEDICATIONS  (PRN):  morphine  - Injectable 2 milliGRAM(s) IV Push every 4 hours PRN Moderate Pain (4 - 6)      Vital Signs Last 24 Hrs  T(C): 37.3 (2019 23:35), Max: 37.7 (2019 21:45)  T(F): 99.1 (2019 23:35), Max: 99.9 (2019 21:45)  HR: 86 (2019 23:35) (70 - 86)  BP: 108/71 (2019 23:35) (104/69 - 122/70)  BP(mean): --  RR: 16 (2019 23:35) (16 - 20)  SpO2: 100% (2019 23:35) (98% - 100%)    I&O's Detail    PHYSICAL EXAM  NAD, awake and alert  Respirations nonlabored  Abdomen soft, tender in RUQ, nondistended  No guarding or rebound tenderness      Daily     Daily     LABS:                        14.9   11.38 )-----------( 201      ( 2019 04:10 )             44.2     06-04    140  |  102  |  16  ----------------------------<  104<H>  4.6   |  25  |  1.06    Ca    9.1      2019 04:10    TPro  7.5  /  Alb  4.2  /  TBili  0.4  /  DBili  x   /  AST  36  /  ALT  50<H>  /  AlkPhos  55  06-04    PT/INR - ( 2019 04:10 )   PT: 11.9 SEC;   INR: 1.07          PTT - ( 2019 04:10 )  PTT:31.9 SEC  Urinalysis Basic - ( 2019 19:00 )    Color: LIGHT YELLOW / Appearance: CLEAR / S.020 / pH: 7.5  Gluc: NEGATIVE / Ketone: NEGATIVE  / Bili: NEGATIVE / Urobili: NORMAL   Blood: NEGATIVE / Protein: 10 / Nitrite: NEGATIVE   Leuk Esterase: NEGATIVE / RBC: x / WBC x   Sq Epi: x / Non Sq Epi: x / Bacteria: x        RADIOLOGY & ADDITIONAL STUDIES:

## 2019-06-05 NOTE — PROGRESS NOTE ADULT - ASSESSMENT
Patient is a 38M with hx of metastatic testicular cancer, chemo induced cardiomyopathy, presenting with acute cholecystitis   - preoped and consented  - added on to OR  - NPO/IVF  - IV abx  - cardiac cleared for OR    [id number]

## 2019-06-05 NOTE — BRIEF OPERATIVE NOTE - OPERATION/FINDINGS
Diagnostic laparoscopy  Inflamed gallbladder with pericholecystic fat. Cystic duct and artery identified, clips applied and transected.  Gallbladder removed in its entirety Diagnostic laparoscopy.  Inflamed gallbladder with pericholecystic fat. Cystic duct and artery identified, clips applied to each and each transected.  Gallbladder removed in its entirety using 4 port sites.

## 2019-06-06 ENCOUNTER — TRANSCRIPTION ENCOUNTER (OUTPATIENT)
Age: 39
End: 2019-06-06

## 2019-06-06 VITALS
TEMPERATURE: 98 F | OXYGEN SATURATION: 98 % | SYSTOLIC BLOOD PRESSURE: 110 MMHG | DIASTOLIC BLOOD PRESSURE: 72 MMHG | HEART RATE: 80 BPM | RESPIRATION RATE: 16 BRPM

## 2019-06-06 LAB
ALBUMIN SERPL ELPH-MCNC: 3.7 G/DL — SIGNIFICANT CHANGE UP (ref 3.3–5)
ALP SERPL-CCNC: 46 U/L — SIGNIFICANT CHANGE UP (ref 40–120)
ALT FLD-CCNC: 47 U/L — HIGH (ref 4–41)
ANION GAP SERPL CALC-SCNC: 9 MMO/L — SIGNIFICANT CHANGE UP (ref 7–14)
ANION GAP SERPL CALC-SCNC: 9 MMO/L — SIGNIFICANT CHANGE UP (ref 7–14)
AST SERPL-CCNC: 40 U/L — SIGNIFICANT CHANGE UP (ref 4–40)
BILIRUB DIRECT SERPL-MCNC: < 0.2 MG/DL — SIGNIFICANT CHANGE UP (ref 0.1–0.2)
BILIRUB SERPL-MCNC: 0.3 MG/DL — SIGNIFICANT CHANGE UP (ref 0.2–1.2)
BUN SERPL-MCNC: 16 MG/DL — SIGNIFICANT CHANGE UP (ref 7–23)
BUN SERPL-MCNC: 16 MG/DL — SIGNIFICANT CHANGE UP (ref 7–23)
CALCIUM SERPL-MCNC: 8.5 MG/DL — SIGNIFICANT CHANGE UP (ref 8.4–10.5)
CALCIUM SERPL-MCNC: 8.5 MG/DL — SIGNIFICANT CHANGE UP (ref 8.4–10.5)
CHLORIDE SERPL-SCNC: 100 MMOL/L — SIGNIFICANT CHANGE UP (ref 98–107)
CHLORIDE SERPL-SCNC: 100 MMOL/L — SIGNIFICANT CHANGE UP (ref 98–107)
CO2 SERPL-SCNC: 27 MMOL/L — SIGNIFICANT CHANGE UP (ref 22–31)
CO2 SERPL-SCNC: 27 MMOL/L — SIGNIFICANT CHANGE UP (ref 22–31)
CREAT SERPL-MCNC: 1 MG/DL — SIGNIFICANT CHANGE UP (ref 0.5–1.3)
CREAT SERPL-MCNC: 1 MG/DL — SIGNIFICANT CHANGE UP (ref 0.5–1.3)
GLUCOSE SERPL-MCNC: 132 MG/DL — HIGH (ref 70–99)
GLUCOSE SERPL-MCNC: 132 MG/DL — HIGH (ref 70–99)
HCT VFR BLD CALC: 38.7 % — LOW (ref 39–50)
HGB BLD-MCNC: 13.2 G/DL — SIGNIFICANT CHANGE UP (ref 13–17)
MAGNESIUM SERPL-MCNC: 2 MG/DL — SIGNIFICANT CHANGE UP (ref 1.6–2.6)
MCHC RBC-ENTMCNC: 32.5 PG — SIGNIFICANT CHANGE UP (ref 27–34)
MCHC RBC-ENTMCNC: 34.1 % — SIGNIFICANT CHANGE UP (ref 32–36)
MCV RBC AUTO: 95.3 FL — SIGNIFICANT CHANGE UP (ref 80–100)
NRBC # FLD: 0 K/UL — SIGNIFICANT CHANGE UP (ref 0–0)
PHOSPHATE SERPL-MCNC: 2.5 MG/DL — SIGNIFICANT CHANGE UP (ref 2.5–4.5)
PLATELET # BLD AUTO: 164 K/UL — SIGNIFICANT CHANGE UP (ref 150–400)
PMV BLD: 9.5 FL — SIGNIFICANT CHANGE UP (ref 7–13)
POTASSIUM SERPL-MCNC: 4.6 MMOL/L — SIGNIFICANT CHANGE UP (ref 3.5–5.3)
POTASSIUM SERPL-MCNC: 4.6 MMOL/L — SIGNIFICANT CHANGE UP (ref 3.5–5.3)
POTASSIUM SERPL-SCNC: 4.6 MMOL/L — SIGNIFICANT CHANGE UP (ref 3.5–5.3)
POTASSIUM SERPL-SCNC: 4.6 MMOL/L — SIGNIFICANT CHANGE UP (ref 3.5–5.3)
PROT SERPL-MCNC: 7.1 G/DL — SIGNIFICANT CHANGE UP (ref 6–8.3)
RBC # BLD: 4.06 M/UL — LOW (ref 4.2–5.8)
RBC # FLD: 12.1 % — SIGNIFICANT CHANGE UP (ref 10.3–14.5)
SODIUM SERPL-SCNC: 136 MMOL/L — SIGNIFICANT CHANGE UP (ref 135–145)
SODIUM SERPL-SCNC: 136 MMOL/L — SIGNIFICANT CHANGE UP (ref 135–145)
WBC # BLD: 8.14 K/UL — SIGNIFICANT CHANGE UP (ref 3.8–10.5)
WBC # FLD AUTO: 8.14 K/UL — SIGNIFICANT CHANGE UP (ref 3.8–10.5)

## 2019-06-06 PROCEDURE — 99232 SBSQ HOSP IP/OBS MODERATE 35: CPT | Mod: GC

## 2019-06-06 RX ADMIN — Medication 400 MILLIGRAM(S): at 11:14

## 2019-06-06 RX ADMIN — Medication 1000 MILLIGRAM(S): at 12:14

## 2019-06-06 RX ADMIN — Medication 1000 MILLIGRAM(S): at 05:13

## 2019-06-06 RX ADMIN — Medication 100 GRAM(S): at 11:15

## 2019-06-06 RX ADMIN — ENOXAPARIN SODIUM 40 MILLIGRAM(S): 100 INJECTION SUBCUTANEOUS at 11:15

## 2019-06-06 RX ADMIN — Medication 400 MILLIGRAM(S): at 04:43

## 2019-06-06 NOTE — PROGRESS NOTE ADULT - SUBJECTIVE AND OBJECTIVE BOX
Patient seen and examined at bedside.    Overnight Events: s/p lap sen, tolerated procedure well, no complaints. Unable to fit in MRI machine this am.      REVIEW OF SYSTEMS:  CONSTITUTIONAL: No weakness, fevers or chills  EYES/ENT: No visual changes;  No dysphagia  NECK: No pain or stiffness  RESPIRATORY: No cough, wheezing, hemoptysis; No shortness of breath  CARDIOVASCULAR: No chest pain or palpitations; No lower extremity edema  GASTROINTESTINAL: No abdominal or epigastric pain. No nausea, vomiting, or hematemesis; No diarrhea or constipation. No melena or hematochezia.  BACK: No back pain  GENITOURINARY: No dysuria, frequency or hematuria  NEUROLOGICAL: No numbness or weakness  SKIN: No itching, burning, rashes, or lesions   All other review of systems is negative unless indicated above.        Current Meds:  carvedilol 12.5 milliGRAM(s) Oral every 12 hours  cefoTEtan  IVPB      cefoTEtan  IVPB 2 Gram(s) IV Intermittent every 12 hours  enoxaparin Injectable 40 milliGRAM(s) SubCutaneous daily  morphine  - Injectable 2 milliGRAM(s) IV Push every 4 hours PRN  oxyCODONE    IR 5 milliGRAM(s) Oral every 4 hours PRN  oxyCODONE    IR 10 milliGRAM(s) Oral every 4 hours PRN  sacubitril 24 mG/valsartan 26 mG 1 Tablet(s) Oral two times a day      Vitals:  T(F): 98.2 (06-06), Max: 98.6 (06-05)  HR: 88 (06-06) (76 - 104)  BP: 104/71 (06-06) (95/61 - 122/81)  RR: 18 (06-06)  SpO2: 97% (06-06)  I&O's Summary    05 Jun 2019 07:01  -  06 Jun 2019 07:00  --------------------------------------------------------  IN: 2925 mL / OUT: 1950 mL / NET: 975 mL    06 Jun 2019 07:01  -  06 Jun 2019 11:24  --------------------------------------------------------  IN: 0 mL / OUT: 100 mL / NET: -100 mL        Physical Exam:  Appearance: No acute distress; well appearing, obese  HEENT:  EOMI, sclera anicteric, Normal oral mucosa  Cardiovascular: RRR, S1, S2, no murmurs, rubs, or gallops; no edema; no JVD  Respiratory: Clear to auscultation bilaterally, no wheezes, rales, rhonchi  Gastrointestinal: soft, non-tender, non-distended with normal bowel sounds  Musculoskeletal: No clubbing; no joint deformity   Neurologic: Non-focal  Lymphatic: No lymphadenopathy  Psychiatry: AAOx3, mood & affect appropriate  Skin: No rashes, ecchymoses, or cyanosis                                     13.2   8.14  )-----------( 164      ( 06 Jun 2019 06:15 )             38.7     06-06    136  |  100  |  16  ----------------------------<  132<H>  4.6   |  27  |  1.00    Ca    8.5      06 Jun 2019 06:15  Phos  2.5     06-06  Mg     2.0     06-06    TPro  7.1  /  Alb  3.7  /  TBili  0.3  /  DBili  < 0.2  /  AST  40  /  ALT  47<H>  /  AlkPhos  46  06-06    PT/INR - ( 05 Jun 2019 05:55 )   PT: 13.0 SEC;   INR: 1.14     PTT - ( 05 Jun 2019 05:55 )  PTT:33.6 SEC        Echo:  6/4/19  CONCLUSIONS:  1. Normal mitral valve. Minimal mitral regurgitation.  2. Normal left ventricular internal dimensions and wall  thicknesses.  3. Endocardium not well visualized; grossly low normal left  ventricular systolic function. Endocardial visualization  enhanced with intravenous injection of echo contrast  (Definity).  4. Unable to accurately evaluate right ventricular size or  systolic function.      cMRI 3/14/18  IMPRESSION:    1.  The right atrial mass which is likely in continuity with the franklin   terminalis has decreased in size since 10/31/2017. This could represent   thrombus or tumor rather than a conspicuous franklin terminalis. A 3 month   follow-up is recommended to evaluate for stability or resolution.

## 2019-06-06 NOTE — DISCHARGE NOTE PROVIDER - NSDCACTIVITY_GEN_ALL_CORE
Walking - Outdoors allowed/Do not drive or operate machinery/Walking - Indoors allowed/Showering allowed/No heavy lifting/straining/Stairs allowed/Do not make important decisions

## 2019-06-06 NOTE — DISCHARGE NOTE NURSING/CASE MANAGEMENT/SOCIAL WORK - NSDCPEWEB_GEN_ALL_CORE
[facility] for Tobacco Control website --- [email or web address] website --- [email or web address]

## 2019-06-06 NOTE — DISCHARGE NOTE NURSING/CASE MANAGEMENT/SOCIAL WORK - NSDCPNINST_GEN_ALL_CORE
Watch for signs of infection; redness, swelling, fever, chills or heat, report such symptoms to the MD. No driving while taking pain medication, it causes drowsiness & constipation. Drink 6-8 glasses of fluids daily to promote hydration. No heavy lifting, pulling or pushing heavy objects. Follow up with the MD.

## 2019-06-06 NOTE — PROGRESS NOTE ADULT - ASSESSMENT
38M with hx of metastatic testicular cancer, chemo induced cardiomyopathy, presenting with acute cholecystitis. Now s/p lap sen on 6/5.    Plan  - Pain control prn  - Regular diet  - DVT ppx: Lovenox  - oob/ambulate  - Dispo: d/c home today if continues to recover appropriately    B Surgery  [id number]

## 2019-06-06 NOTE — DISCHARGE NOTE NURSING/CASE MANAGEMENT/SOCIAL WORK - NSDCDPATPORTLINK_GEN_ALL_CORE
You can access the [facility] Patient Portal, offered by [facility], by registering with the following website: [email or web address]

## 2019-06-06 NOTE — DISCHARGE NOTE PROVIDER - CARE PROVIDER_API CALL
Lorna Aleman (MD)  Surgery  [address], [address] [id number]  Phone: [phone]  Fax: [phone]  Follow Up Time: 1 week

## 2019-06-06 NOTE — PROGRESS NOTE ADULT - ASSESSMENT
37 yo M with a PMH AF/AFl afib/aflutter s/p successful DCCV (in sinus since 2/2018), NICM (2018 EF 25-45%), RA mass (likely thrombus over metastasis), metastatic testicular cancer s/p resection + partial chemo (no further chemo due to adverse effects), and acute cholecystitis pending pre-op clearance for likely lap cholecystectomy.      #RA mass  TTE without RA mass. TTE Dec 2018 also without RA mass. Mass last documented on TTE July 2018.  - unable to perform cMRI. Discussed with patient's outpatient cardiologist Dr Cleve Marquez, ok to hold off on MRI and d/c home    #NICM  - continue home carvedilol 12.5mg PO BID, Entresto 24-26 BID    Stable for d/c home from cardiac standpoint.  Cardiology will sign off at this time, Please call with any questions      Jim Jansen MD  Cardiology Fellow  [phone]  All Cardiology service information can be found 24/7 on [email or web address], password: [email or web address]

## 2019-06-06 NOTE — PROGRESS NOTE ADULT - SUBJECTIVE AND OBJECTIVE BOX
General Surgery Progress Note    SUBJECTIVE:  The patient was seen and examined. No acute events overnight.     OBJECTIVE:     ** VITAL SIGNS / I&O's **    Vital Signs Last 24 Hrs  T(C): 36.8 (2019 11:13), Max: 37 (2019 18:40)  T(F): 98.2 (2019 11:13), Max: 98.6 (2019 18:40)  HR: 88 (:13) (76 - 104)  BP: 104/71 (:13) (95/61 - 122/81)  BP(mean): 79 (2019 19:15) (72 - 90)  RR: 18 (:13) (5 - 24)  SpO2: 97% (:13) (95% - 100%)      2019 07:01  -  2019 07:00  --------------------------------------------------------  IN:    IV PiggyBack: 250 mL    lactated ringers.: 2185 mL    Oral Fluid: 490 mL  Total IN: 2925 mL    OUT:    Voided: 1950 mL  Total OUT: 1950 mL    Total NET: 975 mL      2019 07:01  -  2019 11:15  --------------------------------------------------------  IN:  Total IN: 0 mL    OUT:    Voided: 100 mL  Total OUT: 100 mL    Total NET: -100 mL          ** PHYSICAL EXAM **    -- CONSTITUTIONAL: Alert, NAD  -- PULMONARY: non-labored respirations  -- ABDOMEN: soft, non-distended, non-tender  -- NEURO: A&Ox3    ** LABS **                          13.2   8.14  )-----------( 164      ( 2019 06:15 )             38.7     2019 06:15    136    |  100    |  16     ----------------------------<  132    4.6     |  27     |  1.00     Ca    8.5        2019 06:15  Phos  2.5       2019 06:15  Mg     2.0       2019 06:15    TPro  7.1    /  Alb  3.7    /  TBili  0.3    /  DBili  < 0.2  /  AST  40     /  ALT  47     /  AlkPhos  46     2019 06:15    PT/INR - ( 2019 05:55 )   PT: 13.0 SEC;   INR: 1.14          PTT - ( 2019 05:55 )  PTT:33.6 SEC  CAPILLARY BLOOD GLUCOSE            LIVER FUNCTIONS - ( 2019 06:15 )  Alb: 3.7 g/dL / Pro: 7.1 g/dL / ALK PHOS: 46 u/L / ALT: 47 u/L / AST: 40 u/L / GGT: x             Urinalysis Basic - ( 2019 19:00 )    Color: LIGHT YELLOW / Appearance: CLEAR / S.020 / pH: 7.5  Gluc: NEGATIVE / Ketone: NEGATIVE  / Bili: NEGATIVE / Urobili: NORMAL   Blood: NEGATIVE / Protein: 10 / Nitrite: NEGATIVE   Leuk Esterase: NEGATIVE / RBC: x / WBC x   Sq Epi: x / Non Sq Epi: x / Bacteria: x        MEDICATIONS  (STANDING):  acetaminophen  IVPB .. 1000 milliGRAM(s) IV Intermittent once  carvedilol 12.5 milliGRAM(s) Oral every 12 hours  cefoTEtan  IVPB      cefoTEtan  IVPB 2 Gram(s) IV Intermittent every 12 hours  enoxaparin Injectable 40 milliGRAM(s) SubCutaneous daily  sacubitril 24 mG/valsartan 26 mG 1 Tablet(s) Oral two times a day    MEDICATIONS  (PRN):  morphine  - Injectable 2 milliGRAM(s) IV Push every 4 hours PRN Moderate Pain (4 - 6)  oxyCODONE    IR 5 milliGRAM(s) Oral every 4 hours PRN Moderate Pain (4 - 6)  oxyCODONE    IR 10 milliGRAM(s) Oral every 4 hours PRN Severe Pain (7 - 10)

## 2019-06-06 NOTE — DISCHARGE NOTE NURSING/CASE MANAGEMENT/SOCIAL WORK - NSDCPEEMAIL_GEN_ALL_CORE
[facility] email [email or web address]

## 2019-06-06 NOTE — DISCHARGE NOTE PROVIDER - HOSPITAL COURSE
38M with hx of metastatic testicular cancer, chemo induced cardiomyopathy, presenting with RUQ abdominal pain diagnosed with acute cholecystitis.     Taken to the OR on 6/5 and underwent a lap sen. Post op course was stable.    Continued IV antibiotics until 6/6 pm then discharged home on 6/6 with out pt follow up .         To follow up with Dr. Amaya as out patient in 1-2 weeks 38M with hx of metastatic testicular cancer, chemo induced cardiomyopathy, presenting with RUQ abdominal pain diagnosed with acute cholecystitis.     Taken to the OR on 6/5 and underwent a lap sen. Post op course was stable.    Continued IV antibiotics until 6/6 pm then discharged home on 6/6 with out pt follow up .         To follow up with Dr. Manzo as out patient in 1-2 weeks

## 2019-06-07 ENCOUNTER — OTHER (OUTPATIENT)
Age: 39
End: 2019-06-07

## 2019-06-11 ENCOUNTER — EMERGENCY (EMERGENCY)
Facility: HOSPITAL | Age: 39
LOS: 1 days | Discharge: HOME | End: 2019-06-11
Attending: EMERGENCY MEDICINE | Admitting: STUDENT IN AN ORGANIZED HEALTH CARE EDUCATION/TRAINING PROGRAM
Payer: MEDICAID

## 2019-06-11 VITALS
SYSTOLIC BLOOD PRESSURE: 98 MMHG | TEMPERATURE: 98 F | DIASTOLIC BLOOD PRESSURE: 66 MMHG | RESPIRATION RATE: 17 BRPM | HEART RATE: 79 BPM | OXYGEN SATURATION: 98 %

## 2019-06-11 DIAGNOSIS — Z95.828 PRESENCE OF OTHER VASCULAR IMPLANTS AND GRAFTS: Chronic | ICD-10-CM

## 2019-06-11 DIAGNOSIS — Z98.890 OTHER SPECIFIED POSTPROCEDURAL STATES: Chronic | ICD-10-CM

## 2019-06-11 DIAGNOSIS — Z90.79 ACQUIRED ABSENCE OF OTHER GENITAL ORGAN(S): Chronic | ICD-10-CM

## 2019-06-11 LAB
ALBUMIN SERPL ELPH-MCNC: 3.5 G/DL — SIGNIFICANT CHANGE UP (ref 3.3–5)
ALBUMIN SERPL ELPH-MCNC: 4.2 G/DL — SIGNIFICANT CHANGE UP (ref 3.3–5)
ALP SERPL-CCNC: 52 U/L — SIGNIFICANT CHANGE UP (ref 40–120)
ALP SERPL-CCNC: 59 U/L — SIGNIFICANT CHANGE UP (ref 40–120)
ALT FLD-CCNC: 78 U/L — HIGH (ref 4–41)
ALT FLD-CCNC: 81 U/L — HIGH (ref 4–41)
ANION GAP SERPL CALC-SCNC: 10 MMO/L — SIGNIFICANT CHANGE UP (ref 7–14)
ANION GAP SERPL CALC-SCNC: 13 MMO/L — SIGNIFICANT CHANGE UP (ref 7–14)
APPEARANCE UR: CLEAR — SIGNIFICANT CHANGE UP
AST SERPL-CCNC: 53 U/L — HIGH (ref 4–40)
AST SERPL-CCNC: 56 U/L — HIGH (ref 4–40)
BASE EXCESS BLDV CALC-SCNC: 0 MMOL/L — SIGNIFICANT CHANGE UP
BASE EXCESS BLDV CALC-SCNC: 2 MMOL/L — SIGNIFICANT CHANGE UP
BASOPHILS # BLD AUTO: 0.05 K/UL — SIGNIFICANT CHANGE UP (ref 0–0.2)
BASOPHILS NFR BLD AUTO: 0.6 % — SIGNIFICANT CHANGE UP (ref 0–2)
BILIRUB SERPL-MCNC: 0.4 MG/DL — SIGNIFICANT CHANGE UP (ref 0.2–1.2)
BILIRUB SERPL-MCNC: 0.5 MG/DL — SIGNIFICANT CHANGE UP (ref 0.2–1.2)
BILIRUB UR-MCNC: NEGATIVE — SIGNIFICANT CHANGE UP
BLOOD GAS VENOUS - CREATININE: SIGNIFICANT CHANGE UP MG/DL (ref 0.5–1.3)
BLOOD GAS VENOUS - CREATININE: SIGNIFICANT CHANGE UP MG/DL (ref 0.5–1.3)
BLOOD GAS VENOUS - FIO2: 21 — SIGNIFICANT CHANGE UP
BLOOD UR QL VISUAL: NEGATIVE — SIGNIFICANT CHANGE UP
BUN SERPL-MCNC: 14 MG/DL — SIGNIFICANT CHANGE UP (ref 7–23)
BUN SERPL-MCNC: 16 MG/DL — SIGNIFICANT CHANGE UP (ref 7–23)
CALCIUM SERPL-MCNC: 8.8 MG/DL — SIGNIFICANT CHANGE UP (ref 8.4–10.5)
CALCIUM SERPL-MCNC: 9.4 MG/DL — SIGNIFICANT CHANGE UP (ref 8.4–10.5)
CHLORIDE BLDV-SCNC: 106 MMOL/L — SIGNIFICANT CHANGE UP (ref 96–108)
CHLORIDE BLDV-SCNC: 108 MMOL/L — SIGNIFICANT CHANGE UP (ref 96–108)
CHLORIDE SERPL-SCNC: 104 MMOL/L — SIGNIFICANT CHANGE UP (ref 98–107)
CHLORIDE SERPL-SCNC: 104 MMOL/L — SIGNIFICANT CHANGE UP (ref 98–107)
CO2 SERPL-SCNC: 21 MMOL/L — LOW (ref 22–31)
CO2 SERPL-SCNC: 23 MMOL/L — SIGNIFICANT CHANGE UP (ref 22–31)
COLOR SPEC: SIGNIFICANT CHANGE UP
CREAT SERPL-MCNC: 0.75 MG/DL — SIGNIFICANT CHANGE UP (ref 0.5–1.3)
CREAT SERPL-MCNC: 0.87 MG/DL — SIGNIFICANT CHANGE UP (ref 0.5–1.3)
EOSINOPHIL # BLD AUTO: 0.06 K/UL — SIGNIFICANT CHANGE UP (ref 0–0.5)
EOSINOPHIL NFR BLD AUTO: 0.7 % — SIGNIFICANT CHANGE UP (ref 0–6)
GAS PNL BLDV: 134 MMOL/L — LOW (ref 136–146)
GAS PNL BLDV: 138 MMOL/L — SIGNIFICANT CHANGE UP (ref 136–146)
GLUCOSE BLDV-MCNC: 107 MG/DL — HIGH (ref 70–99)
GLUCOSE BLDV-MCNC: 127 MG/DL — HIGH (ref 70–99)
GLUCOSE SERPL-MCNC: 118 MG/DL — HIGH (ref 70–99)
GLUCOSE SERPL-MCNC: 125 MG/DL — HIGH (ref 70–99)
GLUCOSE UR-MCNC: NEGATIVE — SIGNIFICANT CHANGE UP
HCO3 BLDV-SCNC: 24 MMOL/L — SIGNIFICANT CHANGE UP (ref 20–27)
HCO3 BLDV-SCNC: 25 MMOL/L — SIGNIFICANT CHANGE UP (ref 20–27)
HCT VFR BLD CALC: 42.3 % — SIGNIFICANT CHANGE UP (ref 39–50)
HCT VFR BLDV CALC: 42.6 % — SIGNIFICANT CHANGE UP (ref 39–51)
HCT VFR BLDV CALC: 42.9 % — SIGNIFICANT CHANGE UP (ref 39–51)
HGB BLD-MCNC: 14.2 G/DL — SIGNIFICANT CHANGE UP (ref 13–17)
HGB BLDV-MCNC: 13.9 G/DL — SIGNIFICANT CHANGE UP (ref 13–17)
HGB BLDV-MCNC: 14 G/DL — SIGNIFICANT CHANGE UP (ref 13–17)
IMM GRANULOCYTES NFR BLD AUTO: 1.6 % — HIGH (ref 0–1.5)
KETONES UR-MCNC: NEGATIVE — SIGNIFICANT CHANGE UP
LACTATE BLDV-MCNC: 2.1 MMOL/L — HIGH (ref 0.5–2)
LACTATE BLDV-MCNC: 2.6 MMOL/L — HIGH (ref 0.5–2)
LEUKOCYTE ESTERASE UR-ACNC: NEGATIVE — SIGNIFICANT CHANGE UP
LIDOCAIN IGE QN: 24.9 U/L — SIGNIFICANT CHANGE UP (ref 7–60)
LYMPHOCYTES # BLD AUTO: 1.99 K/UL — SIGNIFICANT CHANGE UP (ref 1–3.3)
LYMPHOCYTES # BLD AUTO: 23.3 % — SIGNIFICANT CHANGE UP (ref 13–44)
MAGNESIUM SERPL-MCNC: 2.2 MG/DL — SIGNIFICANT CHANGE UP (ref 1.6–2.6)
MCHC RBC-ENTMCNC: 32.3 PG — SIGNIFICANT CHANGE UP (ref 27–34)
MCHC RBC-ENTMCNC: 33.6 % — SIGNIFICANT CHANGE UP (ref 32–36)
MCV RBC AUTO: 96.1 FL — SIGNIFICANT CHANGE UP (ref 80–100)
MONOCYTES # BLD AUTO: 0.56 K/UL — SIGNIFICANT CHANGE UP (ref 0–0.9)
MONOCYTES NFR BLD AUTO: 6.6 % — SIGNIFICANT CHANGE UP (ref 2–14)
NEUTROPHILS # BLD AUTO: 5.74 K/UL — SIGNIFICANT CHANGE UP (ref 1.8–7.4)
NEUTROPHILS NFR BLD AUTO: 67.2 % — SIGNIFICANT CHANGE UP (ref 43–77)
NITRITE UR-MCNC: NEGATIVE — SIGNIFICANT CHANGE UP
NRBC # FLD: 0 K/UL — SIGNIFICANT CHANGE UP (ref 0–0)
PCO2 BLDV: 37 MMHG — LOW (ref 41–51)
PCO2 BLDV: 58 MMHG — HIGH (ref 41–51)
PH BLDV: 7.3 PH — LOW (ref 7.32–7.43)
PH BLDV: 7.43 PH — SIGNIFICANT CHANGE UP (ref 7.32–7.43)
PH UR: 7.5 — SIGNIFICANT CHANGE UP (ref 5–8)
PHOSPHATE SERPL-MCNC: 2.4 MG/DL — LOW (ref 2.5–4.5)
PLATELET # BLD AUTO: 201 K/UL — SIGNIFICANT CHANGE UP (ref 150–400)
PMV BLD: 9.8 FL — SIGNIFICANT CHANGE UP (ref 7–13)
PO2 BLDV: 50 MMHG — HIGH (ref 35–40)
PO2 BLDV: 52 MMHG — HIGH (ref 35–40)
POTASSIUM BLDV-SCNC: 4.3 MMOL/L — SIGNIFICANT CHANGE UP (ref 3.4–4.5)
POTASSIUM BLDV-SCNC: 6.1 MMOL/L — HIGH (ref 3.4–4.5)
POTASSIUM SERPL-MCNC: 5 MMOL/L — SIGNIFICANT CHANGE UP (ref 3.5–5.3)
POTASSIUM SERPL-MCNC: 5.2 MMOL/L — SIGNIFICANT CHANGE UP (ref 3.5–5.3)
POTASSIUM SERPL-SCNC: 5 MMOL/L — SIGNIFICANT CHANGE UP (ref 3.5–5.3)
POTASSIUM SERPL-SCNC: 5.2 MMOL/L — SIGNIFICANT CHANGE UP (ref 3.5–5.3)
PROT SERPL-MCNC: 6.9 G/DL — SIGNIFICANT CHANGE UP (ref 6–8.3)
PROT SERPL-MCNC: 7.7 G/DL — SIGNIFICANT CHANGE UP (ref 6–8.3)
PROT UR-MCNC: NEGATIVE — SIGNIFICANT CHANGE UP
RBC # BLD: 4.4 M/UL — SIGNIFICANT CHANGE UP (ref 4.2–5.8)
RBC # FLD: 12.4 % — SIGNIFICANT CHANGE UP (ref 10.3–14.5)
SAO2 % BLDV: 77.7 % — SIGNIFICANT CHANGE UP (ref 60–85)
SAO2 % BLDV: 86.8 % — HIGH (ref 60–85)
SODIUM SERPL-SCNC: 137 MMOL/L — SIGNIFICANT CHANGE UP (ref 135–145)
SODIUM SERPL-SCNC: 138 MMOL/L — SIGNIFICANT CHANGE UP (ref 135–145)
SP GR SPEC: 1.02 — SIGNIFICANT CHANGE UP (ref 1–1.04)
TROPONIN T, HIGH SENSITIVITY: < 6 NG/L — SIGNIFICANT CHANGE UP (ref ?–14)
UROBILINOGEN FLD QL: NORMAL — SIGNIFICANT CHANGE UP
WBC # BLD: 8.54 K/UL — SIGNIFICANT CHANGE UP (ref 3.8–10.5)
WBC # FLD AUTO: 8.54 K/UL — SIGNIFICANT CHANGE UP (ref 3.8–10.5)

## 2019-06-11 PROCEDURE — 99285 EMERGENCY DEPT VISIT HI MDM: CPT

## 2019-06-11 RX ORDER — ONDANSETRON 8 MG/1
4 TABLET, FILM COATED ORAL ONCE
Refills: 0 | Status: COMPLETED | OUTPATIENT
Start: 2019-06-11 | End: 2019-06-11

## 2019-06-11 RX ORDER — SODIUM CHLORIDE 9 MG/ML
1000 INJECTION INTRAMUSCULAR; INTRAVENOUS; SUBCUTANEOUS ONCE
Refills: 0 | Status: COMPLETED | OUTPATIENT
Start: 2019-06-11 | End: 2019-06-11

## 2019-06-11 RX ORDER — MECLIZINE HCL 12.5 MG
25 TABLET ORAL ONCE
Refills: 0 | Status: COMPLETED | OUTPATIENT
Start: 2019-06-11 | End: 2019-06-11

## 2019-06-11 RX ADMIN — Medication 25 MILLIGRAM(S): at 20:39

## 2019-06-11 RX ADMIN — ONDANSETRON 4 MILLIGRAM(S): 8 TABLET, FILM COATED ORAL at 13:13

## 2019-06-11 RX ADMIN — SODIUM CHLORIDE 1000 MILLILITER(S): 9 INJECTION INTRAMUSCULAR; INTRAVENOUS; SUBCUTANEOUS at 16:52

## 2019-06-11 RX ADMIN — SODIUM CHLORIDE 1000 MILLILITER(S): 9 INJECTION INTRAMUSCULAR; INTRAVENOUS; SUBCUTANEOUS at 13:14

## 2019-06-11 NOTE — CONSULT NOTE ADULT - SUBJECTIVE AND OBJECTIVE BOX
General Surgery Consult    Consulting surgical team: General Surgery  Consulting attending: Dr. Aleman    HPI:  38M PMH metastatic testicular cancer s/p orchiectomy (2017), ex lap with RP lymph node dissection and mass excision (2018), last chemo (iphosphamide, taxol, platin) 6 months ago, afib/aflutter s/p cardioversion, NICM (last EF 2/2018 45%), recent admission for acute cholecystitis s/p laparoscopic cholecystectomy (6/5), presenting with nausea/emesis with diarrhea. Patient was discharged home on 6/6 after cholecystectomy, states that he was feeling well at home until  yesterday night when he developed nausea followed by multiple episodes of NBNB emesis and diarrhea. Patient states that he occasionally has similar symptoms, however these usually follow sessions of chemotherapy (last chemo 6 months ago). Denies abdominal pain. Denies fevers/chills.    Upon arrival to [facility] ED, AVSS. Afebrile. Normotensive. WBC 8.54. T Bili wnl. Lactate 2.6.       PAST MEDICAL HISTORY:  Neuropathy  Anemia  Mixed germ cell tumor  Obesity  Atrial mass  Atrial flutter  Cardiomyopathy  Atrial fibrillation  Thrombus  Testicular cancer  Lumbar herniated disc  Testicular mass  No pertinent past medical history  No pertinent past medical history      PAST SURGICAL HISTORY:  History of abdominal surgery  History of abdominal surgery  History of cardioversion  Port-a-cath in place  History of orchiectomy  No significant past surgical history  No significant past surgical history  No significant past surgical history      MEDICATIONS:      ALLERGIES:  No Known Allergies      VITALS & I/Os:  Vital Signs Last 24 Hrs  T(C): 36.7 (11 Jun 2019 12:40), Max: 36.7 (11 Jun 2019 12:09)  T(F): 98.1 (11 Jun 2019 12:40), Max: 98.1 (11 Jun 2019 12:09)  HR: 88 (11 Jun 2019 12:40) (79 - 88)  BP: 101/64 (11 Jun 2019 12:40) (98/66 - 101/64)  BP(mean): --  RR: 18 (11 Jun 2019 12:40) (17 - 18)  SpO2: 97% (11 Jun 2019 12:40) (97% - 98%)    I&O's Summary      PHYSICAL EXAM:  General: Laying in bed, in no acute distress  Respiratory: Nonlabored  Abdominal: Soft, nondistended, nontender. Laparoscopic incisions c/d/i. Well healed midline incision.   Extremities: Warm    LABS:                        14.2   8.54  )-----------( 201      ( 11 Jun 2019 12:35 )             42.3     06-11    138  |  104  |  16  ----------------------------<  125<H>  5.0   |  21<L>  |  0.87    Ca    9.4      11 Jun 2019 12:35  Phos  2.4     06-11  Mg     2.2     06-11    TPro  7.7  /  Alb  4.2  /  TBili  0.5  /  DBili  x   /  AST  53<H>  /  ALT  81<H>  /  AlkPhos  59  06-11    Lactate:  06-11 @ 12:35  2.6                  IMAGING:

## 2019-06-11 NOTE — ED ADULT NURSE NOTE - OBJECTIVE STATEMENT
Pt arrived to room 5, accompanied by mother, A&OX4, pleasant, calm and cooperative. Pt c/o of vomiting that started this morning and bilateral leg pain. Pt states to have nausea, diarrhea, SOB, dizziness and weakness. Pt had cholecystectomy about 3 days ago, approximately 5 cm incision to surgical site, no redness/inflammation noted. Pt has hx of cardiomyopathy, neuropathy, testicular cancer that radiated to lymph node, last chemo treatment 6 months ago. Respirations even and unlabored, abdomen soft/ nontender, heal wound from previous surgery to abdomen. no peripheral edema noted, bilateral pulses present. Placed on cardiac monitor, sinus rhythm, 20 G IV on left AC, labs drawn and sent. Will continue to monitor. Pt arrived to room 5, accompanied by mother, A&OX4, pleasant, calm and cooperative. Pt c/o of vomiting that started this morning and bilateral leg pain. Pt states to have nausea, diarrhea, SOB, dizziness and weakness. Pt had cholecystectomy about 3 days ago, approximately 5 cm incision to surgical site, no redness/inflammation noted. Pt has hx of cardiomyopathy, neuropathy, DVT, testicular cancer that radiated to lymph node, last chemo treatment 6 months ago. Respirations even and unlabored, abdomen soft/ nontender, heal wound from previous surgery to abdomen. no peripheral edema noted, bilateral pulses present. Placed on cardiac monitor, sinus rhythm, 20 G IV on left AC, labs drawn and sent. Will continue to monitor.

## 2019-06-11 NOTE — ED PROVIDER NOTE - NS ED ROS FT
CONSTITUTIONAL: No fevers, no chills  Eyes: no visual changes  Ears: no ear drainage, no ear pain  Nose: no nasal congestion  Mouth/Throat: no sore throat  Cardiovascular: No Chest pain  Respiratory: + macedo   Gastrointestinal: + n/v/d   Genitourinary: no dysuria, no hematuria  SKIN: no rashes.  NEURO: no headache  PSYCHIATRIC: no known mental health issues.

## 2019-06-11 NOTE — ED PROVIDER NOTE - OBJECTIVE STATEMENT
38M pmh metastatic testicular cancer, chemo induced cardiomyopathy, dvt, recent cholecystectomy on 6/5 p/w non bloody vomiting and diarrhea. symptoms x 1  day with bilateral leg cramps right worse then left. No abdominal pain.  No known fevers or chills. No CP. Mild ABARCA.

## 2019-06-11 NOTE — ED PROVIDER NOTE - CARE PLAN
Principal Discharge DX:	Vomiting and diarrhea Principal Discharge DX:	Vomiting and diarrhea  Goal:	Admit

## 2019-06-11 NOTE — CONSULT NOTE ADULT - ASSESSMENT
38M PMH metastatic testicular cancer s/p orchiectomy (2017), ex lap with RP lymph node dissection and mass excision (2018), last chemo (iphosphamide, taxol, platin) 6 months ago, afib/aflutter s/p cardioversion, NICM (last EF 2/2018 45%), recent admission for acute cholecystitis s/p laparoscopic cholecystectomy (6/5), presenting with nausea/emesis with diarrhea.    - Patient with no abdominal pain, T bili wnl. Low concern for intra-abdominal infection or surgical complication at this time  - Can obtain C diff as pt received abx on last admission and is currently complaining of diarrhea.   - No indication for surgical intervention at this time  - Discussed with attending Dr. Love Jimenez PGY2  B team surgery  [id number] 38M PMH metastatic testicular cancer s/p orchiectomy (2017), ex lap with RP lymph node dissection and mass excision (2018), last chemo (iphosphamide, taxol, platin) 6 months ago, afib/aflutter s/p cardioversion, NICM (last EF 2/2018 45%), recent admission for acute cholecystitis s/p laparoscopic cholecystectomy (6/5), presenting with nausea/emesis with diarrhea.    - Patient with no abdominal pain, T bili wnl. Low concern for intra-abdominal infection or surgical complication at this time  - Can obtain C diff as pt received abx on last admission and is currently complaining of diarrhea.   - Recommend Low fat diet - fatty diet can contribute to diarrhea in patients recently post-op after cholecystectomy  - No indication for surgical intervention at this time  - Discussed with attending Dr. Love Jimenez PGY2  B team surgery  [id number]

## 2019-06-11 NOTE — ED PROVIDER NOTE - CLINICAL SUMMARY MEDICAL DECISION MAKING FREE TEXT BOX
pt with vomiting + diarrhea in setting of lap sen; benign belly exam, will check basics, IVF, surgery consult, reassess. also some calf pain, likely lytes 2/2 diarrhea  but will r/o dvt. Cárdenas: pt with vomiting + diarrhea in setting of lap sen; benign belly exam, will check basics, IVF, surgery consult, reassess. also some calf pain, likely lytes 2/2 diarrhea  but will r/o dvt. vomiting is intractable.

## 2019-06-11 NOTE — ED PROVIDER NOTE - PHYSICAL EXAMINATION
GEN - NAD; well appearing; A+O x3   HEAD - NC/AT     EYES - EOMI, no conjunctival pallor, no scleral icterus  ENT -   mucous membranes  moist , no discharge      NECK - Neck supple  PULM - CTA b/l,  symmetric breath sounds  COR -  RRR, S1 S2, no murmurs  ABD - , ND, NT, soft, no guarding, no rebound, no masses, lap sen incisions c/d/i     BACK - no CVA tenderness, nontender spine     EXTREMS - no edema, no deformity, warm and well perfused    SKIN - no rash or bruising      NEUROLOGIC - alert, sensation nl, motor 5/5 RUE/LUE/RLE/LLE

## 2019-06-12 ENCOUNTER — TRANSCRIPTION ENCOUNTER (OUTPATIENT)
Age: 39
End: 2019-06-12

## 2019-06-12 VITALS — SYSTOLIC BLOOD PRESSURE: 92 MMHG | DIASTOLIC BLOOD PRESSURE: 59 MMHG

## 2019-06-12 DIAGNOSIS — I42.9 CARDIOMYOPATHY, UNSPECIFIED: ICD-10-CM

## 2019-06-12 DIAGNOSIS — R11.10 VOMITING, UNSPECIFIED: ICD-10-CM

## 2019-06-12 DIAGNOSIS — R42 DIZZINESS AND GIDDINESS: ICD-10-CM

## 2019-06-12 DIAGNOSIS — C80.1 MALIGNANT (PRIMARY) NEOPLASM, UNSPECIFIED: ICD-10-CM

## 2019-06-12 DIAGNOSIS — Z29.9 ENCOUNTER FOR PROPHYLACTIC MEASURES, UNSPECIFIED: ICD-10-CM

## 2019-06-12 LAB
SPECIMEN SOURCE: SIGNIFICANT CHANGE UP
SPECIMEN SOURCE: SIGNIFICANT CHANGE UP

## 2019-06-12 RX ORDER — CARVEDILOL PHOSPHATE 80 MG/1
1 CAPSULE, EXTENDED RELEASE ORAL
Qty: 0 | Refills: 0 | DISCHARGE
Start: 2019-06-12

## 2019-06-12 RX ORDER — OXYCODONE HYDROCHLORIDE 5 MG/1
1 TABLET ORAL
Qty: 0 | Refills: 0 | DISCHARGE

## 2019-06-12 RX ORDER — SODIUM CHLORIDE 9 MG/ML
1000 INJECTION INTRAMUSCULAR; INTRAVENOUS; SUBCUTANEOUS
Refills: 0 | Status: DISCONTINUED | OUTPATIENT
Start: 2019-06-12 | End: 2019-06-12

## 2019-06-12 RX ORDER — CARVEDILOL PHOSPHATE 80 MG/1
12.5 CAPSULE, EXTENDED RELEASE ORAL EVERY 12 HOURS
Refills: 0 | Status: DISCONTINUED | OUTPATIENT
Start: 2019-06-12 | End: 2019-06-12

## 2019-06-12 RX ORDER — ENOXAPARIN SODIUM 100 MG/ML
40 INJECTION SUBCUTANEOUS DAILY
Refills: 0 | Status: DISCONTINUED | OUTPATIENT
Start: 2019-06-12 | End: 2019-06-12

## 2019-06-12 RX ORDER — MECLIZINE HCL 12.5 MG
1 TABLET ORAL
Qty: 56 | Refills: 0
Start: 2019-06-12 | End: 2019-06-25

## 2019-06-12 RX ORDER — CARVEDILOL PHOSPHATE 80 MG/1
1 CAPSULE, EXTENDED RELEASE ORAL
Qty: 0 | Refills: 0 | DISCHARGE

## 2019-06-12 RX ORDER — ONDANSETRON 8 MG/1
1 TABLET, FILM COATED ORAL
Qty: 0 | Refills: 0 | DISCHARGE

## 2019-06-12 RX ORDER — MECLIZINE HCL 12.5 MG
25 TABLET ORAL
Refills: 0 | Status: DISCONTINUED | OUTPATIENT
Start: 2019-06-12 | End: 2019-06-12

## 2019-06-12 RX ORDER — SACUBITRIL AND VALSARTAN 24; 26 MG/1; MG/1
1 TABLET, FILM COATED ORAL
Qty: 0 | Refills: 0 | DISCHARGE

## 2019-06-12 RX ADMIN — SODIUM CHLORIDE 100 MILLILITER(S): 9 INJECTION INTRAMUSCULAR; INTRAVENOUS; SUBCUTANEOUS at 05:55

## 2019-06-12 RX ADMIN — Medication 25 MILLIGRAM(S): at 05:55

## 2019-06-12 RX ADMIN — Medication 25 MILLIGRAM(S): at 11:47

## 2019-06-12 RX ADMIN — CARVEDILOL PHOSPHATE 12.5 MILLIGRAM(S): 80 CAPSULE, EXTENDED RELEASE ORAL at 05:55

## 2019-06-12 NOTE — H&P ADULT - ATTENDING COMMENTS
Patient seen and examined on 6/12/19, case discussed with Dr. Navas, agree with assessment and plan as above.

## 2019-06-12 NOTE — H&P ADULT - NSHPLABSRESULTS_GEN_ALL_CORE
14.2   8.54  )-----------( 201      ( 2019 12:35 )             42.3           137  |  104  |  14  ----------------------------<  118<H>  5.2   |  23  |  0.75    Ca    8.8      2019 19:55  Phos  2.4       Mg     2.2         TPro  6.9  /  Alb  3.5  /  TBili  0.4  /  DBili  x   /  AST  56<H>  /  ALT  78<H>  /  AlkPhos  52                Urinalysis Basic - ( 2019 15:00 )    Color: LIGHT YELLOW / Appearance: CLEAR / S.018 / pH: 7.5  Gluc: NEGATIVE / Ketone: NEGATIVE  / Bili: NEGATIVE / Urobili: NORMAL   Blood: NEGATIVE / Protein: NEGATIVE / Nitrite: NEGATIVE   Leuk Esterase: NEGATIVE / RBC: x / WBC x   Sq Epi: x / Non Sq Epi: x / Bacteria: x      < from: CT Abdomen and Pelvis w/ IV Cont (19 @ 21:46) >      IMPRESSION:   No bowel obstruction or inflammation.    < end of copied text >    < from: CT Head No Cont (19 @ 21:45) >      IMPRESSION:  No acute intracranial hemorrhage, territorial infarct, mass effect or   midline shift.      < end of copied text > LABS and ADDITIONAL STUDIES:                14.2   8.54  )-----------( 201      ( 2019 12:35 )              42.3           137  |  104  |  14  ----------------------------<  118<H>  5.2   |  23  |  0.75    Ca    8.8      2019 19:55  Phos  2.4       Mg     2.2         TPro  6.9  /  Alb  3.5  /  TBili  0.4  /  DBili  x   /  AST  56<H>  /  ALT  78<H>  /  AlkPhos  52  -       Urinalysis Basic - ( 2019 15:00 )  Color: LIGHT YELLOW / Appearance: CLEAR / S.018 / pH: 7.5  Gluc: NEGATIVE / Ketone: NEGATIVE  / Bili: NEGATIVE / Urobili: NORMAL   Blood: NEGATIVE / Protein: NEGATIVE / Nitrite: NEGATIVE   Leuk Esterase: NEGATIVE / RBC: x / WBC x   Sq Epi: x / Non Sq Epi: x / Bacteria: x    Blood Gas Venous Comprehensive (19 @ 12:35)    Blood Gas Venous - Lactate: 2.6: Please note updated reference range. mmol/L  Blood Gas Venous Comprehensive (19 @ 16:46)    Blood Gas Venous - Lactate: 2.1: Please note updated reference range. mmol/L    Troponin T, High Sensitivity (19 @ 12:35)    Troponin T, High Sensitivity: < 6    < from: CT Abdomen and Pelvis w/ IV Cont (19 @ 21:46) >  IMPRESSION:   No bowel obstruction or inflammation.  < end of copied text >    < from: CT Head No Cont (19 @ 21:45) >  IMPRESSION:  No acute intracranial hemorrhage, territorial infarct, mass effect or   midline shift.  < end of copied text >    EKG - NSR at 80, L axis deviation, QTc 452, no significant ST-T wave changes

## 2019-06-12 NOTE — DISCHARGE NOTE PROVIDER - PROVIDER TOKENS
FREE:[LAST:[chapa],FIRST:[sukhi],PHONE:[phone]],FAX:[(   )    -],FOLLOWUP:[1 week]] PROVIDER:[TOKEN:[[id number]],FOLLOWUP:[2 weeks]],FREE:[LAST:[chapa],FIRST:[sukhi],PHONE:[phone]],FAX:[(   )    -],FOLLOWUP:[1 week]],FREE:[LAST:[[facility]],PHONE:[phone]],FAX:[(   )    -],ADDRESS:[[address] [place] [address], [address] [place] [address]]] PROVIDER:[TOKEN:[[id number]],FOLLOWUP:[2 weeks]],FREE:[LAST:[chapa],FIRST:[sukhi],PHONE:[phone]],FAX:[(   )    -],FOLLOWUP:[1-3 days]],FREE:[LAST:[[facility]],PHONE:[phone]],FAX:[(   )    -],ADDRESS:[[address] [place] [address]],FOLLOWUP:[Routine]],FREE:[LAST:[cardiologist],PHONE:[(   )    -],FAX:[(   )    -],ADDRESS:[BP check on Friday 6/14],FOLLOWUP:[1-3 days]]

## 2019-06-12 NOTE — DISCHARGE NOTE PROVIDER - HOSPITAL COURSE
38M with PMHx of metastatic testicular CA, NICM, neuropathy, who presents with dizziness, nausea, and vomiting.        Vertigo.      - will c/w meclizine 25mg 4x/day    - f/u blood and urine cx to r/o infectious etiology    - PT eval, fall precautions. Constellation of symptoms with horizontal nystagmus and reproducible symptoms with daryn hallpike maneuver c/w BPPV. CT head negative. Pt reports improvement after meclizine.     - will c/w meclizine 25mg 4x/day    - f/u blood and urine cx to r/o infectious etiology    - PT eval               Cardiomyopathy.      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 Most recent TTE on 6/4 showing EF55%. Pt concerned current sx may be side effect from entresto    - c/w carvedilol 12.5mg BID    - will hold entresto in setting of soft BP and pt hesitancy to restart due to side effects. In prior studies, dizziness is reported as an adverse event "at least 5%" of patients taking entresto.               Vomiting.      - will c/w IVF overnight and repeat lactate in AM    - zofran PRN nausea.          Mixed germ cell tumor.       now in remission. 38M with PMHx of metastatic testicular CA, NICM, neuropathy, who presents with dizziness, nausea, and vomiting.        Vertigo.      - will c/w meclizine 25mg 4x/day    - f/u blood and urine cx to r/o infectious etiology    - PT eval, fall precautions. Constellation of symptoms with horizontal nystagmus and reproducible symptoms with daryn hallpike maneuver c/w BPPV. CT head negative. Pt reports improvement after meclizine.     - will c/w meclizine 25mg 4x/day    - f/u blood and urine cx to r/o infectious etiology    - PT eval               Cardiomyopathy.      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 Most recent TTE on 6/4 showing EF55%. Pt concerned current sx may be side effect from entresto    - c/w carvedilol 12.5mg BID    - will hold entresto in setting of soft BP and pt hesitancy to restart due to side effects. In prior studies, dizziness is reported as an adverse event "at least 5%" of patients taking entresto.      will hold all BP meds upond d/c 2/2 hypotension          Vomiting.      - will c/w IVF overnight and repeat lactate in AM    - zofran PRN nausea.          Mixed germ cell tumor.       now in remission.         [place]

## 2019-06-12 NOTE — DISCHARGE NOTE NURSING/CASE MANAGEMENT/SOCIAL WORK - NSDCDPATPORTLINK_GEN_ALL_CORE
You can access the [facility] Patient Portal, offered by [facility], by registering with the following website: [email or web address]

## 2019-06-12 NOTE — DISCHARGE NOTE PROVIDER - CARE PROVIDERS DIRECT ADDRESSES
,DirectAddress_Unknown ,[email or web address],DirectAddress_Unknown,DirectAddress_Unknown ,[email or web address],DirectAddress_Unknown,DirectAddress_Unknown,DirectAddress_Unknown

## 2019-06-12 NOTE — H&P ADULT - PROBLEM SELECTOR PLAN 1
Constellation of symptoms with horizontal nystagmus and reproducible symptoms with daryn hallpike maneuver c/w BPPV. Pt reports improvement after meclizine.   - will c/w meclizine 25mg 4x/day  - f/u ENT recs  - f/u blood and urine cx to r/o infectious etiology Constellation of symptoms with horizontal nystagmus and reproducible symptoms with daryn hallpike maneuver c/w BPPV. Pt reports improvement after meclizine.   - will c/w meclizine 25mg 4x/day  - f/u ENT recs  - f/u blood and urine cx to r/o infectious etiology  - PT eval Constellation of symptoms with horizontal nystagmus and reproducible symptoms with daryn hallpike maneuver c/w BPPV. Pt reports improvement after meclizine.   - will c/w meclizine 25mg 4x/day  - ENT eval in AM  - f/u blood and urine cx to r/o infectious etiology  - PT eval Constellation of symptoms with horizontal nystagmus and reproducible symptoms with daryn hallpike maneuver c/w BPPV. CT head negative. Pt reports improvement after meclizine.   - will c/w meclizine 25mg 4x/day  - ENT eval in AM  - f/u blood and urine cx to r/o infectious etiology  - PT eval Constellation of symptoms with horizontal nystagmus and reproducible symptoms with daryn hallpike maneuver c/w BPPV. CT head negative. Pt reports improvement after meclizine.   - will c/w meclizine 25mg 4x/day  - ENT eval in AM  - f/u blood and urine cx to r/o infectious etiology  - PT eval, fall precautions Constellation of symptoms with horizontal nystagmus and reproducible symptoms with daryn hallpike maneuver c/w BPPV. CT head negative. Pt reports improvement after meclizine.   - will c/w meclizine 25mg 4x/day  - Consider ENT eval in AM  - f/u blood and urine cx to r/o infectious etiology  - PT eval, fall precautions

## 2019-06-12 NOTE — H&P ADULT - PROBLEM SELECTOR PLAN 2
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 Most recent TTE on 6/4 showing EF55%. Pt concerned current sx may be side effect from entresto  - c/w carvedilol 12.5mg BID Pt has hx of cardiomyopathy 2/2 chemotherapy. Most recent TTE on 6/4 showing EF55%. Pt concerned current sx may be side effect from entresto  - c/w carvedilol 12.5mg BID  - will hold entresto in setting of soft BP and pt hesitancy to restart due to side effects Pt has hx of cardiomyopathy 2/2 chemotherapy. Most recent TTE on 6/4 showing EF55%. Pt concerned current sx may be side effect from entresto  - c/w carvedilol 12.5mg BID  - will hold entresto in setting of soft BP and pt hesitancy to restart due to side effects. Consider cards c/s in AM Pt has hx of cardiomyopathy 2/2 chemotherapy. Most recent TTE on 6/4 showing EF55%. Pt concerned current sx may be side effect from entresto  - c/w carvedilol 12.5mg BID  - will hold entresto in setting of soft BP and pt hesitancy to restart due to side effects. In prior studies, dizziness is reported as an adverse event "at least 5%" of patients taking entresto.  Consider cards c/s in AM

## 2019-06-12 NOTE — DISCHARGE NOTE PROVIDER - CARE PROVIDER_API CALL
sukhi chapa  Phone: [phone]  Fax: (   )    -  Follow Up Time: 1 week Neo Chirinos (MD)  Hematology; Internal Medicine; Medical Oncology  [address] [place] [address]  Phone: [phone]  Fax: [phone]  Follow Up Time: 2 weeks    sukhi chapa  Phone: [phone]  Fax: (   )    -  Follow Up Time: 1 week    [facility],   [address] [place] [address], [address] [place] [address]  Phone: [phone]  Fax: (   )    -  Follow Up Time: Neo Chirinos (MD)  Hematology; Internal Medicine; Medical Oncology  [address] [place] [address]  Phone: [phone]  Fax: [phone]  Follow Up Time: 2 weeks    sukhi chapa  Phone: [phone]  Fax: (   )    -  Follow Up Time: 1-3 days    [facility],   [address] [place] [address], [address] [place] [address]  Phone: [phone]  Fax: (   )    -  Follow Up Time: Routine    cardiologist,   BP check on Friday 6/14  Phone: (   )    -  Fax: (   )    -  Follow Up Time: 1-3 days

## 2019-06-12 NOTE — H&P ADULT - NSHPPHYSICALEXAM_GEN_ALL_CORE
.  VITAL SIGNS:  T(C): 36.8 (06-12-19 @ 02:37), Max: 36.8 (06-11-19 @ 16:38)  T(F): 98.3 (06-12-19 @ 02:37), Max: 98.3 (06-11-19 @ 16:38)  HR: 80 (06-12-19 @ 02:37) (78 - 88)  BP: 107/59 (06-12-19 @ 02:37) (98/66 - 107/59)  BP(mean): --  RR: 16 (06-12-19 @ 02:37) (16 - 18)  SpO2: 99% (06-12-19 @ 02:37) (97% - 100%)  Wt(kg): --    PHYSICAL EXAM:    Constitutional: NAD  Head: NC/AT  Eyes: PERRLA, EOMI, +horizontal nystagmus, vertigo reproducible by daryn-hallpike maneuver  ENT: no nasal discharge; no oropharyngeal erythema or exudates; dry oral mucosa  Neck: supple; no JVD or thyromegaly  Respiratory: CTA B/L; no W/R/R, no retractions  Cardiac: +S1/S2; RRR; no M/R/G; PMI non-displaced  Gastrointestinal: soft, NT/ND; diffusely ttp without rebound or guarding  Extremities: WWP, no clubbing or cyanosis; no peripheral edema  Vascular: 2+ radial, DP/PT pulses B/L  Lymphatic: no submandibular or cervical LAD  Neurologic: AAOx3; CNII-XII grossly intact; no focal deficits .  VITAL SIGNS:  T(C): 36.8 (06-12-19 @ 02:37), Max: 36.8 (06-11-19 @ 16:38)  T(F): 98.3 (06-12-19 @ 02:37), Max: 98.3 (06-11-19 @ 16:38)  HR: 80 (06-12-19 @ 02:37) (78 - 88)  BP: 107/59 (06-12-19 @ 02:37) (98/66 - 107/59)  BP(mean): --  RR: 16 (06-12-19 @ 02:37) (16 - 18)  SpO2: 99% (06-12-19 @ 02:37) (97% - 100%)  Wt(kg): --    PHYSICAL EXAM:    Constitutional: NAD  Eyes: PERRL, EOMI, +horizontal nystagmus, vertigo reproducible by daryn-hallpike maneuver  ENT: no nasal discharge; no oropharyngeal erythema or exudates; dry oral mucosa  Neck: supple; no JVD or thyromegaly  Respiratory: CTA B/L; no W/R/R, no retractions  Cardiac: +S1/S2; RRR; no M/R/G; PMI non-displaced  Gastrointestinal: soft, NT/ND; diffusely ttp without rebound or guarding  Extremities: WWP, no clubbing or cyanosis; no peripheral edema  Vascular: 2+ radial, DP/PT pulses B/L  Lymphatic: no submandibular or cervical LAD  Neurologic: AAOx3; no focal deficits

## 2019-06-12 NOTE — DISCHARGE NOTE PROVIDER - NSDCCPCAREPLAN_GEN_ALL_CORE_FT
PRINCIPAL DISCHARGE DIAGNOSIS  Diagnosis: Vertigo  Assessment and Plan of Treatment: Continue meclizene/antivert. Continue medications. Follow up with your PCP for further evaluation and management. Please call to make an appointment within 1-2 weeks of discharge.      SECONDARY DISCHARGE DIAGNOSES  Diagnosis: Mixed germ cell tumor  Assessment and Plan of Treatment: follow up with pcp/oncologist for further evaluation and management. PRINCIPAL DISCHARGE DIAGNOSIS  Diagnosis: Vertigo  Assessment and Plan of Treatment: Continue meclizene/antivert. Continue medications. Follow up with your PCP for further evaluation and management. Please call to make an appointment within 1-2 weeks of discharge.  Rest, light activity allowed      SECONDARY DISCHARGE DIAGNOSES  Diagnosis: Cardiomyopathy  Assessment and Plan of Treatment: Entresto held. follow up with PCP for resumption of medication  BP was low in the hospital    Diagnosis: Mixed germ cell tumor  Assessment and Plan of Treatment: follow up with pcp/oncologist for further evaluation and management. PRINCIPAL DISCHARGE DIAGNOSIS  Diagnosis: Vertigo  Assessment and Plan of Treatment: Continue meclizene/antivert. Continue medications. Follow up with your PCP for further evaluation and management. Please call to make an appointment within 1-2 weeks of discharge.  Rest, light activity allowed      SECONDARY DISCHARGE DIAGNOSES  Diagnosis: Cardiomyopathy  Assessment and Plan of Treatment: BP meds were held due to low blood pressure. SBP < 100. Follow up with cardiologist on Friday for BP check and further management of BP meds    Diagnosis: Mixed germ cell tumor  Assessment and Plan of Treatment: follow up with pcp/oncologist for further evaluation and management.

## 2019-06-12 NOTE — PHYSICAL THERAPY INITIAL EVALUATION ADULT - PERTINENT HX OF CURRENT PROBLEM, REHAB EVAL
Pt. admitted for vertigo, vomiting. Per radiology report, CT head revealed no acute intracranial hemorrhage, territorial infarct, mass effect or midline shift. PMH of metastatic testicular cancer s/p orchiectomy (2017), ex lap with RP lymph node dissection and mass excision (2018), last chemo 6 months ago, afib/aflutter s/p cardioversion (1/2018), NICM (last EF 6/2019 55%), recent admission for acute cholecystitis s/p laparoscopic cholecystectomy (6/5).

## 2019-06-12 NOTE — H&P ADULT - ASSESSMENT
38M with PMHx of metastatic testicular cancer s/p orchiectomy (2017), ex lap with RP lymph node dissection and mass excision (2018), last chemo (iphosphamide, taxol, platin) 6 months ago, afib/aflutter s/p cardioversion (1/2018), NICM (last EF 6/2019 55%), recent admission for acute cholecystitis s/p laparoscopic cholecystectomy (6/5), presenting with vertigo, gait unsteadiness, and nausea/vomiting likely 2/2 BPPV complicated by dehydration and elevated lactate

## 2019-06-12 NOTE — PATIENT PROFILE ADULT - FUNCTIONAL SCREEN CURRENT LEVEL: DRESSING, MLM
Writer called and spoke with Jemma.  She states she had her hand looked at by a nurse friend who told her it was not infected.  Pt states she has been using bactroban ointment on wound.  Denies redness, fever or shills.  Informed Pt it is still the recommendations of our office that she be evaluated in the ED. Pt states if things become worse, she will do so.  Pt does have appt for f/u from her [facility] on Monday.  Addressed with NP as well.  Expressed recommendations of ER which Pt declined.  Will evaluate on Monday.    0 = independent

## 2019-06-12 NOTE — CHART NOTE - NSCHARTNOTEFT_GEN_A_CORE
Called by RN with reporting that patient asymptomatic hypotension             Vital Signs Last 24 Hrs  T(C): 36.7 (12 Jun 2019 14:22), Max: 36.8 (11 Jun 2019 16:38)  T(F): 98 (12 Jun 2019 14:22), Max: 98.3 (11 Jun 2019 16:38)  HR: 75 (12 Jun 2019 14:22) (75 - 84)  BP: 92/59 (12 Jun 2019 14:44) (89/52 - 107/59)  BP(mean): --  RR: 16 (12 Jun 2019 14:22) (16 - 18)  SpO2: 97% (12 Jun 2019 14:22) (97% - 100%)                          14.2   8.54  )-----------( 201      ( 11 Jun 2019 12:35 )             42.3       06-11    137  |  104  |  14  ----------------------------<  118<H>  5.2   |  23  |  0.75    Ca    8.8      11 Jun 2019 19:55  Phos  2.4     06-11  Mg     2.2     06-11    TPro  6.9  /  Alb  3.5  /  TBili  0.4  /  DBili  x   /  AST  56<H>  /  ALT  78<H>  /  AlkPhos  52  06-11            Patient is a 38y old  Male who presents with a chief complaint of vomiting, vertigo (12 Jun 2019 12:21)  38yMale        PHYSICAL EXAM  GENERAL: NAD, AAOx3  HEAD:  Atraumatic, Normocephalic  EYES: EOMI, PERRLA, conjunctiva and sclera clear  NECK: Supple, No JVD, No LAD  CHEST/LUNG: Clear to auscultation bilaterally; No wheeze  HEART: s1 s2 Regular rate and rhythm; No murmurs, rubs, or gallops  ABDOMEN: Soft, Nontender, Nondistended; Bowel sounds present X 4 quadrants   EXTREMITIES:  2+ Peripheral Pulses, No clubbing, cyanosis, or edema  SKIN: No rashes or lesions,  b/l LE not red, cool to touch, cellulitis appears to be healing, no open skin no drainage  NEURO: nonfocal CN/motor/sensory/reflexes  Psych: normal affect and behavior, calm and cooperative     PLAN:      Pt stated, his dizziness is not due to low blood pressure and still would like to be discharge today   Entresto held on admission will hold on d/c  Carvedilol to be held on discharge and patient to see cardiologist on Friday 6/14 for BP check and further management of Entresto and Carvedilol.       discussed with attending and pt cleared for discharge

## 2019-06-12 NOTE — CHART NOTE - NSCHARTNOTEFT_GEN_A_CORE
Patient evaluated, reports improvement in his dizziness with meclizine, ambulating slowly, tolerating po. His primary oncologist Dr. Chirinos notified of current admission, advised to have OP MRI as patient cannot tolerate closed machine in our institution. Given educational handout on exercises to perform at home, will follow-up [facility]. Patient evaluated, reports improvement in his dizziness with meclizine, ambulating slowly, tolerating po. His primary oncologist Dr. Chirinos notified of current admission, advised to have OP MRI as patient cannot tolerate closed machine in our institution. Given educational handout on exercises to perform at home, will follow-up at [facility].    I spent 40 minutes coordinating this patient's discharge, reviewed DC plan with patient and emailed his OP oncologist.

## 2019-06-12 NOTE — H&P ADULT - NSHPSOCIALHISTORY_GEN_ALL_CORE
Pt lives with parents in private residence, independent in his ADLs. He works at [employer] but is currently on disability. He denies any hx of alcohol, tobacco, or drug use.

## 2019-06-12 NOTE — PHYSICAL THERAPY INITIAL EVALUATION ADULT - CRITERIA FOR SKILLED THERAPEUTIC INTERVENTIONS
risk reduction/prevention/predicted duration of therapy intervention/impairments found/therapy frequency/rehab potential/anticipated discharge recommendation

## 2019-06-12 NOTE — H&P ADULT - HISTORY OF PRESENT ILLNESS
Pt is a 38M with PMHx of metastatic testicular cancer s/p orchiectomy (2017), ex lap with RP lymph node dissection and mass excision (2018), last chemo (iphosphamide, taxol, platin) 6 months ago, afib/aflutter s/p cardioversion (1/2018), NICM (last EF 6/2019 55%), recent admission for acute cholecystitis s/p laparoscopic cholecystectomy (6/5), presenting with vertigo, gait unsteadiness, and nausea/vomiting. Pt initially felt well upon discharge on 6/6, but acutely developed a sensation of the room spinning when he moved his head or moved his eyes to the side or up/down. This was associated with gait unsteadiness and inability to ambulate. He has felt dizzy in the past, however it was always associated with chemo and was not as severe as the symptoms prompting evaluation in the hospital. He denies any mechanical fall. Since yesterday he has also experienced nausea and vomiting. He has vomited too many times to quantify. Initially he threw up whatever he had eaten prior, but most recently he was throwing up bile. He has tinnitus that has been chronic for approx 3 years, also associated with damage from chemotherapy. He denies any fevers, chills, or abd pain. He reports loose stools since discharge, denies melena/hematochezia. He denies any recent travel or sick contacts.    In ED, initial VS were T98 HR84 /63 RR16 SaO2 100% on RA. Labs notable for mild transaminitis (AST56 ALT78), lactate of 2.6. UA negative. CT a/p showing no bowel inflammation or obstruction. CT head negative for intracranial pathology Pt is a 38M with PMHx of metastatic testicular cancer s/p orchiectomy (2017), ex lap with RP lymph node dissection and mass excision (2018), last chemo (iphosphamide, taxol, platin) 6 months ago, afib/aflutter s/p cardioversion (1/2018), NICM (last EF 6/2019 55%), recent admission for acute cholecystitis s/p laparoscopic cholecystectomy (6/5), presenting with vertigo, gait unsteadiness, and nausea/vomiting. Pt initially felt well upon discharge on 6/6, but acutely developed a sensation of the room spinning when he moved his head or moved his eyes to the side or up/down. This was associated with gait unsteadiness and inability to ambulate. He has felt dizzy in the past, however it was always associated with chemo and was not as severe as the symptoms prompting evaluation in the hospital. Pt was also started on entresto approx 2 weeks ago and is concerned that his sx are a side effect. He denies any mechanical fall. Since yesterday he has also experienced nausea and vomiting. He has vomited too many times to quantify. Initially he threw up whatever he had eaten prior, but most recently he was throwing up bile. He has tinnitus that has been chronic for approx 3 years, also associated with damage from chemotherapy. He denies any fevers, chills, or abd pain. He reports loose stools since discharge, denies melena/hematochezia. He denies any recent travel or sick contacts.    In ED, initial VS were T98 HR84 /63 RR16 SaO2 100% on RA. Labs notable for mild transaminitis (AST56 ALT78), lactate of 2.6. UA negative. CT a/p showing no bowel inflammation or obstruction. CT head negative for intracranial pathology Pt is a 38M with PMHx of metastatic testicular cancer s/p orchiectomy (2017), ex lap with RP lymph node dissection and mass excision (2018), last chemo (iphosphamide, taxol, platin) 6 months ago, afib/aflutter s/p cardioversion (1/2018), NICM (last EF 6/2019 55%), recent admission for acute cholecystitis s/p laparoscopic cholecystectomy (6/5), presenting with vertigo, gait unsteadiness, and nausea/vomiting. Pt initially felt well upon discharge on 6/6, but acutely developed a sensation of the room spinning when he moved his head or moved his eyes to the side or up/down. This was associated with gait unsteadiness and inability to ambulate. He has felt dizzy in the past, however it was always associated with chemo and was not as severe as the symptoms prompting evaluation in the hospital. Pt was also started on entresto approx 2 weeks ago and is concerned that his sx are a side effect. He denies any mechanical fall. Since yesterday he has also experienced nausea and vomiting associated with the dizziness. He has vomited too many times to quantify. Initially he threw up whatever he had eaten prior, but most recently he was throwing up bile. He has tinnitus that has been chronic for approx 3 years, also associated with damage from chemotherapy. He denies any fevers, chills, or abd pain. He reports loose stools since discharge, denies melena/hematochezia. He denies any recent travel or sick contacts. He denies any focal weakness or numbness.    In ED, initial VS were T98 HR84 /63 RR16 SaO2 100% on RA. Labs notable for mild transaminitis (AST56 ALT78), lactate of 2.6. UA negative. CT a/p showing no bowel inflammation or obstruction. CT head negative for intracranial pathology

## 2019-06-12 NOTE — H&P ADULT - NSHPREVIEWOFSYSTEMS_GEN_ALL_CORE
REVIEW OF SYSTEMS:    CONSTITUTIONAL: No weakness, fevers or chills  EYES/ENT: +vertigo; No visual changes  NECK: No pain or stiffness  RESPIRATORY: No cough, wheezing, hemoptysis; No shortness of breath  CARDIOVASCULAR: No chest pain or palpitations  GASTROINTESTINAL: +nausea, +vomiting, +diarrhea; No abdominal or epigastric pain. No hematemesis. No melena or hematochezia.  GENITOURINARY: No dysuria, frequency or hematuria  NEUROLOGICAL: No numbness or weakness  SKIN: No itching, burning, rashes, or lesions   All other review of systems is negative unless indicated above. REVIEW OF SYSTEMS:    CONSTITUTIONAL: No weakness, fevers or chills  EYES: No visual changes or eye discharge  ENT: +Vertigo; No rhinorrhea or sore throat  NECK: No pain or stiffness  RESPIRATORY: No cough, wheezing, hemoptysis; No shortness of breath  CARDIOVASCULAR: No chest pain or palpitations  GASTROINTESTINAL: +nausea, +vomiting, +diarrhea; No abdominal or epigastric pain. No hematemesis. No melena or hematochezia.  GENITOURINARY: No dysuria, frequency or hematuria  NEUROLOGICAL: No numbness or weakness  SKIN: No itching, burning, rashes, or lesions

## 2019-06-12 NOTE — PHYSICAL THERAPY INITIAL EVALUATION ADULT - ADDITIONAL COMMENTS
Pt. reports owning DME of straight cane.     Pt. was left seated at edge of bed post PT Evaluation, NAD, all lines intact, call so within reach. RN Cecilia made aware of pt. status and participation in PT.

## 2019-06-13 ENCOUNTER — APPOINTMENT (OUTPATIENT)
Dept: CARDIOLOGY | Facility: CLINIC | Age: 39
End: 2019-06-13
Payer: MEDICAID

## 2019-06-13 ENCOUNTER — OTHER (OUTPATIENT)
Age: 39
End: 2019-06-13

## 2019-06-13 ENCOUNTER — INBOUND DOCUMENT (OUTPATIENT)
Age: 39
End: 2019-06-13

## 2019-06-13 ENCOUNTER — NON-APPOINTMENT (OUTPATIENT)
Age: 39
End: 2019-06-13

## 2019-06-13 VITALS
OXYGEN SATURATION: 95 % | DIASTOLIC BLOOD PRESSURE: 73 MMHG | HEIGHT: 74 IN | BODY MASS INDEX: 33.37 KG/M2 | HEART RATE: 87 BPM | SYSTOLIC BLOOD PRESSURE: 110 MMHG | WEIGHT: 260 LBS

## 2019-06-13 VITALS — SYSTOLIC BLOOD PRESSURE: 102 MMHG | DIASTOLIC BLOOD PRESSURE: 76 MMHG

## 2019-06-13 LAB
BACTERIA UR CULT: SIGNIFICANT CHANGE UP
SPECIMEN SOURCE: SIGNIFICANT CHANGE UP

## 2019-06-13 PROCEDURE — 99214 OFFICE O/P EST MOD 30 MIN: CPT

## 2019-06-13 PROCEDURE — 93000 ELECTROCARDIOGRAM COMPLETE: CPT

## 2019-06-13 NOTE — HISTORY OF PRESENT ILLNESS
[FreeTextEntry1] : Patient is a 38 year-old being actively treated for testicular cancer. In 2017, he had completed three cycles of chemotherapy. After the first two cycles, he received medi-port in the right chest wall. He got his third round of chemo through this port. He subsequently developed shortness of breath and chest tightness and was found to be in atrial flutter with rapid ventricular rate. He was admitted for rate control and planned TYLOR and DCCV. He was started on anticoagulation, but during the TYLOR, he was noted to have a right atrial thrombus and cardioversion was aborted. He was discharged on Toprol 200mg daily and Xarelto 15mg BID. \par \par At his initial visit here in November, I added Digoxin to his regimen for better rate control. This helped to better control his rate. In December 2017, TYLOR still showed RA thrombus. TYLOR performed in January 2018 showed improvement in thrombus size, and patient was successfully TYLOR/DCCV into sinus rhythm.\par February 5, 2018, he maintains normal sinus rhythm. He was taking a low dose ACEi (lisinopril 2.5mg daily) and Toprol 100mg daily.\par \par June 27, 2018 - patient recently admitted to [facility] for resection of retroperitoneal mass. The procedure was successful and the patient was discharged. He maintains normal sinus rhythm.\par \par Patient restarted chemotherapy and completed three cycles of TIP. He had a left upper extremity PICC line placed, which has been removed.. \par \par November 2018 - Patient has been taken off anticoagulation and guideline directed medical therapy for cardiomyopathy. He was having episodes that he describes as blacking out while getting chemotherapy, which lead to discontinuation of his medication.\par \par January 2019 - Patient presents today in his usual state of health, but he is reporting ongoing fatigue. His only current medications are low dose beta-blocker (carvedilol 3.125 mg BID), gabapentin 900 mg daily, and oxycodone PRN. Several days ago, he experienced bilateral lower extremity edema, L > R, that improved spontaneously. \par \par March 2019 - Patient has fatigue, anxiety, numbness of hands and feet, and blistering of palms and soles. He is tolerating his carvedilol 3.125 mg BID.\par \par May 2019 - Patient has been having labile weight with swelling of his hands and feet.\par May 15, 2019 - Patient has been tolerating carvedilol 12.5 mg BID and Entresto 24-26 mg BID. He gets occasional orthostatic symptoms, but he has not had syncope. He also sometimes gets a dizzy symptom when he lays down. And he reports a dysequilibrium such that if he closes his eyes in the shower, he worries about falling into the wall.\par He continues to require oxycodone for pain relief - this is managed by his pain management physician.\par \par June 2019 - Patient recently hospitalized with acute cholecystitis, now status post lap sen on 6/5/2019, discharged from [facility] on 6/7/2019. Earlier this week, patient went back to [facility] ER with vertigo and vomiting. He was discharged with meclizine. Plan for outpatient MRI brain to evaluate for etiology of vertigo/dysequilibrium.\par \par PMD: Amador Arambula MD [phone]\par Oncologist: Neo Chirinos MD [phone]

## 2019-06-13 NOTE — PHYSICAL EXAM
[Normal Appearance] : normal appearance [General Appearance - Well Developed] : well developed [Well Groomed] : well groomed [General Appearance - Well Nourished] : well nourished [Normal Oral Mucosa] : normal oral mucosa [General Appearance - In No Acute Distress] : no acute distress [No Deformities] : no deformities [No Oral Pallor] : no oral pallor [Normal Oropharynx] : normal oropharynx [Normal Jugular Venous V Waves Present] : normal jugular venous V waves present [Normal Jugular Venous A Waves Present] : normal jugular venous A waves present [] : no respiratory distress [No Jugular Venous Fernández A Waves] : no jugular venous fernández A waves [Respiration, Rhythm And Depth] : normal respiratory rhythm and effort [Exaggerated Use Of Accessory Muscles For Inspiration] : no accessory muscle use [Abdomen Soft] : soft [Auscultation Breath Sounds / Voice Sounds] : lungs were clear to auscultation bilaterally [Bowel Sounds] : normal bowel sounds [Abnormal Walk] : normal gait [Abdomen Tenderness] : non-tender [Nail Clubbing] : no clubbing of the fingernails [Gait - Sufficient For Exercise Testing] : the gait was sufficient for exercise testing [Skin Color & Pigmentation] : normal skin color and pigmentation [Cyanosis, Localized] : no localized cyanosis [No Venous Stasis] : no venous stasis [No Xanthoma] : no  xanthoma was observed [Oriented To Time, Place, And Person] : oriented to person, place, and time [Impaired Insight] : insight and judgment were intact [Affect] : the affect was normal [Mood] : the mood was normal [No Anxiety] : not feeling anxious [Conjunctiva] : the conjunctiva were normal in both eyes [Normal] : the eyelids were normal bilaterally [PERRL] : pupils were equal in size, round, and reactive to light [EOM Intact] : extraocular movements were intact [Not Palpable] : not palpable [Normal Rate] : normal [Normal S1] : normal S1 [Rhythm Regular] : regular [No Murmur] : no murmurs heard [Normal S2] : normal S2 [1+] : left 1+ [No Pitting Edema] : no pitting edema present [Yellow Sclera (Icteric)] : no scleral icterus was seen [FreeTextEntry1] : site of R-chest medi-port clean/dry/intact [Left Carotid Bruit] : no bruit heard over the left carotid [Right Carotid Bruit] : no bruit heard over the right carotid

## 2019-06-13 NOTE — DISCUSSION/SUMMARY
[FreeTextEntry1] : Patient is a 38 year-old with testicular cancer who developed atrial flutter secondary to catheter irritation of the right atrium and nonischemic cardiomyopathy, likely secondary to the tachycardia and not to the chemotherapy regimen. \par He has been maintaining sinus rhythm since flutter ablation in February 2018.\par Most recent chemo was given October 2018.\par \par Most recent echo (in June 2019 at [facility]) showed improved LVEF (50-55%).\par Patient had been on carvedilol 12.5 mg BID and Entresto 24-26 mg BID. Will resume if needed.\par \par Recommend seeing pain management with the goal of weaning off opiates as these may be exacerbating symptoms.\par \par Strongly encouraged hydration as patient reports relatively low urine outputs with concentrated appearing urine. Suspect this dehydration is the etiology of his current dysequilibrium. Hold beta-blocker and Entresto for now.

## 2019-06-13 NOTE — REASON FOR VISIT
[Other: _____] : [unfilled] [Parent] : parent [Follow-Up - From Hospitalization] : follow-up of a recent hospitalization for [Admitted for Heart Failure] : patient was not admitted for heart failure [Discharge Date: ___] : Discharge Date: [unfilled] [FreeTextEntry2] : cholecystitis, dysequilibrium

## 2019-06-14 ENCOUNTER — APPOINTMENT (OUTPATIENT)
Dept: CARDIOLOGY | Facility: CLINIC | Age: 39
End: 2019-06-14

## 2019-06-16 LAB
BACTERIA BLD CULT: SIGNIFICANT CHANGE UP
BACTERIA BLD CULT: SIGNIFICANT CHANGE UP

## 2019-06-25 DIAGNOSIS — Z76.89 PERSONS ENCOUNTERING HEALTH SERVICES IN OTHER SPECIFIED CIRCUMSTANCES: ICD-10-CM

## 2019-06-29 ENCOUNTER — OUTPATIENT (OUTPATIENT)
Dept: OUTPATIENT SERVICES | Facility: HOSPITAL | Age: 39
LOS: 1 days | Discharge: HOME | End: 2019-06-29

## 2019-06-29 DIAGNOSIS — Z98.890 OTHER SPECIFIED POSTPROCEDURAL STATES: Chronic | ICD-10-CM

## 2019-06-29 DIAGNOSIS — C62.92 MALIGNANT NEOPLASM OF LEFT TESTIS, UNSPECIFIED WHETHER DESCENDED OR UNDESCENDED: ICD-10-CM

## 2019-06-29 DIAGNOSIS — Z95.828 PRESENCE OF OTHER VASCULAR IMPLANTS AND GRAFTS: Chronic | ICD-10-CM

## 2019-06-29 DIAGNOSIS — Z90.79 ACQUIRED ABSENCE OF OTHER GENITAL ORGAN(S): Chronic | ICD-10-CM

## 2019-07-02 ENCOUNTER — RESULT REVIEW (OUTPATIENT)
Age: 39
End: 2019-07-02

## 2019-07-02 ENCOUNTER — APPOINTMENT (OUTPATIENT)
Dept: HEMATOLOGY ONCOLOGY | Facility: CLINIC | Age: 39
End: 2019-07-02
Payer: MEDICAID

## 2019-07-02 VITALS
SYSTOLIC BLOOD PRESSURE: 120 MMHG | OXYGEN SATURATION: 98 % | BODY MASS INDEX: 34.96 KG/M2 | HEART RATE: 100 BPM | TEMPERATURE: 98.8 F | RESPIRATION RATE: 16 BRPM | DIASTOLIC BLOOD PRESSURE: 79 MMHG | WEIGHT: 272.27 LBS

## 2019-07-02 LAB
HCT VFR BLD CALC: 43.2 % — SIGNIFICANT CHANGE UP (ref 39–50)
HGB BLD-MCNC: 14.4 G/DL — SIGNIFICANT CHANGE UP (ref 13–17)
MCHC RBC-ENTMCNC: 32.4 PG — SIGNIFICANT CHANGE UP (ref 27–34)
MCHC RBC-ENTMCNC: 33.4 G/DL — SIGNIFICANT CHANGE UP (ref 32–36)
MCV RBC AUTO: 97 FL — SIGNIFICANT CHANGE UP (ref 80–100)
PLATELET # BLD AUTO: 184 K/UL — SIGNIFICANT CHANGE UP (ref 150–400)
RBC # BLD: 4.46 M/UL — SIGNIFICANT CHANGE UP (ref 4.2–5.8)
RBC # FLD: 12.8 % — SIGNIFICANT CHANGE UP (ref 10.3–14.5)
WBC # BLD: 6 K/UL — SIGNIFICANT CHANGE UP (ref 3.8–10.5)
WBC # FLD AUTO: 6 K/UL — SIGNIFICANT CHANGE UP (ref 3.8–10.5)

## 2019-07-02 PROCEDURE — 99214 OFFICE O/P EST MOD 30 MIN: CPT

## 2019-07-02 RX ORDER — CARVEDILOL 12.5 MG/1
12.5 TABLET, FILM COATED ORAL TWICE DAILY
Qty: 180 | Refills: 2 | Status: COMPLETED | COMMUNITY
Start: 2018-12-24 | End: 2019-07-02

## 2019-07-02 RX ORDER — SACUBITRIL AND VALSARTAN 24; 26 MG/1; MG/1
24-26 TABLET, FILM COATED ORAL
Qty: 180 | Refills: 1 | Status: COMPLETED | COMMUNITY
Start: 2019-05-15 | End: 2019-07-02

## 2019-07-02 NOTE — REVIEW OF SYSTEMS
[Fatigue] : fatigue [Palpitations] : palpitations [Negative] : Gastrointestinal [Dizziness] : dizziness [Anxiety] : anxiety [Depression] : depression [Fever] : no fever [Chills] : no chills [Night Sweats] : no night sweats [Recent Change In Weight] : ~T no recent weight change [Chest Pain] : no chest pain [Lower Ext Edema] : no lower extremity edema [Wheezing] : no wheezing [Cough] : no cough [Dysuria] : no dysuria [SOB on Exertion] : no shortness of breath during exertion [Incontinence] : no incontinence [Joint Pain] : no joint pain [Joint Stiffness] : no joint stiffness [Muscle Pain] : no muscle pain [Skin Rash] : no skin rash [Difficulty Walking] : no difficulty walking [Muscle Weakness] : no muscle weakness [Easy Bleeding] : no tendency for easy bleeding [FreeTextEntry5] : palpitations last night, he attributed to anxiety, brief duration [Easy Bruising] : no tendency for easy bruising [FreeTextEntry8] : feels  his urine stream is narrowed, present since the surgery [FreeTextEntry9] : occasional leg cramps in calves, with toes cramped.  [de-identified] : no HA, has paresthesias of the feet

## 2019-07-02 NOTE — ASSESSMENT
[Palliative Care Plan] : not applicable at this time [FreeTextEntry1] : Cleve was recently hospitalized, and he is now off carvedilol as well as Entresto. He's doing better at this time. An echocardiogram revealed an ejection fraction of 55%. He is bothered most by paresthesias of his feet and occasional brief syncopal episodes it often occur with changes in position. The latter has occurred when getting up from bed woman sitting to standing at times. If he also places his head backwards, he feels as if it's coming on. His appetite is good and his no weight loss. He has no respiratory complaints. There are no dominant pains other than. He has chronic back pain and takes oxycodone. He denies any headaches. His libido is poor and directions are somewhat compromised. He does not ejaculate after his retroperitoneal lymph node dissection.\par \par On physical examination, he appears well there were no palpable abnormalities. The wound has healed although there is some minimal oozing and he says the wound opened up again. The right testicle was normal. There is no spinal column or chest wall tenderness other than over the lumbar spine where he usually has pain.\par \par He does admit to having some depression as well as anxiety. He is amenable to see someone professionally, I have contacted one of our psycho-oncologists to arrange an evaluation for him. I emphasized that he has come so far from where he has been and we had a lengthy discussion regarding this.\par \par All questions are answered the best of my ability and to his apparent satisfaction. Today's blood tests are pending. I asked him to return to see me once again in 2 months time. Testosterone level added.

## 2019-07-02 NOTE — PHYSICAL EXAM
[Restricted in physically strenuous activity but ambulatory and able to carry out work of a light or sedentary nature] : Status 1- Restricted in physically strenuous activity but ambulatory and able to carry out work of a light or sedentary nature, e.g., light house work, office work [Normal] : normoactive bowel sounds, soft and nontender, no hepatosplenomegaly or masses appreciated [de-identified] : no gynecomastia [de-identified] : uncircumcised, glans normal, testicle on right is normal.

## 2019-07-02 NOTE — RESULTS/DATA
[FreeTextEntry1] : EXAM: CT ABDOMEN AND PELVIS IC \par PROCEDURE DATE: Jun 11 2019 \par INTERPRETATION: CLINICAL INFORMATION: Nausea, vomiting, abdominal tenderness to palpation in the suprapubic region and vomiting. History of \par retroperitoneal lymph node dissection; mass excision; testicular cancer and cholecystitis status post cholecystectomy 6/6/2019. \par COMPARISON: Hepatobiliary scan acquired 6/4/2019, CT the abdomen and pelvis acquired 12/3/2018, 10/18/2018, 10/10/2018, 5/26/2018, 3/9/2018, 10/31/2017, 7/15/2017. \par PROCEDURE: \par CT of the Abdomen and Pelvis was performed with intravenous contrast. Intravenous contrast: 90 ml Omnipaque 350. 10 ml discarded. \par Oral contrast: None. Sagittal and coronal reformats were performed. \par FINDINGS: \par LOWER CHEST: Bilateral lower lobe dependent atelectasis. \par LIVER: Within normal limits. \par BILE DUCTS: Normal caliber. \par GALLBLADDER: Status post cholecystectomy. Trace fluid in the gallbladder fossa likely postsurgical in nature. \par SPLEEN: Within normal limits. \par PANCREAS: Within normal limits. \par ADRENALS: Within normal limits. \par KIDNEYS/URETERS: Symmetric enhancement, no hydronephrosis. Bilateral renal cysts. \par BLADDER: Within normal limits. \par REPRODUCTIVE ORGANS: The prostate is within normal limits. \par BOWEL: No bowel obstruction or inflammation. Appendix is normal. \par PERITONEUM: Interval resolution of previously noted mesenteric fluid collection. Redemonstration of a 1.2 cm soft tissue focus anterior to the mid descending colon likely representing fat necrosis or an omental infarct. \par VESSELS: Within normal limits. \par RETROPERITONEUM: No lymphadenopathy. Retroperitoneal surgical clips. \par ABDOMINAL WALL: Small fat-containing umbilical and midline epigastric anterior abdominal wall hernias. Postsurgical change in the midline lower anterior abdominal wall. \par BONES: Degenerative changes of the spine. \par IMPRESSION: \par No bowel obstruction or inflammation. \par YU ROSADO M.D., RADIOLOGY RESIDENT \par This document has been electronically signed. \par STEPHEN DICKSON M.D., ATTENDING RADIOLOGIST \par This document has been electronically signed. Jun 11 2019 11:57PM

## 2019-07-02 NOTE — HISTORY OF PRESENT ILLNESS
[Disease: _____________________] : Disease: [unfilled] [T: ___] : T[unfilled] [N: ___] : N[unfilled] [M: ___] : M[unfilled] [AJCC Stage: ____] : AJCC Stage: [unfilled] [de-identified] : Mr. Calderón is seen in consultation on 8/8/17. On July 11, 2017, he awoke with severe back pain extending into the left testicle. There was a heaviness and pulling sensation. He went to the ER at [facility] and a sonogram was done, revealing a mass. He was seen by Rivas Oconnor and underwent a left radical orchiectomy on July 20, 2017. The pain and pulling sensation resolved. He has been on oxycodone a time, since January 2017, for low back pains. No respiratory complaints. No urinary symptoms. Has had sperm cryopreservation. \par \par 8/30/17...Cleve comes in today for follow up was just discharged yesterday. He was in the hospital for 4 days and he was neutropenic, found a right arm superficial thrombosis at the site of IV. treated with vanco, blood cultures negative. Discharged on Levaquin. Overnight he had a temp of 104.  He is having some hair loss, numbness in his fingers and his toes. He has tinnitus intermittently, started two days after the last dose. He was having soreness in the back of his throat that now resolved. He complains of dysgeusia and he had a lot  of burning and acid reflux-like symptoms during his treatment Carafate, Protonix and the lower dose of Decadron improved his symptoms., Not sleeping well, likely due to steroids.\par \par 9/19/17...cycle 2 day 5 was 9/9/17\fabian Poon is seen in the office today. He has significant fatigue. He had vomiting for 5 days after the chemotherapy with nausea and he said that his complexion was green. He had acid reflux issues once again despite the dropping off the dose of his dexamethasone. He feels a burning sensation in his lower abdomen and he feels that the medications for nausea did not help. He also has significant fatigue which is improved today. He has intermittent tinnitus. He has tingling in the tips of his toes and fingers which are intermittent. His appetite has been good and he is gained weight while on chemotherapy but he says that he eats smaller portions. He denies dysgeusia or dry mouth.\par \par 10/9/17...Completed cycle 3 last week.  He states he feels "horrible." HE states he noticed bloating in the abdomen, He went a whole week after this treatment wit vomiting every day, last time he vomited was two days ago. He fell at one point secondary to being weak and feeling lightheaded. He feels generalized weakness.He had a blood clot, during his treatment, that resolved. His appetite is poor, He eats and then, he eats, he feels like he has to regurgitate his food up after eating. He has fatigue, tinnitus stopped. he denies any numbness of the feet. has had chronic numbness of two of his fingers.He has dysgeusia \par \par 10/30/17...Admission Date: \par -  Admission Date 16-Oct-2017 13:33. \par Discharge Date: \par - Discharge Date	21-Oct-2017 \par Chief Complaint/Reason for Visit: \par Chief Complaint/Reason for Admission	Atrial flutter with RVR\par *******************\par  36 yo M with PMHx of testicular cancer s/p left orchiectomy on chemotherapy who present to the ED before receiving chemo for tachycardia. \par The patient states that he has been feeling well for the past 2 weeks since his 3rd dose of chemotherapy. He denies CP, ABARCA, peripheral edema, cough, \par palpitations. He does states that he had one near syncopal event during a bout of emesis 2 weeks ago which caused him to fall into a door. Has not had any \par other syncopal or presyncopal episodes since then. In the ED he was found to have tachycardia found to be a.flutter after receiving 10mg IV diltiazem x2, a CXR with well placed portacath and no infiltrations. On bedside US found to have EF of 10-15% while tachycardic. \par The patient was initially started on metoprolol tartrate with increasing dosages unable to control the arrhythmia he was subsequently switch to a diltiazem drip. The metiport was removed after discussion with his Oncologist and cardiology for possible arrhythmia induction which was removed 10/19. He also received a TTE on 10/19 which showed EF of 35-40% with LV remodeling. \par Afterwards he was switched to metoprolol succinate 200mg qday. \par He was started on heparin for preparation for TYLOR and cardioversion which was performed on 10/20 and found a RA appendage thrombus with PFO and reduced LV \par function. No cardioversion was performed as a result and he was switched to rivaroxaban on 10/21, lisinopril 2.5mg qday and metoprolol succinate 200mg \par qday. He will follow with cardiology in 2 weeks and EP in 4 weeks. Treatments/Procedures/Significant Findings/Patient Condition: \par Other Significant Findings: \par - Other Significant Findings	 \par < from: Transesophageal Echocardiogram w/o TTE (10.20.17 @ 12:08) > \par Conclusions: \par 1. Left atrial enlargement.  No left atrial or left atrial appendage thrombus. \par 2. Moderate to severe global left ventricular systolic dysfunction. \par 3. There is an echodensity (likely thrombus vs mass) in the right atrial wall that measures (approximately 2.7 cm x 2.0 cm). This is near the distal point of the SVC and may \par represent thrombus from prior indwelling catheter. \par 4. Right ventricular enlargement with decreased right ventricular systolic function. \par 5. Color Doppler demonstrates evidence of a patent foramen ovale. \par 6. Trivial pericardial effusion. \par ****************************************************************************\par TYLOR showed right atrial appendage thrombus, 27 mm, On Xarelto. Feels "exhausted". Does not generally note palpitations. Anxiety level is high. Occasional ABARCA, not at rest. No cough, no sputum, no wheezing. No nausea at this time, has regurgitation. Occ vomiting noted. Fatigue is prominent. Appetite is good. \par \par 7/9/18...Had RPLND on 6/6/18, with viable GCT present. Embryonal pathology. Says he have fatigue, still "recovering" from the RLPND. He has some soreness of the abdomen and says he has some delayed healing. He say Dr Piper on 7/6 in lieu of Dr Restrepo and was told that it was healing well. He is upset that there is residual cancer present, which he says "bummed him out completely". He says he has been reviewing issues related to continued treatment, only after I called him recently as he had not made an appointment. he says his appetite is poor and he is eating small amounts multiple times as he cannot eat full full meals. He says he has had some nausea and he started OTC Prevacid, which has helped. No significant weight loss. Some ABARCA if he walks about 3 blocks or so, he has fatigue with walking as well. he has some paresthesias of the left thigh after the surgery. He notes some spasms of the muscles in that area at times. Urine flow is normal, no nocturia. Libido down, not sexually active, no masturbation since surgery. \par \par 7/19/18...Feels the same, "still recovering". Still has some discomfort near the wound. Appetite s good. Gained 2 pounds. no cough. Mild ABARCA with activities. No palpitations. No leg edema. He continues with some fatigue, 6 on 0-10 scale. Feels he is not motivated when the fatigue is high. He feels his hands are weaker than before. Lifting is fine, but twisting a bottle to open may be  impaired. Ambulates with a cane  due to paresthesias of the left anterior thigh. This from knee  to thigh area,. No paresthesias of the feet. He sometimes has some pains in his feet at night which he says are not cramps. \par \par 9/6/18...Cycle 2, TIP, day # 9. Admitted on Sunday, August 19. \par *********************************************\par Hospitalized again post chemo, this time for 9 days. ANC was <0.1, platelets were low. had low sodium, low potassium and low magnesium. He has had lots of muscle aches and bone pain secondary to the Taxol. Feeling better, still feels weak. Some SOB with exertion. No pains. No headaches, has paresthesias of the bottom of the feet along with some toes. It improves after the chemo. Somewhat more this time. No fevers, no chills. Some nausea with office visits. Appetite is "moderate". Tinnitus has improved, minor dizziness at times. \par \par 8/30/18...Delayed treatment #3 by one week. "Not a good week". Complains of hearing being sensitive. He has tinnitus. This is intermittent, more in the past 2-3 days. He has some vertigo, with some issue walking. He feels as if he is falling to the right side at times, no falls. He has to hold onto the door or furniture to walk. He has some vomiting without nausea. He vomited a few minutes ago. Appetite is decreased, He has altered taste as well. Fatigue is present, not as bad as before. he says he is a "little" fatigued at the moment. Paresthesias of feet have been intermittent, less notable at this time. Spends a lot of time sitting, sleeping. He feels as if his body is exhausted. His mother says he has a lot of anxiety. He agrees. He has thoughts running through his mind about chemo and adverse effects. \par \par 9/20/18...he feels "okay" at times. Occasionally feels nauseous and can't eat, occ feels dizzy. has some regurgitation at times. Has fatigue. No better. Tinnitus is "not as bad", persistent in the right, intermittent in the left. When dizzy,  he feels he is leaning to one side. Spends a lot of time sitting or lying in bed. Paresthesias of the feet, left foot more so than right. Right toes affects the 4th and 5th toe. Fingertips are affected. He notes some darkening of skin folds of the hand joints. Feels no SOB unless he is active. using a cane today. Not taking any meds at this point. \par \par 10/9/18...Admission Date: after cycle 3 TIP\par -  Admission Date 01-Oct-2018 02:09. \par Discharge Date: \par - Discharge Date	06-Oct-2018 \par ***********************************\par HOSPITAL COURSE: The patient continued to have significant nausea while inpatient with intermittent vomiting. He received aggressive IV hydration with \par IVF while he was no longer able to tolerate po. On hospital day 4, patient was able to tolerate some po and requested discharge home. His course was c/b \par neutropenic fever to 101. Cultures were sent and remained negative. Patient was also started on cefepime. His ANC started to improve towards the end of the \par hospital course. His platelets, however, downtrended. He was given 1U platelets as per oncology recommendations. His midline was noted to have been in since \par July. After discussion with his outpatient oncologist, the patient opted to have his midline removed and will receive his last dose of chemotherapy via \par peripheral IV. During his hospital course, patient was noted to have an episode of tachycardia to the 140's followed by bradycardia to the 20's during \par orthostatic vitals. He was seen by Dr. Brown of cardiology who feels this is chemo-induced orthostasis and no further inpatient w/u is needed. He is \par medically stable for dc home. Prior to discharge received additional platelet transfusion and MID-Line was removed after discussion with oncology. \par *******************************************************************\par He was admitted and told that the midline had to be changed. He did have fever with neutropenia. Discharged on the 6th. Hypotensive with orthostasis,seen by cardiology, restarted lisinopril and carvedilol. He has fallen twice since discharge. He has LOC for a few seconds. he is somewhat confused after the episodes. Walking with a cane. he also had an similar episode while in the hospital. Appetite returned, has altered taste, no N/V now, but did have on admission. No source of fever found. Has paresthesias of the left foot, all toes, not his right.  Had aches and pains with the Taxol, which he describes as "brutal". \par \par 10/29/18...called last week, was having difficulty keeping food down and felt weak and dizzy. We arranged for fluids, but he called Friday ad declined. Taking Zofran ADT after meals at times. Able to drink more at this time. Did not eat this AM. Yesterday, had a bagel and egg, for lunch, potatoes, pork and beans and Spam. For dinner had rice and Spam, small amount. No vomiting yesterday, but had nausea. Dizziness if he leis his head back, or if he gets up to fast. Walking with  a cane. No falls. Started Has paresthesias of the feet, on the left side. Continues on oxycodone, about  1 per day, chronic use for low back pains. No leg edema. No cough, ABARCA. \par \par 11/13/18...has not followed with Dr Marquez and remains off of carvedilol, enoxaparin and lisinopril. He takes ondansetron on occasion. Says he had a "blackout" episode after getting up, fell, striking left side, no injury. No LOC. Neuropathy remains, mostly left leg, somewhat better at times, continues on therapy. Minimal on the right side. Considering returning to work. Fatigue is present, somewhat better. Appetite is still impaired, eats once a day. Gained a few pounds. Still has some taste alteration. No leg edema. No abdominal pains. Saw Phong Restrepo yesterday in follow up. He has constipation, and may go 4-5 days w/o bowel movement. \par \par 1/4/19...Had echo done 12/2018. "I'm okay", feeling better, saw Dr Marquez. Paresthesias are an issue. On gabapentin 300 TID, says it is not helping. He says his fingers are affected, works on computers, and hits wrong keys at times. Worse are his feet. Left foot is numb, right foot partially numb, annoying when walking, not sensing if he steps on something. He continues on oxycodone. Affect toes, senses that they are swollen, bottom of the feet as well as the top. Does not pass the ankle. In the hands, it is the 3rd and 4th finger distally on the right side, and 3-4-5 on the left. Echo said that LVEF is about 44-46 % with moderate global dysfunction. The LA appendage clot was not seen. Not as tired as before, walked 11 minutes yesterday with some fatigue. Occ mild nausea, no vomiting. Appetite is good, gained 4 kilos on the past month( points out that he is wearing heavy boots. . Still has some dysgeusia. Still avoids meats.  Occasional HA, lasts up to 1/2 day, occurs intermittently, once a week. More right side of the headaches. Prior sinus headaches, but not the same, has a sharp element to it. Stills feels dizzy at times when gets up too fast..\par \par 3/11/19...Urgent visit. Called this AM stating that the right testicle is swollen, present for a few days, mild tenderness, mild discomfort/pain. No back pains except for his chronic back issue. He notes some skin changes of his hands and his feet.e is some peeling skin at the base of the toes. as well as the center of the foot. He has neuropathy of both feet, more on the left than the right, where it only affects the toes. He says the medial 3 fingers are numb and he drops things. His heart exam included a stress test, which he reports the outcome was good. Still reports tinnitus. Appetite is good, weight is increased 6 kilos since 1/30/19. Still has fatigue. \par \par 5/11/19...Missed appt 4/5/19. I " have good days and bad days". He feels he puts on a lot of weight in  a short time, and then drops weight in a short period of time. He had edema of both feet and also feels he had edema of the hands as well. He says edema accumulates and then resolves. Neuropathy is "pretty bad", extends to the calves, worse at night. Ran out of gabapentin, was on 300 TID and did not feel it helped. He has discoloration of the left foot, edema both feet. Has cramps in the legs. Feels he cannot stand for a long time, and complains of blistering of the feet. He has shooting pains in the toes. There are fluid filled blisters that he he "has to pop", due to pain. He feels he s less active due to the blisters. He feels his toes are locked i position. Does exercises provided by PT. Appetite is good. He is thirsty many times. NO pains elsewhere except chronic back pain, for which he takes oxycodone. No cough, has some ABARCA, can walk 2 blocks prior to stopping. Sees DR Marquez later today. Fine motor issues with fingers with neuropathy, difficulty with typing on keyboard, with many errors.  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 Metastatic tumor in 2 of 57 lymph nodes,  Extranodal extension is identified, 5.2 cm, pN3 [FreeTextEntry1] : etoposide and cisplatin started August 14, 2017. Cycle 4 delayed..then omitted due to tachycardia and low EF. RPLND delayed, residual viable embryonal cancer, TIP started July 29, 2018, cycle 3 delayed for grade 3 fatigue, then again one more week for transaminitis.  [de-identified] : Hospital Course: \par Discharge Date	12-Jun-2019 \par Admission Date	12-Jun-2019 00:37 \par Reason for Admission	vomiting, vertigo \par Medication Reconciliation Status	Admission Reconciliation is Completed \par Discharge Reconciliation is Completed \par Hospital Course	 \par 38M with PMHx of metastatic testicular CA, NICM, neuropathy, who presents with dizziness, nausea, and vomiting. \par Vertigo. \par - will c/w meclizine 25mg 4x/day \par - f/u blood and urine cx to r/o infectious etiology \par - PT eval, fall precautions. Constellation of symptoms with horizontal nystagmus and reproducible symptoms with daryn hallpike maneuver c/w BPPV. CT \par head negative. Pt reports improvement after meclizine. \par - will c/w meclizine 25mg 4x/day \par - f/u blood and urine cx to r/o infectious etiology \par - PT eval \par  Cardiomyopathy. 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 Most recent TTE on 6/4 showing EF55%. Pt concerned current sx may be side effect from entresto \par - c/w carvedilol 12.5mg BID \par - will hold entresto in setting of soft BP and pt hesitancy to restart due to side effects. In prior studies, dizziness is reported as an adverse event "at \par least 5%" of patients taking entresto. \par will hold all BP meds upond d/c 2/2 hypotension \par  Vomiting. \par - will c/w IVF overnight and repeat lactate in AM \par - zofran PRN nausea. \par  Mixed germ cell tumor. \par  now in remission. \par **********************************************\par Was hospitalized and now off carvedilol and Entresto. Doing better at this time. Only bothered by paresthesias of the feet and occasion syncope with changes in position. the latter has occurred when getting up from bed or with sitting to standing at times. Appetite is good, no weight loss. No cough, no ABARCA. No palpitations noted. Chronic back pains, no changes. No HA. Libido is down, erections are difficult to achieve, are not durable,  no ejaculation.\par

## 2019-07-08 LAB
AFP-TM SERPL-MCNC: 2 NG/ML
ALBUMIN SERPL ELPH-MCNC: 4.1 G/DL
ALP BLD-CCNC: 61 U/L
ALT SERPL-CCNC: 63 U/L
ANION GAP SERPL CALC-SCNC: 23 MMOL/L
AST SERPL-CCNC: 59 U/L
BILIRUB SERPL-MCNC: 0.3 MG/DL
BUN SERPL-MCNC: 15 MG/DL
CALCIUM SERPL-MCNC: 9 MG/DL
CHLORIDE SERPL-SCNC: 112 MMOL/L
CO2 SERPL-SCNC: 16 MMOL/L
CREAT SERPL-MCNC: 1.09 MG/DL
GLUCOSE SERPL-MCNC: 106 MG/DL
HCG-TM SERPL-MCNC: <1 MIU/ML
LDH SERPL-CCNC: 430 U/L
MAGNESIUM SERPL-MCNC: 2 MG/DL
POTASSIUM SERPL-SCNC: 4.8 MMOL/L
PROT SERPL-MCNC: 7.3 G/DL
SODIUM SERPL-SCNC: 151 MMOL/L
TESTOST SERPL-MCNC: 481 NG/DL

## 2019-07-12 ENCOUNTER — MESSAGE (OUTPATIENT)
Age: 39
End: 2019-07-12

## 2019-07-16 ENCOUNTER — NON-APPOINTMENT (OUTPATIENT)
Age: 39
End: 2019-07-16

## 2019-07-16 ENCOUNTER — APPOINTMENT (OUTPATIENT)
Dept: CARDIOLOGY | Facility: CLINIC | Age: 39
End: 2019-07-16
Payer: MEDICAID

## 2019-07-16 VITALS
DIASTOLIC BLOOD PRESSURE: 68 MMHG | OXYGEN SATURATION: 96 % | HEART RATE: 81 BPM | SYSTOLIC BLOOD PRESSURE: 100 MMHG | WEIGHT: 269 LBS | BODY MASS INDEX: 34.54 KG/M2

## 2019-07-16 DIAGNOSIS — I50.9 HEART FAILURE, UNSPECIFIED: ICD-10-CM

## 2019-07-16 DIAGNOSIS — R20.0 ANESTHESIA OF SKIN: ICD-10-CM

## 2019-07-16 PROCEDURE — 93000 ELECTROCARDIOGRAM COMPLETE: CPT

## 2019-07-16 PROCEDURE — 99214 OFFICE O/P EST MOD 30 MIN: CPT

## 2019-07-16 NOTE — REASON FOR VISIT
[Follow-Up - From Hospitalization] : follow-up of a recent hospitalization for [Discharge Date: ___] : Discharge Date: [unfilled] [Admitted for Heart Failure] : patient was not admitted for heart failure [FreeTextEntry2] : cholecystitis, dysequilibrium

## 2019-07-16 NOTE — DISCUSSION/SUMMARY
[FreeTextEntry1] : Patient is a 38 year-old with testicular cancer who developed atrial flutter secondary to catheter irritation of the right atrium and nonischemic cardiomyopathy, likely secondary to the tachycardia and not to the chemotherapy regimen. \par He has been maintaining sinus rhythm since flutter ablation in February 2018.\par Most recent chemo was given October 2018.\par \par Most recent echo (in June 2019 at [facility]) showed improved LVEF (50-55%).\par Patient had been on carvedilol 12.5 mg BID and Entresto 24-26 mg BID. Will resume if needed.\par \par Recommend seeing pain management with the goal of weaning off opiates as these may be exacerbating symptoms.\par \par Strongly encouraged hydration as patient reports relatively low urine outputs with concentrated appearing urine. Suspect this dehydration is the etiology of his current dysequilibrium. Hold beta-blocker and Entresto for now.

## 2019-08-12 ENCOUNTER — APPOINTMENT (OUTPATIENT)
Dept: INTERNAL MEDICINE | Facility: CLINIC | Age: 39
End: 2019-08-12
Payer: MEDICAID

## 2019-08-12 VITALS
BODY MASS INDEX: 35.29 KG/M2 | WEIGHT: 275 LBS | DIASTOLIC BLOOD PRESSURE: 60 MMHG | OXYGEN SATURATION: 98 % | TEMPERATURE: 98.2 F | RESPIRATION RATE: 16 BRPM | HEIGHT: 74 IN | SYSTOLIC BLOOD PRESSURE: 100 MMHG | HEART RATE: 87 BPM

## 2019-08-12 PROCEDURE — 99214 OFFICE O/P EST MOD 30 MIN: CPT

## 2019-08-12 NOTE — ASSESSMENT
[FreeTextEntry1] : 39 year old male found to have stable Neuropathy, Left Testicular Cancer, Nonischemic Cardiomyopathy, Abnormal LFT,with the current regimen, diet and life style modifications, as counseled. Prior results reviewed and discussed with the patient during today's examination. Plan as ordered.\par Supplemental history was obtained from mother, who is accompanying the patient for today's examination.\par

## 2019-08-12 NOTE — HEALTH RISK ASSESSMENT
[No] : In the past 12 months have you used drugs other than those required for medical reasons? No [No falls in past year] : Patient reported no falls in the past year [0] : 2) Feeling down, depressed, or hopeless: Not at all (0) [] : No [de-identified] : [facility]/CARD [[id number]] : 0

## 2019-08-12 NOTE — HISTORY OF PRESENT ILLNESS
[de-identified] : 39 year old  male patient with history of stable Left Testicular Cancer, Nonischemic Cardiomyopathy, Abnormal LFT, history as stated, presented for follow up examination. Patient is compliant with all medications. Denies shortness of breath, chest pain or abdominal pains at this time. ROS as stated.\par

## 2019-08-13 DIAGNOSIS — E55.9 VITAMIN D DEFICIENCY, UNSPECIFIED: ICD-10-CM

## 2019-08-13 LAB
25(OH)D3 SERPL-MCNC: 12.7 NG/ML
ALBUMIN SERPL ELPH-MCNC: 4.1 G/DL
ALP BLD-CCNC: 53 U/L
ALT SERPL-CCNC: 55 U/L
ANION GAP SERPL CALC-SCNC: 16 MMOL/L
APPEARANCE: CLEAR
AST SERPL-CCNC: 39 U/L
BASOPHILS # BLD AUTO: 0.02 K/UL
BASOPHILS NFR BLD AUTO: 0.3 %
BILIRUB SERPL-MCNC: 0.4 MG/DL
BILIRUBIN URINE: NEGATIVE
BLOOD URINE: NEGATIVE
BUN SERPL-MCNC: 19 MG/DL
CALCIUM SERPL-MCNC: 9 MG/DL
CHLORIDE SERPL-SCNC: 109 MMOL/L
CHOLEST SERPL-MCNC: 102 MG/DL
CHOLEST/HDLC SERPL: 3.2 RATIO
CO2 SERPL-SCNC: 18 MMOL/L
COLOR: YELLOW
CREAT SERPL-MCNC: 1.02 MG/DL
CREAT SPEC-SCNC: 223 MG/DL
EOSINOPHIL # BLD AUTO: 0.1 K/UL
EOSINOPHIL NFR BLD AUTO: 1.6 %
ERYTHROCYTE [SEDIMENTATION RATE] IN BLOOD BY WESTERGREN METHOD: 25 MM/HR
ESTIMATED AVERAGE GLUCOSE: 111 MG/DL
FOLATE SERPL-MCNC: 13.3 NG/ML
GGT SERPL-CCNC: 54 U/L
GLUCOSE QUALITATIVE U: NEGATIVE
GLUCOSE SERPL-MCNC: 102 MG/DL
HBA1C MFR BLD HPLC: 5.5 %
HCT VFR BLD CALC: 42.4 %
HDLC SERPL-MCNC: 32 MG/DL
HGB BLD-MCNC: 14 G/DL
IMM GRANULOCYTES NFR BLD AUTO: 1.3 %
IRON SATN MFR SERPL: 43 %
IRON SERPL-MCNC: 120 UG/DL
KETONES URINE: NEGATIVE
LDLC SERPL CALC-MCNC: 48 MG/DL
LEUKOCYTE ESTERASE URINE: NEGATIVE
LYMPHOCYTES # BLD AUTO: 2.24 K/UL
LYMPHOCYTES NFR BLD AUTO: 35.1 %
MAN DIFF?: NORMAL
MCHC RBC-ENTMCNC: 32.2 PG
MCHC RBC-ENTMCNC: 33 GM/DL
MCV RBC AUTO: 97.5 FL
MICROALBUMIN 24H UR DL<=1MG/L-MCNC: 1.5 MG/DL
MICROALBUMIN/CREAT 24H UR-RTO: 7 MG/G
MONOCYTES # BLD AUTO: 0.62 K/UL
MONOCYTES NFR BLD AUTO: 9.7 %
NEUTROPHILS # BLD AUTO: 3.32 K/UL
NEUTROPHILS NFR BLD AUTO: 52 %
NITRITE URINE: NEGATIVE
PH URINE: 6
PLATELET # BLD AUTO: 168 K/UL
POTASSIUM SERPL-SCNC: 4.1 MMOL/L
PROT SERPL-MCNC: 6.9 G/DL
PROTEIN URINE: NORMAL
RBC # BLD: 4.35 M/UL
RBC # FLD: 12.9 %
SODIUM SERPL-SCNC: 143 MMOL/L
SPECIFIC GRAVITY URINE: 1.03
T3 SERPL-MCNC: 136 NG/DL
T4 FREE SERPL-MCNC: 1.1 NG/DL
TIBC SERPL-MCNC: 282 UG/DL
TRIGL SERPL-MCNC: 108 MG/DL
TSH SERPL-ACNC: 1.57 UIU/ML
UIBC SERPL-MCNC: 162 UG/DL
UROBILINOGEN URINE: NORMAL
VIT B12 SERPL-MCNC: 563 PG/ML
WBC # FLD AUTO: 6.38 K/UL

## 2019-08-20 ENCOUNTER — APPOINTMENT (OUTPATIENT)
Dept: DERMATOLOGY | Facility: CLINIC | Age: 39
End: 2019-08-20
Payer: MEDICAID

## 2019-08-20 VITALS — HEIGHT: 74 IN | WEIGHT: 276 LBS | BODY MASS INDEX: 35.42 KG/M2

## 2019-08-20 DIAGNOSIS — L30.8 OTHER SPECIFIED DERMATITIS: ICD-10-CM

## 2019-08-20 PROCEDURE — 99203 OFFICE O/P NEW LOW 30 MIN: CPT

## 2019-09-07 ENCOUNTER — OUTPATIENT (OUTPATIENT)
Dept: OUTPATIENT SERVICES | Facility: HOSPITAL | Age: 39
LOS: 1 days | Discharge: HOME | End: 2019-09-07

## 2019-09-07 DIAGNOSIS — C62.92 MALIGNANT NEOPLASM OF LEFT TESTIS, UNSPECIFIED WHETHER DESCENDED OR UNDESCENDED: ICD-10-CM

## 2019-09-07 DIAGNOSIS — Z98.890 OTHER SPECIFIED POSTPROCEDURAL STATES: Chronic | ICD-10-CM

## 2019-09-07 DIAGNOSIS — Z95.828 PRESENCE OF OTHER VASCULAR IMPLANTS AND GRAFTS: Chronic | ICD-10-CM

## 2019-09-07 DIAGNOSIS — Z90.79 ACQUIRED ABSENCE OF OTHER GENITAL ORGAN(S): Chronic | ICD-10-CM

## 2019-09-10 ENCOUNTER — APPOINTMENT (OUTPATIENT)
Dept: HEMATOLOGY ONCOLOGY | Facility: CLINIC | Age: 39
End: 2019-09-10
Payer: MEDICAID

## 2019-09-10 ENCOUNTER — RESULT REVIEW (OUTPATIENT)
Age: 39
End: 2019-09-10

## 2019-09-10 VITALS
DIASTOLIC BLOOD PRESSURE: 76 MMHG | RESPIRATION RATE: 16 BRPM | WEIGHT: 282.63 LBS | TEMPERATURE: 98.8 F | SYSTOLIC BLOOD PRESSURE: 111 MMHG | BODY MASS INDEX: 36.29 KG/M2 | HEART RATE: 95 BPM | OXYGEN SATURATION: 96 %

## 2019-09-10 LAB
AFP-TM SERPL-MCNC: 2.2 NG/ML
ALBUMIN SERPL ELPH-MCNC: 4.1 G/DL
ALP BLD-CCNC: 53 U/L
ALT SERPL-CCNC: 79 U/L
ANION GAP SERPL CALC-SCNC: 12 MMOL/L
AST SERPL-CCNC: 62 U/L
BILIRUB SERPL-MCNC: 0.4 MG/DL
BUN SERPL-MCNC: 19 MG/DL
CALCIUM SERPL-MCNC: 9.5 MG/DL
CHLORIDE SERPL-SCNC: 103 MMOL/L
CO2 SERPL-SCNC: 26 MMOL/L
CREAT SERPL-MCNC: 1.16 MG/DL
GLUCOSE SERPL-MCNC: 106 MG/DL
HCT VFR BLD CALC: 44.4 % — SIGNIFICANT CHANGE UP (ref 39–50)
HGB BLD-MCNC: 14.9 G/DL — SIGNIFICANT CHANGE UP (ref 13–17)
LDH SERPL-CCNC: 202 U/L
MAGNESIUM SERPL-MCNC: 2.1 MG/DL
MCHC RBC-ENTMCNC: 32.7 PG — SIGNIFICANT CHANGE UP (ref 27–34)
MCHC RBC-ENTMCNC: 33.6 G/DL — SIGNIFICANT CHANGE UP (ref 32–36)
MCV RBC AUTO: 97.1 FL — SIGNIFICANT CHANGE UP (ref 80–100)
PLATELET # BLD AUTO: 179 K/UL — SIGNIFICANT CHANGE UP (ref 150–400)
POTASSIUM SERPL-SCNC: 4.2 MMOL/L
PROT SERPL-MCNC: 7 G/DL
RBC # BLD: 4.57 M/UL — SIGNIFICANT CHANGE UP (ref 4.2–5.8)
RBC # FLD: 12.3 % — SIGNIFICANT CHANGE UP (ref 10.3–14.5)
SODIUM SERPL-SCNC: 141 MMOL/L
WBC # BLD: 5.8 K/UL — SIGNIFICANT CHANGE UP (ref 3.8–10.5)
WBC # FLD AUTO: 5.8 K/UL — SIGNIFICANT CHANGE UP (ref 3.8–10.5)

## 2019-09-10 PROCEDURE — 99214 OFFICE O/P EST MOD 30 MIN: CPT

## 2019-09-10 NOTE — PHYSICAL EXAM
[Ambulatory and capable of all self care but unable to carry out any work activities] : Status 2- Ambulatory and capable of all self care but unable to carry out any work activities. Up and about more than 50% of waking hours [Obese] : obese [Normal] : grossly intact [de-identified] : no gynecomastia [de-identified] : flat affect

## 2019-09-10 NOTE — ASSESSMENT
[Palliative Care Plan] : not applicable at this time [FreeTextEntry1] : Cleve is seen in the office today. He feels "okay", but he has many complaints. He notes intermittent swelling of his hands and his feet and occasionally occurs in the face as well and may last for several days. He has paresthesias of the fingertips and hands, which is somewhat better than before. He can be shock like discomfort she notes more at night but is more prominent in the feet. He took gabapentin but says it did not help. He has his usual back pains which antedated his diagnosis from an injury at work. He takes oxycodone tablets 15 mg approximately 2-4 times per day. His appetite has been good. He's gained weight. He does have constipation. He's had a blistering rash on the bottom of his feet and he was seen by Dr. Hernandez of dermatology and prescribed however the distal. He says that the rash recurs with blistering. He is relatively inactive. He says he tries to walk around but the pain in his feet causes discomfort. He occasionally utilizes his cane to walk. Urinary flow is somewhat impaired as he said that it feels like he has some narrowing of the urethra. There is no burning. There is some mild discomfort at times with urinating but not burning. Nocturia occurs 3-4 times per night. There is no blood. He does have occasional leakage of this year and if he goes to stand up at times.\par \par On physical examination, he appears well. He is obviously increased weight. The HEENT examination is normal. There were no palpable neck nodes. The chest is clear and heart examination is normal. The abdominal wound has finally healed at the most inferior aspect. The remaining testicle is normal to palpation. There is trace edema to palpation of the ankles.\par \par His last scan was performed on June 11, and this revealed no evidence of recurrence.\par \par Today's CBC was normal. The remainder the laboratories is pending.\par \par We elected discussion about depression. He has lack of interest in many things. He spends most of the day of the house. He really needs to see someone. He was previously given the name of a psychiatrist but did not go. He said to many things were going on at that time. It is important that he recognizes his depression and seeks some assistance. He agrees to do so. I have contacted Dr. Sirena Almanza. He had been set up to see her in July, but he deferred at that time.\par \par All questions were answered to best of my ability and to his apparent satisfaction. I will see him once again in 3 months time and no reach out to him with the results of his laboratory values once they are available.

## 2019-09-10 NOTE — HISTORY OF PRESENT ILLNESS
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 Calderón is seen in consultation on 8/8/17. On July 11, 2017, he awoke with severe back pain extending into the left testicle. There was a heaviness and pulling sensation. He went to the ER at [facility] and a sonogram was done, revealing a mass. He was seen by Rivas Oconnor and underwent a left radical orchiectomy on July 20, 2017. The pain and pulling sensation resolved. He has been on oxycodone a time, since January 2017, for low back pains. No respiratory complaints. No urinary symptoms. Has had sperm cryopreservation. \par \par 8/30/17...Cleve comes in today for follow up was just discharged yesterday. He was in the hospital for 4 days and he was neutropenic, found a right arm superficial thrombosis at the site of IV. treated with vanco, blood cultures negative. Discharged on Levaquin. Overnight he had a temp of 104.  He is having some hair loss, numbness in his fingers and his toes. He has tinnitus intermittently, started two days after the last dose. He was having soreness in the back of his throat that now resolved. He complains of dysgeusia and he had a lot  of burning and acid reflux-like symptoms during his treatment Carafate, Protonix and the lower dose of Decadron improved his symptoms., Not sleeping well, likely due to steroids.\par \par 9/19/17...cycle 2 day 5 was 9/9/17\fabian Poon is seen in the office today. He has significant fatigue. He had vomiting for 5 days after the chemotherapy with nausea and he said that his complexion was green. He had acid reflux issues once again despite the dropping off the dose of his dexamethasone. He feels a burning sensation in his lower abdomen and he feels that the medications for nausea did not help. He also has significant fatigue which is improved today. He has intermittent tinnitus. He has tingling in the tips of his toes and fingers which are intermittent. His appetite has been good and he is gained weight while on chemotherapy but he says that he eats smaller portions. He denies dysgeusia or dry mouth.\par \par 10/9/17...Completed cycle 3 last week.  He states he feels "horrible." HE states he noticed bloating in the abdomen, He went a whole week after this treatment wit vomiting every day, last time he vomited was two days ago. He fell at one point secondary to being weak and feeling lightheaded. He feels generalized weakness.He had a blood clot, during his treatment, that resolved. His appetite is poor, He eats and then, he eats, he feels like he has to regurgitate his food up after eating. He has fatigue, tinnitus stopped. he denies any numbness of the feet. has had chronic numbness of two of his fingers.He has dysgeusia \par \par 10/30/17...Admission Date: \par -  Admission Date 16-Oct-2017 13:33. \par Discharge Date: \par - Discharge Date	21-Oct-2017 \par Chief Complaint/Reason for Visit: \par Chief Complaint/Reason for Admission	Atrial flutter with RVR\par *******************\par  36 yo M with PMHx of testicular cancer s/p left orchiectomy on chemotherapy who present to the ED before receiving chemo for tachycardia. \par The patient states that he has been feeling well for the past 2 weeks since his 3rd dose of chemotherapy. He denies CP, ABARCA, peripheral edema, cough, \par palpitations. He does states that he had one near syncopal event during a bout of emesis 2 weeks ago which caused him to fall into a door. Has not had any \par other syncopal or presyncopal episodes since then. In the ED he was found to have tachycardia found to be a.flutter after receiving 10mg IV diltiazem x2, a CXR with well placed portacath and no infiltrations. On bedside US found to have EF of 10-15% while tachycardic. \par The patient was initially started on metoprolol tartrate with increasing dosages unable to control the arrhythmia he was subsequently switch to a diltiazem drip. The metiport was removed after discussion with his Oncologist and cardiology for possible arrhythmia induction which was removed 10/19. He also received a TTE on 10/19 which showed EF of 35-40% with LV remodeling. \par Afterwards he was switched to metoprolol succinate 200mg qday. \par He was started on heparin for preparation for TYLOR and cardioversion which was performed on 10/20 and found a RA appendage thrombus with PFO and reduced LV \par function. No cardioversion was performed as a result and he was switched to rivaroxaban on 10/21, lisinopril 2.5mg qday and metoprolol succinate 200mg \par qday. He will follow with cardiology in 2 weeks and EP in 4 weeks. Treatments/Procedures/Significant Findings/Patient Condition: \par Other Significant Findings: \par - Other Significant Findings	 \par < from: Transesophageal Echocardiogram w/o TTE (10.20.17 @ 12:08) > \par Conclusions: \par 1. Left atrial enlargement.  No left atrial or left atrial appendage thrombus. \par 2. Moderate to severe global left ventricular systolic dysfunction. \par 3. There is an echodensity (likely thrombus vs mass) in the right atrial wall that measures (approximately 2.7 cm x 2.0 cm). This is near the distal point of the SVC and may \par represent thrombus from prior indwelling catheter. \par 4. Right ventricular enlargement with decreased right ventricular systolic function. \par 5. Color Doppler demonstrates evidence of a patent foramen ovale. \par 6. Trivial pericardial effusion. \par ****************************************************************************\par TYLOR showed right atrial appendage thrombus, 27 mm, On Xarelto. Feels "exhausted". Does not generally note palpitations. Anxiety level is high. Occasional ABARCA, not at rest. No cough, no sputum, no wheezing. No nausea at this time, has regurgitation. Occ vomiting noted. Fatigue is prominent. Appetite is good. \par \par 7/9/18...Had RPLND on 6/6/18, with viable GCT present. Embryonal pathology. Says he have fatigue, still "recovering" from the RLPND. He has some soreness of the abdomen and says he has some delayed healing. He say Dr Piper on 7/6 in lieu of Dr Restrepo and was told that it was healing well. He is upset that there is residual cancer present, which he says "bummed him out completely". He says he has been reviewing issues related to continued treatment, only after I called him recently as he had not made an appointment. he says his appetite is poor and he is eating small amounts multiple times as he cannot eat full full meals. He says he has had some nausea and he started OTC Prevacid, which has helped. No significant weight loss. Some ABARCA if he walks about 3 blocks or so, he has fatigue with walking as well. he has some paresthesias of the left thigh after the surgery. He notes some spasms of the muscles in that area at times. Urine flow is normal, no nocturia. Libido down, not sexually active, no masturbation since surgery. \par \par 7/19/18...Feels the same, "still recovering". Still has some discomfort near the wound. Appetite s good. Gained 2 pounds. no cough. Mild ABARCA with activities. No palpitations. No leg edema. He continues with some fatigue, 6 on 0-10 scale. Feels he is not motivated when the fatigue is high. He feels his hands are weaker than before. Lifting is fine, but twisting a bottle to open may be  impaired. Ambulates with a cane  due to paresthesias of the left anterior thigh. This from knee  to thigh area,. No paresthesias of the feet. He sometimes has some pains in his feet at night which he says are not cramps. \par \par 9/6/18...Cycle 2, TIP, day # 9. Admitted on Sunday, August 19. \par *********************************************\par Hospitalized again post chemo, this time for 9 days. ANC was <0.1, platelets were low. had low sodium, low potassium and low magnesium. He has had lots of muscle aches and bone pain secondary to the Taxol. Feeling better, still feels weak. Some SOB with exertion. No pains. No headaches, has paresthesias of the bottom of the feet along with some toes. It improves after the chemo. Somewhat more this time. No fevers, no chills. Some nausea with office visits. Appetite is "moderate". Tinnitus has improved, minor dizziness at times. \par \par 8/30/18...Delayed treatment #3 by one week. "Not a good week". Complains of hearing being sensitive. He has tinnitus. This is intermittent, more in the past 2-3 days. He has some vertigo, with some issue walking. He feels as if he is falling to the right side at times, no falls. He has to hold onto the door or furniture to walk. He has some vomiting without nausea. He vomited a few minutes ago. Appetite is decreased, He has altered taste as well. Fatigue is present, not as bad as before. he says he is a "little" fatigued at the moment. Paresthesias of feet have been intermittent, less notable at this time. Spends a lot of time sitting, sleeping. He feels as if his body is exhausted. His mother says he has a lot of anxiety. He agrees. He has thoughts running through his mind about chemo and adverse effects. \par \par 9/20/18...he feels "okay" at times. Occasionally feels nauseous and can't eat, occ feels dizzy. has some regurgitation at times. Has fatigue. No better. Tinnitus is "not as bad", persistent in the right, intermittent in the left. When dizzy,  he feels he is leaning to one side. Spends a lot of time sitting or lying in bed. Paresthesias of the feet, left foot more so than right. Right toes affects the 4th and 5th toe. Fingertips are affected. He notes some darkening of skin folds of the hand joints. Feels no SOB unless he is active. using a cane today. Not taking any meds at this point. \par \par 10/9/18...Admission Date: after cycle 3 TIP\par -  Admission Date 01-Oct-2018 02:09. \par Discharge Date: \par - Discharge Date	06-Oct-2018 \par ***********************************\par HOSPITAL COURSE: The patient continued to have significant nausea while inpatient with intermittent vomiting. He received aggressive IV hydration with \par IVF while he was no longer able to tolerate po. On hospital day 4, patient was able to tolerate some po and requested discharge home. His course was c/b \par neutropenic fever to 101. Cultures were sent and remained negative. Patient was also started on cefepime. His ANC started to improve towards the end of the \par hospital course. His platelets, however, downtrended. He was given 1U platelets as per oncology recommendations. His midline was noted to have been in since \par July. After discussion with his outpatient oncologist, the patient opted to have his midline removed and will receive his last dose of chemotherapy via \par peripheral IV. During his hospital course, patient was noted to have an episode of tachycardia to the 140's followed by bradycardia to the 20's during \par orthostatic vitals. He was seen by Dr. Brown of cardiology who feels this is chemo-induced orthostasis and no further inpatient w/u is needed. He is \par medically stable for dc home. Prior to discharge received additional platelet transfusion and MID-Line was removed after discussion with oncology. \par *******************************************************************\par He was admitted and told that the midline had to be changed. He did have fever with neutropenia. Discharged on the 6th. Hypotensive with orthostasis,seen by cardiology, restarted lisinopril and carvedilol. He has fallen twice since discharge. He has LOC for a few seconds. he is somewhat confused after the episodes. Walking with a cane. he also had an similar episode while in the hospital. Appetite returned, has altered taste, no N/V now, but did have on admission. No source of fever found. Has paresthesias of the left foot, all toes, not his right.  Had aches and pains with the Taxol, which he describes as "brutal". \par \par 10/29/18...called last week, was having difficulty keeping food down and felt weak and dizzy. We arranged for fluids, but he called Friday ad declined. Taking Zofran ADT after meals at times. Able to drink more at this time. Did not eat this AM. Yesterday, had a bagel and egg, for lunch, potatoes, pork and beans and Spam. For dinner had rice and Spam, small amount. No vomiting yesterday, but had nausea. Dizziness if he leis his head back, or if he gets up to fast. Walking with  a cane. No falls. Started Has paresthesias of the feet, on the left side. Continues on oxycodone, about  1 per day, chronic use for low back pains. No leg edema. No cough, ABARCA. \par \par 11/13/18...has not followed with Dr Marquez and remains off of carvedilol, enoxaparin and lisinopril. He takes ondansetron on occasion. Says he had a "blackout" episode after getting up, fell, striking left side, no injury. No LOC. Neuropathy remains, mostly left leg, somewhat better at times, continues on therapy. Minimal on the right side. Considering returning to work. Fatigue is present, somewhat better. Appetite is still impaired, eats once a day. Gained a few pounds. Still has some taste alteration. No leg edema. No abdominal pains. Saw Phong Restrepo yesterday in follow up. He has constipation, and may go 4-5 days w/o bowel movement. \par \par 1/4/19...Had echo done 12/2018. "I'm okay", feeling better, saw Dr Marquez. Paresthesias are an issue. On gabapentin 300 TID, says it is not helping. He says his fingers are affected, works on computers, and hits wrong keys at times. Worse are his feet. Left foot is numb, right foot partially numb, annoying when walking, not sensing if he steps on something. He continues on oxycodone. Affect toes, senses that they are swollen, bottom of the feet as well as the top. Does not pass the ankle. In the hands, it is the 3rd and 4th finger distally on the right side, and 3-4-5 on the left. Echo said that LVEF is about 44-46 % with moderate global dysfunction. The LA appendage clot was not seen. Not as tired as before, walked 11 minutes yesterday with some fatigue. Occ mild nausea, no vomiting. Appetite is good, gained 4 kilos on the past month( points out that he is wearing heavy boots. . Still has some dysgeusia. Still avoids meats.  Occasional HA, lasts up to 1/2 day, occurs intermittently, once a week. More right side of the headaches. Prior sinus headaches, but not the same, has a sharp element to it. Stills feels dizzy at times when gets up too fast..\par \par 3/11/19...Urgent visit. Called this AM stating that the right testicle is swollen, present for a few days, mild tenderness, mild discomfort/pain. No back pains except for his chronic back issue. He notes some skin changes of his hands and his feet.e is some peeling skin at the base of the toes. as well as the center of the foot. He has neuropathy of both feet, more on the left than the right, where it only affects the toes. He says the medial 3 fingers are numb and he drops things. His heart exam included a stress test, which he reports the outcome was good. Still reports tinnitus. Appetite is good, weight is increased 6 kilos since 1/30/19. Still has fatigue. \par \par 5/11/19...Missed appt 4/5/19. I " have good days and bad days". He feels he puts on a lot of weight in  a short time, and then drops weight in a short period of time. He had edema of both feet and also feels he had edema of the hands as well. He says edema accumulates and then resolves. Neuropathy is "pretty bad", extends to the calves, worse at night. Ran out of gabapentin, was on 300 TID and did not feel it helped. He has discoloration of the left foot, edema both feet. Has cramps in the legs. Feels he cannot stand for a long time, and complains of blistering of the feet. He has shooting pains in the toes. There are fluid filled blisters that he he "has to pop", due to pain. He feels he s less active due to the blisters. He feels his toes are locked i position. Does exercises provided by PT. Appetite is good. He is thirsty many times. NO pains elsewhere except chronic back pain, for which he takes oxycodone. No cough, has some ABARCA, can walk 2 blocks prior to stopping. Sees DR Marquez later today. Fine motor issues with fingers with neuropathy, difficulty with typing on keyboard, with many errors. \par \par 7/2/19...Hospital Course: \par Discharge Date	12-Jun-2019 \par Admission Date	12-Jun-2019 00:37 \par Reason for Admission	vomiting, vertigo \par Medication Reconciliation Status	Admission Reconciliation is Completed \par Discharge Reconciliation is Completed \par Hospital Course	 \par 38M with PMHx of metastatic testicular CA, NICM, neuropathy, who presents with dizziness, nausea, and vomiting. \par Vertigo. \par - will c/w meclizine 25mg 4x/day \par - f/u blood and urine cx to r/o infectious etiology \par - PT eval, fall precautions. Constellation of symptoms with horizontal nystagmus and reproducible symptoms with daryn hallpike maneuver c/w BPPV. CT \par head negative. Pt reports improvement after meclizine. \par - will c/w meclizine 25mg 4x/day \par - f/u blood and urine cx to r/o infectious etiology \par - PT eval \par  Cardiomyopathy. 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 Most recent TTE on 6/4 showing EF55%. Pt concerned current sx may be side effect from entresto \par - c/w carvedilol 12.5mg BID \par - will hold entresto in setting of soft BP and pt hesitancy to restart due to side effects. In prior studies, dizziness is reported as an adverse event "at \par least 5%" of patients taking entresto. \par will hold all BP meds upond d/c 2/2 hypotension \par  Vomiting. \par - will c/w IVF overnight and repeat lactate in AM \par - zofran PRN nausea. \par  Mixed germ cell tumor. \par  now in remission. \par **********************************************\fabian Was hospitalized and now off carvedilol and Entresto. Doing better at this time. Only bothered by paresthesias of the feet and occasion syncope with changes in position. the latter has occurred when getting up from bed or with sitting to standing at times. Appetite is good, no weight loss. No cough, no ABARCA. No palpitations noted. Chronic back pains, no changes. No HA. Libido is down, erections are difficult to achieve, are not durable,  no ejaculation. 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 Metastatic tumor in 2 of 57 lymph nodes,  Extranodal extension is identified, 5.2 cm, pN3 [FreeTextEntry1] : etoposide and cisplatin started August 14, 2017. Cycle 4 delayed..then omitted due to tachycardia and low EF. RPLND delayed, residual viable embryonal cancer, TIP started July 29, 2018, cycle 3 delayed for grade 3 fatigue, then again one more week for transaminitis.  [de-identified] : Feels "okay". he has some intermittent swelling noted in the hands and feet, occasionally in face, may last for several days. He has paresthesias of the fingertips the hands, not as bad as it was previously. It can be shock, like, noted more at night, more prominent in the feet. Gabapentin didn't help. Has his usual back pains, takes oxycodone 15 mg 2-4 times a day depending on pain. Appetite is good, No diarrheal stools, has constipation. Weight increased 3 kilos. No nausea, no vomiting.  BM about 3 times a week, sometimes hard. Advised to take Colace. Occ has a blistering rash on the bottom of the feet, Rx'd from dermatology.He still has blisters from time to time. The foot swells and the skin starts to crack. Fatigue unchanged. Tries to walk about but the pain in the feet causes discomfort, occasionally uses his cane to walk. Urine flow is impaired as before with some discomfort, not burning. Nocturia x 3-4. NO blood. occ incontinence- leaking of urine if he goes to stand at times. He noted blood in his stool 2 night sin a row. Reddish color, fresh appearing.  [FreeTextEntry5] : Uses a setroid cream for the bottom of the feet for a rash, blistering at times.

## 2019-09-10 NOTE — REVIEW OF SYSTEMS
[Fatigue] : fatigue [Recent Change In Weight] : ~T recent weight change [Chest Pain] : chest pain [Lower Ext Edema] : lower extremity edema [SOB on Exertion] : shortness of breath during exertion [Constipation] : constipation [Incontinence] : incontinence [Skin Rash] : skin rash [Dizziness] : dizziness [Anxiety] : anxiety [Depression] : depression [Negative] : ENT [Chills] : no chills [Fever] : no fever [Night Sweats] : no night sweats [Dysphagia] : no dysphagia [Loss of Hearing] : no loss of hearing [Hoarseness] : no hoarseness [Odynophagia] : no odynophagia [Wheezing] : no wheezing [Palpitations] : no palpitations [Cough] : no cough [Abdominal Pain] : no abdominal pain [Diarrhea] : no diarrhea [Vomiting] : no vomiting [Dysuria] : no dysuria [Muscle Weakness] : no muscle weakness [Easy Bruising] : no tendency for easy bruising [Easy Bleeding] : no tendency for easy bleeding [FreeTextEntry2] : occ sweats [FreeTextEntry3] : feels his right eye is weaker/light sensitive, will see ophtho [FreeTextEntry4] : tinnitus, chronic, present always, no dysgeusia  [FreeTextEntry7] : blood, see HPI [FreeTextEntry5] : left lateral chest on occasion [FreeTextEntry9] : fingers and knees for joint pains. Myalgias of the legs, cramps in the calves, early AM, not daily [de-identified] : on feet [de-identified] : paresthesias, see HPI, occ dizziness with positional changes, no headaches [de-identified] : not seeing any mental health person

## 2019-09-11 LAB — TESTOST SERPL-MCNC: 484 NG/DL

## 2019-09-12 LAB — HCG-TM SERPL-MCNC: <1 MIU/ML

## 2019-10-15 ENCOUNTER — OUTPATIENT (OUTPATIENT)
Dept: OUTPATIENT SERVICES | Facility: HOSPITAL | Age: 39
LOS: 1 days | Discharge: HOME | End: 2019-10-15

## 2019-10-15 DIAGNOSIS — Z98.890 OTHER SPECIFIED POSTPROCEDURAL STATES: Chronic | ICD-10-CM

## 2019-10-15 DIAGNOSIS — C62.92 MALIGNANT NEOPLASM OF LEFT TESTIS, UNSPECIFIED WHETHER DESCENDED OR UNDESCENDED: ICD-10-CM

## 2019-10-15 DIAGNOSIS — Z90.79 ACQUIRED ABSENCE OF OTHER GENITAL ORGAN(S): Chronic | ICD-10-CM

## 2019-10-15 DIAGNOSIS — Z95.828 PRESENCE OF OTHER VASCULAR IMPLANTS AND GRAFTS: Chronic | ICD-10-CM

## 2019-10-18 ENCOUNTER — APPOINTMENT (OUTPATIENT)
Dept: HEMATOLOGY ONCOLOGY | Facility: CLINIC | Age: 39
End: 2019-10-18

## 2019-10-21 ENCOUNTER — APPOINTMENT (OUTPATIENT)
Dept: INTERNAL MEDICINE | Facility: CLINIC | Age: 39
End: 2019-10-21
Payer: MEDICAID

## 2019-10-21 VITALS
HEIGHT: 74 IN | SYSTOLIC BLOOD PRESSURE: 110 MMHG | BODY MASS INDEX: 36.45 KG/M2 | DIASTOLIC BLOOD PRESSURE: 70 MMHG | TEMPERATURE: 98.6 F | OXYGEN SATURATION: 96 % | RESPIRATION RATE: 12 BRPM | WEIGHT: 284 LBS | HEART RATE: 80 BPM

## 2019-10-21 DIAGNOSIS — R94.5 ABNORMAL RESULTS OF LIVER FUNCTION STUDIES: ICD-10-CM

## 2019-10-21 PROCEDURE — 99214 OFFICE O/P EST MOD 30 MIN: CPT

## 2019-10-21 NOTE — HEALTH RISK ASSESSMENT
[No] : In the past 12 months have you used drugs other than those required for medical reasons? No [No falls in past year] : Patient reported no falls in the past year [0] : 2) Feeling down, depressed, or hopeless: Not at all (0) [de-identified] : HEMEONC/DERM [] : No [[id number]] : 0

## 2019-10-21 NOTE — ASSESSMENT
[FreeTextEntry1] : 39 year old male found to have stable Neuropathy, Left Testicular Cancer, Nonischemic Cardiomyopathy, Abnormal LFT,with the current regimen, diet and life style modifications, as counseled. Prior results reviewed and discussed with the patient during today's examination. Plan as ordered.\par Patient was recently evaluated by DERM/HEMEONC , findings and recommendations reviewed with the patient during today's examination.\par

## 2019-10-21 NOTE — HISTORY OF PRESENT ILLNESS
[de-identified] : 39 year old  male patient with history of stable Left Testicular Cancer, Nonischemic Cardiomyopathy, Abnormal LFT, history as stated, presented for follow up examination. Patient is compliant with all medications. Denies shortness of breath, chest pain or abdominal pains at this time. ROS as stated.\par

## 2019-10-22 LAB
25(OH)D3 SERPL-MCNC: 20.5 NG/ML
ALBUMIN SERPL ELPH-MCNC: 4.3 G/DL
ALP BLD-CCNC: 55 U/L
ALT SERPL-CCNC: 76 U/L
ANION GAP SERPL CALC-SCNC: 12 MMOL/L
APPEARANCE: CLEAR
AST SERPL-CCNC: 54 U/L
BASOPHILS # BLD AUTO: 0.03 K/UL
BASOPHILS NFR BLD AUTO: 0.5 %
BILIRUB SERPL-MCNC: 0.4 MG/DL
BILIRUBIN URINE: NEGATIVE
BLOOD URINE: NORMAL
BUN SERPL-MCNC: 14 MG/DL
CALCIUM SERPL-MCNC: 9.2 MG/DL
CHLORIDE SERPL-SCNC: 105 MMOL/L
CHOLEST SERPL-MCNC: 98 MG/DL
CHOLEST/HDLC SERPL: 3 RATIO
CO2 SERPL-SCNC: 23 MMOL/L
COLOR: NORMAL
CREAT SERPL-MCNC: 0.99 MG/DL
CREAT SPEC-SCNC: 132 MG/DL
EOSINOPHIL # BLD AUTO: 0.07 K/UL
EOSINOPHIL NFR BLD AUTO: 1.3 %
ERYTHROCYTE [SEDIMENTATION RATE] IN BLOOD BY WESTERGREN METHOD: 19 MM/HR
ESTIMATED AVERAGE GLUCOSE: 114 MG/DL
FOLATE SERPL-MCNC: 12.8 NG/ML
GGT SERPL-CCNC: 50 U/L
GLUCOSE QUALITATIVE U: NEGATIVE
GLUCOSE SERPL-MCNC: 104 MG/DL
HBA1C MFR BLD HPLC: 5.6 %
HCT VFR BLD CALC: 45.1 %
HDLC SERPL-MCNC: 33 MG/DL
HGB BLD-MCNC: 15 G/DL
IMM GRANULOCYTES NFR BLD AUTO: 0.9 %
IRON SATN MFR SERPL: 37 %
IRON SERPL-MCNC: 105 UG/DL
KETONES URINE: NEGATIVE
LDLC SERPL CALC-MCNC: 42 MG/DL
LEUKOCYTE ESTERASE URINE: NEGATIVE
LYMPHOCYTES # BLD AUTO: 2.04 K/UL
LYMPHOCYTES NFR BLD AUTO: 37.4 %
MAN DIFF?: NORMAL
MCHC RBC-ENTMCNC: 32.6 PG
MCHC RBC-ENTMCNC: 33.3 GM/DL
MCV RBC AUTO: 98 FL
MICROALBUMIN 24H UR DL<=1MG/L-MCNC: 1.4 MG/DL
MICROALBUMIN/CREAT 24H UR-RTO: 11 MG/G
MONOCYTES # BLD AUTO: 0.62 K/UL
MONOCYTES NFR BLD AUTO: 11.4 %
NEUTROPHILS # BLD AUTO: 2.65 K/UL
NEUTROPHILS NFR BLD AUTO: 48.5 %
NITRITE URINE: NEGATIVE
PH URINE: 6
PLATELET # BLD AUTO: 177 K/UL
POTASSIUM SERPL-SCNC: 4.3 MMOL/L
PROT SERPL-MCNC: 7.1 G/DL
PROTEIN URINE: NORMAL
RBC # BLD: 4.6 M/UL
RBC # FLD: 12.8 %
SODIUM SERPL-SCNC: 140 MMOL/L
SPECIFIC GRAVITY URINE: 1.02
T3 SERPL-MCNC: 139 NG/DL
T4 FREE SERPL-MCNC: 1.1 NG/DL
TIBC SERPL-MCNC: 283 UG/DL
TRIGL SERPL-MCNC: 117 MG/DL
TSH SERPL-ACNC: 1.53 UIU/ML
UIBC SERPL-MCNC: 178 UG/DL
UROBILINOGEN URINE: NORMAL
VIT B12 SERPL-MCNC: 515 PG/ML
WBC # FLD AUTO: 5.46 K/UL

## 2019-10-23 ENCOUNTER — APPOINTMENT (OUTPATIENT)
Dept: UROLOGY | Facility: CLINIC | Age: 39
End: 2019-10-23

## 2019-11-04 ENCOUNTER — APPOINTMENT (OUTPATIENT)
Dept: NEUROLOGY | Facility: CLINIC | Age: 39
End: 2019-11-04
Payer: MEDICAID

## 2019-11-04 VITALS
BODY MASS INDEX: 36.19 KG/M2 | DIASTOLIC BLOOD PRESSURE: 80 MMHG | SYSTOLIC BLOOD PRESSURE: 116 MMHG | WEIGHT: 282 LBS | HEIGHT: 74 IN | HEART RATE: 82 BPM

## 2019-11-04 DIAGNOSIS — G63 OTHER SPECIFIED DISORDERS INVOLVING THE IMMUNE MECHANISM, NOT ELSEWHERE CLASSIFIED: ICD-10-CM

## 2019-11-04 DIAGNOSIS — D89.89 OTHER SPECIFIED DISORDERS INVOLVING THE IMMUNE MECHANISM, NOT ELSEWHERE CLASSIFIED: ICD-10-CM

## 2019-11-04 PROCEDURE — 99205 OFFICE O/P NEW HI 60 MIN: CPT

## 2019-11-06 NOTE — HISTORY OF PRESENT ILLNESS
[FreeTextEntry1] : At 3am to 4 am he is awakened by sharp pain in the bottom of the feet and spasm of the leg. Dermatologist cream for recurrent ulcers ini the feet have not cured. Picking up pencil or object with fingers is difficult because of tremors. With repetitive hand/finger activity muscle in the forearm swells and becomes hard and he cannot close the fingers. 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 Black outs lightheaded feeling.

## 2019-11-06 NOTE — DISCUSSION/SUMMARY
[FreeTextEntry1] : Check paraneoplastic disorder because both, testicular cancers and embryonal cell tumors have a high incidence of paraneoplastic disease causing neuropathy that might be amenable to immune treatment. Check NCV EMG. 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 Start nortriptyline for neuropathic pain and insomnia due to pain.

## 2019-11-08 ENCOUNTER — LABORATORY RESULT (OUTPATIENT)
Age: 39
End: 2019-11-08

## 2019-12-09 ENCOUNTER — APPOINTMENT (OUTPATIENT)
Dept: NEUROLOGY | Facility: CLINIC | Age: 39
End: 2019-12-09
Payer: MEDICAID

## 2019-12-09 ENCOUNTER — OUTPATIENT (OUTPATIENT)
Dept: OUTPATIENT SERVICES | Facility: HOSPITAL | Age: 39
LOS: 1 days | Discharge: HOME | End: 2019-12-09

## 2019-12-09 VITALS
DIASTOLIC BLOOD PRESSURE: 69 MMHG | HEART RATE: 87 BPM | SYSTOLIC BLOOD PRESSURE: 112 MMHG | HEIGHT: 74 IN | BODY MASS INDEX: 36.19 KG/M2 | OXYGEN SATURATION: 98 % | WEIGHT: 282 LBS

## 2019-12-09 DIAGNOSIS — Z90.79 ACQUIRED ABSENCE OF OTHER GENITAL ORGAN(S): Chronic | ICD-10-CM

## 2019-12-09 DIAGNOSIS — Z95.828 PRESENCE OF OTHER VASCULAR IMPLANTS AND GRAFTS: Chronic | ICD-10-CM

## 2019-12-09 DIAGNOSIS — Z98.890 OTHER SPECIFIED POSTPROCEDURAL STATES: Chronic | ICD-10-CM

## 2019-12-09 DIAGNOSIS — C62.92 MALIGNANT NEOPLASM OF LEFT TESTIS, UNSPECIFIED WHETHER DESCENDED OR UNDESCENDED: ICD-10-CM

## 2019-12-09 PROCEDURE — 99214 OFFICE O/P EST MOD 30 MIN: CPT | Mod: 25

## 2019-12-09 PROCEDURE — 95908 NRV CNDJ TST 3-4 STUDIES: CPT

## 2019-12-09 PROCEDURE — 95885 MUSC TST DONE W/NERV TST LIM: CPT

## 2019-12-09 NOTE — HISTORY OF PRESENT ILLNESS
[FreeTextEntry1] : After he completed chemotherapy with toxol and cis-platinum in September, at 3am to 4 am he is awakened by sharp pain in the bottom of the feet and spasm of the leg. Dermatologist cream for recurrent ulcers ini the feet have not cured. Picking up pencil or object with fingers is difficult because of tremors. With repetitive hand/finger activity muscle in the forearm swells and becomes hard and he cannot close the fingers. 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 Black outs lightheaded feeling. \par

## 2019-12-09 NOTE — DISCUSSION/SUMMARY
[FreeTextEntry1] : Check paraneoplastic disorder because both, testicular cancers and embryonal cell tumors have a high incidence of paraneoplastic disease causing neuropathy that might be amenable to immune treatment. Check NCV EMG. 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 Encouraged to continue and increase the dose of  nortriptyline for neuropathic pain and insomnia due to pain up to 50 mg a night. Await urology podiatry consultations

## 2019-12-11 NOTE — CONSULT LETTER
[Courtesy Letter:] : I had the pleasure of seeing your patient, [unfilled], in my office today. [Dear  ___] : Dear  [unfilled], [Please see my note below.] : Please see my note below. [Sincerely,] : Sincerely, [Consult Closing:] : Thank you very much for allowing me to participate in the care of this patient.  If you have any questions, please do not hesitate to contact me. [DrBernice  ___] : Dr. MEHTA [DrBernice ___] : Dr. MEHTA

## 2019-12-13 ENCOUNTER — APPOINTMENT (OUTPATIENT)
Dept: HEMATOLOGY ONCOLOGY | Facility: CLINIC | Age: 39
End: 2019-12-13
Payer: MEDICAID

## 2019-12-13 ENCOUNTER — RESULT REVIEW (OUTPATIENT)
Age: 39
End: 2019-12-13

## 2019-12-13 VITALS
TEMPERATURE: 98.4 F | WEIGHT: 285.5 LBS | DIASTOLIC BLOOD PRESSURE: 77 MMHG | RESPIRATION RATE: 17 BRPM | BODY MASS INDEX: 36.66 KG/M2 | SYSTOLIC BLOOD PRESSURE: 112 MMHG | HEART RATE: 88 BPM | OXYGEN SATURATION: 98 %

## 2019-12-13 LAB
ALBUMIN SERPL ELPH-MCNC: 4.3 G/DL
ALP BLD-CCNC: 57 U/L
ALT SERPL-CCNC: 89 U/L
ANION GAP SERPL CALC-SCNC: 13 MMOL/L
AST SERPL-CCNC: 62 U/L
BILIRUB SERPL-MCNC: 0.5 MG/DL
BUN SERPL-MCNC: 16 MG/DL
CALCIUM SERPL-MCNC: 8.8 MG/DL
CHLORIDE SERPL-SCNC: 103 MMOL/L
CO2 SERPL-SCNC: 27 MMOL/L
CREAT SERPL-MCNC: 1.15 MG/DL
GLUCOSE SERPL-MCNC: 96 MG/DL
HCG-TM SERPL-MCNC: <1 MIU/ML
HCT VFR BLD CALC: 46.6 % — SIGNIFICANT CHANGE UP (ref 39–50)
HGB BLD-MCNC: 15.8 G/DL — SIGNIFICANT CHANGE UP (ref 13–17)
MAGNESIUM SERPL-MCNC: 2.1 MG/DL
MCHC RBC-ENTMCNC: 33.3 PG — SIGNIFICANT CHANGE UP (ref 27–34)
MCHC RBC-ENTMCNC: 33.9 G/DL — SIGNIFICANT CHANGE UP (ref 32–36)
MCV RBC AUTO: 98.3 FL — SIGNIFICANT CHANGE UP (ref 80–100)
PLATELET # BLD AUTO: 164 K/UL — SIGNIFICANT CHANGE UP (ref 150–400)
POTASSIUM SERPL-SCNC: 4.6 MMOL/L
PROT SERPL-MCNC: 7.4 G/DL
RBC # BLD: 4.74 M/UL — SIGNIFICANT CHANGE UP (ref 4.2–5.8)
RBC # FLD: 12.1 % — SIGNIFICANT CHANGE UP (ref 10.3–14.5)
SODIUM SERPL-SCNC: 143 MMOL/L
WBC # BLD: 6.3 K/UL — SIGNIFICANT CHANGE UP (ref 3.8–10.5)
WBC # FLD AUTO: 6.3 K/UL — SIGNIFICANT CHANGE UP (ref 3.8–10.5)

## 2019-12-13 PROCEDURE — 99214 OFFICE O/P EST MOD 30 MIN: CPT

## 2019-12-13 NOTE — REVIEW OF SYSTEMS
[Fatigue] : fatigue [SOB on Exertion] : shortness of breath during exertion [Joint Pain] : joint pain [Muscle Pain] : muscle pain [Anxiety] : anxiety [Muscle Weakness] : muscle weakness [Negative] : ENT [Chills] : no chills [Fever] : no fever [Chest Pain] : no chest pain [Recent Change In Weight] : ~T no recent weight change [Palpitations] : no palpitations [Wheezing] : no wheezing [Lower Ext Edema] : no lower extremity edema [Cough] : no cough [Vomiting] : no vomiting [Abdominal Pain] : no abdominal pain [Diarrhea] : no diarrhea [Dysuria] : no dysuria [Constipation] : no constipation [Skin Rash] : no skin rash [Joint Stiffness] : no joint stiffness [Incontinence] : no incontinence [Dizziness] : no dizziness [Depression] : no depression [Easy Bleeding] : no tendency for easy bleeding [FreeTextEntry3] : notes intermittent ptosis of the right eye, present for many months [Easy Bruising] : no tendency for easy bruising [FreeTextEntry4] : no dyageusia, tinnitus is present, constant,  [FreeTextEntry7] : occ vomiting [FreeTextEntry9] : knees [de-identified] : occ HA neuropathy, see HPI

## 2019-12-13 NOTE — ASSESSMENT
[Palliative Care Plan] : not applicable at this time [FreeTextEntry1] : Cleve is seen in the office today, accompanied by his mother. He continues to struggle with the neuropathy. He was seen by neurology and had nerve conduction studies done of his lower extremities. He was started on nortriptyline 25 and then increased to 50 seems to help somewhat his appetite is good and his weight is stable. He is not very active until due to the neuropathy. He does not leave the house much. He goes to physical therapy and he feels that helped somewhat. He has prior back pains and is chronically taken oxycodone 15 mg several times a day. He does have some anxiety but denies depression.\par \par On physical examination, he appears well. The HEENT examination is normal. Chest is clear and heart examination is normal. There were no palpable abnormalities on examination of the abdomen. Extremities were normal. 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 There were complications of his initial treatment and he received 3 cycles of treatment but treatment was delayed. Your cardiac issues induced by the [id number]. He had a delayed retroperitoneal lymph node dissection. 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 The size of the largest metastasis was 5.2 cm in greatest dimension. This made him pN3. He went on to receive 3 of 4 planned cycles of TIP chemotherapy and had great difficulty in receiving the treatment. The fourth cycle was not given as a result of toxicity.\par \par He has considerable neuropathy associated with his prior cisplatin administration. He is been evaluated by neurology. They sent off a paraneoplastic panel, which was negative.\par \par He will continue with physical therapy at this time. Routine blood work is being obtained today including tumor markers. All questions were answered to the best of my ability and to his apparent satisfaction.

## 2019-12-13 NOTE — PROGRESS NOTE ADULT - PROBLEM/PLAN-2
Pt states that at 4pm and had a muscle spasm in the neck as well as a \"celia horse in [his] arm\". Pt states that he has been feeling dazed since then and that he also feels like he has a celia horse in his left arm. Pt denies recent illness.
DISPLAY PLAN FREE TEXT

## 2019-12-13 NOTE — HISTORY OF PRESENT ILLNESS
[Disease: _____________________] : Disease: [unfilled] [T: ___] : T[unfilled] [N: ___] : N[unfilled] [M: ___] : M[unfilled] [AJCC Stage: ____] : AJCC Stage: [unfilled] [de-identified] : Mr. Calderón is seen in consultation on 8/8/17. On July 11, 2017, he awoke with severe back pain extending into the left testicle. There was a heaviness and pulling sensation. He went to the ER at [facility] and a sonogram was done, revealing a mass. He was seen by Rivas Oconnor and underwent a left radical orchiectomy on July 20, 2017. The pain and pulling sensation resolved. He has been on oxycodone a time, since January 2017, for low back pains. No respiratory complaints. No urinary symptoms. Has had sperm cryopreservation. \par \par 8/30/17...Cleve comes in today for follow up was just discharged yesterday. He was in the hospital for 4 days and he was neutropenic, found a right arm superficial thrombosis at the site of IV. treated with vanco, blood cultures negative. Discharged on Levaquin. Overnight he had a temp of 104.  He is having some hair loss, numbness in his fingers and his toes. He has tinnitus intermittently, started two days after the last dose. He was having soreness in the back of his throat that now resolved. He complains of dysgeusia and he had a lot  of burning and acid reflux-like symptoms during his treatment Carafate, Protonix and the lower dose of Decadron improved his symptoms., Not sleeping well, likely due to steroids.\par \par 9/19/17...cycle 2 day 5 was 9/9/17\fabian Poon is seen in the office today. He has significant fatigue. He had vomiting for 5 days after the chemotherapy with nausea and he said that his complexion was green. He had acid reflux issues once again despite the dropping off the dose of his dexamethasone. He feels a burning sensation in his lower abdomen and he feels that the medications for nausea did not help. He also has significant fatigue which is improved today. He has intermittent tinnitus. He has tingling in the tips of his toes and fingers which are intermittent. His appetite has been good and he is gained weight while on chemotherapy but he says that he eats smaller portions. He denies dysgeusia or dry mouth.\par \par 10/9/17...Completed cycle 3 last week.  He states he feels "horrible." HE states he noticed bloating in the abdomen, He went a whole week after this treatment wit vomiting every day, last time he vomited was two days ago. He fell at one point secondary to being weak and feeling lightheaded. He feels generalized weakness.He had a blood clot, during his treatment, that resolved. His appetite is poor, He eats and then, he eats, he feels like he has to regurgitate his food up after eating. He has fatigue, tinnitus stopped. he denies any numbness of the feet. has had chronic numbness of two of his fingers.He has dysgeusia \par \par 10/30/17...Admission Date: \par -  Admission Date 16-Oct-2017 13:33. \par Discharge Date: \par - Discharge Date	21-Oct-2017 \par Chief Complaint/Reason for Visit: \par Chief Complaint/Reason for Admission	Atrial flutter with RVR\par *******************\par  38 yo M with PMHx of testicular cancer s/p left orchiectomy on chemotherapy who present to the ED before receiving chemo for tachycardia. \par The patient states that he has been feeling well for the past 2 weeks since his 3rd dose of chemotherapy. He denies CP, ABARCA, peripheral edema, cough, \par palpitations. He does states that he had one near syncopal event during a bout of emesis 2 weeks ago which caused him to fall into a door. Has not had any \par other syncopal or presyncopal episodes since then. In the ED he was found to have tachycardia found to be a.flutter after receiving 10mg IV diltiazem x2, a CXR with well placed portacath and no infiltrations. On bedside US found to have EF of 10-15% while tachycardic. \par The patient was initially started on metoprolol tartrate with increasing dosages unable to control the arrhythmia he was subsequently switch to a diltiazem drip. The metiport was removed after discussion with his Oncologist and cardiology for possible arrhythmia induction which was removed 10/19. He also received a TTE on 10/19 which showed EF of 35-40% with LV remodeling. \par Afterwards he was switched to metoprolol succinate 200mg qday. \par He was started on heparin for preparation for TYLOR and cardioversion which was performed on 10/20 and found a RA appendage thrombus with PFO and reduced LV \par function. No cardioversion was performed as a result and he was switched to rivaroxaban on 10/21, lisinopril 2.5mg qday and metoprolol succinate 200mg \par qday. He will follow with cardiology in 2 weeks and EP in 4 weeks. Treatments/Procedures/Significant Findings/Patient Condition: \par Other Significant Findings: \par - Other Significant Findings	 \par < from: Transesophageal Echocardiogram w/o TTE (10.20.17 @ 12:08) > \par Conclusions: \par 1. Left atrial enlargement.  No left atrial or left atrial appendage thrombus. \par 2. Moderate to severe global left ventricular systolic dysfunction. \par 3. There is an echodensity (likely thrombus vs mass) in the right atrial wall that measures (approximately 2.7 cm x 2.0 cm). This is near the distal point of the SVC and may \par represent thrombus from prior indwelling catheter. \par 4. Right ventricular enlargement with decreased right ventricular systolic function. \par 5. Color Doppler demonstrates evidence of a patent foramen ovale. \par 6. Trivial pericardial effusion. \par ****************************************************************************\par TYLOR showed right atrial appendage thrombus, 27 mm, On Xarelto. Feels "exhausted". Does not generally note palpitations. Anxiety level is high. Occasional ABARCA, not at rest. No cough, no sputum, no wheezing. No nausea at this time, has regurgitation. Occ vomiting noted. Fatigue is prominent. Appetite is good. \par \par 7/9/18...Had RPLND on 6/6/18, with viable GCT present. Embryonal pathology. Says he have fatigue, still "recovering" from the RLPND. He has some soreness of the abdomen and says he has some delayed healing. He say Dr Piper on 7/6 in lieu of Dr Restrepo and was told that it was healing well. He is upset that there is residual cancer present, which he says "bummed him out completely". He says he has been reviewing issues related to continued treatment, only after I called him recently as he had not made an appointment. he says his appetite is poor and he is eating small amounts multiple times as he cannot eat full full meals. He says he has had some nausea and he started OTC Prevacid, which has helped. No significant weight loss. Some ABARCA if he walks about 3 blocks or so, he has fatigue with walking as well. he has some paresthesias of the left thigh after the surgery. He notes some spasms of the muscles in that area at times. Urine flow is normal, no nocturia. Libido down, not sexually active, no masturbation since surgery. \par \par 7/19/18...Feels the same, "still recovering". Still has some discomfort near the wound. Appetite s good. Gained 2 pounds. no cough. Mild ABARCA with activities. No palpitations. No leg edema. He continues with some fatigue, 6 on 0-10 scale. Feels he is not motivated when the fatigue is high. He feels his hands are weaker than before. Lifting is fine, but twisting a bottle to open may be  impaired. Ambulates with a cane  due to paresthesias of the left anterior thigh. This from knee  to thigh area,. No paresthesias of the feet. He sometimes has some pains in his feet at night which he says are not cramps. \par \par 9/6/18...Cycle 2, TIP, day # 9. Admitted on Sunday, August 19. \par *********************************************\par Hospitalized again post chemo, this time for 9 days. ANC was <0.1, platelets were low. had low sodium, low potassium and low magnesium. He has had lots of muscle aches and bone pain secondary to the Taxol. Feeling better, still feels weak. Some SOB with exertion. No pains. No headaches, has paresthesias of the bottom of the feet along with some toes. It improves after the chemo. Somewhat more this time. No fevers, no chills. Some nausea with office visits. Appetite is "moderate". Tinnitus has improved, minor dizziness at times. \par \par 8/30/18...Delayed treatment #3 by one week. "Not a good week". Complains of hearing being sensitive. He has tinnitus. This is intermittent, more in the past 2-3 days. He has some vertigo, with some issue walking. He feels as if he is falling to the right side at times, no falls. He has to hold onto the door or furniture to walk. He has some vomiting without nausea. He vomited a few minutes ago. Appetite is decreased, He has altered taste as well. Fatigue is present, not as bad as before. he says he is a "little" fatigued at the moment. Paresthesias of feet have been intermittent, less notable at this time. Spends a lot of time sitting, sleeping. He feels as if his body is exhausted. His mother says he has a lot of anxiety. He agrees. He has thoughts running through his mind about chemo and adverse effects. \par \par 9/20/18...he feels "okay" at times. Occasionally feels nauseous and can't eat, occ feels dizzy. has some regurgitation at times. Has fatigue. No better. Tinnitus is "not as bad", persistent in the right, intermittent in the left. When dizzy,  he feels he is leaning to one side. Spends a lot of time sitting or lying in bed. Paresthesias of the feet, left foot more so than right. Right toes affects the 4th and 5th toe. Fingertips are affected. He notes some darkening of skin folds of the hand joints. Feels no SOB unless he is active. using a cane today. Not taking any meds at this point. \par \par 10/9/18...Admission Date: after cycle 3 TIP\par -  Admission Date 01-Oct-2018 02:09. \par Discharge Date: \par - Discharge Date	06-Oct-2018 \par ***********************************\par HOSPITAL COURSE: The patient continued to have significant nausea while inpatient with intermittent vomiting. He received aggressive IV hydration with \par IVF while he was no longer able to tolerate po. On hospital day 4, patient was able to tolerate some po and requested discharge home. His course was c/b \par neutropenic fever to 101. Cultures were sent and remained negative. Patient was also started on cefepime. His ANC started to improve towards the end of the \par hospital course. His platelets, however, downtrended. He was given 1U platelets as per oncology recommendations. His midline was noted to have been in since \par July. After discussion with his outpatient oncologist, the patient opted to have his midline removed and will receive his last dose of chemotherapy via \par peripheral IV. During his hospital course, patient was noted to have an episode of tachycardia to the 140's followed by bradycardia to the 20's during \par orthostatic vitals. He was seen by Dr. Brown of cardiology who feels this is chemo-induced orthostasis and no further inpatient w/u is needed. He is \par medically stable for dc home. Prior to discharge received additional platelet transfusion and MID-Line was removed after discussion with oncology. \par *******************************************************************\par He was admitted and told that the midline had to be changed. He did have fever with neutropenia. Discharged on the 6th. Hypotensive with orthostasis,seen by cardiology, restarted lisinopril and carvedilol. He has fallen twice since discharge. He has LOC for a few seconds. he is somewhat confused after the episodes. Walking with a cane. he also had an similar episode while in the hospital. Appetite returned, has altered taste, no N/V now, but did have on admission. No source of fever found. Has paresthesias of the left foot, all toes, not his right.  Had aches and pains with the Taxol, which he describes as "brutal". \par \par 10/29/18...called last week, was having difficulty keeping food down and felt weak and dizzy. We arranged for fluids, but he called Friday ad declined. Taking Zofran ADT after meals at times. Able to drink more at this time. Did not eat this AM. Yesterday, had a bagel and egg, for lunch, potatoes, pork and beans and Spam. For dinner had rice and Spam, small amount. No vomiting yesterday, but had nausea. Dizziness if he leis his head back, or if he gets up to fast. Walking with  a cane. No falls. Started Has paresthesias of the feet, on the left side. Continues on oxycodone, about  1 per day, chronic use for low back pains. No leg edema. No cough, ABARCA. \par \par 11/13/18...has not followed with Dr Marquez and remains off of carvedilol, enoxaparin and lisinopril. He takes ondansetron on occasion. Says he had a "blackout" episode after getting up, fell, striking left side, no injury. No LOC. Neuropathy remains, mostly left leg, somewhat better at times, continues on therapy. Minimal on the right side. Considering returning to work. Fatigue is present, somewhat better. Appetite is still impaired, eats once a day. Gained a few pounds. Still has some taste alteration. No leg edema. No abdominal pains. Saw Phong Restrepo yesterday in follow up. He has constipation, and may go 4-5 days w/o bowel movement. \par \par 1/4/19...Had echo done 12/2018. "I'm okay", feeling better, saw Dr Marquez. Paresthesias are an issue. On gabapentin 300 TID, says it is not helping. He says his fingers are affected, works on computers, and hits wrong keys at times. Worse are his feet. Left foot is numb, right foot partially numb, annoying when walking, not sensing if he steps on something. He continues on oxycodone. Affect toes, senses that they are swollen, bottom of the feet as well as the top. Does not pass the ankle. In the hands, it is the 3rd and 4th finger distally on the right side, and 3-4-5 on the left. Echo said that LVEF is about 44-46 % with moderate global dysfunction. The LA appendage clot was not seen. Not as tired as before, walked 11 minutes yesterday with some fatigue. Occ mild nausea, no vomiting. Appetite is good, gained 4 kilos on the past month( points out that he is wearing heavy boots. . Still has some dysgeusia. Still avoids meats.  Occasional HA, lasts up to 1/2 day, occurs intermittently, once a week. More right side of the headaches. Prior sinus headaches, but not the same, has a sharp element to it. Stills feels dizzy at times when gets up too fast..\par \par 3/11/19...Urgent visit. Called this AM stating that the right testicle is swollen, present for a few days, mild tenderness, mild discomfort/pain. No back pains except for his chronic back issue. He notes some skin changes of his hands and his feet.e is some peeling skin at the base of the toes. as well as the center of the foot. He has neuropathy of both feet, more on the left than the right, where it only affects the toes. He says the medial 3 fingers are numb and he drops things. His heart exam included a stress test, which he reports the outcome was good. Still reports tinnitus. Appetite is good, weight is increased 6 kilos since 1/30/19. Still has fatigue. \par \par 5/11/19...Missed appt 4/5/19. I " have good days and bad days". He feels he puts on a lot of weight in  a short time, and then drops weight in a short period of time. He had edema of both feet and also feels he had edema of the hands as well. He says edema accumulates and then resolves. Neuropathy is "pretty bad", extends to the calves, worse at night. Ran out of gabapentin, was on 300 TID and did not feel it helped. He has discoloration of the left foot, edema both feet. Has cramps in the legs. Feels he cannot stand for a long time, and complains of blistering of the feet. He has shooting pains in the toes. There are fluid filled blisters that he he "has to pop", due to pain. He feels he s less active due to the blisters. He feels his toes are locked i position. Does exercises provided by PT. Appetite is good. He is thirsty many times. NO pains elsewhere except chronic back pain, for which he takes oxycodone. No cough, has some ABARCA, can walk 2 blocks prior to stopping. Sees DR Marquez later today. Fine motor issues with fingers with neuropathy, difficulty with typing on keyboard, with many errors. \par \par 7/2/19...Hospital Course: \par Discharge Date	12-Jun-2019 \par Admission Date	12-Jun-2019 00:37 \par Reason for Admission	vomiting, vertigo \par Medication Reconciliation Status	Admission Reconciliation is Completed \par Discharge Reconciliation is Completed \par Hospital Course	 \par 38M with PMHx of metastatic testicular CA, NICM, neuropathy, who presents with dizziness, nausea, and vomiting. \par Vertigo. \par - will c/w meclizine 25mg 4x/day \par - f/u blood and urine cx to r/o infectious etiology \par - PT eval, fall precautions. Constellation of symptoms with horizontal nystagmus and reproducible symptoms with daryn hallpike maneuver c/w BPPV. CT \par head negative. Pt reports improvement after meclizine. \par - will c/w meclizine 25mg 4x/day \par - f/u blood and urine cx to r/o infectious etiology \par - PT eval \par  Cardiomyopathy. 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 Most recent TTE on 6/4 showing EF55%. Pt concerned current sx may be side effect from entresto \par - c/w carvedilol 12.5mg BID \par - will hold entresto in setting of soft BP and pt hesitancy to restart due to side effects. In prior studies, dizziness is reported as an adverse event "at \par least 5%" of patients taking entresto. \par will hold all BP meds upond d/c 2/2 hypotension \par  Vomiting. \par - will c/w IVF overnight and repeat lactate in AM \par - zofran PRN nausea. \par  Mixed germ cell tumor. \par  now in remission. \par **********************************************\fabian Was hospitalized and now off carvedilol and Entresto. Doing better at this time. Only bothered by paresthesias of the feet and occasion syncope with changes in position. the latter has occurred when getting up from bed or with sitting to standing at times. Appetite is good, no weight loss. No cough, no ABARCA. No palpitations noted. Chronic back pains, no changes. No HA. Libido is down, erections are difficult to achieve, are not durable,  no ejaculation.\par \par 9/10/19...Feels "okay". he has some intermittent swelling noted in the hands and feet, occasionally in face, may last for several days. He has paresthesias of the fingertips the hands, not as bad as it was previously. It can be shock, like, noted more at night, more prominent in the feet. Gabapentin didn't help. Has his usual back pains, takes oxycodone 15 mg 2-4 times a day depending on pain. Appetite is good, No diarrheal stools, has constipation. Weight increased 3 kilos. No nausea, no vomiting.  BM about 3 times a week, sometimes hard. Advised to take Colace. Occ has a blistering rash on the bottom of the feet, Rx'd from dermatology.He still has blisters from time to time. The foot swells and the skin starts to crack. Fatigue unchanged. Tries to walk about but the pain in the feet causes discomfort, occasionally uses his cane to walk. Urine flow is impaired as before with some discomfort, not burning. Nocturia x 3-4. NO blood. occ incontinence- leaking of urine if he goes to stand at times. He noted blood in his stool 2 night sin a row. Reddish color, fresh appearing.  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 Cycle 4 delayed..then omitted due to tachycardia and low EF. RPLND delayed, residual viable embryonal cancer, TIP started July 29, 2018, cycle 3 delayed for grade 3 fatigue, then again one more week for transaminitis.  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  He feels it helps somewhat, dose increased to 50. Appetite is good. Weight is stable. Has impaired activities due to the neuropathy, does not leave the house much. Goes to PT and doctor appts. Has back pains form prior injury, on long term oxycodone, takes 15 mg 3-4 times a day. Anxiety level is low except at night, which he attributes to pain. He feels he is not depressed. No cough, mild ABARCA, attributed to weight increase. Occ headaches, right sided, once every few months. Fatigue is 8 on a 0-10 scale. Poor libido, not sexually active, no attempts

## 2019-12-13 NOTE — PHYSICAL EXAM
[Restricted in physically strenuous activity but ambulatory and able to carry out work of a light or sedentary nature] : Status 1- Restricted in physically strenuous activity but ambulatory and able to carry out work of a light or sedentary nature, e.g., light house work, office work [Obese] : obese [Normal] : full range of motion and no deformities appreciated [de-identified] : fatty, no true gynecomastia [de-identified] : uncircumcised, glans normal [de-identified] : some pain to percussion over the lumbar spine..... [de-identified] : no edema [de-identified] : flat affect

## 2019-12-17 LAB — AFP-TM SERPL-MCNC: 2.5 NG/ML

## 2020-01-09 ENCOUNTER — APPOINTMENT (OUTPATIENT)
Dept: CARDIOLOGY | Facility: CLINIC | Age: 40
End: 2020-01-09

## 2020-01-13 ENCOUNTER — APPOINTMENT (OUTPATIENT)
Dept: NEUROLOGY | Facility: CLINIC | Age: 40
End: 2020-01-13

## 2020-01-23 ENCOUNTER — APPOINTMENT (OUTPATIENT)
Dept: NEUROLOGY | Facility: CLINIC | Age: 40
End: 2020-01-23
Payer: MEDICAID

## 2020-01-23 VITALS
DIASTOLIC BLOOD PRESSURE: 83 MMHG | WEIGHT: 279 LBS | HEIGHT: 74 IN | BODY MASS INDEX: 35.81 KG/M2 | OXYGEN SATURATION: 97 % | SYSTOLIC BLOOD PRESSURE: 118 MMHG | HEART RATE: 85 BPM | TEMPERATURE: 97.6 F

## 2020-01-23 DIAGNOSIS — N31.9 NEUROMUSCULAR DYSFUNCTION OF BLADDER, UNSPECIFIED: ICD-10-CM

## 2020-01-23 PROCEDURE — 99215 OFFICE O/P EST HI 40 MIN: CPT

## 2020-01-23 NOTE — DISCUSSION/SUMMARY
[FreeTextEntry1] : Check paraneoplastic disorder because both, testicular cancers and embryonal cell tumors have a high incidence of paraneoplastic disease causing neuropathy that might be amenable to immune treatment. Check NCV EMG. 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 Encouraged to continue and increase the dose of  nortriptyline for neuropathic pain and insomnia due to pain up to 50 mg a night. Await urology podiatry consultations. \par If current medical treatment fails consider intrathecal baclofen or morphine test dose and if successful consider intrathecal infusion pump implant.

## 2020-01-23 NOTE — PHYSICAL EXAM
[General Appearance - Alert] : alert [Oriented To Time, Place, And Person] : oriented to person, place, and time [Impaired Insight] : insight and judgment were intact [Person] : oriented to person [Affect] : the affect was normal [Place] : oriented to place [Time] : oriented to time [Short Term Intact] : short term memory intact [Registration Intact] : recent registration memory intact [Span Intact] : the attention span was normal [Visual Intact] : visual attention was ~T not ~L decreased [Naming Objects] : no difficulty naming common objects [Writing A Sentence] : no difficulty writing a sentence [Fluency] : fluency intact [Comprehension] : comprehension intact [Reading] : reading intact [Current Events] : adequate knowledge of current events [Past History] : adequate knowledge of personal past history [Vocabulary] : adequate range of vocabulary [Cranial Nerves Optic (II)] : visual acuity intact bilaterally,  visual fields full to confrontation, pupils equal round and reactive to light [Cranial Nerves Oculomotor (III)] : extraocular motion intact [Cranial Nerves Facial (VII)] : face symmetrical [Cranial Nerves Trigeminal (V)] : facial sensation intact symmetrically [Cranial Nerves Vestibulocochlear (VIII)] : hearing was intact bilaterally [Cranial Nerves Accessory (XI - Cranial And Spinal)] : head turning and shoulder shrug symmetric [Cranial Nerves Glossopharyngeal (IX)] : tongue and palate midline [Cranial Nerves Hypoglossal (XII)] : there was no tongue deviation with protrusion [Motor Tone] : muscle tone was normal in all four extremities [Motor Strength Upper Extremities Bilaterally] : there was weakness in both upper extremities [Motor Strength Lower Extremities Bilaterally] : there was weakness in both lower extremities [Remote Intact] : remote memory impaired [Concentration Intact] : a decrease in concentrating ability was observed [Repeating Phrases] : difficulty repeating a phrase [Paresis Pronator Drift Left-Sided] : no pronator drift on the left [Paresis Pronator Drift Right-Sided] : no pronator drift on the right

## 2020-01-30 ENCOUNTER — FORM ENCOUNTER (OUTPATIENT)
Age: 40
End: 2020-01-30

## 2020-01-31 ENCOUNTER — OUTPATIENT (OUTPATIENT)
Dept: OUTPATIENT SERVICES | Facility: HOSPITAL | Age: 40
LOS: 1 days | End: 2020-01-31
Payer: MEDICAID

## 2020-01-31 ENCOUNTER — APPOINTMENT (OUTPATIENT)
Dept: RADIOLOGY | Facility: IMAGING CENTER | Age: 40
End: 2020-01-31
Payer: MEDICAID

## 2020-01-31 ENCOUNTER — APPOINTMENT (OUTPATIENT)
Dept: ULTRASOUND IMAGING | Facility: IMAGING CENTER | Age: 40
End: 2020-01-31
Payer: MEDICAID

## 2020-01-31 ENCOUNTER — APPOINTMENT (OUTPATIENT)
Dept: CT IMAGING | Facility: IMAGING CENTER | Age: 40
End: 2020-01-31
Payer: MEDICAID

## 2020-01-31 DIAGNOSIS — Z98.890 OTHER SPECIFIED POSTPROCEDURAL STATES: Chronic | ICD-10-CM

## 2020-01-31 DIAGNOSIS — Z90.79 ACQUIRED ABSENCE OF OTHER GENITAL ORGAN(S): Chronic | ICD-10-CM

## 2020-01-31 DIAGNOSIS — Z95.828 PRESENCE OF OTHER VASCULAR IMPLANTS AND GRAFTS: Chronic | ICD-10-CM

## 2020-01-31 DIAGNOSIS — C62.92 MALIGNANT NEOPLASM OF LEFT TESTIS, UNSPECIFIED WHETHER DESCENDED OR UNDESCENDED: ICD-10-CM

## 2020-01-31 PROCEDURE — 71046 X-RAY EXAM CHEST 2 VIEWS: CPT | Mod: 26

## 2020-01-31 PROCEDURE — 76870 US EXAM SCROTUM: CPT | Mod: 26

## 2020-01-31 PROCEDURE — 74177 CT ABD & PELVIS W/CONTRAST: CPT | Mod: 26

## 2020-01-31 PROCEDURE — 76870 US EXAM SCROTUM: CPT

## 2020-01-31 PROCEDURE — 71046 X-RAY EXAM CHEST 2 VIEWS: CPT

## 2020-01-31 PROCEDURE — 74177 CT ABD & PELVIS W/CONTRAST: CPT

## 2020-02-11 ENCOUNTER — APPOINTMENT (OUTPATIENT)
Dept: INTERNAL MEDICINE | Facility: CLINIC | Age: 40
End: 2020-02-11

## 2020-02-11 ENCOUNTER — APPOINTMENT (OUTPATIENT)
Dept: PODIATRY | Facility: CLINIC | Age: 40
End: 2020-02-11

## 2020-03-04 ENCOUNTER — APPOINTMENT (OUTPATIENT)
Dept: NEUROLOGY | Facility: CLINIC | Age: 40
End: 2020-03-04

## 2020-03-11 NOTE — PROVIDER CONTACT NOTE (CRITICAL VALUE NOTIFICATION) - RECOMMENDATIONS
03/11/20    Patient ID: Enedelia is a 84 year old female.    Chief Complaint   Patient presents with   • Follow-up     Both knees hurt very much, BS is doing very well 103 today, Question about valsartan           HPI  Enedelia is a nice patient who is here today for follow-up stating that she is doing good except the fact that her knee arthritis is flaring up with the change of the weather, her diabetes is getting better and better, her A1c has been improving and we will do another one prior to her next visit.  There is no low sugar reaction and blood pressure is good as well.  Reviewed with patient blood work from January including her thyroid that came back good      .  ALLERGIES:   Allergen Reactions   • Atropine DIZZINESS   • Benadryl Allergy Other (See Comments)   • Caffeine Other (See Comments)   • Codeine Other (See Comments)     Unknown   • Mustard   (Food Or Med) Other (See Comments)     Unknown         Current Outpatient Medications   Medication Sig Dispense Refill   • blood glucose (ONETOUCH VERIO) test strip Test blood sugar *1* time daily as directed. Diagnosis: *E11.69*. 100 strip 3   • Blood Glucose Monitoring Suppl (ONETOUCH VERIO) w/Device Kit 1 each daily. DX: E11.69* 1 kit 0   • MICROLET LANCETS Misc USE ONCE DAILY AS DIRECTED 100 each 0   • fluticasone (FLONASE) 50 MCG/ACT nasal spray SHAKE LQ AND U 2 SPRAYS IEN QD  0   • azelastine (ASTELIN) 0.1 % nasal spray U 2 SPRAYS IEN BID  4   • Omega-3 Fatty Acids (FISH OIL PO) Take 1 tablet by mouth daily.     • Probiotic Product (PROBIOTIC DAILY PO) Take 1 tablet by mouth daily.     • GARLIC OIL PO Take 1 tablet by mouth daily.     • CINNAMON PO Take 1 tablet by mouth daily.     • Calcium Carbonate Antacid (CALCIUM CARBONATE PO) Take 1 tablet by mouth daily.     • COD LIVER OIL PO Take 1 tablet by mouth daily.     • Cyanocobalamin (VITAMIN B-12 PO) Take 1 tablet by mouth daily.     • TURMERIC PO Take 1 tablet by mouth daily.     • CHROMIUM PO Take 1  tablet by mouth daily.     • valsartan (DIOVAN) 160 MG tablet Take 1 tablet by mouth daily. 90 tablet 1   • hydrochlorothiazide (HYDRODIURIL) 12.5 MG tablet Take 1 tablet by mouth daily. 90 tablet 1   • pravastatin (PRAVACHOL) 40 MG tablet TAKE 1 TABLET BY MOUTH DAILY 90 tablet 1   • amLODIPine (NORVASC) 2.5 MG tablet TAKE 1 TABLET BY MOUTH EVERY DAY 90 tablet 1   • levothyroxine (SYNTHROID, LEVOTHROID) 100 MCG tablet TAKE 1 TABLET BY MOUTH EVERY DAY FOR 6 OUT OF 7 DAYS SKIP SUNDAYS 90 tablet 1   • glipiZIDE (GLUCOTROL) 5 MG tablet 1 tablet daily or Take 1/2 tablet two times a day before meals. 90 tablet 2   • Coenzyme Q10 (CO Q 10 PO) Take 1 tablet by mouth daily.     • docusate sodium (COLACE) 100 MG capsule Take 100 mg by mouth 2 times daily.     • ferrous sulfate 325 (65 FE) MG tablet Take 325 mg by mouth every other day.     • triamcinolone (ARISTOCORT) 0.1 % ointment Apply topically 2 times daily as needed (itching, rash). Do not use more than 2 weeks 60 g 1   • VITAMIN D, CHOLECALCIFEROL, PO Take 2,000 Units by mouth daily.     • Multiple Vitamins-Iron (MULTI-DAY PLUS IRON) Tab Take 1 tablet by mouth daily.     • vitamin E ([employer] VITAMIN E) 1000 units capsule Take 2 capsules by mouth daily.       No current facility-administered medications for this visit.          Patient Active Problem List   Diagnosis   • Allergic rhinitis   • Anemia   • Arthritis of both knees   • Type 2 diabetes mellitus without complication, without long-term current use of insulin (CMS/[facility])   • Essential hypertension   • Hypercholesterolemia   • BMI 40.0-44.9, adult (CMS/[facility])   • Osteoarthritis   • Primary hypothyroidism   • History of shingles   • Bifascicular block          Past Surgical History:   Procedure Laterality Date   • Cataract extraction, bilateral     •  section, classic      4   • Colonoscopy      2016 for bleeding, diverticulosis and hemorrhoids   • Hysterectomy      Ovaries probably out         Family  History   Problem Relation Age of Onset   • Cancer, Lung Son          smoker         Social History     Tobacco Use   • Smoking status: Never Smoker   • Smokeless tobacco: Never Used   Substance Use Topics   • Alcohol use: No     Frequency: Never   • Drug use: No        Sexually Active: Not Asked           Comment: , 6 boys and one girl, son Khadar         Immunization History   Administered Date(s) Administered   • Influenza, high dose seasonal, preservative-free 09/12/2016, 10/01/2018, 10/30/2019   • Influenza, injectable, quadrivalent, preservative free, pediatric 11/13/2017   • Influenza, injectable, quadrivalent, preservative-free 08/31/2015   • Influenza, seasonal, injectable, preservative free 08/31/2015   • Pneumococcal Conjugate 13 valent 12/28/2015   • Pneumococcal polysaccharide, adult, 23 valent 02/27/2017   • Zoster Shingles 05/15/2014        Review of Systems   Constitutional: Negative for fatigue and fever.   HENT: Negative for ear pain and nosebleeds.    Respiratory: Negative for cough, shortness of breath and wheezing.    Cardiovascular: Negative for chest pain and palpitations.        No shortness of breath   Gastrointestinal: Negative for abdominal pain, nausea and vomiting.   Musculoskeletal:        See HPI   Skin: Negative for rash.   Neurological: Negative for seizures and weakness.        No acute focal symptoms   Hematological: Does not bruise/bleed easily.   Psychiatric/Behavioral: Negative for confusion. The patient is not nervous/anxious.        Physical Exam:  Visit Vitals  Pulse 81   Temp 99.1 °F (37.3 °C)   Resp 18   Ht 5' (1.524 m)   Wt 92.3 kg (203 lb 7.8 oz)   SpO2 96%   BMI 39.74 kg/m²       Physical Exam   Constitutional: No distress.   HENT:   Head: Normocephalic and atraumatic.   Eyes: Conjunctivae are normal.   Neck: Neck supple.   Cardiovascular: Normal rate, regular rhythm and normal heart sounds. Exam reveals no gallop.   Pulmonary/Chest: Effort normal and breath sounds  normal. She has no wheezes.   Abdominal: Soft. Bowel sounds are normal. She exhibits no distension. There is no abdominal tenderness.   Neurological: She is alert. Coordination normal.   Skin: Skin is warm and dry. She is not diaphoretic.   Psychiatric: Her behavior is normal.       Last Results:    No visits with results within 1 Month(s) from this visit.   Latest known visit with results is:   Lab Services on 01/14/2020   Component Date Value Ref Range Status   • TSH 01/14/2020 2.940  0.350 - 5.000 mcUnits/mL Final   • WBC 01/14/2020 5.4  4.2 - 11.0 K/mcL Final   • RBC 01/14/2020 3.44* 4.00 - 5.20 mil/mcL Final   • HGB 01/14/2020 11.1* 12.0 - 15.5 g/dL Final   • HCT 01/14/2020 33.9* 36.0 - 46.5 % Final   • MCV 01/14/2020 98.5  78.0 - 100.0 fl Final   • MCH 01/14/2020 32.3  26.0 - 34.0 pg Final   • MCHC 01/14/2020 32.7  32.0 - 36.5 g/dL Final   • RDW-CV 01/14/2020 13.5  11.0 - 15.0 % Final   • PLT 01/14/2020 236  140 - 450 K/mcL Final   • NRBC 01/14/2020 0  0 /100 WBC Final   • DIFF TYPE 01/14/2020 AUTOMATED DIFFERENTIAL   Final   • Neutrophil 01/14/2020 51  % Final   • LYMPH 01/14/2020 38  % Final   • MONO 01/14/2020 8  % Final   • EOSIN 01/14/2020 2  % Final   • BASO 01/14/2020 1  % Final   • Percent Immature Granuloctyes 01/14/2020 0  % Final   • Absolute Neutrophil 01/14/2020 2.8  1.8 - 7.7 K/mcL Final   • Absolute Lymph 01/14/2020 2.1  1.0 - 4.0 K/mcL Final   • Absolute Mono 01/14/2020 0.4  0.3 - 0.9 K/mcL Final   • Absolute Eos 01/14/2020 0.1  0.1 - 0.5 K/mcL Final   • Absolute Baso 01/14/2020 0.0  0.0 - 0.3 K/mcL Final   • Absolute Immature Granulocytes 01/14/2020 0.0  0 - 0.2 K/mcl Final   • Fasting Status 01/14/2020 UNKNOWN  hrs Final   • Sodium 01/14/2020 141  135 - 145 mmol/L Final   • Potassium 01/14/2020 4.3  3.4 - 5.1 mmol/L Final   • Chloride 01/14/2020 107  98 - 107 mmol/L Final   • Carbon Dioxide 01/14/2020 27  21 - 32 mmol/L Final   • Anion Gap 01/14/2020 11  10 - 20 mmol/L Final   • Glucose  01/14/2020 142* 65 - 99 mg/dL Final   • BUN 01/14/2020 17  6 - 20 mg/dL Final   • Creatinine 01/14/2020 0.58  0.51 - 0.95 mg/dL Final   • GFR Estimate,  01/14/2020 >90   Final   • GFR Estimate, Non  01/14/2020 85   Final   • BUN/Creatinine Ratio 01/14/2020 29* 7 - 25 Final   • CALCIUM 01/14/2020 9.2  8.4 - 10.2 mg/dL Final   • TOTAL BILIRUBIN 01/14/2020 0.5  0.2 - 1.0 mg/dL Final   • AST/SGOT 01/14/2020 16  <38 Units/L Final   • ALT/SGPT 01/14/2020 17  <64 Units/L Final   • ALK PHOSPHATASE 01/14/2020 57  45 - 117 Units/L Final   • TOTAL PROTEIN 01/14/2020 8.0  6.4 - 8.2 g/dL Final   • Albumin 01/14/2020 3.5* 3.6 - 5.1 g/dL Final   • GLOBULIN 01/14/2020 4.5* 2.0 - 4.0 g/dL Final   • A/G Ratio, Serum 01/14/2020 0.8* 1.0 - 2.4 Final   • FASTING STATUS 01/14/2020 UNKNOWN  hrs Final   • CHOLESTEROL 01/14/2020 150  <200 mg/dL Final   • CALCULATED LDL 01/14/2020 69  <130 mg/dL Final   • HDL 01/14/2020 62  >49 mg/dL Final   • TRIGLYCERIDE 01/14/2020 97  <150 mg/dL Final   • CALCULATED NON HDL 01/14/2020 88  mg/dL Final   • CHOL/HDL 01/14/2020 2.4  <4.5 Final   • Hemoglobin A1C 01/14/2020 7.8* 4.5 - 5.6 % Final   • MICROALBUMIN, UA (TTL) 01/14/2020 66.30  mg/dL Final   • CREATININE, URINE (TOTAL) 01/14/2020 158.00  mg/dL Final   • MICROALBUMIN/CREATININE 01/14/2020 419.6* <30 mg/g Final             ASSESSMENT/PLAN:  Enedelia was seen today for follow-up.    Diagnoses and all orders for this visit:    Type 2 diabetes mellitus without complication, without long-term current use of insulin (CMS/[facility])  Comments:  Continue current medication for diabetes, A1c before next visit, continue to monitor sugar  Orders:  -     GLYCOHEMOGLOBIN; Future    Essential hypertension  Comments:  Blood pressure is under good control and patient has been tolerating medication well, continue monitor blood pressure and bring readings, report if any problem     BMI 40.0-44.9, adult (CMS/[facility])  Comments:  We will  continue to monitor    Primary hypothyroidism  Comments:  TSH is good, continue current plan and management      Patient to see me in 2 months after A1c    Medical compliance with plan discussed and risks of non-compliance reviewed.    Patient education completed on disease process, etiology & prognosis.    Patient expresses understanding of the plan.    Proper usage and side effects of medications reviewed & discussed.   After visit summary printout explained and given to patient        Lee Vargas MD   will follow the heparin protocol. bleeding precautions maintained. will continue to monitor.

## 2020-03-17 ENCOUNTER — APPOINTMENT (OUTPATIENT)
Dept: UROLOGY | Facility: CLINIC | Age: 40
End: 2020-03-17

## 2020-04-02 ENCOUNTER — APPOINTMENT (OUTPATIENT)
Dept: HEMATOLOGY ONCOLOGY | Facility: CLINIC | Age: 40
End: 2020-04-02

## 2020-04-16 ENCOUNTER — APPOINTMENT (OUTPATIENT)
Dept: NEUROLOGY | Facility: CLINIC | Age: 40
End: 2020-04-16
Payer: MEDICAID

## 2020-04-16 PROCEDURE — 99213 OFFICE O/P EST LOW 20 MIN: CPT | Mod: 95

## 2020-04-16 PROCEDURE — 99441: CPT

## 2020-04-29 ENCOUNTER — APPOINTMENT (OUTPATIENT)
Dept: NEUROLOGY | Facility: CLINIC | Age: 40
End: 2020-04-29
Payer: MEDICAID

## 2020-04-29 PROCEDURE — 99441: CPT

## 2020-04-29 NOTE — HISTORY OF PRESENT ILLNESS
[FreeTextEntry1] : Following verbal informed consent that reassured safety, security , privacy of records and gave notice that no part of the video audio communication will be recorded, and that insurance-ruled copays or deductibles are likely to accrue, an audio/video link was established with an approved carrier. This is being done because a face-to-face encounter is inadvisable for non-urgent conditions during the Covid-19 pandemic emergency.\par He has finally received the zonisamide after delay due to the approval process of his insurance but has misplaced the instructions how to start the medication for dysesthesia cramps and jerking in his extremities.

## 2020-04-29 NOTE — DISCUSSION/SUMMARY
[FreeTextEntry1] : Advised to start by using one capsule a day ( at night) for one week then one in the morning and one in the evening every day of the 2nd week. For the 3rd week, he is advised one capsule in the morning and TWO in the evening daily. Every day of the 4th week he is advised two capsules in AM and two in the evening. For the daily dose in the 5th week he is advised two caps in the AM and THREE caps in the PM. From the 6 th week onwards he will take 3 caps in AM and 3 caps in the PM.\par Advised strengthening exercises for the hands with sponge ball and for the feet by attempting to walk on toes and heels and by attempting to spread the toes.

## 2020-04-29 NOTE — DISCUSSION/SUMMARY
[FreeTextEntry1] : I shall appeal to his insurance company to allow him Zonisamide or topiramate for neuropathic pain without weight gain. We shall meet again after 6 weeks to discuss the effects of the medication  (success or failure in control of neuropathic pain)

## 2020-04-29 NOTE — PHYSICAL EXAM
[FreeTextEntry1] : Examination:\par Patient is well appearing\par Neurological Examination:\par Mental Status: Patient is awake and alert. Oriented to person, place, time. \par Language: No aphasia or paraphasic errors.\par Cranial Nerves:\par EOMI, No facial weakness, tongue protrudes in the midline, facial sensation intact\par Motor Exam: No pronator drift. Barrel roll intact. Fine motor movements intact in hands\par Able to stand up from chair without difficulty\par Sensory:numbness of feet\par Reflexes: Unable to examine\par Cerebellar: Finger to nose intact bilaterally, heel to shin intact bilaterally\par Gait: unable to walk on heels and toes due to a compbination of pain and numbness\par

## 2020-04-29 NOTE — HISTORY OF PRESENT ILLNESS
[FreeTextEntry4] : Kellie [FreeTextEntry1] : Following verbal informed consent that reassured safety, security , privacy of records and gave notice that no part of the video audio communication will be recorded, and that insurance-ruled copays or deductibles are likely to accrue, an audio/video link was established with an approved carrier.

## 2020-06-08 ENCOUNTER — OUTPATIENT (OUTPATIENT)
Dept: OUTPATIENT SERVICES | Facility: HOSPITAL | Age: 40
LOS: 1 days | Discharge: HOME | End: 2020-06-08

## 2020-06-08 DIAGNOSIS — C62.92 MALIGNANT NEOPLASM OF LEFT TESTIS, UNSPECIFIED WHETHER DESCENDED OR UNDESCENDED: ICD-10-CM

## 2020-06-08 DIAGNOSIS — Z95.828 PRESENCE OF OTHER VASCULAR IMPLANTS AND GRAFTS: Chronic | ICD-10-CM

## 2020-06-08 DIAGNOSIS — Z98.890 OTHER SPECIFIED POSTPROCEDURAL STATES: Chronic | ICD-10-CM

## 2020-06-08 DIAGNOSIS — Z90.79 ACQUIRED ABSENCE OF OTHER GENITAL ORGAN(S): Chronic | ICD-10-CM

## 2020-06-09 ENCOUNTER — RESULT REVIEW (OUTPATIENT)
Age: 40
End: 2020-06-09

## 2020-06-09 ENCOUNTER — APPOINTMENT (OUTPATIENT)
Dept: HEMATOLOGY ONCOLOGY | Facility: CLINIC | Age: 40
End: 2020-06-09
Payer: MEDICAID

## 2020-06-09 VITALS
HEART RATE: 84 BPM | RESPIRATION RATE: 18 BRPM | BODY MASS INDEX: 37.08 KG/M2 | SYSTOLIC BLOOD PRESSURE: 114 MMHG | TEMPERATURE: 98.9 F | DIASTOLIC BLOOD PRESSURE: 78 MMHG | WEIGHT: 288.81 LBS | OXYGEN SATURATION: 96 %

## 2020-06-09 LAB
BASOPHILS # BLD AUTO: 0.01 K/UL — SIGNIFICANT CHANGE UP (ref 0–0.2)
BASOPHILS NFR BLD AUTO: 0.2 % — SIGNIFICANT CHANGE UP (ref 0–2)
EOSINOPHIL # BLD AUTO: 0.07 K/UL — SIGNIFICANT CHANGE UP (ref 0–0.5)
EOSINOPHIL NFR BLD AUTO: 1.1 % — SIGNIFICANT CHANGE UP (ref 0–6)
HCT VFR BLD CALC: 43.9 % — SIGNIFICANT CHANGE UP (ref 39–50)
HGB BLD-MCNC: 15.1 G/DL — SIGNIFICANT CHANGE UP (ref 13–17)
IMM GRANULOCYTES NFR BLD AUTO: 1.3 % — SIGNIFICANT CHANGE UP (ref 0–1.5)
LYMPHOCYTES # BLD AUTO: 2.51 K/UL — SIGNIFICANT CHANGE UP (ref 1–3.3)
LYMPHOCYTES # BLD AUTO: 40.7 % — SIGNIFICANT CHANGE UP (ref 13–44)
MCHC RBC-ENTMCNC: 33.2 PG — SIGNIFICANT CHANGE UP (ref 27–34)
MCHC RBC-ENTMCNC: 34.4 GM/DL — SIGNIFICANT CHANGE UP (ref 32–36)
MCV RBC AUTO: 96.5 FL — SIGNIFICANT CHANGE UP (ref 80–100)
MONOCYTES # BLD AUTO: 0.55 K/UL — SIGNIFICANT CHANGE UP (ref 0–0.9)
MONOCYTES NFR BLD AUTO: 8.9 % — SIGNIFICANT CHANGE UP (ref 2–14)
NEUTROPHILS # BLD AUTO: 2.95 K/UL — SIGNIFICANT CHANGE UP (ref 1.8–7.4)
NEUTROPHILS NFR BLD AUTO: 47.8 % — SIGNIFICANT CHANGE UP (ref 43–77)
NRBC # BLD: 0 /100 WBCS — SIGNIFICANT CHANGE UP (ref 0–0)
PLATELET # BLD AUTO: 172 K/UL — SIGNIFICANT CHANGE UP (ref 150–400)
RBC # BLD: 4.55 M/UL — SIGNIFICANT CHANGE UP (ref 4.2–5.8)
RBC # FLD: 12.7 % — SIGNIFICANT CHANGE UP (ref 10.3–14.5)
WBC # BLD: 6.17 K/UL — SIGNIFICANT CHANGE UP (ref 3.8–10.5)
WBC # FLD AUTO: 6.17 K/UL — SIGNIFICANT CHANGE UP (ref 3.8–10.5)

## 2020-06-09 PROCEDURE — 99215 OFFICE O/P EST HI 40 MIN: CPT

## 2020-06-09 RX ORDER — ZONISAMIDE 50 MG/1
50 CAPSULE ORAL TWICE DAILY
Qty: 540 | Refills: 3 | Status: DISCONTINUED | COMMUNITY
Start: 2020-01-23 | End: 2020-06-09

## 2020-06-09 RX ORDER — HALOBETASOL PROPIONATE 0.5 MG/G
0.05 OINTMENT TOPICAL TWICE DAILY
Qty: 1 | Refills: 6 | Status: DISCONTINUED | COMMUNITY
Start: 2019-08-20 | End: 2020-06-09

## 2020-06-09 RX ORDER — NORTRIPTYLINE HYDROCHLORIDE 25 MG/1
25 CAPSULE ORAL
Qty: 60 | Refills: 1 | Status: DISCONTINUED | COMMUNITY
Start: 2019-11-04 | End: 2020-06-09

## 2020-06-09 NOTE — ASSESSMENT
[Palliative Care Plan] : not applicable at this time [FreeTextEntry1] : Cleve Calderón is seen in the office today for the first time in 6 months. He is only taking oxycodone at this time. He's been on it for some time due to prior back injury. He was on nortriptyline recently as well as zonasemide. He stopped taking both of them. The latter drug gait and tremors. He had been on gabapentin in the past but felt that he is swelling of his legs as a result. His insurance would not permit him to receive Lyrica. His neuropathy continues to be his greatest complaint. It affects the feet up to the upper calf. He has shooting pains at night which interfere with his sleep. He says that he goes to sleep and awakens about 2 AM in the morning and then cannot get back to sleep. He has some tremors when he uses a mouse of his computer. He cannot touch type as he did before as he makes multiple mistakes. He occasionally drops a fork This would be grade 3 neuropathy.\par \par His appetite is good and his weight is increased by 10 pounds since last visit. He has some knee pains in the left side at times. He was receiving physical therapy, but his insurance won't pay for additional physical therapy at this time. He was encouraged to practice some of the exercises that he was instructed to do. He does not have any cough, but he has some shortness of breath which she attributes to his lack of exercise and weight gain. 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 We have had this discussion many times. He is depressed and has anxiety. He feels that he has spent the last 5 years in "isolation". He says that he used to come in the room when he walked in, but now he feels that he's isolated from other individuals. He lacks interest in many things including sexual activity. He has a poor libido and testosterone level will be checked once again today. He notes that his feet swell with activities, and he's been ambulating with a cane for quite some time. There are no recent headaches. He does have some dizziness if he gets up or lies down quickly. 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 This occurred during the third cycle of BEP chemotherapy. He never did receive the fourth cycle. He was missing in action for a period of time and then returned. He had residual disease, and he underwent TIP chemotherapy for 3 cycles. He could not tolerate any further. He had a retroperitoneal lymph node dissection, and he has been in remission since that time. His last chemotherapy was in September of 2018. His last CT scan was in January of this year. This was read as a stable examination without evidence of metastasis. He had a left external iliac lymph node that measured 2.6 x 1 cm and was stable in size since July 2017. He had a previous retroperitoneal lymph node dissection.\par \par We discussed the potential use of medical marijuana, and I am going to refer him to Dr. Symone Shields for evaluation for this purpose. She may also have some recommendations in regards to his neuropathy. He also agrees to meet with Dr. Edyta Almanza at this time. I have suggested this in the past, but he has been reluctant to do so. 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 No further evaluation has been done for this, so this will be repeated today along with quantitative immunoglobulins, free light chains, and a formal serum protein electrophoresis. Other evaluations for the cause of his neuropathy have not turned up any paraneoplastic issue. This is most likely due to the cisplatin and Taxol that he's received in abundance.\par \par All questions were answered to the best my ability and to his apparent satisfaction. The NCCN guidelines suggest that he should be seen once again in 3 months time. I will see him at that time.

## 2020-06-09 NOTE — REVIEW OF SYSTEMS
[Fatigue] : fatigue [Recent Change In Weight] : ~T recent weight change [Lower Ext Edema] : lower extremity edema [SOB on Exertion] : shortness of breath during exertion [Dysuria] : dysuria [Joint Pain] : joint pain [Muscle Pain] : muscle pain [Dizziness] : dizziness [Depression] : depression [Anxiety] : anxiety [Muscle Weakness] : muscle weakness [Negative] : Gastrointestinal [Fever] : no fever [Chills] : no chills [Chest Pain] : no chest pain [Palpitations] : no palpitations [Cough] : no cough [Wheezing] : no wheezing [Skin Rash] : no skin rash [Incontinence] : no incontinence [Easy Bleeding] : no tendency for easy bleeding [Easy Bruising] : no tendency for easy bruising [FreeTextEntry2] : weight up 10 pounds since last visit [FreeTextEntry4] : tinnitus present [de-identified] : NO HA, neuropathy [FreeTextEntry9] : knees [FreeTextEntry8] : trouble starting the stream  with some pains

## 2020-06-09 NOTE — HISTORY OF PRESENT ILLNESS
[Disease: _____________________] : Disease: [unfilled] [T: ___] : T[unfilled] [N: ___] : N[unfilled] [M: ___] : M[unfilled] [AJCC Stage: ____] : AJCC Stage: [unfilled] [de-identified] : Mr. Calderón is seen in consultation on 8/8/17. On July 11, 2017, he awoke with severe back pain extending into the left testicle. There was a heaviness and pulling sensation. He went to the ER at [facility] and a sonogram was done, revealing a mass. He was seen by Rivas Oconnor and underwent a left radical orchiectomy on July 20, 2017. The pain and pulling sensation resolved. He has been on oxycodone a time, since January 2017, for low back pains. No respiratory complaints. No urinary symptoms. Has had sperm cryopreservation. \par \par 8/30/17...Cleve comes in today for follow up was just discharged yesterday. He was in the hospital for 4 days and he was neutropenic, found a right arm superficial thrombosis at the site of IV. treated with vanco, blood cultures negative. Discharged on Levaquin. Overnight he had a temp of 104.  He is having some hair loss, numbness in his fingers and his toes. He has tinnitus intermittently, started two days after the last dose. He was having soreness in the back of his throat that now resolved. He complains of dysgeusia and he had a lot  of burning and acid reflux-like symptoms during his treatment Carafate, Protonix and the lower dose of Decadron improved his symptoms., Not sleeping well, likely due to steroids.\par \par 9/19/17...cycle 2 day 5 was 9/9/17\fabian Poon is seen in the office today. He has significant fatigue. He had vomiting for 5 days after the chemotherapy with nausea and he said that his complexion was green. He had acid reflux issues once again despite the dropping off the dose of his dexamethasone. He feels a burning sensation in his lower abdomen and he feels that the medications for nausea did not help. He also has significant fatigue which is improved today. He has intermittent tinnitus. He has tingling in the tips of his toes and fingers which are intermittent. His appetite has been good and he is gained weight while on chemotherapy but he says that he eats smaller portions. He denies dysgeusia or dry mouth.\par \par 10/9/17...Completed cycle 3 last week.  He states he feels "horrible." HE states he noticed bloating in the abdomen, He went a whole week after this treatment wit vomiting every day, last time he vomited was two days ago. He fell at one point secondary to being weak and feeling lightheaded. He feels generalized weakness.He had a blood clot, during his treatment, that resolved. His appetite is poor, He eats and then, he eats, he feels like he has to regurgitate his food up after eating. He has fatigue, tinnitus stopped. he denies any numbness of the feet. has had chronic numbness of two of his fingers.He has dysgeusia \par \par 10/30/17...Admission Date: \par -  Admission Date 16-Oct-2017 13:33. \par Discharge Date: \par - Discharge Date	21-Oct-2017 \par Chief Complaint/Reason for Visit: \par Chief Complaint/Reason for Admission	Atrial flutter with RVR\par *******************\par  36 yo M with PMHx of testicular cancer s/p left orchiectomy on chemotherapy who present to the ED before receiving chemo for tachycardia. \par The patient states that he has been feeling well for the past 2 weeks since his 3rd dose of chemotherapy. He denies CP, ABARCA, peripheral edema, cough, \par palpitations. He does states that he had one near syncopal event during a bout of emesis 2 weeks ago which caused him to fall into a door. Has not had any \par other syncopal or presyncopal episodes since then. In the ED he was found to have tachycardia found to be a.flutter after receiving 10mg IV diltiazem x2, a CXR with well placed portacath and no infiltrations. On bedside US found to have EF of 10-15% while tachycardic. \par The patient was initially started on metoprolol tartrate with increasing dosages unable to control the arrhythmia he was subsequently switch to a diltiazem drip. The metiport was removed after discussion with his Oncologist and cardiology for possible arrhythmia induction which was removed 10/19. He also received a TTE on 10/19 which showed EF of 35-40% with LV remodeling. \par Afterwards he was switched to metoprolol succinate 200mg qday. \par He was started on heparin for preparation for TYLOR and cardioversion which was performed on 10/20 and found a RA appendage thrombus with PFO and reduced LV \par function. No cardioversion was performed as a result and he was switched to rivaroxaban on 10/21, lisinopril 2.5mg qday and metoprolol succinate 200mg \par qday. He will follow with cardiology in 2 weeks and EP in 4 weeks. Treatments/Procedures/Significant Findings/Patient Condition: \par Other Significant Findings: \par - Other Significant Findings	 \par < from: Transesophageal Echocardiogram w/o TTE (10.20.17 @ 12:08) > \par Conclusions: \par 1. Left atrial enlargement.  No left atrial or left atrial appendage thrombus. \par 2. Moderate to severe global left ventricular systolic dysfunction. \par 3. There is an echodensity (likely thrombus vs mass) in the right atrial wall that measures (approximately 2.7 cm x 2.0 cm). This is near the distal point of the SVC and may \par represent thrombus from prior indwelling catheter. \par 4. Right ventricular enlargement with decreased right ventricular systolic function. \par 5. Color Doppler demonstrates evidence of a patent foramen ovale. \par 6. Trivial pericardial effusion. \par ****************************************************************************\par TYLOR showed right atrial appendage thrombus, 27 mm, On Xarelto. Feels "exhausted". Does not generally note palpitations. Anxiety level is high. Occasional ABARCA, not at rest. No cough, no sputum, no wheezing. No nausea at this time, has regurgitation. Occ vomiting noted. Fatigue is prominent. Appetite is good. \par \par 7/9/18...Had RPLND on 6/6/18, with viable GCT present. Embryonal pathology. Says he have fatigue, still "recovering" from the RLPND. He has some soreness of the abdomen and says he has some delayed healing. He say Dr Piper on 7/6 in lieu of Dr Restrepo and was told that it was healing well. He is upset that there is residual cancer present, which he says "bummed him out completely". He says he has been reviewing issues related to continued treatment, only after I called him recently as he had not made an appointment. he says his appetite is poor and he is eating small amounts multiple times as he cannot eat full full meals. He says he has had some nausea and he started OTC Prevacid, which has helped. No significant weight loss. Some ABARCA if he walks about 3 blocks or so, he has fatigue with walking as well. he has some paresthesias of the left thigh after the surgery. He notes some spasms of the muscles in that area at times. Urine flow is normal, no nocturia. Libido down, not sexually active, no masturbation since surgery. \par \par 7/19/18...Feels the same, "still recovering". Still has some discomfort near the wound. Appetite s good. Gained 2 pounds. no cough. Mild ABARCA with activities. No palpitations. No leg edema. He continues with some fatigue, 6 on 0-10 scale. Feels he is not motivated when the fatigue is high. He feels his hands are weaker than before. Lifting is fine, but twisting a bottle to open may be  impaired. Ambulates with a cane  due to paresthesias of the left anterior thigh. This from knee  to thigh area,. No paresthesias of the feet. He sometimes has some pains in his feet at night which he says are not cramps. \par \par 9/6/18...Cycle 2, TIP, day # 9. Admitted on Sunday, August 19. \par *********************************************\par Hospitalized again post chemo, this time for 9 days. ANC was <0.1, platelets were low. had low sodium, low potassium and low magnesium. He has had lots of muscle aches and bone pain secondary to the Taxol. Feeling better, still feels weak. Some SOB with exertion. No pains. No headaches, has paresthesias of the bottom of the feet along with some toes. It improves after the chemo. Somewhat more this time. No fevers, no chills. Some nausea with office visits. Appetite is "moderate". Tinnitus has improved, minor dizziness at times. \par \par 8/30/18...Delayed treatment #3 by one week. "Not a good week". Complains of hearing being sensitive. He has tinnitus. This is intermittent, more in the past 2-3 days. He has some vertigo, with some issue walking. He feels as if he is falling to the right side at times, no falls. He has to hold onto the door or furniture to walk. He has some vomiting without nausea. He vomited a few minutes ago. Appetite is decreased, He has altered taste as well. Fatigue is present, not as bad as before. he says he is a "little" fatigued at the moment. Paresthesias of feet have been intermittent, less notable at this time. Spends a lot of time sitting, sleeping. He feels as if his body is exhausted. His mother says he has a lot of anxiety. He agrees. He has thoughts running through his mind about chemo and adverse effects. \par \par 9/20/18...he feels "okay" at times. Occasionally feels nauseous and can't eat, occ feels dizzy. has some regurgitation at times. Has fatigue. No better. Tinnitus is "not as bad", persistent in the right, intermittent in the left. When dizzy,  he feels he is leaning to one side. Spends a lot of time sitting or lying in bed. Paresthesias of the feet, left foot more so than right. Right toes affects the 4th and 5th toe. Fingertips are affected. He notes some darkening of skin folds of the hand joints. Feels no SOB unless he is active. using a cane today. Not taking any meds at this point. \par \par 10/9/18...Admission Date: after cycle 3 TIP\par -  Admission Date 01-Oct-2018 02:09. \par Discharge Date: \par - Discharge Date	06-Oct-2018 \par ***********************************\par HOSPITAL COURSE: The patient continued to have significant nausea while inpatient with intermittent vomiting. He received aggressive IV hydration with \par IVF while he was no longer able to tolerate po. On hospital day 4, patient was able to tolerate some po and requested discharge home. His course was c/b \par neutropenic fever to 101. Cultures were sent and remained negative. Patient was also started on cefepime. His ANC started to improve towards the end of the \par hospital course. His platelets, however, downtrended. He was given 1U platelets as per oncology recommendations. His midline was noted to have been in since \par July. After discussion with his outpatient oncologist, the patient opted to have his midline removed and will receive his last dose of chemotherapy via \par peripheral IV. During his hospital course, patient was noted to have an episode of tachycardia to the 140's followed by bradycardia to the 20's during \par orthostatic vitals. He was seen by Dr. Brown of cardiology who feels this is chemo-induced orthostasis and no further inpatient w/u is needed. He is \par medically stable for dc home. Prior to discharge received additional platelet transfusion and MID-Line was removed after discussion with oncology. \par *******************************************************************\par He was admitted and told that the midline had to be changed. He did have fever with neutropenia. Discharged on the 6th. Hypotensive with orthostasis,seen by cardiology, restarted lisinopril and carvedilol. He has fallen twice since discharge. He has LOC for a few seconds. he is somewhat confused after the episodes. Walking with a cane. he also had an similar episode while in the hospital. Appetite returned, has altered taste, no N/V now, but did have on admission. No source of fever found. Has paresthesias of the left foot, all toes, not his right.  Had aches and pains with the Taxol, which he describes as "brutal". \par \par 10/29/18...called last week, was having difficulty keeping food down and felt weak and dizzy. We arranged for fluids, but he called Friday ad declined. Taking Zofran ADT after meals at times. Able to drink more at this time. Did not eat this AM. Yesterday, had a bagel and egg, for lunch, potatoes, pork and beans and Spam. For dinner had rice and Spam, small amount. No vomiting yesterday, but had nausea. Dizziness if he leis his head back, or if he gets up to fast. Walking with  a cane. No falls. Started Has paresthesias of the feet, on the left side. Continues on oxycodone, about  1 per day, chronic use for low back pains. No leg edema. No cough, ABARCA. \par \par 11/13/18...has not followed with Dr Marquez and remains off of carvedilol, enoxaparin and lisinopril. He takes ondansetron on occasion. Says he had a "blackout" episode after getting up, fell, striking left side, no injury. No LOC. Neuropathy remains, mostly left leg, somewhat better at times, continues on therapy. Minimal on the right side. Considering returning to work. Fatigue is present, somewhat better. Appetite is still impaired, eats once a day. Gained a few pounds. Still has some taste alteration. No leg edema. No abdominal pains. Saw Phong Restrepo yesterday in follow up. He has constipation, and may go 4-5 days w/o bowel movement. \par \par 1/4/19...Had echo done 12/2018. "I'm okay", feeling better, saw Dr Marquez. Paresthesias are an issue. On gabapentin 300 TID, says it is not helping. He says his fingers are affected, works on computers, and hits wrong keys at times. Worse are his feet. Left foot is numb, right foot partially numb, annoying when walking, not sensing if he steps on something. He continues on oxycodone. Affect toes, senses that they are swollen, bottom of the feet as well as the top. Does not pass the ankle. In the hands, it is the 3rd and 4th finger distally on the right side, and 3-4-5 on the left. Echo said that LVEF is about 44-46 % with moderate global dysfunction. The LA appendage clot was not seen. Not as tired as before, walked 11 minutes yesterday with some fatigue. Occ mild nausea, no vomiting. Appetite is good, gained 4 kilos on the past month( points out that he is wearing heavy boots. . Still has some dysgeusia. Still avoids meats.  Occasional HA, lasts up to 1/2 day, occurs intermittently, once a week. More right side of the headaches. Prior sinus headaches, but not the same, has a sharp element to it. Stills feels dizzy at times when gets up too fast..\par \par 3/11/19...Urgent visit. Called this AM stating that the right testicle is swollen, present for a few days, mild tenderness, mild discomfort/pain. No back pains except for his chronic back issue. He notes some skin changes of his hands and his feet.e is some peeling skin at the base of the toes. as well as the center of the foot. He has neuropathy of both feet, more on the left than the right, where it only affects the toes. He says the medial 3 fingers are numb and he drops things. His heart exam included a stress test, which he reports the outcome was good. Still reports tinnitus. Appetite is good, weight is increased 6 kilos since 1/30/19. Still has fatigue. \par \par 5/11/19...Missed appt 4/5/19. I " have good days and bad days". He feels he puts on a lot of weight in  a short time, and then drops weight in a short period of time. He had edema of both feet and also feels he had edema of the hands as well. He says edema accumulates and then resolves. Neuropathy is "pretty bad", extends to the calves, worse at night. Ran out of gabapentin, was on 300 TID and did not feel it helped. He has discoloration of the left foot, edema both feet. Has cramps in the legs. Feels he cannot stand for a long time, and complains of blistering of the feet. He has shooting pains in the toes. There are fluid filled blisters that he he "has to pop", due to pain. He feels he s less active due to the blisters. He feels his toes are locked i position. Does exercises provided by PT. Appetite is good. He is thirsty many times. NO pains elsewhere except chronic back pain, for which he takes oxycodone. No cough, has some ABARCA, can walk 2 blocks prior to stopping. Sees DR Marquez later today. Fine motor issues with fingers with neuropathy, difficulty with typing on keyboard, with many errors. \par \par 7/2/19...Hospital Course: \par Discharge Date	12-Jun-2019 \par Admission Date	12-Jun-2019 00:37 \par Reason for Admission	vomiting, vertigo \par Medication Reconciliation Status	Admission Reconciliation is Completed \par Discharge Reconciliation is Completed \par Hospital Course	 \par 38M with PMHx of metastatic testicular CA, NICM, neuropathy, who presents with dizziness, nausea, and vomiting. \par Vertigo. \par - will c/w meclizine 25mg 4x/day \par - f/u blood and urine cx to r/o infectious etiology \par - PT eval, fall precautions. Constellation of symptoms with horizontal nystagmus and reproducible symptoms with daryn hallpike maneuver c/w BPPV. CT \par head negative. Pt reports improvement after meclizine. \par - will c/w meclizine 25mg 4x/day \par - f/u blood and urine cx to r/o infectious etiology \par - PT eval \par  Cardiomyopathy. 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 Most recent TTE on 6/4 showing EF55%. Pt concerned current sx may be side effect from entresto \par - c/w carvedilol 12.5mg BID \par - will hold entresto in setting of soft BP and pt hesitancy to restart due to side effects. In prior studies, dizziness is reported as an adverse event "at \par least 5%" of patients taking entresto. \par will hold all BP meds upond d/c 2/2 hypotension \par  Vomiting. \par - will c/w IVF overnight and repeat lactate in AM \par - zofran PRN nausea. \par  Mixed germ cell tumor. \par  now in remission. \par **********************************************\par Was hospitalized and now off carvedilol and Entresto. Doing better at this time. Only bothered by paresthesias of the feet and occasion syncope with changes in position. the latter has occurred when getting up from bed or with sitting to standing at times. Appetite is good, no weight loss. No cough, no ABARCA. No palpitations noted. Chronic back pains, no changes. No HA. Libido is down, erections are difficult to achieve, are not durable,  no ejaculation.\par \par 9/10/19...Feels "okay". he has some intermittent swelling noted in the hands and feet, occasionally in face, may last for several days. He has paresthesias of the fingertips the hands, not as bad as it was previously. It can be shock, like, noted more at night, more prominent in the feet. Gabapentin didn't help. Has his usual back pains, takes oxycodone 15 mg 2-4 times a day depending on pain. Appetite is good, No diarrheal stools, has constipation. Weight increased 3 kilos. No nausea, no vomiting.  BM about 3 times a week, sometimes hard. Advised to take Colace. Occ has a blistering rash on the bottom of the feet, Rx'd from dermatology.He still has blisters from time to time. The foot swells and the skin starts to crack. Fatigue unchanged. Tries to walk about but the pain in the feet causes discomfort, occasionally uses his cane to walk. Urine flow is impaired as before with some discomfort, not burning. Nocturia x 3-4. NO blood. occ incontinence- leaking of urine if he goes to stand at times. He noted blood in his stool 2 night sin a row. Reddish color, fresh appearing. \par \par 12/13/20...Continues to struggle with neuropathy, seen by neuro, had NCV, started nortriptyline for the neuropathy at bed time.  He feels it helps somewhat, dose increased to 50. Appetite is good. Weight is stable. Has impaired activities due to the neuropathy, does not leave the house much. Goes to PT and doctor appts. Has back pains form prior injury, on long term oxycodone, takes 15 mg 3-4 times a day. Anxiety level is low except at night, which he attributes to pain. He feels he is not depressed. No cough, mild ABARCA, attributed to weight increase. Occ headaches, right sided, once every few months. Fatigue is 8 on a 0-10 scale. 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 Was on zonasamide by neurology, felt he had tremors and grater degree of anxiety. Insurance won't permit Lyrica, gabapentin created swelling. Taking oxycodone 15, up to 4 times a day, trying to cut down. Interested in med marijuana. he says he does not sleep well. Awake from 2 to 6 AM. Neuropathy keeps him awake, less noted during the day. Neuropathy affects the feet to the upper calves, shooting pains at night. Also has neuropathy of the hands as well. Says he has difficulty typing on the computer. Hand will shake when using the mouse. Occ drops a fork. Appetite is good, weight increased. Knee pains as well. No cough, some ABARCA remains, which he attributes to lack of exercise. Not seeing a mental health professional. I have suggested he see Dr Almanza in the past. He feels he has spent 5 years in "isolation". \par Neurologist notes reviewed they ordered an immunofixation which showed a slight IgG lambda band. No further evaluation was performed.\par No headaches recently, occ dizziness of he gets up fast. Tries to keep himself active, but says he cannot do much. He says his feet swell with activities. He walks with a cane. \par  [FreeTextEntry1] : etoposide and cisplatin started August 14, 2017. Cycle 4 delayed..then omitted due to tachycardia and low EF. RPLND delayed, residual viable embryonal cancer, TIP started July 29, 2018, cycle 3 delayed for grade 3 fatigue, then again one more week for transaminitis.  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 Metastatic embryonal  tumor in 2 of 57 lymph nodes,  Extranodal extension is identified, 5.2 cm, pN3

## 2020-06-09 NOTE — PHYSICAL EXAM
[Ambulatory and capable of all self care but unable to carry out any work activities] : Status 2- Ambulatory and capable of all self care but unable to carry out any work activities. Up and about more than 50% of waking hours [Normal] : grossly intact [de-identified] : some minor edema of the ankles, pitting [de-identified] : obese [de-identified] : no gynecomastia [de-identified] : lumbar tenderness as before [de-identified] : uncircumcised, glans and shaft normal. right testicle normal [de-identified] : flat affect, disinterested

## 2020-06-10 LAB
ALBUMIN MFR SERPL ELPH: 55.5 %
ALBUMIN SERPL ELPH-MCNC: 4.2 G/DL
ALBUMIN SERPL-MCNC: 3.9 G/DL
ALBUMIN/GLOB SERPL: 1.2 RATIO
ALP BLD-CCNC: 57 U/L
ALPHA1 GLOB MFR SERPL ELPH: 2.8 %
ALPHA1 GLOB SERPL ELPH-MCNC: 0.2 G/DL
ALPHA2 GLOB MFR SERPL ELPH: 9 %
ALPHA2 GLOB SERPL ELPH-MCNC: 0.6 G/DL
ALT SERPL-CCNC: 105 U/L
ANION GAP SERPL CALC-SCNC: 13 MMOL/L
AST SERPL-CCNC: 76 U/L
B-GLOBULIN MFR SERPL ELPH: 11.5 %
B-GLOBULIN SERPL ELPH-MCNC: 0.8 G/DL
BILIRUB SERPL-MCNC: 0.4 MG/DL
BUN SERPL-MCNC: 14 MG/DL
CALCIUM SERPL-MCNC: 9.2 MG/DL
CHLORIDE SERPL-SCNC: 104 MMOL/L
CO2 SERPL-SCNC: 23 MMOL/L
CREAT SERPL-MCNC: 0.96 MG/DL
DEPRECATED KAPPA LC FREE/LAMBDA SER: 1.43 RATIO
GAMMA GLOB FLD ELPH-MCNC: 1.5 G/DL
GAMMA GLOB MFR SERPL ELPH: 21.2 %
GLUCOSE SERPL-MCNC: 106 MG/DL
IGA SER QL IEP: 193 MG/DL
IGG SER QL IEP: 1555 MG/DL
IGM SER QL IEP: 115 MG/DL
INTERPRETATION SERPL IEP-IMP: NORMAL
KAPPA LC CSF-MCNC: 1.56 MG/DL
KAPPA LC SERPL-MCNC: 2.23 MG/DL
M PROTEIN MFR SERPL ELPH: 2.1 %
M PROTEIN SPEC IFE-MCNC: NORMAL
MAGNESIUM SERPL-MCNC: 2.2 MG/DL
MONOCLON BAND OBS SERPL: 0.1 G/DL
POTASSIUM SERPL-SCNC: 4.2 MMOL/L
PROT SERPL-MCNC: 7.1 G/DL
SODIUM SERPL-SCNC: 139 MMOL/L
TESTOST SERPL-MCNC: 537 NG/DL

## 2020-06-10 NOTE — PROVIDER CONTACT NOTE (CRITICAL VALUE NOTIFICATION) - ASSESSMENT
ANTICOAGULATION FOLLOW-UP CLINIC VISIT    Patient Name:  Yogesh Abreu  Date:  6/10/2020  Contact Type:  [employer]    SUBJECTIVE:         OBJECTIVE    Recent labs: (last 7 days)     06/10/20   INR 2.8*       ASSESSMENT / PLAN  INR assessment THER    Recheck INR In: 2 WEEKS    INR Location Home INR    Billed home INR No      Anticoagulation Summary  As of 6/10/2020    INR goal:   2.0-3.0   TTR:   76.5 % (1 y)   INR used for dosin.8 (6/10/2020)   Warfarin maintenance plan:   7.5 mg (5 mg x 1.5) every day   Full warfarin instructions:   7.5 mg every day   Weekly warfarin total:   52.5 mg   No change documented:   Tanja Wills RN   Plan last modified:   Tanja Wills RN (2019)   Next INR check:   2020   Priority:   Maintenance   Target end date:   Indefinite    Indications    PE (pulmonary embolism) [I26.99]  Long term current use of anticoagulants with INR goal of 2.0-3.0 [Z79.01]             Anticoagulation Episode Summary     INR check location:       Preferred lab:       Send INR reminders to:   [facility] HEART INR NURSE    Comments:         Anticoagulation Care Providers     Provider Role Specialty Phone number    Frederic Isaacs MD Responsible Cardiology [phone]            See the Encounter Report to view Anticoagulation Flowsheet and Dosing Calendar (Go to Encounters tab in chart review, and find the Anticoagulation Therapy Visit)    Home INR 2.8 Will continue current dosing of 7.5 mg daily and recheck in 2 weeks. Will call pt after the next INR check.    Tanja Wills RN                  VSS

## 2020-06-18 ENCOUNTER — APPOINTMENT (OUTPATIENT)
Dept: HEMATOLOGY ONCOLOGY | Facility: CLINIC | Age: 40
End: 2020-06-18
Payer: MEDICAID

## 2020-06-18 PROCEDURE — 99203 OFFICE O/P NEW LOW 30 MIN: CPT | Mod: 95

## 2020-06-23 NOTE — H&P PST ADULT - ASSESSMENT
I SPOKE WITH THE PATIENT'S INSURANCE.  THE CLAIM WAS MARKED AS URGENT AND URGENT SERVICES DO NOT REQUIRE PRIOR AUTH.  I CANNOT OBTAIN ANY KIND OF AUTHORIZATION NOW.  THEY SAID THAT IF INSURANCE DEEMS THAT IT WASN'T URGENT AND THEY DENY THE CLAIM, TEETEE AND/OR THE PATIENT CAN APPEAL THAT DECISION.    REGARDING PHYSICAL THERAPY, THOSE AUTHS ARE HANDLED DIRECTLY BY PT.  I CALLED THE PATIENT WITH THIS INFORMATION AND SHE UNDERSTANDS, AND WILL CONTACT PT ABOUT HER UPCOMING APPOINTMENT.  
Patient called stating she had emergency surgery on 6/5/20 that Dr Mendoza completed.  Authorization is needed on this because the patient went out of network and has an [facility] insurance plan.  Please contact the [facility] at [phone] for this authorization.      Patient also stated she has therapy scheduled for this Thursday and wants to ensure the authorization is completed on this as well prior to her coming.     Please call the patient back regarding this.   
Pt. is a 36 yo male accompanied by his mother.  Pt. has cancer that metastasized.

## 2020-06-30 NOTE — ASSESSMENT
[FreeTextEntry1] : 39yoM with: \par \par 1. 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 Provided cannabis education, overview of state program, and discussed adverse effects in detail. Counseled that vaporized cannabis is not the preferred route of administration due to the fact that both short-term and long-term risks associated with vaporizing oils are not yet fully understood. Recommend starting with 1:1 THC:CBD formulation at low dose of THC (~2mg/dose).\par Discussed initiation of duloxetine as this may serve a dual effect of helping with his CIPN and mood.  He is agreeable to trying it. \par \par 3. Depressed mood - Duloxetine 30mg to be initiated, as above. \par \par 4. Encounter for Palliative Care - Emotional support provided.\par \par Follow up in 1 month, call sooner with questions or issues.

## 2020-06-30 NOTE — HISTORY OF PRESENT ILLNESS
[Home] : at home, [unfilled] , at the time of the visit. 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  \par \par Main issue for which patient is being referred is pain/neuropathy.  He experiences neuropathy in his feet b/l, which has traveled up his legs to the calves. The pain feels like "little explosions," that occur intermittently.  He has a constant feeling of tingling and numbness.  He has trouble feeling the floor beneath him. The pain is worse at night. \par Gabapentin caused edema, could not continue using it. His insurance company would not cover Lyrica. Zonisamide was prescribed by Neurology, he did not like how it made his hands shaky and he discontinued it. Nortriptyline had also been discontinued because he did not find it to be helpful. \par Has difficulty with cooking as he can't chop food. \par He is interested in trying medicinal cannabis. He had tried using marijuana in the recent past and it was helpful. \par \par ROS:\par +depressed mood - dealing with the pandemic on top of his cancer has made him very sad. \par +trouble sleeping \par +eats more due to his low mood  - has gained about 40 pounds from his baseline\par \par Patient is single, he lives with his parents - had to move back in with them when he became sick. \par Prior to his cancer diagnosis he was in college and working for [employer]. He had been hoping to go into politics. He enjoys reading. \par \par I-Stop Ref#: [id number]\par \par This visit was completed via TeleMedicine due to the restrictions of the COVID-19 pandemic. All issues documented here were discussed and addressed but no physical exam was performed other than what could be visually ascertained via video. The patient verbally consented to this TeleMedicine visit.

## 2020-07-10 NOTE — PATIENT PROFILE ADULT - CENTRAL VENOUS CATHETER/PICC LINE
-- DO NOT REPLY / DO NOT REPLY ALL --  -- Message is from the [facility]--    COVID-19 Universal Screening: Negative    Result Request  Type of Test / Lab: Mri   Date Test / Lab: 07/02/2020  Location: [address] [place] [address]   Test / Lab ordered/authorized by: none  Other comments:none    Preferred Delivery Method   Call back patient with results.  Phone number:  [phone] and Call when ready for pickup - phone number to notify: [phone]    Caller Information       Type Contact Phone    07/08/2020 02:54 PM Phone (Incoming) CRICKET GOULD (Emergency Contact) [phone]          Alternative phone number: none    Turnaround time given to caller:   \"This message will be sent to [state Provider's name]. The clinical team will fulfill your request as soon as they review your message.\"  
Information mailed    
no
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
## 2020-07-21 NOTE — PROGRESS NOTE ADULT - SUBJECTIVE AND OBJECTIVE BOX
SUBJECTIVE:  Yakov Munoz is an 53 Patient is a 38y old  Male who presents with a chief complaint of N/V and Diarrhea (26 Aug 2018 01:59)      SUBJECTIVE / OVERNIGHT EVENTS: Pt reports this is typical after chemo for him, he is feeling weak and dehydrated but not interested in full meals. Would like ensure.    MEDICATIONS  (STANDING):  carvedilol 6.25 milliGRAM(s) Oral every 12 hours  enoxaparin Injectable 110 milliGRAM(s) SubCutaneous every 12 hours  sodium chloride 0.9%. 1000 milliLiter(s) (100 mL/Hr) IV Continuous <Continuous>  sodium chloride 0.9%. 1000 milliLiter(s) (1000 mL/Hr) IV Continuous <Continuous>    MEDICATIONS  (PRN):  benzocaine 15 mG/menthol 3.6 mG Lozenge 1 Lozenge Oral every 4 hours PRN Sore Throat  meclizine 12.5 milliGRAM(s) Oral two times a day PRN Dizziness  ondansetron Injectable 4 milliGRAM(s) IV Push every 6 hours PRN Nausea and/or Vomiting      Vital Signs Last 24 Hrs  T(C): 37 (08-26-18 @ 06:30), Max: 37.4 (08-25-18 @ 21:20)  T(F): 98.6 (08-26-18 @ 06:30), Max: 99.4 (08-25-18 @ 21:20)  HR: 102 (08-26-18 @ 06:30) (82 - 371)  BP: 94/59 (08-26-18 @ 06:30) (93/60 - 122/48)  BP(mean): --  RR: 17 (08-26-18 @ 06:30) (12 - 18)  SpO2: 97% (08-26-18 @ 06:30) (97% - 100%)  CAPILLARY BLOOD GLUCOSE        I&O's Summary      PHYSICAL EXAM:  GENERAL: NAD, overweight  HEENT: dry mm  CHEST/LUNG: CTABL  HEART: RRR, no m/r/g  ABDOMEN: soft, nt, nd  EXTREMITIES:  wwp, no c/c/e  PSYCH: AAOx3  NEUROLOGY: non-focal  SKIN: No rashes or lesions    LABS:                        9.5    0.63  )-----------( 231      ( 26 Aug 2018 06:00 )             28.4     08-25    133<L>  |  97<L>  |  13  ----------------------------<  99  3.8   |  23  |  0.96    Ca    8.7      25 Aug 2018 23:50  Phos  3.2     08-25  Mg     1.8     08-25    TPro  6.4  /  Alb  3.5  /  TBili  0.3  /  DBili  x   /  AST  156<H>  /  ALT  218<H>  /  AlkPhos  57  08-25              RADIOLOGY & ADDITIONAL TESTS:    Imaging Personally Reviewed:    Consultant(s) Notes Reviewed:      Care Discussed with Consultants/Other Providers: (

## 2020-07-27 NOTE — PATIENT PROFILE ADULT. - FUNCTIONAL LEVEL PRIOR: AMBULATION
Assumed care of patient post kyphoplasty, VSS. Spoke w/son Linda Hill on anticipated time of discharge. (0) independent

## 2020-07-29 ENCOUNTER — RESULT REVIEW (OUTPATIENT)
Age: 40
End: 2020-07-29

## 2020-07-29 ENCOUNTER — APPOINTMENT (OUTPATIENT)
Dept: RADIOLOGY | Facility: IMAGING CENTER | Age: 40
End: 2020-07-29
Payer: MEDICAID

## 2020-07-29 ENCOUNTER — APPOINTMENT (OUTPATIENT)
Dept: CT IMAGING | Facility: IMAGING CENTER | Age: 40
End: 2020-07-29
Payer: MEDICAID

## 2020-07-29 ENCOUNTER — OUTPATIENT (OUTPATIENT)
Dept: OUTPATIENT SERVICES | Facility: HOSPITAL | Age: 40
LOS: 1 days | End: 2020-07-29
Payer: MEDICAID

## 2020-07-29 DIAGNOSIS — Z90.79 ACQUIRED ABSENCE OF OTHER GENITAL ORGAN(S): Chronic | ICD-10-CM

## 2020-07-29 DIAGNOSIS — Z95.828 PRESENCE OF OTHER VASCULAR IMPLANTS AND GRAFTS: Chronic | ICD-10-CM

## 2020-07-29 DIAGNOSIS — Z98.890 OTHER SPECIFIED POSTPROCEDURAL STATES: Chronic | ICD-10-CM

## 2020-07-29 DIAGNOSIS — C62.92 MALIGNANT NEOPLASM OF LEFT TESTIS, UNSPECIFIED WHETHER DESCENDED OR UNDESCENDED: ICD-10-CM

## 2020-07-29 PROCEDURE — 71260 CT THORAX DX C+: CPT | Mod: 26

## 2020-07-29 PROCEDURE — 74177 CT ABD & PELVIS W/CONTRAST: CPT | Mod: 26

## 2020-07-29 PROCEDURE — 74177 CT ABD & PELVIS W/CONTRAST: CPT

## 2020-07-29 PROCEDURE — 71260 CT THORAX DX C+: CPT

## 2020-07-30 ENCOUNTER — OUTPATIENT (OUTPATIENT)
Dept: OUTPATIENT SERVICES | Facility: HOSPITAL | Age: 40
LOS: 1 days | Discharge: HOME | End: 2020-07-30

## 2020-07-30 DIAGNOSIS — C62.92 MALIGNANT NEOPLASM OF LEFT TESTIS, UNSPECIFIED WHETHER DESCENDED OR UNDESCENDED: ICD-10-CM

## 2020-07-30 DIAGNOSIS — Z95.828 PRESENCE OF OTHER VASCULAR IMPLANTS AND GRAFTS: Chronic | ICD-10-CM

## 2020-07-30 DIAGNOSIS — Z98.890 OTHER SPECIFIED POSTPROCEDURAL STATES: Chronic | ICD-10-CM

## 2020-07-30 DIAGNOSIS — Z90.79 ACQUIRED ABSENCE OF OTHER GENITAL ORGAN(S): Chronic | ICD-10-CM

## 2020-08-03 ENCOUNTER — APPOINTMENT (OUTPATIENT)
Dept: HEMATOLOGY ONCOLOGY | Facility: CLINIC | Age: 40
End: 2020-08-03
Payer: MEDICAID

## 2020-08-03 PROCEDURE — 99213 OFFICE O/P EST LOW 20 MIN: CPT | Mod: 95

## 2020-08-03 NOTE — PHYSICAL EXAM
[General Appearance - Alert] : alert [General Appearance - In No Acute Distress] : in no acute distress [Affect] : the affect was normal [Oriented To Time, Place, And Person] : oriented to person, place, and time

## 2020-08-10 NOTE — ASSESSMENT
[FreeTextEntry1] : 39yoM with: \par \par 1. 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 \par \par 2. CIPN - Duloxetine has helped . Patient to obtain medicinal cannabis.\par \par 3. Depressed mood with anxiety - C/w Duloxetine 30mg daily. Patient will obtain medicinal cannabis, will monitor its effect on anxiety and dose accordingly.\par \par 4. Encounter for Palliative Care - Emotional support provided.\par \par Follow up in 1 month, call sooner with questions or issues.

## 2020-08-10 NOTE — HISTORY OF PRESENT ILLNESS
[Home] : at home, [unfilled] , at the time of the visit. [Verbal consent obtained from patient] : the patient, [unfilled] [Medical Office: ([facility])___] : at the medical office located in  [FreeTextEntry1] : 39yoM with mixed germ cell tumor presents for follow up visit via Telemedicine. \par \par Main issue for which patient was referred is pain/neuropathy.  He experiences neuropathy in his feet b/l, which has traveled up his legs to the calves. The pain feels like "little explosions," that occur intermittently.  He has a constant feeling of tingling and numbness.  He has trouble feeling the floor beneath him. The pain is worse at night. \par Gabapentin caused edema, could not continue using it. His insurance company would not cover Lyrica. Zonisamide was prescribed by Neurology, he did not like how it made his hands shaky and he discontinued it. Nortriptyline had also been discontinued because he did not find it to be helpful. \par Has difficulty with cooking as he can't chop food. \par He is interested in trying medicinal cannabis. He had tried using marijuana in the recent past and it was helpful. \par \par Interval Hx:\par Patient states that it took him a couple of weeks to get acclimated to the Duloxetine.  For the first week he felt like he was in a bit of a fog, the second week felt a little nauseous. These symptoms passed and for the past month he has felt that it has been helping significantly, particularly for his depression. The neuropathy is still an issue but he states that it's not like it once was. \par \par He has not obtained medical cannabis to date. \par \par Still having significant anxiety, cannot "shut down" at night to be able to sleep.  Is up for about 16 hours per day. \par \par ROS:\par +Patient reports 22lb weight loss over the last 6 weeks (desired weight loss) - some of this was spurred by the nausea/decreased PO intake when he started Duloxetine. Lately he is back to his normal routine of eating one meal daily. \par \par I-Stop Ref#: [id number]\par \par This visit was completed via TeleMedicine due to the restrictions of the COVID-19 pandemic. All issues documented here were discussed and addressed but no physical exam was performed other than what could be visually ascertained via video. The patient verbally consented to this TeleMedicine visit.

## 2020-08-25 ENCOUNTER — APPOINTMENT (OUTPATIENT)
Dept: INTERNAL MEDICINE | Facility: CLINIC | Age: 40
End: 2020-08-25

## 2020-08-28 ENCOUNTER — OUTPATIENT (OUTPATIENT)
Dept: OUTPATIENT SERVICES | Facility: HOSPITAL | Age: 40
LOS: 1 days | Discharge: HOME | End: 2020-08-28

## 2020-08-28 DIAGNOSIS — Z90.79 ACQUIRED ABSENCE OF OTHER GENITAL ORGAN(S): Chronic | ICD-10-CM

## 2020-08-28 DIAGNOSIS — Z98.890 OTHER SPECIFIED POSTPROCEDURAL STATES: Chronic | ICD-10-CM

## 2020-08-28 DIAGNOSIS — Z95.828 PRESENCE OF OTHER VASCULAR IMPLANTS AND GRAFTS: Chronic | ICD-10-CM

## 2020-08-28 DIAGNOSIS — C62.92 MALIGNANT NEOPLASM OF LEFT TESTIS, UNSPECIFIED WHETHER DESCENDED OR UNDESCENDED: ICD-10-CM

## 2020-09-03 ENCOUNTER — APPOINTMENT (OUTPATIENT)
Dept: HEMATOLOGY ONCOLOGY | Facility: CLINIC | Age: 40
End: 2020-09-03
Payer: MEDICAID

## 2020-09-03 PROCEDURE — 99213 OFFICE O/P EST LOW 20 MIN: CPT | Mod: 95

## 2020-09-03 NOTE — HISTORY OF PRESENT ILLNESS
[Home] : at home, [unfilled] , at the time of the visit. [Medical Office: ([facility])___] : at the medical office located in  [Verbal consent obtained from patient] : the patient, [unfilled] [FreeTextEntry1] : 40yoM with mixed germ cell tumor presents for follow up visit via Telemedicine. \par \par Main issue for which patient was referred is pain/neuropathy.  He experiences neuropathy in his feet b/l, which has traveled up his legs to the calves. The pain feels like "little explosions," that occur intermittently.  He has a constant feeling of tingling and numbness.  He has trouble feeling the floor beneath him. The pain is worse at night. \par Gabapentin caused edema, could not continue using it. His insurance company would not cover Lyrica. Zonisamide was prescribed by Neurology, he did not like how it made his hands shaky and he discontinued it. Nortriptyline had also been discontinued because he did not find it to be helpful. \par Has difficulty with cooking as he can't chop food. \par \par Interval Hx:\par Patient reports that his last refill of Duloxetine from his pharmacy was for 20mg tablets although 30mg tablets were prescribed. He did note that the decrease in dose lessened his pain relief and his nausea went away. Additionally, his mood is more "blah" than when he was on the 30mg dose.  He would like to go up to 30mg as his neuropathy and mood were better at that dose. \par \par He obtained medicinal cannabis in 1:1 THC:CBD and 6:1 THC:CBD formulations. He is using the vaporized formulation PRN, mainly at night as it helps him sleep. The 1:1 wasn't very helpful for him but the 6:1 was helpful. He also obtained 20:1 THC:CBD vape but has not yet tried it out. \par \par ROS:\par Sleep is improved\par All other ROS non-contributory. \par \par \par I-Stop Ref#: [id number]\par \par This visit was completed via TeleMedicine due to the restrictions of the COVID-19 pandemic. All issues documented here were discussed and addressed but no physical exam was performed other than what could be visually ascertained via video. The patient verbally consented to this TeleMedicine visit.

## 2020-09-03 NOTE — ASSESSMENT
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 \par \par 2. CIPN - Duloxetine continues to help, c/w 30mg daily. Advised him to ensure that the pills are for 30mg when he picks up this next refill.  \par C/w medical cannabis\par \par 3. Depressed mood with anxiety - C/w Duloxetine 30mg daily. Patient will obtain medicinal cannabis, will monitor its effect on anxiety and dose accordingly. \par \par 4. Encounter for Palliative Care - Emotional support provided.\par \par Follow up in 1 month, call sooner with questions or issues.

## 2020-09-03 NOTE — PHYSICAL EXAM
[General Appearance - Alert] : alert [General Appearance - In No Acute Distress] : in no acute distress [Oriented To Time, Place, And Person] : oriented to person, place, and time [Affect] : the affect was normal

## 2020-09-08 ENCOUNTER — RESULT REVIEW (OUTPATIENT)
Age: 40
End: 2020-09-08

## 2020-09-08 ENCOUNTER — APPOINTMENT (OUTPATIENT)
Dept: HEMATOLOGY ONCOLOGY | Facility: CLINIC | Age: 40
End: 2020-09-08
Payer: MEDICAID

## 2020-09-08 VITALS
SYSTOLIC BLOOD PRESSURE: 120 MMHG | BODY MASS INDEX: 35.37 KG/M2 | HEIGHT: 74 IN | OXYGEN SATURATION: 95 % | HEART RATE: 83 BPM | DIASTOLIC BLOOD PRESSURE: 83 MMHG | WEIGHT: 275.57 LBS | RESPIRATION RATE: 18 BRPM | TEMPERATURE: 98.7 F

## 2020-09-08 LAB
AFP-TM SERPL-MCNC: 3.4 NG/ML
ALBUMIN SERPL ELPH-MCNC: 4.3 G/DL
ALP BLD-CCNC: 60 U/L
ALT SERPL-CCNC: 113 U/L
ANION GAP SERPL CALC-SCNC: 12 MMOL/L
AST SERPL-CCNC: 80 U/L
BASOPHILS # BLD AUTO: 0.03 K/UL — SIGNIFICANT CHANGE UP (ref 0–0.2)
BASOPHILS NFR BLD AUTO: 0.4 % — SIGNIFICANT CHANGE UP (ref 0–2)
BILIRUB SERPL-MCNC: 0.3 MG/DL
BUN SERPL-MCNC: 14 MG/DL
CALCIUM SERPL-MCNC: 9.3 MG/DL
CHLORIDE SERPL-SCNC: 102 MMOL/L
CO2 SERPL-SCNC: 24 MMOL/L
CREAT SERPL-MCNC: 1.01 MG/DL
EOSINOPHIL # BLD AUTO: 0.1 K/UL — SIGNIFICANT CHANGE UP (ref 0–0.5)
EOSINOPHIL NFR BLD AUTO: 1.5 % — SIGNIFICANT CHANGE UP (ref 0–6)
GLUCOSE SERPL-MCNC: 103 MG/DL
HCT VFR BLD CALC: 44.6 % — SIGNIFICANT CHANGE UP (ref 39–50)
HGB BLD-MCNC: 15.6 G/DL — SIGNIFICANT CHANGE UP (ref 13–17)
IMM GRANULOCYTES NFR BLD AUTO: 1.2 % — SIGNIFICANT CHANGE UP (ref 0–1.5)
LDH SERPL-CCNC: 177 U/L
LYMPHOCYTES # BLD AUTO: 2.46 K/UL — SIGNIFICANT CHANGE UP (ref 1–3.3)
LYMPHOCYTES # BLD AUTO: 36.4 % — SIGNIFICANT CHANGE UP (ref 13–44)
MCHC RBC-ENTMCNC: 33.5 PG — SIGNIFICANT CHANGE UP (ref 27–34)
MCHC RBC-ENTMCNC: 35 GM/DL — SIGNIFICANT CHANGE UP (ref 32–36)
MCV RBC AUTO: 95.9 FL — SIGNIFICANT CHANGE UP (ref 80–100)
MONOCYTES # BLD AUTO: 0.76 K/UL — SIGNIFICANT CHANGE UP (ref 0–0.9)
MONOCYTES NFR BLD AUTO: 11.2 % — SIGNIFICANT CHANGE UP (ref 2–14)
NEUTROPHILS # BLD AUTO: 3.33 K/UL — SIGNIFICANT CHANGE UP (ref 1.8–7.4)
NEUTROPHILS NFR BLD AUTO: 49.3 % — SIGNIFICANT CHANGE UP (ref 43–77)
NRBC # BLD: 0 /100 WBCS — SIGNIFICANT CHANGE UP (ref 0–0)
PLATELET # BLD AUTO: 169 K/UL — SIGNIFICANT CHANGE UP (ref 150–400)
POTASSIUM SERPL-SCNC: 4.2 MMOL/L
PROT SERPL-MCNC: 7.1 G/DL
RBC # BLD: 4.65 M/UL — SIGNIFICANT CHANGE UP (ref 4.2–5.8)
RBC # FLD: 12.5 % — SIGNIFICANT CHANGE UP (ref 10.3–14.5)
SODIUM SERPL-SCNC: 138 MMOL/L
WBC # BLD: 6.76 K/UL — SIGNIFICANT CHANGE UP (ref 3.8–10.5)
WBC # FLD AUTO: 6.76 K/UL — SIGNIFICANT CHANGE UP (ref 3.8–10.5)

## 2020-09-08 PROCEDURE — 99214 OFFICE O/P EST MOD 30 MIN: CPT

## 2020-09-08 NOTE — ASSESSMENT
[Palliative Care Plan] : not applicable at this time [FreeTextEntry1] : Cleve is seen in the office for follow-up.  He is taking medical marijuana which he feels helps.  He uses it mainly at night as he is anxious at night and has difficulty in sleeping.  He has chronic low back pains from a prior injury which occurred before his diagnosis of testicular cancer.  He continues on oxycodone 15 mg tablets taking 3 or 4 times per day.  Pain is always present and the pain does not awaken him.  The neuropathy extends to the lower one third of the calves.  It also keeps him awake at night.  It is no better or no or worse.  There are no other pains with the exception of some minor joint pains.  His appetite is somewhat decreased and he feels that secondary to the antidepressant he is taking.  He is lost some weight.  There is no cough or shortness of breath.  He does have fatigue, which he notes after walking approximately 2 blocks.  He has fatigue in the legs.  He says that he is active during the day and walks about.  He is using the computer less and playing video games less.  There is occasional nausea but no vomiting.  There is no diarrhea or constipation.  He feels that urinary flow is somewhat weaker.  There is no blood in the urine.  There is some discomfort when he voids and this is been present since the surgery.  He has not had any urinary tract infections.  Libido is poor, and he has no interest in sexual activity.  There are no morning erections.  His testosterone levels have been normal.\par \par On physical examination, he appears well.  His performance status is 1.  The phallus is uncircumcised.  The left testicle is normal to palpation.  There were no palpable abnormalities upon examination of the abdomen.  The heart reveals a regular rate and rhythm and the lungs are clear.\par \par Repeat CT scans will be done at this time.  He was found to have a small monoclonal protein at 100 mg.  The kappa/lambda light chain ratio was normal.  This will be did not need to be followed in the future.  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  The chemistry panel revealed a BUN of 12 with a creatinine of 1.01.  The AST was 80 and the ALT was 113.  He is known to have fatty liver.  The LDH was 177.  The CBC was normal.  The beta-hCG level is pending.\par \par All questions were answered to the best of my ability and to his apparent satisfaction.  In summary, he received 3 cycles of bleomycin cisplatin and etoposide.  The fourth cycle was omitted.  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  His cardiac function recovered.  He underwent a retroperitoneal lymph node dissection which was delayed, revealing the presence of viable tumor.  He then went on to receive 3 cycles of TIP chemotherapy with great difficulty.  I had planned to give him 4 cycles.\par \par He will have a CT scan performed to assess the current status of his disease.  All questions were answered to the best my ability and to his apparent satisfaction.

## 2020-09-08 NOTE — PHYSICAL EXAM
[Restricted in physically strenuous activity but ambulatory and able to carry out work of a light or sedentary nature] : Status 1- Restricted in physically strenuous activity but ambulatory and able to carry out work of a light or sedentary nature, e.g., light house work, office work [Normal] : grossly intact [de-identified] : no edema [de-identified] : uncircumcised, left testicle normal [de-identified] : no gynecomastia

## 2020-09-08 NOTE — HISTORY OF PRESENT ILLNESS
[Disease: _____________________] : Disease: [unfilled] [T: ___] : T[unfilled] [N: ___] : N[unfilled] [M: ___] : M[unfilled] [AJCC Stage: ____] : AJCC Stage: [unfilled] [de-identified] : Mr. Calderón is seen in consultation on 8/8/17. On July 11, 2017, he awoke with severe back pain extending into the left testicle. There was a heaviness and pulling sensation. He went to the ER at [facility] and a sonogram was done, revealing a mass. He was seen by Rivas Oconnor and underwent a left radical orchiectomy on July 20, 2017. The pain and pulling sensation resolved. He has been on oxycodone a time, since January 2017, for low back pains. No respiratory complaints. No urinary symptoms. Has had sperm cryopreservation. \par \par 8/30/17...Cleve comes in today for follow up was just discharged yesterday. He was in the hospital for 4 days and he was neutropenic, found a right arm superficial thrombosis at the site of IV. treated with vanco, blood cultures negative. Discharged on Levaquin. Overnight he had a temp of 104.  He is having some hair loss, numbness in his fingers and his toes. He has tinnitus intermittently, started two days after the last dose. He was having soreness in the back of his throat that now resolved. He complains of dysgeusia and he had a lot  of burning and acid reflux-like symptoms during his treatment Carafate, Protonix and the lower dose of Decadron improved his symptoms., Not sleeping well, likely due to steroids.\par \par 9/19/17...cycle 2 day 5 was 9/9/17\fabian Poon is seen in the office today. He has significant fatigue. He had vomiting for 5 days after the chemotherapy with nausea and he said that his complexion was green. He had acid reflux issues once again despite the dropping off the dose of his dexamethasone. He feels a burning sensation in his lower abdomen and he feels that the medications for nausea did not help. He also has significant fatigue which is improved today. He has intermittent tinnitus. He has tingling in the tips of his toes and fingers which are intermittent. His appetite has been good and he is gained weight while on chemotherapy but he says that he eats smaller portions. He denies dysgeusia or dry mouth.\par \par 10/9/17...Completed cycle 3 last week.  He states he feels "horrible." HE states he noticed bloating in the abdomen, He went a whole week after this treatment wit vomiting every day, last time he vomited was two days ago. He fell at one point secondary to being weak and feeling lightheaded. He feels generalized weakness.He had a blood clot, during his treatment, that resolved. His appetite is poor, He eats and then, he eats, he feels like he has to regurgitate his food up after eating. He has fatigue, tinnitus stopped. he denies any numbness of the feet. has had chronic numbness of two of his fingers.He has dysgeusia \par \par 10/30/17...Admission Date: \par -  Admission Date 16-Oct-2017 13:33. \par Discharge Date: \par - Discharge Date	21-Oct-2017 \par Chief Complaint/Reason for Visit: \par Chief Complaint/Reason for Admission	Atrial flutter with RVR\par *******************\par  38 yo M with PMHx of testicular cancer s/p left orchiectomy on chemotherapy who present to the ED before receiving chemo for tachycardia. \par The patient states that he has been feeling well for the past 2 weeks since his 3rd dose of chemotherapy. He denies CP, ABARCA, peripheral edema, cough, \par palpitations. He does states that he had one near syncopal event during a bout of emesis 2 weeks ago which caused him to fall into a door. Has not had any \par other syncopal or presyncopal episodes since then. In the ED he was found to have tachycardia found to be a.flutter after receiving 10mg IV diltiazem x2, a CXR with well placed portacath and no infiltrations. On bedside US found to have EF of 10-15% while tachycardic. \par The patient was initially started on metoprolol tartrate with increasing dosages unable to control the arrhythmia he was subsequently switch to a diltiazem drip. The metiport was removed after discussion with his Oncologist and cardiology for possible arrhythmia induction which was removed 10/19. He also received a TTE on 10/19 which showed EF of 35-40% with LV remodeling. \par Afterwards he was switched to metoprolol succinate 200mg qday. \par He was started on heparin for preparation for TYLOR and cardioversion which was performed on 10/20 and found a RA appendage thrombus with PFO and reduced LV \par function. No cardioversion was performed as a result and he was switched to rivaroxaban on 10/21, lisinopril 2.5mg qday and metoprolol succinate 200mg \par qday. He will follow with cardiology in 2 weeks and EP in 4 weeks. Treatments/Procedures/Significant Findings/Patient Condition: \par Other Significant Findings: \par - Other Significant Findings	 \par < from: Transesophageal Echocardiogram w/o TTE (10.20.17 @ 12:08) > \par Conclusions: \par 1. Left atrial enlargement.  No left atrial or left atrial appendage thrombus. \par 2. Moderate to severe global left ventricular systolic dysfunction. \par 3. There is an echodensity (likely thrombus vs mass) in the right atrial wall that measures (approximately 2.7 cm x 2.0 cm). This is near the distal point of the SVC and may \par represent thrombus from prior indwelling catheter. \par 4. Right ventricular enlargement with decreased right ventricular systolic function. \par 5. Color Doppler demonstrates evidence of a patent foramen ovale. \par 6. Trivial pericardial effusion. \par ****************************************************************************\par TYLOR showed right atrial appendage thrombus, 27 mm, On Xarelto. Feels "exhausted". Does not generally note palpitations. Anxiety level is high. Occasional ABARCA, not at rest. No cough, no sputum, no wheezing. No nausea at this time, has regurgitation. Occ vomiting noted. Fatigue is prominent. Appetite is good. \par \par 7/9/18...Had RPLND on 6/6/18, with viable GCT present. Embryonal pathology. Says he have fatigue, still "recovering" from the RLPND. He has some soreness of the abdomen and says he has some delayed healing. He say Dr Piper on 7/6 in lieu of Dr Restrepo and was told that it was healing well. He is upset that there is residual cancer present, which he says "bummed him out completely". He says he has been reviewing issues related to continued treatment, only after I called him recently as he had not made an appointment. he says his appetite is poor and he is eating small amounts multiple times as he cannot eat full full meals. He says he has had some nausea and he started OTC Prevacid, which has helped. No significant weight loss. Some ABARCA if he walks about 3 blocks or so, he has fatigue with walking as well. he has some paresthesias of the left thigh after the surgery. He notes some spasms of the muscles in that area at times. Urine flow is normal, no nocturia. Libido down, not sexually active, no masturbation since surgery. \par \par 7/19/18...Feels the same, "still recovering". Still has some discomfort near the wound. Appetite s good. Gained 2 pounds. no cough. Mild ABARCA with activities. No palpitations. No leg edema. He continues with some fatigue, 6 on 0-10 scale. Feels he is not motivated when the fatigue is high. He feels his hands are weaker than before. Lifting is fine, but twisting a bottle to open may be  impaired. Ambulates with a cane  due to paresthesias of the left anterior thigh. This from knee  to thigh area,. No paresthesias of the feet. He sometimes has some pains in his feet at night which he says are not cramps. \par \par 9/6/18...Cycle 2, TIP, day # 9. Admitted on Sunday, August 19. \par *********************************************\par Hospitalized again post chemo, this time for 9 days. ANC was <0.1, platelets were low. had low sodium, low potassium and low magnesium. He has had lots of muscle aches and bone pain secondary to the Taxol. Feeling better, still feels weak. Some SOB with exertion. No pains. No headaches, has paresthesias of the bottom of the feet along with some toes. It improves after the chemo. Somewhat more this time. No fevers, no chills. Some nausea with office visits. Appetite is "moderate". Tinnitus has improved, minor dizziness at times. \par \par 8/30/18...Delayed treatment #3 by one week. "Not a good week". Complains of hearing being sensitive. He has tinnitus. This is intermittent, more in the past 2-3 days. He has some vertigo, with some issue walking. He feels as if he is falling to the right side at times, no falls. He has to hold onto the door or furniture to walk. He has some vomiting without nausea. He vomited a few minutes ago. Appetite is decreased, He has altered taste as well. Fatigue is present, not as bad as before. he says he is a "little" fatigued at the moment. Paresthesias of feet have been intermittent, less notable at this time. Spends a lot of time sitting, sleeping. He feels as if his body is exhausted. His mother says he has a lot of anxiety. He agrees. He has thoughts running through his mind about chemo and adverse effects. \par \par 9/20/18...he feels "okay" at times. Occasionally feels nauseous and can't eat, occ feels dizzy. has some regurgitation at times. Has fatigue. No better. Tinnitus is "not as bad", persistent in the right, intermittent in the left. When dizzy,  he feels he is leaning to one side. Spends a lot of time sitting or lying in bed. Paresthesias of the feet, left foot more so than right. Right toes affects the 4th and 5th toe. Fingertips are affected. He notes some darkening of skin folds of the hand joints. Feels no SOB unless he is active. using a cane today. Not taking any meds at this point. \par \par 10/9/18...Admission Date: after cycle 3 TIP\par -  Admission Date 01-Oct-2018 02:09. \par Discharge Date: \par - Discharge Date	06-Oct-2018 \par ***********************************\par HOSPITAL COURSE: The patient continued to have significant nausea while inpatient with intermittent vomiting. He received aggressive IV hydration with \par IVF while he was no longer able to tolerate po. On hospital day 4, patient was able to tolerate some po and requested discharge home. His course was c/b \par neutropenic fever to 101. Cultures were sent and remained negative. Patient was also started on cefepime. His ANC started to improve towards the end of the \par hospital course. His platelets, however, downtrended. He was given 1U platelets as per oncology recommendations. His midline was noted to have been in since \par July. After discussion with his outpatient oncologist, the patient opted to have his midline removed and will receive his last dose of chemotherapy via \par peripheral IV. During his hospital course, patient was noted to have an episode of tachycardia to the 140's followed by bradycardia to the 20's during \par orthostatic vitals. He was seen by Dr. Brown of cardiology who feels this is chemo-induced orthostasis and no further inpatient w/u is needed. He is \par medically stable for dc home. Prior to discharge received additional platelet transfusion and MID-Line was removed after discussion with oncology. \par *******************************************************************\par He was admitted and told that the midline had to be changed. He did have fever with neutropenia. Discharged on the 6th. Hypotensive with orthostasis,seen by cardiology, restarted lisinopril and carvedilol. He has fallen twice since discharge. He has LOC for a few seconds. he is somewhat confused after the episodes. Walking with a cane. he also had an similar episode while in the hospital. Appetite returned, has altered taste, no N/V now, but did have on admission. No source of fever found. Has paresthesias of the left foot, all toes, not his right.  Had aches and pains with the Taxol, which he describes as "brutal". \par \par 10/29/18...called last week, was having difficulty keeping food down and felt weak and dizzy. We arranged for fluids, but he called Friday ad declined. Taking Zofran ADT after meals at times. Able to drink more at this time. Did not eat this AM. Yesterday, had a bagel and egg, for lunch, potatoes, pork and beans and Spam. For dinner had rice and Spam, small amount. No vomiting yesterday, but had nausea. Dizziness if he leis his head back, or if he gets up to fast. Walking with  a cane. No falls. Started Has paresthesias of the feet, on the left side. Continues on oxycodone, about  1 per day, chronic use for low back pains. No leg edema. No cough, ABARCA. \par \par 11/13/18...has not followed with Dr Marquez and remains off of carvedilol, enoxaparin and lisinopril. He takes ondansetron on occasion. Says he had a "blackout" episode after getting up, fell, striking left side, no injury. No LOC. Neuropathy remains, mostly left leg, somewhat better at times, continues on therapy. Minimal on the right side. Considering returning to work. Fatigue is present, somewhat better. Appetite is still impaired, eats once a day. Gained a few pounds. Still has some taste alteration. No leg edema. No abdominal pains. Saw Phong Restrepo yesterday in follow up. He has constipation, and may go 4-5 days w/o bowel movement. \par \par 1/4/19...Had echo done 12/2018. "I'm okay", feeling better, saw Dr Marquez. Paresthesias are an issue. On gabapentin 300 TID, says it is not helping. He says his fingers are affected, works on computers, and hits wrong keys at times. Worse are his feet. Left foot is numb, right foot partially numb, annoying when walking, not sensing if he steps on something. He continues on oxycodone. Affect toes, senses that they are swollen, bottom of the feet as well as the top. Does not pass the ankle. In the hands, it is the 3rd and 4th finger distally on the right side, and 3-4-5 on the left. Echo said that LVEF is about 44-46 % with moderate global dysfunction. The LA appendage clot was not seen. Not as tired as before, walked 11 minutes yesterday with some fatigue. Occ mild nausea, no vomiting. Appetite is good, gained 4 kilos on the past month( points out that he is wearing heavy boots. . Still has some dysgeusia. Still avoids meats.  Occasional HA, lasts up to 1/2 day, occurs intermittently, once a week. More right side of the headaches. Prior sinus headaches, but not the same, has a sharp element to it. Stills feels dizzy at times when gets up too fast..\par \par 3/11/19...Urgent visit. Called this AM stating that the right testicle is swollen, present for a few days, mild tenderness, mild discomfort/pain. No back pains except for his chronic back issue. He notes some skin changes of his hands and his feet.e is some peeling skin at the base of the toes. as well as the center of the foot. He has neuropathy of both feet, more on the left than the right, where it only affects the toes. He says the medial 3 fingers are numb and he drops things. His heart exam included a stress test, which he reports the outcome was good. Still reports tinnitus. Appetite is good, weight is increased 6 kilos since 1/30/19. Still has fatigue. \par \par 5/11/19...Missed appt 4/5/19. I " have good days and bad days". He feels he puts on a lot of weight in  a short time, and then drops weight in a short period of time. He had edema of both feet and also feels he had edema of the hands as well. He says edema accumulates and then resolves. Neuropathy is "pretty bad", extends to the calves, worse at night. Ran out of gabapentin, was on 300 TID and did not feel it helped. He has discoloration of the left foot, edema both feet. Has cramps in the legs. Feels he cannot stand for a long time, and complains of blistering of the feet. He has shooting pains in the toes. There are fluid filled blisters that he he "has to pop", due to pain. He feels he s less active due to the blisters. He feels his toes are locked i position. Does exercises provided by PT. Appetite is good. He is thirsty many times. NO pains elsewhere except chronic back pain, for which he takes oxycodone. No cough, has some ABARCA, can walk 2 blocks prior to stopping. Sees DR Marquez later today. Fine motor issues with fingers with neuropathy, difficulty with typing on keyboard, with many errors. \par \par 7/2/19...Hospital Course: \par Discharge Date	12-Jun-2019 \par Admission Date	12-Jun-2019 00:37 \par Reason for Admission	vomiting, vertigo \par Medication Reconciliation Status	Admission Reconciliation is Completed \par Discharge Reconciliation is Completed \par Hospital Course	 \par 38M with PMHx of metastatic testicular CA, NICM, neuropathy, who presents with dizziness, nausea, and vomiting. \par Vertigo. \par - will c/w meclizine 25mg 4x/day \par - f/u blood and urine cx to r/o infectious etiology \par - PT eval, fall precautions. Constellation of symptoms with horizontal nystagmus and reproducible symptoms with daryn hallpike maneuver c/w BPPV. CT \par head negative. Pt reports improvement after meclizine. \par - will c/w meclizine 25mg 4x/day \par - f/u blood and urine cx to r/o infectious etiology \par - PT eval \par  Cardiomyopathy. 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 Most recent TTE on 6/4 showing EF55%. Pt concerned current sx may be side effect from entresto \par - c/w carvedilol 12.5mg BID \par - will hold entresto in setting of soft BP and pt hesitancy to restart due to side effects. In prior studies, dizziness is reported as an adverse event "at \par least 5%" of patients taking entresto. \par will hold all BP meds upond d/c 2/2 hypotension \par  Vomiting. \par - will c/w IVF overnight and repeat lactate in AM \par - zofran PRN nausea. \par  Mixed germ cell tumor. \par  now in remission. \par **********************************************\par Was hospitalized and now off carvedilol and Entresto. Doing better at this time. Only bothered by paresthesias of the feet and occasion syncope with changes in position. the latter has occurred when getting up from bed or with sitting to standing at times. Appetite is good, no weight loss. No cough, no ABARCA. No palpitations noted. Chronic back pains, no changes. No HA. Libido is down, erections are difficult to achieve, are not durable,  no ejaculation.\par \par 9/10/19...Feels "okay". he has some intermittent swelling noted in the hands and feet, occasionally in face, may last for several days. He has paresthesias of the fingertips the hands, not as bad as it was previously. It can be shock, like, noted more at night, more prominent in the feet. Gabapentin didn't help. Has his usual back pains, takes oxycodone 15 mg 2-4 times a day depending on pain. Appetite is good, No diarrheal stools, has constipation. Weight increased 3 kilos. No nausea, no vomiting.  BM about 3 times a week, sometimes hard. Advised to take Colace. Occ has a blistering rash on the bottom of the feet, Rx'd from dermatology.He still has blisters from time to time. The foot swells and the skin starts to crack. Fatigue unchanged. Tries to walk about but the pain in the feet causes discomfort, occasionally uses his cane to walk. Urine flow is impaired as before with some discomfort, not burning. Nocturia x 3-4. NO blood. occ incontinence- leaking of urine if he goes to stand at times. He noted blood in his stool 2 night sin a row. Reddish color, fresh appearing. \par \par 12/13/20...Continues to struggle with neuropathy, seen by neuro, had NCV, started nortriptyline for the neuropathy at bed time.  He feels it helps somewhat, dose increased to 50. Appetite is good. Weight is stable. Has impaired activities due to the neuropathy, does not leave the house much. Goes to PT and doctor appts. Has back pains form prior injury, on long term oxycodone, takes 15 mg 3-4 times a day. Anxiety level is low except at night, which he attributes to pain. He feels he is not depressed. No cough, mild ABARCA, attributed to weight increase. Occ headaches, right sided, once every few months. Fatigue is 8 on a 0-10 scale. Poor libido, not sexually active, no attempts\par \par 6/9/20...Seen today. Was on zonasamide by neurology, felt he had tremors and grater degree of anxiety. Insurance won't permit Lyrica, gabapentin created swelling. Taking oxycodone 15, up to 4 times a day, trying to cut down. Interested in med marijuana. he says he does not sleep well. Awake from 2 to 6 AM. Neuropathy keeps him awake, less noted during the day. Neuropathy affects the feet to the upper calves, shooting pains at night. Also has neuropathy of the hands as well. Says he has difficulty typing on the computer. Hand will shake when using the mouse. Occ drops a fork. Appetite is good, weight increased. Knee pains as well. No cough, some ABARCA remains, which he attributes to lack of exercise. Not seeing a mental health professional. I have suggested he see Dr Almanza in the past. He feels he has spent 5 years in "isolation". \par Neurologist notes reviewed they ordered an immunofixation which showed a slight IgG lambda band. No further evaluation was performed.\par No headaches recently, occ dizziness of he gets up fast. Tries to keep himself active, but says he cannot do much. He says his feet swell with activities. He walks with a cane. 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 Cycle 4 delayed..then omitted due to tachycardia and low EF. RPLND delayed, residual viable embryonal cancer, TIP started July 29, 2018, cycle 3 delayed for grade 3 fatigue, then again one more week for transaminitis.  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 Metastatic embryonal  tumor in 2 of 57 lymph nodes,  Extranodal extension is identified, 5.2 cm, pN3 [de-identified] :  Presents for follow up. On medical marijuana, which he says helps, uses mainly at night as a sleep aide. Chronic low back pains from prior injury, oxycodone 15, 3-4 times a day.  Pain always present, does not awaken him. He does have some sleep issues due to the neuropathy. Neuropathy extends to lower 1/3 of calves. No better, no worse. No other pains except some joint pains. Appetite is decreased, he feels is sec to the duloxetine. He sees Symone Shields. He has lost some weight. No cough, no ABARCA. Fatigue is present, stated to occur after 2 blocks of walking, fatigue in the legs. He says he is active during the day, walks about, less computer and video game use. Occ nausea, no vomiting. No diarrhea, no constipation. Urine flow is weaker, some dysuria/pain with voiding. No blood in urine. Has not any UTIs, since the surgery. No libido, no interest in masturbation, has not tried. No AM erections. T levels have been normal.

## 2020-09-08 NOTE — REVIEW OF SYSTEMS
[Fatigue] : fatigue [Recent Change In Weight] : ~T recent weight change [Palpitations] : palpitations [Joint Pain] : joint pain [Muscle Pain] : muscle pain [Anxiety] : anxiety [Negative] : ENT [Fever] : no fever [Chills] : no chills [Chest Pain] : no chest pain [Cough] : no cough [Lower Ext Edema] : no lower extremity edema [SOB on Exertion] : no shortness of breath during exertion [Abdominal Pain] : no abdominal pain [Vomiting] : no vomiting [Diarrhea] : no diarrhea [Dysuria] : no dysuria [Constipation] : no constipation [Incontinence] : no incontinence [Joint Stiffness] : no joint stiffness [Skin Rash] : no skin rash [Difficulty Walking] : no difficulty walking [Dizziness] : no dizziness [Hot Flashes] : no hot flashes [Easy Bruising] : no tendency for easy bruising [Muscle Weakness] : no muscle weakness [Easy Bleeding] : no tendency for easy bleeding [FreeTextEntry5] : occ brief palpitations, attributed to anxiety [FreeTextEntry7] : mild nausea [FreeTextEntry4] : tinnitus present [de-identified] : paresthesias of legs and fingertips, only median nerve. occ trips, occ falls.  [FreeTextEntry9] : leg muscles with fatigue [de-identified] : has difficulty sleeping due to anxiety, med marijuana helps. Feels better on duloxetine, no thoughts of self harm

## 2020-09-10 LAB — HCG-TM SERPL-MCNC: <1 MIU/ML

## 2020-09-17 ENCOUNTER — APPOINTMENT (OUTPATIENT)
Dept: INTERNAL MEDICINE | Facility: CLINIC | Age: 40
End: 2020-09-17

## 2020-10-20 ENCOUNTER — APPOINTMENT (OUTPATIENT)
Dept: INTERNAL MEDICINE | Facility: CLINIC | Age: 40
End: 2020-10-20

## 2020-11-17 NOTE — PROGRESS NOTE ADULT - PROBLEM SELECTOR PROBLEM 2
Quality 226: Preventive Care And Screening: Tobacco Use: Screening And Cessation Intervention: Patient screened for tobacco use and is an ex/non-smoker Detail Level: Detailed Quality 110: Preventive Care And Screening: Influenza Immunization: Influenza immunization was not ordered or administered, reason not given Atrial flutter

## 2020-12-03 NOTE — PATIENT PROFILE ADULT. - NS PRO ABUSE SCREEN AFRAID ANYONE YN
Future Appointments:  Future Appointments   Date Time Provider Bhavesh Leelee   1/18/2021  2:15 PM Lela Estraad MD [facility] BS AMB        Last Appointment With Me:  9/15/2020     Requested Prescriptions      No prescriptions requested or ordered in this encounter
no

## 2020-12-04 ENCOUNTER — OUTPATIENT (OUTPATIENT)
Dept: OUTPATIENT SERVICES | Facility: HOSPITAL | Age: 40
LOS: 1 days | Discharge: HOME | End: 2020-12-04

## 2020-12-04 DIAGNOSIS — Z95.828 PRESENCE OF OTHER VASCULAR IMPLANTS AND GRAFTS: Chronic | ICD-10-CM

## 2020-12-04 DIAGNOSIS — C62.92 MALIGNANT NEOPLASM OF LEFT TESTIS, UNSPECIFIED WHETHER DESCENDED OR UNDESCENDED: ICD-10-CM

## 2020-12-04 DIAGNOSIS — Z90.79 ACQUIRED ABSENCE OF OTHER GENITAL ORGAN(S): Chronic | ICD-10-CM

## 2020-12-04 DIAGNOSIS — Z98.890 OTHER SPECIFIED POSTPROCEDURAL STATES: Chronic | ICD-10-CM

## 2020-12-07 NOTE — ED PROVIDER NOTE - SKIN, MLM
Called & spoke to pt's spouse, he states that they now are going with [facility] for all their dr's visits (Cardiology included)  Routing msge to [facility] nurse pool as ALEN   Skin normal color for race, warm, dry and intact. No evidence of rash.

## 2020-12-07 NOTE — PHYSICAL THERAPY INITIAL EVALUATION ADULT - GAIT PATTERN USED, PT EVAL
swing-through gait [see HPI] : see HPI [Shortness Of Breath] : no shortness of breath [Dyspnea on exertion] : dyspnea during exertion [Chest  Pressure] : chest pressure [Chest Pain] : no chest pain [Lower Ext Edema] : no extremity edema [Leg Claudication] : no intermittent leg claudication [Palpitations] : no palpitations [Negative] : Heme/Lymph

## 2020-12-08 ENCOUNTER — APPOINTMENT (OUTPATIENT)
Dept: HEMATOLOGY ONCOLOGY | Facility: CLINIC | Age: 40
End: 2020-12-08
Payer: MEDICAID

## 2020-12-08 ENCOUNTER — RESULT REVIEW (OUTPATIENT)
Age: 40
End: 2020-12-08

## 2020-12-08 VITALS
HEIGHT: 72.99 IN | DIASTOLIC BLOOD PRESSURE: 78 MMHG | WEIGHT: 283.51 LBS | RESPIRATION RATE: 16 BRPM | HEART RATE: 85 BPM | OXYGEN SATURATION: 95 % | SYSTOLIC BLOOD PRESSURE: 122 MMHG | TEMPERATURE: 97.8 F | BODY MASS INDEX: 37.57 KG/M2

## 2020-12-08 LAB
BASOPHILS # BLD AUTO: 0.03 K/UL — SIGNIFICANT CHANGE UP (ref 0–0.2)
BASOPHILS NFR BLD AUTO: 0.4 % — SIGNIFICANT CHANGE UP (ref 0–2)
EOSINOPHIL # BLD AUTO: 0.09 K/UL — SIGNIFICANT CHANGE UP (ref 0–0.5)
EOSINOPHIL NFR BLD AUTO: 1.1 % — SIGNIFICANT CHANGE UP (ref 0–6)
HCT VFR BLD CALC: 45.2 % — SIGNIFICANT CHANGE UP (ref 39–50)
HGB BLD-MCNC: 15.4 G/DL — SIGNIFICANT CHANGE UP (ref 13–17)
IMM GRANULOCYTES NFR BLD AUTO: 1.4 % — SIGNIFICANT CHANGE UP (ref 0–1.5)
LYMPHOCYTES # BLD AUTO: 3.07 K/UL — SIGNIFICANT CHANGE UP (ref 1–3.3)
LYMPHOCYTES # BLD AUTO: 37.9 % — SIGNIFICANT CHANGE UP (ref 13–44)
MCHC RBC-ENTMCNC: 33.4 PG — SIGNIFICANT CHANGE UP (ref 27–34)
MCHC RBC-ENTMCNC: 34.1 G/DL — SIGNIFICANT CHANGE UP (ref 32–36)
MCV RBC AUTO: 98 FL — SIGNIFICANT CHANGE UP (ref 80–100)
MONOCYTES # BLD AUTO: 0.76 K/UL — SIGNIFICANT CHANGE UP (ref 0–0.9)
MONOCYTES NFR BLD AUTO: 9.4 % — SIGNIFICANT CHANGE UP (ref 2–14)
NEUTROPHILS # BLD AUTO: 4.04 K/UL — SIGNIFICANT CHANGE UP (ref 1.8–7.4)
NEUTROPHILS NFR BLD AUTO: 49.8 % — SIGNIFICANT CHANGE UP (ref 43–77)
NRBC # BLD: 0 /100 WBCS — SIGNIFICANT CHANGE UP (ref 0–0)
PLATELET # BLD AUTO: 188 K/UL — SIGNIFICANT CHANGE UP (ref 150–400)
RBC # BLD: 4.61 M/UL — SIGNIFICANT CHANGE UP (ref 4.2–5.8)
RBC # FLD: 12.3 % — SIGNIFICANT CHANGE UP (ref 10.3–14.5)
WBC # BLD: 8.1 K/UL — SIGNIFICANT CHANGE UP (ref 3.8–10.5)
WBC # FLD AUTO: 8.1 K/UL — SIGNIFICANT CHANGE UP (ref 3.8–10.5)

## 2020-12-08 PROCEDURE — 99072 ADDL SUPL MATRL&STAF TM PHE: CPT

## 2020-12-08 PROCEDURE — 99213 OFFICE O/P EST LOW 20 MIN: CPT

## 2020-12-08 RX ORDER — OXYCODONE HYDROCHLORIDE 15 MG/1
15 TABLET ORAL
Refills: 0 | Status: DISCONTINUED | COMMUNITY
Start: 2017-10-26 | End: 2020-12-08

## 2020-12-08 NOTE — REVIEW OF SYSTEMS
[Fatigue] : fatigue [Recent Change In Weight] : ~T recent weight change [SOB on Exertion] : shortness of breath during exertion [Joint Pain] : joint pain [Muscle Pain] : muscle pain [Dizziness] : dizziness [Difficulty Walking] : difficulty walking [Depression] : depression [Negative] : Gastrointestinal [Fever] : no fever [Chills] : no chills [Chest Pain] : no chest pain [Palpitations] : no palpitations [Lower Ext Edema] : no lower extremity edema [Wheezing] : no wheezing [Cough] : no cough [Dysuria] : no dysuria [Incontinence] : no incontinence [Skin Rash] : no skin rash [Anxiety] : no anxiety [Hot Flashes] : no hot flashes [Muscle Weakness] : no muscle weakness [Easy Bleeding] : no tendency for easy bleeding [Easy Bruising] : no tendency for easy bruising [FreeTextEntry2] : gained 3.6 kilos [FreeTextEntry3] : feels that eyes are sensitive to light [de-identified] : No HA. Paresthesias of the legs to the tights, has  fallen a few times, walks with a cane [de-identified] : feles anxiy and depression is under control

## 2020-12-08 NOTE — PHYSICAL EXAM
[Ambulatory and capable of all self care but unable to carry out any work activities] : Status 2- Ambulatory and capable of all self care but unable to carry out any work activities. Up and about more than 50% of waking hours [Obese] : obese [Normal] : affect appropriate [de-identified] : no gynecomastia [de-identified] : uncircumcised, glans normal, right testicle normal [de-identified] : lumbar pain to percussion as before

## 2020-12-08 NOTE — HISTORY OF PRESENT ILLNESS
[Disease: _____________________] : Disease: [unfilled] [T: ___] : T[unfilled] [N: ___] : N[unfilled] [M: ___] : M[unfilled] [AJCC Stage: ____] : AJCC Stage: [unfilled] [de-identified] : Mr. Calderón is seen in consultation on 8/8/17. On July 11, 2017, he awoke with severe back pain extending into the left testicle. There was a heaviness and pulling sensation. He went to the ER at [facility] and a sonogram was done, revealing a mass. He was seen by Rivas Oconnor and underwent a left radical orchiectomy on July 20, 2017. The pain and pulling sensation resolved. He has been on oxycodone a time, since January 2017, for low back pains. No respiratory complaints. No urinary symptoms. Has had sperm cryopreservation. \par \par 8/30/17...Cleve comes in today for follow up was just discharged yesterday. He was in the hospital for 4 days and he was neutropenic, found a right arm superficial thrombosis at the site of IV. treated with vanco, blood cultures negative. Discharged on Levaquin. Overnight he had a temp of 104.  He is having some hair loss, numbness in his fingers and his toes. He has tinnitus intermittently, started two days after the last dose. He was having soreness in the back of his throat that now resolved. He complains of dysgeusia and he had a lot  of burning and acid reflux-like symptoms during his treatment Carafate, Protonix and the lower dose of Decadron improved his symptoms., Not sleeping well, likely due to steroids.\par \par 9/19/17...cycle 2 day 5 was 9/9/17\fabian Poon is seen in the office today. He has significant fatigue. He had vomiting for 5 days after the chemotherapy with nausea and he said that his complexion was green. He had acid reflux issues once again despite the dropping off the dose of his dexamethasone. He feels a burning sensation in his lower abdomen and he feels that the medications for nausea did not help. He also has significant fatigue which is improved today. He has intermittent tinnitus. He has tingling in the tips of his toes and fingers which are intermittent. His appetite has been good and he is gained weight while on chemotherapy but he says that he eats smaller portions. He denies dysgeusia or dry mouth.\par \par 10/9/17...Completed cycle 3 last week.  He states he feels "horrible." HE states he noticed bloating in the abdomen, He went a whole week after this treatment wit vomiting every day, last time he vomited was two days ago. He fell at one point secondary to being weak and feeling lightheaded. He feels generalized weakness.He had a blood clot, during his treatment, that resolved. His appetite is poor, He eats and then, he eats, he feels like he has to regurgitate his food up after eating. He has fatigue, tinnitus stopped. he denies any numbness of the feet. has had chronic numbness of two of his fingers.He has dysgeusia \par \par 10/30/17...Admission Date: \par -  Admission Date 16-Oct-2017 13:33. \par Discharge Date: \par - Discharge Date	21-Oct-2017 \par Chief Complaint/Reason for Visit: \par Chief Complaint/Reason for Admission	Atrial flutter with RVR\par *******************\par  38 yo M with PMHx of testicular cancer s/p left orchiectomy on chemotherapy who present to the ED before receiving chemo for tachycardia. \par The patient states that he has been feeling well for the past 2 weeks since his 3rd dose of chemotherapy. He denies CP, ABARCA, peripheral edema, cough, \par palpitations. He does states that he had one near syncopal event during a bout of emesis 2 weeks ago which caused him to fall into a door. Has not had any \par other syncopal or presyncopal episodes since then. In the ED he was found to have tachycardia found to be a.flutter after receiving 10mg IV diltiazem x2, a CXR with well placed portacath and no infiltrations. On bedside US found to have EF of 10-15% while tachycardic. \par The patient was initially started on metoprolol tartrate with increasing dosages unable to control the arrhythmia he was subsequently switch to a diltiazem drip. The metiport was removed after discussion with his Oncologist and cardiology for possible arrhythmia induction which was removed 10/19. He also received a TTE on 10/19 which showed EF of 35-40% with LV remodeling. \par Afterwards he was switched to metoprolol succinate 200mg qday. \par He was started on heparin for preparation for TYLOR and cardioversion which was performed on 10/20 and found a RA appendage thrombus with PFO and reduced LV \par function. No cardioversion was performed as a result and he was switched to rivaroxaban on 10/21, lisinopril 2.5mg qday and metoprolol succinate 200mg \par qday. He will follow with cardiology in 2 weeks and EP in 4 weeks. Treatments/Procedures/Significant Findings/Patient Condition: \par Other Significant Findings: \par - Other Significant Findings	 \par < from: Transesophageal Echocardiogram w/o TTE (10.20.17 @ 12:08) > \par Conclusions: \par 1. Left atrial enlargement.  No left atrial or left atrial appendage thrombus. \par 2. Moderate to severe global left ventricular systolic dysfunction. \par 3. There is an echodensity (likely thrombus vs mass) in the right atrial wall that measures (approximately 2.7 cm x 2.0 cm). This is near the distal point of the SVC and may \par represent thrombus from prior indwelling catheter. \par 4. Right ventricular enlargement with decreased right ventricular systolic function. \par 5. Color Doppler demonstrates evidence of a patent foramen ovale. \par 6. Trivial pericardial effusion. \par ****************************************************************************\par TYLOR showed right atrial appendage thrombus, 27 mm, On Xarelto. Feels "exhausted". Does not generally note palpitations. Anxiety level is high. Occasional ABARCA, not at rest. No cough, no sputum, no wheezing. No nausea at this time, has regurgitation. Occ vomiting noted. Fatigue is prominent. Appetite is good. \par \par 7/9/18...Had RPLND on 6/6/18, with viable GCT present. Embryonal pathology. Says he have fatigue, still "recovering" from the RLPND. He has some soreness of the abdomen and says he has some delayed healing. He say Dr Piper on 7/6 in lieu of Dr Restrepo and was told that it was healing well. He is upset that there is residual cancer present, which he says "bummed him out completely". He says he has been reviewing issues related to continued treatment, only after I called him recently as he had not made an appointment. he says his appetite is poor and he is eating small amounts multiple times as he cannot eat full full meals. He says he has had some nausea and he started OTC Prevacid, which has helped. No significant weight loss. Some ABARCA if he walks about 3 blocks or so, he has fatigue with walking as well. he has some paresthesias of the left thigh after the surgery. He notes some spasms of the muscles in that area at times. Urine flow is normal, no nocturia. Libido down, not sexually active, no masturbation since surgery. \par \par 7/19/18...Feels the same, "still recovering". Still has some discomfort near the wound. Appetite s good. Gained 2 pounds. no cough. Mild ABARCA with activities. No palpitations. No leg edema. He continues with some fatigue, 6 on 0-10 scale. Feels he is not motivated when the fatigue is high. He feels his hands are weaker than before. Lifting is fine, but twisting a bottle to open may be  impaired. Ambulates with a cane  due to paresthesias of the left anterior thigh. This from knee  to thigh area,. No paresthesias of the feet. He sometimes has some pains in his feet at night which he says are not cramps. \par \par 9/6/18...Cycle 2, TIP, day # 9. Admitted on Sunday, August 19. \par *********************************************\par Hospitalized again post chemo, this time for 9 days. ANC was <0.1, platelets were low. had low sodium, low potassium and low magnesium. He has had lots of muscle aches and bone pain secondary to the Taxol. Feeling better, still feels weak. Some SOB with exertion. No pains. No headaches, has paresthesias of the bottom of the feet along with some toes. It improves after the chemo. Somewhat more this time. No fevers, no chills. Some nausea with office visits. Appetite is "moderate". Tinnitus has improved, minor dizziness at times. \par \par 8/30/18...Delayed treatment #3 by one week. "Not a good week". Complains of hearing being sensitive. He has tinnitus. This is intermittent, more in the past 2-3 days. He has some vertigo, with some issue walking. He feels as if he is falling to the right side at times, no falls. He has to hold onto the door or furniture to walk. He has some vomiting without nausea. He vomited a few minutes ago. Appetite is decreased, He has altered taste as well. Fatigue is present, not as bad as before. he says he is a "little" fatigued at the moment. Paresthesias of feet have been intermittent, less notable at this time. Spends a lot of time sitting, sleeping. He feels as if his body is exhausted. His mother says he has a lot of anxiety. He agrees. He has thoughts running through his mind about chemo and adverse effects. \par \par 9/20/18...he feels "okay" at times. Occasionally feels nauseous and can't eat, occ feels dizzy. has some regurgitation at times. Has fatigue. No better. Tinnitus is "not as bad", persistent in the right, intermittent in the left. When dizzy,  he feels he is leaning to one side. Spends a lot of time sitting or lying in bed. Paresthesias of the feet, left foot more so than right. Right toes affects the 4th and 5th toe. Fingertips are affected. He notes some darkening of skin folds of the hand joints. Feels no SOB unless he is active. using a cane today. Not taking any meds at this point. \par \par 10/9/18...Admission Date: after cycle 3 TIP\par -  Admission Date 01-Oct-2018 02:09. \par Discharge Date: \par - Discharge Date	06-Oct-2018 \par ***********************************\par HOSPITAL COURSE: The patient continued to have significant nausea while inpatient with intermittent vomiting. He received aggressive IV hydration with \par IVF while he was no longer able to tolerate po. On hospital day 4, patient was able to tolerate some po and requested discharge home. His course was c/b \par neutropenic fever to 101. Cultures were sent and remained negative. Patient was also started on cefepime. His ANC started to improve towards the end of the \par hospital course. His platelets, however, downtrended. He was given 1U platelets as per oncology recommendations. His midline was noted to have been in since \par July. After discussion with his outpatient oncologist, the patient opted to have his midline removed and will receive his last dose of chemotherapy via \par peripheral IV. During his hospital course, patient was noted to have an episode of tachycardia to the 140's followed by bradycardia to the 20's during \par orthostatic vitals. He was seen by Dr. Brown of cardiology who feels this is chemo-induced orthostasis and no further inpatient w/u is needed. He is \par medically stable for dc home. Prior to discharge received additional platelet transfusion and MID-Line was removed after discussion with oncology. \par *******************************************************************\par He was admitted and told that the midline had to be changed. He did have fever with neutropenia. Discharged on the 6th. Hypotensive with orthostasis,seen by cardiology, restarted lisinopril and carvedilol. He has fallen twice since discharge. He has LOC for a few seconds. he is somewhat confused after the episodes. Walking with a cane. he also had an similar episode while in the hospital. Appetite returned, has altered taste, no N/V now, but did have on admission. No source of fever found. Has paresthesias of the left foot, all toes, not his right.  Had aches and pains with the Taxol, which he describes as "brutal". \par \par 10/29/18...called last week, was having difficulty keeping food down and felt weak and dizzy. We arranged for fluids, but he called Friday ad declined. Taking Zofran ADT after meals at times. Able to drink more at this time. Did not eat this AM. Yesterday, had a bagel and egg, for lunch, potatoes, pork and beans and Spam. For dinner had rice and Spam, small amount. No vomiting yesterday, but had nausea. Dizziness if he leis his head back, or if he gets up to fast. Walking with  a cane. No falls. Started Has paresthesias of the feet, on the left side. Continues on oxycodone, about  1 per day, chronic use for low back pains. No leg edema. No cough, ABARCA. \par \par 11/13/18...has not followed with Dr Marquez and remains off of carvedilol, enoxaparin and lisinopril. He takes ondansetron on occasion. Says he had a "blackout" episode after getting up, fell, striking left side, no injury. No LOC. Neuropathy remains, mostly left leg, somewhat better at times, continues on therapy. Minimal on the right side. Considering returning to work. Fatigue is present, somewhat better. Appetite is still impaired, eats once a day. Gained a few pounds. Still has some taste alteration. No leg edema. No abdominal pains. Saw Phong Restrepo yesterday in follow up. He has constipation, and may go 4-5 days w/o bowel movement. \par \par 1/4/19...Had echo done 12/2018. "I'm okay", feeling better, saw Dr Marquez. Paresthesias are an issue. On gabapentin 300 TID, says it is not helping. He says his fingers are affected, works on computers, and hits wrong keys at times. Worse are his feet. Left foot is numb, right foot partially numb, annoying when walking, not sensing if he steps on something. He continues on oxycodone. Affect toes, senses that they are swollen, bottom of the feet as well as the top. Does not pass the ankle. In the hands, it is the 3rd and 4th finger distally on the right side, and 3-4-5 on the left. Echo said that LVEF is about 44-46 % with moderate global dysfunction. The LA appendage clot was not seen. Not as tired as before, walked 11 minutes yesterday with some fatigue. Occ mild nausea, no vomiting. Appetite is good, gained 4 kilos on the past month( points out that he is wearing heavy boots. . Still has some dysgeusia. Still avoids meats.  Occasional HA, lasts up to 1/2 day, occurs intermittently, once a week. More right side of the headaches. Prior sinus headaches, but not the same, has a sharp element to it. Stills feels dizzy at times when gets up too fast..\par \par 3/11/19...Urgent visit. Called this AM stating that the right testicle is swollen, present for a few days, mild tenderness, mild discomfort/pain. No back pains except for his chronic back issue. He notes some skin changes of his hands and his feet.e is some peeling skin at the base of the toes. as well as the center of the foot. He has neuropathy of both feet, more on the left than the right, where it only affects the toes. He says the medial 3 fingers are numb and he drops things. His heart exam included a stress test, which he reports the outcome was good. Still reports tinnitus. Appetite is good, weight is increased 6 kilos since 1/30/19. Still has fatigue. \par \par 5/11/19...Missed appt 4/5/19. I " have good days and bad days". He feels he puts on a lot of weight in  a short time, and then drops weight in a short period of time. He had edema of both feet and also feels he had edema of the hands as well. He says edema accumulates and then resolves. Neuropathy is "pretty bad", extends to the calves, worse at night. Ran out of gabapentin, was on 300 TID and did not feel it helped. He has discoloration of the left foot, edema both feet. Has cramps in the legs. Feels he cannot stand for a long time, and complains of blistering of the feet. He has shooting pains in the toes. There are fluid filled blisters that he he "has to pop", due to pain. He feels he s less active due to the blisters. He feels his toes are locked i position. Does exercises provided by PT. Appetite is good. He is thirsty many times. NO pains elsewhere except chronic back pain, for which he takes oxycodone. No cough, has some ABARCA, can walk 2 blocks prior to stopping. Sees DR Marquez later today. Fine motor issues with fingers with neuropathy, difficulty with typing on keyboard, with many errors. \par \par 7/2/19...Hospital Course: \par Discharge Date	12-Jun-2019 \par Admission Date	12-Jun-2019 00:37 \par Reason for Admission	vomiting, vertigo \par Medication Reconciliation Status	Admission Reconciliation is Completed \par Discharge Reconciliation is Completed \par Hospital Course	 \par 38M with PMHx of metastatic testicular CA, NICM, neuropathy, who presents with dizziness, nausea, and vomiting. \par Vertigo. \par - will c/w meclizine 25mg 4x/day \par - f/u blood and urine cx to r/o infectious etiology \par - PT eval, fall precautions. Constellation of symptoms with horizontal nystagmus and reproducible symptoms with daryn hallpike maneuver c/w BPPV. CT \par head negative. Pt reports improvement after meclizine. \par - will c/w meclizine 25mg 4x/day \par - f/u blood and urine cx to r/o infectious etiology \par - PT eval \par  Cardiomyopathy. 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 Most recent TTE on 6/4 showing EF55%. Pt concerned current sx may be side effect from entresto \par - c/w carvedilol 12.5mg BID \par - will hold entresto in setting of soft BP and pt hesitancy to restart due to side effects. In prior studies, dizziness is reported as an adverse event "at \par least 5%" of patients taking entresto. \par will hold all BP meds upond d/c 2/2 hypotension \par  Vomiting. \par - will c/w IVF overnight and repeat lactate in AM \par - zofran PRN nausea. \par  Mixed germ cell tumor. \par  now in remission. \par **********************************************\par Was hospitalized and now off carvedilol and Entresto. Doing better at this time. Only bothered by paresthesias of the feet and occasion syncope with changes in position. the latter has occurred when getting up from bed or with sitting to standing at times. Appetite is good, no weight loss. No cough, no ABARCA. No palpitations noted. Chronic back pains, no changes. No HA. Libido is down, erections are difficult to achieve, are not durable,  no ejaculation.\par \par 9/10/19...Feels "okay". he has some intermittent swelling noted in the hands and feet, occasionally in face, may last for several days. He has paresthesias of the fingertips the hands, not as bad as it was previously. It can be shock, like, noted more at night, more prominent in the feet. Gabapentin didn't help. Has his usual back pains, takes oxycodone 15 mg 2-4 times a day depending on pain. Appetite is good, No diarrheal stools, has constipation. Weight increased 3 kilos. No nausea, no vomiting.  BM about 3 times a week, sometimes hard. Advised to take Colace. Occ has a blistering rash on the bottom of the feet, Rx'd from dermatology.He still has blisters from time to time. The foot swells and the skin starts to crack. Fatigue unchanged. Tries to walk about but the pain in the feet causes discomfort, occasionally uses his cane to walk. Urine flow is impaired as before with some discomfort, not burning. Nocturia x 3-4. NO blood. occ incontinence- leaking of urine if he goes to stand at times. He noted blood in his stool 2 night sin a row. Reddish color, fresh appearing. \par \par 12/13/20...Continues to struggle with neuropathy, seen by neuro, had NCV, started nortriptyline for the neuropathy at bed time.  He feels it helps somewhat, dose increased to 50. Appetite is good. Weight is stable. Has impaired activities due to the neuropathy, does not leave the house much. Goes to PT and doctor appts. Has back pains form prior injury, on long term oxycodone, takes 15 mg 3-4 times a day. Anxiety level is low except at night, which he attributes to pain. He feels he is not depressed. No cough, mild ABARCA, attributed to weight increase. Occ headaches, right sided, once every few months. Fatigue is 8 on a 0-10 scale. Poor libido, not sexually active, no attempts\par \par 6/9/20...Seen today. Was on zonasamide by neurology, felt he had tremors and grater degree of anxiety. Insurance won't permit Lyrica, gabapentin created swelling. Taking oxycodone 15, up to 4 times a day, trying to cut down. Interested in med marijuana. he says he does not sleep well. Awake from 2 to 6 AM. Neuropathy keeps him awake, less noted during the day. Neuropathy affects the feet to the upper calves, shooting pains at night. Also has neuropathy of the hands as well. Says he has difficulty typing on the computer. Hand will shake when using the mouse. Occ drops a fork. Appetite is good, weight increased. Knee pains as well. No cough, some ABARCA remains, which he attributes to lack of exercise. Not seeing a mental health professional. I have suggested he see Dr Almanza in the past. He feels he has spent 5 years in "isolation". \par Neurologist notes reviewed they ordered an immunofixation which showed a slight IgG lambda band. No further evaluation was performed.\par No headaches recently, occ dizziness of he gets up fast. Tries to keep himself active, but says he cannot do much. He says his feet swell with activities. He walks with a cane.\par \par 9/8/20... Presents for follow up. On medical marijuana, which he says helps, uses mainly at night as a sleep aide. Chronic low back pains from prior injury, oxycodone 15, 3-4 times a day.  Pain always present, does not awaken him. He does have some sleep issues due to the neuropathy. Neuropathy extends to lower 1/3 of calves. No better, no worse. No other pains except some joint pains. Appetite is decreased, he feels is sec to the duloxetine. He sees Symone Shields. He has lost some weight. No cough, no ABARCA. Fatigue is present, stated to occur after 2 blocks of walking, fatigue in the legs. He says he is active during the day, walks about, less computer and video game use. Occ nausea, no vomiting. No diarrhea, no constipation. Urine flow is weaker, some dysuria/pain with voiding. No blood in urine. Has not any UTIs, since the surgery. No libido, no interest in masturbation, has not tried. No AM erections. T levels have been normal.  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 Cycle 4 delayed..then omitted due to tachycardia and low EF. RPLND delayed, residual viable embryonal cancer, TIP started July 29, 2018, cycle 3 delayed for grade 3 fatigue, then again one more week for transaminitis. Fourth planned cycle omitted [de-identified] : Off the oxycodone 15s, on medical marijuana, which he feels it is better controlled. He sleeps better. has some pain, but did not take anything this AM. Lower back is the sites as well as the discomfort in the extremities. The med marijuana helps the neuropathy as well. Eats more, gained weight. He is more energetic. He feels it is hard to exercise, as he is not as strong as before, tired after a walk down the block. Much more talkative today. he is concerned about blacking out. Also has some muscle spasms at times with activities. Does climb stairs in the house. No cough. ABARCA present. He feels his urine stream is restricted with some discomfort. "Like a lot of fluid trying to fit thru a small space". He is going to call Dr Oconnor about these issues. uses a shower chair as he is worried about "blacking out in the shower" if he raise his hands above his head.

## 2020-12-09 LAB
AFP-TM SERPL-MCNC: 2.6 NG/ML
ALBUMIN SERPL ELPH-MCNC: 4.3 G/DL
ALP BLD-CCNC: 63 U/L
ALT SERPL-CCNC: 78 U/L
ANION GAP SERPL CALC-SCNC: 15 MMOL/L
AST SERPL-CCNC: 58 U/L
BILIRUB SERPL-MCNC: 0.4 MG/DL
BUN SERPL-MCNC: 16 MG/DL
CALCIUM SERPL-MCNC: 9.1 MG/DL
CHLORIDE SERPL-SCNC: 101 MMOL/L
CO2 SERPL-SCNC: 24 MMOL/L
CREAT SERPL-MCNC: 1.11 MG/DL
GLUCOSE SERPL-MCNC: 93 MG/DL
LDH SERPL-CCNC: 201 U/L
POTASSIUM SERPL-SCNC: 4.3 MMOL/L
PROT SERPL-MCNC: 7.3 G/DL
SODIUM SERPL-SCNC: 140 MMOL/L

## 2020-12-10 LAB — HCG-TM SERPL-MCNC: <1 MIU/ML

## 2020-12-14 ENCOUNTER — APPOINTMENT (OUTPATIENT)
Dept: HEMATOLOGY ONCOLOGY | Facility: CLINIC | Age: 40
End: 2020-12-14
Payer: MEDICAID

## 2020-12-14 ENCOUNTER — NON-APPOINTMENT (OUTPATIENT)
Age: 40
End: 2020-12-14

## 2020-12-14 PROCEDURE — 99442: CPT

## 2020-12-14 NOTE — HISTORY OF PRESENT ILLNESS
[Home] : at home, [unfilled] , at the time of the visit. 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  He experiences neuropathy in his feet b/l, which has traveled up his legs to the calves. The pain feels like "little explosions," that occur intermittently.  He has a constant feeling of tingling and numbness.  He has trouble feeling the floor beneath him. The pain is worse at night. \par Gabapentin caused edema, could not continue using it. His insurance company would not cover Lyrica. Zonisamide was prescribed by Neurology, he did not like how it made his hands shaky and he discontinued it. Nortriptyline had also been discontinued because he did not find it to be helpful. \par Has difficulty with cooking as he can't chop food. \par \par Interval Hx:\par He continues to use medical cannabis in vaporized formulation - in 20:1, 6:1 and 1:1 THC:CBD, which he uses PRN.  He uses the 1:1 during the day and the 20:1 mainly at night. \par He finds the cannabis to be helpful for his back pain (was previously using oxycodone 15mg IR) but not as reliable pain control (still aware that the pain is there). \par \par The neuropathy is still an issue for him, he thinks the duloxetine 30mg daily has waned in its effectiveness. \par \par ROS:\par Sleep is improved\par +mood is stable, he has adjusted his outlook/perspective. \par All other ROS non-contributory. \par \par Has not yet returned to work although he would like to.  \par \par I-Stop Ref#: [id number]\par \par This visit was completed via TeleMedicine due to the restrictions of the COVID-19 pandemic. All issues documented here were discussed and addressed but no physical exam was performed other than what could be visually ascertained via video. The patient verbally consented to this TeleMedicine visit.

## 2020-12-14 NOTE — ASSESSMENT
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 \par C/w medical cannabis regimen - advised patient to obtain capsule formulation of cannabis for more sustained relief. may c/w prn vaporized cannabis. \par \par 3. Depressed mood with anxiety - controlled at present.\par \par 4. Encounter for Palliative Care - Emotional support provided.\par \par Follow up in 1 month, call sooner with questions or issues.

## 2021-02-17 ENCOUNTER — OUTPATIENT (OUTPATIENT)
Dept: OUTPATIENT SERVICES | Facility: HOSPITAL | Age: 41
LOS: 1 days | Discharge: HOME | End: 2021-02-17

## 2021-02-17 DIAGNOSIS — Z98.890 OTHER SPECIFIED POSTPROCEDURAL STATES: Chronic | ICD-10-CM

## 2021-02-17 DIAGNOSIS — C62.92 MALIGNANT NEOPLASM OF LEFT TESTIS, UNSPECIFIED WHETHER DESCENDED OR UNDESCENDED: ICD-10-CM

## 2021-02-17 DIAGNOSIS — Z95.828 PRESENCE OF OTHER VASCULAR IMPLANTS AND GRAFTS: Chronic | ICD-10-CM

## 2021-02-17 DIAGNOSIS — Z90.79 ACQUIRED ABSENCE OF OTHER GENITAL ORGAN(S): Chronic | ICD-10-CM

## 2021-02-18 ENCOUNTER — APPOINTMENT (OUTPATIENT)
Dept: HEMATOLOGY ONCOLOGY | Facility: CLINIC | Age: 41
End: 2021-02-18
Payer: MEDICAID

## 2021-02-18 PROCEDURE — 99442: CPT

## 2021-02-18 NOTE — ASSESSMENT
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 \par \par 2. CIPN - C/w Duloxetine 30mg BID. \par C/w medical cannabis regimen - advised patient to obtain capsule formulation of cannabis for more sustained relief. may c/w prn vaporized cannabis. Recommend he try using topical cannabis to his feet at night.\par \par 3. Depressed mood with anxiety - controlled at present.\par \par 4. Encounter for Palliative Care - Emotional support provided.\par \par Follow up in 1 month, call sooner with questions or issues.

## 2021-02-18 NOTE — HISTORY OF PRESENT ILLNESS
[Home] : at home, [unfilled] , at the time of the visit. 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  He experiences neuropathy in his feet b/l, which has traveled up his legs to the calves. The pain feels like "little explosions," that occur intermittently.  He has a constant feeling of tingling and numbness.  He has trouble feeling the floor beneath him. The pain is worse at night. \par Gabapentin caused edema, could not continue using it. His insurance company would not cover Lyrica. Zonisamide was prescribed by Neurology, he did not like how it made his hands shaky and he discontinued it. Nortriptyline had also been discontinued because he did not find it to be helpful. \par Has difficulty with cooking as he can't chop food. \par \par Interval Hx:\par The increase in the duloxetine has had a positive effect on his mood and he feels it is helping neuropathy as well.  He continues to have weakness in hands and feet, drops things from time to time. \par He continues to use medical cannabis in vaporized formulation - in 20:1, 6:1 and 1:1 THC:CBD, which he uses PRN.  He uses the 1:1 during the day and the 20:1 mainly at night. \par He finds the cannabis to be helpful for his back pain (was previously using oxycodone 15mg IR) but not as reliable pain control (still aware that the pain is there). \par \par The neuropathy is still an issue for him, he thinks the duloxetine 30mg daily has waned in its effectiveness. \par \par ROS:\par Sleep is improved although he does wake up in the early morning hours from hyperactive nerves in his feet. \par +mood is stable, he has adjusted his outlook/perspective. \par All other ROS non-contributory. \par \par Has not yet returned to work although he would like to.  \par \par I-Stop Ref#: [id number]\par \par This visit was completed via TeleMedicine due to the restrictions of the COVID-19 pandemic. All issues documented here were discussed and addressed but no physical exam was performed other than what could be visually ascertained via video. The patient verbally consented to this TeleMedicine visit.

## 2021-03-05 ENCOUNTER — RESULT REVIEW (OUTPATIENT)
Age: 41
End: 2021-03-05

## 2021-03-05 ENCOUNTER — APPOINTMENT (OUTPATIENT)
Dept: HEMATOLOGY ONCOLOGY | Facility: CLINIC | Age: 41
End: 2021-03-05

## 2021-03-05 LAB
BASOPHILS # BLD AUTO: 0.02 K/UL — SIGNIFICANT CHANGE UP (ref 0–0.2)
BASOPHILS NFR BLD AUTO: 0.3 % — SIGNIFICANT CHANGE UP (ref 0–2)
EOSINOPHIL # BLD AUTO: 0.07 K/UL — SIGNIFICANT CHANGE UP (ref 0–0.5)
EOSINOPHIL NFR BLD AUTO: 1 % — SIGNIFICANT CHANGE UP (ref 0–6)
HCT VFR BLD CALC: 44.6 % — SIGNIFICANT CHANGE UP (ref 39–50)
HGB BLD-MCNC: 15.7 G/DL — SIGNIFICANT CHANGE UP (ref 13–17)
IMM GRANULOCYTES NFR BLD AUTO: 1.3 % — SIGNIFICANT CHANGE UP (ref 0–1.5)
LYMPHOCYTES # BLD AUTO: 2.75 K/UL — SIGNIFICANT CHANGE UP (ref 1–3.3)
LYMPHOCYTES # BLD AUTO: 38.2 % — SIGNIFICANT CHANGE UP (ref 13–44)
MCHC RBC-ENTMCNC: 33.8 PG — SIGNIFICANT CHANGE UP (ref 27–34)
MCHC RBC-ENTMCNC: 35.2 G/DL — SIGNIFICANT CHANGE UP (ref 32–36)
MCV RBC AUTO: 96.1 FL — SIGNIFICANT CHANGE UP (ref 80–100)
MONOCYTES # BLD AUTO: 0.66 K/UL — SIGNIFICANT CHANGE UP (ref 0–0.9)
MONOCYTES NFR BLD AUTO: 9.2 % — SIGNIFICANT CHANGE UP (ref 2–14)
NEUTROPHILS # BLD AUTO: 3.61 K/UL — SIGNIFICANT CHANGE UP (ref 1.8–7.4)
NEUTROPHILS NFR BLD AUTO: 50 % — SIGNIFICANT CHANGE UP (ref 43–77)
NRBC # BLD: 0 /100 WBCS — SIGNIFICANT CHANGE UP (ref 0–0)
PLATELET # BLD AUTO: 176 K/UL — SIGNIFICANT CHANGE UP (ref 150–400)
RBC # BLD: 4.64 M/UL — SIGNIFICANT CHANGE UP (ref 4.2–5.8)
RBC # FLD: 12.3 % — SIGNIFICANT CHANGE UP (ref 10.3–14.5)
WBC # BLD: 7.2 K/UL — SIGNIFICANT CHANGE UP (ref 3.8–10.5)
WBC # FLD AUTO: 7.2 K/UL — SIGNIFICANT CHANGE UP (ref 3.8–10.5)

## 2021-03-08 LAB
AFP-TM SERPL-MCNC: 2.8 NG/ML
ALBUMIN SERPL ELPH-MCNC: 4.2 G/DL
ALP BLD-CCNC: 61 U/L
ALT SERPL-CCNC: 69 U/L
ANION GAP SERPL CALC-SCNC: 11 MMOL/L
AST SERPL-CCNC: 51 U/L
BILIRUB SERPL-MCNC: 0.4 MG/DL
BUN SERPL-MCNC: 13 MG/DL
CALCIUM SERPL-MCNC: 9.3 MG/DL
CHLORIDE SERPL-SCNC: 104 MMOL/L
CO2 SERPL-SCNC: 25 MMOL/L
CREAT SERPL-MCNC: 1.13 MG/DL
GLUCOSE SERPL-MCNC: 101 MG/DL
HCG SERPL-MCNC: <1 MIU/ML
POTASSIUM SERPL-SCNC: 4.5 MMOL/L
PROT SERPL-MCNC: 7.3 G/DL
SODIUM SERPL-SCNC: 140 MMOL/L

## 2021-03-09 ENCOUNTER — APPOINTMENT (OUTPATIENT)
Dept: HEMATOLOGY ONCOLOGY | Facility: CLINIC | Age: 41
End: 2021-03-09
Payer: MEDICAID

## 2021-03-09 ENCOUNTER — APPOINTMENT (OUTPATIENT)
Dept: HEMATOLOGY ONCOLOGY | Facility: CLINIC | Age: 41
End: 2021-03-09

## 2021-03-09 PROCEDURE — 99213 OFFICE O/P EST LOW 20 MIN: CPT | Mod: 95

## 2021-03-09 NOTE — ASSESSMENT
[Palliative Care Plan] : not applicable at this time [FreeTextEntry1] : Cleve is seen via telehealth services today.  He continues to complain of his major issue as being the neuropathy.  Takes medical marijuana which helps him rest.  He takes it before bedtime and several times during the day.  He says it has a rapid onset of action for anywhere from 7 to 20 minutes.  His appetite is fine.  There is no weight loss of note.  There is no cough.  He does have some shortness of breath with activities.  He can walk up to 1 block and then he becomes short of breath and fatigue.  He has occasional brief headaches.  There is some balance issues due to the neuropathy.  He has not fallen down.  He feels that he has some dexterity issues with his hands.  He has some discomfort when he voids he says it is a pressure-like sensation but not burning.  He feels that he is trying to force fluid through a very narrow urethra.  This is been the case since his surgery.  It is unchanged.  There is no dysuria.  Libido is poor and he has not had any attempted sexual relations.  He says he has not had any morning erections.\par \par He was treated for mixed germ cell tumor diagnosed in 2017.  He had a radical orchiectomy on the left in July 2017.  His stage was to a good risk.  He got cisplatin and etoposide starting in August.  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  He underwent an RPLND which was delayed and he had residual embryonal carcinoma.  He was then started on tip in July 2018 and received a total of 3 cycles.  It was plan to give him 4 cycles, but he could not tolerate it.\par \par He feels his depression is reasonably well controlled.  He does have some anxiety concerns about the future.\par \par On physical examination, his performance status is 1.  He had recent blood work and we went over those results.  His testosterone level was a little bit low but was previously normal.  Tumor markers were normal.  His comprehensive metabolic panel was normal with the exception of mild elevation of his transaminases which has been a persistent issue secondary to fatty liver.\par \par He is coming at the end of 2 years from his treatment with TIP chemotherapy.  His CT scan of the chest abdomen and pelvis has been requested.  All questions were answered to the best of my ability and to his apparent satisfaction.  I will see him in the office once again in 3 months time.

## 2021-03-09 NOTE — REVIEW OF SYSTEMS
[Fatigue] : fatigue [SOB on Exertion] : shortness of breath during exertion [Joint Pain] : joint pain [Muscle Pain] : muscle pain [Anxiety] : anxiety [Depression] : depression [Negative] : ENT [Fever] : no fever [Chills] : no chills [Recent Change In Weight] : ~T no recent weight change [Chest Pain] : no chest pain [Palpitations] : no palpitations [Lower Ext Edema] : no lower extremity edema [Wheezing] : no wheezing [Cough] : no cough [Abdominal Pain] : no abdominal pain [Vomiting] : no vomiting [Constipation] : no constipation [Diarrhea] : no diarrhea [Dysuria] : no dysuria [Incontinence] : no incontinence [Joint Stiffness] : no joint stiffness [Skin Rash] : no skin rash [Dizziness] : no dizziness [Muscle Weakness] : no muscle weakness [Easy Bleeding] : no tendency for easy bleeding [Easy Bruising] : no tendency for easy bruising [FreeTextEntry9] : knee pains [de-identified] : No HA, paresthesias RTS, up to the knees, also in hands [de-identified] : depression "really not a problem"

## 2021-03-09 NOTE — HISTORY OF PRESENT ILLNESS
[Disease: _____________________] : Disease: [unfilled] [T: ___] : T[unfilled] [N: ___] : N[unfilled] [M: ___] : M[unfilled] [AJCC Stage: ____] : AJCC Stage: [unfilled] [Home] : at home, [unfilled] , at the time of the visit. [Medical Office: ([facility])___] : at the medical office located in  [Verbal consent obtained from patient] : the patient, [unfilled] [de-identified] : Mr. Calderón is seen in consultation on 8/8/17. On July 11, 2017, he awoke with severe back pain extending into the left testicle. There was a heaviness and pulling sensation. He went to the ER at [facility] and a sonogram was done, revealing a mass. He was seen by Rivas Oconnor and underwent a left radical orchiectomy on July 20, 2017. The pain and pulling sensation resolved. He has been on oxycodone a time, since January 2017, for low back pains. No respiratory complaints. No urinary symptoms. Has had sperm cryopreservation. \par \par 8/30/17...Cleve comes in today for follow up was just discharged yesterday. He was in the hospital for 4 days and he was neutropenic, found a right arm superficial thrombosis at the site of IV. treated with vanco, blood cultures negative. Discharged on Levaquin. Overnight he had a temp of 104.  He is having some hair loss, numbness in his fingers and his toes. He has tinnitus intermittently, started two days after the last dose. He was having soreness in the back of his throat that now resolved. He complains of dysgeusia and he had a lot  of burning and acid reflux-like symptoms during his treatment Carafate, Protonix and the lower dose of Decadron improved his symptoms., Not sleeping well, likely due to steroids.\par \par 9/19/17...cycle 2 day 5 was 9/9/17\fabian Poon is seen in the office today. He has significant fatigue. He had vomiting for 5 days after the chemotherapy with nausea and he said that his complexion was green. He had acid reflux issues once again despite the dropping off the dose of his dexamethasone. He feels a burning sensation in his lower abdomen and he feels that the medications for nausea did not help. He also has significant fatigue which is improved today. He has intermittent tinnitus. He has tingling in the tips of his toes and fingers which are intermittent. His appetite has been good and he is gained weight while on chemotherapy but he says that he eats smaller portions. He denies dysgeusia or dry mouth.\par \par 10/9/17...Completed cycle 3 last week.  He states he feels "horrible." HE states he noticed bloating in the abdomen, He went a whole week after this treatment wit vomiting every day, last time he vomited was two days ago. He fell at one point secondary to being weak and feeling lightheaded. He feels generalized weakness.He had a blood clot, during his treatment, that resolved. His appetite is poor, He eats and then, he eats, he feels like he has to regurgitate his food up after eating. He has fatigue, tinnitus stopped. he denies any numbness of the feet. has had chronic numbness of two of his fingers.He has dysgeusia \par \par 10/30/17...Admission Date: \par -  Admission Date 16-Oct-2017 13:33. \par Discharge Date: \par - Discharge Date	21-Oct-2017 \par Chief Complaint/Reason for Visit: \par Chief Complaint/Reason for Admission	Atrial flutter with RVR\par *******************\par  38 yo M with PMHx of testicular cancer s/p left orchiectomy on chemotherapy who present to the ED before receiving chemo for tachycardia. \par The patient states that he has been feeling well for the past 2 weeks since his 3rd dose of chemotherapy. He denies CP, ABARCA, peripheral edema, cough, \par palpitations. He does states that he had one near syncopal event during a bout of emesis 2 weeks ago which caused him to fall into a door. Has not had any \par other syncopal or presyncopal episodes since then. In the ED he was found to have tachycardia found to be a.flutter after receiving 10mg IV diltiazem x2, a CXR with well placed portacath and no infiltrations. On bedside US found to have EF of 10-15% while tachycardic. \par The patient was initially started on metoprolol tartrate with increasing dosages unable to control the arrhythmia he was subsequently switch to a diltiazem drip. The metiport was removed after discussion with his Oncologist and cardiology for possible arrhythmia induction which was removed 10/19. He also received a TTE on 10/19 which showed EF of 35-40% with LV remodeling. \par Afterwards he was switched to metoprolol succinate 200mg qday. \par He was started on heparin for preparation for TYLOR and cardioversion which was performed on 10/20 and found a RA appendage thrombus with PFO and reduced LV \par function. No cardioversion was performed as a result and he was switched to rivaroxaban on 10/21, lisinopril 2.5mg qday and metoprolol succinate 200mg \par qday. He will follow with cardiology in 2 weeks and EP in 4 weeks. Treatments/Procedures/Significant Findings/Patient Condition: \par Other Significant Findings: \par - Other Significant Findings	 \par < from: Transesophageal Echocardiogram w/o TTE (10.20.17 @ 12:08) > \par Conclusions: \par 1. Left atrial enlargement.  No left atrial or left atrial appendage thrombus. \par 2. Moderate to severe global left ventricular systolic dysfunction. \par 3. There is an echodensity (likely thrombus vs mass) in the right atrial wall that measures (approximately 2.7 cm x 2.0 cm). This is near the distal point of the SVC and may \par represent thrombus from prior indwelling catheter. \par 4. Right ventricular enlargement with decreased right ventricular systolic function. \par 5. Color Doppler demonstrates evidence of a patent foramen ovale. \par 6. Trivial pericardial effusion. \par ****************************************************************************\par TYLOR showed right atrial appendage thrombus, 27 mm, On Xarelto. Feels "exhausted". Does not generally note palpitations. Anxiety level is high. Occasional ABARCA, not at rest. No cough, no sputum, no wheezing. No nausea at this time, has regurgitation. Occ vomiting noted. Fatigue is prominent. Appetite is good. \par \par 7/9/18...Had RPLND on 6/6/18, with viable GCT present. Embryonal pathology. Says he have fatigue, still "recovering" from the RLPND. He has some soreness of the abdomen and says he has some delayed healing. He say Dr Piper on 7/6 in lieu of Dr Restrepo and was told that it was healing well. He is upset that there is residual cancer present, which he says "bummed him out completely". He says he has been reviewing issues related to continued treatment, only after I called him recently as he had not made an appointment. he says his appetite is poor and he is eating small amounts multiple times as he cannot eat full full meals. He says he has had some nausea and he started OTC Prevacid, which has helped. No significant weight loss. Some ABARCA if he walks about 3 blocks or so, he has fatigue with walking as well. he has some paresthesias of the left thigh after the surgery. He notes some spasms of the muscles in that area at times. Urine flow is normal, no nocturia. Libido down, not sexually active, no masturbation since surgery. \par \par 7/19/18...Feels the same, "still recovering". Still has some discomfort near the wound. Appetite s good. Gained 2 pounds. no cough. Mild ABARCA with activities. No palpitations. No leg edema. He continues with some fatigue, 6 on 0-10 scale. Feels he is not motivated when the fatigue is high. He feels his hands are weaker than before. Lifting is fine, but twisting a bottle to open may be  impaired. Ambulates with a cane  due to paresthesias of the left anterior thigh. This from knee  to thigh area,. No paresthesias of the feet. He sometimes has some pains in his feet at night which he says are not cramps. \par \par 9/6/18...Cycle 2, TIP, day # 9. Admitted on Sunday, August 19. \par *********************************************\par Hospitalized again post chemo, this time for 9 days. ANC was <0.1, platelets were low. had low sodium, low potassium and low magnesium. He has had lots of muscle aches and bone pain secondary to the Taxol. Feeling better, still feels weak. Some SOB with exertion. No pains. No headaches, has paresthesias of the bottom of the feet along with some toes. It improves after the chemo. Somewhat more this time. No fevers, no chills. Some nausea with office visits. Appetite is "moderate". Tinnitus has improved, minor dizziness at times. \par \par 8/30/18...Delayed treatment #3 by one week. "Not a good week". Complains of hearing being sensitive. He has tinnitus. This is intermittent, more in the past 2-3 days. He has some vertigo, with some issue walking. He feels as if he is falling to the right side at times, no falls. He has to hold onto the door or furniture to walk. He has some vomiting without nausea. He vomited a few minutes ago. Appetite is decreased, He has altered taste as well. Fatigue is present, not as bad as before. he says he is a "little" fatigued at the moment. Paresthesias of feet have been intermittent, less notable at this time. Spends a lot of time sitting, sleeping. He feels as if his body is exhausted. His mother says he has a lot of anxiety. He agrees. He has thoughts running through his mind about chemo and adverse effects. \par \par 9/20/18...he feels "okay" at times. Occasionally feels nauseous and can't eat, occ feels dizzy. has some regurgitation at times. Has fatigue. No better. Tinnitus is "not as bad", persistent in the right, intermittent in the left. When dizzy,  he feels he is leaning to one side. Spends a lot of time sitting or lying in bed. Paresthesias of the feet, left foot more so than right. Right toes affects the 4th and 5th toe. Fingertips are affected. He notes some darkening of skin folds of the hand joints. Feels no SOB unless he is active. using a cane today. Not taking any meds at this point. \par \par 10/9/18...Admission Date: after cycle 3 TIP\par -  Admission Date 01-Oct-2018 02:09. \par Discharge Date: \par - Discharge Date	06-Oct-2018 \par ***********************************\par HOSPITAL COURSE: The patient continued to have significant nausea while inpatient with intermittent vomiting. He received aggressive IV hydration with \par IVF while he was no longer able to tolerate po. On hospital day 4, patient was able to tolerate some po and requested discharge home. His course was c/b \par neutropenic fever to 101. Cultures were sent and remained negative. Patient was also started on cefepime. His ANC started to improve towards the end of the \par hospital course. His platelets, however, downtrended. He was given 1U platelets as per oncology recommendations. His midline was noted to have been in since \par July. After discussion with his outpatient oncologist, the patient opted to have his midline removed and will receive his last dose of chemotherapy via \par peripheral IV. During his hospital course, patient was noted to have an episode of tachycardia to the 140's followed by bradycardia to the 20's during \par orthostatic vitals. He was seen by Dr. Brown of cardiology who feels this is chemo-induced orthostasis and no further inpatient w/u is needed. He is \par medically stable for dc home. Prior to discharge received additional platelet transfusion and MID-Line was removed after discussion with oncology. \par *******************************************************************\par He was admitted and told that the midline had to be changed. He did have fever with neutropenia. Discharged on the 6th. Hypotensive with orthostasis,seen by cardiology, restarted lisinopril and carvedilol. He has fallen twice since discharge. He has LOC for a few seconds. he is somewhat confused after the episodes. Walking with a cane. he also had an similar episode while in the hospital. Appetite returned, has altered taste, no N/V now, but did have on admission. No source of fever found. Has paresthesias of the left foot, all toes, not his right.  Had aches and pains with the Taxol, which he describes as "brutal". \par \par 10/29/18...called last week, was having difficulty keeping food down and felt weak and dizzy. We arranged for fluids, but he called Friday ad declined. Taking Zofran ADT after meals at times. Able to drink more at this time. Did not eat this AM. Yesterday, had a bagel and egg, for lunch, potatoes, pork and beans and Spam. For dinner had rice and Spam, small amount. No vomiting yesterday, but had nausea. Dizziness if he leis his head back, or if he gets up to fast. Walking with  a cane. No falls. Started Has paresthesias of the feet, on the left side. Continues on oxycodone, about  1 per day, chronic use for low back pains. No leg edema. No cough, ABARCA. \par \par 11/13/18...has not followed with Dr Marquez and remains off of carvedilol, enoxaparin and lisinopril. He takes ondansetron on occasion. Says he had a "blackout" episode after getting up, fell, striking left side, no injury. No LOC. Neuropathy remains, mostly left leg, somewhat better at times, continues on therapy. Minimal on the right side. Considering returning to work. Fatigue is present, somewhat better. Appetite is still impaired, eats once a day. Gained a few pounds. Still has some taste alteration. No leg edema. No abdominal pains. Saw Phong Restrepo yesterday in follow up. He has constipation, and may go 4-5 days w/o bowel movement. \par \par 1/4/19...Had echo done 12/2018. "I'm okay", feeling better, saw Dr Marquez. Paresthesias are an issue. On gabapentin 300 TID, says it is not helping. He says his fingers are affected, works on computers, and hits wrong keys at times. Worse are his feet. Left foot is numb, right foot partially numb, annoying when walking, not sensing if he steps on something. He continues on oxycodone. Affect toes, senses that they are swollen, bottom of the feet as well as the top. Does not pass the ankle. In the hands, it is the 3rd and 4th finger distally on the right side, and 3-4-5 on the left. Echo said that LVEF is about 44-46 % with moderate global dysfunction. The LA appendage clot was not seen. Not as tired as before, walked 11 minutes yesterday with some fatigue. Occ mild nausea, no vomiting. Appetite is good, gained 4 kilos on the past month( points out that he is wearing heavy boots. . Still has some dysgeusia. Still avoids meats.  Occasional HA, lasts up to 1/2 day, occurs intermittently, once a week. More right side of the headaches. Prior sinus headaches, but not the same, has a sharp element to it. Stills feels dizzy at times when gets up too fast..\par \par 3/11/19...Urgent visit. Called this AM stating that the right testicle is swollen, present for a few days, mild tenderness, mild discomfort/pain. No back pains except for his chronic back issue. He notes some skin changes of his hands and his feet.e is some peeling skin at the base of the toes. as well as the center of the foot. He has neuropathy of both feet, more on the left than the right, where it only affects the toes. He says the medial 3 fingers are numb and he drops things. His heart exam included a stress test, which he reports the outcome was good. Still reports tinnitus. Appetite is good, weight is increased 6 kilos since 1/30/19. Still has fatigue. \par \par 5/11/19...Missed appt 4/5/19. I " have good days and bad days". He feels he puts on a lot of weight in  a short time, and then drops weight in a short period of time. He had edema of both feet and also feels he had edema of the hands as well. He says edema accumulates and then resolves. Neuropathy is "pretty bad", extends to the calves, worse at night. Ran out of gabapentin, was on 300 TID and did not feel it helped. He has discoloration of the left foot, edema both feet. Has cramps in the legs. Feels he cannot stand for a long time, and complains of blistering of the feet. He has shooting pains in the toes. There are fluid filled blisters that he he "has to pop", due to pain. He feels he s less active due to the blisters. He feels his toes are locked i position. Does exercises provided by PT. Appetite is good. He is thirsty many times. NO pains elsewhere except chronic back pain, for which he takes oxycodone. No cough, has some ABARCA, can walk 2 blocks prior to stopping. Sees DR Marquez later today. Fine motor issues with fingers with neuropathy, difficulty with typing on keyboard, with many errors. \par \par 7/2/19...Hospital Course: \par Discharge Date	12-Jun-2019 \par Admission Date	12-Jun-2019 00:37 \par Reason for Admission	vomiting, vertigo \par Medication Reconciliation Status	Admission Reconciliation is Completed \par Discharge Reconciliation is Completed \par Hospital Course	 \par 38M with PMHx of metastatic testicular CA, NICM, neuropathy, who presents with dizziness, nausea, and vomiting. \par Vertigo. \par - will c/w meclizine 25mg 4x/day \par - f/u blood and urine cx to r/o infectious etiology \par - PT eval, fall precautions. Constellation of symptoms with horizontal nystagmus and reproducible symptoms with daryn hallpike maneuver c/w BPPV. CT \par head negative. Pt reports improvement after meclizine. \par - will c/w meclizine 25mg 4x/day \par - f/u blood and urine cx to r/o infectious etiology \par - PT eval \par  Cardiomyopathy. 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 Most recent TTE on 6/4 showing EF55%. Pt concerned current sx may be side effect from entresto \par - c/w carvedilol 12.5mg BID \par - will hold entresto in setting of soft BP and pt hesitancy to restart due to side effects. In prior studies, dizziness is reported as an adverse event "at \par least 5%" of patients taking entresto. \par will hold all BP meds upond d/c 2/2 hypotension \par  Vomiting. \par - will c/w IVF overnight and repeat lactate in AM \par - zofran PRN nausea. \par  Mixed germ cell tumor. \par  now in remission. \par **********************************************\par Was hospitalized and now off carvedilol and Entresto. Doing better at this time. Only bothered by paresthesias of the feet and occasion syncope with changes in position. the latter has occurred when getting up from bed or with sitting to standing at times. Appetite is good, no weight loss. No cough, no ABARCA. No palpitations noted. Chronic back pains, no changes. No HA. Libido is down, erections are difficult to achieve, are not durable,  no ejaculation.\par \par 9/10/19...Feels "okay". he has some intermittent swelling noted in the hands and feet, occasionally in face, may last for several days. He has paresthesias of the fingertips the hands, not as bad as it was previously. It can be shock, like, noted more at night, more prominent in the feet. Gabapentin didn't help. Has his usual back pains, takes oxycodone 15 mg 2-4 times a day depending on pain. Appetite is good, No diarrheal stools, has constipation. Weight increased 3 kilos. No nausea, no vomiting.  BM about 3 times a week, sometimes hard. Advised to take Colace. Occ has a blistering rash on the bottom of the feet, Rx'd from dermatology.He still has blisters from time to time. The foot swells and the skin starts to crack. Fatigue unchanged. Tries to walk about but the pain in the feet causes discomfort, occasionally uses his cane to walk. Urine flow is impaired as before with some discomfort, not burning. Nocturia x 3-4. NO blood. occ incontinence- leaking of urine if he goes to stand at times. He noted blood in his stool 2 night sin a row. Reddish color, fresh appearing. \par \par 12/13/20...Continues to struggle with neuropathy, seen by neuro, had NCV, started nortriptyline for the neuropathy at bed time.  He feels it helps somewhat, dose increased to 50. Appetite is good. Weight is stable. Has impaired activities due to the neuropathy, does not leave the house much. Goes to PT and doctor appts. Has back pains form prior injury, on long term oxycodone, takes 15 mg 3-4 times a day. Anxiety level is low except at night, which he attributes to pain. He feels he is not depressed. No cough, mild ABARCA, attributed to weight increase. Occ headaches, right sided, once every few months. Fatigue is 8 on a 0-10 scale. Poor libido, not sexually active, no attempts\par \par 6/9/20...Seen today. Was on zonasamide by neurology, felt he had tremors and grater degree of anxiety. Insurance won't permit Lyrica, gabapentin created swelling. Taking oxycodone 15, up to 4 times a day, trying to cut down. Interested in med marijuana. he says he does not sleep well. Awake from 2 to 6 AM. Neuropathy keeps him awake, less noted during the day. Neuropathy affects the feet to the upper calves, shooting pains at night. Also has neuropathy of the hands as well. Says he has difficulty typing on the computer. Hand will shake when using the mouse. Occ drops a fork. Appetite is good, weight increased. Knee pains as well. No cough, some ABARCA remains, which he attributes to lack of exercise. Not seeing a mental health professional. I have suggested he see Dr Almanza in the past. He feels he has spent 5 years in "isolation". \par Neurologist notes reviewed they ordered an immunofixation which showed a slight IgG lambda band. No further evaluation was performed.\par No headaches recently, occ dizziness of he gets up fast. Tries to keep himself active, but says he cannot do much. He says his feet swell with activities. He walks with a cane.\par \par 9/8/20... Presents for follow up. On medical marijuana, which he says helps, uses mainly at night as a sleep aide. Chronic low back pains from prior injury, oxycodone 15, 3-4 times a day.  Pain always present, does not awaken him. He does have some sleep issues due to the neuropathy. Neuropathy extends to lower 1/3 of calves. No better, no worse. No other pains except some joint pains. Appetite is decreased, he feels is sec to the duloxetine. He sees Symone Shields. He has lost some weight. No cough, no ABARCA. Fatigue is present, stated to occur after 2 blocks of walking, fatigue in the legs. He says he is active during the day, walks about, less computer and video game use. Occ nausea, no vomiting. No diarrhea, no constipation. Urine flow is weaker, some dysuria/pain with voiding. No blood in urine. Has not any UTIs, since the surgery. No libido, no interest in masturbation, has not tried. No AM erections. T levels have been normal. \par \par 12/8/20...Off the oxycodone 15s, on medical marijuana, which he feels it is better controlled. He sleeps better. has some pain, but did not take anything this AM. Lower back is the sites as well as the discomfort in the extremities. The med marijuana helps the neuropathy as well. Eats more, gained weight. He is more energetic. He feels it is hard to exercise, as he is not as strong as before, tired after a walk down the block. Much more talkative today. he is concerned about blacking out. Also has some muscle spasms at times with activities. Does climb stairs in the house. No cough. ABARCA present. He feels his urine stream is restricted with some discomfort. "Like a lot of fluid trying to fit thru a small space". He is going to call Dr Oconnor about these issues. uses a shower chair as he is worried about "blacking out in the shower" if he raise his hands above his head.  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 Cycle 4 delayed..then omitted due to tachycardia and low EF. RPLND delayed, residual viable embryonal cancer, TIP started July 29, 2018, cycle 3 delayed for grade 3 fatigue, then again one more week for transaminitis. Fourth planned cycle omitted [de-identified] : Verbal permission for telehealth services was granted by the patient, Cleve Calderón, on March 9, 2021 at 10:12 AM. \par Mixed germ cell tumor Diagnosed in 2017, underwent radical orchiectomy on the left side in July 20, 2017.  His stage was 2A, good risk.  He received cisplatin and etoposide starting on August 14 of 2017.  Cycle 4 was delayed and then omitted as he presented with supraventricular tachycardia and a low ejection fraction.  This was felt to be secondary to the indwelling central venous catheter, which was located in the appropriate spot.  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  He underwent an RPLND which was delayed, and he had residual viable embryonal carcinoma.  He was started on TIP in July 2018, received 3 cycles. \par \par Overall, feels the same. Neuropathy is the main issues, feels he cannot rest at all. The medical marijuana helps him rest. Takes it at night and throughout the day when he feels he needs it. Onset of action if from 7 to 20 minutes. Appetite is fine, no weight loss of note. No cough, some ABARCA, after one block, he is SOB and exhausted. Occ headaches noted, brief duration. Some balance issues, no falls. He still feels he has some dexterity issues in the hands. He has some discomfort when he voids, a pressure like sensation as if the has some flow issues, since the surgery, no change. No dysuria. Has not had a CV vaccine. Libido is poor, no attempt, no erections.

## 2021-04-21 NOTE — H&P ADULT - ASSESSMENT
38 M with testicular cancer w/ metastases sp orchiectomy, and last chemo (iphosphamide, taxol, platin) 5 weeks ago stopped 2/2 LFTs, hx of afib/aflutter now in NSR, NICM (last EF 2/2018 45%), chronic low back pain admitted for hypovolemic hypotension and fever of unknown origin.
knowledge deficit

## 2021-05-13 ENCOUNTER — OUTPATIENT (OUTPATIENT)
Dept: OUTPATIENT SERVICES | Facility: HOSPITAL | Age: 41
LOS: 1 days | Discharge: HOME | End: 2021-05-13

## 2021-05-13 DIAGNOSIS — Z95.828 PRESENCE OF OTHER VASCULAR IMPLANTS AND GRAFTS: Chronic | ICD-10-CM

## 2021-05-13 DIAGNOSIS — Z98.890 OTHER SPECIFIED POSTPROCEDURAL STATES: Chronic | ICD-10-CM

## 2021-05-13 DIAGNOSIS — C62.92 MALIGNANT NEOPLASM OF LEFT TESTIS, UNSPECIFIED WHETHER DESCENDED OR UNDESCENDED: ICD-10-CM

## 2021-05-13 DIAGNOSIS — Z90.79 ACQUIRED ABSENCE OF OTHER GENITAL ORGAN(S): Chronic | ICD-10-CM

## 2021-05-14 ENCOUNTER — APPOINTMENT (OUTPATIENT)
Dept: HEMATOLOGY ONCOLOGY | Facility: CLINIC | Age: 41
End: 2021-05-14

## 2021-06-04 ENCOUNTER — APPOINTMENT (OUTPATIENT)
Dept: HEMATOLOGY ONCOLOGY | Facility: CLINIC | Age: 41
End: 2021-06-04

## 2021-06-04 ENCOUNTER — RESULT REVIEW (OUTPATIENT)
Age: 41
End: 2021-06-04

## 2021-06-04 LAB
AFP-TM SERPL-MCNC: 2.6 NG/ML
ALBUMIN SERPL ELPH-MCNC: 4.3 G/DL
ALP BLD-CCNC: 60 U/L
ALT SERPL-CCNC: 67 U/L
ANION GAP SERPL CALC-SCNC: 14 MMOL/L
AST SERPL-CCNC: 48 U/L
BASOPHILS # BLD AUTO: 0.02 K/UL — SIGNIFICANT CHANGE UP (ref 0–0.2)
BASOPHILS NFR BLD AUTO: 0.3 % — SIGNIFICANT CHANGE UP (ref 0–2)
BILIRUB SERPL-MCNC: 0.4 MG/DL
BUN SERPL-MCNC: 13 MG/DL
CALCIUM SERPL-MCNC: 9.4 MG/DL
CHLORIDE SERPL-SCNC: 102 MMOL/L
CO2 SERPL-SCNC: 26 MMOL/L
CREAT SERPL-MCNC: 1.1 MG/DL
EOSINOPHIL # BLD AUTO: 0.09 K/UL — SIGNIFICANT CHANGE UP (ref 0–0.5)
EOSINOPHIL NFR BLD AUTO: 1.2 % — SIGNIFICANT CHANGE UP (ref 0–6)
GLUCOSE SERPL-MCNC: 117 MG/DL
HCG-TM SERPL-MCNC: <1 MIU/ML
HCT VFR BLD CALC: 45 % — SIGNIFICANT CHANGE UP (ref 39–50)
HGB BLD-MCNC: 15.5 G/DL — SIGNIFICANT CHANGE UP (ref 13–17)
IMM GRANULOCYTES NFR BLD AUTO: 0.7 % — SIGNIFICANT CHANGE UP (ref 0–1.5)
LDH SERPL-CCNC: 174 U/L
LYMPHOCYTES # BLD AUTO: 3.18 K/UL — SIGNIFICANT CHANGE UP (ref 1–3.3)
LYMPHOCYTES # BLD AUTO: 44.1 % — HIGH (ref 13–44)
MCHC RBC-ENTMCNC: 34.2 PG — HIGH (ref 27–34)
MCHC RBC-ENTMCNC: 34.4 G/DL — SIGNIFICANT CHANGE UP (ref 32–36)
MCV RBC AUTO: 99.3 FL — SIGNIFICANT CHANGE UP (ref 80–100)
MONOCYTES # BLD AUTO: 0.53 K/UL — SIGNIFICANT CHANGE UP (ref 0–0.9)
MONOCYTES NFR BLD AUTO: 7.4 % — SIGNIFICANT CHANGE UP (ref 2–14)
NEUTROPHILS # BLD AUTO: 3.34 K/UL — SIGNIFICANT CHANGE UP (ref 1.8–7.4)
NEUTROPHILS NFR BLD AUTO: 46.3 % — SIGNIFICANT CHANGE UP (ref 43–77)
NRBC # BLD: 0 /100 WBCS — SIGNIFICANT CHANGE UP (ref 0–0)
PLATELET # BLD AUTO: 171 K/UL — SIGNIFICANT CHANGE UP (ref 150–400)
POTASSIUM SERPL-SCNC: 4.3 MMOL/L
PROT SERPL-MCNC: 7.4 G/DL
RBC # BLD: 4.53 M/UL — SIGNIFICANT CHANGE UP (ref 4.2–5.8)
RBC # FLD: 12.1 % — SIGNIFICANT CHANGE UP (ref 10.3–14.5)
SODIUM SERPL-SCNC: 141 MMOL/L
TESTOST SERPL-MCNC: 462 NG/DL
WBC # BLD: 7.21 K/UL — SIGNIFICANT CHANGE UP (ref 3.8–10.5)
WBC # FLD AUTO: 7.21 K/UL — SIGNIFICANT CHANGE UP (ref 3.8–10.5)

## 2021-06-08 ENCOUNTER — APPOINTMENT (OUTPATIENT)
Dept: HEMATOLOGY ONCOLOGY | Facility: CLINIC | Age: 41
End: 2021-06-08
Payer: MEDICAID

## 2021-06-08 DIAGNOSIS — Z80.41 FAMILY HISTORY OF MALIGNANT NEOPLASM OF OVARY: ICD-10-CM

## 2021-06-08 PROCEDURE — 99214 OFFICE O/P EST MOD 30 MIN: CPT | Mod: 95

## 2021-06-08 RX ORDER — ERGOCALCIFEROL 1.25 MG/1
1.25 MG CAPSULE, LIQUID FILLED ORAL
Qty: 12 | Refills: 3 | Status: DISCONTINUED | COMMUNITY
Start: 2019-08-13 | End: 2021-06-08

## 2021-06-08 NOTE — REVIEW OF SYSTEMS
[Fatigue] : fatigue [Recent Change In Weight] : ~T recent weight change [Lower Ext Edema] : lower extremity edema [Constipation] : constipation [Diarrhea] : diarrhea [Incontinence] : incontinence [Joint Pain] : joint pain [Muscle Pain] : muscle pain [Muscle Weakness] : muscle weakness [Anxiety] : anxiety [Negative] : ENT [Fever] : no fever [Chills] : no chills [Chest Pain] : no chest pain [Palpitations] : no palpitations [Cough] : no cough [SOB on Exertion] : no shortness of breath during exertion [Abdominal Pain] : no abdominal pain [Vomiting] : no vomiting [Dysuria] : no dysuria [Joint Stiffness] : no joint stiffness [Skin Rash] : no skin rash [Dizziness] : no dizziness [Difficulty Walking] : no difficulty walking [Easy Bleeding] : no tendency for easy bleeding [Easy Bruising] : no tendency for easy bruising [FreeTextEntry4] : no dysgeusia. Tinnitus present R>L [FreeTextEntry7] : some nausea [de-identified] : HA - see HPI, neuropathy, painful, see HPI [de-identified] : depression is controlled

## 2021-06-08 NOTE — HISTORY OF PRESENT ILLNESS
[Disease: _____________________] : Disease: [unfilled] [T: ___] : T[unfilled] [N: ___] : N[unfilled] [M: ___] : M[unfilled] [AJCC Stage: ____] : AJCC Stage: [unfilled] [Home] : at home, [unfilled] , at the time of the visit. [Verbal consent obtained from patient] : the patient, [unfilled] [de-identified] : Mr. Calderón is seen in consultation on 8/8/17. On July 11, 2017, he awoke with severe back pain extending into the left testicle. There was a heaviness and pulling sensation. He went to the ER at [facility] and a sonogram was done, revealing a mass. He was seen by Rivas Oconnor and underwent a left radical orchiectomy on July 20, 2017. The pain and pulling sensation resolved. He has been on oxycodone a time, since January 2017, for low back pains. No respiratory complaints. No urinary symptoms. Has had sperm cryopreservation. \par \par 8/30/17...Cleve comes in today for follow up was just discharged yesterday. He was in the hospital for 4 days and he was neutropenic, found a right arm superficial thrombosis at the site of IV. treated with vanco, blood cultures negative. Discharged on Levaquin. Overnight he had a temp of 104.  He is having some hair loss, numbness in his fingers and his toes. He has tinnitus intermittently, started two days after the last dose. He was having soreness in the back of his throat that now resolved. He complains of dysgeusia and he had a lot  of burning and acid reflux-like symptoms during his treatment Carafate, Protonix and the lower dose of Decadron improved his symptoms., Not sleeping well, likely due to steroids.\par \par 9/19/17...cycle 2 day 5 was 9/9/17\fabian Poon is seen in the office today. He has significant fatigue. He had vomiting for 5 days after the chemotherapy with nausea and he said that his complexion was green. He had acid reflux issues once again despite the dropping off the dose of his dexamethasone. He feels a burning sensation in his lower abdomen and he feels that the medications for nausea did not help. He also has significant fatigue which is improved today. He has intermittent tinnitus. He has tingling in the tips of his toes and fingers which are intermittent. His appetite has been good and he is gained weight while on chemotherapy but he says that he eats smaller portions. He denies dysgeusia or dry mouth.\par \par 10/9/17...Completed cycle 3 last week.  He states he feels "horrible." HE states he noticed bloating in the abdomen, He went a whole week after this treatment wit vomiting every day, last time he vomited was two days ago. He fell at one point secondary to being weak and feeling lightheaded. He feels generalized weakness.He had a blood clot, during his treatment, that resolved. His appetite is poor, He eats and then, he eats, he feels like he has to regurgitate his food up after eating. He has fatigue, tinnitus stopped. he denies any numbness of the feet. has had chronic numbness of two of his fingers.He has dysgeusia \par \par 10/30/17...Admission Date: \par -  Admission Date 16-Oct-2017 13:33. \par Discharge Date: \par - Discharge Date	21-Oct-2017 \par Chief Complaint/Reason for Visit: \par Chief Complaint/Reason for Admission	Atrial flutter with RVR\par *******************\par  38 yo M with PMHx of testicular cancer s/p left orchiectomy on chemotherapy who present to the ED before receiving chemo for tachycardia. \par The patient states that he has been feeling well for the past 2 weeks since his 3rd dose of chemotherapy. He denies CP, ABARCA, peripheral edema, cough, \par palpitations. He does states that he had one near syncopal event during a bout of emesis 2 weeks ago which caused him to fall into a door. Has not had any \par other syncopal or presyncopal episodes since then. In the ED he was found to have tachycardia found to be a.flutter after receiving 10mg IV diltiazem x2, a CXR with well placed portacath and no infiltrations. On bedside US found to have EF of 10-15% while tachycardic. \par The patient was initially started on metoprolol tartrate with increasing dosages unable to control the arrhythmia he was subsequently switch to a diltiazem drip. The metiport was removed after discussion with his Oncologist and cardiology for possible arrhythmia induction which was removed 10/19. He also received a TTE on 10/19 which showed EF of 35-40% with LV remodeling. \par Afterwards he was switched to metoprolol succinate 200mg qday. \par He was started on heparin for preparation for TYLOR and cardioversion which was performed on 10/20 and found a RA appendage thrombus with PFO and reduced LV \par function. No cardioversion was performed as a result and he was switched to rivaroxaban on 10/21, lisinopril 2.5mg qday and metoprolol succinate 200mg \par qday. He will follow with cardiology in 2 weeks and EP in 4 weeks. Treatments/Procedures/Significant Findings/Patient Condition: \par Other Significant Findings: \par - Other Significant Findings	 \par < from: Transesophageal Echocardiogram w/o TTE (10.20.17 @ 12:08) > \par Conclusions: \par 1. Left atrial enlargement.  No left atrial or left atrial appendage thrombus. \par 2. Moderate to severe global left ventricular systolic dysfunction. \par 3. There is an echodensity (likely thrombus vs mass) in the right atrial wall that measures (approximately 2.7 cm x 2.0 cm). This is near the distal point of the SVC and may \par represent thrombus from prior indwelling catheter. \par 4. Right ventricular enlargement with decreased right ventricular systolic function. \par 5. Color Doppler demonstrates evidence of a patent foramen ovale. \par 6. Trivial pericardial effusion. \par ****************************************************************************\par TYLOR showed right atrial appendage thrombus, 27 mm, On Xarelto. Feels "exhausted". Does not generally note palpitations. Anxiety level is high. Occasional ABARCA, not at rest. No cough, no sputum, no wheezing. No nausea at this time, has regurgitation. Occ vomiting noted. Fatigue is prominent. Appetite is good. \par \par 7/9/18...Had RPLND on 6/6/18, with viable GCT present. Embryonal pathology. Says he have fatigue, still "recovering" from the RLPND. He has some soreness of the abdomen and says he has some delayed healing. He say Dr Piper on 7/6 in lieu of Dr Restrepo and was told that it was healing well. He is upset that there is residual cancer present, which he says "bummed him out completely". He says he has been reviewing issues related to continued treatment, only after I called him recently as he had not made an appointment. he says his appetite is poor and he is eating small amounts multiple times as he cannot eat full full meals. He says he has had some nausea and he started OTC Prevacid, which has helped. No significant weight loss. Some ABARCA if he walks about 3 blocks or so, he has fatigue with walking as well. he has some paresthesias of the left thigh after the surgery. He notes some spasms of the muscles in that area at times. Urine flow is normal, no nocturia. Libido down, not sexually active, no masturbation since surgery. \par \par 7/19/18...Feels the same, "still recovering". Still has some discomfort near the wound. Appetite s good. Gained 2 pounds. no cough. Mild ABARCA with activities. No palpitations. No leg edema. He continues with some fatigue, 6 on 0-10 scale. Feels he is not motivated when the fatigue is high. He feels his hands are weaker than before. Lifting is fine, but twisting a bottle to open may be  impaired. Ambulates with a cane  due to paresthesias of the left anterior thigh. This from knee  to thigh area,. No paresthesias of the feet. He sometimes has some pains in his feet at night which he says are not cramps. \par \par 9/6/18...Cycle 2, TIP, day # 9. Admitted on Sunday, August 19. \par *********************************************\par Hospitalized again post chemo, this time for 9 days. ANC was <0.1, platelets were low. had low sodium, low potassium and low magnesium. He has had lots of muscle aches and bone pain secondary to the Taxol. Feeling better, still feels weak. Some SOB with exertion. No pains. No headaches, has paresthesias of the bottom of the feet along with some toes. It improves after the chemo. Somewhat more this time. No fevers, no chills. Some nausea with office visits. Appetite is "moderate". Tinnitus has improved, minor dizziness at times. \par \par 8/30/18...Delayed treatment #3 by one week. "Not a good week". Complains of hearing being sensitive. He has tinnitus. This is intermittent, more in the past 2-3 days. He has some vertigo, with some issue walking. He feels as if he is falling to the right side at times, no falls. He has to hold onto the door or furniture to walk. He has some vomiting without nausea. He vomited a few minutes ago. Appetite is decreased, He has altered taste as well. Fatigue is present, not as bad as before. he says he is a "little" fatigued at the moment. Paresthesias of feet have been intermittent, less notable at this time. Spends a lot of time sitting, sleeping. He feels as if his body is exhausted. His mother says he has a lot of anxiety. He agrees. He has thoughts running through his mind about chemo and adverse effects. \par \par 9/20/18...he feels "okay" at times. Occasionally feels nauseous and can't eat, occ feels dizzy. has some regurgitation at times. Has fatigue. No better. Tinnitus is "not as bad", persistent in the right, intermittent in the left. When dizzy,  he feels he is leaning to one side. Spends a lot of time sitting or lying in bed. Paresthesias of the feet, left foot more so than right. Right toes affects the 4th and 5th toe. Fingertips are affected. He notes some darkening of skin folds of the hand joints. Feels no SOB unless he is active. using a cane today. Not taking any meds at this point. \par \par 10/9/18...Admission Date: after cycle 3 TIP\par -  Admission Date 01-Oct-2018 02:09. \par Discharge Date: \par - Discharge Date	06-Oct-2018 \par ***********************************\par HOSPITAL COURSE: The patient continued to have significant nausea while inpatient with intermittent vomiting. He received aggressive IV hydration with \par IVF while he was no longer able to tolerate po. On hospital day 4, patient was able to tolerate some po and requested discharge home. His course was c/b \par neutropenic fever to 101. Cultures were sent and remained negative. Patient was also started on cefepime. His ANC started to improve towards the end of the \par hospital course. His platelets, however, downtrended. He was given 1U platelets as per oncology recommendations. His midline was noted to have been in since \par July. After discussion with his outpatient oncologist, the patient opted to have his midline removed and will receive his last dose of chemotherapy via \par peripheral IV. During his hospital course, patient was noted to have an episode of tachycardia to the 140's followed by bradycardia to the 20's during \par orthostatic vitals. He was seen by Dr. Brown of cardiology who feels this is chemo-induced orthostasis and no further inpatient w/u is needed. He is \par medically stable for dc home. Prior to discharge received additional platelet transfusion and MID-Line was removed after discussion with oncology. \par *******************************************************************\par He was admitted and told that the midline had to be changed. He did have fever with neutropenia. Discharged on the 6th. Hypotensive with orthostasis,seen by cardiology, restarted lisinopril and carvedilol. He has fallen twice since discharge. He has LOC for a few seconds. he is somewhat confused after the episodes. Walking with a cane. he also had an similar episode while in the hospital. Appetite returned, has altered taste, no N/V now, but did have on admission. No source of fever found. Has paresthesias of the left foot, all toes, not his right.  Had aches and pains with the Taxol, which he describes as "brutal". \par \par 10/29/18...called last week, was having difficulty keeping food down and felt weak and dizzy. We arranged for fluids, but he called Friday ad declined. Taking Zofran ADT after meals at times. Able to drink more at this time. Did not eat this AM. Yesterday, had a bagel and egg, for lunch, potatoes, pork and beans and Spam. For dinner had rice and Spam, small amount. No vomiting yesterday, but had nausea. Dizziness if he leis his head back, or if he gets up to fast. Walking with  a cane. No falls. Started Has paresthesias of the feet, on the left side. Continues on oxycodone, about  1 per day, chronic use for low back pains. No leg edema. No cough, ABARCA. \par \par 11/13/18...has not followed with Dr Marquez and remains off of carvedilol, enoxaparin and lisinopril. He takes ondansetron on occasion. Says he had a "blackout" episode after getting up, fell, striking left side, no injury. No LOC. Neuropathy remains, mostly left leg, somewhat better at times, continues on therapy. Minimal on the right side. Considering returning to work. Fatigue is present, somewhat better. Appetite is still impaired, eats once a day. Gained a few pounds. Still has some taste alteration. No leg edema. No abdominal pains. Saw Phong Restrepo yesterday in follow up. He has constipation, and may go 4-5 days w/o bowel movement. \par \par 1/4/19...Had echo done 12/2018. "I'm okay", feeling better, saw Dr Marquez. Paresthesias are an issue. On gabapentin 300 TID, says it is not helping. He says his fingers are affected, works on computers, and hits wrong keys at times. Worse are his feet. Left foot is numb, right foot partially numb, annoying when walking, not sensing if he steps on something. He continues on oxycodone. Affect toes, senses that they are swollen, bottom of the feet as well as the top. Does not pass the ankle. In the hands, it is the 3rd and 4th finger distally on the right side, and 3-4-5 on the left. Echo said that LVEF is about 44-46 % with moderate global dysfunction. The LA appendage clot was not seen. Not as tired as before, walked 11 minutes yesterday with some fatigue. Occ mild nausea, no vomiting. Appetite is good, gained 4 kilos on the past month( points out that he is wearing heavy boots. . Still has some dysgeusia. Still avoids meats.  Occasional HA, lasts up to 1/2 day, occurs intermittently, once a week. More right side of the headaches. Prior sinus headaches, but not the same, has a sharp element to it. Stills feels dizzy at times when gets up too fast..\par \par 3/11/19...Urgent visit. Called this AM stating that the right testicle is swollen, present for a few days, mild tenderness, mild discomfort/pain. No back pains except for his chronic back issue. He notes some skin changes of his hands and his feet.e is some peeling skin at the base of the toes. as well as the center of the foot. He has neuropathy of both feet, more on the left than the right, where it only affects the toes. He says the medial 3 fingers are numb and he drops things. His heart exam included a stress test, which he reports the outcome was good. Still reports tinnitus. Appetite is good, weight is increased 6 kilos since 1/30/19. Still has fatigue. \par \par 5/11/19...Missed appt 4/5/19. I " have good days and bad days". He feels he puts on a lot of weight in  a short time, and then drops weight in a short period of time. He had edema of both feet and also feels he had edema of the hands as well. He says edema accumulates and then resolves. Neuropathy is "pretty bad", extends to the calves, worse at night. Ran out of gabapentin, was on 300 TID and did not feel it helped. He has discoloration of the left foot, edema both feet. Has cramps in the legs. Feels he cannot stand for a long time, and complains of blistering of the feet. He has shooting pains in the toes. There are fluid filled blisters that he he "has to pop", due to pain. He feels he s less active due to the blisters. He feels his toes are locked i position. Does exercises provided by PT. Appetite is good. He is thirsty many times. NO pains elsewhere except chronic back pain, for which he takes oxycodone. No cough, has some ABARCA, can walk 2 blocks prior to stopping. Sees DR Marquez later today. Fine motor issues with fingers with neuropathy, difficulty with typing on keyboard, with many errors. \par \par 7/2/19...Hospital Course: \par Discharge Date	12-Jun-2019 \par Admission Date	12-Jun-2019 00:37 \par Reason for Admission	vomiting, vertigo \par Medication Reconciliation Status	Admission Reconciliation is Completed \par Discharge Reconciliation is Completed \par Hospital Course	 \par 38M with PMHx of metastatic testicular CA, NICM, neuropathy, who presents with dizziness, nausea, and vomiting. \par Vertigo. \par - will c/w meclizine 25mg 4x/day \par - f/u blood and urine cx to r/o infectious etiology \par - PT eval, fall precautions. Constellation of symptoms with horizontal nystagmus and reproducible symptoms with daryn hallpike maneuver c/w BPPV. CT \par head negative. Pt reports improvement after meclizine. \par - will c/w meclizine 25mg 4x/day \par - f/u blood and urine cx to r/o infectious etiology \par - PT eval \par  Cardiomyopathy. 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 Most recent TTE on 6/4 showing EF55%. Pt concerned current sx may be side effect from entresto \par - c/w carvedilol 12.5mg BID \par - will hold entresto in setting of soft BP and pt hesitancy to restart due to side effects. In prior studies, dizziness is reported as an adverse event "at \par least 5%" of patients taking entresto. \par will hold all BP meds upond d/c 2/2 hypotension \par  Vomiting. \par - will c/w IVF overnight and repeat lactate in AM \par - zofran PRN nausea. \par  Mixed germ cell tumor. \par  now in remission. \par **********************************************\par Was hospitalized and now off carvedilol and Entresto. Doing better at this time. Only bothered by paresthesias of the feet and occasion syncope with changes in position. the latter has occurred when getting up from bed or with sitting to standing at times. Appetite is good, no weight loss. No cough, no ABARCA. No palpitations noted. Chronic back pains, no changes. No HA. Libido is down, erections are difficult to achieve, are not durable,  no ejaculation.\par \par 9/10/19...Feels "okay". he has some intermittent swelling noted in the hands and feet, occasionally in face, may last for several days. He has paresthesias of the fingertips the hands, not as bad as it was previously. It can be shock, like, noted more at night, more prominent in the feet. Gabapentin didn't help. Has his usual back pains, takes oxycodone 15 mg 2-4 times a day depending on pain. Appetite is good, No diarrheal stools, has constipation. Weight increased 3 kilos. No nausea, no vomiting.  BM about 3 times a week, sometimes hard. Advised to take Colace. Occ has a blistering rash on the bottom of the feet, Rx'd from dermatology.He still has blisters from time to time. The foot swells and the skin starts to crack. Fatigue unchanged. Tries to walk about but the pain in the feet causes discomfort, occasionally uses his cane to walk. Urine flow is impaired as before with some discomfort, not burning. Nocturia x 3-4. NO blood. occ incontinence- leaking of urine if he goes to stand at times. He noted blood in his stool 2 night sin a row. Reddish color, fresh appearing. \par \par 12/13/20...Continues to struggle with neuropathy, seen by neuro, had NCV, started nortriptyline for the neuropathy at bed time.  He feels it helps somewhat, dose increased to 50. Appetite is good. Weight is stable. Has impaired activities due to the neuropathy, does not leave the house much. Goes to PT and doctor appts. Has back pains form prior injury, on long term oxycodone, takes 15 mg 3-4 times a day. Anxiety level is low except at night, which he attributes to pain. He feels he is not depressed. No cough, mild ABARCA, attributed to weight increase. Occ headaches, right sided, once every few months. Fatigue is 8 on a 0-10 scale. Poor libido, not sexually active, no attempts\par \par 6/9/20...Seen today. Was on zonasamide by neurology, felt he had tremors and grater degree of anxiety. Insurance won't permit Lyrica, gabapentin created swelling. Taking oxycodone 15, up to 4 times a day, trying to cut down. Interested in med marijuana. he says he does not sleep well. Awake from 2 to 6 AM. Neuropathy keeps him awake, less noted during the day. Neuropathy affects the feet to the upper calves, shooting pains at night. Also has neuropathy of the hands as well. Says he has difficulty typing on the computer. Hand will shake when using the mouse. Occ drops a fork. Appetite is good, weight increased. Knee pains as well. No cough, some ABARCA remains, which he attributes to lack of exercise. Not seeing a mental health professional. I have suggested he see Dr Almanza in the past. He feels he has spent 5 years in "isolation". \par Neurologist notes reviewed they ordered an immunofixation which showed a slight IgG lambda band. No further evaluation was performed.\par No headaches recently, occ dizziness of he gets up fast. Tries to keep himself active, but says he cannot do much. He says his feet swell with activities. He walks with a cane.\par \par 9/8/20... Presents for follow up. On medical marijuana, which he says helps, uses mainly at night as a sleep aide. Chronic low back pains from prior injury, oxycodone 15, 3-4 times a day.  Pain always present, does not awaken him. He does have some sleep issues due to the neuropathy. Neuropathy extends to lower 1/3 of calves. No better, no worse. No other pains except some joint pains. Appetite is decreased, he feels is sec to the duloxetine. He sees Symone Shields. He has lost some weight. No cough, no ABARCA. Fatigue is present, stated to occur after 2 blocks of walking, fatigue in the legs. He says he is active during the day, walks about, less computer and video game use. Occ nausea, no vomiting. No diarrhea, no constipation. Urine flow is weaker, some dysuria/pain with voiding. No blood in urine. Has not any UTIs, since the surgery. No libido, no interest in masturbation, has not tried. No AM erections. T levels have been normal. \par \par 12/8/20...Off the oxycodone 15s, on medical marijuana, which he feels it is better controlled. He sleeps better. has some pain, but did not take anything this AM. Lower back is the sites as well as the discomfort in the extremities. The med marijuana helps the neuropathy as well. Eats more, gained weight. He is more energetic. He feels it is hard to exercise, as he is not as strong as before, tired after a walk down the block. Much more talkative today. he is concerned about blacking out. Also has some muscle spasms at times with activities. Does climb stairs in the house. No cough. ABARCA present. He feels his urine stream is restricted with some discomfort. "Like a lot of fluid trying to fit thru a small space". He is going to call Dr Oconnor about these issues. uses a shower chair as he is worried about "blacking out in the shower" if he raise his hands above his head. \par \par 3/9/21 - Verbal permission for telehealth services was granted by the patient, Cleve Calderón, on March 9, 2021 at 10:12 AM. \par Mixed germ cell tumor Diagnosed in 2017, underwent radical orchiectomy on the left side in July 20, 2017.  His stage was 2A, good risk.  He received cisplatin and etoposide starting on August 14 of 2017.  Cycle 4 was delayed and then omitted as he presented with supraventricular tachycardia and a low ejection fraction.  This was felt to be secondary to the indwelling central venous catheter, which was located in the appropriate spot.  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  He underwent an RPLND which was delayed, and he had residual viable embryonal carcinoma.  He was started on TIP in July 2018, received 3 cycles. \par \par Overall, feels the same. Neuropathy is the main issues, feels he cannot rest at all. The medical marijuana helps him rest. Takes it at night and throughout the day when he feels he needs it. Onset of action if from 7 to 20 minutes. Appetite is fine, no weight loss of note. No cough, some ABARCA, after one block, he is SOB and exhausted. Occ headaches noted, brief duration. Some balance issues, no falls. He still feels he has some dexterity issues in the hands. He has some discomfort when he voids, a pressure like sensation as if the has some flow issues, since the surgery, no change. No dysuria. Has not had a CV vaccine. Libido is poor, no attempt, no erections.  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 Cycle 4 delayed..then omitted due to tachycardia and low EF. RPLND delayed, residual viable embryonal cancer, TIP started July 29, 2018, cycle 3 delayed for grade 3 fatigue, then again one more week for transaminitis. 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 Had to receive additional chemo with TIP. He is active and exercising, frustrated with the disability process. He is able to do more at this time. Has pain from the neuropathy with walking uphill. He notes some swelling of the left leg and right hand area, and sometimes the swelling in the hand makes it difficult to close his fist. Appetite is "poor" lately , trying to lose weight. He feels food is not as appealing as it once was, antedates this to the chemotherapy. Has had some nausea in recent times. He feels red meat is the issue. This has occurred for "some time". He says his weight was 289, now 260, over the course of 2.5 weeks. He also notes some bloating and gas. Some cramp like pains at times. had his first CV vaccine (Moderna). Breathing is fine, notes some chest pressure the last few nights. Some minor headaches in recent times, middle of the head, takes Advil or Tylenol, which does not seem to help much. These occur about 2 times a week, may last few minutes. Urinary difficulty, feels the muscle is not fully "retracting". Feels the flow is not what it should be and has a sensation of incomplete voiding. NO blood, occ incontinence-occurred x 2. Feels his right arm has increased numbness and it may be a bit weaker. Was doing exercises that PT had previously recommended. Feels he has issues when typing. No F/C/S. Notes fatigue, exercises in the backyard at times. If he stands more than 10 minutes, he feels exhausted.  Libido is poor. No interest, not sexually active, no masturbation. No full AM erections.

## 2021-06-08 NOTE — ASSESSMENT
[Palliative Care Plan] : not applicable at this time [FreeTextEntry1] : Cleve is seen via telehealth services today.  He was initially seen by me in August 2017.  He had a T1N2 mixed germ cell tumor which was 95% teratoma within the testicle.  He received cisplatin and etoposide for 4 cycles for good risk disease.  Cycle 4 was delayed and then omitted.  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  His ejection fraction was down to 15%.  The RPL ND was delayed as well and he had residual viable embryonal carcinoma.  He was started on TIP chemotherapy in July 2018.  This was poorly tolerated and cycle 3 was delayed for grade 3 fatigue.  And then 1 additional week for transaminitis.  Further chemotherapy was not administered.  He has been on observation since that time.  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  He notes some swelling of the left leg at times and also his right hand and sometimes the swelling in his hand makes it difficult for him to close his fist.  He does have some difficulty in typing due to the neuropathy.  He says that his appetite has been poor in recent times and is trying to lose weight.  He says food is not as appealing as it was before chemotherapy.  He has had some nausea in recent times as well and he feels that red meat is the cause of some of his intestinal discomfort.  This is occurred for "some time".  He said that his weight was 289 and it is now 260 over the course of only 2-1/2 weeks.  He also notes some bloating and gas as well.  He has some cramp-like pains at times.\par \par He received his first coronavirus vaccine.  His breathing is stated to be good.  He does have some chest pressure the last few nights.\par \par He has had some minor headaches in recent times, and it may occur twice per week.  It is located in the middle of the head and is more like a pressure sensation.  He has taken Advil or Tylenol which she says does not seem to help very much although the pain only lasts for a few minutes.  He notes some urinary complaints and he is a sensation that his bladder does not completely empty.  He feels that he has to return to the bathroom a couple times at the point and that the muscle does not completely contracted.  He has had 2 episodes of incontinence.  There is no hematuria.  He feels that his right arm has some increased numbness recently and that he feels it may be somewhat weaker.  He has been doing exercises that physical therapy had recommended to him previously.  He says that he had some fevers last week.  He notes fatigue.  He is able to exercise in his backyard at times.  He feels if he stands for more than 10 minutes that he has increased fatigue and he is exhausted.  Libido is poor and he has no interest in sexual gratification.  He is not sexually active and does not masturbate.  He has some minor morning erections.  He has no dysphagia.  He has tinnitus in both ears, more so on the right side than the left.  He feels that his depression is well controlled with the duloxetine.  He still has anxiety.\par \par On physical examination, he appears in no acute distress.  His performance status is 1.\par \par Recent laboratory results revealed normal markers.  His transaminase levels are elevated, and this is usually the case, most likely secondary to fatty liver.  Tumor markers were normal.\par \par His last CT scan was in July 2020.  He did have treatment delays, and he is at increased risk of recurrence.  I have ordered another CT scan as well as a chest x-ray at this time.  I have asked him to return to see me in 4 months.  He is aware that he should contact me if any new symptoms develop.\par \par I suggested that he see a gastroenterologist, and he acknowledges this and will have his primary care doctor recommend 1.  The symptoms of rapid weight loss with nausea and bloating need to be investigated.  All questions were answered to the best of my ability and to his apparent satisfaction.

## 2021-06-09 ENCOUNTER — APPOINTMENT (OUTPATIENT)
Dept: HEMATOLOGY ONCOLOGY | Facility: CLINIC | Age: 41
End: 2021-06-09
Payer: MEDICAID

## 2021-06-09 PROCEDURE — 99213 OFFICE O/P EST LOW 20 MIN: CPT | Mod: 95

## 2021-06-09 NOTE — HISTORY OF PRESENT ILLNESS
[Home] : at home, [unfilled] , at the time of the visit. [Medical Office: ([facility])___] : at the medical office located in  [Verbal consent obtained from patient] : the patient, [unfilled] [FreeTextEntry1] : 40yoM with mixed germ cell tumor presents for follow up visit via telephone.\par \par Main issue for which patient was referred is pain/neuropathy.  He experiences neuropathy in his feet b/l, which has traveled up his legs to the calves. The pain feels like "little explosions," that occur intermittently.  He has a constant feeling of tingling and numbness.  He has trouble feeling the floor beneath him. The pain is worse at night. \par Gabapentin caused edema, could not continue using it. His insurance company would not cover Lyrica. Zonisamide was prescribed by Neurology, he did not like how it made his hands shaky and he discontinued it. Nortriptyline had also been discontinued because he did not find it to be helpful. \par Has difficulty with cooking as he can't chop food. \par \par Interval Hx (6/9/21):\par Has been having issues with gas/bloating with certain foods. He will be seeing GI. \par He has been making his own cannabis tinctures with flower he obtains from dispensary. \par The increase in the duloxetine has had a positive effect on his mood and he feels it is helping neuropathy as well.  He finds the cannabis to be helpful for his back pain (was previously using oxycodone 15mg IR) but not as reliable pain control (still aware that the pain is there). \par \par Uses Duloxetine 60mg QD and finds it to be helpful. \par \par ROS:\par +weakness in his hands, difficulty with fine motor skills - has done OT in past. \par +has intentionally lost about 30lbs, is intending to lose more\par Sleep is improved although he does wake up in the early morning hours from hyperactive nerves in his feet. \par +mood is stable, he has adjusted his outlook/perspective. \par All other ROS non-contributory. \par \par Has applied for disability insurance but does not think it will come through.\par \par [employer] Ref#: [id number]

## 2021-06-09 NOTE — ASSESSMENT
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 Depressed mood with anxiety - controlled at present.\par \par 4. Encounter for Palliative Care - Emotional support provided.\par \par Follow up in 3 months, call sooner with questions or issues.

## 2021-06-17 NOTE — ED PROVIDER NOTE - GASTROINTESTINAL, MLM
[Initial Visit ___] : [unfilled] is here today for an initial visit  for [unfilled]
Abdomen soft, non-tender, no guarding.

## 2021-06-24 ENCOUNTER — RESULT REVIEW (OUTPATIENT)
Age: 41
End: 2021-06-24

## 2021-07-01 NOTE — ED PROVIDER NOTE - SKIN TEMP
----- Message from Zac Sahni MD sent at 7/1/2021  9:28 AM CDT -----  RF remains mildly high at 25, was 30 three months ago   Vitamin D is better at 28, was 18   Continue with [id number] units weekly   
Notified of lab results.  
warm

## 2021-07-18 ENCOUNTER — OUTPATIENT (OUTPATIENT)
Dept: OUTPATIENT SERVICES | Facility: HOSPITAL | Age: 41
LOS: 1 days | End: 2021-07-18
Payer: MEDICAID

## 2021-07-18 ENCOUNTER — APPOINTMENT (OUTPATIENT)
Dept: CT IMAGING | Facility: IMAGING CENTER | Age: 41
End: 2021-07-18
Payer: MEDICAID

## 2021-07-18 DIAGNOSIS — Z98.890 OTHER SPECIFIED POSTPROCEDURAL STATES: Chronic | ICD-10-CM

## 2021-07-18 DIAGNOSIS — Z95.828 PRESENCE OF OTHER VASCULAR IMPLANTS AND GRAFTS: Chronic | ICD-10-CM

## 2021-07-18 DIAGNOSIS — C77.2 SECONDARY AND UNSPECIFIED MALIGNANT NEOPLASM OF INTRA-ABDOMINAL LYMPH NODES: ICD-10-CM

## 2021-07-18 DIAGNOSIS — Z90.79 ACQUIRED ABSENCE OF OTHER GENITAL ORGAN(S): Chronic | ICD-10-CM

## 2021-07-18 PROCEDURE — 74177 CT ABD & PELVIS W/CONTRAST: CPT

## 2021-07-18 PROCEDURE — 74177 CT ABD & PELVIS W/CONTRAST: CPT | Mod: 26

## 2021-08-27 NOTE — PATIENT PROFILE ADULT - NSPROGENSOURCEINFO_GEN_A_NUR
MEDICAL ELIGIBILITY FORM    Name: Abiodun Carpio YOB: 2005     Abiodun is Medically eligible for all sports without restriction    Recommendations: N/A    I have examined the student named on this form and completed the preparticipation physical evaluation. The athlete does not have apparent clinical contraindications to practice and can participate in the sport(s) as outlined on this form. A copy of the physical examination findings are on record in my office and can be made available to the school at the request of the parents. If conditions arise after the athlete has been cleared for participation, the physician may rescind the medical eligibility until the problem is resolved and the potential consequences are completely explained to the athlete (and parents or guardians).    Name of health care professional (print or type): Manfred Bowen MD Date: 8/27/2021     Address: [facility] [place] [address] [place] [address]  Dept: [phone]     Signature of health care professional: _______________________________________      SHARED EMERGENCY INFORMATION    Allergies   Allergen Reactions   • Amoxicillin RASH       No current outpatient medications    Other information:_____________________________________________________________________  _____________________________________________________________________________________  _____________________________________________________________________________________    Emergency contacts:___________________________________________________________________  _____________________________________________________________________________________  _____________________________________________________________________________________     © 2019 [place] [employer], American Academy of Pediatrics, American College of Sport Medicine, American Medical Society for Sports Medicine, American Orthopaedics Society for  Sport Medicine, and American Osteopathic Academy of Sports Medicine.  Permission is granted to reprint for noncommercial, educational purposes with acknowledgement.      PHYSICAL EXAMINATION FORM     Name: Abiodun Carpio YOB: 2005   PHYSICIAN REMINDERS  1. Consider additional questions on more-sensitive issues.  · Do you feel stressed out or under a lot of pressure?  · Do you feel sad, hopeless, depressed or anxious?  · Do you feel safe at your home or residence?  · During the past 30 days, did you use chewing tobacco, snuff or dip?  · Do you drink alcohol or user any other drugs?  · Have you even taken anabolic steroids or used any other performance-enhancing supplement?  · Have you even take any supplements to help you gain or lose weight or improve your performance?  · Do you wear a seat belt, use a helmet, and use condoms?  2.  Consider reviewing questions on cardiovascular symptoms (Q4-Q13 of History Form).    EXAMINATION     Vitals: BP 96/66   Pulse 60   Temp 97.8 °F (36.6 °C) (Temporal)   Ht 6' 1.23\" (1.86 m)   Wt 65.8 kg (145 lb)   BMI 19.01 kg/m²   BSA 1.88 m²    Vision: R 20/               L 20/                      Corrected  Y         N     MEDICAL NORMAL ABNORMAL FINDINGS   Appearance  · Marfan stigmata (kyphoscoliosis, high-arched palate, pectus excavatum, arachnodactyly, arm span > height, hyperlaxity, myopia, MVP, aortic insufficiency) Yes    Eyes, ears, nose, throat  · Pupils equal  · Hearing Yes    Lymph nodes Yes    Heart*  · Murmurs (auscultation standing, auscultation supine, +/- Valsalva maneuver) Yes    Lungs Yes    Abdomen Yes    Skin  · Herpes simplex virus (HSV), lesions suggestive of methicillin-resistant Staphylococcus aureus MRSA, or tinea corporis Yes    MUSCULOSKELETAL NORMAL ABNORMAL FINDINGS   Neck Yes    Back Yes    Shoulder and arm Yes    Elbow and forearm Yes    Wrist, hand, and fingers Yes    Hip and thigh Yes    Knee Yes    Leg and ankle Yes    Foot and toes  Yes    Functional  · Double-leg squat test, single-leg squat test, and box drop or step drop test Yes    * Consider electrocardiography ECG, echocardiogram, referral to cardiology for abnormal cardiac history or examination finding, or a combination of those.  Name of health care professional (print or type): Manfred Bowen MD  Date: 8/27/2021  Address: [facility] [place] [address] [facility] [address]  Dept: [phone]   Signature of health care professional: _______________________________________    © 2019 American Academy of Family Physicians, American Academy of Pediatrics, American College of Sport Medicine, American Medical Society for Sports Medicine, American Orthopaedics Society for Sport Medicine, and American Osteopathic Academy of Sports Medicine.  Permission is granted to reprint for noncommercial, educational purposes with acknowledgement.         Name: Abiodun Carpio YOB: 2005   Supplemental COVID-19 questions     1.  Have you had any of the following symptoms in the past 14 days?           a) Fever or chills Yes  /  No          b) Cough Yes  /  No          c) Shortness of breath or difficulty breathing Yes  /  No          d) Fatigue Yes  /  No          e) Muscle or body aches Yes  /  No          f) Headache Yes  /  No          g) New loss of taste or smell Yes  /  No          h) Sore throat Yes  /  No          i) Congestion or runny nose Yes  /  No          j) Nausea or vomiting Yes  /  No          k) Diarrhea Yes  /  No          l) Date symptoms started _______          m) Date symptoms resolved _______   2.  Have you ever had a positive test for COVID-19? Yes  /  No          If yes _______                 i.  Date of test Yes  /  No                 ii. Were you tested because you had symptoms? Yes  /  No                 If yes:                         a) Date symptoms started _______                        b) Date symptoms resolved _______                         c) Were you hospitalized? Yes  /  No                        d) Did you have fever > 100.4 F.? Yes  /  No                               If yes, how many days did your fever last? _______                        e) Did you have muscle aches, chills, or lethargy? Yes  /  No                               If yes, how many days did these symptoms last? _______                        f) Have you had the vaccine? Yes  /  No                 iii. Were you tested because you were exposed to someone with COVID-19, but you did not have                     any symptoms? Yes  /  No   3. Has anyone living in your household had any of the following symptoms or tested positive for COVID-19 in the past 14 days? Yes  /  No          If Yes, [place] the applicable symptoms.     • Fever or chills • Shortness of breath or difficulty breathing    • Muscle or body aches • New loss of taste or smell    • Nausea or vomiting • Congestion or runny nose    • Sore throat           • Headache           • Cough • Fatigue           • Diarrhea       4. Have you been within 6 feet for more than 15 minutes of someone with COVID-19 in the past 14 days? Yes  /  No          If yes: date(s) of exposure _______   5. Are you currently waiting on results from a recent COVID test? Yes  /  No     Sources:  • Interim Guidance on the Preparticipation Physical Examination... : Clinical Journal of Sport Medicine (lww.com)  • Supplemental COVID19 Questions (lww.com)  • COVID19 Interim Guidance: Return to Sports and Physical Activity (aap.org)      HISTORY FORM  Note: Complete and sign this form (with your parents if younger than 18) before your appointment.  Name: Abiodun Carpio YOB: 2005     Date of examination: 8/27/2021  Sport(s):_________________________________________   Sex assigned at birth: (F, M, or other): male How do you identify your gender?(F, M, or other):_________     List past and current medical  conditions:____________________________________________________________________  _______________________________________________________________________________________________________________    Have you ever had surgery? If yes, list all past surgical procedures: ______________________________________________  _______________________________________________________________________________________________________________    Medicines and supplements: List all current prescriptions, over-the-counter medicines, and supplements (herbal and nutritional):   ______________________________________________________________________________________________________________  ______________________________________________________________________________________________________________    Do you have any allergies? If yes, please list all your allergies (ie, medicines, pollens, food, stinging insects).   ______________________________________________________________________________________________________________  ______________________________________________________________________________________________________________       Patient Health Questionnaire Version 4 (PHQ-4)  Over the last 2 weeks, how often have you been bothered by any of the following problems? ([place] response.)   Not at all Several days Over half the days Nearly every day   Feeling nervous, anxious, or on edge 0 1 2 3   Not being able to stop or control worrying 0 1 2 3   Little interest or pleasure in doing things 0 1 2 3   Feeling down, depressed, or hopeless 0 1 2 3   (A sum of ?3 is considered positive on either subscale [questions 1 and 2, or questions 3 and 4] for screening purposes.)     GENERAL QUESTIONS  (Explain “Yes” answers at the end of this form.  [place] questions if you don’t know the answer.)     Yes     No   1. Do you have any concerns that you would like to discuss with your provider?       2. Has a provider ever denied or restricted your  participation in sports for any reason?       3. Do you have any ongoing medical issues or recent illness?       HEART HEALTH QUESTIONS ABOUT YOU Yes No   4. Have you ever passed out or nearly passed out during or after exercise?       5. Have you ever had discomfort, pain, tightness, or pressure in your chest during exercise?       6. Does your heart ever race, flutter in your chest, or skip beats (irregular beats) during exercise?       7. Has a doctor ever told you that you have any heart problems?       8. Has a doctor ever requested a test for your heart? For example, electrocardiography (ECG) or echocardiography.      HEART HEALTH QUESTIONS ABOUT YOU (CONTINUED ) Yes No   9. Do you get light-headed or feel shorter of breath than your friends during exercise?       10. Have you ever had a seizure?       HEART HEALTH QUESTIONS ABOUT YOUR FAMILY Yes No   11. Has any family member or relative  of heart problems or had an unexpected or unexplained sudden death before age 35 years (including drowning or unexplained car crash)?       12. Does anyone in your family have a genetic heart problem such as hypertrophic cardiomyopathy (HCM), Marfan syndrome, arrhythmogenic right ventricular cardiomyopathy (ARVC), long QT syndrome (LQTS), short QT syndrome (SQTS), Brugada syndrome, or catecholaminergic polymorphic ventricular tachycardia (CPVT)?       13. Has anyone in your family had a pacemaker or an implanted defibrillator before age 35?                Name: Abiodun Carpio YOB: 2005     BONE AND JOINT QUESTIONS Yes No   14. Have you ever had a stress fracture or an injury to a bone, muscle, ligament, joint, or tendon that caused you to miss a practice or game?       15. Do you have a bone, muscle, ligament, or joint injury that bothers you?       MEDICAL QUESTIONS Yes No   16. Do you cough, wheeze, or have difficulty breathing during or after exercise?       17. Are you missing a kidney, an eye, a  testicle (males), your spleen, or any other organ?       18. Do you have groin or testicle pain or a painful bulge or hernia in the groin area?       19. Do you have any recurring skin rashes or rashes that come and go, including herpes or methicillin-resistant Staphylococcus aureus  (MRSA)?       20. Have you had a concussion or head injury that caused confusion, a prolonged headache, or memory problems?       21. Have you ever had numbness, had tingling, had weakness in your arms or legs, or been unable to move your arms or legs after being hit or falling?       22. Have you ever become ill while exercising in the heat?       23. Do you or does someone in your family have sickle cell trait or disease?       24. Have you ever had or do you have any problems with your eyes or vision?        MEDICAL QUESTIONS (CONTINUED ) Yes No   25. Do you worry about your weight?         26. Are you trying to or has anyone recommended that you gain or lose weight?       27. Are you on a special diet or do you avoid certain types of foods or food groups?       28. Have you ever had an eating disorder?       FEMALES ONLY     29. Have you ever had a menstrual period?       30. How old were you when you had your first menstrual period?       31. When was your most recent menstrual period?       32. How many periods have you had in the past 12 months?         Explain \"Yes\" answers here.  ______________________________________________    ______________________________________________    ______________________________________________    ______________________________________________    ______________________________________________    ______________________________________________    ______________________________________________    ______________________________________________     I hereby state that, to the best of my knowledge, my answers to the questions on this form are complete and correct.    Signature of athlete:  ____________________________________________________________________________________    Signature of parent or guardian: ___________________________________________________________________________  Date: ________________________________________________  _____________________________________________________________________________________________________  © 2019 American Academy of Family Physicians, American Academy of Pediatrics, American College of Sports Medicine, American Medical Society for Sports Medicine, American Orthopaedic Society for Sports Medicine, and American Osteopathic Academy of Sports Medicine. Permission is granted to reprint for noncommercial, educational purposes with acknowledgment.   patient

## 2021-10-11 ENCOUNTER — OUTPATIENT (OUTPATIENT)
Dept: OUTPATIENT SERVICES | Facility: HOSPITAL | Age: 41
LOS: 1 days | Discharge: HOME | End: 2021-10-11

## 2021-10-11 DIAGNOSIS — C62.92 MALIGNANT NEOPLASM OF LEFT TESTIS, UNSPECIFIED WHETHER DESCENDED OR UNDESCENDED: ICD-10-CM

## 2021-10-11 DIAGNOSIS — Z98.890 OTHER SPECIFIED POSTPROCEDURAL STATES: Chronic | ICD-10-CM

## 2021-10-11 DIAGNOSIS — Z95.828 PRESENCE OF OTHER VASCULAR IMPLANTS AND GRAFTS: Chronic | ICD-10-CM

## 2021-10-11 DIAGNOSIS — Z90.79 ACQUIRED ABSENCE OF OTHER GENITAL ORGAN(S): Chronic | ICD-10-CM

## 2021-10-12 ENCOUNTER — APPOINTMENT (OUTPATIENT)
Dept: HEMATOLOGY ONCOLOGY | Facility: CLINIC | Age: 41
End: 2021-10-12
Payer: MEDICAID

## 2021-10-12 PROCEDURE — 99443: CPT

## 2021-10-12 NOTE — HISTORY OF PRESENT ILLNESS
[Disease: _____________________] : Disease: [unfilled] [T: ___] : T[unfilled] [N: ___] : N[unfilled] [M: ___] : M[unfilled] [AJCC Stage: ____] : AJCC Stage: [unfilled] [de-identified] : Mr. Calderón is seen in consultation on 8/8/17. On July 11, 2017, he awoke with severe back pain extending into the left testicle. There was a heaviness and pulling sensation. He went to the ER at [facility] and a sonogram was done, revealing a mass. He was seen by Rivas Oconnor and underwent a left radical orchiectomy on July 20, 2017. The pain and pulling sensation resolved. He has been on oxycodone a time, since January 2017, for low back pains. No respiratory complaints. No urinary symptoms. Has had sperm cryopreservation. \par \par 8/30/17...Cleve comes in today for follow up was just discharged yesterday. He was in the hospital for 4 days and he was neutropenic, found a right arm superficial thrombosis at the site of IV. treated with vanco, blood cultures negative. Discharged on Levaquin. Overnight he had a temp of 104.  He is having some hair loss, numbness in his fingers and his toes. He has tinnitus intermittently, started two days after the last dose. He was having soreness in the back of his throat that now resolved. He complains of dysgeusia and he had a lot  of burning and acid reflux-like symptoms during his treatment Carafate, Protonix and the lower dose of Decadron improved his symptoms., Not sleeping well, likely due to steroids.\par \par 9/19/17...cycle 2 day 5 was 9/9/17\fabian Poon is seen in the office today. He has significant fatigue. He had vomiting for 5 days after the chemotherapy with nausea and he said that his complexion was green. He had acid reflux issues once again despite the dropping off the dose of his dexamethasone. He feels a burning sensation in his lower abdomen and he feels that the medications for nausea did not help. He also has significant fatigue which is improved today. He has intermittent tinnitus. He has tingling in the tips of his toes and fingers which are intermittent. His appetite has been good and he is gained weight while on chemotherapy but he says that he eats smaller portions. He denies dysgeusia or dry mouth.\par \par 10/9/17...Completed cycle 3 last week.  He states he feels "horrible." HE states he noticed bloating in the abdomen, He went a whole week after this treatment wit vomiting every day, last time he vomited was two days ago. He fell at one point secondary to being weak and feeling lightheaded. He feels generalized weakness.He had a blood clot, during his treatment, that resolved. His appetite is poor, He eats and then, he eats, he feels like he has to regurgitate his food up after eating. He has fatigue, tinnitus stopped. he denies any numbness of the feet. has had chronic numbness of two of his fingers.He has dysgeusia \par \par 10/30/17...Admission Date: \par -  Admission Date 16-Oct-2017 13:33. \par Discharge Date: \par - Discharge Date	21-Oct-2017 \par Chief Complaint/Reason for Visit: \par Chief Complaint/Reason for Admission	Atrial flutter with RVR\par *******************\par  38 yo M with PMHx of testicular cancer s/p left orchiectomy on chemotherapy who present to the ED before receiving chemo for tachycardia. \par The patient states that he has been feeling well for the past 2 weeks since his 3rd dose of chemotherapy. He denies CP, ABARCA, peripheral edema, cough, \par palpitations. He does states that he had one near syncopal event during a bout of emesis 2 weeks ago which caused him to fall into a door. Has not had any \par other syncopal or presyncopal episodes since then. In the ED he was found to have tachycardia found to be a.flutter after receiving 10mg IV diltiazem x2, a CXR with well placed portacath and no infiltrations. On bedside US found to have EF of 10-15% while tachycardic. \par The patient was initially started on metoprolol tartrate with increasing dosages unable to control the arrhythmia he was subsequently switch to a diltiazem drip. The metiport was removed after discussion with his Oncologist and cardiology for possible arrhythmia induction which was removed 10/19. He also received a TTE on 10/19 which showed EF of 35-40% with LV remodeling. \par Afterwards he was switched to metoprolol succinate 200mg qday. \par He was started on heparin for preparation for TYLOR and cardioversion which was performed on 10/20 and found a RA appendage thrombus with PFO and reduced LV \par function. No cardioversion was performed as a result and he was switched to rivaroxaban on 10/21, lisinopril 2.5mg qday and metoprolol succinate 200mg \par qday. He will follow with cardiology in 2 weeks and EP in 4 weeks. Treatments/Procedures/Significant Findings/Patient Condition: \par Other Significant Findings: \par - Other Significant Findings	 \par < from: Transesophageal Echocardiogram w/o TTE (10.20.17 @ 12:08) > \par Conclusions: \par 1. Left atrial enlargement.  No left atrial or left atrial appendage thrombus. \par 2. Moderate to severe global left ventricular systolic dysfunction. \par 3. There is an echodensity (likely thrombus vs mass) in the right atrial wall that measures (approximately 2.7 cm x 2.0 cm). This is near the distal point of the SVC and may \par represent thrombus from prior indwelling catheter. \par 4. Right ventricular enlargement with decreased right ventricular systolic function. \par 5. Color Doppler demonstrates evidence of a patent foramen ovale. \par 6. Trivial pericardial effusion. \par ****************************************************************************\par TYLOR showed right atrial appendage thrombus, 27 mm, On Xarelto. Feels "exhausted". Does not generally note palpitations. Anxiety level is high. Occasional ABARCA, not at rest. No cough, no sputum, no wheezing. No nausea at this time, has regurgitation. Occ vomiting noted. Fatigue is prominent. Appetite is good. \par \par 7/9/18...Had RPLND on 6/6/18, with viable GCT present. Embryonal pathology. Says he have fatigue, still "recovering" from the RLPND. He has some soreness of the abdomen and says he has some delayed healing. He say Dr Piper on 7/6 in lieu of Dr Restrepo and was told that it was healing well. He is upset that there is residual cancer present, which he says "bummed him out completely". He says he has been reviewing issues related to continued treatment, only after I called him recently as he had not made an appointment. he says his appetite is poor and he is eating small amounts multiple times as he cannot eat full full meals. He says he has had some nausea and he started OTC Prevacid, which has helped. No significant weight loss. Some ABARCA if he walks about 3 blocks or so, he has fatigue with walking as well. he has some paresthesias of the left thigh after the surgery. He notes some spasms of the muscles in that area at times. Urine flow is normal, no nocturia. Libido down, not sexually active, no masturbation since surgery. \par \par 7/19/18...Feels the same, "still recovering". Still has some discomfort near the wound. Appetite s good. Gained 2 pounds. no cough. Mild ABARCA with activities. No palpitations. No leg edema. He continues with some fatigue, 6 on 0-10 scale. Feels he is not motivated when the fatigue is high. He feels his hands are weaker than before. Lifting is fine, but twisting a bottle to open may be  impaired. Ambulates with a cane  due to paresthesias of the left anterior thigh. This from knee  to thigh area,. No paresthesias of the feet. He sometimes has some pains in his feet at night which he says are not cramps. \par \par 9/6/18...Cycle 2, TIP, day # 9. Admitted on Sunday, August 19. \par *********************************************\par Hospitalized again post chemo, this time for 9 days. ANC was <0.1, platelets were low. had low sodium, low potassium and low magnesium. He has had lots of muscle aches and bone pain secondary to the Taxol. Feeling better, still feels weak. Some SOB with exertion. No pains. No headaches, has paresthesias of the bottom of the feet along with some toes. It improves after the chemo. Somewhat more this time. No fevers, no chills. Some nausea with office visits. Appetite is "moderate". Tinnitus has improved, minor dizziness at times. \par \par 8/30/18...Delayed treatment #3 by one week. "Not a good week". Complains of hearing being sensitive. He has tinnitus. This is intermittent, more in the past 2-3 days. He has some vertigo, with some issue walking. He feels as if he is falling to the right side at times, no falls. He has to hold onto the door or furniture to walk. He has some vomiting without nausea. He vomited a few minutes ago. Appetite is decreased, He has altered taste as well. Fatigue is present, not as bad as before. he says he is a "little" fatigued at the moment. Paresthesias of feet have been intermittent, less notable at this time. Spends a lot of time sitting, sleeping. He feels as if his body is exhausted. His mother says he has a lot of anxiety. He agrees. He has thoughts running through his mind about chemo and adverse effects. \par \par 9/20/18...he feels "okay" at times. Occasionally feels nauseous and can't eat, occ feels dizzy. has some regurgitation at times. Has fatigue. No better. Tinnitus is "not as bad", persistent in the right, intermittent in the left. When dizzy,  he feels he is leaning to one side. Spends a lot of time sitting or lying in bed. Paresthesias of the feet, left foot more so than right. Right toes affects the 4th and 5th toe. Fingertips are affected. He notes some darkening of skin folds of the hand joints. Feels no SOB unless he is active. using a cane today. Not taking any meds at this point. \par \par 10/9/18...Admission Date: after cycle 3 TIP\par -  Admission Date 01-Oct-2018 02:09. \par Discharge Date: \par - Discharge Date	06-Oct-2018 \par ***********************************\par HOSPITAL COURSE: The patient continued to have significant nausea while inpatient with intermittent vomiting. He received aggressive IV hydration with \par IVF while he was no longer able to tolerate po. On hospital day 4, patient was able to tolerate some po and requested discharge home. His course was c/b \par neutropenic fever to 101. Cultures were sent and remained negative. Patient was also started on cefepime. His ANC started to improve towards the end of the \par hospital course. His platelets, however, downtrended. He was given 1U platelets as per oncology recommendations. His midline was noted to have been in since \par July. After discussion with his outpatient oncologist, the patient opted to have his midline removed and will receive his last dose of chemotherapy via \par peripheral IV. During his hospital course, patient was noted to have an episode of tachycardia to the 140's followed by bradycardia to the 20's during \par orthostatic vitals. He was seen by Dr. Brown of cardiology who feels this is chemo-induced orthostasis and no further inpatient w/u is needed. He is \par medically stable for dc home. Prior to discharge received additional platelet transfusion and MID-Line was removed after discussion with oncology. \par *******************************************************************\par He was admitted and told that the midline had to be changed. He did have fever with neutropenia. Discharged on the 6th. Hypotensive with orthostasis,seen by cardiology, restarted lisinopril and carvedilol. He has fallen twice since discharge. He has LOC for a few seconds. he is somewhat confused after the episodes. Walking with a cane. he also had an similar episode while in the hospital. Appetite returned, has altered taste, no N/V now, but did have on admission. No source of fever found. Has paresthesias of the left foot, all toes, not his right.  Had aches and pains with the Taxol, which he describes as "brutal". \par \par 10/29/18...called last week, was having difficulty keeping food down and felt weak and dizzy. We arranged for fluids, but he called Friday ad declined. Taking Zofran ADT after meals at times. Able to drink more at this time. Did not eat this AM. Yesterday, had a bagel and egg, for lunch, potatoes, pork and beans and Spam. For dinner had rice and Spam, small amount. No vomiting yesterday, but had nausea. Dizziness if he leis his head back, or if he gets up to fast. Walking with  a cane. No falls. Started Has paresthesias of the feet, on the left side. Continues on oxycodone, about  1 per day, chronic use for low back pains. No leg edema. No cough, ABARCA. \par \par 11/13/18...has not followed with Dr Marquez and remains off of carvedilol, enoxaparin and lisinopril. He takes ondansetron on occasion. Says he had a "blackout" episode after getting up, fell, striking left side, no injury. No LOC. Neuropathy remains, mostly left leg, somewhat better at times, continues on therapy. Minimal on the right side. Considering returning to work. Fatigue is present, somewhat better. Appetite is still impaired, eats once a day. Gained a few pounds. Still has some taste alteration. No leg edema. No abdominal pains. Saw Phong Restrepo yesterday in follow up. He has constipation, and may go 4-5 days w/o bowel movement. \par \par 1/4/19...Had echo done 12/2018. "I'm okay", feeling better, saw Dr Marquez. Paresthesias are an issue. On gabapentin 300 TID, says it is not helping. He says his fingers are affected, works on computers, and hits wrong keys at times. Worse are his feet. Left foot is numb, right foot partially numb, annoying when walking, not sensing if he steps on something. He continues on oxycodone. Affect toes, senses that they are swollen, bottom of the feet as well as the top. Does not pass the ankle. In the hands, it is the 3rd and 4th finger distally on the right side, and 3-4-5 on the left. Echo said that LVEF is about 44-46 % with moderate global dysfunction. The LA appendage clot was not seen. Not as tired as before, walked 11 minutes yesterday with some fatigue. Occ mild nausea, no vomiting. Appetite is good, gained 4 kilos on the past month( points out that he is wearing heavy boots. . Still has some dysgeusia. Still avoids meats.  Occasional HA, lasts up to 1/2 day, occurs intermittently, once a week. More right side of the headaches. Prior sinus headaches, but not the same, has a sharp element to it. Stills feels dizzy at times when gets up too fast..\par \par 3/11/19...Urgent visit. Called this AM stating that the right testicle is swollen, present for a few days, mild tenderness, mild discomfort/pain. No back pains except for his chronic back issue. He notes some skin changes of his hands and his feet.e is some peeling skin at the base of the toes. as well as the center of the foot. He has neuropathy of both feet, more on the left than the right, where it only affects the toes. He says the medial 3 fingers are numb and he drops things. His heart exam included a stress test, which he reports the outcome was good. Still reports tinnitus. Appetite is good, weight is increased 6 kilos since 1/30/19. Still has fatigue. \par \par 5/11/19...Missed appt 4/5/19. I " have good days and bad days". He feels he puts on a lot of weight in  a short time, and then drops weight in a short period of time. He had edema of both feet and also feels he had edema of the hands as well. He says edema accumulates and then resolves. Neuropathy is "pretty bad", extends to the calves, worse at night. Ran out of gabapentin, was on 300 TID and did not feel it helped. He has discoloration of the left foot, edema both feet. Has cramps in the legs. Feels he cannot stand for a long time, and complains of blistering of the feet. He has shooting pains in the toes. There are fluid filled blisters that he he "has to pop", due to pain. He feels he s less active due to the blisters. He feels his toes are locked i position. Does exercises provided by PT. Appetite is good. He is thirsty many times. NO pains elsewhere except chronic back pain, for which he takes oxycodone. No cough, has some ABARCA, can walk 2 blocks prior to stopping. Sees DR Marquez later today. Fine motor issues with fingers with neuropathy, difficulty with typing on keyboard, with many errors. \par \par 7/2/19...Hospital Course: \par Discharge Date	12-Jun-2019 \par Admission Date	12-Jun-2019 00:37 \par Reason for Admission	vomiting, vertigo \par Medication Reconciliation Status	Admission Reconciliation is Completed \par Discharge Reconciliation is Completed \par Hospital Course	 \par 38M with PMHx of metastatic testicular CA, NICM, neuropathy, who presents with dizziness, nausea, and vomiting. \par Vertigo. \par - will c/w meclizine 25mg 4x/day \par - f/u blood and urine cx to r/o infectious etiology \par - PT eval, fall precautions. Constellation of symptoms with horizontal nystagmus and reproducible symptoms with daryn hallpike maneuver c/w BPPV. CT \par head negative. Pt reports improvement after meclizine. \par - will c/w meclizine 25mg 4x/day \par - f/u blood and urine cx to r/o infectious etiology \par - PT eval \par  Cardiomyopathy. 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 Most recent TTE on 6/4 showing EF55%. Pt concerned current sx may be side effect from entresto \par - c/w carvedilol 12.5mg BID \par - will hold entresto in setting of soft BP and pt hesitancy to restart due to side effects. In prior studies, dizziness is reported as an adverse event "at \par least 5%" of patients taking entresto. \par will hold all BP meds upond d/c 2/2 hypotension \par  Vomiting. \par - will c/w IVF overnight and repeat lactate in AM \par - zofran PRN nausea. \par  Mixed germ cell tumor. \par  now in remission. \par **********************************************\par Was hospitalized and now off carvedilol and Entresto. Doing better at this time. Only bothered by paresthesias of the feet and occasion syncope with changes in position. the latter has occurred when getting up from bed or with sitting to standing at times. Appetite is good, no weight loss. No cough, no ABARCA. No palpitations noted. Chronic back pains, no changes. No HA. Libido is down, erections are difficult to achieve, are not durable,  no ejaculation.\par \par 9/10/19...Feels "okay". he has some intermittent swelling noted in the hands and feet, occasionally in face, may last for several days. He has paresthesias of the fingertips the hands, not as bad as it was previously. It can be shock, like, noted more at night, more prominent in the feet. Gabapentin didn't help. Has his usual back pains, takes oxycodone 15 mg 2-4 times a day depending on pain. Appetite is good, No diarrheal stools, has constipation. Weight increased 3 kilos. No nausea, no vomiting.  BM about 3 times a week, sometimes hard. Advised to take Colace. Occ has a blistering rash on the bottom of the feet, Rx'd from dermatology.He still has blisters from time to time. The foot swells and the skin starts to crack. Fatigue unchanged. Tries to walk about but the pain in the feet causes discomfort, occasionally uses his cane to walk. Urine flow is impaired as before with some discomfort, not burning. Nocturia x 3-4. NO blood. occ incontinence- leaking of urine if he goes to stand at times. He noted blood in his stool 2 night sin a row. Reddish color, fresh appearing. \par \par 12/13/20...Continues to struggle with neuropathy, seen by neuro, had NCV, started nortriptyline for the neuropathy at bed time.  He feels it helps somewhat, dose increased to 50. Appetite is good. Weight is stable. Has impaired activities due to the neuropathy, does not leave the house much. Goes to PT and doctor appts. Has back pains form prior injury, on long term oxycodone, takes 15 mg 3-4 times a day. Anxiety level is low except at night, which he attributes to pain. He feels he is not depressed. No cough, mild ABARCA, attributed to weight increase. Occ headaches, right sided, once every few months. Fatigue is 8 on a 0-10 scale. Poor libido, not sexually active, no attempts\par \par 6/9/20...Seen today. Was on zonasamide by neurology, felt he had tremors and grater degree of anxiety. Insurance won't permit Lyrica, gabapentin created swelling. Taking oxycodone 15, up to 4 times a day, trying to cut down. Interested in med marijuana. he says he does not sleep well. Awake from 2 to 6 AM. Neuropathy keeps him awake, less noted during the day. Neuropathy affects the feet to the upper calves, shooting pains at night. Also has neuropathy of the hands as well. Says he has difficulty typing on the computer. Hand will shake when using the mouse. Occ drops a fork. Appetite is good, weight increased. Knee pains as well. No cough, some ABARCA remains, which he attributes to lack of exercise. Not seeing a mental health professional. I have suggested he see Dr Almanza in the past. He feels he has spent 5 years in "isolation". \par Neurologist notes reviewed they ordered an immunofixation which showed a slight IgG lambda band. No further evaluation was performed.\par No headaches recently, occ dizziness of he gets up fast. Tries to keep himself active, but says he cannot do much. He says his feet swell with activities. He walks with a cane.\par \par 9/8/20... Presents for follow up. On medical marijuana, which he says helps, uses mainly at night as a sleep aide. Chronic low back pains from prior injury, oxycodone 15, 3-4 times a day.  Pain always present, does not awaken him. He does have some sleep issues due to the neuropathy. Neuropathy extends to lower 1/3 of calves. No better, no worse. No other pains except some joint pains. Appetite is decreased, he feels is sec to the duloxetine. He sees Symone Shields. He has lost some weight. No cough, no ABARCA. Fatigue is present, stated to occur after 2 blocks of walking, fatigue in the legs. He says he is active during the day, walks about, less computer and video game use. Occ nausea, no vomiting. No diarrhea, no constipation. Urine flow is weaker, some dysuria/pain with voiding. No blood in urine. Has not any UTIs, since the surgery. No libido, no interest in masturbation, has not tried. No AM erections. T levels have been normal. \par \par 12/8/20...Off the oxycodone 15s, on medical marijuana, which he feels it is better controlled. He sleeps better. has some pain, but did not take anything this AM. Lower back is the sites as well as the discomfort in the extremities. The med marijuana helps the neuropathy as well. Eats more, gained weight. He is more energetic. He feels it is hard to exercise, as he is not as strong as before, tired after a walk down the block. Much more talkative today. he is concerned about blacking out. Also has some muscle spasms at times with activities. Does climb stairs in the house. No cough. ABARCA present. He feels his urine stream is restricted with some discomfort. "Like a lot of fluid trying to fit thru a small space". He is going to call Dr Oconnor about these issues. uses a shower chair as he is worried about "blacking out in the shower" if he raise his hands above his head. \par \par 3/9/21 - Verbal permission for telehealth services was granted by the patient, Cleve Calderón, on March 9, 2021 at 10:12 AM. \par Mixed germ cell tumor Diagnosed in 2017, underwent radical orchiectomy on the left side in July 20, 2017.  His stage was 2A, good risk.  He received cisplatin and etoposide starting on August 14 of 2017.  Cycle 4 was delayed and then omitted as he presented with supraventricular tachycardia and a low ejection fraction.  This was felt to be secondary to the indwelling central venous catheter, which was located in the appropriate spot.  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  He underwent an RPLND which was delayed, and he had residual viable embryonal carcinoma.  He was started on TIP in July 2018, received 3 cycles. \par \par Overall, feels the same. Neuropathy is the main issues, feels he cannot rest at all. The medical marijuana helps him rest. Takes it at night and throughout the day when he feels he needs it. Onset of action if from 7 to 20 minutes. Appetite is fine, no weight loss of note. No cough, some ABARCA, after one block, he is SOB and exhausted. Occ headaches noted, brief duration. Some balance issues, no falls. He still feels he has some dexterity issues in the hands. He has some discomfort when he voids, a pressure like sensation as if the has some flow issues, since the surgery, no change. No dysuria. Has not had a CV vaccine. Libido is poor, no attempt, no erections. 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 Had to receive additional chemo with TIP. He is active and exercising, frustrated with the disability process. He is able to do more at this time. Has pain from the neuropathy with walking uphill. He notes some swelling of the left leg and right hand area, and sometimes the swelling in the hand makes it difficult to close his fist. Appetite is "poor" lately , trying to lose weight. He feels food is not as appealing as it once was, antedates this to the chemotherapy. Has had some nausea in recent times. He feels red meat is the issue. This has occurred for "some time". He says his weight was 289, now 260, over the course of 2.5 weeks. He also notes some bloating and gas. Some cramp like pains at times. had his first CV vaccine (Moderna). Breathing is fine, notes some chest pressure the last few nights. Some minor headaches in recent times, middle of the head, takes Advil or Tylenol, which does not seem to help much. These occur about 2 times a week, may last few minutes. Urinary difficulty, feels the muscle is not fully "retracting". Feels the flow is not what it should be and has a sensation of incomplete voiding. NO blood, occ incontinence-occurred x 2. Feels his right arm has increased numbness and it may be a bit weaker. Was doing exercises that PT had previously recommended. Feels he has issues when typing. No F/C/S. Notes fatigue, exercises in the backyard at times. If he stands more than 10 minutes, he feels exhausted.  Libido is poor. No interest, not sexually active, no masturbation. No full AM erections. 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 Cycle 4 delayed..then omitted due to tachycardia and low EF. RPLND delayed, residual viable embryonal cancer, TIP started July 29, 2018, cycle 3 delayed for grade 3 fatigue, then again one more week for transaminitis. Fourth planned cycle omitted [de-identified] : 10/12/21...Dr Shields notes reviewed. last chemo was 3 years ago. Doing well. Overall, he feels occasional nausea at times. He is on duloxetine and medical marijuana. Still has complaints about his neuropathy with pain and discomfort, not as well controlled as it was last few visits, when he said the med marijuana produced much benefit. He said it calms him, but he still does not sleep well. He feels the duloxetine helps with the tingling sensation. Bottom of the feet feels as if he has "bubble wrap" on the bottom of his feet. Occ falls in the shower, some issue with stairs. Appetite is good, but says he lost some weight. Not seen in office since December 2020. Says he weight 257 at home, was 283 last visit. Occ nausea, no recent vomiting. Occ headaches. Tries to walk about during the day, and trying to be more active. Anxiety and depression are the same. Denies thoughts of self harm. Says he is frustrated at times.

## 2021-10-12 NOTE — REVIEW OF SYSTEMS
[Fatigue] : fatigue [Recent Change In Weight] : ~T recent weight change [Lower Ext Edema] : lower extremity edema [Joint Pain] : joint pain [Difficulty Walking] : difficulty walking [Anxiety] : anxiety [Depression] : depression [Negative] : ENT [Fever] : no fever [Chills] : no chills [Chest Pain] : no chest pain [Palpitations] : no palpitations [Cough] : no cough [SOB on Exertion] : no shortness of breath during exertion [Abdominal Pain] : no abdominal pain [Vomiting] : no vomiting [Constipation] : no constipation [Diarrhea] : no diarrhea [Dysuria] : no dysuria [Incontinence] : no incontinence [Muscle Weakness] : no muscle weakness [Skin Rash] : no skin rash [Dizziness] : no dizziness [Easy Bleeding] : no tendency for easy bleeding [Easy Bruising] : no tendency for easy bruising [FreeTextEntry7] : occ nausea [FreeTextEntry9] : knees, and hands, no cramps, but has muscle spasms [de-identified] : occ HA, neuropathy

## 2021-10-12 NOTE — ASSESSMENT
[Palliative Care Plan] : not applicable at this time [FreeTextEntry1] : Cleve is contacted via telephone today.  He says that his Internet services are spotty.  He states he feels the same as before.  He has some occasional nausea at times.  He is on duloxetine and medical marijuana.  He still has complaints in regards to his neuropathy, which is unchanged.  He has pain and discomfort and he feels this not as well-controlled as it was over the last few visits.  Initially, he said the medical marijuana made a significant difference for him.  When he walks, he feels like he has bubble wrap on the bottom of his feet.  He says the medical marijuana produces some benefit in terms of calming him, but he says he does not sleep well.  He feels the duloxetine helps with the tingling sensation.  He has fallen in the shower on occasion and has some issue with stairs.  His appetite is good, but he says he is lost some weight recently.  I have not seen him in the office since December 2020.  There is no recent vomiting.  He has occasional headaches.  He tries to walk about during the day and is trying to be somewhat more active.  His anxiety and depression remain the same.  He has no thoughts expressed of self-harm.  He says that he is frustrated with his situation at times.\par \par He has some pains in the knees and hands.  He has some muscle spasms in the back but he denies that they are cramps.\par \par We went over his blood work from July.  He will have additional blood work done at this time.  His last imaging studies were performed in July as well, and we discussed those.  I will see him once again in 3 months time.\par \par All questions were answered to the best of my ability and to his apparent satisfaction.  It is now 3 years since the conclusion of his second line chemotherapy.

## 2021-10-12 NOTE — REASON FOR VISIT
[Follow-Up Visit] : a follow-up [Parent] : parent [Home] : at home, [unfilled] , at the time of the visit. [Medical Office: ([facility])___] : at the medical office located in  [Verbal consent obtained from patient] : the patient, [unfilled] [FreeTextEntry2] : mixed germ cell tumor

## 2021-10-26 ENCOUNTER — APPOINTMENT (OUTPATIENT)
Dept: HEMATOLOGY ONCOLOGY | Facility: CLINIC | Age: 41
End: 2021-10-26

## 2021-11-20 NOTE — ED ADULT NURSE NOTE - CINV DISCH TEACH PARTICIP
[employer] Hospitalist Note     Admit Date:  2021  3:47 PM   Name:  Brittani Robert   Age:  77 y.o.  :  1945   MRN:  [id number]   PCP:  Arsen Cowart MD  Treatment Team: Attending Provider: Monico Gonzales DO; Consulting Provider: Jordan Oviedo MD; Care Manager: Paddy Sandoval [facility] [place]; Consulting Provider: Kris Peralta MD; Utilization Review: Richy Francis    HPI/Subjective:   Brittani Robert is a 77 y.o. female with medical history of osteoporosis with multiple vertebral fracture, mood disorder with anxiety and depression, hiatal hernia with gastroparesis who presented with acute abdominal pain and syncope. She was found to be in septic shock and was initially placed on pressors. A central line was placed. After resuscitation her shock stabilized. CT demonstrated acute left-sided colitis with indeterminate cause. UA suggestive of UTI. Blood culture growing Streptococcus species. Patient started on empiric antibiotics. Surgery consulted and recommend no surgical intervention at this time. GI consulted and patient scheduled for EGD today. : Lying comfortably in bed. Son at bedside. Lower abdominal pain improved. No blood in stool today. Denies chest pain, palpitation, nausea, vomiting. Denies shortness of breath. Denies F/C. Denies N/V. Assessment and Plan:     Severe sepsis with septic shock: Resolved  Initially requiring pressor, stabilized after fluid resuscitation  Now off pressors  CT concerning for diverticulitis versus ischemic bowel - stool culture NGTD + C. Diff neg  UA concerning for UTI - culture NGTD  Continue Zosyn   - Consult ID for assistance since all cultures negative except first blood culture    Acute left-sided colitis:  Stool occult positive  Surgery consulted and recommend no surgical intervention at this time.   Recommend GI consult for melena  Continue Zosyn  GI suspected ischemic colitis since was hypotensive on admission  Stool culture pending  C.  Diff negative    UTI:  UA suggestive of UTI  Urine culture NGTD  Continue Zosyn  11/20 Consult ID for assistance    Streptococcus bacteremia:  Blood culture growing gram-positive cocci, ID PCR showed Streptococcus species(not agalactiae, pyogenes no pneumonia)   Vancomycin discontinued  Continue Zosyn  Repeat blood culture NGTD  Consult ID for assistance    Hypokalemia:  Down to 2.7  Replace with 120meq PO today  Continue to monitor    Melena:  11/19 EGD with no signs of bleeding  EGD on 11/18 unsuccessful due to retained food in stomach  Continue Protonix    Acute blood loss anemia:  11/20 Hgb down to 13.8  Likely due to GI bleed  Status post 1 unit PRBC transfusion in the ED  Continue to monitor  Transfuse if hemoglobin less than 7    Compression fracture of T9 vertebra:  Norco as needed  PT/OT    Mood disorder:  History of anxiety and depression, previously treated with benzodiazepine concomitantly treating pain with opioids  Continue venlafaxine  Use caution with benzodiazepine    GERD:  Continue PPI Carafate    Severe persistent asthma:  Continue albuterol and Trelegy    Hypertension:  Continue Verampamil    Discharge planning: PT/OT - Discharge in 2-3 days    DVT ppx ordered  Code status:  Full  Estimated LOS:  Greater than 2 midnights  Risk:  high    Hospital Problems as of 11/20/2021 Date Reviewed: 11/17/2021          Codes Class Noted - Resolved POA    Acute blood loss anemia ICD-10-CM: D62  ICD-9-CM: 285.1  11/17/2021 - Present Yes        Elevated troponin ICD-10-CM: R77.8  ICD-9-CM: 790.6  11/20/2021 - Present Unknown        Severe protein-calorie malnutrition ([facility] ICD-10-CM: E43  ICD-9-CM: 262  11/18/2021 - Present Yes        UTI (urinary tract infection) ICD-10-CM: N39.0  ICD-9-CM: 599.0  11/18/2021 - Present Unknown        Streptococcal bacteremia ICD-10-CM: R78.81, B95.5  ICD-9-CM: 790.7, 041.00  11/18/2021 - Present Unknown        * (Principal) Severe sepsis with septic shock ([facility] ICD-10-CM: A41.9, R65.21  ICD-9-CM: 038.9, 995.92, 785.52  11/17/2021 - Present Yes        Body mass index (BMI) of 21.0 to 21.9 in adult ICD-10-CM: Z68.21  ICD-9-CM: V85.1  11/17/2021 - Present Yes        Colitis, indeterminate ICD-10-CM: K52.3  ICD-9-CM: 558.9  11/17/2021 - Present Unknown        Compression fracture of T9 vertebra (HCC) (Chronic) ICD-10-CM: X21.169Z  ICD-9-CM: 805.2  10/18/2021 - Present Yes        Severe persistent asthma without complication ROL-14-BK: M46.22  ICD-9-CM: 493.90  5/28/2021 - Present Yes        Osteoporosis ICD-10-CM: M81.0  ICD-9-CM: 733.00  10/25/2016 - Present Yes        Primary hypertension (Chronic) ICD-10-CM: I10  ICD-9-CM: 401.9  8/16/2016 - Present Yes    Overview Signed 11/17/2021  9:48 PM by Samanta Schaeffer MD     Goal < 140/80             Hiatal hernia with GERD and esophagitis ICD-10-CM: K44.9, K21.00  ICD-9-CM: 553.3, 530.11  8/16/2016 - Present Yes        Gastroesophageal reflux disease with esophagitis (Chronic) ICD-10-CM: K21.00  ICD-9-CM: 530.11  11/1/2015 - Present Yes        Mood disorder ([facility] ICD-10-CM: F39  ICD-9-CM: 296.90  11/1/2015 - Present Yes                10 systems reviewed and negative except as noted in HPI.   Past Medical History:   Diagnosis Date    Anemia 2005 approx    2 units transfused    Arthritis     hands, hips    B12 deficiency     Cancer ([facility]     hx of melanoma 1986 R arm, and carcinoma x 2 forehead    COVID-19 vaccine series completed 02/2021    PT TO BRING CARD DOS    Depression with anxiety 8/16/2016    Elevated troponin 11/20/2021    Essential hypertension with goal blood pressure less than 140/90     controlled with med    Gastrointestinal disorder     hiatel hernia    Hyperlipidemia, unspecified 8/16/2016    Menopause     @ 51    Mild intermittent asthma 8/16/2016    Osteoarthritis of multiple joints 8/16/2016    Osteoporosis 10/25/2016    [facility]se[facility] ([facility] 08/16/2016    daily inhalers  Rectal bleed     Vertigo       Past Surgical History:   Procedure Laterality Date    COLONOSCOPY N/A 2021    COLONOSCOPY/BMI 23 performed by Lacy Mccracken MD at [address]; HI RISK IND  2021         HX HERNIA REPAIR  2010    helped reflex    HX TUBAL LIGATION  1972      Allergies   Allergen Reactions    Other Medication Rash     Equate antibiotic ointment      Social History     Tobacco Use    Smoking status: Never Smoker    Smokeless tobacco: Never Used    Tobacco comment: 2nd hand smoke x 33 yrs   Substance Use Topics    Alcohol use: No      Family History   Problem Relation Age of Onset    Stroke Mother         intracerebral aneurysm    Diabetes Maternal Aunt     Diabetes Maternal Uncle     No Known Problems Father         didn't have contact with father    Breast Cancer Daughter 28    Hypertension Maternal Grandmother       Family history reviewed and noncontributory. Immunization History   Administered Date(s) Administered    COVID-19, Moderna, Primary or Immunocompromised Series, MRNA, PF, 100mcg/0.5mL 2021, 2021    Influenza High Dose Vaccine PF 2016, 01/15/2018, 2018, 2020    Influenza Vaccine 2016, 2018    Influenza Vaccine (Tri) Adjuvanted (>65 Yrs FLUAD TRI 35218) 2019    Pneumococcal Conjugate (PCV-13) 2016    Pneumococcal Polysaccharide (PPSV-23) 2018    Pneumococcal Vaccine (Unspecified Type) 2012    TB Skin Test (PPD) Intradermal 2021    Tdap 2021     PTA Medications:  Prior to Admission Medications   Prescriptions Last Dose Informant Patient Reported? Taking? HYDROcodone-acetaminophen (NORCO) 5-325 mg per tablet 2021 at Unknown time  Yes Yes   Si Tablet every six (6) hours as needed.    LORazepam (ATIVAN) 1 mg tablet Not Taking at Unknown time  No No   Sig: Take 1/2 to 1 tab PO Q 12 hours PRN anxiety, use sparingly   Patient not taking: Reported on 11/18/2021   albuterol (Ventolin HFA) 90 mcg/actuation inhaler   No No   Sig: Take 2 Puffs by inhalation four (4) times daily. Patient taking differently: Take 2 Puffs by inhalation every six (6) hours as needed. calcium-cholecalciferol, d3, (CALCIUM 600 + D) 600-125 mg-unit tab Not Taking at Unknown time  Yes No   Sig: Take 1 Tab by mouth daily. Patient not taking: Reported on 11/18/2021   cetirizine (ZYRTEC) 10 mg tablet Not Taking at Unknown time  No No   Sig: Take 1 Tab by mouth daily. Patient not taking: Reported on 11/18/2021   fluticasone-umeclidin-vilanter (Trelegy Ellipta) 200-62.5-25 mcg dsdv 11/16/2021 at Unknown time  No Yes   Sig: Take 1 Puff by inhalation daily. hydroCHLOROthiazide (HYDRODIURIL) 12.5 mg tablet 11/16/2021 at Unknown time  No Yes   Sig: Take 1 Tablet by mouth daily. omeprazole (PRILOSEC) 40 mg capsule 11/16/2021 at Unknown time  No Yes   Sig: Take 1 Cap by mouth two (2) times a day. ondansetron (ZOFRAN ODT) 4 mg disintegrating tablet   Yes No   Sig: Take 4 mg by mouth.   potassium chloride (KLOR-CON) 10 mEq tablet   No No   Sig: Take 1 Tablet by mouth daily. pravastatin (PRAVACHOL) 20 mg tablet 11/16/2021 at Unknown time  No Yes   Sig: Take 1 Tablet by mouth daily. Patient taking differently: Take 20 mg by mouth nightly. venlafaxine-SR (Effexor XR) 37.5 mg capsule 11/16/2021 at Unknown time  No Yes   Sig: Take 1 Capsule by mouth nightly. venlafaxine-SR (Effexor XR) 75 mg capsule 11/16/2021 at Unknown time  No Yes   Sig: Take 1 Capsule by mouth two (2) times a day.    verapamil ER (CALAN-SR) 240 mg CR tablet 11/16/2021 at Unknown time  No Yes   Sig: TAKE 1 TABLET BY MOUTH EVERY MORNING      Facility-Administered Medications: None       Objective:     Patient Vitals for the past 24 hrs:   Temp Pulse Resp BP SpO2   11/20/21 1118 97.7 °F (36.5 °C) 79 18 (!) 113/53 96 %   11/20/21 0809 97.6 °F (36.4 °C) 77 16 108/69 94 %   11/20/21 0759 -- -- -- -- 94 %   11/20/21 0514 100.3 °F (37.9 °C) 92 14 -- 94 %   11/20/21 0045 98.2 °F (36.8 °C) (!) 105 16 (!) 154/82 96 %   11/19/21 2135 -- -- -- -- 96 %   11/19/21 2047 97.6 °F (36.4 °C) 89 16 (!) 148/75 95 %   11/19/21 1911 -- 93 -- (!) 171/81 --   11/19/21 1619 97.1 °F (36.2 °C) 99 16 (!) 159/75 94 %   11/19/21 1515 -- (!) 102 14 (!) 148/73 95 %   11/19/21 1508 -- 97 14 132/71 95 %   11/19/21 1458 -- 100 14 128/67 97 %   11/19/21 1448 -- (!) 106 14 (!) 140/70 97 %   11/19/21 1446 96.8 °F (36 °C) (!) 101 14 (!) 140/70 97 %   11/19/21 1426 -- (!) 105 18 (!) 143/74 93 %   11/19/21 1322 97.8 °F (36.6 °C) 100 18 128/74 94 %     Oxygen Therapy  O2 Sat (%): 96 % (11/20/21 1118)  Pulse via Oximetry: 82 beats per minute (11/20/21 0759)  O2 Device: Nasal cannula (11/20/21 0759)  O2 Flow Rate (L/min): 2 l/min (11/20/21 0759)    Estimated body mass index is 20.69 kg/m² as calculated from the following:    Height as of this encounter: 5' 2\" (1.575 m). Weight as of this encounter: 51.3 kg (113 lb 1.6 oz). Intake/Output Summary (Last 24 hours) at 11/20/2021 1304  Last data filed at 11/20/2021 0405  Gross per 24 hour   Intake 560 ml   Output 800 ml   Net -240 ml       *Note that automatically entered I/Os may not be accurate; dependent on patient compliance with collection and accurate  by assistants. Physical Exam:  General:    Alert. Appears stable. Eyes:   Normal sclerae. Extraocular movements intact. HENT:  Normocephalic, atraumatic. Moist mucous membranes  CV:   RRR. No m/r/g. Lungs:  CTAB. No wheezing, rhonchi, or rales. Abdomen: Soft, mild left lower quadrant tenderness, nondistended. Extremities: Warm and dry. No cyanosis or edema. Neurologic: CN II-XII grossly intact. Sensation intact. Skin:     No rashes or jaundice. Normal coloration  Psych:  Normal mood and affect. I reviewed the labs, imaging, EKGs, telemetry, and other studies done this admission.   Data Reviewed:   Recent Results (from the past 25 hour(s))   LACTIC ACID    Collection Time: 11/19/21  7:28 PM   Result Value Ref Range    Lactic acid 0.9 0.4 - 2.0 MMOL/L   CBC WITH AUTOMATED DIFF    Collection Time: 11/20/21  6:29 AM   Result Value Ref Range    WBC 15.2 (H) 4.3 - 11.1 K/uL    RBC 4.87 4.05 - 5.2 M/uL    HGB 13.8 11.7 - 15.4 g/dL    HCT 41.7 35.8 - 46.3 %    MCV 85.6 79.6 - 97.8 FL    MCH 28.3 26.1 - 32.9 PG    MCHC 33.1 31.4 - 35.0 g/dL    RDW 17.3 (H) 11.9 - 14.6 %    PLATELET 341 (L) 869 - 450 K/uL    MPV 10.3 9.4 - 12.3 FL    ABSOLUTE NRBC 0.00 0.0 - 0.2 K/uL    DF AUTOMATED      NEUTROPHILS 84 (H) 43 - 78 %    LYMPHOCYTES 8 (L) 13 - 44 %    MONOCYTES 7 4.0 - 12.0 %    EOSINOPHILS 0 (L) 0.5 - 7.8 %    BASOPHILS 0 0.0 - 2.0 %    IMMATURE GRANULOCYTES 1 0.0 - 5.0 %    ABS. NEUTROPHILS 12.7 (H) 1.7 - 8.2 K/UL    ABS. LYMPHOCYTES 1.2 0.5 - 4.6 K/UL    ABS. MONOCYTES 1.1 0.1 - 1.3 K/UL    ABS. EOSINOPHILS 0.0 0.0 - 0.8 K/UL    ABS. BASOPHILS 0.0 0.0 - 0.2 K/UL    ABS. IMM.  GRANS. 0.2 0.0 - 0.5 K/UL   METABOLIC PANEL, BASIC    Collection Time: 11/20/21  6:29 AM   Result Value Ref Range    Sodium 140 136 - 145 mmol/L    Potassium 2.7 (L) 3.5 - 5.1 mmol/L    Chloride 103 98 - 107 mmol/L    CO2 30 21 - 32 mmol/L    Anion gap 7 7 - 16 mmol/L    Glucose 101 (H) 65 - 100 mg/dL    BUN 10 8 - 23 MG/DL    Creatinine 0.51 (L) 0.6 - 1.0 MG/DL    GFR est AA >60 >60 ml/min/1.73m2    GFR est non-AA >60 >60 ml/min/1.73m2    Calcium 8.3 8.3 - 10.4 MG/DL       All Micro Results     Procedure Component Value Units Date/Time    CULTURE, URINE [[id number]] Collected: 11/17/21 1720    Order Status: Completed Specimen: Urine from Clean catch Updated: 11/20/21 0841     Special Requests: NO SPECIAL REQUESTS        Culture result: NO GROWTH 2 DAYS       BLOOD CULTURE [[id number]]  (Abnormal) Collected: 11/17/21 1651    Order Status: Completed Specimen: Blood Updated: 11/20/21 0767     Special Requests: --        NO SPECIAL REQUESTS  LEFT  FOREARM       GRAM STAIN GRAM POS COCCI IN CHAINS               AEROBIC AND ANAEROBIC BOTTLES                  RESULTS VERIFIED, PHONED TO AND READ BACK BY ELISEO CHO AT 1214 ON 11/18/2021, SH/AMM                  GRAM POS COCCI IN CHAINS RESULTS VERIFIED, PHONED TO AND READ BACK BY Onesimo Hoover RN @0522 ON [id number] [place]           Culture result:       ALPHA STREPTOCOCCUS, NOT S. PNEUMONIAE This organism may be indicative of culture contamination. Clinical correlation needs to be evaluated as each case is unique. Refer to Blood Culture ID Panel Accession  [id number]      CULTURE, BLOOD [[id number]] Collected: 11/19/21 0540    Order Status: Completed Specimen: Blood Updated: 11/20/21 0650     Special Requests: CENTRAL LINE        Culture result: NO GROWTH 1 DAY       C. DIFFICILE AG & TOXIN A/B [[id number]] Collected: 11/18/21 1154    Order Status: Completed Specimen: Stool Updated: 11/19/21 1401     [address] ANTIGEN       C. DIFFICILE GDH ANTIGEN-NEGATIVE           C. difficile toxin       C. DIFFICILE TOXIN-NEGATIVE           PCR Reflex NOT APPLICABLE        INTERPRETATION       NEGATIVE FOR TOXIGENIC C. DIFFICILE           Clinical Consideration       NEGATIVE FOR TOXIGENIC C. DIFFICILE          CULTURE, STOOL [[id number]] Collected: 11/18/21 1154    Order Status: Completed Specimen: Stool Updated: 11/19/21 1108     Special Requests: NO SPECIAL REQUESTS        Culture result: ORGANISM IN STUDY               CULTURE IN PROGRESS,FURTHER UPDATES TO FOLLOW          BLOOD CULTURE ID PANEL [[id number]] Collected: 11/17/21 1607    Order Status: Completed Specimen: Blood Updated: 11/19/21 0707     Acc. no. from Micro Order [id number]     INTERPRETATION       Gram positive cocci. Identified by realtime PCR as Streptococcus species (not agalactiae, pyogenes, or pneumoniae).            Comment: Consider discontinuation of IV vancomycin and using an anti-streptococcal beta-lactam (ceftriaxone/cefotaxime (peds))        Blood Culture PCR Testing MULTIPLEX PCR NEGATIVE:  A negative FilmArray BCID result does not exclude the possibility of bloodstream infection. BLOOD CULTURE [[id number]] Collected: 11/17/21 1607    Order Status: Completed Specimen: Blood Updated: 11/19/21 0301     Special Requests: --        RIGHT  FOREARM       GRAM STAIN GRAM NEGATIVE RODS         ANAEROBIC BOTTLE POSITIVE               RESULTS VERIFIED, PHONED TO AND READ BACK BY Freda Orellana RN @0259 [phone] BY RAJESH           Culture result:       CULTURE IN [address] UPDATES TO FOLLOW                  Refer to Blood Culture ID Panel Accession  [id number]      OVA & PARASITES, STOOL [[id number]] Collected: 11/18/21 1154    Order Status: Completed Specimen: Feces from Stool Updated: 11/18/21 1257    BLOOD CULTURE ID PANEL [[id number]]  (Abnormal) Collected: 11/17/21 1651    Order Status: Completed Specimen: Blood Updated: 11/18/21 1219     Streptococcus Detected        Comment: RESULTS VERIFIED, PHONED TO AND READ BACK BY  ELISEO CHO @ 6768 11/18/2021 SH/AMM           INTERPRETATION       Gram positive cocci. Identified by realtime PCR as Streptococcus species (not agalactiae, pyogenes, or pneumoniae). Comment: Consider discontinuation of IV vancomycin and using an anti-streptococcal beta-lactam (ceftriaxone/cefotaxime (peds))       COVID-19 RAPID TEST [[id number]] Collected: 11/18/21 1154    Order Status: Completed Specimen: Nasopharyngeal Updated: 11/18/21 1216     Specimen source Nasopharyngeal        COVID-19 rapid test Not detected        Comment:      The specimen is NEGATIVE for SARS-CoV-2, the novel coronavirus associated with COVID-19. A negative result does not rule out COVID-19. This test has been authorized by the FDA under an Emergency Use Authorization (EUA) for use by authorized laboratories.         Fact sheet for Healthcare Providers: ConventionUpdate.co.nz  Fact sheet for Patients: ConventionUpdate.co.nz Methodology: Isothermal Nucleic Acid Amplification               Current Facility-Administered Medications   Medication Dose Route Frequency    potassium chloride (K-DUR, KLOR-CON M20) SR tablet 40 mEq  40 mEq Oral TID    morphine injection 0.5 mg  0.5 mg IntraVENous Q4H PRN    atorvastatin (LIPITOR) tablet 40 mg  40 mg Oral QHS    budesonide-formoteroL (SYMBICORT) 160-4.5 mcg/actuation HFA inhaler 2 Puff  2 Puff Inhalation BID RT    And    tiotropium bromide (SPIRIVA RESPIMAT) 2.5 mcg /actuation  2 Puff Inhalation DAILY    venlafaxine-SR (EFFEXOR-XR) capsule 37.5 mg  37.5 mg Oral QHS    nitroglycerin (NITROBID) 2 % ointment 1 Inch  1 Inch Topical BID    sodium chloride (NS) flush 5-10 mL  5-10 mL IntraVENous Q8H    sodium chloride (NS) flush 5-10 mL  5-10 mL IntraVENous PRN    0.9% sodium chloride infusion 250 mL  250 mL IntraVENous PRN    albuterol (PROVENTIL HFA, VENTOLIN HFA, PROAIR HFA) inhaler 2 Puff  2 Puff Inhalation Q6H PRN    calcium-vitamin D (OS-ZACHARIAH +D3) 250 mg-125 unit per tablet 1 Tablet  1 Tablet Oral DAILY    hydroCHLOROthiazide (HYDRODIURIL) tablet 12.5 mg  12.5 mg Oral DAILY    HYDROcodone-acetaminophen (NORCO) 5-325 mg per tablet 1 Tablet  1 Tablet Oral Q6H PRN    pantoprazole (PROTONIX) 40 mg in 0.9% sodium chloride 10 mL injection  40 mg IntraVENous Q12H    venlafaxine-SR (EFFEXOR-XR) capsule 75 mg  75 mg Oral BID    verapamil ER (CALAN-SR) tablet 240 mg  240 mg Oral DAILY WITH BREAKFAST    sodium chloride (NS) flush 5-40 mL  5-40 mL IntraVENous Q8H    sodium chloride (NS) flush 5-40 mL  5-40 mL IntraVENous PRN    acetaminophen (TYLENOL) tablet 650 mg  650 mg Oral Q6H PRN    Or    acetaminophen (TYLENOL) suppository 650 mg  650 mg Rectal Q6H PRN    polyethylene glycol (MIRALAX) packet 17 g  17 g Oral DAILY PRN    ondansetron (ZOFRAN ODT) tablet 4 mg  4 mg Oral Q8H PRN    Or    ondansetron (ZOFRAN) injection 4 mg  4 mg IntraVENous Q6H PRN    piperacillin-tazobactam (ZOSYN) 3.375 g in 0.9% sodium chloride (MBP/ADV) 100 mL MBP  3.375 g IntraVENous Q8H    melatonin tablet 3 mg  3 mg Oral QHS    OLANZapine (ZyPREXA zydis) disintegrating tablet 5 mg  5 mg Oral QHS PRN    sucralfate (CARAFATE) tablet 1 g  1 g Oral AC&HS       Other Studies:  No results found for this visit on 11/17/21. No results found. Part of this note was written by using a voice dictation software and the note has been proof read but may still contain some grammatical/other typographical errors.     Signed:  Man Brothers DO Patient

## 2021-12-01 PROCEDURE — G9005: CPT

## 2021-12-06 ENCOUNTER — APPOINTMENT (OUTPATIENT)
Dept: INTERNAL MEDICINE | Facility: CLINIC | Age: 41
End: 2021-12-06
Payer: MEDICAID

## 2021-12-06 ENCOUNTER — LABORATORY RESULT (OUTPATIENT)
Age: 41
End: 2021-12-06

## 2021-12-06 VITALS
RESPIRATION RATE: 16 BRPM | TEMPERATURE: 98.7 F | BODY MASS INDEX: 34.14 KG/M2 | WEIGHT: 266 LBS | SYSTOLIC BLOOD PRESSURE: 105 MMHG | HEART RATE: 99 BPM | HEIGHT: 74 IN | OXYGEN SATURATION: 97 % | DIASTOLIC BLOOD PRESSURE: 71 MMHG

## 2021-12-06 DIAGNOSIS — Z23 ENCOUNTER FOR IMMUNIZATION: ICD-10-CM

## 2021-12-06 DIAGNOSIS — Z00.00 ENCOUNTER FOR GENERAL ADULT MEDICAL EXAMINATION W/OUT ABNORMAL FINDINGS: ICD-10-CM

## 2021-12-06 PROCEDURE — 90686 IIV4 VACC NO PRSV 0.5 ML IM: CPT

## 2021-12-06 PROCEDURE — 99396 PREV VISIT EST AGE 40-64: CPT | Mod: 25

## 2021-12-06 PROCEDURE — G0008: CPT

## 2021-12-07 LAB
25(OH)D3 SERPL-MCNC: 19.1 NG/ML
ALBUMIN SERPL ELPH-MCNC: 4.7 G/DL
ALP BLD-CCNC: 55 U/L
ALT SERPL-CCNC: 56 U/L
ANION GAP SERPL CALC-SCNC: 13 MMOL/L
APPEARANCE: CLEAR
APTT BLD: 37.3 SEC
AST SERPL-CCNC: 35 U/L
BASOPHILS # BLD AUTO: 0.02 K/UL
BASOPHILS NFR BLD AUTO: 0.2 %
BILIRUB SERPL-MCNC: 0.4 MG/DL
BILIRUBIN URINE: NEGATIVE
BLOOD URINE: NEGATIVE
BUN SERPL-MCNC: 20 MG/DL
CALCIUM SERPL-MCNC: 9.3 MG/DL
CHLORIDE SERPL-SCNC: 102 MMOL/L
CHOLEST SERPL-MCNC: 119 MG/DL
CO2 SERPL-SCNC: 24 MMOL/L
COLOR: YELLOW
CREAT SERPL-MCNC: 1.02 MG/DL
CREAT SPEC-SCNC: 275 MG/DL
EOSINOPHIL # BLD AUTO: 0.06 K/UL
EOSINOPHIL NFR BLD AUTO: 0.7 %
ERYTHROCYTE [SEDIMENTATION RATE] IN BLOOD BY WESTERGREN METHOD: 59 MM/HR
ESTIMATED AVERAGE GLUCOSE: 108 MG/DL
FOLATE SERPL-MCNC: 12.9 NG/ML
GGT SERPL-CCNC: 75 U/L
GLUCOSE QUALITATIVE U: NEGATIVE
GLUCOSE SERPL-MCNC: 98 MG/DL
HBA1C MFR BLD HPLC: 5.4 %
HCT VFR BLD CALC: 47.3 %
HDLC SERPL-MCNC: 36 MG/DL
HGB BLD-MCNC: 15.3 G/DL
IMM GRANULOCYTES NFR BLD AUTO: 0.5 %
INR PPP: 1.02 RATIO
IRON SATN MFR SERPL: 21 %
IRON SERPL-MCNC: 66 UG/DL
KETONES URINE: NEGATIVE
LDLC SERPL CALC-MCNC: 64 MG/DL
LEUKOCYTE ESTERASE URINE: ABNORMAL
LYMPHOCYTES # BLD AUTO: 2.92 K/UL
LYMPHOCYTES NFR BLD AUTO: 35.5 %
MAN DIFF?: NORMAL
MCHC RBC-ENTMCNC: 32.3 GM/DL
MCHC RBC-ENTMCNC: 33.3 PG
MCV RBC AUTO: 103.1 FL
MICROALBUMIN 24H UR DL<=1MG/L-MCNC: 2.5 MG/DL
MICROALBUMIN/CREAT 24H UR-RTO: 9 MG/G
MONOCYTES # BLD AUTO: 0.8 K/UL
MONOCYTES NFR BLD AUTO: 9.7 %
NEUTROPHILS # BLD AUTO: 4.38 K/UL
NEUTROPHILS NFR BLD AUTO: 53.4 %
NITRITE URINE: NEGATIVE
NONHDLC SERPL-MCNC: 84 MG/DL
PH URINE: 6
PLATELET # BLD AUTO: 233 K/UL
POTASSIUM SERPL-SCNC: 4.3 MMOL/L
PROT SERPL-MCNC: 7.9 G/DL
PROTEIN URINE: ABNORMAL
PT BLD: 12.1 SEC
RBC # BLD: 4.59 M/UL
RBC # FLD: 12.9 %
SODIUM SERPL-SCNC: 139 MMOL/L
SPECIFIC GRAVITY URINE: 1.03
T3 SERPL-MCNC: 156 NG/DL
T4 FREE SERPL-MCNC: 1.4 NG/DL
TIBC SERPL-MCNC: 317 UG/DL
TRIGL SERPL-MCNC: 98 MG/DL
TSH SERPL-ACNC: 1.2 UIU/ML
UIBC SERPL-MCNC: 250 UG/DL
UROBILINOGEN URINE: NORMAL
VIT B12 SERPL-MCNC: 557 PG/ML
WBC # FLD AUTO: 8.22 K/UL

## 2021-12-13 ENCOUNTER — NON-APPOINTMENT (OUTPATIENT)
Age: 41
End: 2021-12-13

## 2021-12-13 ENCOUNTER — APPOINTMENT (OUTPATIENT)
Dept: CARDIOLOGY | Facility: CLINIC | Age: 41
End: 2021-12-13
Payer: MEDICAID

## 2021-12-13 VITALS
DIASTOLIC BLOOD PRESSURE: 74 MMHG | RESPIRATION RATE: 16 BRPM | BODY MASS INDEX: 33.37 KG/M2 | HEART RATE: 81 BPM | OXYGEN SATURATION: 98 % | HEIGHT: 74 IN | TEMPERATURE: 98 F | WEIGHT: 260 LBS | SYSTOLIC BLOOD PRESSURE: 116 MMHG

## 2021-12-13 DIAGNOSIS — I95.1 ORTHOSTATIC HYPOTENSION: ICD-10-CM

## 2021-12-13 DIAGNOSIS — I51.3 INTRACARDIAC THROMBOSIS, NOT ELSEWHERE CLASSIFIED: ICD-10-CM

## 2021-12-13 DIAGNOSIS — M62.81 MUSCLE WEAKNESS (GENERALIZED): ICD-10-CM

## 2021-12-13 PROCEDURE — 93000 ELECTROCARDIOGRAM COMPLETE: CPT

## 2021-12-13 PROCEDURE — 99215 OFFICE O/P EST HI 40 MIN: CPT

## 2021-12-13 NOTE — PHYSICAL EXAM
[General Appearance - Well Developed] : well developed [Normal Appearance] : normal appearance [Well Groomed] : well groomed [General Appearance - Well Nourished] : well nourished [No Deformities] : no deformities [General Appearance - In No Acute Distress] : no acute distress [Normal Conjunctiva] : the conjunctiva exhibited no abnormalities [Eyelids - No Xanthelasma] : the eyelids demonstrated no xanthelasmas [Normal Oral Mucosa] : normal oral mucosa [No Oral Pallor] : no oral pallor [No Oral Cyanosis] : no oral cyanosis [Normal Jugular Venous A Waves Present] : normal jugular venous A waves present [Normal Jugular Venous V Waves Present] : normal jugular venous V waves present [No Jugular Venous Fernández A Waves] : no jugular venous fernández A waves [Heart Rate And Rhythm] : heart rate and rhythm were normal [Heart Sounds] : normal S1 and S2 [Murmurs] : no murmurs present [Respiration, Rhythm And Depth] : normal respiratory rhythm and effort [Exaggerated Use Of Accessory Muscles For Inspiration] : no accessory muscle use [Auscultation Breath Sounds / Voice Sounds] : lungs were clear to auscultation bilaterally [Abdomen Soft] : soft [Abdomen Tenderness] : non-tender [Abdomen Mass (___ Cm)] : no abdominal mass palpated [Abnormal Walk] : normal gait [Gait - Sufficient For Exercise Testing] : the gait was sufficient for exercise testing [Skin Color & Pigmentation] : normal skin color and pigmentation [] : no rash [No Venous Stasis] : no venous stasis [Skin Lesions] : no skin lesions [No Skin Ulcers] : no skin ulcer [No Xanthoma] : no  xanthoma was observed [Oriented To Time, Place, And Person] : oriented to person, place, and time [Affect] : the affect was normal [Mood] : the mood was normal [No Anxiety] : not feeling anxious

## 2021-12-13 NOTE — REVIEW OF SYSTEMS
[Negative] : Heme/Lymph [Feeling Fatigued] : feeling fatigued [Dizziness] : dizziness [Numbness (Hypoesthesia)] : numbness [Tingling (Paresthesia)] : tingling [Weakness] : weakness [Limb Weakness (Paresis)] : limb weakness (Paresis)

## 2021-12-21 ENCOUNTER — OUTPATIENT (OUTPATIENT)
Dept: OUTPATIENT SERVICES | Facility: HOSPITAL | Age: 41
LOS: 1 days | End: 2021-12-21

## 2021-12-21 DIAGNOSIS — Z95.828 PRESENCE OF OTHER VASCULAR IMPLANTS AND GRAFTS: Chronic | ICD-10-CM

## 2021-12-21 DIAGNOSIS — I95.1 ORTHOSTATIC HYPOTENSION: ICD-10-CM

## 2021-12-21 DIAGNOSIS — Z98.890 OTHER SPECIFIED POSTPROCEDURAL STATES: Chronic | ICD-10-CM

## 2021-12-21 DIAGNOSIS — Z90.79 ACQUIRED ABSENCE OF OTHER GENITAL ORGAN(S): Chronic | ICD-10-CM

## 2021-12-23 NOTE — ASU PREOP CHECKLIST - NS PREOP CHK HIBICLENS NA
12: Notified MD galicia that patients daughter Farrah Degroot wanted to talk to the MD and had couple questions. N/A

## 2022-01-03 NOTE — H&P ADULT - PROBLEM SELECTOR PROBLEM 4
Called and spoke with mom.  Covid test was negative, she did however test positive for flu.  I told her we could send over Tamiflu for pt. And Proflaxix for brother.  [facility] [place].    S/he  verbalized understanding and no further questions at this time.          
Malignant neoplasm of testis, unspecified laterality, unspecified whether descended or undescended

## 2022-01-10 ENCOUNTER — OUTPATIENT (OUTPATIENT)
Dept: OUTPATIENT SERVICES | Facility: HOSPITAL | Age: 42
LOS: 1 days | Discharge: HOME | End: 2022-01-10

## 2022-01-10 DIAGNOSIS — Z98.890 OTHER SPECIFIED POSTPROCEDURAL STATES: Chronic | ICD-10-CM

## 2022-01-10 DIAGNOSIS — C62.92 MALIGNANT NEOPLASM OF LEFT TESTIS, UNSPECIFIED WHETHER DESCENDED OR UNDESCENDED: ICD-10-CM

## 2022-01-10 DIAGNOSIS — Z90.79 ACQUIRED ABSENCE OF OTHER GENITAL ORGAN(S): Chronic | ICD-10-CM

## 2022-01-10 DIAGNOSIS — Z95.828 PRESENCE OF OTHER VASCULAR IMPLANTS AND GRAFTS: Chronic | ICD-10-CM

## 2022-01-11 ENCOUNTER — APPOINTMENT (OUTPATIENT)
Dept: HEMATOLOGY ONCOLOGY | Facility: CLINIC | Age: 42
End: 2022-01-11
Payer: MEDICAID

## 2022-01-11 NOTE — PATIENT PROFILE ADULT. - PAIN, CHRONIC: FACTORS THAT AGGRAVATE, PROFILE
Performed By: Jeanna Guillaume CMA Lot # (Optional): [id number] Expiration Date (Optional): 2/28/23 Urine Pregnancy Test Result: negative Detail Level: None activity

## 2022-01-13 ENCOUNTER — RESULT REVIEW (OUTPATIENT)
Age: 42
End: 2022-01-13

## 2022-01-13 ENCOUNTER — APPOINTMENT (OUTPATIENT)
Dept: HEMATOLOGY ONCOLOGY | Facility: CLINIC | Age: 42
End: 2022-01-13

## 2022-01-13 LAB
BASOPHILS # BLD AUTO: 0.03 K/UL — SIGNIFICANT CHANGE UP (ref 0–0.2)
BASOPHILS NFR BLD AUTO: 0.4 % — SIGNIFICANT CHANGE UP (ref 0–2)
EOSINOPHIL # BLD AUTO: 0.07 K/UL — SIGNIFICANT CHANGE UP (ref 0–0.5)
EOSINOPHIL NFR BLD AUTO: 1 % — SIGNIFICANT CHANGE UP (ref 0–6)
HCT VFR BLD CALC: 45.1 % — SIGNIFICANT CHANGE UP (ref 39–50)
HGB BLD-MCNC: 15.3 G/DL — SIGNIFICANT CHANGE UP (ref 13–17)
IMM GRANULOCYTES NFR BLD AUTO: 0.7 % — SIGNIFICANT CHANGE UP (ref 0–1.5)
LYMPHOCYTES # BLD AUTO: 2.75 K/UL — SIGNIFICANT CHANGE UP (ref 1–3.3)
LYMPHOCYTES # BLD AUTO: 38.3 % — SIGNIFICANT CHANGE UP (ref 13–44)
MCHC RBC-ENTMCNC: 33.5 PG — SIGNIFICANT CHANGE UP (ref 27–34)
MCHC RBC-ENTMCNC: 33.9 G/DL — SIGNIFICANT CHANGE UP (ref 32–36)
MCV RBC AUTO: 98.7 FL — SIGNIFICANT CHANGE UP (ref 80–100)
MONOCYTES # BLD AUTO: 0.72 K/UL — SIGNIFICANT CHANGE UP (ref 0–0.9)
MONOCYTES NFR BLD AUTO: 10 % — SIGNIFICANT CHANGE UP (ref 2–14)
NEUTROPHILS # BLD AUTO: 3.56 K/UL — SIGNIFICANT CHANGE UP (ref 1.8–7.4)
NEUTROPHILS NFR BLD AUTO: 49.6 % — SIGNIFICANT CHANGE UP (ref 43–77)
NRBC # BLD: 0 /100 WBCS — SIGNIFICANT CHANGE UP (ref 0–0)
PLATELET # BLD AUTO: 177 K/UL — SIGNIFICANT CHANGE UP (ref 150–400)
RBC # BLD: 4.57 M/UL — SIGNIFICANT CHANGE UP (ref 4.2–5.8)
RBC # FLD: 12.1 % — SIGNIFICANT CHANGE UP (ref 10.3–14.5)
WBC # BLD: 7.18 K/UL — SIGNIFICANT CHANGE UP (ref 3.8–10.5)
WBC # FLD AUTO: 7.18 K/UL — SIGNIFICANT CHANGE UP (ref 3.8–10.5)

## 2022-01-14 LAB
AFP-TM SERPL-MCNC: 2.1 NG/ML
ALBUMIN SERPL ELPH-MCNC: 4.1 G/DL
ALP BLD-CCNC: 61 U/L
ALT SERPL-CCNC: 71 U/L
ANION GAP SERPL CALC-SCNC: 18 MMOL/L
AST SERPL-CCNC: 45 U/L
BILIRUB SERPL-MCNC: 0.4 MG/DL
BUN SERPL-MCNC: 16 MG/DL
CALCIUM SERPL-MCNC: 9.1 MG/DL
CHLORIDE SERPL-SCNC: 103 MMOL/L
CO2 SERPL-SCNC: 22 MMOL/L
CREAT SERPL-MCNC: 1.14 MG/DL
GLUCOSE SERPL-MCNC: 108 MG/DL
HCG-TM SERPL-MCNC: <1 MIU/ML
LDH SERPL-CCNC: 151 U/L
MAGNESIUM SERPL-MCNC: 2.2 MG/DL
POTASSIUM SERPL-SCNC: 4.4 MMOL/L
PROT SERPL-MCNC: 7.5 G/DL
SODIUM SERPL-SCNC: 143 MMOL/L
TESTOST SERPL-MCNC: 527 NG/DL

## 2022-01-15 NOTE — ED PROVIDER NOTE - CPE EDP NEURO NORM
Problem: Non-Violent Restraints  Goal: Removal from restraints as soon as assessed to be safe  Outcome: Ongoing  Goal: No harm/injury to patient while restraints in use  Outcome: Ongoing  Goal: Patient's dignity will be maintained  Outcome: Ongoing     Problem: Pain:  Goal: Pain level will decrease  Description: Pain level will decrease  Outcome: Ongoing  Goal: Control of acute pain  Description: Control of acute pain  Outcome: Ongoing  Goal: Control of chronic pain  Description: Control of chronic pain  Outcome: Ongoing     Problem: Skin Integrity:  Goal: Will show no infection signs and symptoms  Description: Will show no infection signs and symptoms  Outcome: Ongoing  Goal: Absence of new skin breakdown  Description: Absence of new skin breakdown  Outcome: Ongoing     Problem: Falls - Risk of:  Goal: Will remain free from falls  Description: Will remain free from falls  Outcome: Ongoing  Goal: Absence of physical injury  Description: Absence of physical injury  Outcome: Ongoing     Problem: Airway Clearance - Ineffective  Goal: Achieve or maintain patent airway  Outcome: Ongoing     Problem: Gas Exchange - Impaired  Goal: Absence of hypoxia  Outcome: Ongoing  Goal: Promote optimal lung function  Outcome: Ongoing     Problem: Breathing Pattern - Ineffective  Goal: Ability to achieve and maintain a regular respiratory rate  Outcome: Ongoing     Problem:  Body Temperature -  Risk of, Imbalanced  Goal: Ability to maintain a body temperature within defined limits  Outcome: Ongoing  Goal: Will regain or maintain usual level of consciousness  Outcome: Ongoing  Goal: Complications related to the disease process, condition or treatment will be avoided or minimized  Outcome: Ongoing     Problem: Isolation Precautions - Risk of Spread of Infection  Goal: Prevent transmission of infection  Outcome: Ongoing     Problem: Nutrition Deficits  Goal: Optimize nutritional status  Outcome: Ongoing     Problem: Risk for Fluid Volume Deficit  Goal: Maintain normal heart rhythm  Outcome: Ongoing  Goal: Maintain absence of muscle cramping  Outcome: Ongoing  Goal: Maintain normal serum potassium, sodium, calcium, phosphorus, and pH  Outcome: Ongoing     Problem: Loneliness or Risk for Loneliness  Goal: Demonstrate positive use of time alone when socialization is not possible  Outcome: Ongoing     Problem: Fatigue  Goal: Verbalize increase energy and improved vitality  Outcome: Ongoing     Problem: Patient Education: Go to Patient Education Activity  Goal: Patient/Family Education  Outcome: Ongoing     Problem: Confusion - Acute:  Goal: Absence of continued neurological deterioration signs and symptoms  Description: Absence of continued neurological deterioration signs and symptoms  Outcome: Ongoing  Goal: Mental status will be restored to baseline  Description: Mental status will be restored to baseline  Outcome: Ongoing     Problem: Discharge Planning:  Goal: Ability to perform activities of daily living will improve  Description: Ability to perform activities of daily living will improve  Outcome: Ongoing  Goal: Participates in care planning  Description: Participates in care planning  Outcome: Ongoing     Problem: Injury - Risk of, Physical Injury:  Goal: Will remain free from falls  Description: Will remain free from falls  Outcome: Ongoing  Goal: Absence of physical injury  Description: Absence of physical injury  Outcome: Ongoing     Problem: Mood - Altered:  Goal: Mood stable  Description: Mood stable  Outcome: Ongoing  Goal: Absence of abusive behavior  Description: Absence of abusive behavior  Outcome: Ongoing  Goal: Verbalizations of feeling emotionally comfortable while being cared for will increase  Description: Verbalizations of feeling emotionally comfortable while being cared for will increase  Outcome: Ongoing     Problem: Psychomotor Activity - Altered:  Goal: Absence of psychomotor disturbance signs and symptoms  Description: Absence of psychomotor disturbance signs and symptoms  Outcome: Ongoing     Problem: Sensory Perception - Impaired:  Goal: Demonstrations of improved sensory functioning will increase  Description: Demonstrations of improved sensory functioning will increase  Outcome: Ongoing  Goal: Decrease in sensory misperception frequency  Description: Decrease in sensory misperception frequency  Outcome: Ongoing  Goal: Able to refrain from responding to false sensory perceptions  Description: Able to refrain from responding to false sensory perceptions  Outcome: Ongoing  Goal: Demonstrates accurate environmental perceptions  Description: Demonstrates accurate environmental perceptions  Outcome: Ongoing  Goal: Able to distinguish between reality-based and nonreality-based thinking  Description: Able to distinguish between reality-based and nonreality-based thinking  Outcome: Ongoing  Goal: Able to interrupt nonreality-based thinking  Description: Able to interrupt nonreality-based thinking  Outcome: Ongoing     Problem: Sleep Pattern Disturbance:  Goal: Appears well-rested  Description: Appears well-rested  Outcome: Ongoing     Problem: VIOLENT RESTRAINTS  Goal: Removal from restraints as soon as assessed to be safe  Outcome: Ongoing  Goal: No harm/injury to patient while restraints in use  Outcome: Ongoing  Goal: Patient's dignity will be maintained  Outcome: Ongoing     Problem: SKIN INTEGRITY  Goal: LTG - patient will maintain/improve skin integrity through proper skin care techniques  Outcome: Ongoing normal...

## 2022-01-18 ENCOUNTER — NON-APPOINTMENT (OUTPATIENT)
Age: 42
End: 2022-01-18

## 2022-01-19 ENCOUNTER — NON-APPOINTMENT (OUTPATIENT)
Age: 42
End: 2022-01-19

## 2022-01-24 NOTE — REASON FOR VISIT
[FreeTextEntry1] : Dear Dr. Arambula:\par \par I had the pleasure of evaluating your patient, Mr. Cleve Calderón who you had kindly referred for cardiovascular and vascular medicine evaluation.  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  His last chemotherapy treatment was given ~3 years ago.  From the oncologic standpoint, the patient reports that he is doing well.  He is also followed by Oncology (Neo Chirinos) and Palliative Care (Symone Shields).  He does report having occasional nausea, and is on duloxetine and medical marijuana.  He has complaints about his neuropathy. He feels the duloxetine helps with the tingling sensation. Bottom of the feet feels as if he has "bubble wrap" on the bottom of his feet. \par \par He has also been complaining of palpitations that have been "kicking his butt."\par He also complains of frequent bouts of dizziness (chronic, has been complaining of this for years).\par He also reports that he has been blacking out/imbalance with body movement.\par Also notes having atypical bouts chest pain.\par \par Does not check BPs at home\par Any fast movement can throw him off\par SBPs 100-120 mmHg in the office \par \par We had previously advised graduated compression stockings - his lower extremity neuropathy inhibits -- need to spare the foot.\par Advised to liberalize salt intake, decrease caffeine\par \par Not taking medications anymore including duloxetine\par Not on anticoagulants at this time\par Was previously on gabapentin -- stopped because he thinks it does not help, led to leg swelling (left foot swelling)\par Will prescribe Lyrica as gabapentin did not work for patient --> Appeal for Lyrica - also attempted to be prescribed by Pain Management previously but rejected.\par \par \par PMH:\par In 2017, as he had just completed three cycles of chemotherapy, his cardiac course then was complicated by atrial flutter with rapid ventricular rate. He was admitted for rate control and planned TYLOR and DCCV. He was started on anticoagulation, but during the TYLOR, he was noted to have a right atrial thrombus and cardioversion was aborted. He was discharged on Toprol 200mg daily and Xarelto 15mg BID. Digoxin was subsequently added to his regimen for better rate control. This helped to better control his rate. In December 2017, TYLOR still showed RA thrombus. TYLOR performed in January 2018 showed improvement in thrombus size, and patient successfully underwent TYLOR/DCCV into sinus rhythm.\par February 5, 2018, he maintains normal sinus rhythm. He was taking a low dose ACEi (lisinopril 2.5mg daily) and Toprol 100mg daily.\par \par June 27, 2018 - patient recently admitted to [facility] for resection of retroperitoneal mass. The procedure was successful and the patient was discharged. He maintains normal sinus rhythm.\par \par Patient restarted chemotherapy and completed three cycles of TIP. He had a left upper extremity PICC line placed, which has been removed.. \par \par November 2018 - Patient has been taken off anticoagulation and guideline directed medical therapy for cardiomyopathy. He was having episodes that he describes as blacking out while getting chemotherapy, which lead to discontinuation of his medication.\par \par January 2019 - Patient presents today in his usual state of health, but he is reporting ongoing fatigue. His only current medications are low dose beta-blocker (carvedilol 3.125 mg BID), gabapentin 900 mg daily, and oxycodone PRN. Several days ago, he experienced bilateral lower extremity edema, L > R, that improved spontaneously. \par \par March 2019 - Patient has fatigue, anxiety, numbness of hands and feet, and blistering of palms and soles. He is tolerating his carvedilol 3.125 mg BID.\par \par May 2019 - Patient has been having labile weight with swelling of his hands and feet.\par May 15, 2019 - Patient has been tolerating carvedilol 12.5 mg BID and Entresto 24-26 mg BID. He gets occasional orthostatic symptoms, but he has not had syncope. He also sometimes gets a dizzy symptom when he lays down. And he reports a dysequilibrium such that if he closes his eyes in the shower, he worries about falling into the wall.\par He continues to require oxycodone for pain relief - this is managed by his pain management physician.\par \par June 2019 - Patient recently hospitalized with acute cholecystitis, now status post lap sen on 6/5/2019, discharged from [facility] on 6/7/2019. Earlier this week, patient went back to [facility] ER with vertigo and vomiting. He was discharged with meclizine. Plan for outpatient MRI brain to evaluate for etiology of vertigo/dysequilibrium.\par \par

## 2022-01-24 NOTE — ADDENDUM
[FreeTextEntry1] : ADDENDUM\par 1/24/22\par \par Patient planning to undergo dental surgery.\par Last cardiac assessment was performed in June 2019: Grossly normal LVEF 55%, minimal MR.\par \par From the cardiac perspective, the patient can proceed with dental surgery which should possess low-cardiac risk.\par \par No further cardiac testing or risk stratification is needed prior to dental surgery.\par

## 2022-01-26 ENCOUNTER — APPOINTMENT (OUTPATIENT)
Dept: HEMATOLOGY ONCOLOGY | Facility: CLINIC | Age: 42
End: 2022-01-26
Payer: MEDICAID

## 2022-01-26 PROCEDURE — 99443: CPT

## 2022-01-26 RX ORDER — DULOXETINE HYDROCHLORIDE 60 MG/1
60 CAPSULE, DELAYED RELEASE PELLETS ORAL
Qty: 30 | Refills: 3 | Status: DISCONTINUED | COMMUNITY
Start: 2020-06-18 | End: 2022-01-26

## 2022-01-26 NOTE — REASON FOR VISIT
[Home] : at home, [unfilled] , at the time of the visit. 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

## 2022-01-26 NOTE — HISTORY OF PRESENT ILLNESS
[de-identified] : Mr. Calderón is seen in consultation on 8/8/17. On July 11, 2017, he awoke with severe back pain extending into the left testicle. There was a heaviness and pulling sensation. He went to the ER at [facility] and a sonogram was done, revealing a mass. He was seen by Rivas Oconnor and underwent a left radical orchiectomy on July 20, 2017. The pain and pulling sensation resolved. He has been on oxycodone a time, since January 2017, for low back pains. No respiratory complaints. No urinary symptoms. Has had sperm cryopreservation. \par \par 8/30/17...Cleve comes in today for follow up was just discharged yesterday. He was in the hospital for 4 days and he was neutropenic, found a right arm superficial thrombosis at the site of IV. treated with vanco, blood cultures negative. Discharged on Levaquin. Overnight he had a temp of 104.  He is having some hair loss, numbness in his fingers and his toes. He has tinnitus intermittently, started two days after the last dose. He was having soreness in the back of his throat that now resolved. He complains of dysgeusia and he had a lot  of burning and acid reflux-like symptoms during his treatment Carafate, Protonix and the lower dose of Decadron improved his symptoms., Not sleeping well, likely due to steroids.\par \par 9/19/17...cycle 2 day 5 was 9/9/17\fabian Poon is seen in the office today. He has significant fatigue. He had vomiting for 5 days after the chemotherapy with nausea and he said that his complexion was green. He had acid reflux issues once again despite the dropping off the dose of his dexamethasone. He feels a burning sensation in his lower abdomen and he feels that the medications for nausea did not help. He also has significant fatigue which is improved today. He has intermittent tinnitus. He has tingling in the tips of his toes and fingers which are intermittent. His appetite has been good and he is gained weight while on chemotherapy but he says that he eats smaller portions. He denies dysgeusia or dry mouth.\par \par 10/9/17...Completed cycle 3 last week.  He states he feels "horrible." HE states he noticed bloating in the abdomen, He went a whole week after this treatment wit vomiting every day, last time he vomited was two days ago. He fell at one point secondary to being weak and feeling lightheaded. He feels generalized weakness.He had a blood clot, during his treatment, that resolved. His appetite is poor, He eats and then, he eats, he feels like he has to regurgitate his food up after eating. He has fatigue, tinnitus stopped. he denies any numbness of the feet. has had chronic numbness of two of his fingers.He has dysgeusia \par \par 10/30/17...Admission Date: \par -  Admission Date 16-Oct-2017 13:33. \par Discharge Date: \par - Discharge Date	21-Oct-2017 \par Chief Complaint/Reason for Visit: \par Chief Complaint/Reason for Admission	Atrial flutter with RVR\par *******************\par  38 yo M with PMHx of testicular cancer s/p left orchiectomy on chemotherapy who present to the ED before receiving chemo for tachycardia. \par The patient states that he has been feeling well for the past 2 weeks since his 3rd dose of chemotherapy. He denies CP, ABARCA, peripheral edema, cough, \par palpitations. He does states that he had one near syncopal event during a bout of emesis 2 weeks ago which caused him to fall into a door. Has not had any \par other syncopal or presyncopal episodes since then. In the ED he was found to have tachycardia found to be a.flutter after receiving 10mg IV diltiazem x2, a CXR with well placed portacath and no infiltrations. On bedside US found to have EF of 10-15% while tachycardic. \par The patient was initially started on metoprolol tartrate with increasing dosages unable to control the arrhythmia he was subsequently switch to a diltiazem drip. The metiport was removed after discussion with his Oncologist and cardiology for possible arrhythmia induction which was removed 10/19. He also received a TTE on 10/19 which showed EF of 35-40% with LV remodeling. \par Afterwards he was switched to metoprolol succinate 200mg qday. \par He was started on heparin for preparation for TYLOR and cardioversion which was performed on 10/20 and found a RA appendage thrombus with PFO and reduced LV \par function. No cardioversion was performed as a result and he was switched to rivaroxaban on 10/21, lisinopril 2.5mg qday and metoprolol succinate 200mg \par qday. He will follow with cardiology in 2 weeks and EP in 4 weeks. Treatments/Procedures/Significant Findings/Patient Condition: \par Other Significant Findings: \par - Other Significant Findings	 \par < from: Transesophageal Echocardiogram w/o TTE (10.20.17 @ 12:08) > \par Conclusions: \par 1. Left atrial enlargement.  No left atrial or left atrial appendage thrombus. \par 2. Moderate to severe global left ventricular systolic dysfunction. \par 3. There is an echodensity (likely thrombus vs mass) in the right atrial wall that measures (approximately 2.7 cm x 2.0 cm). This is near the distal point of the SVC and may \par represent thrombus from prior indwelling catheter. \par 4. Right ventricular enlargement with decreased right ventricular systolic function. \par 5. Color Doppler demonstrates evidence of a patent foramen ovale. \par 6. Trivial pericardial effusion. \par ****************************************************************************\par TYLOR showed right atrial appendage thrombus, 27 mm, On Xarelto. Feels "exhausted". Does not generally note palpitations. Anxiety level is high. Occasional ABARCA, not at rest. No cough, no sputum, no wheezing. No nausea at this time, has regurgitation. Occ vomiting noted. Fatigue is prominent. Appetite is good. \par \par 7/9/18...Had RPLND on 6/6/18, with viable GCT present. Embryonal pathology. Says he have fatigue, still "recovering" from the RLPND. He has some soreness of the abdomen and says he has some delayed healing. He say Dr Piper on 7/6 in lieu of Dr Restrepo and was told that it was healing well. He is upset that there is residual cancer present, which he says "bummed him out completely". He says he has been reviewing issues related to continued treatment, only after I called him recently as he had not made an appointment. he says his appetite is poor and he is eating small amounts multiple times as he cannot eat full full meals. He says he has had some nausea and he started OTC Prevacid, which has helped. No significant weight loss. Some ABARCA if he walks about 3 blocks or so, he has fatigue with walking as well. he has some paresthesias of the left thigh after the surgery. He notes some spasms of the muscles in that area at times. Urine flow is normal, no nocturia. Libido down, not sexually active, no masturbation since surgery. \par \par 7/19/18...Feels the same, "still recovering". Still has some discomfort near the wound. Appetite s good. Gained 2 pounds. no cough. Mild ABARCA with activities. No palpitations. No leg edema. He continues with some fatigue, 6 on 0-10 scale. Feels he is not motivated when the fatigue is high. He feels his hands are weaker than before. Lifting is fine, but twisting a bottle to open may be  impaired. Ambulates with a cane  due to paresthesias of the left anterior thigh. This from knee  to thigh area,. No paresthesias of the feet. He sometimes has some pains in his feet at night which he says are not cramps. \par \par 9/6/18...Cycle 2, TIP, day # 9. Admitted on Sunday, August 19. \par *********************************************\par Hospitalized again post chemo, this time for 9 days. ANC was <0.1, platelets were low. had low sodium, low potassium and low magnesium. He has had lots of muscle aches and bone pain secondary to the Taxol. Feeling better, still feels weak. Some SOB with exertion. No pains. No headaches, has paresthesias of the bottom of the feet along with some toes. It improves after the chemo. Somewhat more this time. No fevers, no chills. Some nausea with office visits. Appetite is "moderate". Tinnitus has improved, minor dizziness at times. \par \par 8/30/18...Delayed treatment #3 by one week. "Not a good week". Complains of hearing being sensitive. He has tinnitus. This is intermittent, more in the past 2-3 days. He has some vertigo, with some issue walking. He feels as if he is falling to the right side at times, no falls. He has to hold onto the door or furniture to walk. He has some vomiting without nausea. He vomited a few minutes ago. Appetite is decreased, He has altered taste as well. Fatigue is present, not as bad as before. he says he is a "little" fatigued at the moment. Paresthesias of feet have been intermittent, less notable at this time. Spends a lot of time sitting, sleeping. He feels as if his body is exhausted. His mother says he has a lot of anxiety. He agrees. He has thoughts running through his mind about chemo and adverse effects. \par \par 9/20/18...he feels "okay" at times. Occasionally feels nauseous and can't eat, occ feels dizzy. has some regurgitation at times. Has fatigue. No better. Tinnitus is "not as bad", persistent in the right, intermittent in the left. When dizzy,  he feels he is leaning to one side. Spends a lot of time sitting or lying in bed. Paresthesias of the feet, left foot more so than right. Right toes affects the 4th and 5th toe. Fingertips are affected. He notes some darkening of skin folds of the hand joints. Feels no SOB unless he is active. using a cane today. Not taking any meds at this point. \par \par 10/9/18...Admission Date: after cycle 3 TIP\par -  Admission Date 01-Oct-2018 02:09. \par Discharge Date: \par - Discharge Date	06-Oct-2018 \par ***********************************\par HOSPITAL COURSE: The patient continued to have significant nausea while inpatient with intermittent vomiting. He received aggressive IV hydration with \par IVF while he was no longer able to tolerate po. On hospital day 4, patient was able to tolerate some po and requested discharge home. His course was c/b \par neutropenic fever to 101. Cultures were sent and remained negative. Patient was also started on cefepime. His ANC started to improve towards the end of the \par hospital course. His platelets, however, downtrended. He was given 1U platelets as per oncology recommendations. His midline was noted to have been in since \par July. After discussion with his outpatient oncologist, the patient opted to have his midline removed and will receive his last dose of chemotherapy via \par peripheral IV. During his hospital course, patient was noted to have an episode of tachycardia to the 140's followed by bradycardia to the 20's during \par orthostatic vitals. He was seen by Dr. Brown of cardiology who feels this is chemo-induced orthostasis and no further inpatient w/u is needed. He is \par medically stable for dc home. Prior to discharge received additional platelet transfusion and MID-Line was removed after discussion with oncology. \par *******************************************************************\par He was admitted and told that the midline had to be changed. He did have fever with neutropenia. Discharged on the 6th. Hypotensive with orthostasis,seen by cardiology, restarted lisinopril and carvedilol. He has fallen twice since discharge. He has LOC for a few seconds. he is somewhat confused after the episodes. Walking with a cane. he also had an similar episode while in the hospital. Appetite returned, has altered taste, no N/V now, but did have on admission. No source of fever found. Has paresthesias of the left foot, all toes, not his right.  Had aches and pains with the Taxol, which he describes as "brutal". \par \par 10/29/18...called last week, was having difficulty keeping food down and felt weak and dizzy. We arranged for fluids, but he called Friday ad declined. Taking Zofran ADT after meals at times. Able to drink more at this time. Did not eat this AM. Yesterday, had a bagel and egg, for lunch, potatoes, pork and beans and Spam. For dinner had rice and Spam, small amount. No vomiting yesterday, but had nausea. Dizziness if he leis his head back, or if he gets up to fast. Walking with  a cane. No falls. Started Has paresthesias of the feet, on the left side. Continues on oxycodone, about  1 per day, chronic use for low back pains. No leg edema. No cough, ABARCA. \par \par 11/13/18...has not followed with Dr Marquez and remains off of carvedilol, enoxaparin and lisinopril. He takes ondansetron on occasion. Says he had a "blackout" episode after getting up, fell, striking left side, no injury. No LOC. Neuropathy remains, mostly left leg, somewhat better at times, continues on therapy. Minimal on the right side. Considering returning to work. Fatigue is present, somewhat better. Appetite is still impaired, eats once a day. Gained a few pounds. Still has some taste alteration. No leg edema. No abdominal pains. Saw Phong Restrepo yesterday in follow up. He has constipation, and may go 4-5 days w/o bowel movement. \par \par 1/4/19...Had echo done 12/2018. "I'm okay", feeling better, saw Dr Marquez. Paresthesias are an issue. On gabapentin 300 TID, says it is not helping. He says his fingers are affected, works on computers, and hits wrong keys at times. Worse are his feet. Left foot is numb, right foot partially numb, annoying when walking, not sensing if he steps on something. He continues on oxycodone. Affect toes, senses that they are swollen, bottom of the feet as well as the top. Does not pass the ankle. In the hands, it is the 3rd and 4th finger distally on the right side, and 3-4-5 on the left. Echo said that LVEF is about 44-46 % with moderate global dysfunction. The LA appendage clot was not seen. Not as tired as before, walked 11 minutes yesterday with some fatigue. Occ mild nausea, no vomiting. Appetite is good, gained 4 kilos on the past month( points out that he is wearing heavy boots. . Still has some dysgeusia. Still avoids meats.  Occasional HA, lasts up to 1/2 day, occurs intermittently, once a week. More right side of the headaches. Prior sinus headaches, but not the same, has a sharp element to it. Stills feels dizzy at times when gets up too fast..\par \par 3/11/19...Urgent visit. Called this AM stating that the right testicle is swollen, present for a few days, mild tenderness, mild discomfort/pain. No back pains except for his chronic back issue. He notes some skin changes of his hands and his feet.e is some peeling skin at the base of the toes. as well as the center of the foot. He has neuropathy of both feet, more on the left than the right, where it only affects the toes. He says the medial 3 fingers are numb and he drops things. His heart exam included a stress test, which he reports the outcome was good. Still reports tinnitus. Appetite is good, weight is increased 6 kilos since 1/30/19. Still has fatigue. \par \par 5/11/19...Missed appt 4/5/19. I " have good days and bad days". He feels he puts on a lot of weight in  a short time, and then drops weight in a short period of time. He had edema of both feet and also feels he had edema of the hands as well. He says edema accumulates and then resolves. Neuropathy is "pretty bad", extends to the calves, worse at night. Ran out of gabapentin, was on 300 TID and did not feel it helped. He has discoloration of the left foot, edema both feet. Has cramps in the legs. Feels he cannot stand for a long time, and complains of blistering of the feet. He has shooting pains in the toes. There are fluid filled blisters that he he "has to pop", due to pain. He feels he s less active due to the blisters. He feels his toes are locked i position. Does exercises provided by PT. Appetite is good. He is thirsty many times. NO pains elsewhere except chronic back pain, for which he takes oxycodone. No cough, has some ABARCA, can walk 2 blocks prior to stopping. Sees DR Marquez later today. Fine motor issues with fingers with neuropathy, difficulty with typing on keyboard, with many errors. \par \par 7/2/19...Hospital Course: \par Discharge Date	12-Jun-2019 \par Admission Date	12-Jun-2019 00:37 \par Reason for Admission	vomiting, vertigo \par Medication Reconciliation Status	Admission Reconciliation is Completed \par Discharge Reconciliation is Completed \par Hospital Course	 \par 38M with PMHx of metastatic testicular CA, NICM, neuropathy, who presents with dizziness, nausea, and vomiting. \par Vertigo. \par - will c/w meclizine 25mg 4x/day \par - f/u blood and urine cx to r/o infectious etiology \par - PT eval, fall precautions. Constellation of symptoms with horizontal nystagmus and reproducible symptoms with daryn hallpike maneuver c/w BPPV. CT \par head negative. Pt reports improvement after meclizine. \par - will c/w meclizine 25mg 4x/day \par - f/u blood and urine cx to r/o infectious etiology \par - PT eval \par  Cardiomyopathy. 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 Most recent TTE on 6/4 showing EF55%. Pt concerned current sx may be side effect from entresto \par - c/w carvedilol 12.5mg BID \par - will hold entresto in setting of soft BP and pt hesitancy to restart due to side effects. In prior studies, dizziness is reported as an adverse event "at \par least 5%" of patients taking entresto. \par will hold all BP meds upond d/c 2/2 hypotension \par  Vomiting. \par - will c/w IVF overnight and repeat lactate in AM \par - zofran PRN nausea. \par  Mixed germ cell tumor. \par  now in remission. \par **********************************************\par Was hospitalized and now off carvedilol and Entresto. Doing better at this time. Only bothered by paresthesias of the feet and occasion syncope with changes in position. the latter has occurred when getting up from bed or with sitting to standing at times. Appetite is good, no weight loss. No cough, no ABARCA. No palpitations noted. Chronic back pains, no changes. No HA. Libido is down, erections are difficult to achieve, are not durable,  no ejaculation.\par \par 9/10/19...Feels "okay". he has some intermittent swelling noted in the hands and feet, occasionally in face, may last for several days. He has paresthesias of the fingertips the hands, not as bad as it was previously. It can be shock, like, noted more at night, more prominent in the feet. Gabapentin didn't help. Has his usual back pains, takes oxycodone 15 mg 2-4 times a day depending on pain. Appetite is good, No diarrheal stools, has constipation. Weight increased 3 kilos. No nausea, no vomiting.  BM about 3 times a week, sometimes hard. Advised to take Colace. Occ has a blistering rash on the bottom of the feet, Rx'd from dermatology.He still has blisters from time to time. The foot swells and the skin starts to crack. Fatigue unchanged. Tries to walk about but the pain in the feet causes discomfort, occasionally uses his cane to walk. Urine flow is impaired as before with some discomfort, not burning. Nocturia x 3-4. NO blood. occ incontinence- leaking of urine if he goes to stand at times. He noted blood in his stool 2 night sin a row. Reddish color, fresh appearing. \par \par 12/13/20...Continues to struggle with neuropathy, seen by neuro, had NCV, started nortriptyline for the neuropathy at bed time.  He feels it helps somewhat, dose increased to 50. Appetite is good. Weight is stable. Has impaired activities due to the neuropathy, does not leave the house much. Goes to PT and doctor appts. Has back pains form prior injury, on long term oxycodone, takes 15 mg 3-4 times a day. Anxiety level is low except at night, which he attributes to pain. He feels he is not depressed. No cough, mild ABARCA, attributed to weight increase. Occ headaches, right sided, once every few months. Fatigue is 8 on a 0-10 scale. Poor libido, not sexually active, no attempts\par \par 6/9/20...Seen today. Was on zonasamide by neurology, felt he had tremors and grater degree of anxiety. Insurance won't permit Lyrica, gabapentin created swelling. Taking oxycodone 15, up to 4 times a day, trying to cut down. Interested in med marijuana. he says he does not sleep well. Awake from 2 to 6 AM. Neuropathy keeps him awake, less noted during the day. Neuropathy affects the feet to the upper calves, shooting pains at night. Also has neuropathy of the hands as well. Says he has difficulty typing on the computer. Hand will shake when using the mouse. Occ drops a fork. Appetite is good, weight increased. Knee pains as well. No cough, some ABARCA remains, which he attributes to lack of exercise. Not seeing a mental health professional. I have suggested he see Dr Almanza in the past. He feels he has spent 5 years in "isolation". \par Neurologist notes reviewed they ordered an immunofixation which showed a slight IgG lambda band. No further evaluation was performed.\par No headaches recently, occ dizziness of he gets up fast. Tries to keep himself active, but says he cannot do much. He says his feet swell with activities. He walks with a cane.\par \par 9/8/20... Presents for follow up. On medical marijuana, which he says helps, uses mainly at night as a sleep aide. Chronic low back pains from prior injury, oxycodone 15, 3-4 times a day.  Pain always present, does not awaken him. He does have some sleep issues due to the neuropathy. Neuropathy extends to lower 1/3 of calves. No better, no worse. No other pains except some joint pains. Appetite is decreased, he feels is sec to the duloxetine. He sees Symone Shields. He has lost some weight. No cough, no ABARCA. Fatigue is present, stated to occur after 2 blocks of walking, fatigue in the legs. He says he is active during the day, walks about, less computer and video game use. Occ nausea, no vomiting. No diarrhea, no constipation. Urine flow is weaker, some dysuria/pain with voiding. No blood in urine. Has not any UTIs, since the surgery. No libido, no interest in masturbation, has not tried. No AM erections. T levels have been normal. \par \par 12/8/20...Off the oxycodone 15s, on medical marijuana, which he feels it is better controlled. He sleeps better. has some pain, but did not take anything this AM. Lower back is the sites as well as the discomfort in the extremities. The med marijuana helps the neuropathy as well. Eats more, gained weight. He is more energetic. He feels it is hard to exercise, as he is not as strong as before, tired after a walk down the block. Much more talkative today. he is concerned about blacking out. Also has some muscle spasms at times with activities. Does climb stairs in the house. No cough. ABARCA present. He feels his urine stream is restricted with some discomfort. "Like a lot of fluid trying to fit thru a small space". He is going to call Dr Oconnor about these issues. uses a shower chair as he is worried about "blacking out in the shower" if he raise his hands above his head. \par \par 3/9/21 - Verbal permission for telehealth services was granted by the patient, Cleve Calderón, on March 9, 2021 at 10:12 AM. \par Mixed germ cell tumor Diagnosed in 2017, underwent radical orchiectomy on the left side in July 20, 2017.  His stage was 2A, good risk.  He received cisplatin and etoposide starting on August 14 of 2017.  Cycle 4 was delayed and then omitted as he presented with supraventricular tachycardia and a low ejection fraction.  This was felt to be secondary to the indwelling central venous catheter, which was located in the appropriate spot.  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  He underwent an RPLND which was delayed, and he had residual viable embryonal carcinoma.  He was started on TIP in July 2018, received 3 cycles. \par \par Overall, feels the same. Neuropathy is the main issues, feels he cannot rest at all. The medical marijuana helps him rest. Takes it at night and throughout the day when he feels he needs it. Onset of action if from 7 to 20 minutes. Appetite is fine, no weight loss of note. No cough, some ABARCA, after one block, he is SOB and exhausted. Occ headaches noted, brief duration. Some balance issues, no falls. He still feels he has some dexterity issues in the hands. He has some discomfort when he voids, a pressure like sensation as if the has some flow issues, since the surgery, no change. No dysuria. Has not had a CV vaccine. Libido is poor, no attempt, no erections. 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 Had to receive additional chemo with TIP. He is active and exercising, frustrated with the disability process. He is able to do more at this time. Has pain from the neuropathy with walking uphill. He notes some swelling of the left leg and right hand area, and sometimes the swelling in the hand makes it difficult to close his fist. Appetite is "poor" lately , trying to lose weight. He feels food is not as appealing as it once was, antedates this to the chemotherapy. Has had some nausea in recent times. He feels red meat is the issue. This has occurred for "some time". He says his weight was 289, now 260, over the course of 2.5 weeks. He also notes some bloating and gas. Some cramp like pains at times. had his first CV vaccine (Moderna). Breathing is fine, notes some chest pressure the last few nights. Some minor headaches in recent times, middle of the head, takes Advil or Tylenol, which does not seem to help much. These occur about 2 times a week, may last few minutes. Urinary difficulty, feels the muscle is not fully "retracting". Feels the flow is not what it should be and has a sensation of incomplete voiding. NO blood, occ incontinence-occurred x 2. Feels his right arm has increased numbness and it may be a bit weaker. Was doing exercises that PT had previously recommended. Feels he has issues when typing. No F/C/S. Notes fatigue, exercises in the backyard at times. If he stands more than 10 minutes, he feels exhausted.  Libido is poor. No interest, not sexually active, no masturbation. No full AM erections.\par \par 10/12/21...Dr Shields notes reviewed. last chemo was 3 years ago. Doing well. Overall, he feels occasional nausea at times. He is on duloxetine and medical marijuana. Still has complaints about his neuropathy with pain and discomfort, not as well controlled as it was last few visits, when he said the med marijuana produced much benefit. He said it calms him, but he still does not sleep well. He feels the duloxetine helps with the tingling sensation. Bottom of the feet feels as if he has "bubble wrap" on the bottom of his feet. Occ falls in the shower, some issue with stairs. Appetite is good, but says he lost some weight. Not seen in office since December 2020. Says he weight 257 at home, was 283 last visit. Occ nausea, no recent vomiting. Occ headaches. Tries to walk about during the day, and trying to be more active. Anxiety and depression are the same. Denies thoughts of self harm. Says he is frustrated at times.  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 Cycle 4 delayed..then omitted due to tachycardia and low EF. RPLND delayed, residual viable embryonal cancer, TIP started July 29, 2018, cycle 3 delayed for grade 3 fatigue, then again one more week for transaminitis. Fourth planned cycle omitted. RPLND June 2018,  2 of 57 LN positive with FINA, embryonal, pN3 [de-identified] : 1/26/22...Now 4.5 years post initial diagnosis, teratoma predominant, cardiomyopathy due to tachycardia associated with port. Received 3 cycles, delayed interval, then started TIP, received 3 cycles, went to RPLND.  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 No subsequent therapy.\par Stopped duloxetine, as he felt like a different person. He remains on medical marijuana. Neuropathy is still an issue. He has some dizziness at times, if he gets up to quickly or turns too quickly. He feels his nervous system is "all screwed up". The dizziness occurs a few times a day to a few times a week. He reads, walks about, does not go out due to COVID concerns, received booster a few weeks ago. Appetite is "variable". He says he loses weight and then regains the weight. Nausea at times, a chronic issue. No vomiting, no constipation, no diarrhea. Headaches occur, chronic, at least once a week. Mid skull, takes Advil or Tylenol with some relief. He thinks they are sinus headaches. No edema in legs, feels his feet are swollen at times. Paresthesias extend to just below the calf on the left, on right, just the ball of the foot, no change. Fatigue is considerable, says he cannot stand for more than 10 minutes w/o being exhausted.

## 2022-01-26 NOTE — REVIEW OF SYSTEMS
[Fatigue] : fatigue [Lower Ext Edema] : lower extremity edema [SOB on Exertion] : shortness of breath during exertion [Dizziness] : dizziness [Anxiety] : anxiety [Depression] : depression [Muscle Weakness] : muscle weakness [Negative] : Genitourinary [Fever] : no fever [Chills] : no chills [Recent Change In Weight] : ~T no recent weight change [Chest Pain] : no chest pain [Palpitations] : no palpitations [Wheezing] : no wheezing [Cough] : no cough [Abdominal Pain] : no abdominal pain [Vomiting] : no vomiting [Constipation] : no constipation [Diarrhea] : no diarrhea [Joint Pain] : no joint pain [Joint Stiffness] : no joint stiffness [Muscle Pain] : no muscle pain [Skin Rash] : no skin rash [Easy Bleeding] : no tendency for easy bleeding [Easy Bruising] : no tendency for easy bruising [FreeTextEntry5] : palpitations when anxious at times [FreeTextEntry7] : nausea, see HPI, GERD symptoms [FreeTextEntry9] : occ cramps [de-identified] : occ HA, paresthesias, has had falls, last occurred a few days ago, in the shower

## 2022-01-26 NOTE — ASSESSMENT
[FreeTextEntry1] : Cleve Calderón is contacted by telephone today for follow-up.  He is now 4-1/2 years since his orchiectomy.  He had a primary tumor that was mostly teratoma.  He did have tumor markers.  3 cycles of BEP.  During the third cycle he had cardiomyopathy associated with his port.  He received 3 cycles and then had a lengthy interval.  He was then treated with TIP and he received a total of 3 cycles.  He went to retroperitoneal lymph node dissection, and 2 out of 57 lymph nodes were positive.  The largest was more than 5 cm with extranodal extension and was embryonal carcinoma.  He has not received any subsequent therapy.\par \par He recently stopped his duloxetine.  He says he felt like a different person.  He remains on medical marijuana.  Neuropathy remains an issue.  He has some dizziness at times a notes if he gets up too quickly or if he turns too quickly.  He says that he has had vertigo in the past and this is not the same.  He feels that if his nervous system is "all screwed up".  The dizziness may occur a few times per day to a few times per week.  During the day, he reads, walks about, but does not go out due to Covid concerns.  He did receive his booster injection a few weeks ago.  His appetite is variable.  He continues to complain of some intermittent nausea.  He says that he loses weight and then regains it.  Is no constipation or diarrhea.  He does have occasional headaches which are chronic.  They occur at least once per week.  They are in the middle of the skull and he says he feels that they are sinus headaches.  He takes Advil or Tylenol with some relief.  He has no edema of his legs.  But he does feel that his feet feel swollen at times.  Paresthesias extend to just below the calf on the left side, and on the right just involve the ball of the foot.  This is unchanged.  Fatigue is considerable.  He says that he cannot stand for more than 10 minutes without feeling exhausted.  He notes occasional palpitations with anxiety at times.  He has reflux disease and is not on any medications for this.  He has fallen a few times in the last occurred a few days ago, when he fell in the shower.  He did not strike his head and sustained no injury.  He does admit to some depression, but does not want to seek any professional assistance.\par \par We went over his recent results.  Tumor markers were all normal.  He has mild transaminase elevations which is likely secondary to fatty liver.  His last CT scan was in July, and there was no evidence of recurrent disease.  There were a few enlarged pelvic lymph nodes which is generally not a site of testicular cancer disease.  These were stable in size.  He should have repeat imaging performed to assess those lymph nodes.\par \par As a result of his chemotherapy, he has residual neuropathy.  He continues to complain of significant fatigue.  He has depression which he does not feel needs to be addressed at this time.\par \par All questions were answered to the best of my ability and to his apparent satisfaction.  We will repeat his CT scan in the next few months to assess those lymph nodes.  I will contact him by telephone once again in 3 months time.\par \par

## 2022-01-27 NOTE — HEALTH RISK ASSESSMENT
[No] : In the past 12 months have you used drugs other than those required for medical reasons? No [No falls in past year] : Patient reported no falls in the past year [0] : 2) Feeling down, depressed, or hopeless: Not at all (0) [HIV test declined] : HIV test declined [None] : None [Feels Safe at Home] : Feels safe at home [Fully functional (bathing, dressing, toileting, transferring, walking, feeding)] : Fully functional (bathing, dressing, toileting, transferring, walking, feeding) [Fully functional (using the telephone, shopping, preparing meals, housekeeping, doing laundry, using] : Fully functional and needs no help or supervision to perform IADLs (using the telephone, shopping, preparing meals, housekeeping, doing laundry, using transportation, managing medications and managing finances) [Smoke Detector] : smoke detector [Carbon Monoxide Detector] : carbon monoxide detector [Seat Belt] :  uses seat belt [Sunscreen] : uses sunscreen [With Patient/Caregiver] : , with patient/caregiver [] : No [de-identified] : [facility] [[id number]] : 0 [Change in mental status noted] : No change in mental status noted [Reports changes in hearing] : Reports no changes in hearing [Reports changes in vision] : Reports no changes in vision [Reports changes in dental health] : Reports no changes in dental health [AdvancecareDate] : 12/21

## 2022-01-27 NOTE — ADDENDUM
[FreeTextEntry1] : PST results from 12/6/21 noted, discussed with the patient.\par \par Patient was recently evaluated by [facility] , findings and recommendations and Clearance noted.\par \par Patient is medically optimized to the best at this time for the stated dental intervention.\par Patient is medically cleared.\par \par

## 2022-01-27 NOTE — ASSESSMENT
[FreeTextEntry1] : 41 year old male found to have stable Neuropathy, Left Testicular Cancer, Nonischemic Cardiomyopathy, Abnormal LFT,with the current prescription regimen as recommended, diet and life style modifications, as counseled. Prior results reviewed, interpreted and discussed with the patient during today's examination, as appropriate. Follow up, treatment plan and tests, as ordered.\par \par

## 2022-01-27 NOTE — HISTORY OF PRESENT ILLNESS
[de-identified] : 41 year old male patient with history of stable Neuropathy, Left Testicular Cancer, Nonischemic Cardiomyopathy, Abnormal LFT, history as stated, presented for an annual preventative examination.\par Patient denies any associated symptoms of shortness of breath, chest pain, abdominal pain at this time.\par

## 2022-02-15 NOTE — DISCHARGE NOTE NURSING/CASE MANAGEMENT/SOCIAL WORK - NURSING SECTION COMPLETE
Pediatric Growth Entered On:  2019 2:12 PM CDT    Performed On:  2019 12:00 AM CDT by Kym Hidalgo               Pediatric Growth Chart   Height Measured - Metric :   50.8 cm(Converted to: 1 ft 8 in, 20.00 in)    Weight Measured - Metric :   3.260 kg(Converted to: 7 lb 3 oz, 7.187 lb)    Body Mass Index - Metric :   12.63 kg/m2   Head Circumference :   35.6 cm(Converted to: 14.02 in)    Kym Hidalgo - 2019 2:10 PM CDT   Patient/Caregiver provided printed discharge information.

## 2022-03-07 ENCOUNTER — APPOINTMENT (OUTPATIENT)
Dept: CARDIOLOGY | Facility: CLINIC | Age: 42
End: 2022-03-07

## 2022-03-31 NOTE — PATIENT PROFILE ADULT. - NS PRO PT RIGHT SUPPORT PERSON
same name as above no blurred vision/no confusion/no loss of consciousness/no numbness/no weakness/no change in level of consciousness

## 2022-04-20 ENCOUNTER — OUTPATIENT (OUTPATIENT)
Dept: OUTPATIENT SERVICES | Facility: HOSPITAL | Age: 42
LOS: 1 days | Discharge: HOME | End: 2022-04-20

## 2022-04-20 DIAGNOSIS — Z98.890 OTHER SPECIFIED POSTPROCEDURAL STATES: Chronic | ICD-10-CM

## 2022-04-20 DIAGNOSIS — Z95.828 PRESENCE OF OTHER VASCULAR IMPLANTS AND GRAFTS: Chronic | ICD-10-CM

## 2022-04-20 DIAGNOSIS — C62.92 MALIGNANT NEOPLASM OF LEFT TESTIS, UNSPECIFIED WHETHER DESCENDED OR UNDESCENDED: ICD-10-CM

## 2022-04-20 DIAGNOSIS — Z90.79 ACQUIRED ABSENCE OF OTHER GENITAL ORGAN(S): Chronic | ICD-10-CM

## 2022-04-22 PROBLEM — I42.8 NONISCHEMIC CARDIOMYOPATHY: Status: ACTIVE | Noted: 2017-11-13

## 2022-04-26 ENCOUNTER — APPOINTMENT (OUTPATIENT)
Dept: HEMATOLOGY ONCOLOGY | Facility: CLINIC | Age: 42
End: 2022-04-26

## 2022-04-26 DIAGNOSIS — I42.8 OTHER CARDIOMYOPATHIES: ICD-10-CM

## 2022-05-03 ENCOUNTER — APPOINTMENT (OUTPATIENT)
Dept: UROLOGY | Facility: CLINIC | Age: 42
End: 2022-05-03

## 2022-05-20 ENCOUNTER — RESULT REVIEW (OUTPATIENT)
Age: 42
End: 2022-05-20

## 2022-05-20 ENCOUNTER — APPOINTMENT (OUTPATIENT)
Dept: HEMATOLOGY ONCOLOGY | Facility: CLINIC | Age: 42
End: 2022-05-20

## 2022-05-20 LAB
AFP-TM SERPL-MCNC: 2.3 NG/ML
ALBUMIN SERPL ELPH-MCNC: 4.4 G/DL
ALP BLD-CCNC: 54 U/L
ALT SERPL-CCNC: 63 U/L
ANION GAP SERPL CALC-SCNC: 13 MMOL/L
AST SERPL-CCNC: 39 U/L
BASOPHILS # BLD AUTO: 0.02 K/UL — SIGNIFICANT CHANGE UP (ref 0–0.2)
BASOPHILS NFR BLD AUTO: 0.3 % — SIGNIFICANT CHANGE UP (ref 0–2)
BILIRUB SERPL-MCNC: 0.4 MG/DL
BUN SERPL-MCNC: 16 MG/DL
CALCIUM SERPL-MCNC: 9.4 MG/DL
CHLORIDE SERPL-SCNC: 102 MMOL/L
CO2 SERPL-SCNC: 26 MMOL/L
CREAT SERPL-MCNC: 1.1 MG/DL
EGFR: 87 ML/MIN/1.73M2
EOSINOPHIL # BLD AUTO: 0.1 K/UL — SIGNIFICANT CHANGE UP (ref 0–0.5)
EOSINOPHIL NFR BLD AUTO: 1.5 % — SIGNIFICANT CHANGE UP (ref 0–6)
GLUCOSE SERPL-MCNC: 104 MG/DL
HCG-TM SERPL-MCNC: <1 MIU/ML
HCT VFR BLD CALC: 48.1 % — SIGNIFICANT CHANGE UP (ref 39–50)
HGB BLD-MCNC: 16.1 G/DL — SIGNIFICANT CHANGE UP (ref 13–17)
IMM GRANULOCYTES NFR BLD AUTO: 0.7 % — SIGNIFICANT CHANGE UP (ref 0–1.5)
LDH SERPL-CCNC: 160 U/L
LYMPHOCYTES # BLD AUTO: 2.64 K/UL — SIGNIFICANT CHANGE UP (ref 1–3.3)
LYMPHOCYTES # BLD AUTO: 38.8 % — SIGNIFICANT CHANGE UP (ref 13–44)
MAGNESIUM SERPL-MCNC: 2.3 MG/DL
MCHC RBC-ENTMCNC: 32.8 PG — SIGNIFICANT CHANGE UP (ref 27–34)
MCHC RBC-ENTMCNC: 33.5 G/DL — SIGNIFICANT CHANGE UP (ref 32–36)
MCV RBC AUTO: 98 FL — SIGNIFICANT CHANGE UP (ref 80–100)
MONOCYTES # BLD AUTO: 0.57 K/UL — SIGNIFICANT CHANGE UP (ref 0–0.9)
MONOCYTES NFR BLD AUTO: 8.4 % — SIGNIFICANT CHANGE UP (ref 2–14)
NEUTROPHILS # BLD AUTO: 3.43 K/UL — SIGNIFICANT CHANGE UP (ref 1.8–7.4)
NEUTROPHILS NFR BLD AUTO: 50.3 % — SIGNIFICANT CHANGE UP (ref 43–77)
NRBC # BLD: 0 /100 WBCS — SIGNIFICANT CHANGE UP (ref 0–0)
PLATELET # BLD AUTO: 164 K/UL — SIGNIFICANT CHANGE UP (ref 150–400)
POTASSIUM SERPL-SCNC: 4.4 MMOL/L
PROT SERPL-MCNC: 7.5 G/DL
RBC # BLD: 4.91 M/UL — SIGNIFICANT CHANGE UP (ref 4.2–5.8)
RBC # FLD: 12.6 % — SIGNIFICANT CHANGE UP (ref 10.3–14.5)
SODIUM SERPL-SCNC: 141 MMOL/L
TESTOST SERPL-MCNC: 493 NG/DL
WBC # BLD: 6.81 K/UL — SIGNIFICANT CHANGE UP (ref 3.8–10.5)
WBC # FLD AUTO: 6.81 K/UL — SIGNIFICANT CHANGE UP (ref 3.8–10.5)

## 2022-05-26 ENCOUNTER — OUTPATIENT (OUTPATIENT)
Dept: OUTPATIENT SERVICES | Facility: HOSPITAL | Age: 42
LOS: 1 days | Discharge: HOME | End: 2022-05-26

## 2022-05-26 DIAGNOSIS — Z90.79 ACQUIRED ABSENCE OF OTHER GENITAL ORGAN(S): Chronic | ICD-10-CM

## 2022-05-26 DIAGNOSIS — C80.1 MALIGNANT (PRIMARY) NEOPLASM, UNSPECIFIED: ICD-10-CM

## 2022-05-26 DIAGNOSIS — Z98.890 OTHER SPECIFIED POSTPROCEDURAL STATES: Chronic | ICD-10-CM

## 2022-05-26 DIAGNOSIS — Z95.828 PRESENCE OF OTHER VASCULAR IMPLANTS AND GRAFTS: Chronic | ICD-10-CM

## 2022-05-26 NOTE — ED ADULT TRIAGE NOTE - ESI TRIAGE ACUITY LEVEL, MLM
Patient will arrive at 1100 at [facility] on 6/1/2022 for his procedure at 1300. 1. Do not eat or drink anything after midnight - unless instructed by your doctor prior to surgery. This includes                   no water, chewing gum or mints. 2. Follow your directions as prescribed by the doctor for your procedure and medications. 3. Check with your Doctor regarding stopping vitamins, supplements, blood thinners (Plavix, Coumadin, Lovenox, Effient, Pradaxa, Xarelto, Fragmin or                   other blood thinners) and follow their instructions. 4. Do not smoke, and do not drink any alcoholic beverages 24 hours prior to surgery. This includes NA Beer. No street drugs 7 days prior to surgery. 5. You may brush your teeth and gargle the morning of surgery. DO NOT SWALLOW WATER   6. You MUST make arrangements for a responsible adult to take you home after your surgery and be able to check on you every couple                   hours for the day. You will not be allowed to leave alone or drive yourself home. It is strongly suggested someone stay with you the first 24                   hrs. Your surgery will be cancelled if you do not have a ride home. 7. Please wear simple, loose fitting clothing to the hospital.  Liliane Gomezer not bring valuables (money, credit cards, checkbooks, etc.) Do not wear any                   makeup (including no eye makeup) or nail polish on your fingers or toes. 8. DO NOT wear any jewelry or piercings on day of surgery. All body piercing jewelry must be removed. 9. If you have dentures, they will be removed before going to the OR; we will provide you a container. If you wear contact lenses or glasses,                  they will be removed; please bring a case for them. 10. If you  have a Living Will and Durable Power of  for Healthcare, please bring in a copy.            11. Please bring picture ID,  insurance card, paperwork from the doctors office    (H & P, Consent, & card for implantable devices). 12. Take a shower the morning of your procedure with Hibiclens or an anti-bacterial soap. Do not apply any make-up, deodorant, lotion, oil or powder. 13.  Enter thru the main entrance wearing a mask on the day of surgery. Patient will take his atenolol the morning of his procedure. Spoke with patient's wife Annika Chu and she will call Dr Nunez Single office about getting prep instructions. 2

## 2022-06-02 ENCOUNTER — APPOINTMENT (OUTPATIENT)
Dept: HEMATOLOGY ONCOLOGY | Facility: CLINIC | Age: 42
End: 2022-06-02
Payer: MEDICAID

## 2022-06-02 PROCEDURE — 99214 OFFICE O/P EST MOD 30 MIN: CPT | Mod: 95

## 2022-06-02 RX ORDER — AMOXICILLIN 500 MG/1
500 CAPSULE ORAL
Qty: 21 | Refills: 0 | Status: DISCONTINUED | COMMUNITY
Start: 2022-01-19

## 2022-06-02 NOTE — ASSESSMENT
[FreeTextEntry1] : \fabian Poon is seen via telehealth services today.  He had an appointment in April which he missed.  He said that he had a fever overnight, and he called today to change to a telehealth.  This was supposed to be an in office visit.  He is more than 4 years since his initial diagnosis.  He is approaching 5 years actually, but his treatment was delayed due to COVID.  He says that he is "still dealing with side effects, I try to push myself as best I can".  Says that he is lost weight over several months, which she says is about 40 pounds, unintentional basis.  He has changed his diet and eat smaller quantities and avoid some foods that do not agree with him.  He is also walking somewhat more but usually was someone else as he is concerned about his balance.  He has fallen a few times as a result of his neuropathy and he states that he feels clumsy he continues with his neuropathy involving both the hands and feet.  He has tingling and burning in the left foot more so than the right foot.  He feels the left hand is stronger than the right even though he is right-handed.  He says he does not realize how much pressure to place if he grabs a cup, and he may drop it.  The neuropathy extends to the ball of the foot on the right foot, but extends to the mid calf on the left side.  He says the area feels tight and uncomfortable.  There is no cough or shortness of breath.  He still has some occasional edema of his legs at times and he says the skin may crack but there is no fluid leaking.  He has no chest pain or chest pressure.  He has occasional he does have some palpitations which are brief duration.  He may have some nausea at times with certain smells or taste and he notes some regurgitation at times.  He may go for 5 days without a bowel movement and then have some diarrhea.  He saw a dentist recently at [facility], and he said that his teeth were "blowing up".  He is going to have 6 teeth removed the [facility].  He was also told he needs some implants for his front teeth.  The dentist blamed his chemotherapy.  His libido is poor.  He has no desire.  There are no erections.  He does not try to masturbate.  He feels that his urinary flow is restricted and he has to wait a minute or 2 before the urine comes out.  There is no associated dysuria.  Nocturia occurs 5 times.  He sometimes has to return to the bathroom after a short time for incomplete voiding.  He admits to having depression which is still present.  He also admits to anxiety as he said he does not know what is going to happen to him\par Does not know how to cope.  He does not feel he can benefit from any mental health professional and he is not interested in seeing anyone.  He says that he just has to adapt to the way things are.  Fatigue is less at this time.  Mentally he does not feel that he is "where he should be".  He uses medical marijuana which is costly, but he feels it helps the neuropathy more than any other medication that he has been prescribed.  He does have some fatigue.  He has occasional cough.  He does have some muscle pains as part of his neuropathy complex.  He also has joint pains.  He feels he has some easy bruisability at times.\par \par On physical examination, he appears well and in no acute distress.  His performance status is 2.\par \par He is much more talkative and seemingly much better adjusted at this time than on prior visits.\par \par His alpha-fetoprotein was 2.3 on recent blood work.  The beta-hCG was undetectable.  His testosterone level was well within normal limits at 493.  His serum magnesium was 2.3.  The chemistry results were normal with the exception of an SGPT elevation, grade 1, to 63.  This is been the case for quite some time.  The LDH was 160.  The CBC was normal.\par \par Cleve had delayed treatment and that his fourth cycle was not given for some time.  It was subsequently omitted as he had a tachycardia with a low ejection fraction.  His RPL ND was delayed and he had residual viable embryonal carcinoma.  He started on tip chemotherapy in 2018.  Cycle 3 was delayed for grade 3 fatigue and then once again in 1 week more for transaminitis.  A fourth planned cycle was omitted.  2 out of 57 lymph nodes were positive for embryonal carcinoma with extranodal extension, pathologic N 3.\par \par His last chemotherapy was nearly 4 years ago and has had no evidence of recurrence.  His last CT scan was in July of last year.  A CT scan has been ordered, but its been delayed due to the lack of iodinated contrast material.  It scheduled for June 16.  I will contact him about the results of his CT scan afterwards.  I asked him once again about seeing a mental health professional, and he once again stated that he does not feel that he can benefit from such an intervention.\par \par All questions were answered to the best of my ability and to his apparent satisfaction.  I will meet with him once again in 3 months time and he knows that he should contact me if he has any concerns in the interim.  His last echocardiogram was in 2019 and showed that his left ventricular ejection fraction had returned to normal values at 55%.\par \par

## 2022-06-02 NOTE — HISTORY OF PRESENT ILLNESS
[de-identified] : Mr. Calderón is seen in consultation on 8/8/17. On July 11, 2017, he awoke with severe back pain extending into the left testicle. There was a heaviness and pulling sensation. He went to the ER at [facility] and a sonogram was done, revealing a mass. He was seen by Rivas Oconnor and underwent a left radical orchiectomy on July 20, 2017. The pain and pulling sensation resolved. He has been on oxycodone a time, since January 2017, for low back pains. No respiratory complaints. No urinary symptoms. Has had sperm cryopreservation. \par \par 8/30/17...Cleve comes in today for follow up was just discharged yesterday. He was in the hospital for 4 days and he was neutropenic, found a right arm superficial thrombosis at the site of IV. treated with vanco, blood cultures negative. Discharged on Levaquin. Overnight he had a temp of 104.  He is having some hair loss, numbness in his fingers and his toes. He has tinnitus intermittently, started two days after the last dose. He was having soreness in the back of his throat that now resolved. He complains of dysgeusia and he had a lot  of burning and acid reflux-like symptoms during his treatment Carafate, Protonix and the lower dose of Decadron improved his symptoms., Not sleeping well, likely due to steroids.\par \par 9/19/17...cycle 2 day 5 was 9/9/17\fabian Poon is seen in the office today. He has significant fatigue. He had vomiting for 5 days after the chemotherapy with nausea and he said that his complexion was green. He had acid reflux issues once again despite the dropping off the dose of his dexamethasone. He feels a burning sensation in his lower abdomen and he feels that the medications for nausea did not help. He also has significant fatigue which is improved today. He has intermittent tinnitus. He has tingling in the tips of his toes and fingers which are intermittent. His appetite has been good and he is gained weight while on chemotherapy but he says that he eats smaller portions. He denies dysgeusia or dry mouth.\par \par 10/9/17...Completed cycle 3 last week.  He states he feels "horrible." HE states he noticed bloating in the abdomen, He went a whole week after this treatment wit vomiting every day, last time he vomited was two days ago. He fell at one point secondary to being weak and feeling lightheaded. He feels generalized weakness.He had a blood clot, during his treatment, that resolved. His appetite is poor, He eats and then, he eats, he feels like he has to regurgitate his food up after eating. He has fatigue, tinnitus stopped. he denies any numbness of the feet. has had chronic numbness of two of his fingers.He has dysgeusia \par \par 10/30/17...Admission Date: \par -  Admission Date 16-Oct-2017 13:33. \par Discharge Date: \par - Discharge Date	21-Oct-2017 \par Chief Complaint/Reason for Visit: \par Chief Complaint/Reason for Admission	Atrial flutter with RVR\par *******************\par  38 yo M with PMHx of testicular cancer s/p left orchiectomy on chemotherapy who present to the ED before receiving chemo for tachycardia. \par The patient states that he has been feeling well for the past 2 weeks since his 3rd dose of chemotherapy. He denies CP, ABARCA, peripheral edema, cough, \par palpitations. He does states that he had one near syncopal event during a bout of emesis 2 weeks ago which caused him to fall into a door. Has not had any \par other syncopal or presyncopal episodes since then. In the ED he was found to have tachycardia found to be a.flutter after receiving 10mg IV diltiazem x2, a CXR with well placed portacath and no infiltrations. On bedside US found to have EF of 10-15% while tachycardic. \par The patient was initially started on metoprolol tartrate with increasing dosages unable to control the arrhythmia he was subsequently switch to a diltiazem drip. The metiport was removed after discussion with his Oncologist and cardiology for possible arrhythmia induction which was removed 10/19. He also received a TTE on 10/19 which showed EF of 35-40% with LV remodeling. \par Afterwards he was switched to metoprolol succinate 200mg qday. \par He was started on heparin for preparation for TYLOR and cardioversion which was performed on 10/20 and found a RA appendage thrombus with PFO and reduced LV \par function. No cardioversion was performed as a result and he was switched to rivaroxaban on 10/21, lisinopril 2.5mg qday and metoprolol succinate 200mg \par qday. He will follow with cardiology in 2 weeks and EP in 4 weeks. Treatments/Procedures/Significant Findings/Patient Condition: \par Other Significant Findings: \par - Other Significant Findings	 \par < from: Transesophageal Echocardiogram w/o TTE (10.20.17 @ 12:08) > \par Conclusions: \par 1. Left atrial enlargement.  No left atrial or left atrial appendage thrombus. \par 2. Moderate to severe global left ventricular systolic dysfunction. \par 3. There is an echodensity (likely thrombus vs mass) in the right atrial wall that measures (approximately 2.7 cm x 2.0 cm). This is near the distal point of the SVC and may \par represent thrombus from prior indwelling catheter. \par 4. Right ventricular enlargement with decreased right ventricular systolic function. \par 5. Color Doppler demonstrates evidence of a patent foramen ovale. \par 6. Trivial pericardial effusion. \par ****************************************************************************\par TYLOR showed right atrial appendage thrombus, 27 mm, On Xarelto. Feels "exhausted". Does not generally note palpitations. Anxiety level is high. Occasional ABARCA, not at rest. No cough, no sputum, no wheezing. No nausea at this time, has regurgitation. Occ vomiting noted. Fatigue is prominent. Appetite is good. \par \par 7/9/18...Had RPLND on 6/6/18, with viable GCT present. Embryonal pathology. Says he have fatigue, still "recovering" from the RLPND. He has some soreness of the abdomen and says he has some delayed healing. He say Dr Piper on 7/6 in lieu of Dr Restrepo and was told that it was healing well. He is upset that there is residual cancer present, which he says "bummed him out completely". He says he has been reviewing issues related to continued treatment, only after I called him recently as he had not made an appointment. he says his appetite is poor and he is eating small amounts multiple times as he cannot eat full full meals. He says he has had some nausea and he started OTC Prevacid, which has helped. No significant weight loss. Some ABARCA if he walks about 3 blocks or so, he has fatigue with walking as well. he has some paresthesias of the left thigh after the surgery. He notes some spasms of the muscles in that area at times. Urine flow is normal, no nocturia. Libido down, not sexually active, no masturbation since surgery. \par \par 7/19/18...Feels the same, "still recovering". Still has some discomfort near the wound. Appetite s good. Gained 2 pounds. no cough. Mild ABARCA with activities. No palpitations. No leg edema. He continues with some fatigue, 6 on 0-10 scale. Feels he is not motivated when the fatigue is high. He feels his hands are weaker than before. Lifting is fine, but twisting a bottle to open may be  impaired. Ambulates with a cane  due to paresthesias of the left anterior thigh. This from knee  to thigh area,. No paresthesias of the feet. He sometimes has some pains in his feet at night which he says are not cramps. \par \par 9/6/18...Cycle 2, TIP, day # 9. Admitted on Sunday, August 19. \par *********************************************\par Hospitalized again post chemo, this time for 9 days. ANC was <0.1, platelets were low. had low sodium, low potassium and low magnesium. He has had lots of muscle aches and bone pain secondary to the Taxol. Feeling better, still feels weak. Some SOB with exertion. No pains. No headaches, has paresthesias of the bottom of the feet along with some toes. It improves after the chemo. Somewhat more this time. No fevers, no chills. Some nausea with office visits. Appetite is "moderate". Tinnitus has improved, minor dizziness at times. \par \par 8/30/18...Delayed treatment #3 by one week. "Not a good week". Complains of hearing being sensitive. He has tinnitus. This is intermittent, more in the past 2-3 days. He has some vertigo, with some issue walking. He feels as if he is falling to the right side at times, no falls. He has to hold onto the door or furniture to walk. He has some vomiting without nausea. He vomited a few minutes ago. Appetite is decreased, He has altered taste as well. Fatigue is present, not as bad as before. he says he is a "little" fatigued at the moment. Paresthesias of feet have been intermittent, less notable at this time. Spends a lot of time sitting, sleeping. He feels as if his body is exhausted. His mother says he has a lot of anxiety. He agrees. He has thoughts running through his mind about chemo and adverse effects. \par \par 9/20/18...he feels "okay" at times. Occasionally feels nauseous and can't eat, occ feels dizzy. has some regurgitation at times. Has fatigue. No better. Tinnitus is "not as bad", persistent in the right, intermittent in the left. When dizzy,  he feels he is leaning to one side. Spends a lot of time sitting or lying in bed. Paresthesias of the feet, left foot more so than right. Right toes affects the 4th and 5th toe. Fingertips are affected. He notes some darkening of skin folds of the hand joints. Feels no SOB unless he is active. using a cane today. Not taking any meds at this point. \par \par 10/9/18...Admission Date: after cycle 3 TIP\par -  Admission Date 01-Oct-2018 02:09. \par Discharge Date: \par - Discharge Date	06-Oct-2018 \par ***********************************\par HOSPITAL COURSE: The patient continued to have significant nausea while inpatient with intermittent vomiting. He received aggressive IV hydration with \par IVF while he was no longer able to tolerate po. On hospital day 4, patient was able to tolerate some po and requested discharge home. His course was c/b \par neutropenic fever to 101. Cultures were sent and remained negative. Patient was also started on cefepime. His ANC started to improve towards the end of the \par hospital course. His platelets, however, downtrended. He was given 1U platelets as per oncology recommendations. His midline was noted to have been in since \par July. After discussion with his outpatient oncologist, the patient opted to have his midline removed and will receive his last dose of chemotherapy via \par peripheral IV. During his hospital course, patient was noted to have an episode of tachycardia to the 140's followed by bradycardia to the 20's during \par orthostatic vitals. He was seen by Dr. Brown of cardiology who feels this is chemo-induced orthostasis and no further inpatient w/u is needed. He is \par medically stable for dc home. Prior to discharge received additional platelet transfusion and MID-Line was removed after discussion with oncology. \par *******************************************************************\par He was admitted and told that the midline had to be changed. He did have fever with neutropenia. Discharged on the 6th. Hypotensive with orthostasis,seen by cardiology, restarted lisinopril and carvedilol. He has fallen twice since discharge. He has LOC for a few seconds. he is somewhat confused after the episodes. Walking with a cane. he also had an similar episode while in the hospital. Appetite returned, has altered taste, no N/V now, but did have on admission. No source of fever found. Has paresthesias of the left foot, all toes, not his right.  Had aches and pains with the Taxol, which he describes as "brutal". \par \par 10/29/18...called last week, was having difficulty keeping food down and felt weak and dizzy. We arranged for fluids, but he called Friday ad declined. Taking Zofran ADT after meals at times. Able to drink more at this time. Did not eat this AM. Yesterday, had a bagel and egg, for lunch, potatoes, pork and beans and Spam. For dinner had rice and Spam, small amount. No vomiting yesterday, but had nausea. Dizziness if he leis his head back, or if he gets up to fast. Walking with  a cane. No falls. Started Has paresthesias of the feet, on the left side. Continues on oxycodone, about  1 per day, chronic use for low back pains. No leg edema. No cough, ABARCA. \par \par 11/13/18...has not followed with Dr Marquez and remains off of carvedilol, enoxaparin and lisinopril. He takes ondansetron on occasion. Says he had a "blackout" episode after getting up, fell, striking left side, no injury. No LOC. Neuropathy remains, mostly left leg, somewhat better at times, continues on therapy. Minimal on the right side. Considering returning to work. Fatigue is present, somewhat better. Appetite is still impaired, eats once a day. Gained a few pounds. Still has some taste alteration. No leg edema. No abdominal pains. Saw Phong Restrepo yesterday in follow up. He has constipation, and may go 4-5 days w/o bowel movement. \par \par 1/4/19...Had echo done 12/2018. "I'm okay", feeling better, saw Dr Marquez. Paresthesias are an issue. On gabapentin 300 TID, says it is not helping. He says his fingers are affected, works on computers, and hits wrong keys at times. Worse are his feet. Left foot is numb, right foot partially numb, annoying when walking, not sensing if he steps on something. He continues on oxycodone. Affect toes, senses that they are swollen, bottom of the feet as well as the top. Does not pass the ankle. In the hands, it is the 3rd and 4th finger distally on the right side, and 3-4-5 on the left. Echo said that LVEF is about 44-46 % with moderate global dysfunction. The LA appendage clot was not seen. Not as tired as before, walked 11 minutes yesterday with some fatigue. Occ mild nausea, no vomiting. Appetite is good, gained 4 kilos on the past month( points out that he is wearing heavy boots. . Still has some dysgeusia. Still avoids meats.  Occasional HA, lasts up to 1/2 day, occurs intermittently, once a week. More right side of the headaches. Prior sinus headaches, but not the same, has a sharp element to it. Stills feels dizzy at times when gets up too fast..\par \par 3/11/19...Urgent visit. Called this AM stating that the right testicle is swollen, present for a few days, mild tenderness, mild discomfort/pain. No back pains except for his chronic back issue. He notes some skin changes of his hands and his feet.e is some peeling skin at the base of the toes. as well as the center of the foot. He has neuropathy of both feet, more on the left than the right, where it only affects the toes. He says the medial 3 fingers are numb and he drops things. His heart exam included a stress test, which he reports the outcome was good. Still reports tinnitus. Appetite is good, weight is increased 6 kilos since 1/30/19. Still has fatigue. \par \par 5/11/19...Missed appt 4/5/19. I " have good days and bad days". He feels he puts on a lot of weight in  a short time, and then drops weight in a short period of time. He had edema of both feet and also feels he had edema of the hands as well. He says edema accumulates and then resolves. Neuropathy is "pretty bad", extends to the calves, worse at night. Ran out of gabapentin, was on 300 TID and did not feel it helped. He has discoloration of the left foot, edema both feet. Has cramps in the legs. Feels he cannot stand for a long time, and complains of blistering of the feet. He has shooting pains in the toes. There are fluid filled blisters that he he "has to pop", due to pain. He feels he s less active due to the blisters. He feels his toes are locked i position. Does exercises provided by PT. Appetite is good. He is thirsty many times. NO pains elsewhere except chronic back pain, for which he takes oxycodone. No cough, has some ABARCA, can walk 2 blocks prior to stopping. Sees DR Marquez later today. Fine motor issues with fingers with neuropathy, difficulty with typing on keyboard, with many errors. \par \par 7/2/19...Hospital Course: \par Discharge Date	12-Jun-2019 \par Admission Date	12-Jun-2019 00:37 \par Reason for Admission	vomiting, vertigo \par Medication Reconciliation Status	Admission Reconciliation is Completed \par Discharge Reconciliation is Completed \par Hospital Course	 \par 38M with PMHx of metastatic testicular CA, NICM, neuropathy, who presents with dizziness, nausea, and vomiting. \par Vertigo. \par - will c/w meclizine 25mg 4x/day \par - f/u blood and urine cx to r/o infectious etiology \par - PT eval, fall precautions. Constellation of symptoms with horizontal nystagmus and reproducible symptoms with daryn hallpike maneuver c/w BPPV. CT \par head negative. Pt reports improvement after meclizine. \par - will c/w meclizine 25mg 4x/day \par - f/u blood and urine cx to r/o infectious etiology \par - PT eval \par  Cardiomyopathy. 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 Most recent TTE on 6/4 showing EF55%. Pt concerned current sx may be side effect from entresto \par - c/w carvedilol 12.5mg BID \par - will hold entresto in setting of soft BP and pt hesitancy to restart due to side effects. In prior studies, dizziness is reported as an adverse event "at \par least 5%" of patients taking entresto. \par will hold all BP meds upond d/c 2/2 hypotension \par  Vomiting. \par - will c/w IVF overnight and repeat lactate in AM \par - zofran PRN nausea. \par  Mixed germ cell tumor. \par  now in remission. \par **********************************************\par Was hospitalized and now off carvedilol and Entresto. Doing better at this time. Only bothered by paresthesias of the feet and occasion syncope with changes in position. the latter has occurred when getting up from bed or with sitting to standing at times. Appetite is good, no weight loss. No cough, no ABARCA. No palpitations noted. Chronic back pains, no changes. No HA. Libido is down, erections are difficult to achieve, are not durable,  no ejaculation.\par \par 9/10/19...Feels "okay". he has some intermittent swelling noted in the hands and feet, occasionally in face, may last for several days. He has paresthesias of the fingertips the hands, not as bad as it was previously. It can be shock, like, noted more at night, more prominent in the feet. Gabapentin didn't help. Has his usual back pains, takes oxycodone 15 mg 2-4 times a day depending on pain. Appetite is good, No diarrheal stools, has constipation. Weight increased 3 kilos. No nausea, no vomiting.  BM about 3 times a week, sometimes hard. Advised to take Colace. Occ has a blistering rash on the bottom of the feet, Rx'd from dermatology.He still has blisters from time to time. The foot swells and the skin starts to crack. Fatigue unchanged. Tries to walk about but the pain in the feet causes discomfort, occasionally uses his cane to walk. Urine flow is impaired as before with some discomfort, not burning. Nocturia x 3-4. NO blood. occ incontinence- leaking of urine if he goes to stand at times. He noted blood in his stool 2 night sin a row. Reddish color, fresh appearing. \par \par 12/13/20...Continues to struggle with neuropathy, seen by neuro, had NCV, started nortriptyline for the neuropathy at bed time.  He feels it helps somewhat, dose increased to 50. Appetite is good. Weight is stable. Has impaired activities due to the neuropathy, does not leave the house much. Goes to PT and doctor appts. Has back pains form prior injury, on long term oxycodone, takes 15 mg 3-4 times a day. Anxiety level is low except at night, which he attributes to pain. He feels he is not depressed. No cough, mild ABARCA, attributed to weight increase. Occ headaches, right sided, once every few months. Fatigue is 8 on a 0-10 scale. Poor libido, not sexually active, no attempts\par \par 6/9/20...Seen today. Was on zonasamide by neurology, felt he had tremors and grater degree of anxiety. Insurance won't permit Lyrica, gabapentin created swelling. Taking oxycodone 15, up to 4 times a day, trying to cut down. Interested in med marijuana. he says he does not sleep well. Awake from 2 to 6 AM. Neuropathy keeps him awake, less noted during the day. Neuropathy affects the feet to the upper calves, shooting pains at night. Also has neuropathy of the hands as well. Says he has difficulty typing on the computer. Hand will shake when using the mouse. Occ drops a fork. Appetite is good, weight increased. Knee pains as well. No cough, some ABARCA remains, which he attributes to lack of exercise. Not seeing a mental health professional. I have suggested he see Dr Almanza in the past. He feels he has spent 5 years in "isolation". \par Neurologist notes reviewed they ordered an immunofixation which showed a slight IgG lambda band. No further evaluation was performed.\par No headaches recently, occ dizziness of he gets up fast. Tries to keep himself active, but says he cannot do much. He says his feet swell with activities. He walks with a cane.\par \par 9/8/20... Presents for follow up. On medical marijuana, which he says helps, uses mainly at night as a sleep aide. Chronic low back pains from prior injury, oxycodone 15, 3-4 times a day.  Pain always present, does not awaken him. He does have some sleep issues due to the neuropathy. Neuropathy extends to lower 1/3 of calves. No better, no worse. No other pains except some joint pains. Appetite is decreased, he feels is sec to the duloxetine. He sees Symone Shields. He has lost some weight. No cough, no ABARCA. Fatigue is present, stated to occur after 2 blocks of walking, fatigue in the legs. He says he is active during the day, walks about, less computer and video game use. Occ nausea, no vomiting. No diarrhea, no constipation. Urine flow is weaker, some dysuria/pain with voiding. No blood in urine. Has not any UTIs, since the surgery. No libido, no interest in masturbation, has not tried. No AM erections. T levels have been normal. \par \par 12/8/20...Off the oxycodone 15s, on medical marijuana, which he feels it is better controlled. He sleeps better. has some pain, but did not take anything this AM. Lower back is the sites as well as the discomfort in the extremities. The med marijuana helps the neuropathy as well. Eats more, gained weight. He is more energetic. He feels it is hard to exercise, as he is not as strong as before, tired after a walk down the block. Much more talkative today. he is concerned about blacking out. Also has some muscle spasms at times with activities. Does climb stairs in the house. No cough. ABARCA present. He feels his urine stream is restricted with some discomfort. "Like a lot of fluid trying to fit thru a small space". He is going to call Dr Oconnor about these issues. uses a shower chair as he is worried about "blacking out in the shower" if he raise his hands above his head. \par \par 3/9/21 - Verbal permission for telehealth services was granted by the patient, Cleve Calderón, on March 9, 2021 at 10:12 AM. \par Mixed germ cell tumor Diagnosed in 2017, underwent radical orchiectomy on the left side in July 20, 2017.  His stage was 2A, good risk.  He received cisplatin and etoposide starting on August 14 of 2017.  Cycle 4 was delayed and then omitted as he presented with supraventricular tachycardia and a low ejection fraction.  This was felt to be secondary to the indwelling central venous catheter, which was located in the appropriate spot.  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  He underwent an RPLND which was delayed, and he had residual viable embryonal carcinoma.  He was started on TIP in July 2018, received 3 cycles. \par \par Overall, feels the same. Neuropathy is the main issues, feels he cannot rest at all. The medical marijuana helps him rest. Takes it at night and throughout the day when he feels he needs it. Onset of action if from 7 to 20 minutes. Appetite is fine, no weight loss of note. No cough, some ABARCA, after one block, he is SOB and exhausted. Occ headaches noted, brief duration. Some balance issues, no falls. He still feels he has some dexterity issues in the hands. He has some discomfort when he voids, a pressure like sensation as if the has some flow issues, since the surgery, no change. No dysuria. Has not had a CV vaccine. Libido is poor, no attempt, no erections. 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 Had to receive additional chemo with TIP. He is active and exercising, frustrated with the disability process. He is able to do more at this time. Has pain from the neuropathy with walking uphill. He notes some swelling of the left leg and right hand area, and sometimes the swelling in the hand makes it difficult to close his fist. Appetite is "poor" lately , trying to lose weight. He feels food is not as appealing as it once was, antedates this to the chemotherapy. Has had some nausea in recent times. He feels red meat is the issue. This has occurred for "some time". He says his weight was 289, now 260, over the course of 2.5 weeks. He also notes some bloating and gas. Some cramp like pains at times. had his first CV vaccine (Moderna). Breathing is fine, notes some chest pressure the last few nights. Some minor headaches in recent times, middle of the head, takes Advil or Tylenol, which does not seem to help much. These occur about 2 times a week, may last few minutes. Urinary difficulty, feels the muscle is not fully "retracting". Feels the flow is not what it should be and has a sensation of incomplete voiding. NO blood, occ incontinence-occurred x 2. Feels his right arm has increased numbness and it may be a bit weaker. Was doing exercises that PT had previously recommended. Feels he has issues when typing. No F/C/S. Notes fatigue, exercises in the backyard at times. If he stands more than 10 minutes, he feels exhausted.  Libido is poor. No interest, not sexually active, no masturbation. No full AM erections.\par \par 10/12/21...Dr Shields notes reviewed. last chemo was 3 years ago. Doing well. Overall, he feels occasional nausea at times. He is on duloxetine and medical marijuana. Still has complaints about his neuropathy with pain and discomfort, not as well controlled as it was last few visits, when he said the med marijuana produced much benefit. He said it calms him, but he still does not sleep well. He feels the duloxetine helps with the tingling sensation. Bottom of the feet feels as if he has "bubble wrap" on the bottom of his feet. Occ falls in the shower, some issue with stairs. Appetite is good, but says he lost some weight. Not seen in office since December 2020. Says he weight 257 at home, was 283 last visit. Occ nausea, no recent vomiting. Occ headaches. Tries to walk about during the day, and trying to be more active. Anxiety and depression are the same. Denies thoughts of self harm. Says he is frustrated at times. 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 Received 3 cycles, delayed interval, then started TIP, received 3 cycles, went to [facility].  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 No subsequent therapy.\par Stopped duloxetine, as he felt like a different person. He remains on medical marijuana. Neuropathy is still an issue. He has some dizziness at times, if he gets up to quickly or turns too quickly. He feels his nervous system is "all screwed up". The dizziness occurs a few times a day to a few times a week. He reads, walks about, does not go out due to COVID concerns, received booster a few weeks ago. Appetite is "variable". He says he loses weight and then regains the weight. Nausea at times, a chronic issue. No vomiting, no constipation, no diarrhea. Headaches occur, chronic, at least once a week. Mid skull, takes Advil or Tylenol with some relief. He thinks they are sinus headaches. No edema in legs, feels his feet are swollen at times. Paresthesias extend to just below the calf on the left, on right, just the ball of the foot, no change. Fatigue is considerable, says he cannot stand for more than 10 minutes w/o being exhausted.  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 Cycle 4 delayed..then omitted due to tachycardia and low EF. RPLND delayed, residual viable embryonal cancer, TIP started July 29, 2018, cycle 3 delayed for grade 3 fatigue, then again one more week for transaminitis. Fourth planned cycle omitted. RPLND June 2018,  2 of 57 LN positive with FINA, embryonal, pN3 [de-identified] : 6/2//22...missed April TEB. More than 4 years after diagnosis.  "Still dealing with side effects, I try to push my self as best I can". Has lost weight, about 40 pounds, intentionally. He has changed his diet, eats smaller quantities, avoids some foods that don't agree with him. He is also walking more, usually with someone else. He has fallen a few times. as a result of the neuropathy. He feels clumsy. \par Continues with his neuropathy, hands and feet, tingling and burning in left foot> right foot, feels left hand is stronger than the right. Says he does not realize how much pressure to place if the grabs a cup, may drop it. Neuropathy extends to the ball of the foot on the right foot, extends to mid calf on the left. The area feels tight and uncomfortable. No cough, no ABARCA. Still has some occasional edema of the legs and the skin may crack, no fluid leaking. No chest pain/pressure/...some palpitations on occasion are noted. Has some nausea at times with certain smells or taste, may have regurgitation at times. He may have 4-5 days w/o a BM, then may have some diarrhea. Saw dentist recently as he had some issues with his teeth, he is going to have 6 teeth removed at [facility]. Also has to have some implants for the front teeth, dentist blamed the chemo. Poor libido, no desire, no erections, does not try to masturbate. He feels like his flow is restricted, has to wait a minute or two before urine comes out, no dysuria. Nocturia x 5, sometimes has to return to bathroom for incomplete voiding. He admits to depression, still present, also has anxiety, says he doesn't know what is next and doesn't know to cope. Does not feel he can benefit from seeing someone about A and D, says he has to adapt to the way things  are. Had a low grade temp last night and changed appt to telehealth. Fatigue  is less at this time. Mentally, he does not feel where he should be. Uses medical marijuana, costly, but feels it helps the neuropathy more than any other medication that was prescribed.

## 2022-06-02 NOTE — REVIEW OF SYSTEMS
[Fatigue] : fatigue [Recent Change In Weight] : ~T recent weight change [Lower Ext Edema] : lower extremity edema [Cough] : cough [Constipation] : constipation [Diarrhea] : diarrhea [Joint Pain] : joint pain [Muscle Pain] : muscle pain [Difficulty Walking] : difficulty walking [Anxiety] : anxiety [Depression] : depression [Muscle Weakness] : muscle weakness [Easy Bruising] : a tendency for easy bruising [Negative] : ENT [Fever] : no fever [Chills] : no chills [Chest Pain] : no chest pain [Palpitations] : no palpitations [Wheezing] : no wheezing [SOB on Exertion] : no shortness of breath during exertion [Abdominal Pain] : no abdominal pain [Vomiting] : no vomiting [Dysuria] : no dysuria [Incontinence] : no incontinence [Joint Stiffness] : no joint stiffness [Skin Rash] : no skin rash [Dizziness] : no dizziness [Easy Bleeding] : no tendency for easy bleeding [FreeTextEntry8] : incomplete voiding [FreeTextEntry9] : occ  cramps, muscles less weak c/w before [de-identified] : No HA, paresthesias, see HPI

## 2022-06-02 NOTE — REASON FOR VISIT
[Home] : at home, [unfilled] , at the time of the visit. [Medical Office: ([facility])___] : at the medical office located in  [Verbal consent obtained from patient] : the patient, [unfilled] [FreeTextEntry2] : mixed germ cell tumor

## 2022-06-10 ENCOUNTER — RESULT REVIEW (OUTPATIENT)
Age: 42
End: 2022-06-10

## 2022-06-15 ENCOUNTER — APPOINTMENT (OUTPATIENT)
Dept: CT IMAGING | Facility: IMAGING CENTER | Age: 42
End: 2022-06-15
Payer: MEDICAID

## 2022-06-15 ENCOUNTER — OUTPATIENT (OUTPATIENT)
Dept: OUTPATIENT SERVICES | Facility: HOSPITAL | Age: 42
LOS: 1 days | End: 2022-06-15
Payer: MEDICAID

## 2022-06-15 DIAGNOSIS — Z90.79 ACQUIRED ABSENCE OF OTHER GENITAL ORGAN(S): Chronic | ICD-10-CM

## 2022-06-15 DIAGNOSIS — C62.92 MALIGNANT NEOPLASM OF LEFT TESTIS, UNSPECIFIED WHETHER DESCENDED OR UNDESCENDED: ICD-10-CM

## 2022-06-15 DIAGNOSIS — Z98.890 OTHER SPECIFIED POSTPROCEDURAL STATES: Chronic | ICD-10-CM

## 2022-06-15 DIAGNOSIS — Z95.828 PRESENCE OF OTHER VASCULAR IMPLANTS AND GRAFTS: Chronic | ICD-10-CM

## 2022-06-15 PROCEDURE — 74177 CT ABD & PELVIS W/CONTRAST: CPT

## 2022-06-15 PROCEDURE — 71260 CT THORAX DX C+: CPT

## 2022-06-15 PROCEDURE — 71260 CT THORAX DX C+: CPT | Mod: 26

## 2022-06-15 PROCEDURE — 74177 CT ABD & PELVIS W/CONTRAST: CPT | Mod: 26

## 2022-08-12 NOTE — PATIENT PROFILE ADULT - NSASFUNCLEVELADLTRANSFER_GEN_A_NUR
History   Chief Complaint:  Blood Sugar Problem and Hematemesis       HPI   Wally Avendano is a 33 year old male with history of DM 1 who presents with cramping abdominal pain, vomiting, and high blood sugar levels. EMS reported that his sugar does not usually spike but earlier today it was 480 and EMS measured a blood sugar level of 514. Fluids and Zofran was given en route.   Patient states that he has not been taking his insulin for the past several weeks.  No fevers.  No cough or congestion.  Patient arrives via EMS.  He has coffee-ground emesis.  Denies bloody stool.    Review of Systems   Gastrointestinal: Positive for abdominal pain and vomiting.   All other systems reviewed and are negative.        Allergies:  No Known Allergies     Medications:  Insulin Aspart   Insulin Glargine   Zofran   Phenergan   Glucose   Glucagon      Past Medical History:     Type 1 DM   DKA   Gastroparesis        Past Surgical History:    Gastric biopsies   Duodenum biopsies       Family History:    Mother - PBC      Social History:  Patient presents to the ED via EMS    Physical Exam     Patient Vitals for the past 24 hrs:   BP Temp Temp src Pulse Resp SpO2   08/11/22 2230 129/81 -- -- 100 12 91 %   08/11/22 2215 137/87 -- -- 102 (!) 34 96 %   08/11/22 2145 (!) 148/67 -- -- (!) 140 26 93 %   08/11/22 2130 128/83 -- -- 104 14 97 %   08/11/22 2115 138/78 -- -- 106 15 100 %   08/11/22 2100 136/82 -- -- 97 (!) 6 98 %   08/11/22 2045 (!) 148/87 -- -- 118 (!) 31 98 %   08/11/22 2030 125/81 -- -- 86 10 100 %   08/11/22 1953 135/75 97.9  F (36.6  C) Oral 91 24 --       Physical Exam  Gen: alert  HEENT: PERRL, oropharynx clear, cough, blood in emesis  Neck: normal ROM  CV: RRR, no murmurs  Pulm: breath sounds equal, lungs clear, kussmaul breathing  Abd: Soft, mild epigastric tenderness  Back: no evidence of injury, no cva tenderness  MSK: no deformity, moves all extremities  Skin: no rash  Neuro: alert, appropriate conversation and  interaction    Emergency Department Course   ECG  ECG taken at 2007, ECG read at 2012  Sinus rhythm with sinus arrhythmia  Rightward axis  Pulmonary disease pattern   Rate 93 bpm. AR interval 144 ms. QRS duration 100 ms. QT/QTc 386/479 ms. P-R-T axes 83 109 65.     Imaging:  CT Abdomen Pelvis w Contrast   Final Result   IMPRESSION:    1.  Mild mural thickening of the distal esophagus, may reflect esophagitis secondary to recent history of vomiting.   2.  Normal Appendix        Report per radiology    Laboratory:  Labs Ordered and Resulted from Time of ED Arrival to Time of ED Departure   HEMOGLOBIN A1C - Abnormal       Result Value    Hemoglobin A1C 13.3 (*)    COMPREHENSIVE METABOLIC PANEL - Abnormal    Sodium 140      Potassium 3.8      Creatinine 0.63 (*)     Urea Nitrogen 10.3      Chloride 101      Carbon Dioxide (CO2) 14 (*)     Anion Gap 25 (*)     Glucose 456 (*)     Calcium 9.2      Protein Total 7.2      Albumin 4.6      Bilirubin Total 0.7      Alkaline Phosphatase 97      AST 26      ALT 31      GFR Estimate >90     LIPASE - Abnormal    Lipase 11 (*)    LACTIC ACID WHOLE BLOOD - Abnormal    Lactic Acid 3.3 (*)    BLOOD GAS VENOUS - Abnormal    pH Venous 7.36      pCO2 Venous 29 (*)     pO2 Venous 60 (*)     Bicarbonate Venous 16 (*)     Base Excess/Deficit (+/-) -7.5      FIO2 0     KETONE BETA-HYDROXYBUTYRATE QUANTITATIVE, RAPID - Abnormal    Ketone (Beta-Hydroxybutyrate) Quantitative 5.4 (*)    CBC WITH PLATELETS AND DIFFERENTIAL - Abnormal    WBC Count 16.0 (*)     RBC Count 5.12      Hemoglobin 14.9      Hematocrit 43.9      MCV 86      MCH 29.1      MCHC 33.9      RDW 11.8      Platelet Count 291      % Neutrophils 91      % Lymphocytes 4      % Monocytes 3      % Eosinophils 0      % Basophils 1      % Immature Granulocytes 1      NRBCs per 100 WBC 0      Absolute Neutrophils 14.6 (*)     Absolute Lymphocytes 0.7 (*)     Absolute Monocytes 0.5      Absolute Eosinophils 0.0      Absolute Basophils  0.1      Absolute Immature Granulocytes 0.1      Absolute NRBCs 0.0     ISTAT BASIC CHEM ICA HEMATOCRIT POCT - Abnormal    TOTAL CO2 POCT 16 (*)     Creatinine POCT 0.5 (*)     Hematocrit POCT 44      Calcium, Ionized Whole Blood POCT 4.3 (*)     UREA NITROGEN POCT 9      Glucose Whole Blood POCT 432 (*)     Potassium POCT 3.4      Sodium POCT 142      Hemoglobin POCT 15.0      Chloride POCT 105     GLUCOSE BY METER - Abnormal    GLUCOSE BY METER POCT 446 (*)    ROUTINE UA WITH MICROSCOPIC - Abnormal    Color Urine Light Yellow      Appearance Urine Clear      Glucose Urine >=1000 (*)     Bilirubin Urine Negative      Ketones Urine >150 (*)     Specific Gravity Urine >1.035 (*)     Blood Urine Negative      pH Urine 5.5      Protein Albumin Urine Negative      Urobilinogen Urine Normal      Nitrite Urine Negative      Leukocyte Esterase Urine Negative      Mucus Urine Present (*)     RBC Urine <1      WBC Urine <1     GLUCOSE BY METER - Abnormal    GLUCOSE BY METER POCT 329 (*)    GLUCOSE BY METER - Abnormal    GLUCOSE BY METER POCT 305 (*)    GLUCOSE BY METER - Abnormal    GLUCOSE BY METER POCT 261 (*)    GLUCOSE BY METER - Abnormal    GLUCOSE BY METER POCT 247 (*)    PHOSPHORUS - Normal    Phosphorus 3.6     MAGNESIUM - Normal    Magnesium 1.8     GLUCOSE MONITOR NURSING POCT   COVID-19 VIRUS (CORONAVIRUS) BY PCR   LACTIC ACID WHOLE BLOOD   TYPE AND SCREEN, ADULT    ABO/RH(D) O NEG      Antibody Screen Negative      SPECIMEN EXPIRATION DATE [id number]     ABO/RH TYPE AND SCREEN      Emergency Department Course:    Reviewed:  I reviewed nursing notes, vitals, past medical history and Care Everywhere    Assessments:  1958 I obtained history and examined the patient as noted above.     2309 I rechecked the patient and explained findings.     Consults:  2308 I spoke with Dr. Beltran, hospitalist, who agreed to admit the patient.    Interventions:  Medications   dextrose 50 % injection 25-50 mL (has no administration  in time range)   0.9% sodium chloride BOLUS (0 mLs Intravenous Stopped 8/11/22 2222)     Followed by   0.45% sodium chloride infusion (1,000 mLs Intravenous New Bag 8/11/22 2233)   insulin 1 unit/1 mL in NS (NovoLIN, HumuLIN Regular) drip -ED DKA algorithm (4 Units/hr Intravenous Rate/Dose Change 8/11/22 2157)   ondansetron (ZOFRAN) injection 4 mg (4 mg Intravenous Given 8/11/22 2037)   pantoprazole (PROTONIX) IV push injection 80 mg (80 mg Intravenous Given 8/11/22 2018)   potassium chloride 10 mEq in 100 mL sterile water intermittent infusion (premix) (0 mEq Intravenous Stopped 8/11/22 2223)   HYDROmorphone (PF) (DILAUDID) injection 0.5 mg (0.5 mg Intravenous Given 8/11/22 2038)   metoclopramide (REGLAN) injection 10 mg (10 mg Intravenous Given 8/11/22 2052)   diphenhydrAMINE (BENADRYL) injection 12.5 mg (12.5 mg Intravenous Given 8/11/22 2052)   CT Scan Flush (58 mLs Intravenous Given 8/11/22 2204)   iopamidol (ISOVUE-370) solution 500 mL (65 mLs Intravenous Given 8/11/22 2204)     Disposition:  The patient was admitted to the hospital under the care of Dr. Beltran.     Impression & Plan     Medical Decision Making:  Patient presents for vomiting.  DKA present.  Insulin infusion and DKA protocol initiated.  Exam completed without signs of other underlying process driving DKA.  Significant abdominal tenderness on my initial exam therefore CT abdomen obtained.  This was negative for acute intra-abdominal process.  Symptoms improved with IV fluids and antiemetics.  Patient was discussed with Dr. Beltran.  Patient admitted to [facility].    Diagnosis:    ICD-10-CM    1. Diabetic ketoacidosis without coma associated with type 1 diabetes mellitus (H)  E10.10        Scribe Disclosure:  Katie MACHADO, am serving as a scribe at 7:54 PM on 8/11/2022 to document services personally performed by Catina Bazan MD based on my observations and the provider's statements to me.            Catina Bazan  MD  08/12/22 0234     0 = independent

## 2022-08-18 NOTE — PROGRESS NOTE ADULT - ASSESSMENT
[Parents] : parents [Patient] : patient 38 yo M with PMHx of testicular cancer s/p left orchiectomy on chemotherapy who present to the ED before receiving chemo for tachycardia. Patient found to be in atrial flutter with some response to IV diltiazem and bedside US with EF of 10-15% on TTE showed concentric left ventricular remodeling with LVEF of 35-40% possibly due to paroxysmal aflutter, unlikely due to chemotherapy. Better rate controlled on 100mg metoprolol BID PO. Will go for DC cardioversion today.

## 2022-08-31 ENCOUNTER — OUTPATIENT (OUTPATIENT)
Dept: OUTPATIENT SERVICES | Facility: HOSPITAL | Age: 42
LOS: 1 days | Discharge: HOME | End: 2022-08-31

## 2022-08-31 DIAGNOSIS — Z90.79 ACQUIRED ABSENCE OF OTHER GENITAL ORGAN(S): Chronic | ICD-10-CM

## 2022-08-31 DIAGNOSIS — Z98.890 OTHER SPECIFIED POSTPROCEDURAL STATES: Chronic | ICD-10-CM

## 2022-08-31 DIAGNOSIS — Z95.828 PRESENCE OF OTHER VASCULAR IMPLANTS AND GRAFTS: Chronic | ICD-10-CM

## 2022-08-31 DIAGNOSIS — C62.92 MALIGNANT NEOPLASM OF LEFT TESTIS, UNSPECIFIED WHETHER DESCENDED OR UNDESCENDED: ICD-10-CM

## 2022-09-01 ENCOUNTER — APPOINTMENT (OUTPATIENT)
Dept: HEMATOLOGY ONCOLOGY | Facility: CLINIC | Age: 42
End: 2022-09-01

## 2022-10-05 ENCOUNTER — APPOINTMENT (OUTPATIENT)
Dept: HEMATOLOGY ONCOLOGY | Facility: CLINIC | Age: 42
End: 2022-10-05

## 2022-11-01 ENCOUNTER — OUTPATIENT (OUTPATIENT)
Dept: OUTPATIENT SERVICES | Facility: HOSPITAL | Age: 42
LOS: 1 days | Discharge: HOME | End: 2022-11-01

## 2022-11-01 ENCOUNTER — APPOINTMENT (OUTPATIENT)
Dept: HEMATOLOGY ONCOLOGY | Facility: CLINIC | Age: 42
End: 2022-11-01

## 2022-11-01 ENCOUNTER — RESULT REVIEW (OUTPATIENT)
Age: 42
End: 2022-11-01

## 2022-11-01 DIAGNOSIS — Z95.828 PRESENCE OF OTHER VASCULAR IMPLANTS AND GRAFTS: Chronic | ICD-10-CM

## 2022-11-01 DIAGNOSIS — Z90.79 ACQUIRED ABSENCE OF OTHER GENITAL ORGAN(S): Chronic | ICD-10-CM

## 2022-11-01 DIAGNOSIS — C62.92 MALIGNANT NEOPLASM OF LEFT TESTIS, UNSPECIFIED WHETHER DESCENDED OR UNDESCENDED: ICD-10-CM

## 2022-11-01 DIAGNOSIS — Z98.890 OTHER SPECIFIED POSTPROCEDURAL STATES: Chronic | ICD-10-CM

## 2022-11-01 LAB
BASOPHILS # BLD AUTO: 0.02 K/UL — SIGNIFICANT CHANGE UP (ref 0–0.2)
BASOPHILS NFR BLD AUTO: 0.3 % — SIGNIFICANT CHANGE UP (ref 0–2)
EOSINOPHIL # BLD AUTO: 0.1 K/UL — SIGNIFICANT CHANGE UP (ref 0–0.5)
EOSINOPHIL NFR BLD AUTO: 1.6 % — SIGNIFICANT CHANGE UP (ref 0–6)
HCT VFR BLD CALC: 45.5 % — SIGNIFICANT CHANGE UP (ref 39–50)
HGB BLD-MCNC: 15.8 G/DL — SIGNIFICANT CHANGE UP (ref 13–17)
IMM GRANULOCYTES NFR BLD AUTO: 0.5 % — SIGNIFICANT CHANGE UP (ref 0–0.9)
LYMPHOCYTES # BLD AUTO: 2.5 K/UL — SIGNIFICANT CHANGE UP (ref 1–3.3)
LYMPHOCYTES # BLD AUTO: 40.5 % — SIGNIFICANT CHANGE UP (ref 13–44)
MCHC RBC-ENTMCNC: 33.3 PG — SIGNIFICANT CHANGE UP (ref 27–34)
MCHC RBC-ENTMCNC: 34.7 G/DL — SIGNIFICANT CHANGE UP (ref 32–36)
MCV RBC AUTO: 95.8 FL — SIGNIFICANT CHANGE UP (ref 80–100)
MONOCYTES # BLD AUTO: 0.46 K/UL — SIGNIFICANT CHANGE UP (ref 0–0.9)
MONOCYTES NFR BLD AUTO: 7.5 % — SIGNIFICANT CHANGE UP (ref 2–14)
NEUTROPHILS # BLD AUTO: 3.06 K/UL — SIGNIFICANT CHANGE UP (ref 1.8–7.4)
NEUTROPHILS NFR BLD AUTO: 49.6 % — SIGNIFICANT CHANGE UP (ref 43–77)
NRBC # BLD: 0 /100 WBCS — SIGNIFICANT CHANGE UP (ref 0–0)
PLATELET # BLD AUTO: 152 K/UL — SIGNIFICANT CHANGE UP (ref 150–400)
RBC # BLD: 4.75 M/UL — SIGNIFICANT CHANGE UP (ref 4.2–5.8)
RBC # FLD: 11.9 % — SIGNIFICANT CHANGE UP (ref 10.3–14.5)
WBC # BLD: 6.17 K/UL — SIGNIFICANT CHANGE UP (ref 3.8–10.5)
WBC # FLD AUTO: 6.17 K/UL — SIGNIFICANT CHANGE UP (ref 3.8–10.5)

## 2022-11-14 ENCOUNTER — RESULT REVIEW (OUTPATIENT)
Age: 42
End: 2022-11-14

## 2022-11-14 ENCOUNTER — LABORATORY RESULT (OUTPATIENT)
Age: 42
End: 2022-11-14

## 2022-11-14 ENCOUNTER — APPOINTMENT (OUTPATIENT)
Dept: HEMATOLOGY ONCOLOGY | Facility: CLINIC | Age: 42
End: 2022-11-14

## 2022-11-14 LAB
BASOPHILS # BLD AUTO: 0.01 K/UL — SIGNIFICANT CHANGE UP (ref 0–0.2)
BASOPHILS NFR BLD AUTO: 0.2 % — SIGNIFICANT CHANGE UP (ref 0–2)
EOSINOPHIL # BLD AUTO: 0.1 K/UL — SIGNIFICANT CHANGE UP (ref 0–0.5)
EOSINOPHIL NFR BLD AUTO: 1.5 % — SIGNIFICANT CHANGE UP (ref 0–6)
HCT VFR BLD CALC: 45.9 % — SIGNIFICANT CHANGE UP (ref 39–50)
HGB BLD-MCNC: 15.8 G/DL — SIGNIFICANT CHANGE UP (ref 13–17)
IMM GRANULOCYTES NFR BLD AUTO: 0.5 % — SIGNIFICANT CHANGE UP (ref 0–0.9)
LYMPHOCYTES # BLD AUTO: 2.34 K/UL — SIGNIFICANT CHANGE UP (ref 1–3.3)
LYMPHOCYTES # BLD AUTO: 35.8 % — SIGNIFICANT CHANGE UP (ref 13–44)
MCHC RBC-ENTMCNC: 33.3 PG — SIGNIFICANT CHANGE UP (ref 27–34)
MCHC RBC-ENTMCNC: 34.4 G/DL — SIGNIFICANT CHANGE UP (ref 32–36)
MCV RBC AUTO: 96.8 FL — SIGNIFICANT CHANGE UP (ref 80–100)
MONOCYTES # BLD AUTO: 0.52 K/UL — SIGNIFICANT CHANGE UP (ref 0–0.9)
MONOCYTES NFR BLD AUTO: 8 % — SIGNIFICANT CHANGE UP (ref 2–14)
NEUTROPHILS # BLD AUTO: 3.54 K/UL — SIGNIFICANT CHANGE UP (ref 1.8–7.4)
NEUTROPHILS NFR BLD AUTO: 54 % — SIGNIFICANT CHANGE UP (ref 43–77)
NRBC # BLD: 0 /100 WBCS — SIGNIFICANT CHANGE UP (ref 0–0)
PLATELET # BLD AUTO: 163 K/UL — SIGNIFICANT CHANGE UP (ref 150–400)
RBC # BLD: 4.74 M/UL — SIGNIFICANT CHANGE UP (ref 4.2–5.8)
RBC # FLD: 12.2 % — SIGNIFICANT CHANGE UP (ref 10.3–14.5)
WBC # BLD: 6.54 K/UL — SIGNIFICANT CHANGE UP (ref 3.8–10.5)
WBC # FLD AUTO: 6.54 K/UL — SIGNIFICANT CHANGE UP (ref 3.8–10.5)

## 2022-11-17 ENCOUNTER — APPOINTMENT (OUTPATIENT)
Dept: HEMATOLOGY ONCOLOGY | Facility: CLINIC | Age: 42
End: 2022-11-17

## 2022-11-17 PROCEDURE — 99214 OFFICE O/P EST MOD 30 MIN: CPT | Mod: 95

## 2022-11-17 NOTE — ASSESSMENT
[Palliative Care Plan] : not applicable at this time [FreeTextEntry1] : Cleve is seen via telehealth services today.  He was diagnosed with a mixed germ cell tumor 5 years ago.  His last chemotherapy was 4 years ago.  He had 3 cycles of TIP after incomplete initial treatment due to cardiac issues from tachycardia incited by the Uvtrax-h-Rvve.  He underwent a delayed RPL ND and had viable tumor.  His initial testicular tumor was 95% teratoma.\par \par He says he stays mostly at home.  He does lift some weights on occasion or walk for period of time.  He says is hard for him to get about due to his neuropathy.  He says he can walk about a half a block without any discomfort.  He does not take any medications for the neuropathy.  He feels that they have not helped him.  The neuropathy is worse at night and he feels that his body is "constantly in motion".  There are sometimes sharp stabbing pains that occur.  He uses compression stockings which seem to help.  He has fatigue and he has trouble "maintaining stamina during the day".  He has fallen on occasion.  The neuropathy extends to just below the knee on the left side, but only involves one half of the right foot.  The pain is more on the left as well.  He says that his appetite is "okay, variable".  He does note some taste alteration at times.  He checks his weight frequently and is up and down a few pounds.  There is no cough, but he feels that he has shortness of breath with walking.  He also has some shortness of breath with climbing a half a staircase.  There are occasional headaches which are occipital.  They occur 1-2 times a month.  He goes to sleep if he has the headaches.  He does not take any medications.  There is no associated dizziness.  He feels he has swelling of his legs.  There is no fevers or chills.  He has no chest pain or chest pressure.  He has no palpitations unless he lifts weights.  He uses kettle ball up to 25 pounds.  He feels his remaining testicle may be swollen at times due to the hydrocele.  His libido is poor.  He says that masturbation is painful and not enjoyable.  He notes anxiety and depression.  He does not see anyone, and he says it is "hard for me to talk".  He feels that other people cannot relate to his experiences as they have not experienced the same issues that he has had.  He has no thoughts of self-harm.  He feels that his muscles are weak.  He has some easy bruising at times.  There is some constipation.  He has joint pain as well as muscle pain.  He has muscle cramps in the calves.\par \par On physical examination, he appears well and in no acute distress.  His performance status is 1.  Recent laboratory results revealed a normal alpha-fetoprotein and an undetectable beta-hCG.  The LDH was normal.  His testosterone level was normal at 482.  The chemistry panel was normal with the exception of an ALT of 58 and a glucose of 109.  His ALT has been elevated for some time and is likely secondary to steatosis.  The magnesium level was 2.2.  His CBC was normal.\par \par His last imaging studies were performed in June 2022 with no evidence of disease activity.  He wanted to know when his next CT scan would be, and I told him that we generally do not have recommendations after 5 years from therapy but because his therapy was somewhat delayed and his significant presence of teratoma, it would be reasonable to perform another CT scan in June of next year.  We would only do CAT scans for suspicion of recurrent disease after 5 years of treatment.\par \par All questions were answered to the best my ability and to his apparent satisfaction.  I will see him once again in 3 months time.  Have suggested that he come to the office for that visit and he agrees to do so.  I have asked our team to reach out to him to schedule an appointment in 3 months.

## 2022-11-17 NOTE — HISTORY OF PRESENT ILLNESS
[de-identified] : Mr. Calderón is seen in consultation on 8/8/17. On July 11, 2017, he awoke with severe back pain extending into the left testicle. There was a heaviness and pulling sensation. He went to the ER at [facility] and a sonogram was done, revealing a mass. He was seen by Rivas Oconnor and underwent a left radical orchiectomy on July 20, 2017. The pain and pulling sensation resolved. He has been on oxycodone a time, since January 2017, for low back pains. No respiratory complaints. No urinary symptoms. Has had sperm cryopreservation. \par \par 8/30/17...Cleve comes in today for follow up was just discharged yesterday. He was in the hospital for 4 days and he was neutropenic, found a right arm superficial thrombosis at the site of IV. treated with vanco, blood cultures negative. Discharged on Levaquin. Overnight he had a temp of 104.  He is having some hair loss, numbness in his fingers and his toes. He has tinnitus intermittently, started two days after the last dose. He was having soreness in the back of his throat that now resolved. He complains of dysgeusia and he had a lot  of burning and acid reflux-like symptoms during his treatment Carafate, Protonix and the lower dose of Decadron improved his symptoms., Not sleeping well, likely due to steroids.\par \par 9/19/17...cycle 2 day 5 was 9/9/17\fabian Poon is seen in the office today. He has significant fatigue. He had vomiting for 5 days after the chemotherapy with nausea and he said that his complexion was green. He had acid reflux issues once again despite the dropping off the dose of his dexamethasone. He feels a burning sensation in his lower abdomen and he feels that the medications for nausea did not help. He also has significant fatigue which is improved today. He has intermittent tinnitus. He has tingling in the tips of his toes and fingers which are intermittent. His appetite has been good and he is gained weight while on chemotherapy but he says that he eats smaller portions. He denies dysgeusia or dry mouth.\par \par 10/9/17...Completed cycle 3 last week.  He states he feels "horrible." HE states he noticed bloating in the abdomen, He went a whole week after this treatment wit vomiting every day, last time he vomited was two days ago. He fell at one point secondary to being weak and feeling lightheaded. He feels generalized weakness.He had a blood clot, during his treatment, that resolved. His appetite is poor, He eats and then, he eats, he feels like he has to regurgitate his food up after eating. He has fatigue, tinnitus stopped. he denies any numbness of the feet. has had chronic numbness of two of his fingers.He has dysgeusia \par \par 10/30/17...Admission Date: \par -  Admission Date 16-Oct-2017 13:33. \par Discharge Date: \par - Discharge Date	21-Oct-2017 \par Chief Complaint/Reason for Visit: \par Chief Complaint/Reason for Admission	Atrial flutter with RVR\par *******************\par  36 yo M with PMHx of testicular cancer s/p left orchiectomy on chemotherapy who present to the ED before receiving chemo for tachycardia. \par The patient states that he has been feeling well for the past 2 weeks since his 3rd dose of chemotherapy. He denies CP, ABARCA, peripheral edema, cough, \par palpitations. He does states that he had one near syncopal event during a bout of emesis 2 weeks ago which caused him to fall into a door. Has not had any \par other syncopal or presyncopal episodes since then. In the ED he was found to have tachycardia found to be a.flutter after receiving 10mg IV diltiazem x2, a CXR with well placed portacath and no infiltrations. On bedside US found to have EF of 10-15% while tachycardic. \par The patient was initially started on metoprolol tartrate with increasing dosages unable to control the arrhythmia he was subsequently switch to a diltiazem drip. The metiport was removed after discussion with his Oncologist and cardiology for possible arrhythmia induction which was removed 10/19. He also received a TTE on 10/19 which showed EF of 35-40% with LV remodeling. \par Afterwards he was switched to metoprolol succinate 200mg qday. \par He was started on heparin for preparation for TYLOR and cardioversion which was performed on 10/20 and found a RA appendage thrombus with PFO and reduced LV \par function. No cardioversion was performed as a result and he was switched to rivaroxaban on 10/21, lisinopril 2.5mg qday and metoprolol succinate 200mg \par qday. He will follow with cardiology in 2 weeks and EP in 4 weeks. Treatments/Procedures/Significant Findings/Patient Condition: \par Other Significant Findings: \par - Other Significant Findings	 \par < from: Transesophageal Echocardiogram w/o TTE (10.20.17 @ 12:08) > \par Conclusions: \par 1. Left atrial enlargement.  No left atrial or left atrial appendage thrombus. \par 2. Moderate to severe global left ventricular systolic dysfunction. \par 3. There is an echodensity (likely thrombus vs mass) in the right atrial wall that measures (approximately 2.7 cm x 2.0 cm). This is near the distal point of the SVC and may \par represent thrombus from prior indwelling catheter. \par 4. Right ventricular enlargement with decreased right ventricular systolic function. \par 5. Color Doppler demonstrates evidence of a patent foramen ovale. \par 6. Trivial pericardial effusion. \par ****************************************************************************\par TYLOR showed right atrial appendage thrombus, 27 mm, On Xarelto. Feels "exhausted". Does not generally note palpitations. Anxiety level is high. Occasional BAARCA, not at rest. No cough, no sputum, no wheezing. No nausea at this time, has regurgitation. Occ vomiting noted. Fatigue is prominent. Appetite is good. \par \par 7/9/18...Had RPLND on 6/6/18, with viable GCT present. Embryonal pathology. Says he have fatigue, still "recovering" from the RLPND. He has some soreness of the abdomen and says he has some delayed healing. He say Dr Piper on 7/6 in lieu of Dr Restrepo and was told that it was healing well. He is upset that there is residual cancer present, which he says "bummed him out completely". He says he has been reviewing issues related to continued treatment, only after I called him recently as he had not made an appointment. he says his appetite is poor and he is eating small amounts multiple times as he cannot eat full full meals. He says he has had some nausea and he started OTC Prevacid, which has helped. No significant weight loss. Some ABARCA if he walks about 3 blocks or so, he has fatigue with walking as well. he has some paresthesias of the left thigh after the surgery. He notes some spasms of the muscles in that area at times. Urine flow is normal, no nocturia. Libido down, not sexually active, no masturbation since surgery. \par \par 7/19/18...Feels the same, "still recovering". Still has some discomfort near the wound. Appetite s good. Gained 2 pounds. no cough. Mild ABARCA with activities. No palpitations. No leg edema. He continues with some fatigue, 6 on 0-10 scale. Feels he is not motivated when the fatigue is high. He feels his hands are weaker than before. Lifting is fine, but twisting a bottle to open may be  impaired. Ambulates with a cane  due to paresthesias of the left anterior thigh. This from knee  to thigh area,. No paresthesias of the feet. He sometimes has some pains in his feet at night which he says are not cramps. \par \par 9/6/18...Cycle 2, TIP, day # 9. Admitted on Sunday, August 19. \par *********************************************\par Hospitalized again post chemo, this time for 9 days. ANC was <0.1, platelets were low. had low sodium, low potassium and low magnesium. He has had lots of muscle aches and bone pain secondary to the Taxol. Feeling better, still feels weak. Some SOB with exertion. No pains. No headaches, has paresthesias of the bottom of the feet along with some toes. It improves after the chemo. Somewhat more this time. No fevers, no chills. Some nausea with office visits. Appetite is "moderate". Tinnitus has improved, minor dizziness at times. \par \par 8/30/18...Delayed treatment #3 by one week. "Not a good week". Complains of hearing being sensitive. He has tinnitus. This is intermittent, more in the past 2-3 days. He has some vertigo, with some issue walking. He feels as if he is falling to the right side at times, no falls. He has to hold onto the door or furniture to walk. He has some vomiting without nausea. He vomited a few minutes ago. Appetite is decreased, He has altered taste as well. Fatigue is present, not as bad as before. he says he is a "little" fatigued at the moment. Paresthesias of feet have been intermittent, less notable at this time. Spends a lot of time sitting, sleeping. He feels as if his body is exhausted. His mother says he has a lot of anxiety. He agrees. He has thoughts running through his mind about chemo and adverse effects. \par \par 9/20/18...he feels "okay" at times. Occasionally feels nauseous and can't eat, occ feels dizzy. has some regurgitation at times. Has fatigue. No better. Tinnitus is "not as bad", persistent in the right, intermittent in the left. When dizzy,  he feels he is leaning to one side. Spends a lot of time sitting or lying in bed. Paresthesias of the feet, left foot more so than right. Right toes affects the 4th and 5th toe. Fingertips are affected. He notes some darkening of skin folds of the hand joints. Feels no SOB unless he is active. using a cane today. Not taking any meds at this point. \par \par 10/9/18...Admission Date: after cycle 3 TIP\par -  Admission Date 01-Oct-2018 02:09. \par Discharge Date: \par - Discharge Date	06-Oct-2018 \par ***********************************\par HOSPITAL COURSE: The patient continued to have significant nausea while inpatient with intermittent vomiting. He received aggressive IV hydration with \par IVF while he was no longer able to tolerate po. On hospital day 4, patient was able to tolerate some po and requested discharge home. His course was c/b \par neutropenic fever to 101. Cultures were sent and remained negative. Patient was also started on cefepime. His ANC started to improve towards the end of the \par hospital course. His platelets, however, downtrended. He was given 1U platelets as per oncology recommendations. His midline was noted to have been in since \par July. After discussion with his outpatient oncologist, the patient opted to have his midline removed and will receive his last dose of chemotherapy via \par peripheral IV. During his hospital course, patient was noted to have an episode of tachycardia to the 140's followed by bradycardia to the 20's during \par orthostatic vitals. He was seen by Dr. Brown of cardiology who feels this is chemo-induced orthostasis and no further inpatient w/u is needed. He is \par medically stable for dc home. Prior to discharge received additional platelet transfusion and MID-Line was removed after discussion with oncology. \par *******************************************************************\par He was admitted and told that the midline had to be changed. He did have fever with neutropenia. Discharged on the 6th. Hypotensive with orthostasis,seen by cardiology, restarted lisinopril and carvedilol. He has fallen twice since discharge. He has LOC for a few seconds. he is somewhat confused after the episodes. Walking with a cane. he also had an similar episode while in the hospital. Appetite returned, has altered taste, no N/V now, but did have on admission. No source of fever found. Has paresthesias of the left foot, all toes, not his right.  Had aches and pains with the Taxol, which he describes as "brutal". \par \par 10/29/18...called last week, was having difficulty keeping food down and felt weak and dizzy. We arranged for fluids, but he called Friday ad declined. Taking Zofran ADT after meals at times. Able to drink more at this time. Did not eat this AM. Yesterday, had a bagel and egg, for lunch, potatoes, pork and beans and Spam. For dinner had rice and Spam, small amount. No vomiting yesterday, but had nausea. Dizziness if he leis his head back, or if he gets up to fast. Walking with  a cane. No falls. Started Has paresthesias of the feet, on the left side. Continues on oxycodone, about  1 per day, chronic use for low back pains. No leg edema. No cough, ABARCA. \par \par 11/13/18...has not followed with Dr Marquez and remains off of carvedilol, enoxaparin and lisinopril. He takes ondansetron on occasion. Says he had a "blackout" episode after getting up, fell, striking left side, no injury. No LOC. Neuropathy remains, mostly left leg, somewhat better at times, continues on therapy. Minimal on the right side. Considering returning to work. Fatigue is present, somewhat better. Appetite is still impaired, eats once a day. Gained a few pounds. Still has some taste alteration. No leg edema. No abdominal pains. Saw Phong Restrepo yesterday in follow up. He has constipation, and may go 4-5 days w/o bowel movement. \par \par 1/4/19...Had echo done 12/2018. "I'm okay", feeling better, saw Dr Marquez. Paresthesias are an issue. On gabapentin 300 TID, says it is not helping. He says his fingers are affected, works on computers, and hits wrong keys at times. Worse are his feet. Left foot is numb, right foot partially numb, annoying when walking, not sensing if he steps on something. He continues on oxycodone. Affect toes, senses that they are swollen, bottom of the feet as well as the top. Does not pass the ankle. In the hands, it is the 3rd and 4th finger distally on the right side, and 3-4-5 on the left. Echo said that LVEF is about 44-46 % with moderate global dysfunction. The LA appendage clot was not seen. Not as tired as before, walked 11 minutes yesterday with some fatigue. Occ mild nausea, no vomiting. Appetite is good, gained 4 kilos on the past month( points out that he is wearing heavy boots. . Still has some dysgeusia. Still avoids meats.  Occasional HA, lasts up to 1/2 day, occurs intermittently, once a week. More right side of the headaches. Prior sinus headaches, but not the same, has a sharp element to it. Stills feels dizzy at times when gets up too fast..\par \par 3/11/19...Urgent visit. Called this AM stating that the right testicle is swollen, present for a few days, mild tenderness, mild discomfort/pain. No back pains except for his chronic back issue. He notes some skin changes of his hands and his feet.e is some peeling skin at the base of the toes. as well as the center of the foot. He has neuropathy of both feet, more on the left than the right, where it only affects the toes. He says the medial 3 fingers are numb and he drops things. His heart exam included a stress test, which he reports the outcome was good. Still reports tinnitus. Appetite is good, weight is increased 6 kilos since 1/30/19. Still has fatigue. \par \par 5/11/19...Missed appt 4/5/19. I " have good days and bad days". He feels he puts on a lot of weight in  a short time, and then drops weight in a short period of time. He had edema of both feet and also feels he had edema of the hands as well. He says edema accumulates and then resolves. Neuropathy is "pretty bad", extends to the calves, worse at night. Ran out of gabapentin, was on 300 TID and did not feel it helped. He has discoloration of the left foot, edema both feet. Has cramps in the legs. Feels he cannot stand for a long time, and complains of blistering of the feet. He has shooting pains in the toes. There are fluid filled blisters that he he "has to pop", due to pain. He feels he s less active due to the blisters. He feels his toes are locked i position. Does exercises provided by PT. Appetite is good. He is thirsty many times. NO pains elsewhere except chronic back pain, for which he takes oxycodone. No cough, has some ABARCA, can walk 2 blocks prior to stopping. Sees DR Marquez later today. Fine motor issues with fingers with neuropathy, difficulty with typing on keyboard, with many errors. \par \par 7/2/19...Hospital Course: \par Discharge Date	12-Jun-2019 \par Admission Date	12-Jun-2019 00:37 \par Reason for Admission	vomiting, vertigo \par Medication Reconciliation Status	Admission Reconciliation is Completed \par Discharge Reconciliation is Completed \par Hospital Course	 \par 38M with PMHx of metastatic testicular CA, NICM, neuropathy, who presents with dizziness, nausea, and vomiting. \par Vertigo. \par - will c/w meclizine 25mg 4x/day \par - f/u blood and urine cx to r/o infectious etiology \par - PT eval, fall precautions. Constellation of symptoms with horizontal nystagmus and reproducible symptoms with daryn hallpike maneuver c/w BPPV. CT \par head negative. Pt reports improvement after meclizine. \par - will c/w meclizine 25mg 4x/day \par - f/u blood and urine cx to r/o infectious etiology \par - PT eval \par  Cardiomyopathy. 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 Most recent TTE on 6/4 showing EF55%. Pt concerned current sx may be side effect from entresto \par - c/w carvedilol 12.5mg BID \par - will hold entresto in setting of soft BP and pt hesitancy to restart due to side effects. In prior studies, dizziness is reported as an adverse event "at \par least 5%" of patients taking entresto. \par will hold all BP meds upond d/c 2/2 hypotension \par  Vomiting. \par - will c/w IVF overnight and repeat lactate in AM \par - zofran PRN nausea. \par  Mixed germ cell tumor. \par  now in remission. \par **********************************************\par Was hospitalized and now off carvedilol and Entresto. Doing better at this time. Only bothered by paresthesias of the feet and occasion syncope with changes in position. the latter has occurred when getting up from bed or with sitting to standing at times. Appetite is good, no weight loss. No cough, no ABARCA. No palpitations noted. Chronic back pains, no changes. No HA. Libido is down, erections are difficult to achieve, are not durable,  no ejaculation.\par \par 9/10/19...Feels "okay". he has some intermittent swelling noted in the hands and feet, occasionally in face, may last for several days. He has paresthesias of the fingertips the hands, not as bad as it was previously. It can be shock, like, noted more at night, more prominent in the feet. Gabapentin didn't help. Has his usual back pains, takes oxycodone 15 mg 2-4 times a day depending on pain. Appetite is good, No diarrheal stools, has constipation. Weight increased 3 kilos. No nausea, no vomiting.  BM about 3 times a week, sometimes hard. Advised to take Colace. Occ has a blistering rash on the bottom of the feet, Rx'd from dermatology.He still has blisters from time to time. The foot swells and the skin starts to crack. Fatigue unchanged. Tries to walk about but the pain in the feet causes discomfort, occasionally uses his cane to walk. Urine flow is impaired as before with some discomfort, not burning. Nocturia x 3-4. NO blood. occ incontinence- leaking of urine if he goes to stand at times. He noted blood in his stool 2 night sin a row. Reddish color, fresh appearing. \par \par 12/13/20...Continues to struggle with neuropathy, seen by neuro, had NCV, started nortriptyline for the neuropathy at bed time.  He feels it helps somewhat, dose increased to 50. Appetite is good. Weight is stable. Has impaired activities due to the neuropathy, does not leave the house much. Goes to PT and doctor appts. Has back pains form prior injury, on long term oxycodone, takes 15 mg 3-4 times a day. Anxiety level is low except at night, which he attributes to pain. He feels he is not depressed. No cough, mild ABARCA, attributed to weight increase. Occ headaches, right sided, once every few months. Fatigue is 8 on a 0-10 scale. Poor libido, not sexually active, no attempts\par \par 6/9/20...Seen today. Was on zonasamide by neurology, felt he had tremors and grater degree of anxiety. Insurance won't permit Lyrica, gabapentin created swelling. Taking oxycodone 15, up to 4 times a day, trying to cut down. Interested in med marijuana. he says he does not sleep well. Awake from 2 to 6 AM. Neuropathy keeps him awake, less noted during the day. Neuropathy affects the feet to the upper calves, shooting pains at night. Also has neuropathy of the hands as well. Says he has difficulty typing on the computer. Hand will shake when using the mouse. Occ drops a fork. Appetite is good, weight increased. Knee pains as well. No cough, some ABARCA remains, which he attributes to lack of exercise. Not seeing a mental health professional. I have suggested he see Dr Almanza in the past. He feels he has spent 5 years in "isolation". \par Neurologist notes reviewed they ordered an immunofixation which showed a slight IgG lambda band. No further evaluation was performed.\par No headaches recently, occ dizziness of he gets up fast. Tries to keep himself active, but says he cannot do much. He says his feet swell with activities. He walks with a cane.\par \par 9/8/20... Presents for follow up. On medical marijuana, which he says helps, uses mainly at night as a sleep aide. Chronic low back pains from prior injury, oxycodone 15, 3-4 times a day.  Pain always present, does not awaken him. He does have some sleep issues due to the neuropathy. Neuropathy extends to lower 1/3 of calves. No better, no worse. No other pains except some joint pains. Appetite is decreased, he feels is sec to the duloxetine. He sees Symone Shields. He has lost some weight. No cough, no ABARCA. Fatigue is present, stated to occur after 2 blocks of walking, fatigue in the legs. He says he is active during the day, walks about, less computer and video game use. Occ nausea, no vomiting. No diarrhea, no constipation. Urine flow is weaker, some dysuria/pain with voiding. No blood in urine. Has not any UTIs, since the surgery. No libido, no interest in masturbation, has not tried. No AM erections. T levels have been normal. \par \par 12/8/20...Off the oxycodone 15s, on medical marijuana, which he feels it is better controlled. He sleeps better. has some pain, but did not take anything this AM. Lower back is the sites as well as the discomfort in the extremities. The med marijuana helps the neuropathy as well. Eats more, gained weight. He is more energetic. He feels it is hard to exercise, as he is not as strong as before, tired after a walk down the block. Much more talkative today. he is concerned about blacking out. Also has some muscle spasms at times with activities. Does climb stairs in the house. No cough. ABARCA present. He feels his urine stream is restricted with some discomfort. "Like a lot of fluid trying to fit thru a small space". He is going to call Dr Oconnor about these issues. uses a shower chair as he is worried about "blacking out in the shower" if he raise his hands above his head. \par \par 3/9/21 - Verbal permission for telehealth services was granted by the patient, Cleve Calderón, on March 9, 2021 at 10:12 AM. \par Mixed germ cell tumor Diagnosed in 2017, underwent radical orchiectomy on the left side in July 20, 2017.  His stage was 2A, good risk.  He received cisplatin and etoposide starting on August 14 of 2017.  Cycle 4 was delayed and then omitted as he presented with supraventricular tachycardia and a low ejection fraction.  This was felt to be secondary to the indwelling central venous catheter, which was located in the appropriate spot.  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  He underwent an RPLND which was delayed, and he had residual viable embryonal carcinoma.  He was started on TIP in July 2018, received 3 cycles. \par \par Overall, feels the same. Neuropathy is the main issues, feels he cannot rest at all. The medical marijuana helps him rest. Takes it at night and throughout the day when he feels he needs it. Onset of action if from 7 to 20 minutes. Appetite is fine, no weight loss of note. No cough, some ABARCA, after one block, he is SOB and exhausted. Occ headaches noted, brief duration. Some balance issues, no falls. He still feels he has some dexterity issues in the hands. He has some discomfort when he voids, a pressure like sensation as if the has some flow issues, since the surgery, no change. No dysuria. Has not had a CV vaccine. Libido is poor, no attempt, no erections. 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 Had to receive additional chemo with TIP. He is active and exercising, frustrated with the disability process. He is able to do more at this time. Has pain from the neuropathy with walking uphill. He notes some swelling of the left leg and right hand area, and sometimes the swelling in the hand makes it difficult to close his fist. Appetite is "poor" lately , trying to lose weight. He feels food is not as appealing as it once was, antedates this to the chemotherapy. Has had some nausea in recent times. He feels red meat is the issue. This has occurred for "some time". He says his weight was 289, now 260, over the course of 2.5 weeks. He also notes some bloating and gas. Some cramp like pains at times. had his first CV vaccine (Moderna). Breathing is fine, notes some chest pressure the last few nights. Some minor headaches in recent times, middle of the head, takes Advil or Tylenol, which does not seem to help much. These occur about 2 times a week, may last few minutes. Urinary difficulty, feels the muscle is not fully "retracting". Feels the flow is not what it should be and has a sensation of incomplete voiding. NO blood, occ incontinence-occurred x 2. Feels his right arm has increased numbness and it may be a bit weaker. Was doing exercises that PT had previously recommended. Feels he has issues when typing. No F/C/S. Notes fatigue, exercises in the backyard at times. If he stands more than 10 minutes, he feels exhausted.  Libido is poor. No interest, not sexually active, no masturbation. No full AM erections.\par \par 10/12/21...Dr Shields notes reviewed. last chemo was 3 years ago. Doing well. Overall, he feels occasional nausea at times. He is on duloxetine and medical marijuana. Still has complaints about his neuropathy with pain and discomfort, not as well controlled as it was last few visits, when he said the med marijuana produced much benefit. He said it calms him, but he still does not sleep well. He feels the duloxetine helps with the tingling sensation. Bottom of the feet feels as if he has "bubble wrap" on the bottom of his feet. Occ falls in the shower, some issue with stairs. Appetite is good, but says he lost some weight. Not seen in office since December 2020. Says he weight 257 at home, was 283 last visit. Occ nausea, no recent vomiting. Occ headaches. Tries to walk about during the day, and trying to be more active. Anxiety and depression are the same. Denies thoughts of self harm. Says he is frustrated at times. 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 Received 3 cycles, delayed interval, then started TIP, received 3 cycles, went to [facility].  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 No subsequent therapy.\par Stopped duloxetine, as he felt like a different person. He remains on medical marijuana. Neuropathy is still an issue. He has some dizziness at times, if he gets up to quickly or turns too quickly. He feels his nervous system is "all screwed up". The dizziness occurs a few times a day to a few times a week. He reads, walks about, does not go out due to COVID concerns, received booster a few weeks ago. Appetite is "variable". He says he loses weight and then regains the weight. Nausea at times, a chronic issue. No vomiting, no constipation, no diarrhea. Headaches occur, chronic, at least once a week. Mid skull, takes Advil or Tylenol with some relief. He thinks they are sinus headaches. No edema in legs, feels his feet are swollen at times. Paresthesias extend to just below the calf on the left, on right, just the ball of the foot, no change. Fatigue is considerable, says he cannot stand for more than 10 minutes w/o being exhausted. \par \par 6/2//22...missed April TEB. More than 4 years after diagnosis.  "Still dealing with side effects, I try to push my self as best I can". Has lost weight, about 40 pounds, intentionally. He has changed his diet, eats smaller quantities, avoids some foods that don't agree with him. He is also walking more, usually with someone else. He has fallen a few times. as a result of the neuropathy. He feels clumsy. \par Continues with his neuropathy, hands and feet, tingling and burning in left foot> right foot, feels left hand is stronger than the right. Says he does not realize how much pressure to place if the grabs a cup, may drop it. Neuropathy extends to the ball of the foot on the right foot, extends to mid calf on the left. The area feels tight and uncomfortable. No cough, no ABARCA. Still has some occasional edema of the legs and the skin may crack, no fluid leaking. No chest pain/pressure/...some palpitations on occasion are noted. Has some nausea at times with certain smells or taste, may have regurgitation at times. He may have 4-5 days w/o a BM, then may have some diarrhea. Saw dentist recently as he had some issues with his teeth, he is going to have 6 teeth removed at [facility]. Also has to have some implants for the front teeth, dentist blamed the chemo. Poor libido, no desire, no erections, does not try to masturbate. He feels like his flow is restricted, has to wait a minute or two before urine comes out, no dysuria. Nocturia x 5, sometimes has to return to bathroom for incomplete voiding. He admits to depression, still present, also has anxiety, says he doesn't know what is next and doesn't know to cope. Does not feel he can benefit from seeing someone about A and D, says he has to adapt to the way things  are. Had a low grade temp last night and changed appt to telehealth. Fatigue  is less at this time. Mentally, he does not feel where he should be. Uses medical marijuana, costly, but feels it helps the neuropathy more than any other medication that was prescribed.  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 Cycle 4 delayed..then omitted due to tachycardia and low EF. RPLND delayed, residual viable embryonal cancer, TIP started July 29, 2018, cycle 3 delayed for grade 3 fatigue, then again one more week for transaminitis. Fourth planned cycle omitted. RPLND June 2018,  2 of 57 LN positive with FINA, embryonal, pN3 [de-identified] : 11/17//22..diagnosed 5 years ago.  last chemo 4 years ago, received 3 cycle TIP. He had a delayed RPLND with viable tumor. Initial testicular tumor was 95% teratoma.  Stays mostly at home. He lifts some weight on occasion, or walk for a time. He says it is hard for him to get about due to the neuropathy.  Can do up to 1/2 block. No meds for the neuropathy. Worse at night, feels his body is constantly in motion, sometimes with  sharp pain. Uses compression socks, which he feels helps at night. He has fatigue and trouble "maintaining stamina during the day". Occasionally falls. Neuropathy up to just below the knee on the left, just 1/2  on the right. Pain is more on the left. Appetite is "okay, variable", notes some taste alteration some days. Checks weight frequently, up and down a few pounds. No cough, but feels he has ABARCA with walking, or climbing 1/2 a staircase. Occ headaches, goes to sleep, no meds. They occur 1-2 times a month.  No dizziness. Feels he has swelling of his legs. No fevers, no chills. NO chest pain/pressure. No palpitations unless he lifts weights (kettle ball, 25 pounds). He feels his remaining testicle may be swollen at times due to the hydrocele. Libido is poor, he says masturbation is painful and not enjoyable.

## 2022-11-17 NOTE — REASON FOR VISIT
[Home] : at home, [unfilled] , at the time of the visit. [Medical Office: ([facility])___] : at the medical office located in  [Patient] : the patient [Self] : self [FreeTextEntry2] : mixed germ cell tumor

## 2022-11-17 NOTE — REVIEW OF SYSTEMS
[Fatigue] : fatigue [Palpitations] : palpitations [Lower Ext Edema] : lower extremity edema [SOB on Exertion] : shortness of breath during exertion [Constipation] : constipation [Joint Pain] : joint pain [Muscle Pain] : muscle pain [Anxiety] : anxiety [Depression] : depression [Muscle Weakness] : muscle weakness [Easy Bruising] : a tendency for easy bruising [Fever] : no fever [Chills] : no chills [Recent Change In Weight] : ~T no recent weight change [Dysphagia] : no dysphagia [Nosebleeds] : no nosebleeds [Hoarseness] : no hoarseness [Odynophagia] : no odynophagia [Mucosal Pain] : no mucosal pain [Chest Pain] : no chest pain [Wheezing] : no wheezing [Cough] : no cough [Vomiting] : no vomiting [Diarrhea] : no diarrhea [Dysuria] : no dysuria [Incontinence] : no incontinence [Skin Rash] : no skin rash [Dizziness] : no dizziness [FreeTextEntry3] : thinks he has some visual issues, to see ophtho [FreeTextEntry4] : tinnitus present [FreeTextEntry7] : occ nausea [FreeTextEntry9] : muscle cramps in calves [de-identified] : Occ HA, see HPI, neuropathy [de-identified] : notes anxiety and depression, dose not see anyone, "hard for me to talk". Feels that others cannot relate as they have not experienced what he has gone through. No thoughts of self harm.

## 2022-12-19 NOTE — PROGRESS NOTE ADULT - ENMT
No oral lesions; no gross abnormalities
No oral lesions; no gross abnormalities
Paramedian Forehead Flap Text: A decision was made to reconstruct the defect utilizing an interpolation axial flap and a staged reconstruction.  A telfa template was made of the defect.  This telfa template was then used to outline the paramedian forehead pedicle flap.  The donor area for the pedicle flap was then injected with anesthesia.  The flap was excised through the skin and subcutaneous tissue down to the layer of the underlying musculature.  The pedicle flap was carefully excised within this deep plane to maintain its blood supply.  The edges of the donor site were undermined.   The donor site was closed in a primary fashion.  The pedicle was then rotated into position and sutured.  Once the tube was sutured into place, adequate blood supply was confirmed with blanching and refill.  The pedicle was then wrapped with xeroform gauze and dressed appropriately with a telfa and gauze bandage to ensure continued blood supply and protect the attached pedicle.

## 2022-12-21 NOTE — ED ADULT TRIAGE NOTE - ACCOMPANIED BY
12/21/22 1501   Family Communication   Family Update Message Surgeon working   Delivery Origin Nurse    Relationship to Patient Spouse    Phone Number Jesse Gonzalez [phone]   Family/Significant Other Update Called;Updated Self

## 2023-01-26 NOTE — PROGRESS NOTE ADULT - PROBLEM SELECTOR PROBLEM 1
Nausea and vomiting, intractability of vomiting not specified, unspecified vomiting type
Nausea and vomiting, intractability of vomiting not specified, unspecified vomiting type
Detail Level: Detailed
Detail Level: Zone

## 2023-02-07 ENCOUNTER — OUTPATIENT (OUTPATIENT)
Dept: OUTPATIENT SERVICES | Facility: HOSPITAL | Age: 43
LOS: 1 days | Discharge: HOME | End: 2023-02-07

## 2023-02-07 DIAGNOSIS — Z98.890 OTHER SPECIFIED POSTPROCEDURAL STATES: Chronic | ICD-10-CM

## 2023-02-07 DIAGNOSIS — Z90.79 ACQUIRED ABSENCE OF OTHER GENITAL ORGAN(S): Chronic | ICD-10-CM

## 2023-02-07 DIAGNOSIS — C62.92 MALIGNANT NEOPLASM OF LEFT TESTIS, UNSPECIFIED WHETHER DESCENDED OR UNDESCENDED: ICD-10-CM

## 2023-02-07 DIAGNOSIS — Z95.828 PRESENCE OF OTHER VASCULAR IMPLANTS AND GRAFTS: Chronic | ICD-10-CM

## 2023-02-14 ENCOUNTER — RESULT REVIEW (OUTPATIENT)
Age: 43
End: 2023-02-14

## 2023-02-14 ENCOUNTER — APPOINTMENT (OUTPATIENT)
Dept: HEMATOLOGY ONCOLOGY | Facility: CLINIC | Age: 43
End: 2023-02-14
Payer: MEDICAID

## 2023-02-14 VITALS
SYSTOLIC BLOOD PRESSURE: 120 MMHG | WEIGHT: 264.55 LBS | TEMPERATURE: 97.2 F | HEART RATE: 80 BPM | RESPIRATION RATE: 16 BRPM | OXYGEN SATURATION: 97 % | BODY MASS INDEX: 34.32 KG/M2 | HEIGHT: 73.5 IN | DIASTOLIC BLOOD PRESSURE: 81 MMHG

## 2023-02-14 LAB
AFP-TM SERPL-MCNC: 2.4 NG/ML
ALBUMIN SERPL ELPH-MCNC: 4.4 G/DL
ALP BLD-CCNC: 58 U/L
ALT SERPL-CCNC: 44 U/L
ANION GAP SERPL CALC-SCNC: 12 MMOL/L
AST SERPL-CCNC: 31 U/L
BASOPHILS # BLD AUTO: 0.03 K/UL — SIGNIFICANT CHANGE UP (ref 0–0.2)
BASOPHILS NFR BLD AUTO: 0.5 % — SIGNIFICANT CHANGE UP (ref 0–2)
BILIRUB SERPL-MCNC: 0.5 MG/DL
BUN SERPL-MCNC: 15 MG/DL
CALCIUM SERPL-MCNC: 9.1 MG/DL
CHLORIDE SERPL-SCNC: 102 MMOL/L
CO2 SERPL-SCNC: 25 MMOL/L
CREAT SERPL-MCNC: 1.05 MG/DL
EGFR: 91 ML/MIN/1.73M2
EOSINOPHIL # BLD AUTO: 0.12 K/UL — SIGNIFICANT CHANGE UP (ref 0–0.5)
EOSINOPHIL NFR BLD AUTO: 1.9 % — SIGNIFICANT CHANGE UP (ref 0–6)
GLUCOSE SERPL-MCNC: 102 MG/DL
HCG-TM SERPL-MCNC: <1 MIU/ML
HCT VFR BLD CALC: 47.2 % — SIGNIFICANT CHANGE UP (ref 39–50)
HGB BLD-MCNC: 16.3 G/DL — SIGNIFICANT CHANGE UP (ref 13–17)
IMM GRANULOCYTES NFR BLD AUTO: 0.8 % — SIGNIFICANT CHANGE UP (ref 0–0.9)
LDH SERPL-CCNC: 180 U/L
LYMPHOCYTES # BLD AUTO: 2.23 K/UL — SIGNIFICANT CHANGE UP (ref 1–3.3)
LYMPHOCYTES # BLD AUTO: 35.8 % — SIGNIFICANT CHANGE UP (ref 13–44)
MAGNESIUM SERPL-MCNC: 2.1 MG/DL
MCHC RBC-ENTMCNC: 33.1 PG — SIGNIFICANT CHANGE UP (ref 27–34)
MCHC RBC-ENTMCNC: 34.5 G/DL — SIGNIFICANT CHANGE UP (ref 32–36)
MCV RBC AUTO: 95.9 FL — SIGNIFICANT CHANGE UP (ref 80–100)
MONOCYTES # BLD AUTO: 0.47 K/UL — SIGNIFICANT CHANGE UP (ref 0–0.9)
MONOCYTES NFR BLD AUTO: 7.5 % — SIGNIFICANT CHANGE UP (ref 2–14)
NEUTROPHILS # BLD AUTO: 3.33 K/UL — SIGNIFICANT CHANGE UP (ref 1.8–7.4)
NEUTROPHILS NFR BLD AUTO: 53.5 % — SIGNIFICANT CHANGE UP (ref 43–77)
NRBC # BLD: 0 /100 WBCS — SIGNIFICANT CHANGE UP (ref 0–0)
PLATELET # BLD AUTO: 163 K/UL — SIGNIFICANT CHANGE UP (ref 150–400)
POTASSIUM SERPL-SCNC: 4.8 MMOL/L
PROT SERPL-MCNC: 7.7 G/DL
RBC # BLD: 4.92 M/UL — SIGNIFICANT CHANGE UP (ref 4.2–5.8)
RBC # FLD: 11.9 % — SIGNIFICANT CHANGE UP (ref 10.3–14.5)
SODIUM SERPL-SCNC: 138 MMOL/L
TESTOST SERPL-MCNC: 626 NG/DL
WBC # BLD: 6.23 K/UL — SIGNIFICANT CHANGE UP (ref 3.8–10.5)
WBC # FLD AUTO: 6.23 K/UL — SIGNIFICANT CHANGE UP (ref 3.8–10.5)

## 2023-02-14 PROCEDURE — 99215 OFFICE O/P EST HI 40 MIN: CPT

## 2023-02-14 RX ORDER — ERGOCALCIFEROL 1.25 MG/1
1.25 MG CAPSULE, LIQUID FILLED ORAL
Qty: 12 | Refills: 3 | Status: DISCONTINUED | COMMUNITY
Start: 2021-12-13 | End: 2023-02-14

## 2023-02-14 NOTE — PHYSICAL EXAM
[Ambulatory and capable of all self care but unable to carry out any work activities] : Status 2- Ambulatory and capable of all self care but unable to carry out any work activities. Up and about more than 50% of waking hours [Obese] : obese [Normal] : normal appearance, no rash, nodules, vesicles, ulcers, erythema [de-identified] : no edema [de-identified] : no gynecomastia [de-identified] : uncircumcised, right testicle normal, glans normal [de-identified] : some lumbar pain as before [de-identified] : flat affect

## 2023-02-14 NOTE — REVIEW OF SYSTEMS
[Fatigue] : fatigue [Palpitations] : palpitations [Lower Ext Edema] : lower extremity edema [Joint Pain] : joint pain [Anxiety] : anxiety [Depression] : depression [Muscle Weakness] : muscle weakness [Negative] : ENT [Fever] : no fever [Chills] : no chills [Recent Change In Weight] : ~T no recent weight change [Chest Pain] : no chest pain [Wheezing] : no wheezing [Cough] : no cough [SOB on Exertion] : no shortness of breath during exertion [Dysuria] : no dysuria [Incontinence] : no incontinence [Joint Stiffness] : no joint stiffness [Muscle Pain] : no muscle pain [Skin Rash] : no skin rash [Dizziness] : no dizziness [Easy Bruising] : no tendency for easy bruising [FreeTextEntry7] : some nausea [de-identified] : HA, see HPI, neuropathy [de-identified] : feels left leg is weaker.

## 2023-02-14 NOTE — HISTORY OF PRESENT ILLNESS
[Disease: _____________________] : Disease: [unfilled] [T: ___] : T[unfilled] [N: ___] : N[unfilled] [M: ___] : M[unfilled] [AJCC Stage: ____] : AJCC Stage: [unfilled] [de-identified] : Mr. Calderón is seen in consultation on 8/8/17. On July 11, 2017, he awoke with severe back pain extending into the left testicle. There was a heaviness and pulling sensation. He went to the ER at [facility] and a sonogram was done, revealing a mass. He was seen by Rivas Oconnor and underwent a left radical orchiectomy on July 20, 2017. The pain and pulling sensation resolved. He has been on oxycodone a time, since January 2017, for low back pains. No respiratory complaints. No urinary symptoms. Has had sperm cryopreservation. \par \par 8/30/17...Cleve comes in today for follow up was just discharged yesterday. He was in the hospital for 4 days and he was neutropenic, found a right arm superficial thrombosis at the site of IV. treated with vanco, blood cultures negative. Discharged on Levaquin. Overnight he had a temp of 104.  He is having some hair loss, numbness in his fingers and his toes. He has tinnitus intermittently, started two days after the last dose. He was having soreness in the back of his throat that now resolved. He complains of dysgeusia and he had a lot  of burning and acid reflux-like symptoms during his treatment Carafate, Protonix and the lower dose of Decadron improved his symptoms., Not sleeping well, likely due to steroids.\par \par 9/19/17...cycle 2 day 5 was 9/9/17\fabian Poon is seen in the office today. He has significant fatigue. He had vomiting for 5 days after the chemotherapy with nausea and he said that his complexion was green. He had acid reflux issues once again despite the dropping off the dose of his dexamethasone. He feels a burning sensation in his lower abdomen and he feels that the medications for nausea did not help. He also has significant fatigue which is improved today. He has intermittent tinnitus. He has tingling in the tips of his toes and fingers which are intermittent. His appetite has been good and he is gained weight while on chemotherapy but he says that he eats smaller portions. He denies dysgeusia or dry mouth.\par \par 10/9/17...Completed cycle 3 last week.  He states he feels "horrible." HE states he noticed bloating in the abdomen, He went a whole week after this treatment wit vomiting every day, last time he vomited was two days ago. He fell at one point secondary to being weak and feeling lightheaded. He feels generalized weakness.He had a blood clot, during his treatment, that resolved. His appetite is poor, He eats and then, he eats, he feels like he has to regurgitate his food up after eating. He has fatigue, tinnitus stopped. he denies any numbness of the feet. has had chronic numbness of two of his fingers.He has dysgeusia \par \par 10/30/17...Admission Date: \par -  Admission Date 16-Oct-2017 13:33. \par Discharge Date: \par - Discharge Date	21-Oct-2017 \par Chief Complaint/Reason for Visit: \par Chief Complaint/Reason for Admission	Atrial flutter with RVR\par *******************\par  38 yo M with PMHx of testicular cancer s/p left orchiectomy on chemotherapy who present to the ED before receiving chemo for tachycardia. \par The patient states that he has been feeling well for the past 2 weeks since his 3rd dose of chemotherapy. He denies CP, ABARCA, peripheral edema, cough, \par palpitations. He does states that he had one near syncopal event during a bout of emesis 2 weeks ago which caused him to fall into a door. Has not had any \par other syncopal or presyncopal episodes since then. In the ED he was found to have tachycardia found to be a.flutter after receiving 10mg IV diltiazem x2, a CXR with well placed portacath and no infiltrations. On bedside US found to have EF of 10-15% while tachycardic. \par The patient was initially started on metoprolol tartrate with increasing dosages unable to control the arrhythmia he was subsequently switch to a diltiazem drip. The metiport was removed after discussion with his Oncologist and cardiology for possible arrhythmia induction which was removed 10/19. He also received a TTE on 10/19 which showed EF of 35-40% with LV remodeling. \par Afterwards he was switched to metoprolol succinate 200mg qday. \par He was started on heparin for preparation for TYLOR and cardioversion which was performed on 10/20 and found a RA appendage thrombus with PFO and reduced LV \par function. No cardioversion was performed as a result and he was switched to rivaroxaban on 10/21, lisinopril 2.5mg qday and metoprolol succinate 200mg \par qday. He will follow with cardiology in 2 weeks and EP in 4 weeks. Treatments/Procedures/Significant Findings/Patient Condition: \par Other Significant Findings: \par - Other Significant Findings	 \par < from: Transesophageal Echocardiogram w/o TTE (10.20.17 @ 12:08) > \par Conclusions: \par 1. Left atrial enlargement.  No left atrial or left atrial appendage thrombus. \par 2. Moderate to severe global left ventricular systolic dysfunction. \par 3. There is an echodensity (likely thrombus vs mass) in the right atrial wall that measures (approximately 2.7 cm x 2.0 cm). This is near the distal point of the SVC and may \par represent thrombus from prior indwelling catheter. \par 4. Right ventricular enlargement with decreased right ventricular systolic function. \par 5. Color Doppler demonstrates evidence of a patent foramen ovale. \par 6. Trivial pericardial effusion. \par ****************************************************************************\par TYLOR showed right atrial appendage thrombus, 27 mm, On Xarelto. Feels "exhausted". Does not generally note palpitations. Anxiety level is high. Occasional ABARCA, not at rest. No cough, no sputum, no wheezing. No nausea at this time, has regurgitation. Occ vomiting noted. Fatigue is prominent. Appetite is good. \par \par 7/9/18...Had RPLND on 6/6/18, with viable GCT present. Embryonal pathology. Says he have fatigue, still "recovering" from the RLPND. He has some soreness of the abdomen and says he has some delayed healing. He say Dr Piper on 7/6 in lieu of Dr Restrepo and was told that it was healing well. He is upset that there is residual cancer present, which he says "bummed him out completely". He says he has been reviewing issues related to continued treatment, only after I called him recently as he had not made an appointment. he says his appetite is poor and he is eating small amounts multiple times as he cannot eat full full meals. He says he has had some nausea and he started OTC Prevacid, which has helped. No significant weight loss. Some ABARCA if he walks about 3 blocks or so, he has fatigue with walking as well. he has some paresthesias of the left thigh after the surgery. He notes some spasms of the muscles in that area at times. Urine flow is normal, no nocturia. Libido down, not sexually active, no masturbation since surgery. \par \par 7/19/18...Feels the same, "still recovering". Still has some discomfort near the wound. Appetite s good. Gained 2 pounds. no cough. Mild ABARCA with activities. No palpitations. No leg edema. He continues with some fatigue, 6 on 0-10 scale. Feels he is not motivated when the fatigue is high. He feels his hands are weaker than before. Lifting is fine, but twisting a bottle to open may be  impaired. Ambulates with a cane  due to paresthesias of the left anterior thigh. This from knee  to thigh area,. No paresthesias of the feet. He sometimes has some pains in his feet at night which he says are not cramps. \par \par 9/6/18...Cycle 2, TIP, day # 9. Admitted on Sunday, August 19. \par *********************************************\par Hospitalized again post chemo, this time for 9 days. ANC was <0.1, platelets were low. had low sodium, low potassium and low magnesium. He has had lots of muscle aches and bone pain secondary to the Taxol. Feeling better, still feels weak. Some SOB with exertion. No pains. No headaches, has paresthesias of the bottom of the feet along with some toes. It improves after the chemo. Somewhat more this time. No fevers, no chills. Some nausea with office visits. Appetite is "moderate". Tinnitus has improved, minor dizziness at times. \par \par 8/30/18...Delayed treatment #3 by one week. "Not a good week". Complains of hearing being sensitive. He has tinnitus. This is intermittent, more in the past 2-3 days. He has some vertigo, with some issue walking. He feels as if he is falling to the right side at times, no falls. He has to hold onto the door or furniture to walk. He has some vomiting without nausea. He vomited a few minutes ago. Appetite is decreased, He has altered taste as well. Fatigue is present, not as bad as before. he says he is a "little" fatigued at the moment. Paresthesias of feet have been intermittent, less notable at this time. Spends a lot of time sitting, sleeping. He feels as if his body is exhausted. His mother says he has a lot of anxiety. He agrees. He has thoughts running through his mind about chemo and adverse effects. \par \par 9/20/18...he feels "okay" at times. Occasionally feels nauseous and can't eat, occ feels dizzy. has some regurgitation at times. Has fatigue. No better. Tinnitus is "not as bad", persistent in the right, intermittent in the left. When dizzy,  he feels he is leaning to one side. Spends a lot of time sitting or lying in bed. Paresthesias of the feet, left foot more so than right. Right toes affects the 4th and 5th toe. Fingertips are affected. He notes some darkening of skin folds of the hand joints. Feels no SOB unless he is active. using a cane today. Not taking any meds at this point. \par \par 10/9/18...Admission Date: after cycle 3 TIP\par -  Admission Date 01-Oct-2018 02:09. \par Discharge Date: \par - Discharge Date	06-Oct-2018 \par ***********************************\par HOSPITAL COURSE: The patient continued to have significant nausea while inpatient with intermittent vomiting. He received aggressive IV hydration with \par IVF while he was no longer able to tolerate po. On hospital day 4, patient was able to tolerate some po and requested discharge home. His course was c/b \par neutropenic fever to 101. Cultures were sent and remained negative. Patient was also started on cefepime. His ANC started to improve towards the end of the \par hospital course. His platelets, however, downtrended. He was given 1U platelets as per oncology recommendations. His midline was noted to have been in since \par July. After discussion with his outpatient oncologist, the patient opted to have his midline removed and will receive his last dose of chemotherapy via \par peripheral IV. During his hospital course, patient was noted to have an episode of tachycardia to the 140's followed by bradycardia to the 20's during \par orthostatic vitals. He was seen by Dr. Brown of cardiology who feels this is chemo-induced orthostasis and no further inpatient w/u is needed. He is \par medically stable for dc home. Prior to discharge received additional platelet transfusion and MID-Line was removed after discussion with oncology. \par *******************************************************************\par He was admitted and told that the midline had to be changed. He did have fever with neutropenia. Discharged on the 6th. Hypotensive with orthostasis,seen by cardiology, restarted lisinopril and carvedilol. He has fallen twice since discharge. He has LOC for a few seconds. he is somewhat confused after the episodes. Walking with a cane. he also had an similar episode while in the hospital. Appetite returned, has altered taste, no N/V now, but did have on admission. No source of fever found. Has paresthesias of the left foot, all toes, not his right.  Had aches and pains with the Taxol, which he describes as "brutal". \par \par 10/29/18...called last week, was having difficulty keeping food down and felt weak and dizzy. We arranged for fluids, but he called Friday ad declined. Taking Zofran ADT after meals at times. Able to drink more at this time. Did not eat this AM. Yesterday, had a bagel and egg, for lunch, potatoes, pork and beans and Spam. For dinner had rice and Spam, small amount. No vomiting yesterday, but had nausea. Dizziness if he leis his head back, or if he gets up to fast. Walking with  a cane. No falls. Started Has paresthesias of the feet, on the left side. Continues on oxycodone, about  1 per day, chronic use for low back pains. No leg edema. No cough, ABARCA. \par \par 11/13/18...has not followed with Dr Marquez and remains off of carvedilol, enoxaparin and lisinopril. He takes ondansetron on occasion. Says he had a "blackout" episode after getting up, fell, striking left side, no injury. No LOC. Neuropathy remains, mostly left leg, somewhat better at times, continues on therapy. Minimal on the right side. Considering returning to work. Fatigue is present, somewhat better. Appetite is still impaired, eats once a day. Gained a few pounds. Still has some taste alteration. No leg edema. No abdominal pains. Saw Phong Restrepo yesterday in follow up. He has constipation, and may go 4-5 days w/o bowel movement. \par \par 1/4/19...Had echo done 12/2018. "I'm okay", feeling better, saw Dr Marquez. Paresthesias are an issue. On gabapentin 300 TID, says it is not helping. He says his fingers are affected, works on computers, and hits wrong keys at times. Worse are his feet. Left foot is numb, right foot partially numb, annoying when walking, not sensing if he steps on something. He continues on oxycodone. Affect toes, senses that they are swollen, bottom of the feet as well as the top. Does not pass the ankle. In the hands, it is the 3rd and 4th finger distally on the right side, and 3-4-5 on the left. Echo said that LVEF is about 44-46 % with moderate global dysfunction. The LA appendage clot was not seen. Not as tired as before, walked 11 minutes yesterday with some fatigue. Occ mild nausea, no vomiting. Appetite is good, gained 4 kilos on the past month( points out that he is wearing heavy boots. . Still has some dysgeusia. Still avoids meats.  Occasional HA, lasts up to 1/2 day, occurs intermittently, once a week. More right side of the headaches. Prior sinus headaches, but not the same, has a sharp element to it. Stills feels dizzy at times when gets up too fast..\par \par 3/11/19...Urgent visit. Called this AM stating that the right testicle is swollen, present for a few days, mild tenderness, mild discomfort/pain. No back pains except for his chronic back issue. He notes some skin changes of his hands and his feet.e is some peeling skin at the base of the toes. as well as the center of the foot. He has neuropathy of both feet, more on the left than the right, where it only affects the toes. He says the medial 3 fingers are numb and he drops things. His heart exam included a stress test, which he reports the outcome was good. Still reports tinnitus. Appetite is good, weight is increased 6 kilos since 1/30/19. Still has fatigue. \par \par 5/11/19...Missed appt 4/5/19. I " have good days and bad days". He feels he puts on a lot of weight in  a short time, and then drops weight in a short period of time. He had edema of both feet and also feels he had edema of the hands as well. He says edema accumulates and then resolves. Neuropathy is "pretty bad", extends to the calves, worse at night. Ran out of gabapentin, was on 300 TID and did not feel it helped. He has discoloration of the left foot, edema both feet. Has cramps in the legs. Feels he cannot stand for a long time, and complains of blistering of the feet. He has shooting pains in the toes. There are fluid filled blisters that he he "has to pop", due to pain. He feels he s less active due to the blisters. He feels his toes are locked i position. Does exercises provided by PT. Appetite is good. He is thirsty many times. NO pains elsewhere except chronic back pain, for which he takes oxycodone. No cough, has some ABARCA, can walk 2 blocks prior to stopping. Sees DR Marquez later today. Fine motor issues with fingers with neuropathy, difficulty with typing on keyboard, with many errors. \par \par 7/2/19...Hospital Course: \par Discharge Date	12-Jun-2019 \par Admission Date	12-Jun-2019 00:37 \par Reason for Admission	vomiting, vertigo \par Medication Reconciliation Status	Admission Reconciliation is Completed \par Discharge Reconciliation is Completed \par Hospital Course	 \par 38M with PMHx of metastatic testicular CA, NICM, neuropathy, who presents with dizziness, nausea, and vomiting. \par Vertigo. \par - will c/w meclizine 25mg 4x/day \par - f/u blood and urine cx to r/o infectious etiology \par - PT eval, fall precautions. Constellation of symptoms with horizontal nystagmus and reproducible symptoms with daryn hallpike maneuver c/w BPPV. CT \par head negative. Pt reports improvement after meclizine. \par - will c/w meclizine 25mg 4x/day \par - f/u blood and urine cx to r/o infectious etiology \par - PT eval \par  Cardiomyopathy. 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 Most recent TTE on 6/4 showing EF55%. Pt concerned current sx may be side effect from entresto \par - c/w carvedilol 12.5mg BID \par - will hold entresto in setting of soft BP and pt hesitancy to restart due to side effects. In prior studies, dizziness is reported as an adverse event "at \par least 5%" of patients taking entresto. \par will hold all BP meds upond d/c 2/2 hypotension \par  Vomiting. \par - will c/w IVF overnight and repeat lactate in AM \par - zofran PRN nausea. \par  Mixed germ cell tumor. \par  now in remission. \par **********************************************\par Was hospitalized and now off carvedilol and Entresto. Doing better at this time. Only bothered by paresthesias of the feet and occasion syncope with changes in position. the latter has occurred when getting up from bed or with sitting to standing at times. Appetite is good, no weight loss. No cough, no ABARCA. No palpitations noted. Chronic back pains, no changes. No HA. Libido is down, erections are difficult to achieve, are not durable,  no ejaculation.\par \par 9/10/19...Feels "okay". he has some intermittent swelling noted in the hands and feet, occasionally in face, may last for several days. He has paresthesias of the fingertips the hands, not as bad as it was previously. It can be shock, like, noted more at night, more prominent in the feet. Gabapentin didn't help. Has his usual back pains, takes oxycodone 15 mg 2-4 times a day depending on pain. Appetite is good, No diarrheal stools, has constipation. Weight increased 3 kilos. No nausea, no vomiting.  BM about 3 times a week, sometimes hard. Advised to take Colace. Occ has a blistering rash on the bottom of the feet, Rx'd from dermatology.He still has blisters from time to time. The foot swells and the skin starts to crack. Fatigue unchanged. Tries to walk about but the pain in the feet causes discomfort, occasionally uses his cane to walk. Urine flow is impaired as before with some discomfort, not burning. Nocturia x 3-4. NO blood. occ incontinence- leaking of urine if he goes to stand at times. He noted blood in his stool 2 night sin a row. Reddish color, fresh appearing. \par \par 12/13/20...Continues to struggle with neuropathy, seen by neuro, had NCV, started nortriptyline for the neuropathy at bed time.  He feels it helps somewhat, dose increased to 50. Appetite is good. Weight is stable. Has impaired activities due to the neuropathy, does not leave the house much. Goes to PT and doctor appts. Has back pains form prior injury, on long term oxycodone, takes 15 mg 3-4 times a day. Anxiety level is low except at night, which he attributes to pain. He feels he is not depressed. No cough, mild ABARCA, attributed to weight increase. Occ headaches, right sided, once every few months. Fatigue is 8 on a 0-10 scale. Poor libido, not sexually active, no attempts\par \par 6/9/20...Seen today. Was on zonasamide by neurology, felt he had tremors and grater degree of anxiety. Insurance won't permit Lyrica, gabapentin created swelling. Taking oxycodone 15, up to 4 times a day, trying to cut down. Interested in med marijuana. he says he does not sleep well. Awake from 2 to 6 AM. Neuropathy keeps him awake, less noted during the day. Neuropathy affects the feet to the upper calves, shooting pains at night. Also has neuropathy of the hands as well. Says he has difficulty typing on the computer. Hand will shake when using the mouse. Occ drops a fork. Appetite is good, weight increased. Knee pains as well. No cough, some ABARCA remains, which he attributes to lack of exercise. Not seeing a mental health professional. I have suggested he see Dr Almanza in the past. He feels he has spent 5 years in "isolation". \par Neurologist notes reviewed they ordered an immunofixation which showed a slight IgG lambda band. No further evaluation was performed.\par No headaches recently, occ dizziness of he gets up fast. Tries to keep himself active, but says he cannot do much. He says his feet swell with activities. He walks with a cane.\par \par 9/8/20... Presents for follow up. On medical marijuana, which he says helps, uses mainly at night as a sleep aide. Chronic low back pains from prior injury, oxycodone 15, 3-4 times a day.  Pain always present, does not awaken him. He does have some sleep issues due to the neuropathy. Neuropathy extends to lower 1/3 of calves. No better, no worse. No other pains except some joint pains. Appetite is decreased, he feels is sec to the duloxetine. He sees Symone Shields. He has lost some weight. No cough, no ABARCA. Fatigue is present, stated to occur after 2 blocks of walking, fatigue in the legs. He says he is active during the day, walks about, less computer and video game use. Occ nausea, no vomiting. No diarrhea, no constipation. Urine flow is weaker, some dysuria/pain with voiding. No blood in urine. Has not any UTIs, since the surgery. No libido, no interest in masturbation, has not tried. No AM erections. T levels have been normal. \par \par 12/8/20...Off the oxycodone 15s, on medical marijuana, which he feels it is better controlled. He sleeps better. has some pain, but did not take anything this AM. Lower back is the sites as well as the discomfort in the extremities. The med marijuana helps the neuropathy as well. Eats more, gained weight. He is more energetic. He feels it is hard to exercise, as he is not as strong as before, tired after a walk down the block. Much more talkative today. he is concerned about blacking out. Also has some muscle spasms at times with activities. Does climb stairs in the house. No cough. ABARCA present. He feels his urine stream is restricted with some discomfort. "Like a lot of fluid trying to fit thru a small space". He is going to call Dr Oconnor about these issues. uses a shower chair as he is worried about "blacking out in the shower" if he raise his hands above his head. \par \par 3/9/21 - Verbal permission for telehealth services was granted by the patient, Cleve Calderón, on March 9, 2021 at 10:12 AM. \par Mixed germ cell tumor Diagnosed in 2017, underwent radical orchiectomy on the left side in July 20, 2017.  His stage was 2A, good risk.  He received cisplatin and etoposide starting on August 14 of 2017.  Cycle 4 was delayed and then omitted as he presented with supraventricular tachycardia and a low ejection fraction.  This was felt to be secondary to the indwelling central venous catheter, which was located in the appropriate spot.  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  He underwent an RPLND which was delayed, and he had residual viable embryonal carcinoma.  He was started on TIP in July 2018, received 3 cycles. \par \par Overall, feels the same. Neuropathy is the main issues, feels he cannot rest at all. The medical marijuana helps him rest. Takes it at night and throughout the day when he feels he needs it. Onset of action if from 7 to 20 minutes. Appetite is fine, no weight loss of note. No cough, some ABARCA, after one block, he is SOB and exhausted. Occ headaches noted, brief duration. Some balance issues, no falls. He still feels he has some dexterity issues in the hands. He has some discomfort when he voids, a pressure like sensation as if the has some flow issues, since the surgery, no change. No dysuria. Has not had a CV vaccine. Libido is poor, no attempt, no erections. 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 Had to receive additional chemo with TIP. He is active and exercising, frustrated with the disability process. He is able to do more at this time. Has pain from the neuropathy with walking uphill. He notes some swelling of the left leg and right hand area, and sometimes the swelling in the hand makes it difficult to close his fist. Appetite is "poor" lately , trying to lose weight. He feels food is not as appealing as it once was, antedates this to the chemotherapy. Has had some nausea in recent times. He feels red meat is the issue. This has occurred for "some time". He says his weight was 289, now 260, over the course of 2.5 weeks. He also notes some bloating and gas. Some cramp like pains at times. had his first CV vaccine (Moderna). Breathing is fine, notes some chest pressure the last few nights. Some minor headaches in recent times, middle of the head, takes Advil or Tylenol, which does not seem to help much. These occur about 2 times a week, may last few minutes. Urinary difficulty, feels the muscle is not fully "retracting". Feels the flow is not what it should be and has a sensation of incomplete voiding. NO blood, occ incontinence-occurred x 2. Feels his right arm has increased numbness and it may be a bit weaker. Was doing exercises that PT had previously recommended. Feels he has issues when typing. No F/C/S. Notes fatigue, exercises in the backyard at times. If he stands more than 10 minutes, he feels exhausted.  Libido is poor. No interest, not sexually active, no masturbation. No full AM erections.\par \par 10/12/21...Dr Shields notes reviewed. last chemo was 3 years ago. Doing well. Overall, he feels occasional nausea at times. He is on duloxetine and medical marijuana. Still has complaints about his neuropathy with pain and discomfort, not as well controlled as it was last few visits, when he said the med marijuana produced much benefit. He said it calms him, but he still does not sleep well. He feels the duloxetine helps with the tingling sensation. Bottom of the feet feels as if he has "bubble wrap" on the bottom of his feet. Occ falls in the shower, some issue with stairs. Appetite is good, but says he lost some weight. Not seen in office since December 2020. Says he weight 257 at home, was 283 last visit. Occ nausea, no recent vomiting. Occ headaches. Tries to walk about during the day, and trying to be more active. Anxiety and depression are the same. Denies thoughts of self harm. Says he is frustrated at times. 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 Received 3 cycles, delayed interval, then started TIP, received 3 cycles, went to R[facility]ND.  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 No subsequent therapy.\par Stopped duloxetine, as he felt like a different person. He remains on medical marijuana. Neuropathy is still an issue. He has some dizziness at times, if he gets up to quickly or turns too quickly. He feels his nervous system is "all screwed up". The dizziness occurs a few times a day to a few times a week. He reads, walks about, does not go out due to COVID concerns, received booster a few weeks ago. Appetite is "variable". He says he loses weight and then regains the weight. Nausea at times, a chronic issue. No vomiting, no constipation, no diarrhea. Headaches occur, chronic, at least once a week. Mid skull, takes Advil or Tylenol with some relief. He thinks they are sinus headaches. No edema in legs, feels his feet are swollen at times. Paresthesias extend to just below the calf on the left, on right, just the ball of the foot, no change. Fatigue is considerable, says he cannot stand for more than 10 minutes w/o being exhausted. \par \par 6/2//22...missed April TEB. More than 4 years after diagnosis.  "Still dealing with side effects, I try to push my self as best I can". Has lost weight, about 40 pounds, intentionally. He has changed his diet, eats smaller quantities, avoids some foods that don't agree with him. He is also walking more, usually with someone else. He has fallen a few times. as a result of the neuropathy. He feels clumsy. \par Continues with his neuropathy, hands and feet, tingling and burning in left foot> right foot, feels left hand is stronger than the right. Says he does not realize how much pressure to place if the grabs a cup, may drop it. Neuropathy extends to the ball of the foot on the right foot, extends to mid calf on the left. The area feels tight and uncomfortable. No cough, no ABARCA. Still has some occasional edema of the legs and the skin may crack, no fluid leaking. No chest pain/pressure/...some palpitations on occasion are noted. Has some nausea at times with certain smells or taste, may have regurgitation at times. He may have 4-5 days w/o a BM, then may have some diarrhea. Saw dentist recently as he had some issues with his teeth, he is going to have 6 teeth removed at [facility]. Also has to have some implants for the front teeth, dentist blamed the chemo. Poor libido, no desire, no erections, does not try to masturbate. He feels like his flow is restricted, has to wait a minute or two before urine comes out, no dysuria. Nocturia x 5, sometimes has to return to bathroom for incomplete voiding. He admits to depression, still present, also has anxiety, says he doesn't know what is next and doesn't know to cope. Does not feel he can benefit from seeing someone about A and D, says he has to adapt to the way things  are. Had a low grade temp last night and changed appt to telehealth. Fatigue  is less at this time. Mentally, he does not feel where he should be. Uses medical marijuana, costly, but feels it helps the neuropathy more than any other medication that was prescribed. \par \par 11/17//22..diagnosed 5 years ago.  last chemo 4 years ago, received 3 cycle TIP. He had a delayed RPLND with viable tumor. Initial testicular tumor was 95% teratoma.  Stays mostly at home. He lifts some weight on occasion, or walk for a time. He says it is hard for him to get about due to the neuropathy.  Can do up to 1/2 block. No meds for the neuropathy. Worse at night, feels his body is constantly in motion, sometimes with  sharp pain. Uses compression socks, which he feels helps at night. He has fatigue and trouble "maintaining stamina during the day". Occasionally falls. Neuropathy up to just below the knee on the left, just 1/2  on the right. Pain is more on the left. Appetite is "okay, variable", notes some taste alteration some days. Checks weight frequently, up and down a few pounds. No cough, but feels he has ABARCA with walking, or climbing 1/2 a staircase. Occ headaches, goes to sleep, no meds. They occur 1-2 times a month.  No dizziness. Feels he has swelling of his legs. No fevers, no chills. NO chest pain/pressure. No palpitations unless he lifts weights (kettle ball, 25 pounds). He feels his remaining testicle may be swollen at times due to the hydrocele. Libido is poor, he says masturbation is painful and not enjoyable.  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 Cycle 4 delayed..then omitted due to tachycardia and low EF. RPLND delayed, residual viable embryonal cancer, TIP started July 29, 2018, cycle 3 delayed for grade 3 fatigue, then again one more week for transaminitis. Fourth planned cycle omitted. RPLND June 2018,  2 of 57 LN positive with FINA, embryonal, pN3 [de-identified] : 2/14/23....diagnosed 5 years ago.  last chemo 4 years ago, received 3 cycle TIP. He had a delayed RPLND with viable tumor. Initial testicular tumor was 95% teratoma. Still has neuropathy, no worse, "not manageable". pain will sometimes shoot up when he is trying to sleep. Does not sleep well. Extends to mid calf on the left, his "bad leg".  On the right it extends to the ball f the foot only.  Just the fingertips on the hands, intermittent. Says he cannot sense heat at times. Tinnitus noted, both ears, noted more at night, occ relents. Appetite is variable, depends on his "state of depression". No change of note in weight. Feels that he cannot help his family as he should, father is approaching ESRD, mother has kidney issues as well.  He lives with them.  he continues to fall, had a fall last night night, "fell hard", getting something out of a cabinet, couldn't pivot fast enough and fell to the ground. Walks with a cane, not so much in the house. Wears sandals in the house due to neuropathy, has difficulty walking in shoes and uses the cane.  He has felt that any med for neuropathy has not had the "right effect on my body". Depressed, does not go out, no friends. He says he has tried to reach out to others, but feel they have turned their back. No cough/ABARCA.  Headaches, occur, almost every day, center of the head, may last 1/2 the day, takes Tylenol or ibuprofen, which does not help much. HA have been present for years.  feels he only sleeps 3 hours a night.  he naps during the day. No nausea with headaches, has occasional nausea. Occ vomits. No D/C. Feels the left leg is swollen from time to time. No chest pain/pressure. Occ palpitations, which he attributes to anxiety. he was denied disability, saw a doctor, appointed, non-neurologist,  who told him he can work. libido is poor, weak erections, does not even try to masturbate. Was on med marijuana, but no longer due to cost issues. \par

## 2023-03-08 ENCOUNTER — APPOINTMENT (OUTPATIENT)
Dept: PHYSICAL MEDICINE AND REHAB | Facility: CLINIC | Age: 43
End: 2023-03-08

## 2023-03-09 NOTE — ASU PREOP CHECKLIST - PATIENT SENT TO
Have seen an increase in Liams aggressive behavior. Please call for up dates. [phone]   operating room

## 2023-03-13 ENCOUNTER — APPOINTMENT (OUTPATIENT)
Dept: GERIATRICS | Facility: CLINIC | Age: 43
End: 2023-03-13
Payer: MEDICAID

## 2023-03-13 PROCEDURE — 99214 OFFICE O/P EST MOD 30 MIN: CPT | Mod: 95

## 2023-03-13 NOTE — ASSESSMENT
[FreeTextEntry1] : 42yoM with: \par \par # L Testicular Cancer - s/p treatment. Med Onc follow up for surveillance.\par \par # CIPN - Restart Duloxetine - 30mg BID. \par -medical cannabis re-certification completed today.\par -topical ketamine cream prescribed\par \par # Depression - Duloxetine to be re-started. \par \par # Encounter for Palliative Care - Emotional support provided.\par \par Follow up in 1 month, call sooner with questions or issues.

## 2023-03-13 NOTE — HISTORY OF PRESENT ILLNESS
[Home] : at home, [unfilled] , at the time of the visit. [Medical Office: ([facility])___] : at the medical office located in  [Verbal consent obtained from patient] : the patient, [unfilled] [FreeTextEntry1] : 42yoM with mixed germ cell tumor presents for follow up visit via telephone.\par \par Main issue for which patient was referred is pain/neuropathy.  He experiences neuropathy in his feet b/l, which has traveled up his legs to the calves. The pain feels like "little explosions," that occur intermittently.  He has a constant feeling of tingling and numbness.  He has trouble feeling the floor beneath him. The pain is worse at night. \par Gabapentin caused edema, could not continue using it. His insurance company would not cover Lyrica. Zonisamide was prescribed by Neurology, he did not like how it made his hands shaky and he discontinued it. Nortriptyline had also been discontinued because he did not find it to be helpful. \par Has difficulty with cooking as he can't chop food. \par \par Interval Hx (3/13/23):\par Pt seen via telemedicine; last visit with me was 6/2021. \par He presents today to discuss his neuropathy, which continues to be an issue for him. \par He feels numbness/tingling in his fingertips and toes. \par The neuropathy affects his sleep. Continues to use marijuana to help with the neuropathy; he uses "whatever he can get [his] hands on" but would prefer to go back on the medical cannabis program to get more reliable, safe access. He uses the marijuana typically once in the morning and once at night. Has tried topical ketamine which "sort of works" but the relief is very temporary. He had a small supply, is willing to try it again. \par Exercises his hands and feet to help promote the dexterity. \par Has been stumbling/falling due to not feeling the ground well beneath him.\par \par \par ROS:\par +weakness in his hands, difficulty with fine motor skills - has done OT in past. \par +loss of sleep 2/2 neuropathy\par +mood is low, particularly since hearing that this neuropathy is likely permanent.\par All other ROS non-contributory. \par \par I-Stop Ref#: [id number]

## 2023-03-17 NOTE — ADVANCED PRACTICE NURSE CONSULT - REASON FOR CONSULT
Patient seen on skin care rounds after wound care referral received for assessment of skin impairment and recommendations of topical management. Chart reviewed: WBC 0.74k/uL, Serum albumin 3.1g/dL, Serum total protein 6.3g/dL, Cornelio 16, patient interviewed. After lymph node dissection pt had an abdominal surgical incision with several staples in place. During post operative visit when staples were removed, there was an area at the end of the incision that "opened." As per patient, it was of no concern at that time. The open wound was not healing, therefore the pt went to see Dr. Quesada where bacitracin was recommended. Pt said he applied the bacitracin daily until he started chemotherapy again, at that point it became "too difficult to manage the open area due to my general state of not feeling well after chemo treatments." Patient H/O metastatic testicular cancer admitted for neutropenic sepsis with acute organ disfunction s/p 1 cycle of 2nd round of chemo (7/29 to 8/3), left orchiectomy (July 2017), Open retroperitoneal lymph node dissection  (06/06/2018), cardiomyopathy, Atrial flutter requiring cardioversion (not ablated due to cardiac mass vs thrombus on coumadin at home). Continues to have diarrhea (C. diff negative), afebrile since 8/7. Pateint seen and followed Heme/Onc for mixed germ cell tumor and internal medicine.
left upper extremity

## 2023-04-11 NOTE — REASON FOR VISIT
----- Message from RIVKA Byrd sent at 8/8/2019  8:13 AM CDT -----  Please call-CT scan  With findings small fat containing ventral and umbilical hernia, no acute concerns, we can refer to general surgery for consideration of hernia however they are small and do not contain bowel. [Follow-Up] : a follow-up visit

## 2023-04-13 ENCOUNTER — APPOINTMENT (OUTPATIENT)
Dept: GERIATRICS | Facility: CLINIC | Age: 43
End: 2023-04-13
Payer: MEDICAID

## 2023-04-13 DIAGNOSIS — C62.92 MALIGNANT NEOPLASM OF LEFT TESTIS, UNSPECIFIED WHETHER DESCENDED OR UNDESCENDED: ICD-10-CM

## 2023-04-13 DIAGNOSIS — Z51.5 ENCOUNTER FOR PALLIATIVE CARE: ICD-10-CM

## 2023-04-13 PROCEDURE — 99213 OFFICE O/P EST LOW 20 MIN: CPT | Mod: 95

## 2023-04-13 NOTE — ASSESSMENT
[FreeTextEntry1] : 42yoM with: \par \par # L Testicular Cancer - s/p treatment. Med Onc follow up for surveillance.\par \par # CIPN - Restart Duloxetine - 30mg BID. Requested assistance with insurance approval for this medication. Informed patient to call office with any issues between visits, he does not have to wait for a follow up visit to bring up issues such as this.\par - c/w medical cannabis\par \par # Depression - Duloxetine to be re-started. \par \par # Encounter for Palliative Care - Emotional support provided.\par \par Follow up in 1 month, call sooner with questions or issues.

## 2023-04-13 NOTE — HISTORY OF PRESENT ILLNESS
[Home] : at home, [unfilled] , at the time of the visit. [Medical Office: ([facility])___] : at the medical office located in  [Verbal consent obtained from patient] : the patient, [unfilled] [FreeTextEntry1] : 42yoM with mixed germ cell tumor presents for follow up visit via telephone.\par \par Main issue for which patient was referred is pain/neuropathy.  He experiences neuropathy in his feet b/l, which has traveled up his legs to the calves. The pain feels like "little explosions," that occur intermittently.  He has a constant feeling of tingling and numbness.  He has trouble feeling the floor beneath him. The pain is worse at night. \par Gabapentin caused edema, could not continue using it. His insurance company would not cover Lyrica. Zonisamide was prescribed by Neurology, he did not like how it made his hands shaky and he discontinued it. Nortriptyline had also been discontinued because he did not find it to be helpful. \par Has difficulty with cooking as he can't chop food. \par \par Interval Hx (4/13/23):\par Pt seen via telemedicine.\par Pt did not obtain ketamine cream due to not having a credit card for payment of it. Neither has he yet received the Rx for duloxetine due to insurance coverage issues. \par He feels numbness/tingling in his fingertips and toes. \par The neuropathy affects his sleep. Continues to use marijuana to help with the neuropathy; he uses "whatever he can get [his] hands on" but would prefer to go back on the medical cannabis program to get more reliable, safe access. He uses the marijuana typically once in the morning and once at night. Has tried topical ketamine which "sort of works" but the relief is very temporary. He had a small supply, is willing to try it again. \par Exercises his hands and feet to help promote the dexterity. \par Has been stumbling/falling due to not feeling the ground well beneath him.\par \par ROS:\par +weakness in his hands, difficulty with fine motor skills - has done OT in past. \par +loss of sleep 2/2 neuropathy\par +mood is low, particularly since hearing that this neuropathy is likely permanent.\par All other ROS non-contributory. \par \par I-Stop Ref#: [id number]

## 2023-05-08 NOTE — ED PROVIDER NOTE - ATTENDING CONTRIBUTION TO CARE
DR. JOHNSON, ATTENDING MD-  I performed a face to face bedside interview with patient regarding history of present illness, review of symptoms and past medical history. I completed an independent physical exam.  I have discussed patient's plan of care with the resident.   Documentation as above in the note.    37 y/o male h/o test ca on chemo, cardiomyop here with n/v/d and syncope.  Pt states that he last had chemo on 9/28.  Typically develops gi upset after chemo sessions, however, never had syncopized.  He states that he was walking to bathroom when his vision blurred and he woke up on the floor.  Second episode witnessed by mother, she caught him as he fell and lowered him down.  No seizurelike activity.  No confusion on waking.  Denies f/c, ha, neck stiffness, cp, sob, cough, abd pain, dysuria, rash.  Afebrile, fatigued, dry mm, ctabil, s1s2 tachy reg rhythm no m/r/g, abd soft non ttp no r/g, no cva tenderness b/l, no leg swelling b/l, no rash.  Hypovolemia vs lyte abn vs anemia vs dysrhythmia d/t known cardiomyop.  Obtain cbc, cmp, mag, phos, ekg, give ivf bolus, antiemetic, reassess.  Will need admission for further care and evaluation.
No

## 2023-05-15 ENCOUNTER — APPOINTMENT (OUTPATIENT)
Dept: GERIATRICS | Facility: CLINIC | Age: 43
End: 2023-05-15
Payer: MEDICAID

## 2023-05-15 PROCEDURE — 99213 OFFICE O/P EST LOW 20 MIN: CPT | Mod: 95

## 2023-05-15 NOTE — ASSESSMENT
[FreeTextEntry1] : 42yoM with: \par \par # L Testicular Cancer - s/p treatment. Med Onc follow up for surveillance.\par \par # CIPN - Increase Duloxetine to 60mg QAM and 30mg QPM. \par - c/w medical cannabis\par \par # Depression - Improving since initiation of Duloxetine.\par \par # Encounter for Palliative Care - Emotional support provided.\par \par Follow up in 1 month, call sooner with questions or issues.

## 2023-05-15 NOTE — HISTORY OF PRESENT ILLNESS
[Home] : at home, [unfilled] , at the time of the visit. [Medical Office: ([facility])___] : at the medical office located in  [Verbal consent obtained from patient] : the patient, [unfilled] [FreeTextEntry1] : 42yoM with mixed germ cell tumor presents for follow up visit via telephone.\par \par Main issue for which patient was referred is pain/neuropathy.  He experiences neuropathy in his feet b/l, which has traveled up his legs to the calves. The pain feels like "little explosions," that occur intermittently.  He has a constant feeling of tingling and numbness.  He has trouble feeling the floor beneath him. The pain is worse at night. \par Gabapentin caused edema, could not continue using it. His insurance company would not cover Lyrica. Zonisamide was prescribed by Neurology, he did not like how it made his hands shaky and he discontinued it. Nortriptyline had also been discontinued because he did not find it to be helpful. \par Has difficulty with cooking as he can't chop food. \par \par Interval Hx (5/15/23):\par Pt seen via telemedicine.\par Has been on Duloxetine 30mg BID for the past month, has found an improvement in his sleep, mood. \par He feels numbness/tingling in his fingertips and toes, it has been "quieter," although very much still present.  He had a headache the first days he started duloxetine which has since dissipated.. He uses the marijuana typically once in the morning and once at night. \par Exercises his hands and feet to help promote the dexterity. \par Has been stumbling/falling due to not feeling the ground well beneath him.\par \par ROS:\par +weakness in his hands, difficulty with fine motor skills - has done OT in past. \par +loss of sleep 2/2 neuropathy\par +mood is low, particularly since hearing that this neuropathy is likely permanent.\par All other ROS non-contributory. \par \par I-Stop Ref#: [id number]

## 2023-05-31 ENCOUNTER — OUTPATIENT (OUTPATIENT)
Dept: OUTPATIENT SERVICES | Facility: HOSPITAL | Age: 43
LOS: 1 days | Discharge: HOME | End: 2023-05-31

## 2023-05-31 DIAGNOSIS — Z98.890 OTHER SPECIFIED POSTPROCEDURAL STATES: Chronic | ICD-10-CM

## 2023-05-31 DIAGNOSIS — Z90.79 ACQUIRED ABSENCE OF OTHER GENITAL ORGAN(S): Chronic | ICD-10-CM

## 2023-05-31 DIAGNOSIS — C62.92 MALIGNANT NEOPLASM OF LEFT TESTIS, UNSPECIFIED WHETHER DESCENDED OR UNDESCENDED: ICD-10-CM

## 2023-05-31 DIAGNOSIS — Z95.828 PRESENCE OF OTHER VASCULAR IMPLANTS AND GRAFTS: Chronic | ICD-10-CM

## 2023-06-01 ENCOUNTER — RESULT REVIEW (OUTPATIENT)
Age: 43
End: 2023-06-01

## 2023-06-01 ENCOUNTER — APPOINTMENT (OUTPATIENT)
Dept: HEMATOLOGY ONCOLOGY | Facility: CLINIC | Age: 43
End: 2023-06-01
Payer: MEDICAID

## 2023-06-01 VITALS
BODY MASS INDEX: 34.53 KG/M2 | TEMPERATURE: 98.4 F | RESPIRATION RATE: 16 BRPM | HEIGHT: 73 IN | OXYGEN SATURATION: 96 % | HEART RATE: 76 BPM | WEIGHT: 260.56 LBS | SYSTOLIC BLOOD PRESSURE: 106 MMHG | DIASTOLIC BLOOD PRESSURE: 73 MMHG

## 2023-06-01 LAB
BASOPHILS # BLD AUTO: 0.03 K/UL — SIGNIFICANT CHANGE UP (ref 0–0.2)
BASOPHILS NFR BLD AUTO: 0.5 % — SIGNIFICANT CHANGE UP (ref 0–2)
EOSINOPHIL # BLD AUTO: 0.1 K/UL — SIGNIFICANT CHANGE UP (ref 0–0.5)
EOSINOPHIL NFR BLD AUTO: 1.6 % — SIGNIFICANT CHANGE UP (ref 0–6)
HCT VFR BLD CALC: 44.1 % — SIGNIFICANT CHANGE UP (ref 39–50)
HGB BLD-MCNC: 15.3 G/DL — SIGNIFICANT CHANGE UP (ref 13–17)
IMM GRANULOCYTES NFR BLD AUTO: 0.6 % — SIGNIFICANT CHANGE UP (ref 0–0.9)
LYMPHOCYTES # BLD AUTO: 2.41 K/UL — SIGNIFICANT CHANGE UP (ref 1–3.3)
LYMPHOCYTES # BLD AUTO: 38 % — SIGNIFICANT CHANGE UP (ref 13–44)
MCHC RBC-ENTMCNC: 33 PG — SIGNIFICANT CHANGE UP (ref 27–34)
MCHC RBC-ENTMCNC: 34.7 G/DL — SIGNIFICANT CHANGE UP (ref 32–36)
MCV RBC AUTO: 95.2 FL — SIGNIFICANT CHANGE UP (ref 80–100)
MONOCYTES # BLD AUTO: 0.54 K/UL — SIGNIFICANT CHANGE UP (ref 0–0.9)
MONOCYTES NFR BLD AUTO: 8.5 % — SIGNIFICANT CHANGE UP (ref 2–14)
NEUTROPHILS # BLD AUTO: 3.23 K/UL — SIGNIFICANT CHANGE UP (ref 1.8–7.4)
NEUTROPHILS NFR BLD AUTO: 50.8 % — SIGNIFICANT CHANGE UP (ref 43–77)
NRBC # BLD: 0 /100 WBCS — SIGNIFICANT CHANGE UP (ref 0–0)
PLATELET # BLD AUTO: 144 K/UL — LOW (ref 150–400)
RBC # BLD: 4.63 M/UL — SIGNIFICANT CHANGE UP (ref 4.2–5.8)
RBC # FLD: 11.9 % — SIGNIFICANT CHANGE UP (ref 10.3–14.5)
WBC # BLD: 6.35 K/UL — SIGNIFICANT CHANGE UP (ref 3.8–10.5)
WBC # FLD AUTO: 6.35 K/UL — SIGNIFICANT CHANGE UP (ref 3.8–10.5)

## 2023-06-01 PROCEDURE — 99214 OFFICE O/P EST MOD 30 MIN: CPT

## 2023-06-01 NOTE — REVIEW OF SYSTEMS
[Fatigue] : fatigue [Recent Change In Weight] : ~T recent weight change [SOB on Exertion] : shortness of breath during exertion [Constipation] : constipation [Diarrhea: Grade 0] : Diarrhea: Grade 0 [Dysuria] : dysuria [Joint Pain] : joint pain [Dizziness] : dizziness [Difficulty Walking] : difficulty walking [Anxiety] : anxiety [Depression] : depression [Negative] : Eyes [Fever] : no fever [Chills] : no chills [Dysphagia] : no dysphagia [Odynophagia] : no odynophagia [Mucosal Pain] : no mucosal pain [Chest Pain] : no chest pain [Palpitations] : no palpitations [Lower Ext Edema] : no lower extremity edema [Wheezing] : no wheezing [Cough] : no cough [Abdominal Pain] : no abdominal pain [Incontinence] : no incontinence [Vomiting] : no vomiting [Skin Rash] : no skin rash [Muscle Weakness] : no muscle weakness [Easy Bruising] : no tendency for easy bruising [FreeTextEntry4] : tinnitus [FreeTextEntry7] : occ nausea [FreeTextEntry9] : muscle cramps, hands and knees for joint pains. [de-identified] : no pruritus [de-identified] : balance issues. occ HA

## 2023-06-01 NOTE — ASSESSMENT
[Palliative Care Plan] : not applicable at this time [FreeTextEntry1] : Cleve is seen in follow-up today.  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  He had interrupted chemotherapy.  He had to receive second line therapy with TIP.  He had a delayed retroperitoneal lymph node dissection with viable tumor.  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  His neuropathy remains a great issue and he says that he has painful neuropathy.  It is minimally better on the current medications which includes duloxetine 60 in the morning and 30 in the afternoon.  He also has medical marijuana and he says that he sleeps better with this medication.  He has tinnitus as before.  He says that he does some "light activity".  Has been trying to do some exercise.  He is attempting to be more physically active.  He ambulates with a cane.  He lives with his family.  His appetite is variable.  He is lost 10 pounds since December which she attributes to increased activity.  He has some headaches and this has been since the start of duloxetine.  He was on it previously and experienced headaches as well.  Its in the center of the head and is intermittent.  It does not occur on a daily basis.  There is no associated nausea or diaphoresis.  He takes some ibuprofen which relieves it, or sometimes takes Tylenol.  He has fallen a few times.  He has not sustained any injury.  He sometimes falls backwards if he looks up too long.  There is no cough but he does have some shortness of breath.  Urinary flow continues to be slow and he says it is "painful", and the same feels that the sphincter does not open wide and that there is too much pressure.  There is no incontinence.  He has occasional urgency.  Nocturia occurs 4 times.  He sometimes feels if he does not empty his bladder fully.  There is no edema of the extremities.  There is no chest pain or chest pressure.  He denies any palpitations, unless he has some anxiety issues.  He feels that his anxiety is better controlled, and that his depression is improved.  He feels that this is due to improved sleep habits.\par \par On physical examination, he appears in no acute distress.  His performance status is 2.  His blood pressure was 106/73 with a pulse rate of 76 bpm.  His weight was 118.2 kg.  The HEENT examination is normal.  The lungs are clear and the heart examination is normal.  There is no edema of the extremities present.  The abdominal examination does not reveal any palpable abnormality or tenderness.  The penis is uncircumcised.  The glans is normal.  The right testicle is normal to palpation.  There is no hydrocele at this time.  There is no spinal column or chest wall tenderness to palpation or percussion.\par \par His last imaging studies were in June of last year.  He had interrupted chemotherapy.  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  He did not receive additional therapy for some time, and he underwent retroperitoneal lymph node dissection.  Due to persistent disease, he was treated with 3 cycles of TIP chemotherapy.  His last chemotherapy was September 2018.\par \par I requested a CT scan of the abdomen and pelvis at this time.  I am concerned about the weight loss.  He has chronic back pains which makes it difficult to differentiate between recurrent disease versus his chronic back issues.\par \par The alpha-fetoprotein from today was normal at 2.7.  The beta-hCG was undetectable.  His testosterone level was 335.  His chemistry panel revealed normal values.  The LDH was 184.  The CBC was normal with the exception of a platelet count of 144,000.\par \par All questions were answered to the best my ability and to his apparent satisfaction.  I will see him once again in 6 months time.  I will contact him about the results of his CT scan when they become available.

## 2023-06-01 NOTE — PHYSICAL EXAM
[Ambulatory and capable of all self care but unable to carry out any work activities] : Status 2- Ambulatory and capable of all self care but unable to carry out any work activities. Up and about more than 50% of waking hours [Normal] : affect appropriate [de-identified] : uncircumcised, glans normal, right testicle normal, no hydrocele [de-identified] : no edema

## 2023-06-01 NOTE — HISTORY OF PRESENT ILLNESS
[Disease: _____________________] : Disease: [unfilled] [T: ___] : T[unfilled] [N: ___] : N[unfilled] [M: ___] : M[unfilled] [AJCC Stage: ____] : AJCC Stage: [unfilled] [de-identified] : Mr. Calderón is seen in consultation on 8/8/17. On July 11, 2017, he awoke with severe back pain extending into the left testicle. There was a heaviness and pulling sensation. He went to the ER at [facility] and a sonogram was done, revealing a mass. He was seen by Rivas Oconnor and underwent a left radical orchiectomy on July 20, 2017. The pain and pulling sensation resolved. He has been on oxycodone a time, since January 2017, for low back pains. No respiratory complaints. No urinary symptoms. Has had sperm cryopreservation. \par \par 8/30/17...Cleve comes in today for follow up was just discharged yesterday. He was in the hospital for 4 days and he was neutropenic, found a right arm superficial thrombosis at the site of IV. treated with vanco, blood cultures negative. Discharged on Levaquin. Overnight he had a temp of 104.  He is having some hair loss, numbness in his fingers and his toes. He has tinnitus intermittently, started two days after the last dose. He was having soreness in the back of his throat that now resolved. He complains of dysgeusia and he had a lot  of burning and acid reflux-like symptoms during his treatment Carafate, Protonix and the lower dose of Decadron improved his symptoms., Not sleeping well, likely due to steroids.\par \par 9/19/17...cycle 2 day 5 was 9/9/17\fabian Poon is seen in the office today. He has significant fatigue. He had vomiting for 5 days after the chemotherapy with nausea and he said that his complexion was green. He had acid reflux issues once again despite the dropping off the dose of his dexamethasone. He feels a burning sensation in his lower abdomen and he feels that the medications for nausea did not help. He also has significant fatigue which is improved today. He has intermittent tinnitus. He has tingling in the tips of his toes and fingers which are intermittent. His appetite has been good and he is gained weight while on chemotherapy but he says that he eats smaller portions. He denies dysgeusia or dry mouth.\par \par 10/9/17...Completed cycle 3 last week.  He states he feels "horrible." HE states he noticed bloating in the abdomen, He went a whole week after this treatment wit vomiting every day, last time he vomited was two days ago. He fell at one point secondary to being weak and feeling lightheaded. He feels generalized weakness.He had a blood clot, during his treatment, that resolved. His appetite is poor, He eats and then, he eats, he feels like he has to regurgitate his food up after eating. He has fatigue, tinnitus stopped. he denies any numbness of the feet. has had chronic numbness of two of his fingers.He has dysgeusia \par \par 10/30/17...Admission Date: \par -  Admission Date 16-Oct-2017 13:33. \par Discharge Date: \par - Discharge Date	21-Oct-2017 \par Chief Complaint/Reason for Visit: \par Chief Complaint/Reason for Admission	Atrial flutter with RVR\par *******************\par  36 yo M with PMHx of testicular cancer s/p left orchiectomy on chemotherapy who present to the ED before receiving chemo for tachycardia. \par The patient states that he has been feeling well for the past 2 weeks since his 3rd dose of chemotherapy. He denies CP, ABARCA, peripheral edema, cough, \par palpitations. He does states that he had one near syncopal event during a bout of emesis 2 weeks ago which caused him to fall into a door. Has not had any \par other syncopal or presyncopal episodes since then. In the ED he was found to have tachycardia found to be a.flutter after receiving 10mg IV diltiazem x2, a CXR with well placed portacath and no infiltrations. On bedside US found to have EF of 10-15% while tachycardic. \par The patient was initially started on metoprolol tartrate with increasing dosages unable to control the arrhythmia he was subsequently switch to a diltiazem drip. The metiport was removed after discussion with his Oncologist and cardiology for possible arrhythmia induction which was removed 10/19. He also received a TTE on 10/19 which showed EF of 35-40% with LV remodeling. \par Afterwards he was switched to metoprolol succinate 200mg qday. \par He was started on heparin for preparation for TYLOR and cardioversion which was performed on 10/20 and found a RA appendage thrombus with PFO and reduced LV \par function. No cardioversion was performed as a result and he was switched to rivaroxaban on 10/21, lisinopril 2.5mg qday and metoprolol succinate 200mg \par qday. He will follow with cardiology in 2 weeks and EP in 4 weeks. Treatments/Procedures/Significant Findings/Patient Condition: \par Other Significant Findings: \par - Other Significant Findings	 \par < from: Transesophageal Echocardiogram w/o TTE (10.20.17 @ 12:08) > \par Conclusions: \par 1. Left atrial enlargement.  No left atrial or left atrial appendage thrombus. \par 2. Moderate to severe global left ventricular systolic dysfunction. \par 3. There is an echodensity (likely thrombus vs mass) in the right atrial wall that measures (approximately 2.7 cm x 2.0 cm). This is near the distal point of the SVC and may \par represent thrombus from prior indwelling catheter. \par 4. Right ventricular enlargement with decreased right ventricular systolic function. \par 5. Color Doppler demonstrates evidence of a patent foramen ovale. \par 6. Trivial pericardial effusion. \par ****************************************************************************\par TYLOR showed right atrial appendage thrombus, 27 mm, On Xarelto. Feels "exhausted". Does not generally note palpitations. Anxiety level is high. Occasional ABARCA, not at rest. No cough, no sputum, no wheezing. No nausea at this time, has regurgitation. Occ vomiting noted. Fatigue is prominent. Appetite is good. \par \par 7/9/18...Had RPLND on 6/6/18, with viable GCT present. Embryonal pathology. Says he have fatigue, still "recovering" from the RLPND. He has some soreness of the abdomen and says he has some delayed healing. He say Dr Piper on 7/6 in lieu of Dr Restrepo and was told that it was healing well. He is upset that there is residual cancer present, which he says "bummed him out completely". He says he has been reviewing issues related to continued treatment, only after I called him recently as he had not made an appointment. he says his appetite is poor and he is eating small amounts multiple times as he cannot eat full full meals. He says he has had some nausea and he started OTC Prevacid, which has helped. No significant weight loss. Some ABARCA if he walks about 3 blocks or so, he has fatigue with walking as well. he has some paresthesias of the left thigh after the surgery. He notes some spasms of the muscles in that area at times. Urine flow is normal, no nocturia. Libido down, not sexually active, no masturbation since surgery. \par \par 7/19/18...Feels the same, "still recovering". Still has some discomfort near the wound. Appetite s good. Gained 2 pounds. no cough. Mild ABARCA with activities. No palpitations. No leg edema. He continues with some fatigue, 6 on 0-10 scale. Feels he is not motivated when the fatigue is high. He feels his hands are weaker than before. Lifting is fine, but twisting a bottle to open may be  impaired. Ambulates with a cane  due to paresthesias of the left anterior thigh. This from knee  to thigh area,. No paresthesias of the feet. He sometimes has some pains in his feet at night which he says are not cramps. \par \par 9/6/18...Cycle 2, TIP, day # 9. Admitted on Sunday, August 19. \par *********************************************\par Hospitalized again post chemo, this time for 9 days. ANC was <0.1, platelets were low. had low sodium, low potassium and low magnesium. He has had lots of muscle aches and bone pain secondary to the Taxol. Feeling better, still feels weak. Some SOB with exertion. No pains. No headaches, has paresthesias of the bottom of the feet along with some toes. It improves after the chemo. Somewhat more this time. No fevers, no chills. Some nausea with office visits. Appetite is "moderate". Tinnitus has improved, minor dizziness at times. \par \par 8/30/18...Delayed treatment #3 by one week. "Not a good week". Complains of hearing being sensitive. He has tinnitus. This is intermittent, more in the past 2-3 days. He has some vertigo, with some issue walking. He feels as if he is falling to the right side at times, no falls. He has to hold onto the door or furniture to walk. He has some vomiting without nausea. He vomited a few minutes ago. Appetite is decreased, He has altered taste as well. Fatigue is present, not as bad as before. he says he is a "little" fatigued at the moment. Paresthesias of feet have been intermittent, less notable at this time. Spends a lot of time sitting, sleeping. He feels as if his body is exhausted. His mother says he has a lot of anxiety. He agrees. He has thoughts running through his mind about chemo and adverse effects. \par \par 9/20/18...he feels "okay" at times. Occasionally feels nauseous and can't eat, occ feels dizzy. has some regurgitation at times. Has fatigue. No better. Tinnitus is "not as bad", persistent in the right, intermittent in the left. When dizzy,  he feels he is leaning to one side. Spends a lot of time sitting or lying in bed. Paresthesias of the feet, left foot more so than right. Right toes affects the 4th and 5th toe. Fingertips are affected. He notes some darkening of skin folds of the hand joints. Feels no SOB unless he is active. using a cane today. Not taking any meds at this point. \par \par 10/9/18...Admission Date: after cycle 3 TIP\par -  Admission Date 01-Oct-2018 02:09. \par Discharge Date: \par - Discharge Date	06-Oct-2018 \par ***********************************\par HOSPITAL COURSE: The patient continued to have significant nausea while inpatient with intermittent vomiting. He received aggressive IV hydration with \par IVF while he was no longer able to tolerate po. On hospital day 4, patient was able to tolerate some po and requested discharge home. His course was c/b \par neutropenic fever to 101. Cultures were sent and remained negative. Patient was also started on cefepime. His ANC started to improve towards the end of the \par hospital course. His platelets, however, downtrended. He was given 1U platelets as per oncology recommendations. His midline was noted to have been in since \par July. After discussion with his outpatient oncologist, the patient opted to have his midline removed and will receive his last dose of chemotherapy via \par peripheral IV. During his hospital course, patient was noted to have an episode of tachycardia to the 140's followed by bradycardia to the 20's during \par orthostatic vitals. He was seen by Dr. Brown of cardiology who feels this is chemo-induced orthostasis and no further inpatient w/u is needed. He is \par medically stable for dc home. Prior to discharge received additional platelet transfusion and MID-Line was removed after discussion with oncology. \par *******************************************************************\par He was admitted and told that the midline had to be changed. He did have fever with neutropenia. Discharged on the 6th. Hypotensive with orthostasis,seen by cardiology, restarted lisinopril and carvedilol. He has fallen twice since discharge. He has LOC for a few seconds. he is somewhat confused after the episodes. Walking with a cane. he also had an similar episode while in the hospital. Appetite returned, has altered taste, no N/V now, but did have on admission. No source of fever found. Has paresthesias of the left foot, all toes, not his right.  Had aches and pains with the Taxol, which he describes as "brutal". \par \par 10/29/18...called last week, was having difficulty keeping food down and felt weak and dizzy. We arranged for fluids, but he called Friday ad declined. Taking Zofran ADT after meals at times. Able to drink more at this time. Did not eat this AM. Yesterday, had a bagel and egg, for lunch, potatoes, pork and beans and Spam. For dinner had rice and Spam, small amount. No vomiting yesterday, but had nausea. Dizziness if he leis his head back, or if he gets up to fast. Walking with  a cane. No falls. Started Has paresthesias of the feet, on the left side. Continues on oxycodone, about  1 per day, chronic use for low back pains. No leg edema. No cough, ABARCA. \par \par 11/13/18...has not followed with Dr Marquez and remains off of carvedilol, enoxaparin and lisinopril. He takes ondansetron on occasion. Says he had a "blackout" episode after getting up, fell, striking left side, no injury. No LOC. Neuropathy remains, mostly left leg, somewhat better at times, continues on therapy. Minimal on the right side. Considering returning to work. Fatigue is present, somewhat better. Appetite is still impaired, eats once a day. Gained a few pounds. Still has some taste alteration. No leg edema. No abdominal pains. Saw Phong Restrepo yesterday in follow up. He has constipation, and may go 4-5 days w/o bowel movement. \par \par 1/4/19...Had echo done 12/2018. "I'm okay", feeling better, saw Dr Marquez. Paresthesias are an issue. On gabapentin 300 TID, says it is not helping. He says his fingers are affected, works on computers, and hits wrong keys at times. Worse are his feet. Left foot is numb, right foot partially numb, annoying when walking, not sensing if he steps on something. He continues on oxycodone. Affect toes, senses that they are swollen, bottom of the feet as well as the top. Does not pass the ankle. In the hands, it is the 3rd and 4th finger distally on the right side, and 3-4-5 on the left. Echo said that LVEF is about 44-46 % with moderate global dysfunction. The LA appendage clot was not seen. Not as tired as before, walked 11 minutes yesterday with some fatigue. Occ mild nausea, no vomiting. Appetite is good, gained 4 kilos on the past month( points out that he is wearing heavy boots. . Still has some dysgeusia. Still avoids meats.  Occasional HA, lasts up to 1/2 day, occurs intermittently, once a week. More right side of the headaches. Prior sinus headaches, but not the same, has a sharp element to it. Stills feels dizzy at times when gets up too fast..\par \par 3/11/19...Urgent visit. Called this AM stating that the right testicle is swollen, present for a few days, mild tenderness, mild discomfort/pain. No back pains except for his chronic back issue. He notes some skin changes of his hands and his feet.e is some peeling skin at the base of the toes. as well as the center of the foot. He has neuropathy of both feet, more on the left than the right, where it only affects the toes. He says the medial 3 fingers are numb and he drops things. His heart exam included a stress test, which he reports the outcome was good. Still reports tinnitus. Appetite is good, weight is increased 6 kilos since 1/30/19. Still has fatigue. \par \par 5/11/19...Missed appt 4/5/19. I " have good days and bad days". He feels he puts on a lot of weight in  a short time, and then drops weight in a short period of time. He had edema of both feet and also feels he had edema of the hands as well. He says edema accumulates and then resolves. Neuropathy is "pretty bad", extends to the calves, worse at night. Ran out of gabapentin, was on 300 TID and did not feel it helped. He has discoloration of the left foot, edema both feet. Has cramps in the legs. Feels he cannot stand for a long time, and complains of blistering of the feet. He has shooting pains in the toes. There are fluid filled blisters that he he "has to pop", due to pain. He feels he s less active due to the blisters. He feels his toes are locked i position. Does exercises provided by PT. Appetite is good. He is thirsty many times. NO pains elsewhere except chronic back pain, for which he takes oxycodone. No cough, has some ABARCA, can walk 2 blocks prior to stopping. Sees DR Marquez later today. Fine motor issues with fingers with neuropathy, difficulty with typing on keyboard, with many errors. \par \par 7/2/19...Hospital Course: \par Discharge Date	12-Jun-2019 \par Admission Date	12-Jun-2019 00:37 \par Reason for Admission	vomiting, vertigo \par Medication Reconciliation Status	Admission Reconciliation is Completed \par Discharge Reconciliation is Completed \par Hospital Course	 \par 38M with PMHx of metastatic testicular CA, NICM, neuropathy, who presents with dizziness, nausea, and vomiting. \par Vertigo. \par - will c/w meclizine 25mg 4x/day \par - f/u blood and urine cx to r/o infectious etiology \par - PT eval, fall precautions. Constellation of symptoms with horizontal nystagmus and reproducible symptoms with daryn hallpike maneuver c/w BPPV. CT \par head negative. Pt reports improvement after meclizine. \par - will c/w meclizine 25mg 4x/day \par - f/u blood and urine cx to r/o infectious etiology \par - PT eval \par  Cardiomyopathy. 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 Most recent TTE on 6/4 showing EF55%. Pt concerned current sx may be side effect from entresto \par - c/w carvedilol 12.5mg BID \par - will hold entresto in setting of soft BP and pt hesitancy to restart due to side effects. In prior studies, dizziness is reported as an adverse event "at \par least 5%" of patients taking entresto. \par will hold all BP meds upond d/c 2/2 hypotension \par  Vomiting. \par - will c/w IVF overnight and repeat lactate in AM \par - zofran PRN nausea. \par  Mixed germ cell tumor. \par  now in remission. \par **********************************************\par Was hospitalized and now off carvedilol and Entresto. Doing better at this time. Only bothered by paresthesias of the feet and occasion syncope with changes in position. the latter has occurred when getting up from bed or with sitting to standing at times. Appetite is good, no weight loss. No cough, no ABARCA. No palpitations noted. Chronic back pains, no changes. No HA. Libido is down, erections are difficult to achieve, are not durable,  no ejaculation.\par \par 9/10/19...Feels "okay". he has some intermittent swelling noted in the hands and feet, occasionally in face, may last for several days. He has paresthesias of the fingertips the hands, not as bad as it was previously. It can be shock, like, noted more at night, more prominent in the feet. Gabapentin didn't help. Has his usual back pains, takes oxycodone 15 mg 2-4 times a day depending on pain. Appetite is good, No diarrheal stools, has constipation. Weight increased 3 kilos. No nausea, no vomiting.  BM about 3 times a week, sometimes hard. Advised to take Colace. Occ has a blistering rash on the bottom of the feet, Rx'd from dermatology.He still has blisters from time to time. The foot swells and the skin starts to crack. Fatigue unchanged. Tries to walk about but the pain in the feet causes discomfort, occasionally uses his cane to walk. Urine flow is impaired as before with some discomfort, not burning. Nocturia x 3-4. NO blood. occ incontinence- leaking of urine if he goes to stand at times. He noted blood in his stool 2 night sin a row. Reddish color, fresh appearing. \par \par 12/13/20...Continues to struggle with neuropathy, seen by neuro, had NCV, started nortriptyline for the neuropathy at bed time.  He feels it helps somewhat, dose increased to 50. Appetite is good. Weight is stable. Has impaired activities due to the neuropathy, does not leave the house much. Goes to PT and doctor appts. Has back pains form prior injury, on long term oxycodone, takes 15 mg 3-4 times a day. Anxiety level is low except at night, which he attributes to pain. He feels he is not depressed. No cough, mild ABARCA, attributed to weight increase. Occ headaches, right sided, once every few months. Fatigue is 8 on a 0-10 scale. Poor libido, not sexually active, no attempts\par \par 6/9/20...Seen today. Was on zonasamide by neurology, felt he had tremors and grater degree of anxiety. Insurance won't permit Lyrica, gabapentin created swelling. Taking oxycodone 15, up to 4 times a day, trying to cut down. Interested in med marijuana. he says he does not sleep well. Awake from 2 to 6 AM. Neuropathy keeps him awake, less noted during the day. Neuropathy affects the feet to the upper calves, shooting pains at night. Also has neuropathy of the hands as well. Says he has difficulty typing on the computer. Hand will shake when using the mouse. Occ drops a fork. Appetite is good, weight increased. Knee pains as well. No cough, some ABARCA remains, which he attributes to lack of exercise. Not seeing a mental health professional. I have suggested he see Dr Almanza in the past. He feels he has spent 5 years in "isolation". \par Neurologist notes reviewed they ordered an immunofixation which showed a slight IgG lambda band. No further evaluation was performed.\par No headaches recently, occ dizziness of he gets up fast. Tries to keep himself active, but says he cannot do much. He says his feet swell with activities. He walks with a cane.\par \par 9/8/20... Presents for follow up. On medical marijuana, which he says helps, uses mainly at night as a sleep aide. Chronic low back pains from prior injury, oxycodone 15, 3-4 times a day.  Pain always present, does not awaken him. He does have some sleep issues due to the neuropathy. Neuropathy extends to lower 1/3 of calves. No better, no worse. No other pains except some joint pains. Appetite is decreased, he feels is sec to the duloxetine. He sees Symone Shields. He has lost some weight. No cough, no ABARCA. Fatigue is present, stated to occur after 2 blocks of walking, fatigue in the legs. He says he is active during the day, walks about, less computer and video game use. Occ nausea, no vomiting. No diarrhea, no constipation. Urine flow is weaker, some dysuria/pain with voiding. No blood in urine. Has not any UTIs, since the surgery. No libido, no interest in masturbation, has not tried. No AM erections. T levels have been normal. \par \par 12/8/20...Off the oxycodone 15s, on medical marijuana, which he feels it is better controlled. He sleeps better. has some pain, but did not take anything this AM. Lower back is the sites as well as the discomfort in the extremities. The med marijuana helps the neuropathy as well. Eats more, gained weight. He is more energetic. He feels it is hard to exercise, as he is not as strong as before, tired after a walk down the block. Much more talkative today. he is concerned about blacking out. Also has some muscle spasms at times with activities. Does climb stairs in the house. No cough. ABARCA present. He feels his urine stream is restricted with some discomfort. "Like a lot of fluid trying to fit thru a small space". He is going to call Dr Oconnor about these issues. uses a shower chair as he is worried about "blacking out in the shower" if he raise his hands above his head. \par \par 3/9/21 - Verbal permission for telehealth services was granted by the patient, Cleve Calderón, on March 9, 2021 at 10:12 AM. \par Mixed germ cell tumor Diagnosed in 2017, underwent radical orchiectomy on the left side in July 20, 2017.  His stage was 2A, good risk.  He received cisplatin and etoposide starting on August 14 of 2017.  Cycle 4 was delayed and then omitted as he presented with supraventricular tachycardia and a low ejection fraction.  This was felt to be secondary to the indwelling central venous catheter, which was located in the appropriate spot.  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  He underwent an RPLND which was delayed, and he had residual viable embryonal carcinoma.  He was started on TIP in July 2018, received 3 cycles. \par \par Overall, feels the same. Neuropathy is the main issues, feels he cannot rest at all. The medical marijuana helps him rest. Takes it at night and throughout the day when he feels he needs it. Onset of action if from 7 to 20 minutes. Appetite is fine, no weight loss of note. No cough, some ABARCA, after one block, he is SOB and exhausted. Occ headaches noted, brief duration. Some balance issues, no falls. He still feels he has some dexterity issues in the hands. He has some discomfort when he voids, a pressure like sensation as if the has some flow issues, since the surgery, no change. No dysuria. Has not had a CV vaccine. Libido is poor, no attempt, no erections. 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 Had to receive additional chemo with TIP. He is active and exercising, frustrated with the disability process. He is able to do more at this time. Has pain from the neuropathy with walking uphill. He notes some swelling of the left leg and right hand area, and sometimes the swelling in the hand makes it difficult to close his fist. Appetite is "poor" lately , trying to lose weight. He feels food is not as appealing as it once was, antedates this to the chemotherapy. Has had some nausea in recent times. He feels red meat is the issue. This has occurred for "some time". He says his weight was 289, now 260, over the course of 2.5 weeks. He also notes some bloating and gas. Some cramp like pains at times. had his first CV vaccine (Moderna). Breathing is fine, notes some chest pressure the last few nights. Some minor headaches in recent times, middle of the head, takes Advil or Tylenol, which does not seem to help much. These occur about 2 times a week, may last few minutes. Urinary difficulty, feels the muscle is not fully "retracting". Feels the flow is not what it should be and has a sensation of incomplete voiding. NO blood, occ incontinence-occurred x 2. Feels his right arm has increased numbness and it may be a bit weaker. Was doing exercises that PT had previously recommended. Feels he has issues when typing. No F/C/S. Notes fatigue, exercises in the backyard at times. If he stands more than 10 minutes, he feels exhausted.  Libido is poor. No interest, not sexually active, no masturbation. No full AM erections.\par \par 10/12/21...Dr Shields notes reviewed. last chemo was 3 years ago. Doing well. Overall, he feels occasional nausea at times. He is on duloxetine and medical marijuana. Still has complaints about his neuropathy with pain and discomfort, not as well controlled as it was last few visits, when he said the med marijuana produced much benefit. He said it calms him, but he still does not sleep well. He feels the duloxetine helps with the tingling sensation. Bottom of the feet feels as if he has "bubble wrap" on the bottom of his feet. Occ falls in the shower, some issue with stairs. Appetite is good, but says he lost some weight. Not seen in office since December 2020. Says he weight 257 at home, was 283 last visit. Occ nausea, no recent vomiting. Occ headaches. Tries to walk about during the day, and trying to be more active. Anxiety and depression are the same. Denies thoughts of self harm. Says he is frustrated at times. 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 Received 3 cycles, delayed interval, then started TIP, received 3 cycles, went to RPLND.  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 No subsequent therapy.\par Stopped duloxetine, as he felt like a different person. He remains on medical marijuana. Neuropathy is still an issue. He has some dizziness at times, if he gets up to quickly or turns too quickly. He feels his nervous system is "all screwed up". The dizziness occurs a few times a day to a few times a week. He reads, walks about, does not go out due to COVID concerns, received booster a few weeks ago. Appetite is "variable". He says he loses weight and then regains the weight. Nausea at times, a chronic issue. No vomiting, no constipation, no diarrhea. Headaches occur, chronic, at least once a week. Mid skull, takes Advil or Tylenol with some relief. He thinks they are sinus headaches. No edema in legs, feels his feet are swollen at times. Paresthesias extend to just below the calf on the left, on right, just the ball of the foot, no change. Fatigue is considerable, says he cannot stand for more than 10 minutes w/o being exhausted. \par \par 6/2//22...missed April TEB. More than 4 years after diagnosis.  "Still dealing with side effects, I try to push my self as best I can". Has lost weight, about 40 pounds, intentionally. He has changed his diet, eats smaller quantities, avoids some foods that don't agree with him. He is also walking more, usually with someone else. He has fallen a few times. as a result of the neuropathy. He feels clumsy. \par Continues with his neuropathy, hands and feet, tingling and burning in left foot> right foot, feels left hand is stronger than the right. Says he does not realize how much pressure to place if the grabs a cup, may drop it. Neuropathy extends to the ball of the foot on the right foot, extends to mid calf on the left. The area feels tight and uncomfortable. No cough, no ABARCA. Still has some occasional edema of the legs and the skin may crack, no fluid leaking. No chest pain/pressure/...some palpitations on occasion are noted. Has some nausea at times with certain smells or taste, may have regurgitation at times. He may have 4-5 days w/o a BM, then may have some diarrhea. Saw dentist recently as he had some issues with his teeth, he is going to have 6 teeth removed at [facility]. Also has to have some implants for the front teeth, dentist blamed the chemo. Poor libido, no desire, no erections, does not try to masturbate. He feels like his flow is restricted, has to wait a minute or two before urine comes out, no dysuria. Nocturia x 5, sometimes has to return to bathroom for incomplete voiding. He admits to depression, still present, also has anxiety, says he doesn't know what is next and doesn't know to cope. Does not feel he can benefit from seeing someone about A and D, says he has to adapt to the way things  are. Had a low grade temp last night and changed appt to telehealth. Fatigue  is less at this time. Mentally, he does not feel where he should be. Uses medical marijuana, costly, but feels it helps the neuropathy more than any other medication that was prescribed. \par \par 11/17//22..diagnosed 5 years ago.  last chemo 4 years ago, received 3 cycle TIP. He had a delayed RPLND with viable tumor. Initial testicular tumor was 95% teratoma.  Stays mostly at home. He lifts some weight on occasion, or walk for a time. He says it is hard for him to get about due to the neuropathy.  Can do up to 1/2 block. No meds for the neuropathy. Worse at night, feels his body is constantly in motion, sometimes with  sharp pain. Uses compression socks, which he feels helps at night. He has fatigue and trouble "maintaining stamina during the day". Occasionally falls. Neuropathy up to just below the knee on the left, just 1/2  on the right. Pain is more on the left. Appetite is "okay, variable", notes some taste alteration some days. Checks weight frequently, up and down a few pounds. No cough, but feels he has ABARCA with walking, or climbing 1/2 a staircase. Occ headaches, goes to sleep, no meds. They occur 1-2 times a month.  No dizziness. Feels he has swelling of his legs. No fevers, no chills. NO chest pain/pressure. No palpitations unless he lifts weights (kettle ball, 25 pounds). He feels his remaining testicle may be swollen at times due to the hydrocele. Libido is poor, he says masturbation is painful and not enjoyable. \par \par 2/14/23....diagnosed 5 years ago.  last chemo 4 years ago, received 3 cycle TIP. He had a delayed RPLND with viable tumor. Initial testicular tumor was 95% teratoma. Still has neuropathy, no worse, "not manageable". pain will sometimes shoot up when he is trying to sleep. Does not sleep well. Extends to mid calf on the left, his "bad leg".  On the right it extends to the ball f the foot only.  Just the fingertips on the hands, intermittent. Says he cannot sense heat at times. Tinnitus noted, both ears, noted more at night, occ relents. Appetite is variable, depends on his "state of depression". No change of note in weight. Feels that he cannot help his family as he should, father is approaching ESRD, mother has kidney issues as well.  He lives with them.  he continues to fall, had a fall last night night, "fell hard", getting something out of a cabinet, couldn't pivot fast enough and fell to the ground. Walks with a cane, not so much in the house. Wears sandals in the house due to neuropathy, has difficulty walking in shoes and uses the cane.  He has felt that any med for neuropathy has not had the "right effect on my body". Depressed, does not go out, no friends. He says he has tried to reach out to others, but feel they have turned their back. No cough/ABARCA.  Headaches, occur, almost every day, center of the head, may last 1/2 the day, takes Tylenol or ibuprofen, which does not help much. HA have been present for years.  feels he only sleeps 3 hours a night.  he naps during the day. No nausea with headaches, has occasional nausea. Occ vomits. No D/C. Feels the left leg is swollen from time to time. No chest pain/pressure. Occ palpitations, which he attributes to anxiety. he was denied disability, saw a doctor, appointed, non-neurologist,  who told him he can work. libido is poor, weak erections, does not even try to mastu [de-identified] : 95% teratoma [de-identified] : Tumor Size (Greatest dimension of main mass): 4.2 x 3.2 x 3.0 cm\par Histologic type:  Mixed germ cell tumor, 95% teratoma remaining 5% yolk sac and seminoma components \par Necrosis:  Present \par \par \par RPLND 6/4/18..... 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 Cycle 4 delayed..then omitted due to tachycardia and low EF. RPLND delayed, residual viable embryonal cancer, TIP started July 29, 2018, cycle 3 delayed for grade 3 fatigue, then again one more week for transaminitis. Fourth planned cycle omitted. RPLND June 2018,  2 of 57 LN positive with FINA, embryonal, pN3 [de-identified] : 6/1/23....diagnosed 5+ years ago.  last chemo 4 years ago, received 3 cycle TIP. He had a delayed RPLND with viable tumor. Initial testicular tumor was 95% teratoma. \par Doing "okay".  neuropathy remains "painful", minimally better with current meds, including med marijuana. Extends proximal on the left and the bottom of the foot on the right, fingertips affected as well, mostly the medial 2 fingers. On duloxetine 60 in AM and 30 in PM, sleeps better with it. Has tinnitus as before. Does some "light activity", tried to do some exercise. Attempting to do more physical activity, walking with a cane. Thinking about getting another dog, lives with family. Appetite is variable. Weight loss of 10 kilos since December. he attributes this to increased activity. has headaches, since the start of duloxetine, says he had it with prior course, Center of the head, intermittent, not daily No associated nausea. Takes some ibuprofen relieves it , or Tylenol. has fallen a few times. no injury, sometimes falls back if he looks up too long. No cough, some ABARCA. Urine flow is "painful", feels as if sphincter does not open wide, has pressure. No incontinence, occ urgency. Nocturia x 4. Sometime feels as if he doesn't fully empty. No edema. No chest pain/pressure/...no palpitations, unless he has anxiety issues. Feels his anxiety is better controlled, feels that depression is improved, due to improved sleep.

## 2023-06-02 LAB
AFP-TM SERPL-MCNC: 2.7 NG/ML
ALBUMIN SERPL ELPH-MCNC: 4.3 G/DL
ALP BLD-CCNC: 50 U/L
ALT SERPL-CCNC: 42 U/L
ANION GAP SERPL CALC-SCNC: 12 MMOL/L
AST SERPL-CCNC: 37 U/L
BILIRUB SERPL-MCNC: 0.5 MG/DL
BUN SERPL-MCNC: 14 MG/DL
CALCIUM SERPL-MCNC: 9 MG/DL
CHLORIDE SERPL-SCNC: 102 MMOL/L
CO2 SERPL-SCNC: 26 MMOL/L
CREAT SERPL-MCNC: 1.06 MG/DL
EGFR: 90 ML/MIN/1.73M2
GLUCOSE SERPL-MCNC: 100 MG/DL
HCG-TM SERPL-MCNC: <1 MIU/ML
LDH SERPL-CCNC: 184 U/L
MAGNESIUM SERPL-MCNC: 2.3 MG/DL
POTASSIUM SERPL-SCNC: 4.4 MMOL/L
PROT SERPL-MCNC: 7 G/DL
SODIUM SERPL-SCNC: 141 MMOL/L
TESTOST SERPL-MCNC: 335 NG/DL

## 2023-06-06 ENCOUNTER — RESULT REVIEW (OUTPATIENT)
Age: 43
End: 2023-06-06

## 2023-06-08 ENCOUNTER — APPOINTMENT (OUTPATIENT)
Dept: RADIOLOGY | Facility: IMAGING CENTER | Age: 43
End: 2023-06-08
Payer: MEDICAID

## 2023-06-08 ENCOUNTER — OUTPATIENT (OUTPATIENT)
Dept: OUTPATIENT SERVICES | Facility: HOSPITAL | Age: 43
LOS: 1 days | End: 2023-06-08
Payer: MEDICAID

## 2023-06-08 ENCOUNTER — APPOINTMENT (OUTPATIENT)
Dept: CT IMAGING | Facility: IMAGING CENTER | Age: 43
End: 2023-06-08
Payer: MEDICAID

## 2023-06-08 DIAGNOSIS — Z98.890 OTHER SPECIFIED POSTPROCEDURAL STATES: Chronic | ICD-10-CM

## 2023-06-08 DIAGNOSIS — C80.1 MALIGNANT (PRIMARY) NEOPLASM, UNSPECIFIED: ICD-10-CM

## 2023-06-08 DIAGNOSIS — Z95.828 PRESENCE OF OTHER VASCULAR IMPLANTS AND GRAFTS: Chronic | ICD-10-CM

## 2023-06-08 DIAGNOSIS — Z90.79 ACQUIRED ABSENCE OF OTHER GENITAL ORGAN(S): Chronic | ICD-10-CM

## 2023-06-08 PROCEDURE — 71046 X-RAY EXAM CHEST 2 VIEWS: CPT | Mod: 26

## 2023-06-08 PROCEDURE — 74177 CT ABD & PELVIS W/CONTRAST: CPT

## 2023-06-08 PROCEDURE — 71046 X-RAY EXAM CHEST 2 VIEWS: CPT

## 2023-06-08 PROCEDURE — 74177 CT ABD & PELVIS W/CONTRAST: CPT | Mod: 26

## 2023-07-17 NOTE — ED PROVIDER NOTE - RATE
Refill request for patients [facility]  Last refill at annual on 6/20/22    Patient is due to be seen   RN phoned patient   Left message for pt to return call to [facility] ext [phone] or  OB/GYN or [facility]   Encounter open for patient call back    89

## 2023-09-29 ENCOUNTER — APPOINTMENT (OUTPATIENT)
Age: 43
End: 2023-09-29

## 2023-09-29 ENCOUNTER — APPOINTMENT (OUTPATIENT)
Age: 43
End: 2023-09-29
Payer: COMMERCIAL

## 2023-09-29 PROCEDURE — D1110 PROPHYLAXIS - ADULT: CPT

## 2023-09-29 PROCEDURE — D7140: CPT

## 2023-10-03 NOTE — H&P ADULT - LV FUNCTION ASSESSMENT
"Chief Complaint   Patient presents with    Follow Up     3 months follow up - Pelvic floor dysfunction, LUTS - Urine       Blood pressure 106/73, pulse 73, height 1.88 m (6' 2\"), weight 88.5 kg (195 lb). Body mass index is 25.04 kg/m .    Patient Active Problem List   Diagnosis   (none) - all problems resolved or deleted       Allergies   Allergen Reactions    No Clinical Screening - See Comments      PN: Other1: -cats, PN: Pollen, dust, mold and animal dander  PN: Other1: -cats, PN: Pollen, dust, mold and animal dander         Current Outpatient Medications   Medication Sig Dispense Refill    escitalopram (LEXAPRO) 10 MG tablet       methylphenidate (RITALIN) 10 MG tablet       omeprazole (PRILOSEC) 20 MG DR capsule       Testosterone 1.62 % GEL APPLY 1 PUMP TOPICALLY TO THE AFFECTED AREA DAILY      valACYclovir (VALTREX) 1000 mg tablet       hydrocortisone 2.5 % cream APPLY THIN LAYER TOPICALLY TO RASH IN AXILLAE 2 TO 3 TIMES DAILY UNTIL IMPROVED (Patient not taking: Reported on 3/7/2023)      tamsulosin (FLOMAX) 0.4 MG capsule  (Patient not taking: Reported on 3/7/2023)         Social History     Tobacco Use    Smoking status: Never    Smokeless tobacco: Former     Quit date: 3/7/2013   Substance Use Topics    Alcohol use: Yes     Comment: Alcoholic Drinks/day: 15-18 per week    Drug use: Unknown     Types: Other     Comment: Drug use: No       Keyona Amin MA  10/3/2023  2:18 PM     "
yes

## 2023-10-11 ENCOUNTER — APPOINTMENT (OUTPATIENT)
Age: 43
End: 2023-10-11
Payer: COMMERCIAL

## 2023-10-11 PROCEDURE — D0470 DIAGNOSTIC CASTS: CPT

## 2023-11-02 NOTE — H&P ADULT - NEGATIVE OPHTHALMOLOGIC SYMPTOMS
SUBJECTIVE:   CC: Ramon is an 53 year old who presents for preventive health visit.       Healthy Habits:     Getting at least 3 servings of Calcium per day:  Yes    Bi-annual eye exam:  Yes    Dental care twice a year:  Yes    Sleep apnea or symptoms of sleep apnea:  None    Diet:  Regular (no restrictions)    Frequency of exercise:  2-3 days/week    Duration of exercise:  15-30 minutes    Taking medications regularly:  Not Applicable    Medication side effects:  Not applicable    Additional concerns today:  No      More mediterranean diet recently with .  Exercise - walking.  "[employer] giovanni.    -Is traveling next week, should she wait off on doing vaccines until she returns?   Daughter in [place] - [employer].  Son - [facility].  New grandson.    Mammo - 24   Colon screen - average risk; agreeable to colonoscopy  Last pap 2017.   - 2 girls, 1 boy    Right knee pain - medial.  Mild RLS.    Still has menses - regular, normal flow.      Have you ever done Advance Care Planning? (For example, a Health Directive, POLST, or a discussion with a medical provider or your loved ones about your wishes): No, advance care planning information given to patient to review.  Patient declined advance care planning discussion at this time.    Social History     Tobacco Use    Smoking status: Never    Smokeless tobacco: Never   Substance Use Topics    Alcohol use: No             2023     9:32 AM   Alcohol Use   Prescreen: >3 drinks/day or >7 drinks/week? Not Applicable     Reviewed orders with patient.  Reviewed health maintenance and updated orders accordingly - Yes  No current outpatient medications on file.     No current facility-administered medications for this visit.       Lab work is in process  Labs reviewed in EPIC    Breast Cancer Screening:  Any new diagnosis of family breast, ovarian, or bowel cancer? No    FHS-7:       2023    12:24 PM   Breast CA Risk Assessment (FHS-7)   Did any of  your first-degree relatives have breast or ovarian cancer? No   Did any of your relatives have bilateral breast cancer? No   Did any man in your family have breast cancer? No   Did any woman in your family have breast and ovarian cancer? No   Did any woman in your family have breast cancer before age 50 y? No   Do you have 2 or more relatives with breast and/or ovarian cancer? No   Do you have 2 or more relatives with breast and/or bowel cancer? No       Mammogram Screening: Recommended annual mammography  Pertinent mammograms are reviewed under the imaging tab.    History of abnormal Pap smear: NO - age 30-65 PAP every 5 years with negative HPV co-testing recommended      Latest Ref Rng & Units 7/20/2017     5:06 PM 9/10/2013     9:17 AM   PAP / HPV   PAP (Historical)  NIL  NIL    HPV 16 DNA NEG Negative     HPV 18 DNA NEG Negative     Other HR HPV NEG Negative       Reviewed and updated as needed this visit by clinical staff   Tobacco  Allergies  Meds  Problems             Reviewed and updated as needed this visit by Provider      Problems            Past Medical History:   Diagnosis Date    Lump or mass in breast 05/18/2005    Other enthesopathy of ankle and tarsus 06/13/2002    Primary osteoarthritis of both knees     Tenosynovitis of foot and ankle 06/20/2002      Past Surgical History:   Procedure Laterality Date    ARTHROSCOPY ANKLE, OPEN REPAIR LIGAMENT, COMBINED      RT; ankle repair    CHOLECYSTECTOMY      IR CONSULTATION FOR IR EXAM  5/8/2019     OB History   No obstetric history on file.       Review of Systems   Constitutional:  Negative for chills and fever.   HENT:  Negative for congestion, ear pain, hearing loss and sore throat.    Eyes:  Negative for pain and visual disturbance.   Respiratory:  Negative for cough and shortness of breath.    Cardiovascular:  Positive for peripheral edema. Negative for chest pain and palpitations.   Gastrointestinal:  Negative for abdominal pain, constipation,  "diarrhea, heartburn, hematochezia and nausea.   Breasts:  Negative for tenderness, breast mass and discharge.   Genitourinary:  Negative for dysuria, frequency, genital sores, hematuria, pelvic pain, urgency, vaginal bleeding and vaginal discharge.   Musculoskeletal:  Positive for arthralgias and joint swelling. Negative for myalgias.   Skin:  Negative for rash.   Neurological:  Negative for dizziness, weakness, headaches and paresthesias.   Psychiatric/Behavioral:  Negative for mood changes. The patient is not nervous/anxious.         OBJECTIVE:   /80 (BP Location: Right arm, Patient Position: Sitting, Cuff Size: Adult Regular)   Pulse 90   Temp 98.4  F (36.9  C) (Tympanic)   Ht 1.506 m (4' 11.3\")   Wt 94.1 kg (207 lb 6.4 oz)   LMP 10/07/2023   SpO2 99%   BMI 41.47 kg/m    Physical Exam  GENERAL APPEARANCE: healthy, alert, no distress, and obese  EYES: Eyes grossly normal to inspection, PERRL and conjunctivae and sclerae normal  HENT: ear canals and TM's normal, nose and mouth without ulcers or lesions, oropharynx clear and oral mucous membranes moist  NECK: no adenopathy, no asymmetry, masses, or scars and thyroid normal to palpation  RESP: lungs clear to auscultation - no rales, rhonchi or wheezes  BREAST: normal without masses, tenderness or nipple discharge and no palpable axillary masses or adenopathy  CV: regular rate and rhythm, normal S1 S2, no S3 or S4, no murmur, click or rub, no peripheral edema and peripheral pulses strong  ABDOMEN: soft, nontender, no hepatosplenomegaly, no masses and bowel sounds normal   (female): normal female external genitalia, normal urethral meatus, vaginal mucosal atrophy noted, normal cervix, adnexae, and uterus without masses or abnormal discharge  MS: no musculoskeletal defects are noted and gait is age appropriate without ataxia; tender to palpation right knee medial joint line  SKIN: no suspicious lesions or rashes  NEURO: Normal strength and tone, sensory " exam grossly normal, mentation intact and speech normal  PSYCH: mentation appears normal and affect normal/bright    Future labs ordered.    ASSESSMENT/PLAN:       ICD-10-CM    1. Routine general medical examination at a health care facility  Z00.00       2. Encounter for medical examination to establish care  Z00.00 CBC with platelets and differential     Comprehensive metabolic panel (BMP + Alb, Alk Phos, ALT, AST, Total. Bili, TP)      3. Morbid obesity (H)  E66.01 TSH with free T4 reflex     Vitamin D Deficiency     Lipid Profile (Chol, Trig, HDL, LDL calc)      4. Lipid screening  Z13.220 Comprehensive metabolic panel (BMP + Alb, Alk Phos, ALT, AST, Total. Bili, TP)     Lipid Profile (Chol, Trig, HDL, LDL calc)      5. Screening for diabetes mellitus  Z13.1 Hemoglobin A1c     Insulin level      6. Cervical cancer screening  Z12.4 A pap thin layer screen with  HPV - recommended age 30 - 65 years      7. Special screening for malignant neoplasms, colon  Z12.11 Colonoscopy Screening  Referral          Vaccines updated.  Return for fasting labs.  Mammogram negative.  Colonoscopy referral placed.  Will notify of pap/hpv results.    Right knee pain - likely arthritis - medial joint line. Symptomatic cares, weight management.  Consider cortisone.  Consider xray.  Defers today.    COUNSELING:  Reviewed preventive health counseling, as reflected in patient instructions       Regular exercise       Healthy diet/nutrition       Vision screening       Hearing screening       Alcohol Use       Contraception       Osteoporosis prevention/bone health       Colorectal Cancer Screening       Advance Care Planning        She reports that she has never smoked. She has never used smokeless tobacco.          Rosi Alberto MD  [facility] - [facility]   no lacrimation L/no lacrimation R/no photophobia/no pain L/no pain R

## 2023-11-06 ENCOUNTER — APPOINTMENT (OUTPATIENT)
Age: 43
End: 2023-11-06
Payer: COMMERCIAL

## 2023-11-06 PROCEDURE — D0470 DIAGNOSTIC CASTS: CPT

## 2023-11-21 ENCOUNTER — OUTPATIENT (OUTPATIENT)
Dept: OUTPATIENT SERVICES | Facility: HOSPITAL | Age: 43
LOS: 1 days | Discharge: HOME | End: 2023-11-21

## 2023-11-21 DIAGNOSIS — Z98.890 OTHER SPECIFIED POSTPROCEDURAL STATES: Chronic | ICD-10-CM

## 2023-11-21 DIAGNOSIS — Z90.79 ACQUIRED ABSENCE OF OTHER GENITAL ORGAN(S): Chronic | ICD-10-CM

## 2023-11-21 DIAGNOSIS — C62.92 MALIGNANT NEOPLASM OF LEFT TESTIS, UNSPECIFIED WHETHER DESCENDED OR UNDESCENDED: ICD-10-CM

## 2023-11-21 DIAGNOSIS — Z95.828 PRESENCE OF OTHER VASCULAR IMPLANTS AND GRAFTS: Chronic | ICD-10-CM

## 2023-12-01 PROBLEM — F32.A DEPRESSION: Status: ACTIVE | Noted: 2020-06-30

## 2023-12-07 ENCOUNTER — RESULT REVIEW (OUTPATIENT)
Age: 43
End: 2023-12-07

## 2023-12-07 ENCOUNTER — LABORATORY RESULT (OUTPATIENT)
Age: 43
End: 2023-12-07

## 2023-12-07 ENCOUNTER — APPOINTMENT (OUTPATIENT)
Dept: HEMATOLOGY ONCOLOGY | Facility: CLINIC | Age: 43
End: 2023-12-07
Payer: MEDICAID

## 2023-12-07 VITALS
TEMPERATURE: 98.3 F | BODY MASS INDEX: 33.55 KG/M2 | DIASTOLIC BLOOD PRESSURE: 76 MMHG | HEIGHT: 72.44 IN | SYSTOLIC BLOOD PRESSURE: 112 MMHG | HEART RATE: 68 BPM | OXYGEN SATURATION: 96 % | RESPIRATION RATE: 16 BRPM | WEIGHT: 250.45 LBS

## 2023-12-07 DIAGNOSIS — R42 DIZZINESS AND GIDDINESS: ICD-10-CM

## 2023-12-07 DIAGNOSIS — F32.A DEPRESSION, UNSPECIFIED: ICD-10-CM

## 2023-12-07 DIAGNOSIS — G62.9 POLYNEUROPATHY, UNSPECIFIED: ICD-10-CM

## 2023-12-07 LAB
AFP-TM SERPL-MCNC: 2.2 NG/ML
ALBUMIN SERPL ELPH-MCNC: 4.4 G/DL
ALP BLD-CCNC: 49 U/L
ALT SERPL-CCNC: 50 U/L
ANION GAP SERPL CALC-SCNC: 12 MMOL/L
AST SERPL-CCNC: 34 U/L
BASOPHILS # BLD AUTO: 0.02 K/UL — SIGNIFICANT CHANGE UP (ref 0–0.2)
BASOPHILS # BLD AUTO: 0.02 K/UL — SIGNIFICANT CHANGE UP (ref 0–0.2)
BASOPHILS NFR BLD AUTO: 0.3 % — SIGNIFICANT CHANGE UP (ref 0–2)
BASOPHILS NFR BLD AUTO: 0.3 % — SIGNIFICANT CHANGE UP (ref 0–2)
BILIRUB SERPL-MCNC: 0.6 MG/DL
BUN SERPL-MCNC: 19 MG/DL
CALCIUM SERPL-MCNC: 9.1 MG/DL
CHLORIDE SERPL-SCNC: 104 MMOL/L
CO2 SERPL-SCNC: 25 MMOL/L
CREAT SERPL-MCNC: 1.07 MG/DL
DEPRECATED KAPPA LC FREE/LAMBDA SER: 1.9 RATIO
EGFR: 89 ML/MIN/1.73M2
EOSINOPHIL # BLD AUTO: 0.07 K/UL — SIGNIFICANT CHANGE UP (ref 0–0.5)
EOSINOPHIL # BLD AUTO: 0.07 K/UL — SIGNIFICANT CHANGE UP (ref 0–0.5)
EOSINOPHIL NFR BLD AUTO: 1.2 % — SIGNIFICANT CHANGE UP (ref 0–6)
EOSINOPHIL NFR BLD AUTO: 1.2 % — SIGNIFICANT CHANGE UP (ref 0–6)
GLUCOSE SERPL-MCNC: 100 MG/DL
HCG-TM SERPL-MCNC: <1 MIU/ML
HCT VFR BLD CALC: 45.4 % — SIGNIFICANT CHANGE UP (ref 39–50)
HCT VFR BLD CALC: 45.4 % — SIGNIFICANT CHANGE UP (ref 39–50)
HGB BLD-MCNC: 15.7 G/DL — SIGNIFICANT CHANGE UP (ref 13–17)
HGB BLD-MCNC: 15.7 G/DL — SIGNIFICANT CHANGE UP (ref 13–17)
IGA SER QL IEP: 218 MG/DL
IGG SER QL IEP: 1520 MG/DL
IGM SER QL IEP: 121 MG/DL
IMM GRANULOCYTES NFR BLD AUTO: 0.3 % — SIGNIFICANT CHANGE UP (ref 0–0.9)
IMM GRANULOCYTES NFR BLD AUTO: 0.3 % — SIGNIFICANT CHANGE UP (ref 0–0.9)
KAPPA LC CSF-MCNC: 1.45 MG/DL
KAPPA LC SERPL-MCNC: 2.76 MG/DL
LDH SERPL-CCNC: 177 U/L
LYMPHOCYTES # BLD AUTO: 2.5 K/UL — SIGNIFICANT CHANGE UP (ref 1–3.3)
LYMPHOCYTES # BLD AUTO: 2.5 K/UL — SIGNIFICANT CHANGE UP (ref 1–3.3)
LYMPHOCYTES # BLD AUTO: 43.5 % — SIGNIFICANT CHANGE UP (ref 13–44)
LYMPHOCYTES # BLD AUTO: 43.5 % — SIGNIFICANT CHANGE UP (ref 13–44)
MCHC RBC-ENTMCNC: 33.2 PG — SIGNIFICANT CHANGE UP (ref 27–34)
MCHC RBC-ENTMCNC: 33.2 PG — SIGNIFICANT CHANGE UP (ref 27–34)
MCHC RBC-ENTMCNC: 34.6 G/DL — SIGNIFICANT CHANGE UP (ref 32–36)
MCHC RBC-ENTMCNC: 34.6 G/DL — SIGNIFICANT CHANGE UP (ref 32–36)
MCV RBC AUTO: 96 FL — SIGNIFICANT CHANGE UP (ref 80–100)
MCV RBC AUTO: 96 FL — SIGNIFICANT CHANGE UP (ref 80–100)
MONOCYTES # BLD AUTO: 0.5 K/UL — SIGNIFICANT CHANGE UP (ref 0–0.9)
MONOCYTES # BLD AUTO: 0.5 K/UL — SIGNIFICANT CHANGE UP (ref 0–0.9)
MONOCYTES NFR BLD AUTO: 8.7 % — SIGNIFICANT CHANGE UP (ref 2–14)
MONOCYTES NFR BLD AUTO: 8.7 % — SIGNIFICANT CHANGE UP (ref 2–14)
NEUTROPHILS # BLD AUTO: 2.64 K/UL — SIGNIFICANT CHANGE UP (ref 1.8–7.4)
NEUTROPHILS # BLD AUTO: 2.64 K/UL — SIGNIFICANT CHANGE UP (ref 1.8–7.4)
NEUTROPHILS NFR BLD AUTO: 46 % — SIGNIFICANT CHANGE UP (ref 43–77)
NEUTROPHILS NFR BLD AUTO: 46 % — SIGNIFICANT CHANGE UP (ref 43–77)
NRBC # BLD: 0 /100 WBCS — SIGNIFICANT CHANGE UP (ref 0–0)
NRBC # BLD: 0 /100 WBCS — SIGNIFICANT CHANGE UP (ref 0–0)
PLATELET # BLD AUTO: 144 K/UL — LOW (ref 150–400)
PLATELET # BLD AUTO: 144 K/UL — LOW (ref 150–400)
POTASSIUM SERPL-SCNC: 4.5 MMOL/L
PROT SERPL-MCNC: 7.5 G/DL
RBC # BLD: 4.73 M/UL — SIGNIFICANT CHANGE UP (ref 4.2–5.8)
RBC # BLD: 4.73 M/UL — SIGNIFICANT CHANGE UP (ref 4.2–5.8)
RBC # FLD: 12.1 % — SIGNIFICANT CHANGE UP (ref 10.3–14.5)
RBC # FLD: 12.1 % — SIGNIFICANT CHANGE UP (ref 10.3–14.5)
SODIUM SERPL-SCNC: 142 MMOL/L
TESTOST SERPL-MCNC: 584 NG/DL
WBC # BLD: 5.75 K/UL — SIGNIFICANT CHANGE UP (ref 3.8–10.5)
WBC # BLD: 5.75 K/UL — SIGNIFICANT CHANGE UP (ref 3.8–10.5)
WBC # FLD AUTO: 5.75 K/UL — SIGNIFICANT CHANGE UP (ref 3.8–10.5)
WBC # FLD AUTO: 5.75 K/UL — SIGNIFICANT CHANGE UP (ref 3.8–10.5)

## 2023-12-07 PROCEDURE — 99214 OFFICE O/P EST MOD 30 MIN: CPT

## 2023-12-07 RX ORDER — DULOXETINE HYDROCHLORIDE 30 MG/1
30 CAPSULE, DELAYED RELEASE PELLETS ORAL
Qty: 90 | Refills: 2 | Status: DISCONTINUED | COMMUNITY
Start: 2023-03-13 | End: 2023-12-07

## 2023-12-07 RX ORDER — KETAMINE HCL 100 %
POWDER (GRAM) MISCELLANEOUS
Qty: 1 | Refills: 0 | Status: DISCONTINUED | COMMUNITY
Start: 2023-03-13 | End: 2023-12-07

## 2023-12-14 LAB
ALBUMIN MFR SERPL ELPH: 56.5 %
ALBUMIN SERPL-MCNC: 4.2 G/DL
ALBUMIN/GLOB SERPL: 1.3 RATIO
ALPHA1 GLOB MFR SERPL ELPH: 3.3 %
ALPHA1 GLOB SERPL ELPH-MCNC: 0.2 G/DL
ALPHA2 GLOB MFR SERPL ELPH: 10.5 %
ALPHA2 GLOB SERPL ELPH-MCNC: 0.8 G/DL
B-GLOBULIN MFR SERPL ELPH: 11.4 %
B-GLOBULIN SERPL ELPH-MCNC: 0.9 G/DL
GAMMA GLOB FLD ELPH-MCNC: 1.4 G/DL
GAMMA GLOB MFR SERPL ELPH: 18.3 %
INTERPRETATION SERPL IEP-IMP: NORMAL
M PROTEIN MFR SERPL ELPH: NORMAL
MONOCLON BAND OBS SERPL: NORMAL
PROT SERPL-MCNC: 7.5 G/DL
PROT SERPL-MCNC: 7.5 G/DL

## 2023-12-18 ENCOUNTER — APPOINTMENT (OUTPATIENT)
Dept: CT IMAGING | Facility: IMAGING CENTER | Age: 43
End: 2023-12-18
Payer: MEDICAID

## 2023-12-18 ENCOUNTER — OUTPATIENT (OUTPATIENT)
Dept: OUTPATIENT SERVICES | Facility: HOSPITAL | Age: 43
LOS: 1 days | End: 2023-12-18
Payer: MEDICAID

## 2023-12-18 DIAGNOSIS — Z98.890 OTHER SPECIFIED POSTPROCEDURAL STATES: Chronic | ICD-10-CM

## 2023-12-18 DIAGNOSIS — Z95.828 PRESENCE OF OTHER VASCULAR IMPLANTS AND GRAFTS: Chronic | ICD-10-CM

## 2023-12-18 DIAGNOSIS — C80.1 MALIGNANT (PRIMARY) NEOPLASM, UNSPECIFIED: ICD-10-CM

## 2023-12-18 DIAGNOSIS — Z90.79 ACQUIRED ABSENCE OF OTHER GENITAL ORGAN(S): Chronic | ICD-10-CM

## 2023-12-18 PROCEDURE — 74178 CT ABD&PLV WO CNTR FLWD CNTR: CPT | Mod: 26

## 2023-12-18 PROCEDURE — 74178 CT ABD&PLV WO CNTR FLWD CNTR: CPT

## 2023-12-29 NOTE — H&P PST ADULT - VENOUS THROMBOEMBOLISM AGE
PAST MEDICAL HISTORY:  Cancer of kidney, left     Hypertension     Renal failure, chronic     
less than 40 yrs

## 2024-02-12 NOTE — H&P CARDIOLOGY - HIV STATUS
Wt Readings from Last 3 Encounters:   02/12/24 84.8 kg (186 lb 15.2 oz)   02/09/24 87.1 kg (192 lb)   12/12/22 87.3 kg (192 lb 6.4 oz)     81-year-old female with past medical history of bladder cancer, small cell lung cancer in remission, COPD and obesity presented with shortness of breath  Has been following with her outpatient PCP, workup for heart failure ongoing.  Underwent outpatient echocardiogram and was sent to the ER for low EF  Echocardiogram with moderately dilated left ventricular cavity, severely reduced left ventricular systolic function with global hypokinesis and regions of akinesis of the apex, cannot definitively exclude layering thrombus in the apical region, EF 17%, grade 3 diastolic dysfunction  Will need life vest on DC  Cardiology following  IV diuretics switched to oral today  Continue monitoring intake and output, fluid restriction, daily weights-down 3 pounds since yesterday  Additional echo ordered to further evaluate possibility of thrombus  Cardiac Catheterization scheduled for today at 10:30am   Negative/Unknown

## 2024-02-21 ENCOUNTER — APPOINTMENT (OUTPATIENT)
Age: 44
End: 2024-02-21
Payer: COMMERCIAL

## 2024-02-21 PROCEDURE — NTX: CUSTOM

## 2024-03-06 NOTE — PROGRESS NOTE ADULT - PROBLEM SELECTOR PLAN 9
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

## 2024-03-26 ENCOUNTER — NON-APPOINTMENT (OUTPATIENT)
Age: 44
End: 2024-03-26

## 2024-04-22 NOTE — PROVIDER CONTACT NOTE (OTHER) - NAME OF MD/NP/PA/DO NOTIFIED:
Ena MULTANI [FreeTextEntry1] : pt presents with post menopausal bleeding since last Friday. Bleeding was heavy on Friday, light on Sat, Sun and today. Pt has hx of breast cancer, took Tamoxifen up until Feb 2024.  Last D&C hysteroscopy in Aug 2023 showed endometrial hyperplasia without atypia.

## 2024-05-01 NOTE — H&P PST ADULT - NSANTHBPHIGHRD_ENT_A_CORE
Patient: Kaycee Cuellar    Procedure: Procedure(s):  REPAIR, PTOSIS LEFT UPPER EYELID       Anesthesia Type:  MAC    Note:  Disposition: Outpatient   Postop Pain Control: Uneventful            Sign Out: Well controlled pain   PONV: No   Neuro/Psych: Uneventful            Sign Out: Acceptable/Baseline neuro status   Airway/Respiratory: Uneventful            Sign Out: Acceptable/Baseline resp. status   CV/Hemodynamics: Uneventful            Sign Out: Acceptable CV status; No obvious hypovolemia; No obvious fluid overload   Other NRE: NONE   DID A NON-ROUTINE EVENT OCCUR?            Last vitals:  Vitals Value Taken Time   /68 05/01/24 1430   Temp 37  C (98.6  F) 05/01/24 1430   Pulse 63 05/01/24 1430   Resp 16 05/01/24 1430   SpO2 95 % 05/01/24 1430       Electronically Signed By: Dom Hernandez MD  May 1, 2024  3:44 PM  
No

## 2024-06-06 PROBLEM — C77.2 SECONDARY MALIGNANT NEOPLASM OF RETROPERITONEAL LYMPH NODES: Status: ACTIVE | Noted: 2018-03-22

## 2024-06-06 PROBLEM — D47.2 MONOCLONAL GAMMOPATHY: Status: ACTIVE | Noted: 2020-06-05

## 2024-06-06 PROBLEM — G62.0 CHEMOTHERAPY-INDUCED PERIPHERAL NEUROPATHY: Status: ACTIVE | Noted: 2020-06-18

## 2024-06-06 PROBLEM — N28.89 RENAL MASS: Status: ACTIVE | Noted: 2024-06-06

## 2024-06-06 PROBLEM — C80.1 MIXED GERM CELL TUMOR: Status: ACTIVE | Noted: 2021-06-02

## 2024-06-06 PROBLEM — R68.82 DECREASED LIBIDO: Status: ACTIVE | Noted: 2023-12-07

## 2024-06-11 ENCOUNTER — RESULT REVIEW (OUTPATIENT)
Age: 44
End: 2024-06-11

## 2024-06-11 ENCOUNTER — APPOINTMENT (OUTPATIENT)
Dept: HEMATOLOGY ONCOLOGY | Facility: CLINIC | Age: 44
End: 2024-06-11
Payer: MEDICAID

## 2024-06-11 VITALS
BODY MASS INDEX: 33.13 KG/M2 | SYSTOLIC BLOOD PRESSURE: 119 MMHG | OXYGEN SATURATION: 98 % | RESPIRATION RATE: 16 BRPM | HEART RATE: 73 BPM | HEIGHT: 73 IN | DIASTOLIC BLOOD PRESSURE: 84 MMHG | WEIGHT: 250 LBS | TEMPERATURE: 98.6 F

## 2024-06-11 DIAGNOSIS — C80.1 MALIGNANT (PRIMARY) NEOPLASM, UNSPECIFIED: ICD-10-CM

## 2024-06-11 DIAGNOSIS — R68.82 DECREASED LIBIDO: ICD-10-CM

## 2024-06-11 DIAGNOSIS — N28.89 OTHER SPECIFIED DISORDERS OF KIDNEY AND URETER: ICD-10-CM

## 2024-06-11 DIAGNOSIS — C77.2 SECONDARY AND UNSPECIFIED MALIGNANT NEOPLASM OF INTRA-ABDOMINAL LYMPH NODES: ICD-10-CM

## 2024-06-11 DIAGNOSIS — G62.0 DRUG-INDUCED POLYNEUROPATHY: ICD-10-CM

## 2024-06-11 DIAGNOSIS — D47.2 MONOCLONAL GAMMOPATHY: ICD-10-CM

## 2024-06-11 DIAGNOSIS — T45.1X5A DRUG-INDUCED POLYNEUROPATHY: ICD-10-CM

## 2024-06-11 PROCEDURE — 99214 OFFICE O/P EST MOD 30 MIN: CPT

## 2024-06-11 NOTE — PHYSICAL EXAM
[Ambulatory and capable of all self care but unable to carry out any work activities] : Status 2- Ambulatory and capable of all self care but unable to carry out any work activities. Up and about more than 50% of waking hours [Normal] : grossly intact [de-identified] : no edema noted [de-identified] : fatty deposition [de-identified] : uncircumcised, glans normal, right testicle normal [de-identified] : flat affect, but somewhat more engaged today

## 2024-06-11 NOTE — RESULTS/DATA
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 Further assessment of a right upper renal pole lesion ADDITIONAL CLINICAL INFORMATION: Disorder of the kidney and ureter N28.9 COMPARISON: CT abdomen pelvis 6/8/2023 CONTRAST/COMPLICATIONS: IV Contrast: Isovue 370  90 cc administered   10 cc discarded Oral Contrast: NONE Complications: None reported at time of study completion  PROCEDURE: CT of the Abdomen and Pelvis was performed. Precontrast, Arterial and Delayed phases were acquired. Sagittal and coronal reformats were performed.  FINDINGS: LOWER CHEST: Within normal limits.  LIVER: Within normal limits. BILE DUCTS: Normal caliber. GALLBLADDER: Cholecystectomy. SPLEEN: Within normal limits. PANCREAS: Within normal limits. ADRENALS: Within normal limits. KIDNEYS/URETERS: Symmetric renal enhancement without hydronephrosis. No calculus. Right lower pole scarring Exophytic right upper pole 1.3 cm mildly heterogeneous lesions suggests possible internal enhancement Couple scattered subcentimeter bilateral hypodensities too small for definitive characterization by CT  BLADDER: Minimally distended which will accentuate the appearance of wall thickening. REPRODUCTIVE ORGANS: Prostate within normal size limits. Left orchiectomy  BOWEL: No bowel obstruction. PERITONEUM: No ascites. VESSELS: Within normal limits. RETROPERITONEUM/LYMPH NODES: No lymphadenopathy. Pelvic and retroperitoneal reyna dissection ABDOMINAL WALL: Small fat-containing epigastric and umbilical hernias BONES: No significant acute bony abnormality.  IMPRESSION: No lymphadenopathy  Right upper renal pole exophytic 1.3 cm mildly heterogeneous lesion suggests possible enhancement and may represent a small renal neoplasm. This could be definitively characterized with renal MRI with IV contrast. --- End of Report --- DIANDRA PALMER MD; Attending Radiologist This document has been electronically signed. Dec 18 2023 ***************************************************************

## 2024-06-11 NOTE — HISTORY OF PRESENT ILLNESS
[Disease: _____________________] : Disease: [unfilled] [T: ___] : T[unfilled] [N: ___] : N[unfilled] [M: ___] : M[unfilled] [AJCC Stage: ____] : AJCC Stage: [unfilled] [de-identified] : Mr. Calderón is seen in consultation on 8/8/17. On July 11, 2017, he awoke with severe back pain extending into the left testicle. There was a heaviness and pulling sensation. He went to the ER at [facility] and a sonogram was done, revealing a mass. He was seen by Rivas Oconnor and underwent a left radical orchiectomy on July 20, 2017. The pain and pulling sensation resolved. He has been on oxycodone a time, since January 2017, for low back pains. No respiratory complaints. No urinary symptoms. Has had sperm cryopreservation.   8/30/17...Cleve comes in today for follow up was just discharged yesterday. He was in the hospital for 4 days and he was neutropenic, found a right arm superficial thrombosis at the site of IV. treated with vanco, blood cultures negative. Discharged on Levaquin. Overnight he had a temp of 104.  He is having some hair loss, numbness in his fingers and his toes. He has tinnitus intermittently, started two days after the last dose. He was having soreness in the back of his throat that now resolved. He complains of dysgeusia and he had a lot  of burning and acid reflux-like symptoms during his treatment Carafate, Protonix and the lower dose of Decadron improved his symptoms., Not sleeping well, likely due to steroids.  9/19/17...cycle 2 day 5 was 9/9/17 Cleve is seen in the office today. He has significant fatigue. He had vomiting for 5 days after the chemotherapy with nausea and he said that his complexion was green. He had acid reflux issues once again despite the dropping off the dose of his dexamethasone. He feels a burning sensation in his lower abdomen and he feels that the medications for nausea did not help. He also has significant fatigue which is improved today. He has intermittent tinnitus. He has tingling in the tips of his toes and fingers which are intermittent. His appetite has been good and he is gained weight while on chemotherapy but he says that he eats smaller portions. He denies dysgeusia or dry mouth.  10/9/17...Completed cycle 3 last week.  He states he feels "horrible." HE states he noticed bloating in the abdomen, He went a whole week after this treatment wit vomiting every day, last time he vomited was two days ago. He fell at one point secondary to being weak and feeling lightheaded. He feels generalized weakness.He had a blood clot, during his treatment, that resolved. His appetite is poor, He eats and then, he eats, he feels like he has to regurgitate his food up after eating. He has fatigue, tinnitus stopped. he denies any numbness of the feet. has had chronic numbness of two of his fingers.He has dysgeusia   10/30/17...Admission Date:  -  Admission Date 16-Oct-2017 13:33.  Discharge Date:  - Discharge Date	21-Oct-2017  Chief Complaint/Reason for Visit:  Chief Complaint/Reason for Admission	Atrial flutter with RVR *******************  38 yo M with PMHx of testicular cancer s/p left orchiectomy on chemotherapy who present to the ED before receiving chemo for tachycardia.  The patient states that he has been feeling well for the past 2 weeks since his 3rd dose of chemotherapy. He denies CP, ABARCA, peripheral edema, cough,  palpitations. He does states that he had one near syncopal event during a bout of emesis 2 weeks ago which caused him to fall into a door. Has not had any  other syncopal or presyncopal episodes since then. In the ED he was found to have tachycardia found to be a.flutter after receiving 10mg IV diltiazem x2, a CXR with well placed portacath and no infiltrations. On bedside US found to have EF of 10-15% while tachycardic.  The patient was initially started on metoprolol tartrate with increasing dosages unable to control the arrhythmia he was subsequently switch to a diltiazem drip. The metiport was removed after discussion with his Oncologist and cardiology for possible arrhythmia induction which was removed 10/19. He also received a TTE on 10/19 which showed EF of 35-40% with LV remodeling.  Afterwards he was switched to metoprolol succinate 200mg qday.  He was started on heparin for preparation for TYLOR and cardioversion which was performed on 10/20 and found a RA appendage thrombus with PFO and reduced LV  function. No cardioversion was performed as a result and he was switched to rivaroxaban on 10/21, lisinopril 2.5mg qday and metoprolol succinate 200mg  qday. He will follow with cardiology in 2 weeks and EP in 4 weeks. Treatments/Procedures/Significant Findings/Patient Condition:  Other Significant Findings:  - Other Significant Findings	  < from: Transesophageal Echocardiogram w/o TTE (10.20.17 @ 12:08) >  Conclusions:  1. Left atrial enlargement.  No left atrial or left atrial appendage thrombus.  2. Moderate to severe global left ventricular systolic dysfunction.  3. There is an echodensity (likely thrombus vs mass) in the right atrial wall that measures (approximately 2.7 cm x 2.0 cm). This is near the distal point of the SVC and may  represent thrombus from prior indwelling catheter.  4. Right ventricular enlargement with decreased right ventricular systolic function.  5. Color Doppler demonstrates evidence of a patent foramen ovale.  6. Trivial pericardial effusion.  **************************************************************************** TYLOR showed right atrial appendage thrombus, 27 mm, On Xarelto. Feels "exhausted". Does not generally note palpitations. Anxiety level is high. Occasional ABARCA, not at rest. No cough, no sputum, no wheezing. No nausea at this time, has regurgitation. Occ vomiting noted. Fatigue is prominent. Appetite is good.   7/9/18...Had RPLND on 6/6/18, with viable GCT present. Embryonal pathology. Says he have fatigue, still "recovering" from the RLPND. He has some soreness of the abdomen and says he has some delayed healing. He say Dr Piper on 7/6 in lieu of Dr Restrepo and was told that it was healing well. He is upset that there is residual cancer present, which he says "bummed him out completely". He says he has been reviewing issues related to continued treatment, only after I called him recently as he had not made an appointment. he says his appetite is poor and he is eating small amounts multiple times as he cannot eat full full meals. He says he has had some nausea and he started OTC Prevacid, which has helped. No significant weight loss. Some ABARCA if he walks about 3 blocks or so, he has fatigue with walking as well. he has some paresthesias of the left thigh after the surgery. He notes some spasms of the muscles in that area at times. Urine flow is normal, no nocturia. Libido down, not sexually active, no masturbation since surgery.   7/19/18...Feels the same, "still recovering". Still has some discomfort near the wound. Appetite s good. Gained 2 pounds. no cough. Mild ABARCA with activities. No palpitations. No leg edema. He continues with some fatigue, 6 on 0-10 scale. Feels he is not motivated when the fatigue is high. He feels his hands are weaker than before. Lifting is fine, but twisting a bottle to open may be  impaired. Ambulates with a cane  due to paresthesias of the left anterior thigh. This from knee  to thigh area,. No paresthesias of the feet. He sometimes has some pains in his feet at night which he says are not cramps.   9/6/18...Cycle 2, TIP, day # 9. Admitted on Sunday, August 19.  ********************************************* Hospitalized again post chemo, this time for 9 days. ANC was <0.1, platelets were low. had low sodium, low potassium and low magnesium. He has had lots of muscle aches and bone pain secondary to the Taxol. Feeling better, still feels weak. Some SOB with exertion. No pains. No headaches, has paresthesias of the bottom of the feet along with some toes. It improves after the chemo. Somewhat more this time. No fevers, no chills. Some nausea with office visits. Appetite is "moderate". Tinnitus has improved, minor dizziness at times.   8/30/18...Delayed treatment #3 by one week. "Not a good week". Complains of hearing being sensitive. He has tinnitus. This is intermittent, more in the past 2-3 days. He has some vertigo, with some issue walking. He feels as if he is falling to the right side at times, no falls. He has to hold onto the door or furniture to walk. He has some vomiting without nausea. He vomited a few minutes ago. Appetite is decreased, He has altered taste as well. Fatigue is present, not as bad as before. he says he is a "little" fatigued at the moment. Paresthesias of feet have been intermittent, less notable at this time. Spends a lot of time sitting, sleeping. He feels as if his body is exhausted. His mother says he has a lot of anxiety. He agrees. He has thoughts running through his mind about chemo and adverse effects.   9/20/18...he feels "okay" at times. Occasionally feels nauseous and can't eat, occ feels dizzy. has some regurgitation at times. Has fatigue. No better. Tinnitus is "not as bad", persistent in the right, intermittent in the left. When dizzy,  he feels he is leaning to one side. Spends a lot of time sitting or lying in bed. Paresthesias of the feet, left foot more so than right. Right toes affects the 4th and 5th toe. Fingertips are affected. He notes some darkening of skin folds of the hand joints. Feels no SOB unless he is active. using a cane today. Not taking any meds at this point.   10/9/18...Admission Date: after cycle 3 TIP -  Admission Date 01-Oct-2018 02:09.  Discharge Date:  - Discharge Date	06-Oct-2018  *********************************** HOSPITAL COURSE: The patient continued to have significant nausea while inpatient with intermittent vomiting. He received aggressive IV hydration with  IVF while he was no longer able to tolerate po. On hospital day 4, patient was able to tolerate some po and requested discharge home. His course was c/b  neutropenic fever to 101. Cultures were sent and remained negative. Patient was also started on cefepime. His ANC started to improve towards the end of the  hospital course. His platelets, however, downtrended. He was given 1U platelets as per oncology recommendations. His midline was noted to have been in since  July. After discussion with his outpatient oncologist, the patient opted to have his midline removed and will receive his last dose of chemotherapy via  peripheral IV. During his hospital course, patient was noted to have an episode of tachycardia to the 140's followed by bradycardia to the 20's during  orthostatic vitals. He was seen by Dr. Brown of cardiology who feels this is chemo-induced orthostasis and no further inpatient w/u is needed. He is  medically stable for dc home. Prior to discharge received additional platelet transfusion and MID-Line was removed after discussion with oncology.  ******************************************************************* He was admitted and told that the midline had to be changed. He did have fever with neutropenia. Discharged on the 6th. Hypotensive with orthostasis,seen by cardiology, restarted lisinopril and carvedilol. He has fallen twice since discharge. He has LOC for a few seconds. he is somewhat confused after the episodes. Walking with a cane. he also had an similar episode while in the hospital. Appetite returned, has altered taste, no N/V now, but did have on admission. No source of fever found. Has paresthesias of the left foot, all toes, not his right.  Had aches and pains with the Taxol, which he describes as "brutal".   10/29/18...called last week, was having difficulty keeping food down and felt weak and dizzy. We arranged for fluids, but he called Friday ad declined. Taking Zofran ADT after meals at times. Able to drink more at this time. Did not eat this AM. Yesterday, had a bagel and egg, for lunch, potatoes, pork and beans and Spam. For dinner had rice and Spam, small amount. No vomiting yesterday, but had nausea. Dizziness if he leis his head back, or if he gets up to fast. Walking with  a cane. No falls. Started Has paresthesias of the feet, on the left side. Continues on oxycodone, about  1 per day, chronic use for low back pains. No leg edema. No cough, ABARCA.   11/13/18...has not followed with Dr Marquez and remains off of carvedilol, enoxaparin and lisinopril. He takes ondansetron on occasion. Says he had a "blackout" episode after getting up, fell, striking left side, no injury. No LOC. Neuropathy remains, mostly left leg, somewhat better at times, continues on therapy. Minimal on the right side. Considering returning to work. Fatigue is present, somewhat better. Appetite is still impaired, eats once a day. Gained a few pounds. Still has some taste alteration. No leg edema. No abdominal pains. Saw Phong Restrepo yesterday in follow up. He has constipation, and may go 4-5 days w/o bowel movement.   1/4/19...Had echo done 12/2018. "I'm okay", feeling better, saw Dr Marquez. Paresthesias are an issue. On gabapentin 300 TID, says it is not helping. He says his fingers are affected, works on computers, and hits wrong keys at times. Worse are his feet. Left foot is numb, right foot partially numb, annoying when walking, not sensing if he steps on something. He continues on oxycodone. Affect toes, senses that they are swollen, bottom of the feet as well as the top. Does not pass the ankle. In the hands, it is the 3rd and 4th finger distally on the right side, and 3-4-5 on the left. Echo said that LVEF is about 44-46 % with moderate global dysfunction. The LA appendage clot was not seen. Not as tired as before, walked 11 minutes yesterday with some fatigue. Occ mild nausea, no vomiting. Appetite is good, gained 4 kilos on the past month( points out that he is wearing heavy boots. . Still has some dysgeusia. Still avoids meats.  Occasional HA, lasts up to 1/2 day, occurs intermittently, once a week. More right side of the headaches. Prior sinus headaches, but not the same, has a sharp element to it. Stills feels dizzy at times when gets up too fast..  3/11/19...Urgent visit. Called this AM stating that the right testicle is swollen, present for a few days, mild tenderness, mild discomfort/pain. No back pains except for his chronic back issue. He notes some skin changes of his hands and his feet.e is some peeling skin at the base of the toes. as well as the center of the foot. He has neuropathy of both feet, more on the left than the right, where it only affects the toes. He says the medial 3 fingers are numb and he drops things. His heart exam included a stress test, which he reports the outcome was good. Still reports tinnitus. Appetite is good, weight is increased 6 kilos since 1/30/19. Still has fatigue.   5/11/19...Missed appt 4/5/19. I " have good days and bad days". He feels he puts on a lot of weight in  a short time, and then drops weight in a short period of time. He had edema of both feet and also feels he had edema of the hands as well. He says edema accumulates and then resolves. Neuropathy is "pretty bad", extends to the calves, worse at night. Ran out of gabapentin, was on 300 TID and did not feel it helped. He has discoloration of the left foot, edema both feet. Has cramps in the legs. Feels he cannot stand for a long time, and complains of blistering of the feet. He has shooting pains in the toes. There are fluid filled blisters that he he "has to pop", due to pain. He feels he s less active due to the blisters. He feels his toes are locked i position. Does exercises provided by PT. Appetite is good. He is thirsty many times. NO pains elsewhere except chronic back pain, for which he takes oxycodone. No cough, has some ABARCA, can walk 2 blocks prior to stopping. Sees DR Marquez later today. Fine motor issues with fingers with neuropathy, difficulty with typing on keyboard, with many errors.   7/2/19...Hospital Course:  Discharge Date	12-Jun-2019  Admission Date	12-Jun-2019 00:37  Reason for Admission	vomiting, vertigo  Medication Reconciliation Status	Admission Reconciliation is Completed  Discharge Reconciliation is Completed  Hospital Course	  38M with PMHx of metastatic testicular CA, NICM, neuropathy, who presents with dizziness, nausea, and vomiting.  Vertigo.  - will c/w meclizine 25mg 4x/day  - f/u blood and urine cx to r/o infectious etiology  - PT eval, fall precautions. Constellation of symptoms with horizontal nystagmus and reproducible symptoms with daryn hallpike maneuver c/w BPPV. CT  head negative. Pt reports improvement after meclizine.  - will c/w meclizine 25mg 4x/day  - f/u blood and urine cx to r/o infectious etiology  - PT eval   Cardiomyopathy.  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 Most recent TTE on 6/4 showing EF55%. Pt concerned current sx may be side effect from entresto  - c/w carvedilol 12.5mg BID  - will hold entresto in setting of soft BP and pt hesitancy to restart due to side effects. In prior studies, dizziness is reported as an adverse event "at  least 5%" of patients taking entresto.  will hold all BP meds upond d/c 2/2 hypotension   Vomiting.  - will c/w IVF overnight and repeat lactate in AM  - zofran PRN nausea.   Mixed germ cell tumor.   now in remission.  ********************************************** Was hospitalized and now off carvedilol and Entresto. Doing better at this time. Only bothered by paresthesias of the feet and occasion syncope with changes in position. the latter has occurred when getting up from bed or with sitting to standing at times. Appetite is good, no weight loss. No cough, no ABARCA. No palpitations noted. Chronic back pains, no changes. No HA. Libido is down, erections are difficult to achieve, are not durable,  no ejaculation.  9/10/19...Feels "okay". he has some intermittent swelling noted in the hands and feet, occasionally in face, may last for several days. He has paresthesias of the fingertips the hands, not as bad as it was previously. It can be shock, like, noted more at night, more prominent in the feet. Gabapentin didn't help. Has his usual back pains, takes oxycodone 15 mg 2-4 times a day depending on pain. Appetite is good, No diarrheal stools, has constipation. Weight increased 3 kilos. No nausea, no vomiting.  BM about 3 times a week, sometimes hard. Advised to take Colace. Occ has a blistering rash on the bottom of the feet, Rx'd from dermatology.He still has blisters from time to time. The foot swells and the skin starts to crack. Fatigue unchanged. Tries to walk about but the pain in the feet causes discomfort, occasionally uses his cane to walk. Urine flow is impaired as before with some discomfort, not burning. Nocturia x 3-4. NO blood. occ incontinence- leaking of urine if he goes to stand at times. He noted blood in his stool 2 night sin a row. Reddish color, fresh appearing.   12/13/20...Continues to struggle with neuropathy, seen by neuro, had NCV, started nortriptyline for the neuropathy at bed time.  He feels it helps somewhat, dose increased to 50. Appetite is good. Weight is stable. Has impaired activities due to the neuropathy, does not leave the house much. Goes to PT and doctor appts. Has back pains form prior injury, on long term oxycodone, takes 15 mg 3-4 times a day. Anxiety level is low except at night, which he attributes to pain. He feels he is not depressed. No cough, mild ABARCA, attributed to weight increase. Occ headaches, right sided, once every few months. Fatigue is 8 on a 0-10 scale. Poor libido, not sexually active, no attempts  6/9/20...Seen today. Was on zonasamide by neurology, felt he had tremors and grater degree of anxiety. Insurance won't permit Lyrica, gabapentin created swelling. Taking oxycodone 15, up to 4 times a day, trying to cut down. Interested in med marijuana. he says he does not sleep well. Awake from 2 to 6 AM. Neuropathy keeps him awake, less noted during the day. Neuropathy affects the feet to the upper calves, shooting pains at night. Also has neuropathy of the hands as well. Says he has difficulty typing on the computer. Hand will shake when using the mouse. Occ drops a fork. Appetite is good, weight increased. Knee pains as well. No cough, some ABARCA remains, which he attributes to lack of exercise. Not seeing a mental health professional. I have suggested he see Dr Almanza in the past. He feels he has spent 5 years in "isolation".  Neurologist notes reviewed they ordered an immunofixation which showed a slight IgG lambda band. No further evaluation was performed. No headaches recently, occ dizziness of he gets up fast. Tries to keep himself active, but says he cannot do much. He says his feet swell with activities. He walks with a cane.  9/8/20... Presents for follow up. On medical marijuana, which he says helps, uses mainly at night as a sleep aide. Chronic low back pains from prior injury, oxycodone 15, 3-4 times a day.  Pain always present, does not awaken him. He does have some sleep issues due to the neuropathy. Neuropathy extends to lower 1/3 of calves. No better, no worse. No other pains except some joint pains. Appetite is decreased, he feels is sec to the duloxetine. He sees Symone Shields. He has lost some weight. No cough, no ABARCA. Fatigue is present, stated to occur after 2 blocks of walking, fatigue in the legs. He says he is active during the day, walks about, less computer and video game use. Occ nausea, no vomiting. No diarrhea, no constipation. Urine flow is weaker, some dysuria/pain with voiding. No blood in urine. Has not any UTIs, since the surgery. No libido, no interest in masturbation, has not tried. No AM erections. T levels have been normal.   12/8/20...Off the oxycodone 15s, on medical marijuana, which he feels it is better controlled. He sleeps better. has some pain, but did not take anything this AM. Lower back is the sites as well as the discomfort in the extremities. The med marijuana helps the neuropathy as well. Eats more, gained weight. He is more energetic. He feels it is hard to exercise, as he is not as strong as before, tired after a walk down the block. Much more talkative today. he is concerned about blacking out. Also has some muscle spasms at times with activities. Does climb stairs in the house. No cough. ABARCA present. He feels his urine stream is restricted with some discomfort. "Like a lot of fluid trying to fit thru a small space". He is going to call Dr Oconnor about these issues. uses a shower chair as he is worried about "blacking out in the shower" if he raise his hands above his head.   3/9/21 - Verbal permission for telehealth services was granted by the patient, Cleve Calderón, on March 9, 2021 at 10:12 AM.  Mixed germ cell tumor Diagnosed in 2017, underwent radical orchiectomy on the left side in July 20, 2017.  His stage was 2A, good risk.  He received cisplatin and etoposide starting on August 14 of 2017.  Cycle 4 was delayed and then omitted as he presented with supraventricular tachycardia and a low ejection fraction.  This was felt to be secondary to the indwelling central venous catheter, which was located in the appropriate spot.  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  He underwent an RPLND which was delayed, and he had residual viable embryonal carcinoma.  He was started on TIP in July 2018, received 3 cycles.   Overall, feels the same. Neuropathy is the main issues, feels he cannot rest at all. The medical marijuana helps him rest. Takes it at night and throughout the day when he feels he needs it. Onset of action if from 7 to 20 minutes. Appetite is fine, no weight loss of note. No cough, some ABARCA, after one block, he is SOB and exhausted. Occ headaches noted, brief duration. Some balance issues, no falls. He still feels he has some dexterity issues in the hands. He has some discomfort when he voids, a pressure like sensation as if the has some flow issues, since the surgery, no change. No dysuria. Has not had a CV vaccine. Libido is poor, no attempt, no erections.   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 Had to receive additional chemo with TIP. He is active and exercising, frustrated with the disability process. He is able to do more at this time. Has pain from the neuropathy with walking uphill. He notes some swelling of the left leg and right hand area, and sometimes the swelling in the hand makes it difficult to close his fist. Appetite is "poor" lately , trying to lose weight. He feels food is not as appealing as it once was, antedates this to the chemotherapy. Has had some nausea in recent times. He feels red meat is the issue. This has occurred for "some time". He says his weight was 289, now 260, over the course of 2.5 weeks. He also notes some bloating and gas. Some cramp like pains at times. had his first CV vaccine (Moderna). Breathing is fine, notes some chest pressure the last few nights. Some minor headaches in recent times, middle of the head, takes Advil or Tylenol, which does not seem to help much. These occur about 2 times a week, may last few minutes. Urinary difficulty, feels the muscle is not fully "retracting". Feels the flow is not what it should be and has a sensation of incomplete voiding. NO blood, occ incontinence-occurred x 2. Feels his right arm has increased numbness and it may be a bit weaker. Was doing exercises that PT had previously recommended. Feels he has issues when typing. No F/C/S. Notes fatigue, exercises in the backyard at times. If he stands more than 10 minutes, he feels exhausted.  Libido is poor. No interest, not sexually active, no masturbation. No full AM erections.  10/12/21...Dr Shields notes reviewed. last chemo was 3 years ago. Doing well. Overall, he feels occasional nausea at times. He is on duloxetine and medical marijuana. Still has complaints about his neuropathy with pain and discomfort, not as well controlled as it was last few visits, when he said the med marijuana produced much benefit. He said it calms him, but he still does not sleep well. He feels the duloxetine helps with the tingling sensation. Bottom of the feet feels as if he has "bubble wrap" on the bottom of his feet. Occ falls in the shower, some issue with stairs. Appetite is good, but says he lost some weight. Not seen in office since December 2020. Says he weight 257 at home, was 283 last visit. Occ nausea, no recent vomiting. Occ headaches. Tries to walk about during the day, and trying to be more active. Anxiety and depression are the same. Denies thoughts of self harm. Says he is frustrated at times.   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 Received 3 cycles, delayed interval, then started TIP, received 3 cycles, went to RPLND.  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 No subsequent therapy. Stopped duloxetine, as he felt like a different person. He remains on medical marijuana. Neuropathy is still an issue. He has some dizziness at times, if he gets up to quickly or turns too quickly. He feels his nervous system is "all screwed up". The dizziness occurs a few times a day to a few times a week. He reads, walks about, does not go out due to COVID concerns, received booster a few weeks ago. Appetite is "variable". He says he loses weight and then regains the weight. Nausea at times, a chronic issue. No vomiting, no constipation, no diarrhea. Headaches occur, chronic, at least once a week. Mid skull, takes Advil or Tylenol with some relief. He thinks they are sinus headaches. No edema in legs, feels his feet are swollen at times. Paresthesias extend to just below the calf on the left, on right, just the ball of the foot, no change. Fatigue is considerable, says he cannot stand for more than 10 minutes w/o being exhausted.   6/2//22...missed April TEB. More than 4 years after diagnosis.  "Still dealing with side effects, I try to push my self as best I can". Has lost weight, about 40 pounds, intentionally. He has changed his diet, eats smaller quantities, avoids some foods that don't agree with him. He is also walking more, usually with someone else. He has fallen a few times. as a result of the neuropathy. He feels clumsy.  Continues with his neuropathy, hands and feet, tingling and burning in left foot> right foot, feels left hand is stronger than the right. Says he does not realize how much pressure to place if the grabs a cup, may drop it. Neuropathy extends to the ball of the foot on the right foot, extends to mid calf on the left. The area feels tight and uncomfortable. No cough, no ABARCA. Still has some occasional edema of the legs and the skin may crack, no fluid leaking. No chest pain/pressure/...some palpitations on occasion are noted. Has some nausea at times with certain smells or taste, may have regurgitation at times. He may have 4-5 days w/o a BM, then may have some diarrhea. Saw dentist recently as he had some issues with his teeth, he is going to have 6 teeth removed at [facility]. Also has to have some implants for the front teeth, dentist blamed the chemo. Poor libido, no desire, no erections, does not try to masturbate. He feels like his flow is restricted, has to wait a minute or two before urine comes out, no dysuria. Nocturia x 5, sometimes has to return to bathroom for incomplete voiding. He admits to depression, still present, also has anxiety, says he doesn't know what is next and doesn't know to cope. Does not feel he can benefit from seeing someone about A and D, says he has to adapt to the way things  are. Had a low grade temp last night and changed appt to telehealth. Fatigue  is less at this time. Mentally, he does not feel where he should be. Uses medical marijuana, costly, but feels it helps the neuropathy more than any other medication that was prescribed.   11/17//22..diagnosed 5 years ago.  last chemo 4 years ago, received 3 cycle TIP. He had a delayed RPLND with viable tumor. Initial testicular tumor was 95% teratoma.  Stays mostly at home. He lifts some weight on occasion, or walk for a time. He says it is hard for him to get about due to the neuropathy.  Can do up to 1/2 block. No meds for the neuropathy. Worse at night, feels his body is constantly in motion, sometimes with  sharp pain. Uses compression socks, which he feels helps at night. He has fatigue and trouble "maintaining stamina during the day". Occasionally falls. Neuropathy up to just below the knee on the left, just 1/2  on the right. Pain is more on the left. Appetite is "okay, variable", notes some taste alteration some days. Checks weight frequently, up and down a few pounds. No cough, but feels he has ABARCA with walking, or climbing 1/2 a staircase. Occ headaches, goes to sleep, no meds. They occur 1-2 times a month.  No dizziness. Feels he has swelling of his legs. No fevers, no chills. NO chest pain/pressure. No palpitations unless he lifts weights (kettle ball, 25 pounds). He feels his remaining testicle may be swollen at times due to the hydrocele. Libido is poor, he says masturbation is painful and not enjoyable.   2/14/23....diagnosed 5 years ago.  last chemo 4 years ago, received 3 cycle TIP. He had a delayed RPLND with viable tumor. Initial testicular tumor was 95% teratoma. Still has neuropathy, no worse, "not manageable". pain will sometimes shoot up when he is trying to sleep. Does not sleep well. Extends to mid calf on the left, his "bad leg".  On the right it extends to the ball f the foot only.  Just the fingertips on the hands, intermittent. Says he cannot sense heat at times. Tinnitus noted, both ears, noted more at night, occ relents. Appetite is variable, depends on his "state of depression". No change of note in weight. Feels that he cannot help his family as he should, father is approaching ESRD, mother has kidney issues as well.  He lives with them.  he continues to fall, had a fall last night night, "fell hard", getting something out of a cabinet, couldn't pivot fast enough and fell to the ground. Walks with a cane, not so much in the house. Wears sandals in the house due to neuropathy, has difficulty walking in shoes and uses the cane.  He has felt that any med for neuropathy has not had the "right effect on my body". Depressed, does not go out, no friends. He says he has tried to reach out to others, but feel they have turned their back. No cough/ABARCA.  Headaches, occur, almost every day, center of the head, may last 1/2 the day, takes Tylenol or ibuprofen, which does not help much. HA have been present for years.  feels he only sleeps 3 hours a night.  he naps during the day. No nausea with headaches, has occasional nausea. Occ vomits. No D/C. Feels the left leg is swollen from time to time. No chest pain/pressure. Occ palpitations, which he attributes to anxiety. he was denied disability, saw a doctor, appointed, non-neurologist,  who told him he can work. libido is poor, weak erections, does not even try to mastu  6/1/23....diagnosed 5+ years ago.  last chemo 4 years ago, received 3 cycle TIP. He had a delayed RPLND with viable tumor. Initial testicular tumor was 95% teratoma.  Doing "okay".  neuropathy remains "painful", minimally better with current meds, including med marijuana. Extends proximal on the left and the bottom of the foot on the right, fingertips affected as well, mostly the medial 2 fingers. On duloxetine 60 in AM and 30 in PM, sleeps better with it. Has tinnitus as before. Does some "light activity", tried to do some exercise. Attempting to do more physical activity, walking with a cane. Thinking about getting another dog, lives with family. Appetite is variable. Weight loss of 10 kilos since December. he attributes this to increased activity. has headaches, since the start of duloxetine, says he had it with prior course, Center of the head, intermittent, not daily No associated nausea. Takes some ibuprofen relieves it , or Tylenol. has fallen a few times. no injury, sometimes falls back if he looks up too long. No cough, some ABARCA. Urine flow is "painful", feels as if sphincter does not open wide, has pressure. No incontinence, occ urgency. Nocturia x 4. Sometime feels as if he doesn't fully empty. No edema. No chest pain/pressure/...no palpitations, unless he has anxiety issues. Feels his anxiety is better controlled, feels that depression is improved, due to improved sleep.  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 Cycle 4 delayed..then omitted due to tachycardia and low EF. RPLND delayed, residual viable embryonal cancer, TIP started July 29, 2018, cycle 3 delayed for grade 3 fatigue, then again one more week for transaminitis. Fourth planned cycle omitted. 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  last chemo 4.5 years ago, received 3 cycle TIP. He had a delayed RPLND with viable tumor. Initial testicular tumor was 95% teratoma. On medical cannabis only, stopped the duloxetine in July. Stopped ketamine cream as well.  Down somewhat, lost a friend to CHF. Overall, "it's been tough", father is on renal transplant list, not on HD as yet. Headaches, pressure like, more in the back of the head, 1-2 times a week. Last 20-30 minutes, improved from before. Appetite is decreased. Still has some nausea. Has some reflux, no meds taken. Vomiting "on occasion". Diarrhea on occasional as well. Sometime no BM for 4-5 days. Diarrheal stools afterwards. Neuropathy "still there", affects is daily activities, both walking and balance. May miss a step on stairs at times. Has fallen at times. No chest pain/pressure/palpitations. Occ edema noted after strenuous activity. Occ walks on treadmill for 15 minutes or more. Does a work-out regimen.  Lifts some weights. Occ cough, notes some ABARCA at times. Pains are located in his feet and legs, occ hands. LBP as before. Libido is poor, No spontaneous erections, does not masturbate. Last time he did in the past, he said it was painful.  6/11/24....diagnosed July 2017, almost 7 years ago.  last chemo 6 years ago, received 3 cycle TIP. He had a delayed RPLND, June 2018, with viable tumor. Initial testicular tumor was 95% teratoma. Only on medical marijuana at this time. Overall, "feels the same". Biggest problem is his neuropathy, helped by the medical marijuana. Above the ankle on the left leg, right side is not as notable. Has fallen, walks with a cane. Working on [employer], free-lancing. Appetite is variable. Occ nausea. Occ vomiting, avoid red meats. 2-3 times a week, usually in the AM. No weight loss recorded. He says he has lost weight by home scale. Some HA, usually about 4 times a week, last 20 minutes with some eye issues, no scotomata. No chest pain/pressure. Occ palpitations noted, may awaken him, along with anxiety. Minimal edema noted. No cough, some ABARCA, as before. Sleeps poorly, attributed to neuropathy, sleeps during the day. Has tinnitus on right.

## 2024-06-11 NOTE — REVIEW OF SYSTEMS
[Fatigue] : fatigue [Palpitations] : palpitations [Lower Ext Edema] : lower extremity edema [Vomiting] : vomiting [Constipation] : constipation [Diarrhea: Grade 0] : Diarrhea: Grade 0 [Joint Pain] : joint pain [Difficulty Walking] : difficulty walking [Anxiety] : anxiety [Depression] : depression [Muscle Weakness] : muscle weakness [Easy Bruising] : a tendency for easy bruising [Negative] : ENT [Fever] : no fever [Chills] : no chills [Recent Change In Weight] : ~T no recent weight change [Chest Pain] : no chest pain [Wheezing] : no wheezing [Cough] : no cough [SOB on Exertion] : no shortness of breath during exertion [Abdominal Pain] : no abdominal pain [Dysuria] : no dysuria [Incontinence] : no incontinence [Muscle Pain] : no muscle pain [Skin Rash] : no skin rash [FreeTextEntry9] : knees, hands, some cramps in legs [de-identified] : no pruritus [de-identified] : no HA, see above.  [de-identified] : not seeing anyone.

## 2024-06-11 NOTE — ASSESSMENT
[Palliative Care Plan] : not applicable at this time [FreeTextEntry1] : Cleve is seen in follow-up today. 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 He had interrupted chemotherapy. He had to receive second line therapy with TIP. He had a delayed retroperitoneal lymph node dissection with viable tumor. His initial testicular tumor was 95% teratoma, and at the time of retroperitoneal lymph node dissection, the tumor was predominantly embryonal carcinoma.  He states that his biggest problem is his neuropathy.  It has been helped by medical marijuana and is the only agent he is taking for.  Extends above the ankle on the left leg and the right side is not as problematic.  He has fallen down.  He ambulates with a cane.  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  His appetite is variable.  He notes some occasional nausea.  He may vomit every once in a while and he avoids red meats.  He usually vomits about 2-3 times a week, usually in the morning.  There is no weight loss recorded.  He says he has lost some weight according to his home scale.  He does have occasional headaches, occurring about 4 times a week.  There were brief duration with up to 20 minutes.  He does note some mild eye symptoms as well but not clearly scotomata.  There is no chest pain or chest pressure.  Occasional palpitations are noted, and he may awaken with them.  He also has them with anxiety.  he notes some minimal edema at times.  There is no cough but he does have some shortness of breath as before.  He says he sleeps poorly which she attributes to the neuropathy.  He also naps during the day.  He continues to have tinnitus, involving the right side.  He does have a monoclonal protein was evaluated in in December, and there is no need for follow-up at this time.  It was nonquantifiable with a week monoclonal.  He has a renal mass that needs to be followed up.  The last imaging studies were done in December.  The mass is 1.3 cm partly exophytic with heterogeneous uptake.  He needs to have repeat imaging done to assess this lesion.  Today's white blood cell count was 5.8 with a hemoglobin value of 16.2 g.  The platelet count was 148,000.  The chemistry panel and tumor markers are pending.  It is assumed that they will be normal.  He is now about 7 years from his diagnosis, and about 4-1/2 years from his last chemotherapy.  He does admit to both anxiety and depression.  He says that he is concerned about his parents health.  His father just underwent a kidney transplant recently, and his mother's health is also a concern.  He once again declines seeing a mental health professional.  All questions were answered to the best my ability and to his apparent satisfaction.  I informed him that I am going to be leaving the practice and moving to another location in [place].  He said he wanted to come to [place] to see me, but he does not drive.  I told him that he will be reassigned to one of my colleagues.  A new colleague is joining in September.  In the event of any developments in the interim, he can always reach out to contact me for an earlier appointment.  Imaging studies have been requested and I will contact him with the results once they have been posted.

## 2024-06-12 LAB
AFP-TM SERPL-MCNC: 2.1 NG/ML
ALBUMIN SERPL ELPH-MCNC: 4.4 G/DL
ALP BLD-CCNC: 55 U/L
ALT SERPL-CCNC: 46 U/L
ANION GAP SERPL CALC-SCNC: 11 MMOL/L
AST SERPL-CCNC: 32 U/L
BILIRUB SERPL-MCNC: 0.5 MG/DL
BUN SERPL-MCNC: 12 MG/DL
CALCIUM SERPL-MCNC: 9.3 MG/DL
CHLORIDE SERPL-SCNC: 102 MMOL/L
CO2 SERPL-SCNC: 26 MMOL/L
CREAT SERPL-MCNC: 1.11 MG/DL
EGFR: 85 ML/MIN/1.73M2
GLUCOSE SERPL-MCNC: 108 MG/DL
HCG-TM SERPL-MCNC: <1 MIU/ML
LDH SERPL-CCNC: 172 U/L
MAGNESIUM SERPL-MCNC: 2.2 MG/DL
POTASSIUM SERPL-SCNC: 4.4 MMOL/L
PROT SERPL-MCNC: 7.3 G/DL
SODIUM SERPL-SCNC: 140 MMOL/L
TESTOST SERPL-MCNC: 600 NG/DL

## 2024-06-13 LAB
ALBUMIN MFR SERPL ELPH: 55.2 %
ALBUMIN SERPL-MCNC: 4 G/DL
ALBUMIN/GLOB SERPL: 1.2 RATIO
ALPHA1 GLOB MFR SERPL ELPH: 3.1 %
ALPHA1 GLOB SERPL ELPH-MCNC: 0.2 G/DL
ALPHA2 GLOB MFR SERPL ELPH: 10.9 %
ALPHA2 GLOB SERPL ELPH-MCNC: 0.8 G/DL
B-GLOBULIN MFR SERPL ELPH: 11.8 %
B-GLOBULIN SERPL ELPH-MCNC: 0.9 G/DL
DEPRECATED KAPPA LC FREE/LAMBDA SER: 1.52 RATIO
GAMMA GLOB FLD ELPH-MCNC: 1.4 G/DL
GAMMA GLOB MFR SERPL ELPH: 19 %
IGA SER QL IEP: 223 MG/DL
IGG SER QL IEP: 1431 MG/DL
IGM SER QL IEP: 102 MG/DL
INTERPRETATION SERPL IEP-IMP: NORMAL
KAPPA LC CSF-MCNC: 1.58 MG/DL
KAPPA LC SERPL-MCNC: 2.4 MG/DL
M PROTEIN MFR SERPL ELPH: NORMAL
M PROTEIN SPEC IFE-MCNC: NORMAL
MONOCLON BAND OBS SERPL: NORMAL
PROT SERPL-MCNC: 7.3 G/DL
PROT SERPL-MCNC: 7.3 G/DL

## 2024-06-15 ENCOUNTER — APPOINTMENT (OUTPATIENT)
Dept: CT IMAGING | Facility: IMAGING CENTER | Age: 44
End: 2024-06-15
Payer: MEDICAID

## 2024-06-15 ENCOUNTER — OUTPATIENT (OUTPATIENT)
Dept: OUTPATIENT SERVICES | Facility: HOSPITAL | Age: 44
LOS: 1 days | End: 2024-06-15
Payer: MEDICAID

## 2024-06-15 DIAGNOSIS — N28.89 OTHER SPECIFIED DISORDERS OF KIDNEY AND URETER: ICD-10-CM

## 2024-06-15 DIAGNOSIS — Z98.890 OTHER SPECIFIED POSTPROCEDURAL STATES: Chronic | ICD-10-CM

## 2024-06-15 DIAGNOSIS — Z95.828 PRESENCE OF OTHER VASCULAR IMPLANTS AND GRAFTS: Chronic | ICD-10-CM

## 2024-06-15 PROCEDURE — 74178 CT ABD&PLV WO CNTR FLWD CNTR: CPT

## 2024-06-15 PROCEDURE — 74178 CT ABD&PLV WO CNTR FLWD CNTR: CPT | Mod: 26

## 2024-06-21 ENCOUNTER — APPOINTMENT (OUTPATIENT)
Age: 44
End: 2024-06-21

## 2024-07-01 NOTE — ED PROVIDER NOTE - EKG #1 DATE/TIME
-longstanding history of polysubstance abuse, IV drug abuse, PTSD, anxiety, depression   - Currently not on suboxone, currently receiving pain medications for acute pain    06-Aug-2018 10:57

## 2024-08-05 ENCOUNTER — APPOINTMENT (OUTPATIENT)
Age: 44
End: 2024-08-05

## 2024-08-05 PROCEDURE — D0330 PANORAMIC RADIOGRAPHIC IMAGE: CPT

## 2024-08-05 PROCEDURE — D9310: CPT

## 2024-08-13 ENCOUNTER — APPOINTMENT (OUTPATIENT)
Age: 44
End: 2024-08-13
Payer: COMMERCIAL

## 2024-08-13 PROCEDURE — D0274: CPT

## 2024-08-13 PROCEDURE — D0230: CPT

## 2024-08-29 NOTE — DISCHARGE NOTE NURSING/CASE MANAGEMENT/SOCIAL WORK - NSDCVIVACCINE_GEN_ALL_CORE_FT
CONSTITUTIONAL: Non-toxic; in no apparent distress  HEAD: Normocephalic; atraumatic  EYES: PERRL; EOM intact   ENMT: External appears normal  NECK: Supple; non-tender  CARD: Normal S1, S2; no murmurs, rubs, or gallops  RESP: Normal chest excursion with respiration; breath sounds clear and equal bilaterally  ABD: Soft, non-distended; non-tender  EXT: Normal ROM in all four extremities; non-tender to palpation, no peripheral edema  SKIN: Warm, dry, no rash  NEURO:  No focal neurological deficiencies, gait normal, strength/sensation intact throughout No Vaccines Administered.

## 2024-09-03 ENCOUNTER — APPOINTMENT (OUTPATIENT)
Dept: GERIATRICS | Facility: CLINIC | Age: 44
End: 2024-09-03
Payer: MEDICAID

## 2024-09-03 DIAGNOSIS — C62.92 MALIGNANT NEOPLASM OF LEFT TESTIS, UNSPECIFIED WHETHER DESCENDED OR UNDESCENDED: ICD-10-CM

## 2024-09-03 PROCEDURE — 99213 OFFICE O/P EST LOW 20 MIN: CPT

## 2024-09-03 NOTE — ASSESSMENT
[FreeTextEntry1] : 44yoM with:   # L Testicular Cancer - s/p treatment. Med Onc follow up for surveillance.  # CIPN - -medical cannabis re-certification completed today.  # Encounter for Palliative Care - Emotional support provided.  Follow up as needed, call with questions/issues

## 2024-09-03 NOTE — DATA REVIEWED
[FreeTextEntry1] : CT CAP (6/15/2024) FINDINGS: LOWER CHEST: Within normal limits.  LIVER: Within normal limits. BILE DUCTS: Normal caliber. GALLBLADDER: Cholecystectomy. SPLEEN: Within normal limits. PANCREAS: Within normal limits. ADRENALS: Within normal limits. KIDNEYS/URETERS: An exophytic 1.3 cm right upper pole renal cyst with low-level enhancement and thin septations especially evident on delayed imaging, is unchanged in size dating back to 7/18/2021, when it was simple in appearance. Small left renal cyst and subcentimeter bilateral hypodense foci that are too small to characterize are unchanged. Right renal cortical scarring. No hydronephrosis.  BLADDER: Within normal limits. REPRODUCTIVE ORGANS: Prostate is within normal limits in size.  BOWEL: No bowel obstruction. Appendix is not visualized. PERITONEUM/RETROPERITONEUM: Within normal limits. VESSELS: Within normal limits. LYMPH NODES:  Status post retroperitoneal lymph node dissection. No adenopathy. Small subcentimeter short axis pelvic lymph nodes are unchanged. ABDOMINAL WALL: Small fat-containing umbilical hernia. BONES: Within normal limits.  IMPRESSION: Small right upper pole renal complex cystic mass is unchanged in size since 6/8/2023. This would be better evaluated/followed with MRI with contrast given its small size.    --- End of Report ---

## 2024-09-03 NOTE — HISTORY OF PRESENT ILLNESS
[Home] : at home, [unfilled] , at the time of the visit. [Medical Office: ([facility])___] : at the medical office located in  [Verbal consent obtained from patient] : the patient, [unfilled] [FreeTextEntry1] : 44yoM with mixed germ cell tumor presents for follow up visit via telephone.  Main issue for which patient was referred is pain/neuropathy.  He experiences neuropathy in his feet b/l, which has traveled up his legs to the calves. The pain feels like "little explosions," that occur intermittently.  He has a constant feeling of tingling and numbness.  He has trouble feeling the floor beneath him. The pain is worse at night.  Gabapentin caused edema, could not continue using it. His insurance company would not cover Lyrica. Zonisamide was prescribed by Neurology, he did not like how it made his hands shaky and he discontinued it. Nortriptyline had also been discontinued because he did not find it to be helpful.  Has difficulty with cooking as he can't chop food.   Interval Hx (9/3/24): Pt seen via telemedicine; last visit with me was 3/2023. He presents today to discuss re-certification for medicinal cannabis for his peripheral neuropathy which continues to plague him. The neuropathy affects his sleep. Continues to use marijuana to help with the neuropathy; he prefers to use the medical cannabis program to get more reliable, safe access. He uses the marijuana typically once in the morning and once at night.  Exercises his hands and feet to help promote the dexterity. Prefers to not use any pharmaceuticals at this time.   ROS: +weakness in his hands, difficulty with fine motor skills - has done OT in past.  +loss of sleep 2/2 neuropathy +mood is low, particularly since hearing that this neuropathy is likely permanent. All other ROS non-contributory.   [employer]-Stop Ref#:  [id number]

## 2024-09-06 ENCOUNTER — APPOINTMENT (OUTPATIENT)
Age: 44
End: 2024-09-06
Payer: COMMERCIAL

## 2024-09-06 PROCEDURE — D1110 PROPHYLAXIS - ADULT: CPT

## 2024-09-22 NOTE — CHART NOTE - NSCHARTNOTEFT_GEN_A_CORE
STATUS POST:  open RPLND    SUBJECTIVE: Pt seen and examined in PACU.  Patient reporting minimal pain.  He has not attempted to intake fluids.  No GI function.  No complaints.    SOB:  [ ] YES [ x] NO  Chest Discomfort: [ ] YES [ x] NO    Nausea: [ ] YES [x ] NO           Vomiting: [ ] YES [x ] NO  Pain Control Adequate: [ x] YES [ ] NO    Vital Signs Last 24 Hrs  T(C): 36.9 (07 Jun 2018 01:15), Max: 36.9 (07 Jun 2018 01:15)  T(F): 98.4 (07 Jun 2018 01:15), Max: 98.4 (07 Jun 2018 01:15)  HR: 92 (07 Jun 2018 01:15) (85 - 97)  BP: 105/67 (07 Jun 2018 01:00) (92/55 - 126/81)  BP(mean): 77 (06 Jun 2018 23:00) (74 - 81)  RR: 12 (07 Jun 2018 01:15) (10 - 17)  SpO2: 100% (07 Jun 2018 01:15) (95% - 100%)  I&O's Detail    06 Jun 2018 07:01  -  07 Jun 2018 02:17  --------------------------------------------------------  IN:    lactated ringers.: 350 mL  Total IN: 350 mL    OUT:    Indwelling Catheter - Urethral: 400 mL  Total OUT: 400 mL    Total NET: -50 mL          MEDICATIONS  (STANDING):  heparin  Injectable 5000 Unit(s) SubCutaneous three times a day  HYDROmorphone PCA (1 mG/mL) 30 milliLiter(s) PCA Continuous PCA Continuous  lactated ringers. 1000 milliLiter(s) (125 mL/Hr) IV Continuous <Continuous>  senna 2 Tablet(s) Oral at bedtime    MEDICATIONS  (PRN):  fentaNYL    Injectable 25 MICROGram(s) IV Push every 5 minutes PRN Mild Pain (1 - 3)  fentaNYL    Injectable 50 MICROGram(s) IV Push every 5 minutes PRN Severe Pain  HYDROmorphone  Injectable 0.5 milliGRAM(s) IV Push every 10 minutes PRN Moderate Pain  HYDROmorphone PCA (1 mG/mL) Rescue Clinician Bolus 0.5 milliGRAM(s) IV Push every 15 minutes PRN for Pain Scale GREATER THAN 6  naloxone Injectable 0.1 milliGRAM(s) IV Push every 3 minutes PRN For ANY of the following changes in patient status:  A. RR LESS THAN 10 breaths per minute, B. Oxygen saturation LESS THAN 90%, C. Sedation score of 6  ondansetron Injectable 4 milliGRAM(s) IV Push every 6 hours PRN Nausea  ondansetron Injectable 4 milliGRAM(s) IV Push every 6 hours PRN Nausea and/or Vomiting      GEN: A&Ox3  RESP: no increase WOB, no retraction, no desaturations on RA  GI: soft, mildly distended, appropriately tender, dressings c/d/i  EXTREMITIES: warm, pink, well-perfused    A/P: 37y Male s/p open RPLND    - Diet: CLD  - Monitor HR & BP  - IVF  - Activity: OOB/ambulate  - Post-op pain analgesia   - DVT ppx
22-Sep-2024 04:01

## 2024-09-30 ENCOUNTER — APPOINTMENT (OUTPATIENT)
Age: 44
End: 2024-09-30

## 2024-10-04 ENCOUNTER — APPOINTMENT (OUTPATIENT)
Age: 44
End: 2024-10-04
Payer: COMMERCIAL

## 2024-10-04 PROCEDURE — D2392: CPT

## 2024-10-04 PROCEDURE — D2330: CPT

## 2024-10-04 PROCEDURE — D2391: CPT

## 2024-10-04 PROCEDURE — D2331: CPT

## 2024-11-11 ENCOUNTER — APPOINTMENT (OUTPATIENT)
Age: 44
End: 2024-11-11
Payer: COMMERCIAL

## 2024-11-11 PROCEDURE — D7140: CPT

## 2024-11-20 ENCOUNTER — APPOINTMENT (OUTPATIENT)
Age: 44
End: 2024-11-20
Payer: COMMERCIAL

## 2024-11-20 PROCEDURE — D2332: CPT

## 2024-11-20 PROCEDURE — D2393: CPT

## 2024-11-20 PROCEDURE — D2392: CPT

## 2024-11-27 ENCOUNTER — APPOINTMENT (OUTPATIENT)
Age: 44
End: 2024-11-27
Payer: COMMERCIAL

## 2024-11-27 PROCEDURE — D2335: CPT

## 2024-12-04 NOTE — DISCHARGE NOTE ADULT - NS AS DC FU [facility] LV FUNCTION ASSESSMENT
Detail Level: Detailed Right Leg: Peripheral Vascular Disease?: No Left Dorsalis Pedis Pulse: 2 (Easily palpable) Show Diagnoses Variable: Yes Left Leg Circumference: medium no

## 2024-12-07 NOTE — H&P PST ADULT - LAST PROSTATE EXAM
[Alert] : alert [No Acute Distress] : no acute distress [EOMI] : extra ocular movement intact [No LAD] : no lymphadenopathy [Clear to Auscultation] : lungs were clear to auscultation bilaterally [Normal S1, S2] : normal S1 and S2 [Normal Rate] : heart rate was normal [Normal Reflexes] : deep tendon reflexes were 2+ and symmetric [No Tremors] : no tremors [Oriented x3] : oriented to person, place, and time [Normal Affect] : the affect was normal [Normal Insight/Judgement] : insight and judgment were intact [Normal Mood] : the mood was normal [Normal Rate and Effort] : normal respiratory rate and effort [de-identified] : Left thyroid nodule denies

## 2024-12-13 ENCOUNTER — APPOINTMENT (OUTPATIENT)
Dept: INTERNAL MEDICINE | Facility: CLINIC | Age: 44
End: 2024-12-13

## 2025-01-08 ENCOUNTER — APPOINTMENT (OUTPATIENT)
Age: 45
End: 2025-01-08
Payer: COMMERCIAL

## 2025-01-08 PROCEDURE — D2332: CPT

## 2025-01-08 PROCEDURE — D2335: CPT

## 2025-02-21 ENCOUNTER — APPOINTMENT (OUTPATIENT)
Age: 45
End: 2025-02-21

## 2025-03-13 ENCOUNTER — OUTPATIENT (OUTPATIENT)
Dept: OUTPATIENT SERVICES | Facility: HOSPITAL | Age: 45
LOS: 1 days | Discharge: HOME | End: 2025-03-13

## 2025-03-13 DIAGNOSIS — C62.92 MALIGNANT NEOPLASM OF LEFT TESTIS, UNSPECIFIED WHETHER DESCENDED OR UNDESCENDED: ICD-10-CM

## 2025-03-13 DIAGNOSIS — Z98.890 OTHER SPECIFIED POSTPROCEDURAL STATES: Chronic | ICD-10-CM

## 2025-03-13 DIAGNOSIS — Z90.79 ACQUIRED ABSENCE OF OTHER GENITAL ORGAN(S): Chronic | ICD-10-CM

## 2025-03-13 DIAGNOSIS — Z95.828 PRESENCE OF OTHER VASCULAR IMPLANTS AND GRAFTS: Chronic | ICD-10-CM

## 2025-03-17 ENCOUNTER — RESULT REVIEW (OUTPATIENT)
Age: 45
End: 2025-03-17

## 2025-03-17 ENCOUNTER — APPOINTMENT (OUTPATIENT)
Dept: HEMATOLOGY ONCOLOGY | Facility: CLINIC | Age: 45
End: 2025-03-17
Payer: MEDICAID

## 2025-03-17 ENCOUNTER — NON-APPOINTMENT (OUTPATIENT)
Age: 45
End: 2025-03-17

## 2025-03-17 VITALS
HEIGHT: 73 IN | OXYGEN SATURATION: 94 % | WEIGHT: 248.24 LBS | SYSTOLIC BLOOD PRESSURE: 111 MMHG | TEMPERATURE: 97.3 F | HEART RATE: 72 BPM | DIASTOLIC BLOOD PRESSURE: 74 MMHG | BODY MASS INDEX: 32.9 KG/M2 | RESPIRATION RATE: 17 BRPM

## 2025-03-17 LAB
BASOPHILS # BLD AUTO: 0.03 K/UL — SIGNIFICANT CHANGE UP (ref 0–0.2)
BASOPHILS NFR BLD AUTO: 0.5 % — SIGNIFICANT CHANGE UP (ref 0–2)
EOSINOPHIL # BLD AUTO: 0.04 K/UL — SIGNIFICANT CHANGE UP (ref 0–0.5)
EOSINOPHIL NFR BLD AUTO: 0.6 % — SIGNIFICANT CHANGE UP (ref 0–6)
HCT VFR BLD CALC: 46.2 % — SIGNIFICANT CHANGE UP (ref 39–50)
HGB BLD-MCNC: 16 G/DL — SIGNIFICANT CHANGE UP (ref 13–17)
IMM GRANULOCYTES NFR BLD AUTO: 0.5 % — SIGNIFICANT CHANGE UP (ref 0–0.9)
LYMPHOCYTES # BLD AUTO: 2.38 K/UL — SIGNIFICANT CHANGE UP (ref 1–3.3)
LYMPHOCYTES # BLD AUTO: 36.7 % — SIGNIFICANT CHANGE UP (ref 13–44)
MCHC RBC-ENTMCNC: 33.2 PG — SIGNIFICANT CHANGE UP (ref 27–34)
MCHC RBC-ENTMCNC: 34.6 G/DL — SIGNIFICANT CHANGE UP (ref 32–36)
MCV RBC AUTO: 95.9 FL — SIGNIFICANT CHANGE UP (ref 80–100)
MONOCYTES # BLD AUTO: 0.51 K/UL — SIGNIFICANT CHANGE UP (ref 0–0.9)
MONOCYTES NFR BLD AUTO: 7.9 % — SIGNIFICANT CHANGE UP (ref 2–14)
NEUTROPHILS # BLD AUTO: 3.49 K/UL — SIGNIFICANT CHANGE UP (ref 1.8–7.4)
NEUTROPHILS NFR BLD AUTO: 53.8 % — SIGNIFICANT CHANGE UP (ref 43–77)
NRBC BLD AUTO-RTO: 0 /100 WBCS — SIGNIFICANT CHANGE UP (ref 0–0)
PLATELET # BLD AUTO: 155 K/UL — SIGNIFICANT CHANGE UP (ref 150–400)
RBC # BLD: 4.82 M/UL — SIGNIFICANT CHANGE UP (ref 4.2–5.8)
RBC # FLD: 12.1 % — SIGNIFICANT CHANGE UP (ref 10.3–14.5)
WBC # BLD: 6.48 K/UL — SIGNIFICANT CHANGE UP (ref 3.8–10.5)
WBC # FLD AUTO: 6.48 K/UL — SIGNIFICANT CHANGE UP (ref 3.8–10.5)

## 2025-03-17 PROCEDURE — 99204 OFFICE O/P NEW MOD 45 MIN: CPT

## 2025-03-18 LAB
AFP-TM SERPL-MCNC: 2.3 NG/ML
ALBUMIN SERPL ELPH-MCNC: 4.6 G/DL
ALP BLD-CCNC: 69 U/L
ALT SERPL-CCNC: 59 U/L
ANION GAP SERPL CALC-SCNC: 10 MMOL/L
AST SERPL-CCNC: 37 U/L
BILIRUB SERPL-MCNC: 0.5 MG/DL
BUN SERPL-MCNC: 23 MG/DL
CALCIUM SERPL-MCNC: 9.7 MG/DL
CHLORIDE SERPL-SCNC: 104 MMOL/L
CO2 SERPL-SCNC: 27 MMOL/L
CREAT SERPL-MCNC: 1.1 MG/DL
EGFRCR SERPLBLD CKD-EPI 2021: 85 ML/MIN/1.73M2
GLUCOSE SERPL-MCNC: 93 MG/DL
HCG-TM SERPL-MCNC: <1 MIU/ML
LDH SERPL-CCNC: 188 U/L
POTASSIUM SERPL-SCNC: 4.7 MMOL/L
PROT SERPL-MCNC: 7.7 G/DL
SODIUM SERPL-SCNC: 141 MMOL/L

## 2025-03-21 ENCOUNTER — APPOINTMENT (OUTPATIENT)
Age: 45
End: 2025-03-21

## 2025-03-21 PROCEDURE — D0274: CPT

## 2025-03-21 PROCEDURE — D0120: CPT

## 2025-03-21 PROCEDURE — D1110 PROPHYLAXIS - ADULT: CPT

## 2025-03-26 ENCOUNTER — RESULT REVIEW (OUTPATIENT)
Age: 45
End: 2025-03-26

## 2025-03-27 NOTE — H&P PST ADULT - CENTRAL VENOUS CATHETER
WOUND CARE NOTE      REASON FOR VISIT:   Chief Complaint   Patient presents with    Unresponsive        HISTORY:  Past Medical History:   Diagnosis Date    BPH (benign prostatic hyperplasia)     Cirrhosis (CMS/HCC)     Essential (primary) hypertension     High cholesterol       No past surgical history on file.       ASSESSMENT & TREATMENT:     Wound Description  Consulted to see pt for skin breakdown. Pain assessed for pain medication/intervention prior to assessment. Pain medication not indicated. Skin above the lip noted with scabbed area, no drainage, 1.5x1.0x0.1cm, No periwound edema, erythema, induration or warmth at this time of assessment. Preventative measures in place including pressure reducing surface.       Recommendation:   MARIA ALEJANDRA surface along with aggressive offloading as tolerated by patient's medical condition. Preventative skin care. Rec. Keep skin above lip MARISSA with aggressive offloading. RN updated on recommendations.      Gurpreet: Gurpreet Scale Score: 13 (03/27/25 0800)  Nutrition:    Dietary Orders (From admission, onward)       Start     Ordered    03/23/25 1724  Nutrition Communication  ONE TIME        Question:  Nutrition communication (specify)  Answer:  Please send Jevity 1.5, 2 bottles please and thank you!    03/23/25 1723    03/22/25 1619  Tube Feeding Diet Jevity 1.5 Calorie/1.5 Calorie Formula - With Fiber; Without Diet Tray; Water; Every 4 hours; 200  DIET EFFECTIVE NOW        References:    [facility] Medical Nutrition Therapy   Question Answer Comment   Tube Feeding Products Jevity 1.5 Calorie/1.5 Calorie Formula - With Fiber    Administer enteral feeding. Choose schedule Continuous    Method By pump    Initiate enteral feeding at (mL/hr) 25    Increase feeding by (mL/hr) as tolerated until goal is reached 10 mL/hr every 8 hours    Advance to goal rate (mL/hr) Other (specify)    Other (specify) 55    Tube Feeding Relationship Without Diet Tray    Flush with  Water    Flush frequency Every 4 hours    Flush volume (mL) 200        03/22/25 1619                    Labs:  Recent Labs     03/27/25  0527   WBC 18.5*   GLUCOSE 110*     Allergies:   ALLERGIES:   Allergen Reactions    Penicillins HIVES     Meds:  Current Facility-Administered Medications   Medication    melatonin tablet 1 mg    modafinil (PROVIGIL) tablet 200 mg    amantadine (SYMMETREL) 50 MG/5ML solution 100 mg    CARBOXYMethylcellulose (REFRESH TEARS) 0.5 % ophthalmic solution 1 drop    lactulose (CHRONULAC) 10 GM/15ML solution 30 g    rifAXIMin (XIFAXAN) tablet 550 mg    tamsulosin (FLOMAX) capsule 0.4 mg    heparin (porcine) injection 5,000 Units    thiamine (VITAMIN B1) tablet 100 mg    albuterol (VENTOLIN) nebulizer 2.5 mg    albuterol (VENTOLIN) nebulizer 2.5 mg    fentaNYL (SUBLIMAZE) injection 25 mcg    cyanocobalamin (Vitamin B-12) tablet 1,000 mcg    folic acid (FOLATE) tablet 1 mg    hydrALAZINE (APRESOLINE) injection 10 mg    polyethylene glycol (MIRALAX) packet 17 g    docusate sodium-sennosides (SENOKOT S) 50-8.6 MG 2 tablet    bisacodyl (DULCOLAX) suppository 10 mg    magnesium hydroxide (MILK OF MAGNESIA) 400 MG/5ML suspension 30 mL                     no

## 2025-03-28 ENCOUNTER — APPOINTMENT (OUTPATIENT)
Dept: CT IMAGING | Facility: IMAGING CENTER | Age: 45
End: 2025-03-28
Payer: MEDICAID

## 2025-03-28 ENCOUNTER — OUTPATIENT (OUTPATIENT)
Dept: OUTPATIENT SERVICES | Facility: HOSPITAL | Age: 45
LOS: 1 days | End: 2025-03-28
Payer: MEDICAID

## 2025-03-28 DIAGNOSIS — C62.92 MALIGNANT NEOPLASM OF LEFT TESTIS, UNSPECIFIED WHETHER DESCENDED OR UNDESCENDED: ICD-10-CM

## 2025-03-28 DIAGNOSIS — Z95.828 PRESENCE OF OTHER VASCULAR IMPLANTS AND GRAFTS: Chronic | ICD-10-CM

## 2025-03-28 DIAGNOSIS — Z98.890 OTHER SPECIFIED POSTPROCEDURAL STATES: Chronic | ICD-10-CM

## 2025-03-28 PROCEDURE — 74177 CT ABD & PELVIS W/CONTRAST: CPT

## 2025-03-28 PROCEDURE — 74177 CT ABD & PELVIS W/CONTRAST: CPT | Mod: 26

## 2025-04-14 ENCOUNTER — APPOINTMENT (OUTPATIENT)
Dept: HEMATOLOGY ONCOLOGY | Facility: CLINIC | Age: 45
End: 2025-04-14

## 2025-04-17 ENCOUNTER — APPOINTMENT (OUTPATIENT)
Age: 45
End: 2025-04-17
Payer: COMMERCIAL

## 2025-04-17 PROCEDURE — D2392: CPT

## 2025-05-14 ENCOUNTER — APPOINTMENT (OUTPATIENT)
Age: 45
End: 2025-05-14
Payer: COMMERCIAL

## 2025-05-14 PROCEDURE — D7140: CPT

## 2025-05-14 PROCEDURE — D2335: CPT

## 2025-06-13 NOTE — BRIEF OPERATIVE NOTE - CONDITION POST OP
Stable [Healthy Appearing] : healthy appearing [Well Nourished] : well nourished [Interactive] : interactive [Obese] : obese [Normal Appearance] : normal appearance [Well formed] : well formed [Normally Set] : normally set [Upper Airway Sounds] : transmitted upper airway sounds [Abdomen Soft] : soft [Abdomen Tenderness] : non-tender [] : no hepatosplenomegaly [Normal] : normal [Goiter] : no goiter [de-identified] : morbidly obese, smiling [de-identified] : red eczematous rash over cheeks. dry skin over forearms around the bilateral wrists with a couple of excoriated spots.. [de-identified] : deferred

## 2025-07-21 NOTE — ED ADULT TRIAGE NOTE - TEMPERATURE IN FAHRENHEIT (DEGREES F)
"Subjective   Patient ID: Hleadio Jimenes is a 59 y.o. male who presents for Sleep Apnea and Pap Adherence Followup.    Prior sleep history:      HPI  History of Present Illness  The patient presents for a sleep study. He reports no difficulties in initiating or maintaining sleep. He receives CPAP supplies from [employer] via [employer], including filters. His CPAP machine operates at a pressure of 11 or 12. He does not have the MyAir clive installed.    Unable to obtain a compliance report       ESS: 2   Insomnia Severity Index  1. Difficulty falling asleep: None  2. Difficulty staying asleep: None  3. Problems waking up too early: None  4. How SATISFIED/DISSATISFIED are you with your CURRENT sleep pattern?: Satisfied  5. How NOTICEABLE to others do you think your sleep problem is in terms of impairing the quality of your life?: A Little  6. How WORRIED/DISTRESSED are you about your current sleep problem?: A Little  7. To what extent do you consider your sleep problem to INTERFERE with your daily functioning (e.g. daytime fatigue, mood, ability to function at work/daily chores, concentration, memory, mood, etc.) CURRENTLY?: A Little  AMEYA TS: 0-7 = No clinically significant insomnia 8-14 = Subthreshold insomnia 15-21 = Clinical insomnia (moderate severity) 22-28 = Clinical insomnia (severe): 4     Review of Systems  Review of systems negative except as per HPI  Objective     /76   Pulse 74   Ht 1.727 m (5' 8\")   Wt 99.8 kg (220 lb)   SpO2 97%   BMI 33.45 kg/m²      Physical Exam       PHYSICAL EXAM: GENERAL: alert pleasant and cooperative no acute distress  PSYCH EXAM: alert,oriented, in NAD with a full range of affect, normal behavior and no psychotic features     Results         Assessment/Plan     Assessment & Plan  Sleep apnea  Sleep apnea was under fair control in 2022, with an average of 5 events per hour. CPAP machine is set to a pressure range of 5-15, typically operating at 11 or 12. He does not have the MyAir " clive, which could help monitor events.  Treatment plan: Order supplies through [employer]. Advised to bring CPAP machine with power supply for manual data extraction if needed. Inform office if any issues.  Follow-up: None specified.     Problem List Items Addressed This Visit           ICD-10-CM    Obstructive sleep apnea syndrome - Primary G47.33    - Heladio Jimenes  has sleep apnea and requires treatment.  - Heladio Jimenes demonstrates good compliance and benefit from PAP therapy  - Continue Auto PAP 5-15 cmH20   - Order for renewal of PAP supplies placed through [employer]    - Follow-up in 12 months          Relevant Orders    Positive Airway Pressure (PAP) Therapy    Follow Up In Adult Sleep Medicine     There are no Patient Instructions on file for this visit.     This medical note was created with the assistance of artificial intelligence (AI) for documentation purposes. The content has been reviewed and confirmed by the healthcare provider for accuracy and completeness. Patient consented to the use of audio recording and use of AI during their visit.       98.6